# Patient Record
Sex: FEMALE | Race: WHITE | NOT HISPANIC OR LATINO | Employment: FULL TIME | URBAN - METROPOLITAN AREA
[De-identification: names, ages, dates, MRNs, and addresses within clinical notes are randomized per-mention and may not be internally consistent; named-entity substitution may affect disease eponyms.]

---

## 2019-09-03 ENCOUNTER — OFFICE VISIT (OUTPATIENT)
Dept: RETAIL CLINIC | Facility: CLINIC | Age: 49
End: 2019-09-03

## 2019-09-03 DIAGNOSIS — G51.0 BELL'S PALSY: Primary | ICD-10-CM

## 2019-09-03 PROCEDURE — 99214 OFFICE O/P EST MOD 30 MIN: CPT | Performed by: NURSE PRACTITIONER

## 2019-09-03 RX ORDER — ALBUTEROL SULFATE 1.25 MG/3ML
1 SOLUTION RESPIRATORY (INHALATION) EVERY 6 HOURS PRN
COMMUNITY
End: 2019-11-27

## 2019-09-03 RX ORDER — INSULIN DEGLUDEC 100 U/ML
58 INJECTION, SOLUTION SUBCUTANEOUS DAILY
COMMUNITY
End: 2020-04-08

## 2019-09-03 RX ORDER — PREDNISONE 20 MG/1
20 TABLET ORAL 2 TIMES DAILY
Qty: 10 TABLET | Refills: 0 | Status: SHIPPED | OUTPATIENT
Start: 2019-09-03 | End: 2019-09-08

## 2019-09-03 NOTE — PROGRESS NOTES
"Stacy Pack is a 49 y.o. female.     History of Present Illness   Patient presents with new, sudden onset of right facial droop that started on Saturday (3 days ago).  She denies any other neuro defecits, no trouble swallowing or speaking, no arm or leg wekeness, no headache, no dizziness, no vision changes.  She does have history of Bell's Palsy in 2002 and 2014 with same associated symptoms.  The facial droop has remained the same since Saturday, no improvement or worsening.  She called her PCP and was told to go to the ER and they \"refused to see her\" according to the patient.  The following portions of the patient's history were reviewed and updated as appropriate: allergies, current medications, past family history, past medical history, past social history, past surgical history and problem list.    Review of Systems   HENT: Positive for drooling (right side). Negative for sinus pressure, sneezing, sore throat, swollen glands, trouble swallowing and voice change.    Eyes:        Right eye dryness   Gastrointestinal: Negative for nausea and vomiting.   Musculoskeletal: Negative.    Skin: Negative.    Neurological: Positive for facial asymmetry and numbness (right face). Negative for speech difficulty and weakness. Headache: right side facial droop.       Objective   Physical Exam   Constitutional: She is oriented to person, place, and time. She is cooperative. No distress.   HENT:   Head: Normocephalic.   Right Ear: Hearing, tympanic membrane, external ear and ear canal normal.   Left Ear: Hearing, tympanic membrane, external ear and ear canal normal.   Nose: Nose normal.   Mouth/Throat: Oropharynx is clear and moist.   Eyes: Conjunctivae, EOM and lids are normal. Pupils are equal, round, and reactive to light.   Neck: Trachea normal and full passive range of motion without pain.   Cardiovascular: Normal rate, regular rhythm and normal pulses.   Pulmonary/Chest: Effort normal and breath sounds " normal.   Neurological: She is alert and oriented to person, place, and time.   Right side facial droop with associated numbness. Unable to completely close right eye, mouth droop on right, patient reports some drooling. No vocal wetness, no aphasia or dysphagia noted.  No other weakness in arm or leg.  Unable to raise right eyebrow or squint right eye, no movement noted on right side of face.  No other focal neuro defecits apparent.   Skin: Skin is warm. Capillary refill takes less than 2 seconds.   Psychiatric: She has a normal mood and affect. Her speech is normal and behavior is normal.   Vitals reviewed.        Assessment/Plan   Kelly was seen today for facial droop.    Diagnoses and all orders for this visit:    Bell's palsy    Other orders  -     predniSONE (DELTASONE) 20 MG tablet; Take 1 tablet by mouth 2 (Two) Times a Day for 5 days.      Educated patient that Amory Palsy does have some same signs and symptoms as stroke and there is possibility of this being stroke, although likely Bell's Palsy due to no other neuro concerns and history.  Instructed her that she would need to go to ER immediately with any weakness in arms, legs, sudden headache, trouble swallowing or speaking.  She verbalized understanding and agrees with treatment plan to treat with steroids and follow up with PCP or ER if any changes.  We also had long discussion about her diabetes and the steroids increasing her blood sugar, she stated she spoke with her endocrinologist today and was told that if steroids were started they could adjust her insulin doses.  I instructed her to notify them of the steroid use and get their orders on any changes.  Again I reiterated the importance of follow up with PCP and Endocrinology and if any changes she would need to go directly to the ED.

## 2019-09-03 NOTE — PATIENT INSTRUCTIONS
Bell Palsy, Adult    Bell palsy is a short-term inability to move muscles in part of the face. The inability to move (paralysis) results from inflammation or compression of the facial nerve, which travels along the skull and under the ear to the side of the face (7th cranial nerve). This nerve is responsible for facial movements that include blinking, closing the eyes, smiling, and frowning.  What are the causes?  The exact cause of this condition is not known. It may be caused by an infection from a virus, such as the chickenpox (herpes zoster), Radha-Barr, or mumps virus.  What increases the risk?  You are more likely to develop this condition if:  · You are pregnant.  · You have diabetes.  · You have had a recent infection in your nose, throat, or airways (upper respiratory infection).  · You have a weakened body defense system (immune system).  · You have had a facial injury, such as a fracture.  · You have a family history of Bell palsy.  What are the signs or symptoms?  Symptoms of this condition include:  · Weakness on one side of the face.  · Drooping eyelid and corner of the mouth.  · Excessive tearing in one eye.  · Difficulty closing the eyelid.  · Dry eye.  · Drooling.  · Dry mouth.  · Changes in taste.  · Change in facial appearance.  · Pain behind one ear.  · Ringing in one or both ears.  · Sensitivity to sound in one ear.  · Facial twitching.  · Headache.  · Impaired speech.  · Dizziness.  · Difficulty eating or drinking.  Most of the time, only one side of the face is affected. Rarely, Bell palsy affects the whole face.  How is this diagnosed?  This condition is diagnosed based on:  · Your symptoms.  · Your medical history.  · A physical exam.  You may also have to see health care providers who specialize in disorders of the nerves (neurologist) or diseases and conditions of the eye (ophthalmologist). You may have tests, such as:  · A test to check for nerve damage (electromyogram).  · Imaging  studies, such as CT or MRI scans.  · Blood tests.  How is this treated?  This condition affects every person differently. Sometimes symptoms go away without treatment within a couple weeks. If treatment is needed, it varies from person to person. The goal of treatment is to reduce inflammation and protect the eye from damage. Treatment for Bell palsy may include:  · Medicines, such as:  ? Steroids to reduce swelling and inflammation.  ? Antiviral drugs.  ? Pain relievers, including aspirin, acetaminophen, or ibuprofen.  · Eye drops or ointment to keep your eye moist.  · Eye protection, if you cannot close your eye.  · Exercises or massage to regain muscle strength and function (physical therapy).  Follow these instructions at home:    · Take over-the-counter and prescription medicines only as told by your health care provider.  · If your eye is affected:  ? Keep your eye moist with eye drops or ointment as told by your health care provider.  ? Follow instructions for eye care and protection as told by your health care provider.  · Do any physical therapy exercises as told by your health care provider.  · Keep all follow-up visits as told by your health care provider. This is important.  Contact a health care provider if:  · You have a fever.  · Your symptoms do not get better within 2-3 weeks, or your symptoms get worse.  · Your eye is red, irritated, or painful.  · You have new symptoms.  Get help right away if:  · You have weakness or numbness in a part of your body other than your face.  · You have trouble swallowing.  · You develop neck pain or stiffness.  · You develop dizziness or shortness of breath.  Summary  · Bell palsy is a short-term inability to move muscles in part of the face. The inability to move (paralysis) results from inflammation or compression of the facial nerve.  · This condition affects every person differently. Sometimes symptoms go away without treatment within a couple weeks.  · If  treatment is needed, it varies from person to person. The goal of treatment is to reduce inflammation and protect the eye from damage.  · Contact your health care provider if your symptoms do not get better within 2-3 weeks, or your symptoms get worse.  This information is not intended to replace advice given to you by your health care provider. Make sure you discuss any questions you have with your health care provider.  Document Released: 12/18/2006 Document Revised: 11/16/2018 Document Reviewed: 02/20/2018  Elsevier Interactive Patient Education © 2019 [employer]

## 2019-09-04 VITALS
OXYGEN SATURATION: 97 % | TEMPERATURE: 98.7 F | RESPIRATION RATE: 18 BRPM | DIASTOLIC BLOOD PRESSURE: 52 MMHG | HEART RATE: 100 BPM | SYSTOLIC BLOOD PRESSURE: 90 MMHG

## 2019-09-11 ENCOUNTER — TELEPHONE (OUTPATIENT)
Dept: FAMILY MEDICINE CLINIC | Facility: CLINIC | Age: 49
End: 2019-09-11

## 2019-09-11 NOTE — TELEPHONE ENCOUNTER
Patient has appt on 9-17-19. She is out of [employer] Plus test strips. Can these be sent to pharmacy until she is seen? She tests 2-3 times a day

## 2019-09-17 ENCOUNTER — OFFICE VISIT (OUTPATIENT)
Dept: FAMILY MEDICINE CLINIC | Facility: CLINIC | Age: 49
End: 2019-09-17

## 2019-09-17 VITALS
HEIGHT: 66 IN | TEMPERATURE: 98.7 F | SYSTOLIC BLOOD PRESSURE: 126 MMHG | DIASTOLIC BLOOD PRESSURE: 84 MMHG | BODY MASS INDEX: 40.98 KG/M2 | OXYGEN SATURATION: 96 % | WEIGHT: 255 LBS | HEART RATE: 120 BPM

## 2019-09-17 DIAGNOSIS — R80.9 ALBUMINURIA: ICD-10-CM

## 2019-09-17 DIAGNOSIS — H92.01 RIGHT EAR PAIN: Primary | ICD-10-CM

## 2019-09-17 DIAGNOSIS — E78.5 HYPERLIPIDEMIA, UNSPECIFIED HYPERLIPIDEMIA TYPE: ICD-10-CM

## 2019-09-17 DIAGNOSIS — H93.12 TINNITUS OF LEFT EAR: ICD-10-CM

## 2019-09-17 DIAGNOSIS — J30.9 ALLERGIC RHINITIS, UNSPECIFIED SEASONALITY, UNSPECIFIED TRIGGER: ICD-10-CM

## 2019-09-17 PROCEDURE — 99213 OFFICE O/P EST LOW 20 MIN: CPT | Performed by: NURSE PRACTITIONER

## 2019-09-17 RX ORDER — SIMVASTATIN 20 MG
20 TABLET ORAL DAILY
Refills: 1 | COMMUNITY
Start: 2019-08-30 | End: 2019-09-17 | Stop reason: SDUPTHER

## 2019-09-17 RX ORDER — LISINOPRIL 2.5 MG/1
2.5 TABLET ORAL DAILY
Qty: 90 TABLET | Refills: 0 | Status: SHIPPED | OUTPATIENT
Start: 2019-09-17 | End: 2020-03-10

## 2019-09-17 RX ORDER — FLUTICASONE PROPIONATE 50 MCG
2 SPRAY, SUSPENSION (ML) NASAL DAILY
Qty: 3 BOTTLE | Refills: 0 | Status: SHIPPED | OUTPATIENT
Start: 2019-09-17 | End: 2020-02-25

## 2019-09-17 RX ORDER — ERGOCALCIFEROL 1.25 MG/1
50000 CAPSULE ORAL WEEKLY
Refills: 0 | COMMUNITY
Start: 2019-08-12 | End: 2019-09-17 | Stop reason: SDUPTHER

## 2019-09-17 RX ORDER — ALBUTEROL SULFATE 90 UG/1
2 AEROSOL, METERED RESPIRATORY (INHALATION) AS NEEDED
Refills: 1 | COMMUNITY
Start: 2019-09-14 | End: 2020-05-15 | Stop reason: SDUPTHER

## 2019-09-17 RX ORDER — SIMVASTATIN 20 MG
20 TABLET ORAL DAILY
Qty: 90 TABLET | Refills: 0 | Status: SHIPPED | OUTPATIENT
Start: 2019-09-17 | End: 2020-11-02

## 2019-09-17 RX ORDER — FEXOFENADINE HYDROCHLORIDE 180 MG/1
180 TABLET, FILM COATED ORAL DAILY
Qty: 90 TABLET | Refills: 0 | Status: SHIPPED | OUTPATIENT
Start: 2019-09-17 | End: 2020-02-28

## 2019-09-17 RX ORDER — ERGOCALCIFEROL 1.25 MG/1
50000 CAPSULE ORAL WEEKLY
Qty: 12 CAPSULE | Refills: 0 | Status: SHIPPED | OUTPATIENT
Start: 2019-09-17 | End: 2021-06-11

## 2019-09-17 RX ORDER — FLUTICASONE PROPIONATE 50 MCG
SPRAY, SUSPENSION (ML) NASAL DAILY
COMMUNITY
End: 2019-09-17 | Stop reason: SDUPTHER

## 2019-09-17 RX ORDER — HYDROCHLOROTHIAZIDE 12.5 MG/1
12.5 TABLET ORAL DAILY
Qty: 90 TABLET | Refills: 0 | Status: SHIPPED | OUTPATIENT
Start: 2019-09-17 | End: 2020-05-15 | Stop reason: SDUPTHER

## 2019-09-17 RX ORDER — FEXOFENADINE HYDROCHLORIDE 180 MG/1
180 TABLET, FILM COATED ORAL DAILY
Refills: 6 | COMMUNITY
Start: 2019-08-30 | End: 2019-09-17 | Stop reason: SDUPTHER

## 2019-09-17 RX ORDER — LISINOPRIL 2.5 MG/1
2.5 TABLET ORAL DAILY
Refills: 1 | COMMUNITY
Start: 2019-09-03 | End: 2019-09-17 | Stop reason: SDUPTHER

## 2019-09-17 RX ORDER — HYDROCHLOROTHIAZIDE 12.5 MG/1
12.5 TABLET ORAL DAILY
COMMUNITY
End: 2019-09-17 | Stop reason: SDUPTHER

## 2019-09-17 NOTE — PROGRESS NOTES
Stacy Pack is a 49 y.o. female.     Chief Complaint   Patient presents with   • Ear Fullness     both ears       Earache    There is pain in both ears. The current episode started more than 1 year ago. There has been no fever. Associated symptoms include hearing loss and rhinorrhea. Pertinent negatives include no ear discharge. She has tried nothing for the symptoms. There is no history of a tympanostomy tube.     Hyperlipidemia: Symptoms: none. Medications:The patient is adherent with their medication regimen. Medication(s): statin. The patient is due for nothing at this time.       The following portions of the patient's history were reviewed and updated as appropriate: allergies, current medications, past family history, past medical history, past social history, past surgical history and problem list.    Past Medical History:   Diagnosis Date   • Albuminuria    • Allergic rhinitis    • Bell's palsy    • Blood glucose elevated    • Diabetes mellitus with albuminuria (CMS/HCC)    • Elevated blood pressure reading without diagnosis of hypertension    • Hyperlipidemia    • Migraine    • Right otitis media    • Type II diabetes mellitus (CMS/[facility])    • Vitamin D deficiency        Past Surgical History:   Procedure Laterality Date   • CATARACT EXTRACTION     • CHOLECYSTECTOMY     • HYSTERECTOMY      due to cancer       Family History   Problem Relation Age of Onset   • Breast cancer Mother    • Diabetes Father    • Hyperlipidemia Father    • Hypertension Father    • Asthma Maternal Grandfather        Social History     Socioeconomic History   • Marital status: Unknown     Spouse name: Not on file   • Number of children: Not on file   • Years of education: Not on file   • Highest education level: Not on file   Tobacco Use   • Smoking status: Never Smoker   • Smokeless tobacco: Never Used   Substance and Sexual Activity   • Alcohol use: Yes     Frequency: Monthly or less     Comment: rarely   • Drug use:  "No   • Sexual activity: No       Review of Systems   HENT: Positive for ear pain, hearing loss and rhinorrhea. Negative for ear discharge.        Objective   Vitals:    09/17/19 0800   BP: 126/84   BP Location: Left arm   Patient Position: Sitting   Pulse: 120   Temp: 98.7 °F (37.1 °C)   SpO2: 96%   Weight: 116 kg (255 lb)   Height: 167.6 cm (66\")      Body mass index is 41.16 kg/m².  Physical Exam   Constitutional: She appears well-developed and well-nourished.   HENT:   Head: Normocephalic and atraumatic.   Right Ear: Tympanic membrane and ear canal normal.   Left Ear: Tympanic membrane and ear canal normal.   Nose: Nose normal.   Mouth/Throat: Oropharynx is clear and moist.   Cardiovascular: Normal rate, regular rhythm and normal heart sounds.   Pulmonary/Chest: Effort normal and breath sounds normal.   Musculoskeletal: She exhibits no edema.   Skin: Skin is warm and dry.   Psychiatric: She has a normal mood and affect.   Nursing note and vitals reviewed.        Assessment/Plan   Kelly was seen today for ear fullness.    Diagnoses and all orders for this visit:    Right ear pain  -     Ambulatory Referral to ENT (Otolaryngology)    Tinnitus of left ear  -     Ambulatory Referral to ENT (Otolaryngology)    Hyperlipidemia, unspecified hyperlipidemia type  -     simvastatin (ZOCOR) 20 MG tablet; Take 1 tablet by mouth Daily.    Allergic rhinitis, unspecified seasonality, unspecified trigger  -     EQ ALLERGY RELIEF 180 MG tablet; Take 1 tablet by mouth Daily.  -     fluticasone (FLONASE) 50 MCG/ACT nasal spray; 2 sprays into the nostril(s) as directed by provider Daily.    Albuminuria  -     lisinopril (PRINIVIL,ZESTRIL) 2.5 MG tablet; Take 1 tablet by mouth Daily.    Other orders  -     vitamin D (ERGOCALCIFEROL) 21497 units capsule capsule; Take 1 capsule by mouth 1 (One) Time Per Week.  -     hydrochlorothiazide (HYDRODIURIL) 12.5 MG tablet; Take 1 tablet by mouth Daily.               "

## 2019-10-24 ENCOUNTER — OFFICE VISIT (OUTPATIENT)
Dept: RETAIL CLINIC | Facility: CLINIC | Age: 49
End: 2019-10-24

## 2019-10-24 VITALS
SYSTOLIC BLOOD PRESSURE: 136 MMHG | DIASTOLIC BLOOD PRESSURE: 86 MMHG | TEMPERATURE: 100 F | OXYGEN SATURATION: 95 % | HEART RATE: 103 BPM

## 2019-10-24 DIAGNOSIS — J06.9 ACUTE URI: ICD-10-CM

## 2019-10-24 DIAGNOSIS — J02.9 SORE THROAT: Primary | ICD-10-CM

## 2019-10-24 LAB
EXPIRATION DATE: NORMAL
INTERNAL CONTROL: NORMAL
Lab: NORMAL
S PYO AG THROAT QL: NEGATIVE

## 2019-10-24 PROCEDURE — 99213 OFFICE O/P EST LOW 20 MIN: CPT | Performed by: NURSE PRACTITIONER

## 2019-10-24 PROCEDURE — 87880 STREP A ASSAY W/OPTIC: CPT | Performed by: NURSE PRACTITIONER

## 2019-10-24 RX ORDER — BROMPHENIRAMINE MALEATE, PSEUDOEPHEDRINE HYDROCHLORIDE, AND DEXTROMETHORPHAN HYDROBROMIDE 2; 30; 10 MG/5ML; MG/5ML; MG/5ML
SYRUP ORAL
Qty: 240 ML | Refills: 0 | Status: SHIPPED | OUTPATIENT
Start: 2019-10-24 | End: 2019-11-27

## 2019-10-24 RX ORDER — PREDNISONE 20 MG/1
20 TABLET ORAL 2 TIMES DAILY
Qty: 10 TABLET | Refills: 0 | Status: SHIPPED | OUTPATIENT
Start: 2019-10-24 | End: 2019-11-27

## 2019-10-24 NOTE — PROGRESS NOTES
Subjective:     Kelly Pack is a 49 y.o.     Sore Throat    Episode onset: started 3 days ago. Maximum temperature: unknown. Associated symptoms include coughing, ear pain, headaches and neck pain. Pertinent negatives include no congestion. She has had no exposure to strep or mono. She has tried NSAIDs (chloraseptic spray and lozenges) for the symptoms. The treatment provided mild relief.         The following portions of the patient's history were reviewed and updated as appropriate: allergies, current medications, past family history, past medical history, past social history, past surgical history and problem list.      Review of Systems   Constitutional: Positive for fever.   HENT: Positive for ear pain and sore throat. Negative for congestion.    Respiratory: Positive for cough.    Musculoskeletal: Positive for neck pain.   Neurological: Positive for headaches.         Objective:      Physical Exam   HENT:   Head: Normocephalic and atraumatic.   Right Ear: Ear canal normal. Right ear middle ear effusion: mild.   Left Ear: Tympanic membrane and ear canal normal.   Nose: Nose normal.   Mouth/Throat: No oropharyngeal exudate. Posterior oropharyngeal erythema: mild.   Cardiovascular: Tachycardia present.   Pulmonary/Chest: Effort normal and breath sounds normal.   Lymphadenopathy:     She has cervical adenopathy (right).   Vitals reviewed.          Kelly was seen today for sore throat and generalized body aches.    Diagnoses and all orders for this visit:    Sore throat  -     POC Rapid Strep A    Acute URI    Other orders  -     brompheniramine-pseudoephedrine-DM (BROMFED DM) 30-2-10 MG/5ML syrup; 5 to 10 cc every 4 hours as needed for cough, congestion, allergies  -     predniSONE (DELTASONE) 20 MG tablet; Take 1 tablet by mouth 2 (Two) Times a Day.

## 2019-10-24 NOTE — PATIENT INSTRUCTIONS

## 2019-11-25 ENCOUNTER — TELEPHONE (OUTPATIENT)
Dept: FAMILY MEDICINE CLINIC | Facility: CLINIC | Age: 49
End: 2019-11-25

## 2019-11-25 NOTE — TELEPHONE ENCOUNTER
Will be having eye surgery 12/10 & the eye surgeon wants her to have EKG, CBS & BMP and they would like the results at least 7 days prior so patient would like to come in this week

## 2019-11-27 ENCOUNTER — OFFICE VISIT (OUTPATIENT)
Dept: FAMILY MEDICINE CLINIC | Facility: CLINIC | Age: 49
End: 2019-11-27

## 2019-11-27 VITALS
TEMPERATURE: 98.4 F | BODY MASS INDEX: 41.95 KG/M2 | SYSTOLIC BLOOD PRESSURE: 110 MMHG | WEIGHT: 261 LBS | HEIGHT: 66 IN | HEART RATE: 107 BPM | DIASTOLIC BLOOD PRESSURE: 74 MMHG | OXYGEN SATURATION: 98 %

## 2019-11-27 DIAGNOSIS — Z01.818 PREOP EXAMINATION: Primary | ICD-10-CM

## 2019-11-27 PROCEDURE — 93000 ELECTROCARDIOGRAM COMPLETE: CPT | Performed by: NURSE PRACTITIONER

## 2019-11-27 PROCEDURE — 99213 OFFICE O/P EST LOW 20 MIN: CPT | Performed by: NURSE PRACTITIONER

## 2019-11-27 RX ORDER — BENZONATATE 200 MG/1
200 CAPSULE ORAL 3 TIMES DAILY PRN
Qty: 30 CAPSULE | Refills: 0 | Status: SHIPPED | OUTPATIENT
Start: 2019-11-27 | End: 2019-12-07

## 2019-11-27 NOTE — PROGRESS NOTES
Stacy Pack is a 49 y.o. female.     Chief Complaint   Patient presents with   • Pre-op Exam       History of Present Illness     Preop visit: The patient is being seen for a preoperative visit.  The procedure is removal of retinal hemorrhage from left eye scheduled for 12/10/2019 with Dr. Howard Lazarus.  Surgical risk assessment: She had prior anesthesia and no prior adverse reaction to general anesthesia.  Pertinent past medical history: Asthma, obesity and diabetes.  Exercise capacity: Able to walk 4 blocks without symptoms and able to walk 2 flights of stairs without symptoms.  Lifestyle factors: Denies alcohol use, denies tobacco use and denies illegal drug use.  Symptoms: No symptoms.  STOP questionnaire score is 1.  Other ALEISHA risk factors include high BMI but age less than 50, female gender and normal neck circumference.  Predicted risk of ALEISHA is mild.  Pertinent family history: No pertinent family history.  Living situation: Home is secure and supportive and no postop concerns with her living situation.      The following portions of the patient's history were reviewed and updated as appropriate: allergies, current medications, past family history, past medical history, past social history, past surgical history and problem list.    Past Medical History:   Diagnosis Date   • Albuminuria    • Allergic rhinitis    • Bell's palsy    • Blood glucose elevated    • Diabetes mellitus with albuminuria (CMS/HCC)    • Elevated blood pressure reading without diagnosis of hypertension    • Hyperlipidemia    • Migraine    • Right otitis media    • Type II diabetes mellitus (CMS/HCC)    • Vitamin D deficiency        Past Surgical History:   Procedure Laterality Date   • CATARACT EXTRACTION     • CHOLECYSTECTOMY     • HYSTERECTOMY      due to cancer       Family History   Problem Relation Age of Onset   • Breast cancer Mother    • Diabetes Father    • Hyperlipidemia Father    • Hypertension Father    •  "Asthma Maternal Grandfather        Social History     Socioeconomic History   • Marital status: Unknown     Spouse name: Not on file   • Number of children: Not on file   • Years of education: Not on file   • Highest education level: Not on file   Tobacco Use   • Smoking status: Never Smoker   • Smokeless tobacco: Never Used   Substance and Sexual Activity   • Alcohol use: Yes     Frequency: Monthly or less     Comment: rarely   • Drug use: No   • Sexual activity: No       Review of Systems   Constitutional: Negative for fever.   HENT: Negative for ear pain, rhinorrhea and sore throat.    Respiratory: Negative for shortness of breath.    Cardiovascular: Negative for chest pain.   Gastrointestinal: Negative for abdominal pain, diarrhea, nausea and vomiting.   Genitourinary: Negative for dysuria and hematuria.   Skin: Negative for rash.   Neurological: Negative for dizziness and headache.       Objective   Vitals:    11/27/19 0818   BP: 110/74   BP Location: Left arm   Patient Position: Sitting   Pulse: 107   Temp: 98.4 °F (36.9 °C)   SpO2: 98%   Weight: 118 kg (261 lb)   Height: 167.6 cm (65.98\")      Body mass index is 42.15 kg/m².    Physical Exam   Constitutional: She is oriented to person, place, and time. She appears well-developed and well-nourished.   HENT:   Head: Normocephalic and atraumatic.   Right Ear: Tympanic membrane and ear canal normal.   Left Ear: Tympanic membrane and ear canal normal.   Nose: Nose normal.   Mouth/Throat: Oropharynx is clear and moist.   Eyes: Conjunctivae are normal. Pupils are equal, round, and reactive to light.   Neck: Neck supple.   Cardiovascular: Normal rate, regular rhythm and normal heart sounds.   Pulmonary/Chest: Effort normal and breath sounds normal.   Abdominal: Soft. Bowel sounds are normal.   Musculoskeletal: Normal range of motion.   Neurological: She is alert and oriented to person, place, and time.   Skin: Skin is warm and dry.   Psychiatric: She has a normal " mood and affect.   Nursing note and vitals reviewed.      ECG 12 Lead  Date/Time: 11/27/2019 12:47 PM  Performed by: Mattie Freeman APRN  Authorized by: Mattie Freeman APRN   Comparison: not compared with previous ECG   Previous ECG: no previous ECG available  Rhythm: sinus tachycardia  Conduction: conduction normal  ST Segments: ST segments normal    Clinical impression: normal ECG            Assessment/Plan   Kelly was seen today for pre-op exam.    Diagnoses and all orders for this visit:    Preop examination  -     Basic Metabolic Panel  -     CBC & Differential    Patient is cleared for surgery pending lab results.

## 2019-11-28 LAB
BASOPHILS # BLD AUTO: 0.07 10*3/MM3 (ref 0–0.2)
BASOPHILS NFR BLD AUTO: 0.8 % (ref 0–1.5)
BUN SERPL-MCNC: 26 MG/DL (ref 6–20)
BUN/CREAT SERPL: 23.4 (ref 7–25)
CALCIUM SERPL-MCNC: 9.7 MG/DL (ref 8.6–10.5)
CHLORIDE SERPL-SCNC: 95 MMOL/L (ref 98–107)
CO2 SERPL-SCNC: 25.5 MMOL/L (ref 22–29)
CREAT SERPL-MCNC: 1.11 MG/DL (ref 0.57–1)
EOSINOPHIL # BLD AUTO: 0.32 10*3/MM3 (ref 0–0.4)
EOSINOPHIL NFR BLD AUTO: 3.5 % (ref 0.3–6.2)
ERYTHROCYTE [DISTWIDTH] IN BLOOD BY AUTOMATED COUNT: 12.7 % (ref 12.3–15.4)
GLUCOSE SERPL-MCNC: 400 MG/DL (ref 65–99)
HCT VFR BLD AUTO: 35.6 % (ref 34–46.6)
HGB BLD-MCNC: 12.2 G/DL (ref 12–15.9)
IMM GRANULOCYTES # BLD AUTO: 0.09 10*3/MM3 (ref 0–0.05)
IMM GRANULOCYTES NFR BLD AUTO: 1 % (ref 0–0.5)
LYMPHOCYTES # BLD AUTO: 2.97 10*3/MM3 (ref 0.7–3.1)
LYMPHOCYTES NFR BLD AUTO: 32.5 % (ref 19.6–45.3)
MCH RBC QN AUTO: 34.6 PG (ref 26.6–33)
MCHC RBC AUTO-ENTMCNC: 34.3 G/DL (ref 31.5–35.7)
MCV RBC AUTO: 100.8 FL (ref 79–97)
MONOCYTES # BLD AUTO: 0.65 10*3/MM3 (ref 0.1–0.9)
MONOCYTES NFR BLD AUTO: 7.1 % (ref 5–12)
NEUTROPHILS # BLD AUTO: 5.04 10*3/MM3 (ref 1.7–7)
NEUTROPHILS NFR BLD AUTO: 55.1 % (ref 42.7–76)
NRBC BLD AUTO-RTO: 0 /100 WBC (ref 0–0.2)
PLATELET # BLD AUTO: 399 10*3/MM3 (ref 140–450)
POTASSIUM SERPL-SCNC: 4.4 MMOL/L (ref 3.5–5.2)
RBC # BLD AUTO: 3.53 10*6/MM3 (ref 3.77–5.28)
SODIUM SERPL-SCNC: 136 MMOL/L (ref 136–145)
WBC # BLD AUTO: 9.14 10*3/MM3 (ref 3.4–10.8)

## 2019-12-03 ENCOUNTER — TELEPHONE (OUTPATIENT)
Dept: FAMILY MEDICINE CLINIC | Facility: CLINIC | Age: 49
End: 2019-12-03

## 2019-12-03 NOTE — TELEPHONE ENCOUNTER
"A1c obtained from Dr. Mendoza on 10/29/2019 states \"too high.\"  You will need to follow-up with Dr. Mendoza to get surgical clearance.    Patient aware of results and recommendations.  "

## 2020-01-27 ENCOUNTER — ANESTHESIA EVENT (OUTPATIENT)
Dept: PERIOP | Facility: HOSPITAL | Age: 50
End: 2020-01-27

## 2020-01-28 ENCOUNTER — ANESTHESIA (OUTPATIENT)
Dept: PERIOP | Facility: HOSPITAL | Age: 50
End: 2020-01-28

## 2020-01-28 ENCOUNTER — HOSPITAL ENCOUNTER (OUTPATIENT)
Facility: HOSPITAL | Age: 50
Setting detail: HOSPITAL OUTPATIENT SURGERY
Discharge: HOME | End: 2020-01-28
Attending: OPHTHALMOLOGY | Admitting: OPHTHALMOLOGY

## 2020-01-28 VITALS
SYSTOLIC BLOOD PRESSURE: 118 MMHG | OXYGEN SATURATION: 94 % | RESPIRATION RATE: 14 BRPM | WEIGHT: 263.89 LBS | DIASTOLIC BLOOD PRESSURE: 88 MMHG | BODY MASS INDEX: 39.09 KG/M2 | HEIGHT: 69 IN | TEMPERATURE: 96.9 F | HEART RATE: 91 BPM

## 2020-01-28 LAB — GLUCOSE BLDC GLUCOMTR-MCNC: 106 MG/DL (ref 70–105)

## 2020-01-28 PROCEDURE — 82962 GLUCOSE BLOOD TEST: CPT

## 2020-01-28 PROCEDURE — 25010000003 HYALURONIDASE OVINE 200 UNIT/ML SOLUTION: Performed by: OPHTHALMOLOGY

## 2020-01-28 PROCEDURE — 25010000002 DEXAMETHASONE PER 1 MG: Performed by: OPHTHALMOLOGY

## 2020-01-28 PROCEDURE — 25010000003 CEFAZOLIN PER 500 MG: Performed by: OPHTHALMOLOGY

## 2020-01-28 PROCEDURE — 25010000002 PROPOFOL 10 MG/ML EMULSION: Performed by: ANESTHESIOLOGIST ASSISTANT

## 2020-01-28 RX ORDER — SODIUM CHLORIDE, SODIUM LACTATE, POTASSIUM CHLORIDE, CALCIUM CHLORIDE 600; 310; 30; 20 MG/100ML; MG/100ML; MG/100ML; MG/100ML
20 INJECTION, SOLUTION INTRAVENOUS CONTINUOUS
Status: DISCONTINUED | OUTPATIENT
Start: 2020-01-28 | End: 2020-01-28 | Stop reason: HOSPADM

## 2020-01-28 RX ORDER — SODIUM CHLORIDE 0.9 % (FLUSH) 0.9 %
10 SYRINGE (ML) INJECTION EVERY 12 HOURS SCHEDULED
Status: DISCONTINUED | OUTPATIENT
Start: 2020-01-28 | End: 2020-01-28 | Stop reason: HOSPADM

## 2020-01-28 RX ORDER — DEXAMETHASONE SODIUM PHOSPHATE 4 MG/ML
INJECTION, SOLUTION INTRA-ARTICULAR; INTRALESIONAL; INTRAMUSCULAR; INTRAVENOUS; SOFT TISSUE AS NEEDED
Status: DISCONTINUED | OUTPATIENT
Start: 2020-01-28 | End: 2020-01-28 | Stop reason: HOSPADM

## 2020-01-28 RX ORDER — PROPOFOL 10 MG/ML
VIAL (ML) INTRAVENOUS AS NEEDED
Status: DISCONTINUED | OUTPATIENT
Start: 2020-01-28 | End: 2020-01-28 | Stop reason: SURG

## 2020-01-28 RX ORDER — BUPIVACAINE HYDROCHLORIDE 7.5 MG/ML
INJECTION, SOLUTION EPIDURAL; RETROBULBAR AS NEEDED
Status: DISCONTINUED | OUTPATIENT
Start: 2020-01-28 | End: 2020-01-28 | Stop reason: HOSPADM

## 2020-01-28 RX ORDER — CIPROFLOXACIN HYDROCHLORIDE 3.5 MG/ML
1 SOLUTION/ DROPS TOPICAL
Status: COMPLETED | OUTPATIENT
Start: 2020-01-28 | End: 2020-01-28

## 2020-01-28 RX ORDER — LIDOCAINE HYDROCHLORIDE 10 MG/ML
INJECTION, SOLUTION EPIDURAL; INFILTRATION; INTRACAUDAL; PERINEURAL AS NEEDED
Status: DISCONTINUED | OUTPATIENT
Start: 2020-01-28 | End: 2020-01-28 | Stop reason: SURG

## 2020-01-28 RX ORDER — SODIUM CHLORIDE, SODIUM LACTATE, POTASSIUM CHLORIDE, CALCIUM CHLORIDE 600; 310; 30; 20 MG/100ML; MG/100ML; MG/100ML; MG/100ML
9 INJECTION, SOLUTION INTRAVENOUS CONTINUOUS PRN
Status: DISCONTINUED | OUTPATIENT
Start: 2020-01-28 | End: 2020-01-28 | Stop reason: HOSPADM

## 2020-01-28 RX ORDER — SODIUM CHLORIDE 0.9 % (FLUSH) 0.9 %
10 SYRINGE (ML) INJECTION AS NEEDED
Status: DISCONTINUED | OUTPATIENT
Start: 2020-01-28 | End: 2020-01-28 | Stop reason: HOSPADM

## 2020-01-28 RX ORDER — BALANCED SALT SOLUTION 6.4; .75; .48; .3; 3.9; 1.7 MG/ML; MG/ML; MG/ML; MG/ML; MG/ML; MG/ML
SOLUTION OPHTHALMIC AS NEEDED
Status: DISCONTINUED | OUTPATIENT
Start: 2020-01-28 | End: 2020-01-28 | Stop reason: HOSPADM

## 2020-01-28 RX ORDER — SODIUM CHLORIDE 9 MG/ML
9 INJECTION, SOLUTION INTRAVENOUS CONTINUOUS PRN
Status: DISCONTINUED | OUTPATIENT
Start: 2020-01-28 | End: 2020-01-28 | Stop reason: HOSPADM

## 2020-01-28 RX ORDER — PHENYLEPHRINE HCL 2.5 %
1 DROPS OPHTHALMIC (EYE)
Status: COMPLETED | OUTPATIENT
Start: 2020-01-28 | End: 2020-01-28

## 2020-01-28 RX ORDER — CYCLOPENTOLATE HYDROCHLORIDE 10 MG/ML
1 SOLUTION/ DROPS OPHTHALMIC
Status: COMPLETED | OUTPATIENT
Start: 2020-01-28 | End: 2020-01-28

## 2020-01-28 RX ORDER — NEOMYCIN SULFATE, POLYMYXIN B SULFATE AND BACITRACIN ZINC 3.5; 10000; 4 MG/G; [USP'U]/G; [USP'U]/G
OINTMENT OPHTHALMIC AS NEEDED
Status: DISCONTINUED | OUTPATIENT
Start: 2020-01-28 | End: 2020-01-28 | Stop reason: HOSPADM

## 2020-01-28 RX ORDER — ATROPINE SULFATE 10 MG/ML
SOLUTION/ DROPS OPHTHALMIC AS NEEDED
Status: DISCONTINUED | OUTPATIENT
Start: 2020-01-28 | End: 2020-01-28 | Stop reason: HOSPADM

## 2020-01-28 RX ADMIN — SODIUM CHLORIDE 9 ML/HR: 900 INJECTION, SOLUTION INTRAVENOUS at 11:25

## 2020-01-28 RX ADMIN — CYCLOPENTOLATE HYDROCHLORIDE 1 DROP: 10 SOLUTION/ DROPS OPHTHALMIC at 11:07

## 2020-01-28 RX ADMIN — CIPROFLOXACIN 1 DROP: 3 SOLUTION OPHTHALMIC at 10:52

## 2020-01-28 RX ADMIN — PHENYLEPHRINE HYDROCHLORIDE 1 DROP: 25 SOLUTION/ DROPS OPHTHALMIC at 11:07

## 2020-01-28 RX ADMIN — CYCLOPENTOLATE HYDROCHLORIDE 1 DROP: 10 SOLUTION/ DROPS OPHTHALMIC at 10:59

## 2020-01-28 RX ADMIN — CIPROFLOXACIN 1 DROP: 3 SOLUTION OPHTHALMIC at 11:07

## 2020-01-28 RX ADMIN — PHENYLEPHRINE HYDROCHLORIDE 1 DROP: 25 SOLUTION/ DROPS OPHTHALMIC at 10:52

## 2020-01-28 RX ADMIN — PHENYLEPHRINE HYDROCHLORIDE 1 DROP: 25 SOLUTION/ DROPS OPHTHALMIC at 10:59

## 2020-01-28 RX ADMIN — PROPOFOL 100 MG: 10 INJECTION, EMULSION INTRAVENOUS at 12:55

## 2020-01-28 RX ADMIN — CIPROFLOXACIN 1 DROP: 3 SOLUTION OPHTHALMIC at 10:59

## 2020-01-28 RX ADMIN — LIDOCAINE HYDROCHLORIDE 40 MG: 10 INJECTION, SOLUTION EPIDURAL; INFILTRATION; INTRACAUDAL; PERINEURAL at 12:55

## 2020-01-28 RX ADMIN — CYCLOPENTOLATE HYDROCHLORIDE 1 DROP: 10 SOLUTION/ DROPS OPHTHALMIC at 10:52

## 2020-01-28 NOTE — H&P
"    Patient Care Team:  Mattie Freeman APRN as PCP - General (Family Medicine)    Chief complaint decreased vision    HPI h/o PDR with nonclearing vitreous hemorrhage OS    Review of Systems  Review of Systems   Constitution: Negative.   HENT: Negative.    Eyes: Negative.    Cardiovascular: Negative.    Respiratory: Negative.    Endocrine: Negative.    Skin: Negative.    Musculoskeletal: Negative.    Gastrointestinal: Negative.    Genitourinary: Negative.    Neurological: Negative.    Psychiatric/Behavioral: Negative.    Allergic/Immunologic: Negative.    All other systems reviewed and are negative.      Physical Exam  Physical Exam    No Known Allergies    History  Past Medical History:   Diagnosis Date   • Albuminuria    • Allergic rhinitis    • Asthma     allergy triggered   • Bell's palsy    • Blood glucose elevated    • Diabetes mellitus with albuminuria (CMS/HCC)    • Elevated blood pressure reading without diagnosis of hypertension    • Hyperlipidemia    • Migraine    • Right otitis media    • Type II diabetes mellitus (CMS/[facility])    • Vitamin D deficiency        Past Surgical History:   Procedure Laterality Date   • CATARACT EXTRACTION     • CHOLECYSTECTOMY     • HYSTERECTOMY      due to cancer       Social History     Tobacco Use   • Smoking status: Never Smoker   • Smokeless tobacco: Never Used   Substance Use Topics   • Alcohol use: Yes     Frequency: Monthly or less     Comment: rarely   • Drug use: No       Family History   Problem Relation Age of Onset   • Breast cancer Mother    • Diabetes Father    • Hyperlipidemia Father    • Hypertension Father    • Asthma Maternal Grandfather        Vital Signs  /73 (BP Location: Right arm, Patient Position: Lying)   Pulse 96   Temp 98.7 °F (37.1 °C) (Temporal)   Resp 15   Ht 175.3 cm (69\")   Wt 120 kg (263 lb 14.3 oz)   SpO2 96%   BMI 38.97 kg/m²   Temp:  [98.7 °F (37.1 °C)] 98.7 °F (37.1 °C)  Heart Rate:  [96] 96  Resp:  [15] 15  BP: (108)/(73) " 108/73      Assessment:  (Non cleareing diabetic vitreous hemorrhage).     Plan:   (Vitrectomy and panretinal endolaser OS).       Howard S. Lazarus, MD  01/28/20  11:52 AM

## 2020-01-28 NOTE — DISCHARGE INSTRUCTIONS
Leave patch on   Call Dr. Lazarus if severe pain at [phone]  Keep scheduled follow up tomorrow

## 2020-01-28 NOTE — ANESTHESIA PREPROCEDURE EVALUATION
Anesthesia Evaluation     Patient summary reviewed and Nursing notes reviewed   NPO Solid Status: > 6 hours  NPO Liquid Status: > 6 hours           Airway   Mallampati: II  TM distance: >3 FB  Neck ROM: full  Possible difficult intubation  Dental    (+) poor dentition    Pulmonary - normal exam    breath sounds clear to auscultation  (+) asthma,sleep apnea,   Cardiovascular - normal exam    ECG reviewed  Rhythm: regular  Rate: normal    (+) hypertension, hyperlipidemia,       Neuro/Psych  (+) headaches, numbness,     GI/Hepatic/Renal/Endo    (+) obesity, morbid obesity,  diabetes mellitus,     Musculoskeletal (-) negative ROS    Abdominal  - normal exam   Substance History - negative use     OB/GYN          Other        ROS/Med Hx Other: Last used inhaler 2 days ago                Anesthesia Plan    ASA 3     MAC     intravenous induction     Anesthetic plan, all risks, benefits, and alternatives have been provided, discussed and informed consent has been obtained with: patient.

## 2020-01-28 NOTE — ANESTHESIA POSTPROCEDURE EVALUATION
Patient: Kelly Pack    Procedure Summary     Date:  01/28/20 Room / Location:  [facility] OR 05 / [facility] MA[facility]    Anesthesia Start:  1250 Anesthesia Stop:  1318    Procedure:  25G VITRECTOMY-ENDO LASER (Left Eye) Diagnosis:       Proliferative diabetic retinopathy of left eye (CMS/HCC)      Vitreous hemorrhage of left eye (CMS/HCC)      (Proliferative diabetic retinopathy of left eye (CMS/HCC) [E11.3592] Vitreous hemorrhage of left eye (CMS/HCC) [H43.12])    Surgeon:  Lazarus, Howard S., MD Provider:  Junior Cueva MD    Anesthesia Type:  MAC ASA Status:  3          Anesthesia Type: MAC    Vitals  No vitals data found for the desired time range.          Post Anesthesia Care and Evaluation    Patient location during evaluation: PHASE II  Patient participation: complete - patient participated  Level of consciousness: awake and alert  Pain score: 0  Pain management: adequate  Airway patency: patent  Anesthetic complications: No anesthetic complications  PONV Status: none  Cardiovascular status: acceptable  Respiratory status: acceptable  Hydration status: acceptable

## 2020-01-28 NOTE — OP NOTE
VITRECTOMY PARS PLANA 25GA  Procedure Report    Patient Name:  Kelly Pack  YOB: 1970    Date of Surgery:  1/28/2020     Indications: Recurrent nonclearing diabetic vitreous hemorrhage    Pre-op Diagnosis:   Proliferative diabetic retinopathy of left eye (CMS/[facility]) [E11.3592] Vitreous hemorrhage of left eye (CMS/HCC) [H43.12]       Post-Op Diagnosis Codes:     * Proliferative diabetic retinopathy of left eye (CMS/HCC) [E11.3592]     * Vitreous hemorrhage of left eye (CMS/[facility]) [H43.12]    Procedure/CPT® Codes:      Procedure(s):  25G VITRECTOMY-ENDO LASER    Staff:       Anesthesia: Monitored Anesthesia Care    Implants:    Nothing was implanted during the procedure    Complications: None    Description of Procedure: After obtaining informed consent, the patient was taken to the operating room where a peribulbar block was administered to the operative eye.  The patient was then prepped and draped in the customary manner.  Using a 25 gauge entry system, an infusion cannula was inserted inferotemporally 3.5 mm posterior to the limbus.  Additional cannulas were inserted supratemporally superonasally in a similar manner.  A DORC dual chandelier was inserted superiorly.  The eye was entered with a light pipe vitreous cutter.  Residual vitreous trimmed to the periphery for 360° Peripheral vitreous was removed employing scleral depression for visualization.  Laser was then applied in a peripheral scatter pattern for 360 degrees.   The periphery was  examined with scleral depression and no peripheral breaks or tears were noted.. The cannulas were removed and the eye was left slightly soft to palpation.  Subconjunctival injections of Decadron and cefazolin were administered into the inferior fornix. A drop of atropine and triple antibiotic ointment were placed on the eye and it was covered with a patch and Carr shield.  The patient was taken to the postoperative recovery area in stable condition.  Leobardo HAYS  Lazarus, MD     Date: 1/28/2020  Time: 1:30 PM

## 2020-02-12 ENCOUNTER — OFFICE VISIT (OUTPATIENT)
Dept: FAMILY MEDICINE CLINIC | Facility: CLINIC | Age: 50
End: 2020-02-12

## 2020-02-12 VITALS
HEART RATE: 100 BPM | OXYGEN SATURATION: 98 % | SYSTOLIC BLOOD PRESSURE: 110 MMHG | HEIGHT: 69 IN | TEMPERATURE: 97.4 F | DIASTOLIC BLOOD PRESSURE: 78 MMHG | BODY MASS INDEX: 38.48 KG/M2 | WEIGHT: 259.8 LBS

## 2020-02-12 DIAGNOSIS — F41.9 ANXIETY: ICD-10-CM

## 2020-02-12 DIAGNOSIS — F32.1 CURRENT MODERATE EPISODE OF MAJOR DEPRESSIVE DISORDER WITHOUT PRIOR EPISODE (HCC): Primary | ICD-10-CM

## 2020-02-12 PROCEDURE — 99213 OFFICE O/P EST LOW 20 MIN: CPT | Performed by: NURSE PRACTITIONER

## 2020-02-12 RX ORDER — PREDNISOLONE ACETATE 10 MG/ML
SUSPENSION/ DROPS OPHTHALMIC
COMMUNITY
Start: 2020-01-28 | End: 2021-09-14

## 2020-02-12 RX ORDER — ACETAMINOPHEN AND CODEINE PHOSPHATE 300; 30 MG/1; MG/1
TABLET ORAL SEE ADMIN INSTRUCTIONS
COMMUNITY
Start: 2020-02-07 | End: 2020-04-08

## 2020-02-12 NOTE — PROGRESS NOTES
Stacy Pack is a 49 y.o. female.     Chief Complaint   Patient presents with   • Anxiety       History of Present Illness   Anxiety (initial): The patient reports doing poorly. Symptoms: feelings of losing control and insomnia. Significant family history: depression. Medication(s): none. Patient is not exercising. Patient does not use tobacco.     PHQ-9 Depression Screening  Little interest or pleasure in doing things? 3   Feeling down, depressed, or hopeless? 3   Trouble falling or staying asleep, or sleeping too much? 3   Feeling tired or having little energy? 3   Poor appetite or overeating? 1   Feeling bad about yourself - or that you are a failure or have let yourself or your family down? 0   Trouble concentrating on things, such as reading the newspaper or watching television? 0   Moving or speaking so slowly that other people could have noticed? Or the opposite - being so fidgety or restless that you have been moving around a lot more than usual? 0   Thoughts that you would be better off dead, or of hurting yourself in some way? 0   PHQ-9 Total Score 13   If you checked off any problems, how difficult have these problems made it for you to do your work, take care of things at home, or get along with other people? Not difficult at all         The following portions of the patient's history were reviewed and updated as appropriate: allergies, current medications, past family history, past medical history, past social history, past surgical history and problem list.    Past Medical History:   Diagnosis Date   • Albuminuria    • Allergic rhinitis    • Asthma     allergy triggered   • Bell's palsy    • Blood glucose elevated    • Diabetes mellitus with albuminuria (CMS/[facility])    • Elevated blood pressure reading without diagnosis of hypertension    • Hyperlipidemia    • Migraine    • Right otitis media    • Type II diabetes mellitus (CMS/[facility])    • Vitamin D deficiency        Past Surgical History:  "  Procedure Laterality Date   • CATARACT EXTRACTION     • CHOLECYSTECTOMY     • HYSTERECTOMY      due to cancer   • VITRECTOMY PARS PLANA Left 1/28/2020    Procedure: 25G VITRECTOMY-ENDO LASER;  Surgeon: Lazarus, Howard S., MD;  Location: [facility] MAIN OR;  Service: Ophthalmology       Family History   Problem Relation Age of Onset   • Breast cancer Mother    • Diabetes Father    • Hyperlipidemia Father    • Hypertension Father    • Asthma Maternal Grandfather        Social History     Socioeconomic History   • Marital status: Unknown     Spouse name: Not on file   • Number of children: Not on file   • Years of education: Not on file   • Highest education level: Not on file   Tobacco Use   • Smoking status: Never Smoker   • Smokeless tobacco: Never Used   Substance and Sexual Activity   • Alcohol use: Yes     Frequency: Monthly or less     Comment: rarely   • Drug use: No   • Sexual activity: Never       Review of Systems   Psychiatric/Behavioral: Positive for depressed mood. Negative for suicidal ideas. The patient is nervous/anxious.        Objective   Vitals:    02/12/20 1328   BP: 110/78   BP Location: Left arm   Patient Position: Sitting   Cuff Size: Large Adult   Pulse: 100   Temp: 97.4 °F (36.3 °C)   TempSrc: Temporal   SpO2: 98%   Weight: 118 kg (259 lb 12.8 oz)   Height: 175.3 cm (69\")      Body mass index is 38.37 kg/m².  Physical Exam   Constitutional: She is oriented to person, place, and time. She appears well-developed and well-nourished.   Cardiovascular: Normal rate, regular rhythm and normal heart sounds.   Pulmonary/Chest: Effort normal and breath sounds normal.   Neurological: She is alert and oriented to person, place, and time.   Skin: Skin is warm and dry.   Psychiatric: She has a normal mood and affect. She expresses no homicidal and no suicidal ideation.   Nursing note and vitals reviewed.        Assessment/Plan   Kelly was seen today for anxiety.    Diagnoses and all orders for this " visit:    Current moderate episode of major depressive disorder without prior episode (CMS/[facility])  Comments:  - Follow-up in 6 weeks or sooner if symptoms worsen.   - ER if any SI/HI.   Orders:  -     sertraline (ZOLOFT) 50 MG tablet; TAKE 1/2 TABLET DAILY X 1 WEEK, THEN TAKE 1 TABLET DAILY    Anxiety  -     sertraline (ZOLOFT) 50 MG tablet; TAKE 1/2 TABLET DAILY X 1 WEEK, THEN TAKE 1 TABLET DAILY

## 2020-02-23 DIAGNOSIS — J30.9 ALLERGIC RHINITIS, UNSPECIFIED SEASONALITY, UNSPECIFIED TRIGGER: ICD-10-CM

## 2020-02-25 RX ORDER — FLUTICASONE PROPIONATE 50 MCG
SPRAY, SUSPENSION (ML) NASAL
Qty: 48 G | Refills: 1 | Status: SHIPPED | OUTPATIENT
Start: 2020-02-25 | End: 2021-09-14 | Stop reason: SDUPTHER

## 2020-02-28 DIAGNOSIS — J30.9 ALLERGIC RHINITIS, UNSPECIFIED SEASONALITY, UNSPECIFIED TRIGGER: ICD-10-CM

## 2020-02-28 RX ORDER — FEXOFENADINE HYDROCHLORIDE 180 MG/1
TABLET, FILM COATED ORAL
Qty: 90 TABLET | Refills: 0 | Status: SHIPPED | OUTPATIENT
Start: 2020-02-28 | End: 2022-09-16

## 2020-03-09 DIAGNOSIS — R80.9 ALBUMINURIA: ICD-10-CM

## 2020-03-10 RX ORDER — LISINOPRIL 2.5 MG/1
TABLET ORAL
Qty: 90 TABLET | Refills: 0 | Status: SHIPPED | OUTPATIENT
Start: 2020-03-10 | End: 2020-05-07

## 2020-04-08 ENCOUNTER — TELEPHONE (OUTPATIENT)
Dept: FAMILY MEDICINE CLINIC | Facility: CLINIC | Age: 50
End: 2020-04-08

## 2020-04-08 ENCOUNTER — OFFICE VISIT (OUTPATIENT)
Dept: FAMILY MEDICINE CLINIC | Facility: CLINIC | Age: 50
End: 2020-04-08

## 2020-04-08 DIAGNOSIS — F32.0 CURRENT MILD EPISODE OF MAJOR DEPRESSIVE DISORDER, UNSPECIFIED WHETHER RECURRENT (HCC): Primary | ICD-10-CM

## 2020-04-08 DIAGNOSIS — F41.9 ANXIETY: ICD-10-CM

## 2020-04-08 PROCEDURE — 99442 PR PHYS/QHP TELEPHONE EVALUATION 11-20 MIN: CPT | Performed by: NURSE PRACTITIONER

## 2020-04-08 RX ORDER — SERTRALINE HYDROCHLORIDE 100 MG/1
100 TABLET, FILM COATED ORAL DAILY
Qty: 90 TABLET | Refills: 0 | Status: SHIPPED | OUTPATIENT
Start: 2020-04-08 | End: 2020-07-28 | Stop reason: SDUPTHER

## 2020-04-08 NOTE — TELEPHONE ENCOUNTER
Patient is scheduled for today at 3:30. Front staff already arrived her but patient asked that you wait until 3:30 so she can get off a call from work.

## 2020-04-08 NOTE — PROGRESS NOTES
Stacy Pack is a 49 y.o. female.     Chief Complaint   Patient presents with   • Anxiety       History of Present Illness   Anxiety: The patient reports doing poorly. Symptoms: panic and insomnia. The patient is adherent to their medication regimen. Medication(s): sertraline. Patient is exercising. Patient does not use tobacco.       The following portions of the patient's history were reviewed and updated as appropriate: allergies, current medications, past family history, past medical history, past social history, past surgical history and problem list.    Past Medical History:   Diagnosis Date   • Albuminuria    • Allergic rhinitis    • Asthma     allergy triggered   • Bell's palsy    • Blood glucose elevated    • Diabetes mellitus with albuminuria (CMS/HCC)    • Elevated blood pressure reading without diagnosis of hypertension    • Hyperlipidemia    • Migraine    • Right otitis media    • Type II diabetes mellitus (CMS/HCC)    • Vitamin D deficiency        Past Surgical History:   Procedure Laterality Date   • CATARACT EXTRACTION     • CHOLECYSTECTOMY     • HYSTERECTOMY      due to cancer   • VITRECTOMY PARS PLANA Left 1/28/2020    Procedure: 25G VITRECTOMY-ENDO LASER;  Surgeon: Lazarus, Howard S., MD;  Location: [facility];  Service: Ophthalmology       Family History   Problem Relation Age of Onset   • Breast cancer Mother    • Diabetes Father    • Hyperlipidemia Father    • Hypertension Father    • Asthma Maternal Grandfather        Social History     Socioeconomic History   • Marital status: Unknown     Spouse name: Not on file   • Number of children: Not on file   • Years of education: Not on file   • Highest education level: Not on file   Tobacco Use   • Smoking status: Never Smoker   • Smokeless tobacco: Never Used   Substance and Sexual Activity   • Alcohol use: Yes     Frequency: Monthly or less     Comment: rarely   • Drug use: No   • Sexual activity: Never       Review of Systems    Psychiatric/Behavioral: Positive for sleep disturbance. Negative for suicidal ideas and depressed mood. The patient is nervous/anxious.        Objective   There were no vitals filed for this visit.   There is no height or weight on file to calculate BMI.        Assessment/Plan   Kelly was seen today for anxiety.    Diagnoses and all orders for this visit:    Current mild episode of major depressive disorder, unspecified whether recurrent (CMS/HCC)  -     sertraline (ZOLOFT) 100 MG tablet; Take 1 tablet by mouth Daily.    Anxiety  -     sertraline (ZOLOFT) 100 MG tablet; Take 1 tablet by mouth Daily.    ER if any SI/HI.   15 minutes spent with patient today discussing worsening anxiety, plan to increase medication, and when to follow-up.

## 2020-04-13 DIAGNOSIS — R80.9 ALBUMINURIA: ICD-10-CM

## 2020-04-14 RX ORDER — LISINOPRIL 2.5 MG/1
TABLET ORAL
Qty: 90 TABLET | Refills: 0 | OUTPATIENT
Start: 2020-04-14

## 2020-05-06 DIAGNOSIS — R80.9 ALBUMINURIA: ICD-10-CM

## 2020-05-07 RX ORDER — LISINOPRIL 2.5 MG/1
TABLET ORAL
Qty: 90 TABLET | Refills: 0 | Status: SHIPPED | OUTPATIENT
Start: 2020-05-07 | End: 2020-11-24

## 2020-05-15 ENCOUNTER — TELEPHONE (OUTPATIENT)
Dept: FAMILY MEDICINE CLINIC | Facility: CLINIC | Age: 50
End: 2020-05-15

## 2020-05-15 RX ORDER — ALBUTEROL SULFATE 90 UG/1
2 AEROSOL, METERED RESPIRATORY (INHALATION) EVERY 6 HOURS PRN
Qty: 1 INHALER | Refills: 1 | Status: SHIPPED | OUTPATIENT
Start: 2020-05-15 | End: 2022-09-06 | Stop reason: SDUPTHER

## 2020-05-15 RX ORDER — HYDROCHLOROTHIAZIDE 12.5 MG/1
12.5 TABLET ORAL DAILY
Qty: 90 TABLET | Refills: 0 | Status: SHIPPED | OUTPATIENT
Start: 2020-05-15 | End: 2020-08-06

## 2020-05-15 NOTE — TELEPHONE ENCOUNTER
Pt called in for refill on medication     albuterol sulfate  (90 Base) MCG/ACT inhaler AS NEEDED    hydrochlorothiazide (HYDRODIURIL) 12.5 MG tablet ONCE DAILY       [place] [address] [address] [facility] - [phone]  - [phone] (Phone)  [phone] (Fax)       LOV 2-

## 2020-07-28 ENCOUNTER — TELEMEDICINE (OUTPATIENT)
Dept: FAMILY MEDICINE CLINIC | Facility: CLINIC | Age: 50
End: 2020-07-28

## 2020-07-28 DIAGNOSIS — F32.0 CURRENT MILD EPISODE OF MAJOR DEPRESSIVE DISORDER, UNSPECIFIED WHETHER RECURRENT (HCC): ICD-10-CM

## 2020-07-28 DIAGNOSIS — F41.9 ANXIETY: ICD-10-CM

## 2020-07-28 PROCEDURE — 99213 OFFICE O/P EST LOW 20 MIN: CPT | Performed by: NURSE PRACTITIONER

## 2020-07-28 RX ORDER — SERTRALINE HYDROCHLORIDE 100 MG/1
100 TABLET, FILM COATED ORAL DAILY
Qty: 90 TABLET | Refills: 1 | Status: SHIPPED | OUTPATIENT
Start: 2020-07-28 | End: 2021-09-14

## 2020-07-28 RX ORDER — HYDROXYZINE PAMOATE 25 MG/1
25 CAPSULE ORAL NIGHTLY PRN
Qty: 90 CAPSULE | Refills: 1 | Status: SHIPPED | OUTPATIENT
Start: 2020-07-28 | End: 2021-09-14 | Stop reason: SINTOL

## 2020-07-28 NOTE — PROGRESS NOTES
Stacy Pack is a 50 y.o. female.     Chief Complaint   Patient presents with   • Anxiety     You have chosen to receive care through a telehealth visit.  Do you consent to use a video/audio connection for your medical care today? Yes  History of Present Illness     Anxiety: The patient reports doing fairly well. Symptoms: insomnia. The patient is adherent to their medication regimen. Medication(s): sertraline . Patient is not exercising. Patient does not use tobacco.     The following portions of the patient's history were reviewed and updated as appropriate: allergies, current medications, past family history, past medical history, past social history, past surgical history and problem list.    Past Medical History:   Diagnosis Date   • Albuminuria    • Allergic rhinitis    • Asthma     allergy triggered   • Bell's palsy    • Blood glucose elevated    • Diabetes mellitus with albuminuria (CMS/[facility])    • Elevated blood pressure reading without diagnosis of hypertension    • Hyperlipidemia    • Migraine    • Right otitis media    • Type II diabetes mellitus (CMS/[facility])    • Vitamin D deficiency        Past Surgical History:   Procedure Laterality Date   • CATARACT EXTRACTION     • CHOLECYSTECTOMY     • HYSTERECTOMY      due to cancer   • VITRECTOMY PARS PLANA Left 1/28/2020    Procedure: 25G VITRECTOMY-ENDO LASER;  Surgeon: Lazarus, Howard S., MD;  Location: [facility] MAIN OR;  Service: Ophthalmology       Family History   Problem Relation Age of Onset   • Breast cancer Mother    • Diabetes Father    • Hyperlipidemia Father    • Hypertension Father    • Asthma Maternal Grandfather        Social History     Socioeconomic History   • Marital status: Unknown     Spouse name: Not on file   • Number of children: Not on file   • Years of education: Not on file   • Highest education level: Not on file   Tobacco Use   • Smoking status: Never Smoker   • Smokeless tobacco: Never Used   Substance and Sexual Activity    • Alcohol use: Yes     Frequency: Monthly or less     Comment: rarely   • Drug use: No   • Sexual activity: Never       Review of Systems   Psychiatric/Behavioral: Positive for sleep disturbance. Negative for suicidal ideas and depressed mood. The patient is not nervous/anxious.        Objective   There were no vitals filed for this visit.   There is no height or weight on file to calculate BMI.  Physical Exam   Constitutional: She is oriented to person, place, and time. She appears well-developed and well-nourished.   Pulmonary/Chest: Effort normal.   Neurological: She is alert and oriented to person, place, and time.   Psychiatric: She has a normal mood and affect.         Assessment/Plan   Kelly was seen today for anxiety.    Diagnoses and all orders for this visit:    Anxiety  -     sertraline (ZOLOFT) 100 MG tablet; Take 1 tablet by mouth Daily.  -     hydrOXYzine pamoate (VISTARIL) 25 MG capsule; Take 1 capsule by mouth At Night As Needed for Anxiety (insomnia).    Current mild episode of major depressive disorder, unspecified whether recurrent (CMS/[facility])  Comments:  - ER if any SI/HI.   Orders:  -     sertraline (ZOLOFT) 100 MG tablet; Take 1 tablet by mouth Daily.    Approximately 10 minutes spent with patient via video.

## 2020-08-06 RX ORDER — HYDROCHLOROTHIAZIDE 12.5 MG/1
TABLET ORAL
Qty: 90 TABLET | Refills: 0 | Status: SHIPPED | OUTPATIENT
Start: 2020-08-06 | End: 2020-11-24

## 2020-10-23 ENCOUNTER — TELEPHONE (OUTPATIENT)
Dept: ENDOCRINOLOGY | Age: 50
End: 2020-10-23

## 2020-10-23 NOTE — TELEPHONE ENCOUNTER
OK TO [facility] LEVEL 3 W/ DR VELARDE    [facility] FOR 12/03/2020 @ 12:45

## 2020-10-31 DIAGNOSIS — E78.5 HYPERLIPIDEMIA, UNSPECIFIED HYPERLIPIDEMIA TYPE: ICD-10-CM

## 2020-11-02 RX ORDER — SIMVASTATIN 20 MG
TABLET ORAL
Qty: 30 TABLET | Refills: 1 | Status: SHIPPED | OUTPATIENT
Start: 2020-11-02 | End: 2020-12-03 | Stop reason: SDUPTHER

## 2020-11-24 DIAGNOSIS — R80.9 ALBUMINURIA: ICD-10-CM

## 2020-11-24 RX ORDER — HYDROCHLOROTHIAZIDE 12.5 MG/1
TABLET ORAL
Qty: 90 TABLET | Refills: 0 | Status: SHIPPED | OUTPATIENT
Start: 2020-11-24 | End: 2021-04-08 | Stop reason: SDUPTHER

## 2020-11-24 RX ORDER — LISINOPRIL 2.5 MG/1
TABLET ORAL
Qty: 90 TABLET | Refills: 0 | Status: SHIPPED | OUTPATIENT
Start: 2020-11-24 | End: 2020-12-03 | Stop reason: SDUPTHER

## 2020-12-03 ENCOUNTER — OFFICE VISIT (OUTPATIENT)
Dept: ENDOCRINOLOGY | Age: 50
End: 2020-12-03

## 2020-12-03 VITALS
OXYGEN SATURATION: 98 % | WEIGHT: 268.2 LBS | SYSTOLIC BLOOD PRESSURE: 128 MMHG | HEIGHT: 69 IN | HEART RATE: 104 BPM | DIASTOLIC BLOOD PRESSURE: 70 MMHG | BODY MASS INDEX: 39.72 KG/M2

## 2020-12-03 DIAGNOSIS — Z79.4 LONG-TERM INSULIN USE (HCC): Primary | ICD-10-CM

## 2020-12-03 DIAGNOSIS — R80.9 ALBUMINURIA: ICD-10-CM

## 2020-12-03 DIAGNOSIS — IMO0002 DM (DIABETES MELLITUS), TYPE 2, UNCONTROLLED W/NEUROLOGIC COMPLICATION: ICD-10-CM

## 2020-12-03 DIAGNOSIS — E78.5 HYPERLIPIDEMIA, UNSPECIFIED HYPERLIPIDEMIA TYPE: ICD-10-CM

## 2020-12-03 DIAGNOSIS — E66.9 OBESITY (BMI 35.0-39.9 WITHOUT COMORBIDITY): ICD-10-CM

## 2020-12-03 PROCEDURE — 99204 OFFICE O/P NEW MOD 45 MIN: CPT | Performed by: INTERNAL MEDICINE

## 2020-12-03 RX ORDER — LIRAGLUTIDE 6 MG/ML
1.8 INJECTION SUBCUTANEOUS DAILY
Qty: 9 PEN | Refills: 3 | Status: SHIPPED | OUTPATIENT
Start: 2020-12-03 | End: 2021-06-11

## 2020-12-03 RX ORDER — LISINOPRIL 2.5 MG/1
2.5 TABLET ORAL DAILY
Qty: 90 TABLET | Refills: 3 | Status: SHIPPED | OUTPATIENT
Start: 2020-12-03 | End: 2022-03-23 | Stop reason: SDUPTHER

## 2020-12-03 RX ORDER — INSULIN GLARGINE 300 U/ML
100 INJECTION, SOLUTION SUBCUTANEOUS NIGHTLY
Qty: 30 ML | Refills: 3 | Status: SHIPPED | OUTPATIENT
Start: 2020-12-03 | End: 2021-11-03 | Stop reason: SDUPTHER

## 2020-12-03 RX ORDER — SIMVASTATIN 20 MG
20 TABLET ORAL DAILY
Qty: 90 TABLET | Refills: 3 | Status: SHIPPED | OUTPATIENT
Start: 2020-12-03 | End: 2021-11-03 | Stop reason: SDUPTHER

## 2020-12-03 RX ORDER — BLOOD SUGAR DIAGNOSTIC
STRIP MISCELLANEOUS
Qty: 200 EACH | Refills: 0 | Status: SHIPPED | OUTPATIENT
Start: 2020-12-03

## 2020-12-03 NOTE — PROGRESS NOTES
Chief Complaint   Patient presents with   • Diabetes       Kelly Pack 50 y.o. presents with Type 2 dm as a new patient. Consulted by      Type 2 dm - Diagnosed about 2 years ago.   Today in clinic pt reports being on toujeo 100 units at bed time, humalog 14 units with each meal, victoza 1.2 mg subq daily. Couldn't tolerate the higher dose of victoza due to diarrhea.   FBG - 100 - 300   Pre meals - 200 - 300  Checks BG - 1 - 2 times   Sensor - not on one  Dm retinopathy - yes ,Last eye exam - 8 surgeris in the last 1 year.   Dm nephropathy - no  Dm neuropathy - yes,Dm neuropathy meds - not on any meds  CAD -  no  CVA - no  Episodes of hypoglycemia -   Pt is physically active. weight has been stable.   Pt tries to follow DM diet for most part.   On Ace inb.    Hyperlipidemia  On simvastatin 20 mg oral daily.    Reviewed primary care physician's/consulting physician documentation and lab results       I have reviewed the patient's allergies, medicines, past medical hx, family hx and social hx in detail.    Past Medical History:   Diagnosis Date   • Albuminuria    • Allergic rhinitis    • Asthma     allergy triggered   • Bell's palsy    • Blood glucose elevated    • Diabetes mellitus with albuminuria (CMS/HCC)    • Elevated blood pressure reading without diagnosis of hypertension    • Hyperlipidemia    • Migraine    • Right otitis media    • Type II diabetes mellitus (CMS/HCC)    • Vitamin D deficiency        Family History   Problem Relation Age of Onset   • Breast cancer Mother    • Diabetes Father    • Hyperlipidemia Father    • Hypertension Father    • Asthma Maternal Grandfather        Social History     Socioeconomic History   • Marital status: Unknown     Spouse name: Not on file   • Number of children: Not on file   • Years of education: Not on file   • Highest education level: Not on file   Tobacco Use   • Smoking status: Never Smoker   • Smokeless tobacco: Never Used   Substance and Sexual Activity   •  Alcohol use: Yes     Frequency: Monthly or less     Comment: rarely   • Drug use: No   • Sexual activity: Never       No Known Allergies      Current Outpatient Medications:   •  albuterol sulfate  (90 Base) MCG/ACT inhaler, Inhale 2 puffs Every 6 (Six) Hours As Needed for Wheezing or Shortness of Air., Disp: 1 inhaler, Rfl: 1  •  EQ ALLERGY RELIEF 180 MG tablet, TAKE 1 TABLET BY MOUTH ONCE DAILY, Disp: 90 tablet, Rfl: 0  •  fluticasone (FLONASE) 50 MCG/ACT nasal spray, USE 2 SPRAY(S) IN EACH NOSTRIL ONCE DAILY AS DIRECTED, Disp: 48 g, Rfl: 1  •  glucose blood (Accu-Chek Pascale Plus) test strip, TEST BLOOD SUGAR TWICE DAILY, Disp: 200 each, Rfl: 0  •  hydroCHLOROthiazide (HYDRODIURIL) 12.5 MG tablet, Take 1 tablet by mouth once daily, Disp: 90 tablet, Rfl: 0  •  hydrOXYzine pamoate (VISTARIL) 25 MG capsule, Take 1 capsule by mouth At Night As Needed for Anxiety (insomnia)., Disp: 90 capsule, Rfl: 1  •  insulin lispro (humaLOG) 100 UNIT/ML injection, Inject 14 Units under the skin into the appropriate area as directed 3 (Three) Times a Day Before Meals., Disp: 37.8 mL, Rfl: 3  •  Liraglutide (Victoza) 18 MG/3ML solution pen-injector injection, Inject 1.8 mg under the skin into the appropriate area as directed Daily., Disp: 9 pen, Rfl: 3  •  lisinopril (PRINIVIL,ZESTRIL) 2.5 MG tablet, Take 1 tablet by mouth Daily., Disp: 90 tablet, Rfl: 3  •  prednisoLONE acetate (PRED FORTE) 1 % ophthalmic suspension, , Disp: , Rfl:   •  sertraline (ZOLOFT) 100 MG tablet, Take 1 tablet by mouth Daily., Disp: 90 tablet, Rfl: 1  •  simvastatin (ZOCOR) 20 MG tablet, Take 1 tablet by mouth Daily., Disp: 90 tablet, Rfl: 3  •  vitamin D (ERGOCALCIFEROL) 84304 units capsule capsule, Take 1 capsule by mouth 1 (One) Time Per Week., Disp: 12 capsule, Rfl: 0  •  Insulin Glargine, 1 Unit Dial, (Toujeo SoloStar) 300 UNIT/ML solution pen-injector injection, Inject 100 Units under the skin into the appropriate area as directed Every Night.,  "Disp: 30 mL, Rfl: 3    Review of Systems   Constitutional: Negative for appetite change, fatigue and fever.   Eyes: Negative for visual disturbance.   Respiratory: Negative for shortness of breath.    Cardiovascular: Negative for palpitations and leg swelling.   Gastrointestinal: Negative for abdominal pain and vomiting.   Endocrine: Positive for polydipsia. Negative for polyuria.   Musculoskeletal: Negative for joint swelling and neck pain.   Skin: Negative for rash.   Neurological: Negative for weakness and numbness.   Psychiatric/Behavioral: Negative for behavioral problems.     I have reviewed and confirmed the accuracy of the ROS as documented by the MA/LPN/RN Hilda Morton MD    Objective:     /70   Pulse 104   Ht 175.3 cm (69\")   Wt 122 kg (268 lb 3.2 oz)   SpO2 98%   BMI 39.61 kg/m²     Physical Exam  Vitals signs reviewed.   Constitutional:       Appearance: She is not diaphoretic.      Comments: Obese     HENT:      Head: Normocephalic and atraumatic.   Eyes:      General: No scleral icterus.     Conjunctiva/sclera: Conjunctivae normal.   Neck:      Musculoskeletal: Normal range of motion and neck supple.      Thyroid: No thyromegaly.      Comments: Acanthosis nigricans  Cardiovascular:      Rate and Rhythm: Normal rate.      Heart sounds: Normal heart sounds.   Pulmonary:      Effort: Pulmonary effort is normal.      Breath sounds: Normal breath sounds. No stridor. No wheezing.   Abdominal:      General: Bowel sounds are normal. There is no distension.      Palpations: Abdomen is soft.      Tenderness: There is no abdominal tenderness.      Comments: Central obesity   Musculoskeletal:         General: No tenderness.   Skin:     General: Skin is warm and dry.   Neurological:      Mental Status: She is alert and oriented to person, place, and time.            Results Review:    I reviewed the patient's new clinical results.    Admission on 01/28/2020, Discharged on 01/28/2020   Component Date " Value Ref Range Status   • Glucose 01/28/2020 106* 70 - 105 mg/dL Final    Serial Number: [id number]:  [id number]       Diagnoses and all orders for this visit:    1. Long-term insulin use (CMS/[facility]) (Primary)  -     Hemoglobin A1c  -     Basic Metabolic Panel  -     Lipid Panel  -     Microalbumin / Creatinine Urine Ratio - Urine, Clean Catch  -     TSH  -     T4, Free  -     Vitamin B12 & Folate  -     Vitamin D 25 Hydroxy    2. DM (diabetes mellitus), type 2, uncontrolled w/neurologic complication (CMS/[facility])  -     Hemoglobin A1c  -     Basic Metabolic Panel  -     Lipid Panel  -     Microalbumin / Creatinine Urine Ratio - Urine, Clean Catch  -     TSH  -     T4, Free  -     Vitamin B12 & Folate  -     Vitamin D 25 Hydroxy    3. Obesity (BMI 35.0-39.9 without comorbidity)  -     Hemoglobin A1c  -     Basic Metabolic Panel  -     Lipid Panel  -     Microalbumin / Creatinine Urine Ratio - Urine, Clean Catch  -     TSH  -     T4, Free  -     Vitamin B12 & Folate  -     Vitamin D 25 Hydroxy    4. Albuminuria  -     lisinopril (PRINIVIL,ZESTRIL) 2.5 MG tablet; Take 1 tablet by mouth Daily.  Dispense: 90 tablet; Refill: 3    5. Hyperlipidemia, unspecified hyperlipidemia type  -     simvastatin (ZOCOR) 20 MG tablet; Take 1 tablet by mouth Daily.  Dispense: 90 tablet; Refill: 3    Other orders  -     glucose blood (Accu-Chek Pascale Plus) test strip; TEST BLOOD SUGAR TWICE DAILY  Dispense: 200 each; Refill: 0  -     Insulin Glargine, 1 Unit Dial, (Toujeo SoloStar) 300 UNIT/ML solution pen-injector injection; Inject 100 Units under the skin into the appropriate area as directed Every Night.  Dispense: 30 mL; Refill: 3  -     insulin lispro (humaLOG) 100 UNIT/ML injection; Inject 14 Units under the skin into the appropriate area as directed 3 (Three) Times a Day Before Meals.  Dispense: 37.8 mL; Refill: 3  -     Liraglutide (Victoza) 18 MG/3ML solution pen-injector injection; Inject 1.8 mg under the skin into the  "appropriate area as directed Daily.  Dispense: 9 pen; Refill: 3      Type 2 diabetes mellitus-uncontrolled  HbA1c would be high based on patient's blood glucose trends.  Adjust insulin regimen based on the blood work-up today.  Discussed with the patient about increasing the dosage of Victoza to 1.8 mg subcutaneous daily.    Placed continuous glucose monitoring on the patient to further assess the blood glucose trends.    Hyperlipidemia  Continue simvastatin.    Obesity  Gave handout in AVS about calorie counting and exercise regimen to help with the weight loss.    Thank you for asking me to see your patient, Kelly Pack in consultation.        Hilda Morton MD  12/03/20    EMR Dragon / transcription disclaimer:     \"Dictated utilizing Dragon dictation\".   "

## 2020-12-04 ENCOUNTER — TELEPHONE (OUTPATIENT)
Dept: ENDOCRINOLOGY | Age: 50
End: 2020-12-04

## 2020-12-04 LAB
25(OH)D3+25(OH)D2 SERPL-MCNC: 45.1 NG/ML (ref 30–100)
ALBUMIN/CREAT UR: 944 MG/G CREAT (ref 0–29)
BUN SERPL-MCNC: 37 MG/DL (ref 6–20)
BUN/CREAT SERPL: 28.5 (ref 7–25)
CALCIUM SERPL-MCNC: 10.1 MG/DL (ref 8.6–10.5)
CHLORIDE SERPL-SCNC: 103 MMOL/L (ref 98–107)
CHOLEST SERPL-MCNC: 223 MG/DL (ref 0–200)
CO2 SERPL-SCNC: 27.6 MMOL/L (ref 22–29)
CREAT SERPL-MCNC: 1.3 MG/DL (ref 0.57–1)
CREAT UR-MCNC: 168.9 MG/DL
FOLATE SERPL-MCNC: 4.26 NG/ML (ref 4.78–24.2)
GLUCOSE SERPL-MCNC: 203 MG/DL (ref 65–99)
HBA1C MFR BLD: 9.9 % (ref 4.8–5.6)
HDLC SERPL-MCNC: 39 MG/DL (ref 40–60)
INTERPRETATION: NORMAL
LDLC SERPL CALC-MCNC: 114 MG/DL (ref 0–100)
Lab: NORMAL
MICROALBUMIN UR-MCNC: 1594.2 UG/ML
POTASSIUM SERPL-SCNC: 6.2 MMOL/L (ref 3.5–5.2)
SODIUM SERPL-SCNC: 140 MMOL/L (ref 136–145)
T4 FREE SERPL-MCNC: 1.03 NG/DL (ref 0.93–1.7)
TRIGL SERPL-MCNC: 404 MG/DL (ref 0–150)
TSH SERPL DL<=0.005 MIU/L-ACNC: 2.29 UIU/ML (ref 0.27–4.2)
VIT B12 SERPL-MCNC: 486 PG/ML (ref 211–946)
VLDLC SERPL CALC-MCNC: 70 MG/DL (ref 5–40)

## 2020-12-04 NOTE — TELEPHONE ENCOUNTER
Was contacted at 4:11 AM by answering service for urgent critical lab value for this patient date of birth 5/10/1971 patient name Kelly Pack.  Critical lab value was for potassium of 6.2 that was drawn at 2:10 PM yesterday.  Lab denies hemolysis of sample.  Attempted to contact patient at 730 this morning went straight to voicemail.  Left message for patient to contact the office regarding critical lab value.  Sent a message to medical assistant Elizabeth Solares and sent a secure chat message to  regarding critical lab result of potassium of 6.2 patient was noted to be on lisinopril.  Patient's visit note from yesterday reviewed.

## 2021-04-08 ENCOUNTER — OFFICE VISIT (OUTPATIENT)
Dept: FAMILY MEDICINE CLINIC | Facility: CLINIC | Age: 51
End: 2021-04-08

## 2021-04-08 VITALS
SYSTOLIC BLOOD PRESSURE: 188 MMHG | OXYGEN SATURATION: 98 % | TEMPERATURE: 97.3 F | HEIGHT: 69 IN | WEIGHT: 290.2 LBS | DIASTOLIC BLOOD PRESSURE: 94 MMHG | HEART RATE: 96 BPM | BODY MASS INDEX: 42.98 KG/M2

## 2021-04-08 DIAGNOSIS — E11.65 UNCONTROLLED TYPE 2 DIABETES MELLITUS WITH HYPERGLYCEMIA (HCC): Chronic | ICD-10-CM

## 2021-04-08 DIAGNOSIS — I10 ESSENTIAL HYPERTENSION: ICD-10-CM

## 2021-04-08 DIAGNOSIS — J45.909 ASTHMA, UNSPECIFIED ASTHMA SEVERITY, UNSPECIFIED WHETHER COMPLICATED, UNSPECIFIED WHETHER PERSISTENT: Primary | ICD-10-CM

## 2021-04-08 PROCEDURE — 96372 THER/PROPH/DIAG INJ SC/IM: CPT | Performed by: FAMILY MEDICINE

## 2021-04-08 PROCEDURE — 99214 OFFICE O/P EST MOD 30 MIN: CPT | Performed by: FAMILY MEDICINE

## 2021-04-08 RX ORDER — DOXYCYCLINE HYCLATE 100 MG
100 TABLET ORAL 2 TIMES DAILY
Qty: 20 TABLET | Refills: 0 | Status: SHIPPED | OUTPATIENT
Start: 2021-04-08 | End: 2021-04-18

## 2021-04-08 RX ORDER — ALBUTEROL SULFATE 90 UG/1
2 AEROSOL, METERED RESPIRATORY (INHALATION) EVERY 6 HOURS PRN
Qty: 18 G | Refills: 6 | Status: SHIPPED | OUTPATIENT
Start: 2021-04-08 | End: 2021-06-11 | Stop reason: SDUPTHER

## 2021-04-08 RX ORDER — HYDROCHLOROTHIAZIDE 25 MG/1
25 TABLET ORAL DAILY
Qty: 90 TABLET | Refills: 3 | Status: SHIPPED | OUTPATIENT
Start: 2021-04-08 | End: 2021-09-14 | Stop reason: SDUPTHER

## 2021-04-08 RX ORDER — METHYLPREDNISOLONE SODIUM SUCCINATE 40 MG/ML
40 INJECTION, POWDER, LYOPHILIZED, FOR SOLUTION INTRAMUSCULAR; INTRAVENOUS ONCE
Status: COMPLETED | OUTPATIENT
Start: 2021-04-08 | End: 2021-04-08

## 2021-04-08 RX ORDER — BENZONATATE 100 MG/1
100 CAPSULE ORAL 3 TIMES DAILY PRN
Qty: 30 CAPSULE | Refills: 1 | Status: SHIPPED | OUTPATIENT
Start: 2021-04-08 | End: 2022-11-16 | Stop reason: HOSPADM

## 2021-04-08 RX ORDER — DILTIAZEM HYDROCHLORIDE 60 MG/1
2 TABLET, FILM COATED ORAL
Qty: 10.2 G | Refills: 1 | Status: SHIPPED | OUTPATIENT
Start: 2021-04-08 | End: 2021-11-09

## 2021-04-08 RX ADMIN — METHYLPREDNISOLONE SODIUM SUCCINATE 40 MG: 40 INJECTION, POWDER, LYOPHILIZED, FOR SOLUTION INTRAMUSCULAR; INTRAVENOUS at 14:34

## 2021-04-08 NOTE — PROGRESS NOTES
"Chief Complaint  Asthma (patient request refills on ventolin )    Stacy Pack presents to [facility]  History of Present Illness  Seasonal Asthma since childhood x 4 days with a death rattle, and wheezing, cough, dry, no fever, non smoker, not pregnant , , seen by an endocrinologist, she is no longer taking lisinopril, she is on HCTZ 12.5 mg only, she has been using albuterol every 6 hours, 4 times a day, no chest pain, positive bilateral maxillary tenderness  Objective   Vital Signs:   BP (!) 188/94 (BP Location: Left arm, Patient Position: Sitting)   Pulse 96   Temp 97.3 °F (36.3 °C) (Temporal)   Ht 175.3 cm (69.02\")   Wt 132 kg (290 lb 3.2 oz)   SpO2 98%   BMI 42.84 kg/m²     Physical Exam  Vitals and nursing note reviewed.   Constitutional:       Appearance: She is well-developed.   HENT:      Head: Normocephalic and atraumatic.      Right Ear: Tympanic membrane, ear canal and external ear normal.      Left Ear: Tympanic membrane, ear canal and external ear normal.      Nose: Nose normal.   Eyes:      General: No scleral icterus.        Right eye: No discharge.         Left eye: No discharge.      Conjunctiva/sclera: Conjunctivae normal.      Pupils: Pupils are equal, round, and reactive to light.   Neck:      Thyroid: No thyromegaly.      Vascular: No JVD.      Trachea: No tracheal deviation.   Cardiovascular:      Rate and Rhythm: Normal rate and regular rhythm.      Heart sounds: Normal heart sounds. No murmur heard.   No friction rub. No gallop.    Pulmonary:      Effort: Pulmonary effort is normal. No respiratory distress.      Breath sounds: Normal breath sounds. No wheezing or rales.   Chest:      Chest wall: No tenderness.   Abdominal:      General: Bowel sounds are normal. There is no distension.      Palpations: Abdomen is soft. There is no mass.      Tenderness: There is no abdominal tenderness. There is no guarding or rebound.      " Hernia: No hernia is present.   Musculoskeletal:         General: No tenderness. Normal range of motion.      Cervical back: Normal range of motion and neck supple.   Lymphadenopathy:      Cervical: No cervical adenopathy.   Skin:     General: Skin is warm and dry.   Neurological:      General: No focal deficit present.      Mental Status: She is alert.      Cranial Nerves: No cranial nerve deficit.      Sensory: No sensory deficit.      Motor: No abnormal muscle tone.      Coordination: Coordination normal.      Deep Tendon Reflexes: Reflexes normal.   Psychiatric:         Behavior: Behavior normal.         Thought Content: Thought content normal.         Judgment: Judgment normal.        Result Review :                 Assessment and Plan    Diagnoses and all orders for this visit:    1. Asthma, unspecified asthma severity, unspecified whether complicated, unspecified whether persistent (Primary)  -     doxycycline (VIBRAMYICN) 100 MG tablet; Take 1 tablet by mouth 2 (Two) Times a Day for 10 days.  Dispense: 20 tablet; Refill: 0  -     benzonatate (Tessalon Perles) 100 MG capsule; Take 1 capsule by mouth 3 (Three) Times a Day As Needed for Cough.  Dispense: 30 capsule; Refill: 1  -     Symbicort 80-4.5 MCG/ACT inhaler; Inhale 2 puffs 2 (Two) Times a Day. To use it after exacerbation is over x prevention and rinse your mouth  Dispense: 10.2 g; Refill: 1  -     methylPREDNISolone sodium succinate (SOLU-Medrol) injection 40 mg  -     albuterol sulfate  (90 Base) MCG/ACT inhaler; Inhale 2 puffs Every 6 (Six) Hours As Needed for Wheezing for up to 180 days.  Dispense: 18 g; Refill: 6    2. Essential hypertension  -     hydroCHLOROthiazide (HYDRODIURIL) 25 MG tablet; Take 1 tablet by mouth Daily.  Dispense: 90 tablet; Refill: 3        Follow Up   No follow-ups on file.  Patient was given instructions and counseling regarding her condition or for health maintenance advice. Please see specific information pulled  into the AVS if appropriate.   Monitor blood pressure  Discussed with patient side effects of taking steroids, including  but not limited to elevated blood sugar, patient to monitor her blood sugar, so we add Symbicort for prophylaxis to be used once these exacerbation is over not to be used now,

## 2021-04-09 ENCOUNTER — TELEPHONE (OUTPATIENT)
Dept: FAMILY MEDICINE CLINIC | Facility: CLINIC | Age: 51
End: 2021-04-09

## 2021-04-09 DIAGNOSIS — J45.909 ASTHMA, UNSPECIFIED ASTHMA SEVERITY, UNSPECIFIED WHETHER COMPLICATED, UNSPECIFIED WHETHER PERSISTENT: Primary | ICD-10-CM

## 2021-04-09 RX ORDER — METHYLPREDNISOLONE 4 MG/1
TABLET ORAL
Qty: 21 EACH | Refills: 0 | Status: SHIPPED | OUTPATIENT
Start: 2021-04-09 | End: 2021-09-14

## 2021-04-09 NOTE — TELEPHONE ENCOUNTER
PATIENT WAS TOLD TO CALL TODAY IF SHE DID NOT GET RELIEF FROM THE STEROID SHOT AND REQUEST A STEROID PACK INSTEAD.    Caller: Kelly Pack    Relationship: Self    Best call back number: [phone]     PATIENT IS REQUESTING THIS GETS SENT IN TODAY IF POSSIBLE.    If a prescription is needed, what is your preferred pharmacy and phone number: [facility] [id number] [place], [place] - [address] [phone] [facility] [phone]

## 2021-05-21 DIAGNOSIS — IMO0002 DM (DIABETES MELLITUS), TYPE 2, UNCONTROLLED W/NEUROLOGIC COMPLICATION: ICD-10-CM

## 2021-05-21 DIAGNOSIS — Z79.4 LONG-TERM INSULIN USE (HCC): Primary | ICD-10-CM

## 2021-05-22 LAB
25(OH)D3+25(OH)D2 SERPL-MCNC: 17.3 NG/ML (ref 30–100)
ALBUMIN/CREAT UR: 1063 MG/G CREAT (ref 0–29)
CHOLEST SERPL-MCNC: 344 MG/DL (ref 100–199)
CREAT SERPL-MCNC: 1.22 MG/DL (ref 0.57–1)
CREAT UR-MCNC: 300.6 MG/DL
FOLATE SERPL-MCNC: 5.4 NG/ML
HBA1C MFR BLD: 9.2 % (ref 4.8–5.6)
HDLC SERPL-MCNC: 33 MG/DL
IMP & REVIEW OF LAB RESULTS: NORMAL
LDLC SERPL CALC-MCNC: 194 MG/DL (ref 0–99)
MICROALBUMIN UR-MCNC: 3196.1 UG/ML
REPORT: NORMAL
T4 FREE SERPL-MCNC: 1.03 NG/DL (ref 0.82–1.77)
TRIGL SERPL-MCNC: 552 MG/DL (ref 0–149)
TSH SERPL DL<=0.005 MIU/L-ACNC: 2.9 UIU/ML (ref 0.45–4.5)
VIT B12 SERPL-MCNC: 437 PG/ML (ref 232–1245)
VLDLC SERPL CALC-MCNC: 117 MG/DL (ref 5–40)

## 2021-06-02 ENCOUNTER — TREATMENT (OUTPATIENT)
Dept: ENDOCRINOLOGY | Age: 51
End: 2021-06-02

## 2021-06-02 NOTE — PROGRESS NOTES
A continuous glucose monitor (CGM) was placed 12/03/20. The device was activated, and the patient was educated on proper use of the device. The patient will return in 14 Days for removal of the device and interpretation of the results.

## 2021-06-06 NOTE — PROGRESS NOTES
Unfortunately at this time it is too late for us to make any changes based on the CGM information.  However since patient has an upcoming appointment in the next 1 week, based on this information could make some changes at that time.

## 2021-06-11 ENCOUNTER — OFFICE VISIT (OUTPATIENT)
Dept: ENDOCRINOLOGY | Age: 51
End: 2021-06-11

## 2021-06-11 VITALS
WEIGHT: 277.6 LBS | SYSTOLIC BLOOD PRESSURE: 140 MMHG | HEIGHT: 69 IN | OXYGEN SATURATION: 97 % | DIASTOLIC BLOOD PRESSURE: 72 MMHG | HEART RATE: 72 BPM | BODY MASS INDEX: 41.12 KG/M2

## 2021-06-11 DIAGNOSIS — IMO0002 SEVERE UNCONTROLLED DIABETES MELLITUS: ICD-10-CM

## 2021-06-11 DIAGNOSIS — Z79.4 LONG-TERM INSULIN USE (HCC): Primary | ICD-10-CM

## 2021-06-11 DIAGNOSIS — E78.2 HYPERLIPEMIA, MIXED: ICD-10-CM

## 2021-06-11 DIAGNOSIS — Z79.4 TYPE 2 DIABETES MELLITUS WITH HYPERGLYCEMIA, WITH LONG-TERM CURRENT USE OF INSULIN (HCC): ICD-10-CM

## 2021-06-11 DIAGNOSIS — E11.65 TYPE 2 DIABETES MELLITUS WITH HYPERGLYCEMIA, WITH LONG-TERM CURRENT USE OF INSULIN (HCC): ICD-10-CM

## 2021-06-11 PROCEDURE — 99214 OFFICE O/P EST MOD 30 MIN: CPT | Performed by: INTERNAL MEDICINE

## 2021-06-11 RX ORDER — DULAGLUTIDE 1.5 MG/.5ML
1.5 INJECTION, SOLUTION SUBCUTANEOUS WEEKLY
Qty: 6 ML | Refills: 3 | Status: SHIPPED | OUTPATIENT
Start: 2021-06-11 | End: 2021-09-14

## 2021-06-11 RX ORDER — ERGOCALCIFEROL 1.25 MG/1
50000 CAPSULE ORAL 2 TIMES WEEKLY
Qty: 30 CAPSULE | Refills: 11 | Status: SHIPPED | OUTPATIENT
Start: 2021-06-14 | End: 2022-10-18 | Stop reason: SDUPTHER

## 2021-06-11 NOTE — PROGRESS NOTES
"Chief Complaint  Chief Complaint   Patient presents with   • Diabetes   FOLLOW UP/ TYPE 2 DM      Subjective          History of Present Illness    Kelly Pack 51 y.o. presents with Type 2 dm as a new patient. Consulted by      Type 2 dm - Diagnosed about 2 years ago.   Today in clinic pt reports being on toujeo 100 units at bed time, Humalog 14 units with each meal, also victoza 1.8 mg subcutaneous once a week  Checks BG -has not been checking her sugars  Sensor -none  Dm retinopathy -no history,Last eye exam -up-to-date with eye examination  Dm nephropathy -no  Dm neuropathy -occasional,Dm neuropathy meds -not on meds  CAD -x  CVA -x  Episodes of hypoglycemia -none in the last few weeks  Pt is physically active. weight has been stable.     Hyperlipidemia  On simvastatin, has not been taking the medication as she was having grapefruit occasionally    Reviewed primary care physician's/consulting physician documentation and lab results         I have reviewed the patient's allergies, medicines, past medical hx, family hx and social hx in detail.    Objective   Vital Signs:   /72   Pulse 72   Ht 175.3 cm (69\")   Wt 126 kg (277 lb 9.6 oz)   SpO2 97%   BMI 40.99 kg/m²   Physical Exam   General appearance - no distress  Eyes- anicteric sclera  Ear nose and throat-external ears and nose normal.    Respiratory-normal chest on inspection.  No respiratory distress noted.  Skin-no rashes.  Neuro-alert and oriented x3          Result Review :   The following data was reviewed by: Hilda Morton MD on 06/11/2021:  Orders Only on 05/21/2021   Component Date Value Ref Range Status   • Hemoglobin A1C 05/21/2021 9.2* 4.8 - 5.6 % Final    Comment:          Prediabetes: 5.7 - 6.4           Diabetes: >6.4           Glycemic control for adults with diabetes: <7.0     • Creatinine 05/21/2021 1.22* 0.57 - 1.00 mg/dL Final   • eGFR Non African Am 05/21/2021 52* >59 mL/min/1.73 Final   • eGFR African Am 05/21/2021 60  >59 " mL/min/1.73 Final    Comment: **[facility] currently reports eGFR in compliance with the current**    recommendations of the National Kidney Foundation. [facility] will    update reporting as new guidelines are published from the NKF-ASN    Task force.     • Total Cholesterol 05/21/2021 344* 100 - 199 mg/dL Final   • Triglycerides 05/21/2021 552* 0 - 149 mg/dL Final   • HDL Cholesterol 05/21/2021 33* >39 mg/dL Final   • VLDL Cholesterol Michael 05/21/2021 117* 5 - 40 mg/dL Final   • LDL Chol Calc ([facility]) 05/21/2021 194* 0 - 99 mg/dL Final   • Creatinine, Urine 05/21/2021 300.6  Not Estab. mg/dL Final   • Microalbumin, Urine 05/21/2021 3,196.1  Not Estab. ug/mL Final    Comment: Results confirmed on  dilution.     • Microalbumin/Creatinine Ratio 05/21/2021 1,063* 0 - 29 mg/g creat Final    Comment:                        Normal:                0 -  29                         Moderately increased: 30 - 300                         Severely increased:       >300     • TSH 05/21/2021 2.900  0.450 - 4.500 uIU/mL Final   • Vitamin B-12 05/21/2021 437  232 - 1,245 pg/mL Final   • Folate 05/21/2021 5.4  >3.0 ng/mL Final    Comment: A serum folate concentration of less than 3.1 ng/mL is  considered to represent clinical deficiency.     • 25 Hydroxy, Vitamin D 05/21/2021 17.3* 30.0 - 100.0 ng/mL Final    Comment: Vitamin D deficiency has been defined by the [place] of  Medicine and an Endocrine Society practice guideline as a  level of serum 25-OH vitamin D less than 20 ng/mL (1,2).  The Endocrine Society went on to further define vitamin D  insufficiency as a level between 21 and 29 ng/mL (2).  1. IOM ([place] of Medicine). 2010. Dietary reference     intakes for calcium and D. [place]: The     National Academies Press.  2. [place] RIGGINS, Sophie ISIDRO, Lance DUMONT, et al.     Evaluation, treatment, and prevention of vitamin D     deficiency: an Endocrine Society clinical practice     guideline. JCEM. 2011 Jul;  96(7):1911-30.     • Free T4 05/21/2021 1.03  0.82 - 1.77 ng/dL Final   • Interpretation 05/21/2021 Note   Final    Supplemental report is available.   • Interpretation 05/21/2021 Note   Final    Comment: -------------------------------  CHRONIC KIDNEY DISEASE:  EGFR, BLOOD PRESSURE, AND PROTEINURIA ASSESSMENT  Estimated GFR has risen, was 44 and now is 52  mL/min/1.73mE2. Current eGFR corresponds to CKD stage 3a.  Multiply eGFR by 1.159 if patient is .  Albumin:Creatinine ratio is elevated and has risen, was  943.9 and now is 1063.2 mg/g creat. Glycemic control (HB  A1c: 9.2 %) is not within goal and additional action is  indicated.  EGFR, BLOOD PRESSURE, AND PROTEINURIA TREATMENT SUGGESTIONS  -  Based upon current eGFR and presence of severe proteinuria,  patient is at very high risk for adverse outcomes such as  CKD progression, cardiovascular disease, and mortality.  Guidelines suggest a target blood pressure of 130/80 mmHg or  less in patients with albuminuria or proteinuria to reduce  cardiovascular risk and CKD progression. Weight loss and  optimal glycemic control (in diabetes) are helpful in  reducing proteinuria.  EGFR, BLOOD PRESSURE, AND PROTEINURIA FOLLOW-UP                             -  Spot Urine Panel (Protein preferred) and fasting Renal Panel  within 6 months; Hemoglobin A1C within 3 months;  BONE and MINERAL ASSESSMENT  Most recent order does not include a Renal Panel.  -  LIPIDS ASSESSMENT  LDL-C is very high and has risen, was 114 and now is 194  mg/dL. Triglyceride is very high and has risen, was 404 and  now is 552 mg/dL. Non-HDL Cholesterol is very high and has  risen, was 184 and now is 311 mg/dL. HDL-C is low and has  decreased, was 39 and now is 33 mg/dL. Markedly elevated  LDL-C raises the possibility of familial  hypercholesterolemia. A family history of high cholesterol  and heart disease in first degree relatives should be  obtained.  LIPIDS TREATMENT  SUGGESTIONS  -  Therapeutic lifestyle changes are always valuable to  maintain optimal blood lipid status (diet, exercise, weight  management). Begin statin. If statin already in use,  consider increasing dose to achieve at least a 50% LDL  reduction from baseline. High intensity statin is preferred                           .  If statin cannot be tolerated or increased, alternatives  include use of fibrate, niacin, fish oil, or ezetimibe. Very  high triglycerides may cause pancreatitis. Recommend very  low fat diet (less than 15% of calories from fat). Treat  underlying causes such as obesity and suboptimal diabetes  control.  LIPIDS FOLLOW-UP  -  fasting Lipid Panel within 3 months;  ANEMIA ASSESSMENT  Most recent order does not include a CBC Panel or iron  studies.  ANEMIA FOLLOW-UP  -  CBC is recommended by KDOQI guidelines, at least yearly;  -------------------------------  DISCLAIMER  These assessments and treatment suggestions are provided as  a convenience in support of the physician-patient  relationship and are not intended to replace the physician's  clinical judgment. They are derived from national guidelines  in addition to other evidence and expert opinion. The  clinician should consider this information within the  context of clinical opinion and the individual patient.  SEE GUIDANCE FOR CHR                           ONIC KIDNEY DISEASE PROGRAM: Kidney  Disease Improving Global Outcomes (KDIGO) clinical practice  guidelines are at http://kdigo.org/home/guidelines/.  National Kidney Foundation Kidney Disease Outcomes Quality  Initiative (KDOQI (TM)), with its limitations and  disclaimers, are at www.kidney.org/professionals/KDOQI. This  program is intended for patients who have been diagnosed  with stages 3, 4, or pre-dialysis 5 CKD. It is not intended  for children, pregnant patients, or transplant patients.       Data reviewed: PCP information       Results Review:    I reviewed the patient's new  clinical results.     Assessment and Plan    Problem List Items Addressed This Visit     None      Visit Diagnoses     Long-term insulin use (CMS/[facility])    -  Primary    Relevant Orders    Basic Metabolic Panel    Hemoglobin A1c    Lipid Panel    Microalbumin / Creatinine Urine Ratio - Urine, Clean Catch    TSH    T4, Free    Type 2 diabetes mellitus with hyperglycemia, with long-term current use of insulin (CMS/[facility])        Relevant Medications    Dulaglutide (Trulicity) 1.5 MG/0.5ML solution pen-injector    Other Relevant Orders    Basic Metabolic Panel    Hemoglobin A1c    Lipid Panel    Microalbumin / Creatinine Urine Ratio - Urine, Clean Catch    TSH    T4, Free    Severe uncontrolled diabetes mellitus (CMS/[facility])        Relevant Medications    Dulaglutide (Trulicity) 1.5 MG/0.5ML solution pen-injector    Other Relevant Orders    Basic Metabolic Panel    Hemoglobin A1c    Lipid Panel    Microalbumin / Creatinine Urine Ratio - Urine, Clean Catch    TSH    T4, Free    Hyperlipemia, mixed        Relevant Orders    Basic Metabolic Panel    Hemoglobin A1c    Lipid Panel    Microalbumin / Creatinine Urine Ratio - Urine, Clean Catch    TSH    T4, Free        Type 2 diabetes mellitus-uncontrolled with hyperglycemia   Continue Toujeo and Humalog at the above dosages  Change Victoza to Trulicity    Patient had better control of her blood sugars when she was wearing the CGM-Freestyle felipe where her average blood sugars were 164 mg/dL, expected A1c was 7.2%.  This clearly showed that patient had more accountability when she was wearing the CGM.    We will send in a prescription for freestyle felipe for the patient.    Hyperlipidemia  Resume the simvastatin  Emphasized the patient to hold the medication on the days that she is having the grapefruit but otherwise try to be compliant on the medication given her severely elevated cholesterol panel.    Reviewed the information of the CGM after downloading it and made the current  "insulin changes, CGM scan report attached on 6/2/2021  Interpreted the blood work-up/imaging results performed by the primary care/consulting physician -    Refills sent to pharmacy    Follow Up     Patient was given instructions and counseling regarding her condition or for health maintenance advice. Please see specific information pulled into the AVS if appropriate.       Thank you for asking me to see your patient, Kelly Pack in consultation.         Hilda Morton MD  06/11/21      EMR Dragon / transcription disclaimer:     \"Dictated utilizing Dragon dictation\".         "

## 2021-06-14 ENCOUNTER — TELEPHONE (OUTPATIENT)
Dept: ENDOCRINOLOGY | Age: 51
End: 2021-06-14

## 2021-09-14 ENCOUNTER — OFFICE VISIT (OUTPATIENT)
Dept: FAMILY MEDICINE CLINIC | Facility: CLINIC | Age: 51
End: 2021-09-14

## 2021-09-14 VITALS
SYSTOLIC BLOOD PRESSURE: 176 MMHG | OXYGEN SATURATION: 98 % | HEART RATE: 96 BPM | BODY MASS INDEX: 42.32 KG/M2 | DIASTOLIC BLOOD PRESSURE: 100 MMHG | TEMPERATURE: 97.8 F | WEIGHT: 286.6 LBS

## 2021-09-14 DIAGNOSIS — J30.9 ALLERGIC RHINITIS, UNSPECIFIED SEASONALITY, UNSPECIFIED TRIGGER: ICD-10-CM

## 2021-09-14 DIAGNOSIS — I10 ESSENTIAL HYPERTENSION: ICD-10-CM

## 2021-09-14 DIAGNOSIS — S91.139A: Primary | ICD-10-CM

## 2021-09-14 PROCEDURE — 99213 OFFICE O/P EST LOW 20 MIN: CPT | Performed by: NURSE PRACTITIONER

## 2021-09-14 RX ORDER — CEPHALEXIN 500 MG/1
500 CAPSULE ORAL 4 TIMES DAILY
Qty: 40 CAPSULE | Refills: 0 | Status: SHIPPED | OUTPATIENT
Start: 2021-09-14 | End: 2021-09-24

## 2021-09-14 RX ORDER — FLUTICASONE PROPIONATE 50 MCG
2 SPRAY, SUSPENSION (ML) NASAL DAILY
Qty: 48 G | Refills: 1 | Status: SHIPPED | OUTPATIENT
Start: 2021-09-14

## 2021-09-14 RX ORDER — LIRAGLUTIDE 6 MG/ML
INJECTION SUBCUTANEOUS
COMMUNITY
Start: 2021-08-11 | End: 2021-11-03

## 2021-09-14 RX ORDER — HYDROCHLOROTHIAZIDE 25 MG/1
25 TABLET ORAL DAILY
Qty: 90 TABLET | Refills: 1 | Status: SHIPPED | OUTPATIENT
Start: 2021-09-14 | End: 2021-11-09 | Stop reason: SDUPTHER

## 2021-09-15 NOTE — PROGRESS NOTES
Chief Complaint  Pain (pain right big toe, red, heat) and Med Refill (multiple medications)    Subjective          Kelly Pack presents to [facility]  History of Present Illness  Puncture wound of toe of right foot: Patient complains of pain and swelling surrounding puncture wound to right foot.  Patient sustained injury less than 1 week ago by stepping on a nail.  Tdap is up-to-date.  Pertinent medical history includes diabetes.    Hypertension: Patient here for follow-up of essential hypertension. Blood pressure is not well controlled at home. Home monitoring: The patient is checking blood pressure at home. Symptoms: lower extremity edema. Medications: The patient is adherent with their medication regimen. Medication(s): HCTZ as needed. The patient is due for nothing at this time.    Objective   Vital Signs:   /100 (BP Location: Right arm, Patient Position: Sitting, Cuff Size: Large Adult) Comment: 163/98 - repeat blood pressure  Pulse 96   Temp 97.8 °F (36.6 °C) (Temporal)   Wt 130 kg (286 lb 9.6 oz)   SpO2 98%   BMI 42.32 kg/m²     Physical Exam  Vitals and nursing note reviewed.   Cardiovascular:      Rate and Rhythm: Normal rate and regular rhythm.      Heart sounds: Normal heart sounds.   Pulmonary:      Effort: Pulmonary effort is normal.      Breath sounds: Normal breath sounds.   Skin:     Comments: Erythema and edema surrounding puncture wound at great toe of right foot.   Neurological:      Mental Status: She is oriented to person, place, and time.   Psychiatric:         Mood and Affect: Mood normal.        Result Review :            Assessment and Plan    Diagnoses and all orders for this visit:    1. Puncture wound of toe of right foot, initial encounter (Primary)  -     XR Foot 3+ View Right; Future  -     cephalexin (Keflex) 500 MG capsule; Take 1 capsule by mouth 4 (Four) Times a Day for 10 days.  Dispense: 40 capsule; Refill: 0    2. Allergic rhinitis,  unspecified seasonality, unspecified trigger  -     fluticasone (FLONASE) 50 MCG/ACT nasal spray; 2 sprays by Each Nare route Daily.  Dispense: 48 g; Refill: 1    3. Essential hypertension  -     hydroCHLOROthiazide (HYDRODIURIL) 25 MG tablet; Take 1 tablet by mouth Daily.  Dispense: 90 tablet; Refill: 1      I spent 20 minutes caring for Kelly on this date of service. This time includes time spent by me in the following activities:performing a medically appropriate examination and/or evaluation , counseling and educating the patient/family/caregiver, ordering medications, tests, or procedures and documenting information in the medical record  Follow Up   Return for Annual physical.  Patient was given instructions and counseling regarding her condition or for health maintenance advice. Please see specific information pulled into the AVS if appropriate.

## 2021-09-16 ENCOUNTER — HOSPITAL ENCOUNTER (OUTPATIENT)
Dept: GENERAL RADIOLOGY | Facility: HOSPITAL | Age: 51
Discharge: HOME | End: 2021-09-16
Admitting: NURSE PRACTITIONER

## 2021-09-16 DIAGNOSIS — S91.139A: ICD-10-CM

## 2021-09-16 PROCEDURE — 73630 X-RAY EXAM OF FOOT: CPT

## 2021-09-17 ENCOUNTER — TELEPHONE (OUTPATIENT)
Dept: FAMILY MEDICINE CLINIC | Facility: CLINIC | Age: 51
End: 2021-09-17

## 2021-09-17 NOTE — TELEPHONE ENCOUNTER
Right foot x-ray shows soft tissue swelling of the great toe but no evidence of underlying osteomyelitis.  Continue current treatment regimen and follow-up as needed.    Patient aware of results and recommendations.

## 2021-11-01 DIAGNOSIS — J45.909 ASTHMA, UNSPECIFIED ASTHMA SEVERITY, UNSPECIFIED WHETHER COMPLICATED, UNSPECIFIED WHETHER PERSISTENT: ICD-10-CM

## 2021-11-02 RX ORDER — METHYLPREDNISOLONE 4 MG/1
TABLET ORAL
Qty: 21 TABLET | Refills: 0 | OUTPATIENT
Start: 2021-11-02

## 2021-11-02 NOTE — TELEPHONE ENCOUNTER
Rx Refill Note  Requested Prescriptions     Pending Prescriptions Disp Refills   • methylPREDNISolone (MEDROL) 4 MG dose pack [Pharmacy Med Name: methylPREDNISolone 4 MG Oral Tablet Therapy Pack] 21 tablet 0     Sig: TAKE AS DIRECTED ON PACKAGE      Last office visit with prescribing clinician: 9/14/2021      Next office visit with prescribing clinician: Due for annual physical      Wants refill because she has asthma and is having problem.  Told her she needs to be seen - she was at lunch and will call back      3}  Romina Marrero MA  11/02/21, 12:11 EDT

## 2021-11-03 ENCOUNTER — OFFICE VISIT (OUTPATIENT)
Dept: ENDOCRINOLOGY | Age: 51
End: 2021-11-03

## 2021-11-03 VITALS
HEIGHT: 69 IN | OXYGEN SATURATION: 99 % | BODY MASS INDEX: 43.4 KG/M2 | DIASTOLIC BLOOD PRESSURE: 90 MMHG | SYSTOLIC BLOOD PRESSURE: 142 MMHG | WEIGHT: 293 LBS | HEART RATE: 96 BPM

## 2021-11-03 DIAGNOSIS — Z79.4 TYPE 2 DIABETES MELLITUS WITH HYPERGLYCEMIA, WITH LONG-TERM CURRENT USE OF INSULIN (HCC): Primary | ICD-10-CM

## 2021-11-03 DIAGNOSIS — E11.65 TYPE 2 DIABETES MELLITUS WITH HYPERGLYCEMIA, WITH LONG-TERM CURRENT USE OF INSULIN (HCC): Primary | ICD-10-CM

## 2021-11-03 DIAGNOSIS — E78.2 HYPERLIPEMIA, MIXED: ICD-10-CM

## 2021-11-03 PROCEDURE — 95251 CONT GLUC MNTR ANALYSIS I&R: CPT | Performed by: NURSE PRACTITIONER

## 2021-11-03 PROCEDURE — 99214 OFFICE O/P EST MOD 30 MIN: CPT | Performed by: NURSE PRACTITIONER

## 2021-11-03 RX ORDER — SIMVASTATIN 20 MG
20 TABLET ORAL DAILY
Qty: 90 TABLET | Refills: 1 | Status: SHIPPED | OUTPATIENT
Start: 2021-11-03 | End: 2022-05-05 | Stop reason: SDUPTHER

## 2021-11-03 RX ORDER — INSULIN GLARGINE 300 U/ML
100 INJECTION, SOLUTION SUBCUTANEOUS NIGHTLY
Qty: 30 ML | Refills: 1 | Status: SHIPPED | OUTPATIENT
Start: 2021-11-03 | End: 2022-03-15

## 2021-11-03 NOTE — PROGRESS NOTES
"Chief Complaint  Diabetes Mellitus    Subjective          Kelly Pack presents to [facility] ENDOCRINOLOGY  History of Present Illness     I have reviewed PMH, allergies and medications UTD at this visit     a1c improved to goal range    Type 2 dm   Diagnosed about 2 years ago.   Today in clinic pt reports being on toujeo 120u daily, Humalog 8-18 units with each meal, trulicity 1.5mg weekly     Sensor -felipe  Very high 0%  High 4%  Target range 95%  Low 1%  BS steady through the whole day  Mildly decreased around 9p-12a    Dm retinopathy -no history,Last eye exam -up-to-date with eye examination  Dm nephropathy -yes, low gfr + proteinuria   Dm neuropathy -yes, sharp shooting, worse with edema ,Dm neuropathy meds -not on meds but is wanting some   CAD -no  CVA -no  Episodes of hypoglycemia -no  Pt is physically active. weight is trending up  Lab Results   Component Value Date    HGBA1C 6.7 (H) 10/20/2021     Review of Systems   Respiratory: Positive for shortness of breath (asthma).    Cardiovascular: Positive for leg swelling. Negative for chest pain and palpitations.          Hyperlipidemia  On simvastatin 20mg daily- off related to pain    Father tolerates atorvastatin  Lab Results   Component Value Date    CHLPL 314 (H) 10/20/2021    TRIG 359 (H) 10/20/2021    HDL 38 (L) 10/20/2021     (H) 10/20/2021         Objective   Vital Signs:   /90   Pulse 96   Ht 175.3 cm (69\")   Wt 133 kg (294 lb)   SpO2 99%   BMI 43.42 kg/m²     Physical Exam  Vitals reviewed.   Constitutional:       General: She is not in acute distress.  HENT:      Head: Normocephalic and atraumatic.   Cardiovascular:      Rate and Rhythm: Normal rate and regular rhythm.   Pulmonary:      Effort: Pulmonary effort is normal. No respiratory distress.   Musculoskeletal:         General: No signs of injury. Normal range of motion.      Cervical back: Normal range of motion and neck supple.      Right lower leg: Edema " present.      Left lower leg: Edema present.      Comments: 3+ pitting   Feet:      Comments: Pedal edema  Skin:     General: Skin is warm and dry.   Neurological:      Mental Status: She is alert and oriented to person, place, and time. Mental status is at baseline.   Psychiatric:         Mood and Affect: Mood normal.         Behavior: Behavior normal.         Thought Content: Thought content normal.         Judgment: Judgment normal.          Result Review :   The following data was reviewed by: YOSSI Norton on 11/03/2021:  Common labs    Common Labsle 12/3/20 12/3/20 12/3/20 12/3/20 5/21/21 5/21/21 5/21/21 5/21/21 10/20/21 10/20/21 10/20/21 10/20/21    1410 1410 1410 1410 1226 1226 1226 1226 0839 [id number]   Glucose  203 (A)       132 (A)      BUN  37 (A)       34 (A)      Creatinine  1.30 (A)    1.22 (A)   1.37 (A)      eGFR Non  Am  44 (A)    52 (A)   45 (A)      eGFR  Am  53 (A)    60   52 (A)      Sodium  140       148 (A)      Potassium  6.2 (A)       4.9      Chloride  103       106      Calcium  10.1       9.2      Total Cholesterol   223 (A)    344 (A)    314 (A)    Triglycerides   404 (A)    552 (A)    359 (A)    HDL Cholesterol   39 (A)    33 (A)    38 (A)    LDL Cholesterol    114 (A)    194 (A)    202 (A)    Hemoglobin A1C 9.90 (A)    9.2 (A)     6.7 (A)     Microalbumin, Urine    1,594.2    3,196.1    2,268.7   (A) Abnormal value       Comments are available for some flowsheets but are not being displayed.                     Assessment and Plan    Diagnoses and all orders for this visit:    1. Type 2 diabetes mellitus with hyperglycemia, with long-term current use of insulin (HCC) (Primary)  -     Cancel: Ambulatory Referral to Nephrology  -     Ambulatory Referral to Nephrology  -     Ambulatory Referral to Podiatry  -     Hemoglobin A1c; Future  -     Comprehensive Metabolic Panel; Future  -     Lipid Panel; Future    2. Hyperlipemia, mixed  -     simvastatin (ZOCOR) 20  MG tablet; Take 1 tablet by mouth Daily.  Dispense: 90 tablet; Refill: 1  -     Hemoglobin A1c; Future  -     Comprehensive Metabolic Panel; Future  -     Lipid Panel; Future    Other orders  -     Dulaglutide 1.5 MG/0.5ML solution pen-injector; Inject 1.5 mg under the skin into the appropriate area as directed 1 (One) Time Per Week.  Dispense: 12 pen; Refill: 1  -     Continuous Blood Gluc Sensor (FreeStyle Zane 2 Sensor) misc; 1 Device Every 14 (Fourteen) Days.  Dispense: 2 each; Refill: 4  -     Insulin Glargine, 1 Unit Dial, (Toujeo SoloStar) 300 UNIT/ML solution pen-injector injection; Inject 100 Units under the skin into the appropriate area as directed Every Night.  Dispense: 30 mL; Refill: 1  -     insulin lispro (humaLOG) 100 UNIT/ML injection; Inject 14 Units under the skin into the appropriate area as directed 3 (Three) Times a Day Before Meals.  Dispense: 37.8 mL; Refill: 1        Follow Up   No follow-ups on file.      will see lula in march   Nephrology eval urgent r/t edema, proteinuria and low gfr. Had seen PCP who decreased HCTZ last visit   Recommend ER for leg edema and foot pain but she refused r/t cost of her insurance. Discussed worsening symptoms and why I suggest ER and what should be treated asap ie: r/o dvt, blood flow complications or other potentially harmful scenarios that would cause leg pain and swelling. She verbalizes understanding   Resume statin and assess myalgia  Discuss medication for neuropathy with dr cotton   Going to see PCP for asthma management   Improve low cholesterol diet  Higher risk of complications discussed with current edema and ldl > 200    Patient was given instructions and counseling regarding her condition or for health maintenance advice. Please see specific information pulled into the AVS if appropriate.       YOSSI Norton

## 2021-11-03 NOTE — PATIENT INSTRUCTIONS
Edema    Edema is an abnormal buildup of fluids in the body tissues and under the skin. Swelling of the legs, feet, and ankles is a common symptom that becomes more likely as you get older. Swelling is also common in looser tissues, like around the eyes. When the affected area is squeezed, the fluid may move out of that spot and leave a dent for a few moments. This dent is called pitting edema.  There are many possible causes of edema. Eating too much salt (sodium) and being on your feet or sitting for a long time can cause edema in your legs, feet, and ankles. Hot weather may make edema worse. Common causes of edema include:  · Heart failure.  · Liver or kidney disease.  · Weak leg blood vessels.  · Cancer.  · An injury.  · Pregnancy.  · Medicines.  · Being obese.  · Low protein levels in the blood.  Edema is usually painless. Your skin may look swollen or shiny.  Follow these instructions at home:  · Keep the affected body part raised (elevated) above the level of your heart when you are sitting or lying down.  · Do not sit still or stand for long periods of time.  · Do not wear tight clothing. Do not wear garters on your upper legs.  · Exercise your legs to get your circulation going. This helps to move the fluid back into your blood vessels, and it may help the swelling go down.  · Wear elastic bandages or support stockings to reduce swelling as told by your health care provider.  · Eat a low-salt (low-sodium) diet to reduce fluid as told by your health care provider.  · Depending on the cause of your swelling, you may need to limit how much fluid you drink (fluid restriction).  · Take over-the-counter and prescription medicines only as told by your health care provider.  Contact a health care provider if:  · Your edema does not get better with treatment.  · You have heart, liver, or kidney disease and have symptoms of edema.  · You have sudden and unexplained weight gain.  Get help right away if:  · You develop  shortness of breath or chest pain.  · You cannot breathe when you lie down.  · You develop pain, redness, or warmth in the swollen areas.  · You have heart, liver, or kidney disease and suddenly get edema.  · You have a fever and your symptoms suddenly get worse.  Summary  · Edema is an abnormal buildup of fluids in the body tissues and under the skin.  · Eating too much salt (sodium) and being on your feet or sitting for a long time can cause edema in your legs, feet, and ankles.  · Keep the affected body part raised (elevated) above the level of your heart when you are sitting or lying down.  This information is not intended to replace advice given to you by your health care provider. Make sure you discuss any questions you have with your health care provider.  Document Revised: 05/06/2020 Document Reviewed: 01/20/2018  [employer] Patient Education © 2021 [employer]        Familial Hypercholesterolemia  Familial hypercholesterolemia (FH) is a genetic disorder that causes a very high level of LDL (low-density lipoprotein) cholesterol. Cholesterol is a waxy, fat-like substance that your body needs to build cells. Your body makes all the cholesterol it needs in the liver and removes extra (excess) cholesterol from the blood as needed. Excess cholesterol comes from food that you eat. In people who have FH, the body is not able to remove LDL cholesterol from the blood as it should.  A high level of LDL cholesterol puts you at higher risk for narrowing and hardening of your arteries (atherosclerosis) at an early age. This raises your risk for heart disease and stroke.  What are the causes?  FH is passed from parent to child (inherited). FH is caused by an inherited gene defect (genetic mutation) that makes it hard for the liver to remove LDL cholesterol from the blood. The gene may be inherited from one parent or both parents.  What increases the risk?  You may be at higher risk for FH if:  · You have a family history  of the condition. If both parents carry the genetic mutation, their children are at higher risk for a more severe form of FH, with symptoms that start at an earlier age.  What are the signs or symptoms?  You may have a high level of LDL cholesterol before you develop symptoms. Symptoms of FH may include:  · Cholesterol nodules (xanthomas) on the cords of tissue that connect muscles to bones (tendons). Xanthomas often form on the long tendon at the back of the ankle (Achilles tendon) or on the tendons on the back of the hands.  · Cholesterol deposits (xanthelasmas) under the skin of the eyelids.  · A gray or blue ring around the white part of the eye (corneal arcus).  Complications of FH can occur due to atherosclerosis. Atherosclerosis may cause damage to an area of the body that is not getting enough blood. Complications of FH may include:  · Chest pain (angina) and shortness of breath due to narrowed or blocked arteries in the heart (coronary artery disease).  · Pain and cramping in the back of the lower legs (calves) when walking (claudication).  · Interruption in blood flow to the brain (stroke). This may cause:  ? Loss of balance.  ? Vision loss.  ? Sudden weakness or numbness on one side of the body.  How is this diagnosed?  This condition may be diagnosed based on:  · Your symptoms.  · Your medical history, including any family history of FH or early coronary heart disease.  · A physical exam.  · A blood test to check for the genetic mutation that causes FH. Your family members may also be tested.  How is this treated?  There is no cure for FH, but treatment can lower LDL cholesterol levels and lower your risk for heart attack or stroke. Treatment should be started as soon as you are diagnosed. Treatment may include:  · A type of medicine that lowers your cholesterol (statin). If statins do not help, your health care provider may try other kinds of cholesterol-lowering medicines. The exact combination of  "medicines depends on the severity of your symptoms.  · A procedure to filter LDL from your blood (apheresis). You may need this treatment if you have a severe form of FH.  · Making lifestyle changes that are healthy for your heart, such as lowering the amount of fat and cholesterol in your diet.  Follow these instructions at home:  Lifestyle    · Lose weight, if directed by your health care provider.  · Follow instructions from your health care provider about eating a healthy diet. Your health care provider may recommend:  ? Working with a diet and nutrition specialist (dietitian), who can help you make a healthy eating plan and help you maintain a healthy weight.  ? Eating less fat and cholesterol. Avoid fatty meats, fried foods, and whole-fat dairy.  ? Eating more vegetables, fruits, and whole grains.  ? Limiting your intake of alcohol.  · Be physically active. Ask your health care provider what type of exercise is best for you.  · Do not use any products that contain nicotine or tobacco, such as cigarettes and e-cigarettes. If you need help quitting, ask your health care provider.  · Work with your health care provider to manage any other conditions you have, such as high blood pressure (hypertension) or diabetes. These conditions affect your heart.    General instructions  · Take over-the-counter and prescription medicines only as told by your health care provider.  · Keep all follow-up visits as told by your health care provider. This is important.  Contact a health care provider if:  · You have pain or cramps in your calf when you walk.  Get help right away if:    · You have sudden, unexplained discomfort in your chest, arms, back, neck, jaw, or upper body.  · You have trouble breathing.  · You have a sudden, severe headache with no known cause.  · You have any symptoms of a stroke. \"BE FAST\" is an easy way to remember the main warning signs of a stroke:  ? B - Balance. Signs are dizziness, sudden trouble " walking, or loss of balance.  ? E - Eyes. Signs are trouble seeing or a sudden change in vision.  ? F - Face. Signs are sudden weakness or numbness of the face, or the face or eyelid drooping on one side.  ? A - Arms. Signs are weakness or numbness in an arm. This happens suddenly and usually on one side of the body.  ? S - Speech. Signs are sudden trouble speaking, slurred speech, or trouble understanding what people say.  ? T - Time. Time to call emergency services. Write down what time symptoms started.  These symptoms may represent a serious problem that is an emergency. Do not wait to see if the symptoms will go away. Get medical help right away. Call your local emergency services (911 in the [place] Do not drive yourself to the hospital.  Summary  · Familial hypercholesterolemia (FH) is a genetic disorder that causes a very high level of LDL (low-density lipoprotein) cholesterol.  · FH increases your risk for coronary heart disease and stroke at an early age.  · Treatment for FH should be started as soon as you are diagnosed with the condition. Treatment is aimed at lowering your risk for complications.  · Follow instructions from your health care provider about eating a healthy diet. Your health care provider may recommend eating less fat and cholesterol.  This information is not intended to replace advice given to you by your health care provider. Make sure you discuss any questions you have with your health care provider.  Document Revised: 04/13/2021 Document Reviewed: 04/13/2021  [employer] Patient Education © 2021 [employer]

## 2021-11-04 RX ORDER — GABAPENTIN 100 MG/1
100 CAPSULE ORAL 3 TIMES DAILY
Qty: 90 CAPSULE | Refills: 1 | Status: SHIPPED | OUTPATIENT
Start: 2021-11-04 | End: 2022-05-13 | Stop reason: SDUPTHER

## 2021-11-09 ENCOUNTER — OFFICE VISIT (OUTPATIENT)
Dept: FAMILY MEDICINE CLINIC | Facility: CLINIC | Age: 51
End: 2021-11-09

## 2021-11-09 VITALS
HEART RATE: 90 BPM | SYSTOLIC BLOOD PRESSURE: 198 MMHG | BODY MASS INDEX: 43.4 KG/M2 | DIASTOLIC BLOOD PRESSURE: 100 MMHG | WEIGHT: 293 LBS | HEIGHT: 69 IN | OXYGEN SATURATION: 98 % | TEMPERATURE: 97.5 F

## 2021-11-09 DIAGNOSIS — J45.21 MILD INTERMITTENT ASTHMA WITH ACUTE EXACERBATION: ICD-10-CM

## 2021-11-09 DIAGNOSIS — I10 ESSENTIAL HYPERTENSION: ICD-10-CM

## 2021-11-09 DIAGNOSIS — Z12.31 ENCOUNTER FOR SCREENING MAMMOGRAM FOR MALIGNANT NEOPLASM OF BREAST: ICD-10-CM

## 2021-11-09 DIAGNOSIS — Z00.00 WELL FEMALE EXAM WITHOUT GYNECOLOGICAL EXAM: Primary | ICD-10-CM

## 2021-11-09 PROCEDURE — 99396 PREV VISIT EST AGE 40-64: CPT | Performed by: NURSE PRACTITIONER

## 2021-11-09 PROCEDURE — 90686 IIV4 VACC NO PRSV 0.5 ML IM: CPT | Performed by: NURSE PRACTITIONER

## 2021-11-09 PROCEDURE — 90471 IMMUNIZATION ADMIN: CPT | Performed by: NURSE PRACTITIONER

## 2021-11-09 RX ORDER — METHYLPREDNISOLONE 4 MG/1
TABLET ORAL
Qty: 21 EACH | Refills: 0 | Status: SHIPPED | OUTPATIENT
Start: 2021-11-09 | End: 2022-06-03

## 2021-11-09 RX ORDER — FLUTICASONE PROPIONATE 44 MCG
1 AEROSOL WITH ADAPTER (GRAM) INHALATION
Qty: 1 EACH | Refills: 1 | Status: SHIPPED | OUTPATIENT
Start: 2021-11-09 | End: 2022-06-03

## 2021-11-09 RX ORDER — IRBESARTAN 150 MG/1
150 TABLET ORAL DAILY
Qty: 90 TABLET | Refills: 0 | Status: SHIPPED | OUTPATIENT
Start: 2021-11-09 | End: 2022-06-03

## 2021-11-09 RX ORDER — HYDROCHLOROTHIAZIDE 12.5 MG/1
12.5 TABLET ORAL DAILY
Qty: 90 TABLET | Refills: 0 | Status: SHIPPED | OUTPATIENT
Start: 2021-11-09 | End: 2022-06-03

## 2021-11-09 NOTE — PROGRESS NOTES
Stacy Pack is a 51 y.o. female.     Chief Complaint   Patient presents with   • Annual Exam     rattle in chest while sleeping    • Flu Vaccine       History of Present Illness   The patient is being seen for a health maintenance evaluation.  The last health maintenance visit is unknown.  Social history: Household members include none.  She is unmarried.  Work status: Full-time.  The patient has never smoked cigarettes.  She reports rare alcohol use.  General health: The patient's health since the last visit is described as good.  She does not have regular dental visits.  The patient brushes 2 times a day, flosses occasionally reports her last dental visit is unknown.  She denies vision problems.  Vision care includes glasses and an eye exam within the past year.  She denies hearing loss.  Immunization status: Influenza vaccination needed.  Lifestyle: She does not exercise regularly.  Reproductive health: She is not sexually active.  Screening: Mammogram is due.  Patient reports history of full hysterectomy due to endometrial cancer.    The following portions of the patient's history were reviewed and updated as appropriate: allergies, current medications, past family history, past medical history, past social history, past surgical history and problem list.    Past Medical History:   Diagnosis Date   • Albuminuria    • Allergic rhinitis    • Asthma     allergy triggered   • Bell's palsy    • Blood glucose elevated    • Diabetes mellitus with albuminuria (HCC)    • Elevated blood pressure reading without diagnosis of hypertension    • Hyperlipidemia    • Migraine    • Right otitis media    • Type II diabetes mellitus (HCC)    • Vitamin D deficiency        Past Surgical History:   Procedure Laterality Date   • CATARACT EXTRACTION     • CHOLECYSTECTOMY     • EYE SURGERY      NOV 2020   • HYSTERECTOMY      due to cancer   • VITRECTOMY PARS PLANA Left 1/28/2020    Procedure: 25G VITRECTOMY-ENDO LASER;   "Surgeon: Lazarus, Howard S., MD;  Location: [facility] MAIN OR;  Service: Ophthalmology       Family History   Problem Relation Age of Onset   • Breast cancer Mother    • Diabetes Father    • Hyperlipidemia Father    • Hypertension Father    • Asthma Maternal Grandfather        Social History     Socioeconomic History   • Marital status: Unknown   Tobacco Use   • Smoking status: Never Smoker   • Smokeless tobacco: Never Used   Substance and Sexual Activity   • Alcohol use: Yes     Comment: rarely   • Drug use: No   • Sexual activity: Never       Review of Systems   Constitutional: Negative for fever.   HENT: Negative for ear pain, rhinorrhea and sore throat.    Eyes: Negative for visual disturbance.   Respiratory: Positive for shortness of breath and wheezing. Negative for cough.    Cardiovascular: Negative for chest pain.   Gastrointestinal: Negative for abdominal pain, diarrhea, nausea and vomiting.   Genitourinary: Negative.    Musculoskeletal: Negative.    Skin: Negative for rash.   Neurological: Negative for dizziness and headache.   Psychiatric/Behavioral: Negative for depressed mood.       Objective   Vitals:    11/09/21 1422 11/09/21 1518   BP: (!) 200/109 (!) 198/100   BP Location: Right arm Left arm   Patient Position: Sitting Sitting   Pulse: 90    Temp: 97.5 °F (36.4 °C)    SpO2: 98%    Weight: (!) 136 kg (300 lb 6.4 oz)    Height: 175.3 cm (69.02\")       Body mass index is 44.34 kg/m².  Physical Exam  Vitals and nursing note reviewed.   Constitutional:       Appearance: Normal appearance.   HENT:      Head: Normocephalic and atraumatic.      Right Ear: Tympanic membrane and ear canal normal.      Left Ear: Tympanic membrane and ear canal normal.   Eyes:      Conjunctiva/sclera: Conjunctivae normal.      Pupils: Pupils are equal, round, and reactive to light.   Cardiovascular:      Rate and Rhythm: Normal rate and regular rhythm.      Heart sounds: Normal heart sounds.   Pulmonary:      Effort: Pulmonary " effort is normal.      Breath sounds: Normal breath sounds.   Abdominal:      General: Bowel sounds are normal.      Palpations: Abdomen is soft.      Tenderness: There is no abdominal tenderness.   Genitourinary:     Comments: Declined   Musculoskeletal:         General: Normal range of motion.      Cervical back: Neck supple.   Skin:     General: Skin is warm and dry.   Neurological:      Mental Status: She is alert and oriented to person, place, and time.   Psychiatric:         Mood and Affect: Mood normal.           Assessment/Plan   Diagnoses and all orders for this visit:    1. Well female exam without gynecological exam (Primary)  -     FluLaval/Fluarix/Fluzone >6 Months (3059-7346)    2. Mild intermittent asthma with acute exacerbation  -     methylPREDNISolone (MEDROL) 4 MG dose pack; Take as directed on package instructions.  Dispense: 21 each; Refill: 0  -     fluticasone (Flovent HFA) 44 MCG/ACT inhaler; Inhale 1 puff 2 (Two) Times a Day.  Dispense: 1 each; Refill: 1    3. Essential hypertension  Comments:  - Lisinopril was discontinued due to hyperkalemia and patient has not been taking HCTZ.   - Will restart HCTZ at 12.5 mg daily with irbesartan.  Orders:  -     irbesartan (Avapro) 150 MG tablet; Take 1 tablet by mouth Daily.  Dispense: 90 tablet; Refill: 0  -     hydroCHLOROthiazide (HYDRODIURIL) 12.5 MG tablet; Take 1 tablet by mouth Daily.  Dispense: 90 tablet; Refill: 0  -     Basic metabolic panel; Future    4. Encounter for screening mammogram for malignant neoplasm of breast  -     Mammo Screening Digital Tomosynthesis Bilateral With CAD; Future    Impression: Currently, she has an inadequate exercise regimen.  Patient declines cervical cancer screening.  Mammogram ordered today.  No screening lab work is due at this time.  Influenza vaccination given today.  She was advised to be evaluated by dentist.  Advice and education were given regarding aerobic exercise.

## 2021-12-05 ENCOUNTER — HOSPITAL ENCOUNTER (OUTPATIENT)
Dept: MAMMOGRAPHY | Facility: HOSPITAL | Age: 51
Discharge: HOME | End: 2021-12-05
Admitting: NURSE PRACTITIONER

## 2021-12-05 DIAGNOSIS — Z12.31 ENCOUNTER FOR SCREENING MAMMOGRAM FOR MALIGNANT NEOPLASM OF BREAST: ICD-10-CM

## 2021-12-05 PROCEDURE — 77067 SCR MAMMO BI INCL CAD: CPT

## 2021-12-05 PROCEDURE — 77063 BREAST TOMOSYNTHESIS BI: CPT

## 2021-12-08 ENCOUNTER — TRANSCRIBE ORDERS (OUTPATIENT)
Dept: ADMINISTRATIVE | Facility: HOSPITAL | Age: 51
End: 2021-12-08

## 2021-12-08 DIAGNOSIS — N18.31 STAGE 3A CHRONIC KIDNEY DISEASE (HCC): Primary | ICD-10-CM

## 2022-01-04 ENCOUNTER — APPOINTMENT (OUTPATIENT)
Dept: ULTRASOUND IMAGING | Facility: HOSPITAL | Age: 52
End: 2022-01-04

## 2022-01-27 ENCOUNTER — HOSPITAL ENCOUNTER (OUTPATIENT)
Dept: ULTRASOUND IMAGING | Facility: HOSPITAL | Age: 52
Discharge: HOME | End: 2022-01-27
Admitting: HOSPITALIST

## 2022-01-27 DIAGNOSIS — N18.31 STAGE 3A CHRONIC KIDNEY DISEASE: ICD-10-CM

## 2022-01-27 PROCEDURE — 76775 US EXAM ABDO BACK WALL LIM: CPT

## 2022-02-14 ENCOUNTER — TELEPHONE (OUTPATIENT)
Dept: ENDOCRINOLOGY | Age: 52
End: 2022-02-14

## 2022-02-23 ENCOUNTER — TRANSCRIBE ORDERS (OUTPATIENT)
Dept: ADMINISTRATIVE | Facility: HOSPITAL | Age: 52
End: 2022-02-23

## 2022-02-23 DIAGNOSIS — I12.9 HYPERTENSIVE NEPHROPATHY: Primary | ICD-10-CM

## 2022-03-15 RX ORDER — INSULIN GLARGINE 300 U/ML
INJECTION, SOLUTION SUBCUTANEOUS
Qty: 6 ML | Refills: 0 | Status: SHIPPED | OUTPATIENT
Start: 2022-03-15 | End: 2022-05-05 | Stop reason: SDUPTHER

## 2022-03-22 DIAGNOSIS — R80.9 ALBUMINURIA: ICD-10-CM

## 2022-03-23 NOTE — TELEPHONE ENCOUNTER
The original prescription was discontinued on 4/8/2021 by Abel Grover MD. Renewing this prescription may not be appropriate.     Blood pressure on record    Patient is on an ARB

## 2022-03-24 RX ORDER — LISINOPRIL 2.5 MG/1
TABLET ORAL
Qty: 90 TABLET | Refills: 0 | Status: SHIPPED | OUTPATIENT
Start: 2022-03-24 | End: 2022-06-03

## 2022-04-05 ENCOUNTER — APPOINTMENT (OUTPATIENT)
Dept: CARDIOLOGY | Facility: HOSPITAL | Age: 52
End: 2022-04-05

## 2022-04-26 ENCOUNTER — APPOINTMENT (OUTPATIENT)
Dept: CARDIOLOGY | Facility: HOSPITAL | Age: 52
End: 2022-04-26

## 2022-04-28 RX ORDER — FLASH GLUCOSE SENSOR
KIT MISCELLANEOUS
Qty: 2 EACH | Refills: 0 | Status: SHIPPED | OUTPATIENT
Start: 2022-04-28 | End: 2022-05-05

## 2022-04-28 NOTE — TELEPHONE ENCOUNTER
Rx Refill Note  Requested Prescriptions     Pending Prescriptions Disp Refills    Continuous Blood Gluc Sensor (FreeStyle Zane 2 Sensor) misc [Pharmacy Med Name: FREESTYLE ZANE 2 SENSOR KIT] 2 each 0     Sig: USE 1 DEVICE EVERY TWO WEEKS (14 DAYS)    Continuous Blood Gluc Sensor (FreeStyle Zane 14 Day Sensor) misc [Pharmacy Med Name: FREESTYLE ZANE SENSOR 14D KIT] 2 each 0     Sig: APPLY 1  TOPICALLY ONCE EVERY 14 DAYS      Last office visit with prescribing clinician: 11/3/2021      Next office visit with prescribing clinician: 5/5/2022            Della Brannon  04/28/22, 11:27 EDT

## 2022-04-29 ENCOUNTER — DOCUMENTATION (OUTPATIENT)
Dept: ENDOCRINOLOGY | Age: 52
End: 2022-04-29

## 2022-05-05 ENCOUNTER — OFFICE VISIT (OUTPATIENT)
Dept: ENDOCRINOLOGY | Age: 52
End: 2022-05-05

## 2022-05-05 VITALS
BODY MASS INDEX: 43.4 KG/M2 | WEIGHT: 293 LBS | HEART RATE: 95 BPM | DIASTOLIC BLOOD PRESSURE: 75 MMHG | SYSTOLIC BLOOD PRESSURE: 135 MMHG | OXYGEN SATURATION: 98 % | HEIGHT: 69 IN

## 2022-05-05 DIAGNOSIS — E11.65 TYPE 2 DIABETES MELLITUS WITH HYPERGLYCEMIA, WITH LONG-TERM CURRENT USE OF INSULIN: Primary | ICD-10-CM

## 2022-05-05 DIAGNOSIS — E78.2 HYPERLIPEMIA, MIXED: ICD-10-CM

## 2022-05-05 DIAGNOSIS — Z79.4 TYPE 2 DIABETES MELLITUS WITH HYPERGLYCEMIA, WITH LONG-TERM CURRENT USE OF INSULIN: Primary | ICD-10-CM

## 2022-05-05 PROCEDURE — 99214 OFFICE O/P EST MOD 30 MIN: CPT | Performed by: NURSE PRACTITIONER

## 2022-05-05 RX ORDER — SIMVASTATIN 20 MG
20 TABLET ORAL DAILY
Qty: 90 TABLET | Refills: 1 | Status: SHIPPED | OUTPATIENT
Start: 2022-05-05

## 2022-05-05 RX ORDER — INSULIN GLARGINE 300 U/ML
100 INJECTION, SOLUTION SUBCUTANEOUS WEEKLY
Qty: 6 ML | Refills: 0 | Status: SHIPPED | OUTPATIENT
Start: 2022-05-05 | End: 2022-05-20 | Stop reason: SDUPTHER

## 2022-05-05 NOTE — PROGRESS NOTES
"Chief Complaint  Diabetes (Type 2)    Subjective          Kelly Pack presents to [facility] ENDOCRINOLOGY  History of Present Illness     Currently with left eye palsy, saw eye doctor today, going to be having MRI to r/o stroke  She did see the nephrology    Currently on trulicty 1.5mg weekly   Currently taking toujeo 80-100u daily  Taking lispro around 14u around meal times   Does not want us to download her sensor as she does not want to pay for it to be reviewed  12a-6a 153  6a-12p 139  12p- 6p 164  6p- 12a 158  She has been taking her simvastatin   Nephrology decided not to put patient on ace or arb      Objective   Vital Signs:  /75   Pulse 95   Ht 175.3 cm (69.02\")   Wt 135 kg (297 lb 12.8 oz)   SpO2 98%   BMI 43.96 kg/m²           Physical Exam  Vitals reviewed.   Constitutional:       General: She is not in acute distress.  HENT:      Head: Normocephalic and atraumatic.   Cardiovascular:      Rate and Rhythm: Normal rate and regular rhythm.   Pulmonary:      Effort: Pulmonary effort is normal. No respiratory distress.   Musculoskeletal:         General: No signs of injury. Normal range of motion.      Cervical back: Normal range of motion and neck supple.   Skin:     General: Skin is warm and dry.   Neurological:      Mental Status: She is alert and oriented to person, place, and time. Mental status is at baseline.   Psychiatric:         Mood and Affect: Mood normal.         Behavior: Behavior normal.         Thought Content: Thought content normal.         Judgment: Judgment normal.        Result Review :   The following data was reviewed by: YOSSI Norton on 05/05/2022:  Common labs    Common Labsle 5/21/21 5/21/21 5/21/21 5/21/21 10/20/21 10/20/21 10/20/21 10/20/21    1226 1226 1226 1226 0839 0839 0839 0839   Glucose     132 (A)      BUN     34 (A)      Creatinine  1.22 (A)   1.37 (A)      eGFR Non  Am  52 (A)   45 (A)      eGFR  Am  60   52 (A)    "   Sodium     148 (A)      Potassium     4.9      Chloride     106      Calcium     9.2      Total Cholesterol   344 (A)    314 (A)    Triglycerides   552 (A)    359 (A)    HDL Cholesterol   33 (A)    38 (A)    LDL Cholesterol    194 (A)    202 (A)    Hemoglobin A1C 9.2 (A)     6.7 (A)     Microalbumin, Urine    3,196.1    2,268.7   (A) Abnormal value       Comments are available for some flowsheets but are not being displayed.                     Assessment and Plan    Diagnoses and all orders for this visit:    1. Type 2 diabetes mellitus with hyperglycemia, with long-term current use of insulin (HCC) (Primary)  -     Hemoglobin A1c  -     Lipid Panel    2. Hyperlipemia, mixed  -     Hemoglobin A1c  -     Lipid Panel  -     simvastatin (ZOCOR) 20 MG tablet; Take 1 tablet by mouth Daily.  Dispense: 90 tablet; Refill: 1    Other orders  -     Continuous Blood Gluc Sensor (FreeStyle Zane 2 Sensor) misc; 1 each by Other route Every 14 (Fourteen) Days.  Dispense: 2 each; Refill: 5  -     Dulaglutide 1.5 MG/0.5ML solution pen-injector; Inject 1.5 mg under the skin into the appropriate area as directed 1 (One) Time Per Week.  Dispense: 12 pen; Refill: 1  -     insulin lispro (humaLOG) 100 UNIT/ML injection; Inject 14 Units under the skin into the appropriate area as directed 3 (Three) Times a Day Before Meals.  Dispense: 40 mL; Refill: 1  -     Insulin Glargine, 1 Unit Dial, (Toujeo SoloStar) 300 UNIT/ML solution pen-injector injection; Inject 100 Units under the skin into the appropriate area as directed 1 (One) Time Per Week.  Dispense: 6 mL; Refill: 0             Follow Up   No follow-ups on file.     Repeat A1c and ensure below target range near 6.5%  Continue diabetic regimen as above  No ACE or ARB per nephrology  Repeat LDL to ensure resolving, increase simvastatin dose if renally appropriate to maintain LDL goals less than 100      Patient was given instructions and counseling regarding her condition or for  health maintenance advice. Please see specific information pulled into the AVS if appropriate.     YOSSI Norton

## 2022-05-13 DIAGNOSIS — Z79.4 TYPE 2 DIABETES MELLITUS WITH HYPERGLYCEMIA, WITH LONG-TERM CURRENT USE OF INSULIN: Primary | ICD-10-CM

## 2022-05-13 DIAGNOSIS — E11.65 TYPE 2 DIABETES MELLITUS WITH HYPERGLYCEMIA, WITH LONG-TERM CURRENT USE OF INSULIN: Primary | ICD-10-CM

## 2022-05-13 NOTE — TELEPHONE ENCOUNTER
Last ov appt 5/5/22  Next ov appt 11/7/22  forgot to ask for a refill on the Gabapentin. I don’t use it often but it does help on nights when my leg/ foot pain is keeping me from sleeping.

## 2022-05-16 RX ORDER — GABAPENTIN 100 MG/1
100 CAPSULE ORAL 3 TIMES DAILY
Qty: 90 CAPSULE | Refills: 1 | Status: SHIPPED | OUTPATIENT
Start: 2022-05-16 | End: 2023-05-16

## 2022-05-20 ENCOUNTER — TELEPHONE (OUTPATIENT)
Dept: ENDOCRINOLOGY | Age: 52
End: 2022-05-20

## 2022-05-20 RX ORDER — INSULIN GLARGINE 300 U/ML
INJECTION, SOLUTION SUBCUTANEOUS
Qty: 9 ML | Refills: 2 | Status: SHIPPED | OUTPATIENT
Start: 2022-05-20 | End: 2022-11-03

## 2022-05-20 NOTE — TELEPHONE ENCOUNTER
[facility] called needing clarification or a new RX sent in for toujeo the instructions are wrong please call [phone] or resend RX with new directions please

## 2022-06-01 ENCOUNTER — PRIOR AUTHORIZATION (OUTPATIENT)
Dept: ENDOCRINOLOGY | Age: 52
End: 2022-06-01

## 2022-06-03 ENCOUNTER — OFFICE VISIT (OUTPATIENT)
Dept: FAMILY MEDICINE CLINIC | Facility: CLINIC | Age: 52
End: 2022-06-03

## 2022-06-03 VITALS
SYSTOLIC BLOOD PRESSURE: 183 MMHG | HEART RATE: 87 BPM | OXYGEN SATURATION: 98 % | BODY MASS INDEX: 44.84 KG/M2 | WEIGHT: 293 LBS | TEMPERATURE: 98.7 F | DIASTOLIC BLOOD PRESSURE: 122 MMHG

## 2022-06-03 DIAGNOSIS — M54.2 NECK PAIN: ICD-10-CM

## 2022-06-03 DIAGNOSIS — G44.209 ACUTE NON INTRACTABLE TENSION-TYPE HEADACHE: ICD-10-CM

## 2022-06-03 DIAGNOSIS — I10 ESSENTIAL HYPERTENSION: Primary | ICD-10-CM

## 2022-06-03 PROCEDURE — 99213 OFFICE O/P EST LOW 20 MIN: CPT | Performed by: NURSE PRACTITIONER

## 2022-06-03 RX ORDER — ONDANSETRON 4 MG/1
4 TABLET, FILM COATED ORAL EVERY 8 HOURS PRN
Qty: 30 TABLET | Refills: 1 | Status: SHIPPED | OUTPATIENT
Start: 2022-06-03

## 2022-06-03 RX ORDER — ACETAMINOPHEN AND CODEINE PHOSPHATE 300; 60 MG/1; MG/1
1 TABLET ORAL
COMMUNITY
End: 2022-09-16

## 2022-06-03 RX ORDER — CYCLOBENZAPRINE HCL 10 MG
10 TABLET ORAL NIGHTLY PRN
Qty: 30 TABLET | Refills: 1 | Status: SHIPPED | OUTPATIENT
Start: 2022-06-03 | End: 2022-08-08

## 2022-06-03 RX ORDER — HYDRALAZINE HYDROCHLORIDE 25 MG/1
25 TABLET, FILM COATED ORAL 3 TIMES DAILY
Qty: 90 TABLET | Refills: 2 | Status: SHIPPED | OUTPATIENT
Start: 2022-06-03 | End: 2022-09-06 | Stop reason: SDUPTHER

## 2022-06-03 NOTE — PROGRESS NOTES
Chief Complaint  Migraine    Subjective        Kelly Pack presents to [facility]  History of Present Illness    Hypertension: Patient here for follow-up of essential hypertension. Blood pressure is not well controlled at home.She is not exercising and is not adherent to low salt diet. Home monitoring: The patient is not checking blood pressure at home. Symptoms: headache. Medications: The patient is not adherent with their medication regimen. HCTZ, irbestartan and lisinopril were discontinued by nephrology and endocrinology. The patient is due for Lipid panel and Serum creatinine.    Objective   Vital Signs:  BP (!) 183/122 (BP Location: Left arm, Patient Position: Sitting, Cuff Size: Large Adult)   Pulse 87   Temp 98.7 °F (37.1 °C) (Temporal)   Wt (!) 138 kg (303 lb 12.8 oz)   SpO2 98%   BMI 44.84 kg/m²     BMI has not been calculated during today's encounter.       Physical Exam  Vitals and nursing note reviewed.   Constitutional:       Appearance: Normal appearance.   Cardiovascular:      Rate and Rhythm: Normal rate and regular rhythm.      Heart sounds: Normal heart sounds.   Pulmonary:      Effort: Pulmonary effort is normal.      Breath sounds: Normal breath sounds.   Neurological:      Mental Status: She is alert and oriented to person, place, and time.   Psychiatric:         Mood and Affect: Mood normal.        Result Review :           Assessment and Plan   Diagnoses and all orders for this visit:    1. Essential hypertension (Primary)  -     hydrALAZINE (APRESOLINE) 25 MG tablet; Take 1 tablet by mouth 3 (Three) Times a Day.  Dispense: 90 tablet; Refill: 2    2. Neck pain  -     cyclobenzaprine (FLEXERIL) 10 MG tablet; Take 1 tablet by mouth At Night As Needed for Muscle Spasms.  Dispense: 30 tablet; Refill: 1    3. Acute non intractable tension-type headache  -     cyclobenzaprine (FLEXERIL) 10 MG tablet; Take 1 tablet by mouth At Night As Needed for Muscle Spasms.   Dispense: 30 tablet; Refill: 1  -     ondansetron (Zofran) 4 MG tablet; Take 1 tablet by mouth Every 8 (Eight) Hours As Needed for Nausea or Vomiting.  Dispense: 30 tablet; Refill: 1           I spent 20 minutes caring for Kelly on this date of service. This time includes time spent by me in the following activities:performing a medically appropriate examination and/or evaluation , counseling and educating the patient/family/caregiver, ordering medications, tests, or procedures and documenting information in the medical record  Follow Up   Return in about 4 weeks (around 7/1/2022) for Recheck.  Patient was given instructions and counseling regarding her condition or for health maintenance advice. Please see specific information pulled into the AVS if appropriate.

## 2022-08-07 DIAGNOSIS — M54.2 NECK PAIN: ICD-10-CM

## 2022-08-07 DIAGNOSIS — G44.209 ACUTE NON INTRACTABLE TENSION-TYPE HEADACHE: ICD-10-CM

## 2022-08-08 RX ORDER — CYCLOBENZAPRINE HCL 10 MG
TABLET ORAL
Qty: 30 TABLET | Refills: 0 | Status: SHIPPED | OUTPATIENT
Start: 2022-08-08 | End: 2022-09-06 | Stop reason: SDUPTHER

## 2022-09-06 DIAGNOSIS — G44.209 ACUTE NON INTRACTABLE TENSION-TYPE HEADACHE: ICD-10-CM

## 2022-09-06 DIAGNOSIS — M54.2 NECK PAIN: ICD-10-CM

## 2022-09-06 DIAGNOSIS — I10 ESSENTIAL HYPERTENSION: ICD-10-CM

## 2022-09-06 RX ORDER — HYDRALAZINE HYDROCHLORIDE 25 MG/1
25 TABLET, FILM COATED ORAL 3 TIMES DAILY
Qty: 270 TABLET | Refills: 0 | Status: SHIPPED | OUTPATIENT
Start: 2022-09-06 | End: 2022-09-16 | Stop reason: SDUPTHER

## 2022-09-06 RX ORDER — CYCLOBENZAPRINE HCL 10 MG
10 TABLET ORAL NIGHTLY PRN
Qty: 30 TABLET | Refills: 0 | Status: SHIPPED | OUTPATIENT
Start: 2022-09-06 | End: 2022-10-18 | Stop reason: SDUPTHER

## 2022-09-06 RX ORDER — ALBUTEROL SULFATE 90 UG/1
2 AEROSOL, METERED RESPIRATORY (INHALATION) EVERY 6 HOURS PRN
Qty: 18 G | Refills: 0 | Status: SHIPPED | OUTPATIENT
Start: 2022-09-06

## 2022-09-06 NOTE — TELEPHONE ENCOUNTER
LOV - 06/03/22    LF - ALBUTEROL 05/15/20    LF - HYDRALAZINE - 06/03/22    LF - CYCLOBENZAPRINE - 08/08/22    NEXT APPOINTMENT - 09/16/22

## 2022-09-16 ENCOUNTER — OFFICE VISIT (OUTPATIENT)
Dept: FAMILY MEDICINE CLINIC | Facility: CLINIC | Age: 52
End: 2022-09-16

## 2022-09-16 VITALS
OXYGEN SATURATION: 98 % | WEIGHT: 293 LBS | TEMPERATURE: 98.8 F | DIASTOLIC BLOOD PRESSURE: 119 MMHG | BODY MASS INDEX: 48.44 KG/M2 | HEART RATE: 92 BPM | SYSTOLIC BLOOD PRESSURE: 169 MMHG

## 2022-09-16 DIAGNOSIS — E83.42 HYPOMAGNESEMIA: ICD-10-CM

## 2022-09-16 DIAGNOSIS — I10 ESSENTIAL HYPERTENSION: Primary | ICD-10-CM

## 2022-09-16 PROCEDURE — 99213 OFFICE O/P EST LOW 20 MIN: CPT | Performed by: NURSE PRACTITIONER

## 2022-09-16 RX ORDER — HYDRALAZINE HYDROCHLORIDE 50 MG/1
50 TABLET, FILM COATED ORAL 3 TIMES DAILY
Qty: 270 TABLET | Refills: 1 | Status: SHIPPED | OUTPATIENT
Start: 2022-09-16 | End: 2022-11-16 | Stop reason: HOSPADM

## 2022-09-17 LAB
BUN SERPL-MCNC: 45 MG/DL (ref 6–24)
BUN/CREAT SERPL: 15 (ref 9–23)
CALCIUM SERPL-MCNC: 8.5 MG/DL (ref 8.7–10.2)
CHLORIDE SERPL-SCNC: 108 MMOL/L (ref 96–106)
CO2 SERPL-SCNC: 23 MMOL/L (ref 20–29)
CREAT SERPL-MCNC: 3.03 MG/DL (ref 0.57–1)
EGFRCR SERPLBLD CKD-EPI 2021: 18 ML/MIN/1.73
GLUCOSE SERPL-MCNC: 167 MG/DL (ref 65–99)
MAGNESIUM SERPL-MCNC: 1.9 MG/DL (ref 1.6–2.3)
POTASSIUM SERPL-SCNC: 4.1 MMOL/L (ref 3.5–5.2)
SODIUM SERPL-SCNC: 144 MMOL/L (ref 134–144)

## 2022-09-21 NOTE — PROGRESS NOTES
"Chief Complaint  Hypertension    Subjective        Kelly Pack presents to [facility]  History of Present Illness  Hypertension: Patient was seen at [facility] on 8/15/2022 through 8/17/2022.  Patient was treated for peripheral edema.  Cause of peripheral edema has still not been determined.  There were no medication changes upon discharge.  Patient was instructed to follow-up with nephrology regarding proteinuria.  Objective   Vital Signs:  BP (!) 169/119 (BP Location: Left arm, Patient Position: Sitting, Cuff Size: Large Adult)   Pulse 92   Temp 98.8 °F (37.1 °C) (Temporal)   Wt (!) 149 kg (328 lb 3.2 oz)   SpO2 98%   BMI 48.44 kg/m²   Estimated body mass index is 48.44 kg/m² as calculated from the following:    Height as of 5/5/22: 175.3 cm (69.02\").    Weight as of this encounter: 149 kg (328 lb 3.2 oz).        Physical Exam  Vitals and nursing note reviewed.   Cardiovascular:      Rate and Rhythm: Normal rate and regular rhythm.      Heart sounds: Normal heart sounds.   Pulmonary:      Effort: Pulmonary effort is normal.      Breath sounds: Normal breath sounds.   Musculoskeletal:      Right lower leg: No edema.      Left lower leg: No edema.   Neurological:      Mental Status: She is alert and oriented to person, place, and time.   Psychiatric:         Mood and Affect: Mood normal.        Result Review :  The following data was reviewed by: YOSSI Hamlin on 09/16/2022:    Data reviewed: Recent hospitalization notes On 8/15/2022 through 8/17/2022.          Assessment and Plan   Diagnoses and all orders for this visit:    1. Essential hypertension (Primary)  Comments:  - Will increase hydralazine to 50 mg three times daily.   Orders:  -     hydrALAZINE (APRESOLINE) 50 MG tablet; Take 1 tablet by mouth 3 (Three) Times a Day.  Dispense: 270 tablet; Refill: 1  -     Basic metabolic panel    2. Hypomagnesemia  -     Magnesium           I spent 20 minutes caring " nate Mendoza on this date of service. This time includes time spent by me in the following activities:reviewing tests, performing a medically appropriate examination and/or evaluation , counseling and educating the patient/family/caregiver, ordering medications, tests, or procedures and documenting information in the medical record  Follow Up   Advised patient to schedule appointment with nephrology as soon as possible.   Patient was given instructions and counseling regarding her condition or for health maintenance advice. Please see specific information pulled into the AVS if appropriate.

## 2022-10-18 DIAGNOSIS — G44.209 ACUTE NON INTRACTABLE TENSION-TYPE HEADACHE: ICD-10-CM

## 2022-10-18 DIAGNOSIS — M54.2 NECK PAIN: ICD-10-CM

## 2022-10-18 RX ORDER — ERGOCALCIFEROL 1.25 MG/1
50000 CAPSULE ORAL 2 TIMES WEEKLY
Qty: 30 CAPSULE | Refills: 11 | Status: SHIPPED | OUTPATIENT
Start: 2022-10-20

## 2022-10-19 DIAGNOSIS — M54.2 NECK PAIN: ICD-10-CM

## 2022-10-19 DIAGNOSIS — G44.209 ACUTE NON INTRACTABLE TENSION-TYPE HEADACHE: ICD-10-CM

## 2022-10-20 RX ORDER — CYCLOBENZAPRINE HCL 10 MG
TABLET ORAL
Qty: 30 TABLET | Refills: 0 | OUTPATIENT
Start: 2022-10-20

## 2022-10-20 RX ORDER — CYCLOBENZAPRINE HCL 10 MG
10 TABLET ORAL NIGHTLY PRN
Qty: 30 TABLET | Refills: 0 | Status: SHIPPED | OUTPATIENT
Start: 2022-10-20 | End: 2022-11-22

## 2022-11-01 DIAGNOSIS — I10 ESSENTIAL HYPERTENSION: ICD-10-CM

## 2022-11-02 RX ORDER — HYDROCHLOROTHIAZIDE 25 MG/1
TABLET ORAL
Qty: 90 TABLET | Refills: 0 | OUTPATIENT
Start: 2022-11-02

## 2022-11-03 RX ORDER — INSULIN GLARGINE 300 U/ML
INJECTION, SOLUTION SUBCUTANEOUS
Qty: 6 ML | Refills: 0 | Status: ON HOLD | OUTPATIENT
Start: 2022-11-03 | End: 2022-11-11 | Stop reason: SDUPTHER

## 2022-11-08 ENCOUNTER — HOSPITAL ENCOUNTER (INPATIENT)
Facility: HOSPITAL | Age: 52
LOS: 8 days | Discharge: HOME | End: 2022-11-16
Attending: EMERGENCY MEDICINE | Admitting: INTERNAL MEDICINE

## 2022-11-08 ENCOUNTER — APPOINTMENT (OUTPATIENT)
Dept: GENERAL RADIOLOGY | Facility: HOSPITAL | Age: 52
End: 2022-11-08

## 2022-11-08 DIAGNOSIS — R77.8 ELEVATED TROPONIN: ICD-10-CM

## 2022-11-08 DIAGNOSIS — I10 HYPERTENSION, UNSPECIFIED TYPE: ICD-10-CM

## 2022-11-08 DIAGNOSIS — E87.70 HYPERVOLEMIA, UNSPECIFIED HYPERVOLEMIA TYPE: ICD-10-CM

## 2022-11-08 DIAGNOSIS — N17.9 AKI (ACUTE KIDNEY INJURY): Primary | ICD-10-CM

## 2022-11-08 DIAGNOSIS — R29.818 SUSPECTED SLEEP APNEA: ICD-10-CM

## 2022-11-08 LAB
ALBUMIN SERPL-MCNC: 2.7 G/DL (ref 3.5–5.2)
ALBUMIN/GLOB SERPL: 0.8 G/DL
ALP SERPL-CCNC: 138 U/L (ref 39–117)
ALT SERPL W P-5'-P-CCNC: 17 U/L (ref 1–33)
ANION GAP SERPL CALCULATED.3IONS-SCNC: 12.4 MMOL/L (ref 5–15)
AST SERPL-CCNC: 18 U/L (ref 1–32)
BACTERIA UR QL AUTO: ABNORMAL /HPF
BASOPHILS # BLD AUTO: 0.08 10*3/MM3 (ref 0–0.2)
BASOPHILS NFR BLD AUTO: 0.8 % (ref 0–1.5)
BILIRUB SERPL-MCNC: <0.2 MG/DL (ref 0–1.2)
BILIRUB UR QL STRIP: NEGATIVE
BUN SERPL-MCNC: 45 MG/DL (ref 6–20)
BUN/CREAT SERPL: 11.5 (ref 7–25)
CALCIUM SPEC-SCNC: 8.4 MG/DL (ref 8.6–10.5)
CHLORIDE SERPL-SCNC: 107 MMOL/L (ref 98–107)
CLARITY UR: CLEAR
CO2 SERPL-SCNC: 22.6 MMOL/L (ref 22–29)
COLOR UR: YELLOW
CREAT SERPL-MCNC: 3.93 MG/DL (ref 0.57–1)
DEPRECATED RDW RBC AUTO: 42.6 FL (ref 37–54)
EGFRCR SERPLBLD CKD-EPI 2021: 13.3 ML/MIN/1.73
EOSINOPHIL # BLD AUTO: 0.38 10*3/MM3 (ref 0–0.4)
EOSINOPHIL NFR BLD AUTO: 3.9 % (ref 0.3–6.2)
ERYTHROCYTE [DISTWIDTH] IN BLOOD BY AUTOMATED COUNT: 12.8 % (ref 12.3–15.4)
GLOBULIN UR ELPH-MCNC: 3.5 GM/DL
GLUCOSE SERPL-MCNC: 135 MG/DL (ref 65–99)
GLUCOSE UR STRIP-MCNC: ABNORMAL MG/DL
HCT VFR BLD AUTO: 30.5 % (ref 34–46.6)
HGB BLD-MCNC: 9.9 G/DL (ref 12–15.9)
HGB UR QL STRIP.AUTO: ABNORMAL
HOLD SPECIMEN: NORMAL
HOLD SPECIMEN: NORMAL
HYALINE CASTS UR QL AUTO: ABNORMAL /LPF
IMM GRANULOCYTES # BLD AUTO: 0.08 10*3/MM3 (ref 0–0.05)
IMM GRANULOCYTES NFR BLD AUTO: 0.8 % (ref 0–0.5)
KETONES UR QL STRIP: NEGATIVE
LEUKOCYTE ESTERASE UR QL STRIP.AUTO: NEGATIVE
LYMPHOCYTES # BLD AUTO: 1.86 10*3/MM3 (ref 0.7–3.1)
LYMPHOCYTES NFR BLD AUTO: 19 % (ref 19.6–45.3)
MCH RBC QN AUTO: 29.6 PG (ref 26.6–33)
MCHC RBC AUTO-ENTMCNC: 32.5 G/DL (ref 31.5–35.7)
MCV RBC AUTO: 91.3 FL (ref 79–97)
MONOCYTES # BLD AUTO: 1.02 10*3/MM3 (ref 0.1–0.9)
MONOCYTES NFR BLD AUTO: 10.4 % (ref 5–12)
NEUTROPHILS NFR BLD AUTO: 6.38 10*3/MM3 (ref 1.7–7)
NEUTROPHILS NFR BLD AUTO: 65.1 % (ref 42.7–76)
NITRITE UR QL STRIP: NEGATIVE
NRBC BLD AUTO-RTO: 0 /100 WBC (ref 0–0.2)
NT-PROBNP SERPL-MCNC: 8826 PG/ML (ref 0–900)
PH UR STRIP.AUTO: 6 [PH] (ref 5–8)
PLATELET # BLD AUTO: 428 10*3/MM3 (ref 140–450)
PMV BLD AUTO: 9.3 FL (ref 6–12)
POTASSIUM SERPL-SCNC: 3.6 MMOL/L (ref 3.5–5.2)
PROT SERPL-MCNC: 6.2 G/DL (ref 6–8.5)
PROT UR QL STRIP: ABNORMAL
RBC # BLD AUTO: 3.34 10*6/MM3 (ref 3.77–5.28)
RBC # UR STRIP: ABNORMAL /HPF
REF LAB TEST METHOD: ABNORMAL
SODIUM SERPL-SCNC: 142 MMOL/L (ref 136–145)
SP GR UR STRIP: 1.02 (ref 1–1.03)
SQUAMOUS #/AREA URNS HPF: ABNORMAL /HPF
TROPONIN T SERPL-MCNC: 0.05 NG/ML (ref 0–0.03)
TROPONIN T SERPL-MCNC: 0.06 NG/ML (ref 0–0.03)
UROBILINOGEN UR QL STRIP: ABNORMAL
WBC # UR STRIP: ABNORMAL /HPF
WBC NRBC COR # BLD: 9.8 10*3/MM3 (ref 3.4–10.8)
WHOLE BLOOD HOLD COAG: NORMAL
WHOLE BLOOD HOLD SPECIMEN: NORMAL

## 2022-11-08 PROCEDURE — 82570 ASSAY OF URINE CREATININE: CPT | Performed by: INTERNAL MEDICINE

## 2022-11-08 PROCEDURE — 84156 ASSAY OF PROTEIN URINE: CPT | Performed by: INTERNAL MEDICINE

## 2022-11-08 PROCEDURE — 71046 X-RAY EXAM CHEST 2 VIEWS: CPT

## 2022-11-08 PROCEDURE — 93010 ELECTROCARDIOGRAM REPORT: CPT | Performed by: INTERNAL MEDICINE

## 2022-11-08 PROCEDURE — 36415 COLL VENOUS BLD VENIPUNCTURE: CPT

## 2022-11-08 PROCEDURE — 84540 ASSAY OF URINE/UREA-N: CPT | Performed by: INTERNAL MEDICINE

## 2022-11-08 PROCEDURE — 80053 COMPREHEN METABOLIC PANEL: CPT

## 2022-11-08 PROCEDURE — 84484 ASSAY OF TROPONIN QUANT: CPT

## 2022-11-08 PROCEDURE — 83880 ASSAY OF NATRIURETIC PEPTIDE: CPT

## 2022-11-08 PROCEDURE — 85025 COMPLETE CBC W/AUTO DIFF WBC: CPT

## 2022-11-08 PROCEDURE — 93005 ELECTROCARDIOGRAM TRACING: CPT

## 2022-11-08 PROCEDURE — 84484 ASSAY OF TROPONIN QUANT: CPT | Performed by: NURSE PRACTITIONER

## 2022-11-08 PROCEDURE — 25010000002 FUROSEMIDE PER 20 MG: Performed by: PHYSICIAN ASSISTANT

## 2022-11-08 PROCEDURE — 93005 ELECTROCARDIOGRAM TRACING: CPT | Performed by: EMERGENCY MEDICINE

## 2022-11-08 PROCEDURE — 99284 EMERGENCY DEPT VISIT MOD MDM: CPT

## 2022-11-08 PROCEDURE — 84300 ASSAY OF URINE SODIUM: CPT | Performed by: INTERNAL MEDICINE

## 2022-11-08 PROCEDURE — 81001 URINALYSIS AUTO W/SCOPE: CPT | Performed by: EMERGENCY MEDICINE

## 2022-11-08 PROCEDURE — 25010000002 HEPARIN (PORCINE) PER 1000 UNITS: Performed by: NURSE PRACTITIONER

## 2022-11-08 RX ORDER — DEXTROSE MONOHYDRATE 25 G/50ML
25 INJECTION, SOLUTION INTRAVENOUS
Status: DISCONTINUED | OUTPATIENT
Start: 2022-11-08 | End: 2022-11-16 | Stop reason: HOSPADM

## 2022-11-08 RX ORDER — INSULIN LISPRO 100 [IU]/ML
0-7 INJECTION, SOLUTION INTRAVENOUS; SUBCUTANEOUS
Status: DISCONTINUED | OUTPATIENT
Start: 2022-11-09 | End: 2022-11-09

## 2022-11-08 RX ORDER — SODIUM CHLORIDE 0.9 % (FLUSH) 0.9 %
10 SYRINGE (ML) INJECTION AS NEEDED
Status: DISCONTINUED | OUTPATIENT
Start: 2022-11-08 | End: 2022-11-16 | Stop reason: HOSPADM

## 2022-11-08 RX ORDER — HEPARIN SODIUM 5000 [USP'U]/ML
5000 INJECTION, SOLUTION INTRAVENOUS; SUBCUTANEOUS EVERY 12 HOURS SCHEDULED
Status: DISPENSED | OUTPATIENT
Start: 2022-11-08 | End: 2022-11-13

## 2022-11-08 RX ORDER — BUMETANIDE 2 MG/1
2 TABLET ORAL 2 TIMES DAILY
COMMUNITY
End: 2022-11-16 | Stop reason: HOSPADM

## 2022-11-08 RX ORDER — NITROGLYCERIN 0.4 MG/1
0.4 TABLET SUBLINGUAL
Status: DISCONTINUED | OUTPATIENT
Start: 2022-11-08 | End: 2022-11-16 | Stop reason: HOSPADM

## 2022-11-08 RX ORDER — SODIUM CHLORIDE 0.9 % (FLUSH) 0.9 %
10 SYRINGE (ML) INJECTION EVERY 12 HOURS SCHEDULED
Status: DISCONTINUED | OUTPATIENT
Start: 2022-11-08 | End: 2022-11-16 | Stop reason: HOSPADM

## 2022-11-08 RX ORDER — FUROSEMIDE 10 MG/ML
80 INJECTION INTRAMUSCULAR; INTRAVENOUS ONCE
Status: COMPLETED | OUTPATIENT
Start: 2022-11-08 | End: 2022-11-08

## 2022-11-08 RX ORDER — ACETAMINOPHEN 650 MG/1
650 SUPPOSITORY RECTAL EVERY 4 HOURS PRN
Status: DISCONTINUED | OUTPATIENT
Start: 2022-11-08 | End: 2022-11-16 | Stop reason: HOSPADM

## 2022-11-08 RX ORDER — ONDANSETRON 2 MG/ML
4 INJECTION INTRAMUSCULAR; INTRAVENOUS EVERY 6 HOURS PRN
Status: DISCONTINUED | OUTPATIENT
Start: 2022-11-08 | End: 2022-11-16 | Stop reason: HOSPADM

## 2022-11-08 RX ORDER — NICOTINE POLACRILEX 4 MG
15 LOZENGE BUCCAL
Status: DISCONTINUED | OUTPATIENT
Start: 2022-11-08 | End: 2022-11-16 | Stop reason: HOSPADM

## 2022-11-08 RX ORDER — ACETAMINOPHEN 325 MG/1
650 TABLET ORAL EVERY 4 HOURS PRN
Status: DISCONTINUED | OUTPATIENT
Start: 2022-11-08 | End: 2022-11-16 | Stop reason: HOSPADM

## 2022-11-08 RX ORDER — ACETAMINOPHEN 160 MG/5ML
650 SOLUTION ORAL EVERY 4 HOURS PRN
Status: DISCONTINUED | OUTPATIENT
Start: 2022-11-08 | End: 2022-11-16 | Stop reason: HOSPADM

## 2022-11-08 RX ADMIN — FUROSEMIDE 80 MG: 10 INJECTION, SOLUTION INTRAMUSCULAR; INTRAVENOUS at 21:41

## 2022-11-08 RX ADMIN — HEPARIN SODIUM 5000 UNITS: 5000 INJECTION INTRAVENOUS; SUBCUTANEOUS at 21:46

## 2022-11-08 RX ADMIN — Medication 10 ML: at 21:59

## 2022-11-08 NOTE — ED NOTES
"Pt via PV from home with c/o \"swelling to whole body x8 weeks\", high blood pressure (220/160 at home), and \"sore to R heel\".  Sent here by nephrologist    Post-op eye surgery on 11/4    All triage performed with this RN wearing appropriate PPE.  Pt placed in mask upon arrival to ED.    "

## 2022-11-09 ENCOUNTER — APPOINTMENT (OUTPATIENT)
Dept: CARDIOLOGY | Facility: HOSPITAL | Age: 52
End: 2022-11-09

## 2022-11-09 ENCOUNTER — APPOINTMENT (OUTPATIENT)
Dept: ULTRASOUND IMAGING | Facility: HOSPITAL | Age: 52
End: 2022-11-09

## 2022-11-09 PROBLEM — N18.31 ACUTE RENAL FAILURE SUPERIMPOSED ON STAGE 3A CHRONIC KIDNEY DISEASE (HCC): Status: ACTIVE | Noted: 2022-11-08

## 2022-11-09 PROBLEM — R60.1 ANASARCA: Status: ACTIVE | Noted: 2022-11-09

## 2022-11-09 LAB
ALBUMIN SERPL-MCNC: 2.5 G/DL (ref 3.5–5.2)
ALBUMIN/GLOB SERPL: 0.8 G/DL
ALP SERPL-CCNC: 127 U/L (ref 39–117)
ALT SERPL W P-5'-P-CCNC: 11 U/L (ref 1–33)
ANION GAP SERPL CALCULATED.3IONS-SCNC: 10.2 MMOL/L (ref 5–15)
AORTIC DIMENSIONLESS INDEX: 0.6 (DI)
AST SERPL-CCNC: 14 U/L (ref 1–32)
BASOPHILS # BLD AUTO: 0.05 10*3/MM3 (ref 0–0.2)
BASOPHILS NFR BLD AUTO: 0.6 % (ref 0–1.5)
BH CV ECHO MEAS - AO MAX PG: 12.5 MMHG
BH CV ECHO MEAS - AO MEAN PG: 6.7 MMHG
BH CV ECHO MEAS - AO V2 MAX: 176.5 CM/SEC
BH CV ECHO MEAS - AO V2 VTI: 37.2 CM
BH CV ECHO MEAS - AVA(I,D): 2.34 CM2
BH CV ECHO MEAS - EDV(CUBED): 125.8 ML
BH CV ECHO MEAS - EDV(MOD-SP2): 92 ML
BH CV ECHO MEAS - EDV(MOD-SP4): 135 ML
BH CV ECHO MEAS - EF(MOD-BP): 60.3 %
BH CV ECHO MEAS - EF(MOD-SP2): 56.5 %
BH CV ECHO MEAS - EF(MOD-SP4): 63 %
BH CV ECHO MEAS - ESV(CUBED): 44.8 ML
BH CV ECHO MEAS - ESV(MOD-SP2): 40 ML
BH CV ECHO MEAS - ESV(MOD-SP4): 50 ML
BH CV ECHO MEAS - FS: 29.1 %
BH CV ECHO MEAS - IVS/LVPW: 1.05 CM
BH CV ECHO MEAS - IVSD: 1.23 CM
BH CV ECHO MEAS - LV DIASTOLIC VOL/BSA (35-75): 51 CM2
BH CV ECHO MEAS - LV MASS(C)D: 234.1 GRAMS
BH CV ECHO MEAS - LV MAX PG: 5.6 MMHG
BH CV ECHO MEAS - LV MEAN PG: 2.45 MMHG
BH CV ECHO MEAS - LV SYSTOLIC VOL/BSA (12-30): 18.9 CM2
BH CV ECHO MEAS - LV V1 MAX: 118.7 CM/SEC
BH CV ECHO MEAS - LV V1 VTI: 20.7 CM
BH CV ECHO MEAS - LVIDD: 5 CM
BH CV ECHO MEAS - LVIDS: 3.6 CM
BH CV ECHO MEAS - LVOT AREA: 4.2 CM2
BH CV ECHO MEAS - LVOT DIAM: 2.31 CM
BH CV ECHO MEAS - LVPWD: 1.17 CM
BH CV ECHO MEAS - MV A DUR: 0.11 SEC
BH CV ECHO MEAS - MV A MAX VEL: 111.8 CM/SEC
BH CV ECHO MEAS - MV DEC SLOPE: 689 CM/SEC2
BH CV ECHO MEAS - MV DEC TIME: 0.13 MSEC
BH CV ECHO MEAS - MV E MAX VEL: 101.5 CM/SEC
BH CV ECHO MEAS - MV E/A: 0.91
BH CV ECHO MEAS - MV MAX PG: 6.4 MMHG
BH CV ECHO MEAS - MV MEAN PG: 3 MMHG
BH CV ECHO MEAS - MV P1/2T: 54.3 MSEC
BH CV ECHO MEAS - MV V2 VTI: 27.9 CM
BH CV ECHO MEAS - MVA(P1/2T): 4.1 CM2
BH CV ECHO MEAS - MVA(VTI): 3.1 CM2
BH CV ECHO MEAS - PA ACC TIME: 0.16 SEC
BH CV ECHO MEAS - PA PR(ACCEL): 8.9 MMHG
BH CV ECHO MEAS - PA V2 MAX: 99 CM/SEC
BH CV ECHO MEAS - QP/QS: 1.3
BH CV ECHO MEAS - RAP SYSTOLE: 15 MMHG
BH CV ECHO MEAS - RV MAX PG: 2.9 MMHG
BH CV ECHO MEAS - RV V1 MAX: 84.9 CM/SEC
BH CV ECHO MEAS - RV V1 VTI: 20.1 CM
BH CV ECHO MEAS - RVOT DIAM: 2.7 CM
BH CV ECHO MEAS - RVSP: 48 MMHG
BH CV ECHO MEAS - SI(MOD-SP2): 19.6 ML/M2
BH CV ECHO MEAS - SI(MOD-SP4): 32.1 ML/M2
BH CV ECHO MEAS - SV(LVOT): 86.9 ML
BH CV ECHO MEAS - SV(MOD-SP2): 52 ML
BH CV ECHO MEAS - SV(MOD-SP4): 85 ML
BH CV ECHO MEAS - SV(RVOT): 112.9 ML
BH CV ECHO MEAS - TR MAX PG: 33 MMHG
BH CV ECHO MEAS - TR MAX VEL: 287.4 CM/SEC
BH CV LOWER VASCULAR LEFT COMMON FEMORAL AUGMENT: NORMAL
BH CV LOWER VASCULAR LEFT COMMON FEMORAL COMPETENT: NORMAL
BH CV LOWER VASCULAR LEFT COMMON FEMORAL COMPRESS: NORMAL
BH CV LOWER VASCULAR LEFT COMMON FEMORAL PHASIC: NORMAL
BH CV LOWER VASCULAR LEFT COMMON FEMORAL SPONT: NORMAL
BH CV LOWER VASCULAR LEFT DISTAL FEMORAL COMPRESS: NORMAL
BH CV LOWER VASCULAR LEFT GASTRONEMIUS COMPRESS: NORMAL
BH CV LOWER VASCULAR LEFT GREATER SAPH AK COMPRESS: NORMAL
BH CV LOWER VASCULAR LEFT GREATER SAPH BK COMPRESS: NORMAL
BH CV LOWER VASCULAR LEFT LESSER SAPH COMPRESS: NORMAL
BH CV LOWER VASCULAR LEFT MID FEMORAL AUGMENT: NORMAL
BH CV LOWER VASCULAR LEFT MID FEMORAL COMPETENT: NORMAL
BH CV LOWER VASCULAR LEFT MID FEMORAL COMPRESS: NORMAL
BH CV LOWER VASCULAR LEFT MID FEMORAL PHASIC: NORMAL
BH CV LOWER VASCULAR LEFT MID FEMORAL SPONT: NORMAL
BH CV LOWER VASCULAR LEFT POPLITEAL AUGMENT: NORMAL
BH CV LOWER VASCULAR LEFT POPLITEAL COMPETENT: NORMAL
BH CV LOWER VASCULAR LEFT POPLITEAL COMPRESS: NORMAL
BH CV LOWER VASCULAR LEFT POPLITEAL PHASIC: NORMAL
BH CV LOWER VASCULAR LEFT POPLITEAL SPONT: NORMAL
BH CV LOWER VASCULAR LEFT POSTERIOR TIBIAL COMPRESS: NORMAL
BH CV LOWER VASCULAR LEFT PROXIMAL FEMORAL COMPRESS: NORMAL
BH CV LOWER VASCULAR LEFT SAPHENOFEMORAL JUNCTION COMPRESS: NORMAL
BH CV LOWER VASCULAR RIGHT COMMON FEMORAL AUGMENT: NORMAL
BH CV LOWER VASCULAR RIGHT COMMON FEMORAL COMPETENT: NORMAL
BH CV LOWER VASCULAR RIGHT COMMON FEMORAL COMPRESS: NORMAL
BH CV LOWER VASCULAR RIGHT COMMON FEMORAL PHASIC: NORMAL
BH CV LOWER VASCULAR RIGHT COMMON FEMORAL SPONT: NORMAL
BH CV LOWER VASCULAR RIGHT GASTRONEMIUS COMPRESS: NORMAL
BH CV LOWER VASCULAR RIGHT GREATER SAPH AK COMPRESS: NORMAL
BH CV LOWER VASCULAR RIGHT GREATER SAPH BK COMPRESS: NORMAL
BH CV LOWER VASCULAR RIGHT LESSER SAPH COMPRESS: NORMAL
BH CV LOWER VASCULAR RIGHT MID FEMORAL AUGMENT: NORMAL
BH CV LOWER VASCULAR RIGHT MID FEMORAL COMPETENT: NORMAL
BH CV LOWER VASCULAR RIGHT MID FEMORAL COMPRESS: NORMAL
BH CV LOWER VASCULAR RIGHT MID FEMORAL PHASIC: NORMAL
BH CV LOWER VASCULAR RIGHT MID FEMORAL SPONT: NORMAL
BH CV LOWER VASCULAR RIGHT POPLITEAL AUGMENT: NORMAL
BH CV LOWER VASCULAR RIGHT POPLITEAL COMPETENT: NORMAL
BH CV LOWER VASCULAR RIGHT POPLITEAL COMPRESS: NORMAL
BH CV LOWER VASCULAR RIGHT POPLITEAL PHASIC: NORMAL
BH CV LOWER VASCULAR RIGHT POPLITEAL SPONT: NORMAL
BH CV LOWER VASCULAR RIGHT POSTERIOR TIBIAL COMPRESS: NORMAL
BH CV LOWER VASCULAR RIGHT PROFUNDA FEMORAL COMPRESS: NORMAL
BH CV LOWER VASCULAR RIGHT PROXIMAL FEMORAL COMPRESS: NORMAL
BH CV LOWER VASCULAR RIGHT SAPHENOFEMORAL JUNCTION COMPRESS: NORMAL
BH CV XLRA - RV BASE: 4.6 CM
BH CV XLRA - RV LENGTH: 8.3 CM
BH CV XLRA - RV MID: 3.4 CM
BH CV XLRA - TDI S': 14.3 CM/SEC
BILIRUB SERPL-MCNC: <0.2 MG/DL (ref 0–1.2)
BUN SERPL-MCNC: 44 MG/DL (ref 6–20)
BUN/CREAT SERPL: 10.5 (ref 7–25)
CALCIUM SPEC-SCNC: 8 MG/DL (ref 8.6–10.5)
CHLORIDE SERPL-SCNC: 107 MMOL/L (ref 98–107)
CO2 SERPL-SCNC: 24.8 MMOL/L (ref 22–29)
CORTIS SERPL-MCNC: 3.3 MCG/DL
CREAT SERPL-MCNC: 4.19 MG/DL (ref 0.57–1)
CREAT UR-MCNC: 61.9 MG/DL
DEPRECATED RDW RBC AUTO: 42.8 FL (ref 37–54)
EGFRCR SERPLBLD CKD-EPI 2021: 12.3 ML/MIN/1.73
EOSINOPHIL # BLD AUTO: 0.28 10*3/MM3 (ref 0–0.4)
EOSINOPHIL NFR BLD AUTO: 3.6 % (ref 0.3–6.2)
EOSINOPHIL SPEC QL MICRO: 1 % EOS/100 CELLS (ref 0–0)
ERYTHROCYTE [DISTWIDTH] IN BLOOD BY AUTOMATED COUNT: 13 % (ref 12.3–15.4)
GLOBULIN UR ELPH-MCNC: 3.1 GM/DL
GLUCOSE BLDC GLUCOMTR-MCNC: 84 MG/DL (ref 70–130)
GLUCOSE BLDC GLUCOMTR-MCNC: 87 MG/DL (ref 70–130)
GLUCOSE BLDC GLUCOMTR-MCNC: 91 MG/DL (ref 70–130)
GLUCOSE SERPL-MCNC: 77 MG/DL (ref 65–99)
HCT VFR BLD AUTO: 28 % (ref 34–46.6)
HGB BLD-MCNC: 9 G/DL (ref 12–15.9)
IMM GRANULOCYTES # BLD AUTO: 0.07 10*3/MM3 (ref 0–0.05)
IMM GRANULOCYTES NFR BLD AUTO: 0.9 % (ref 0–0.5)
INR PPP: 1.01 (ref 0.9–1.1)
LEFT ATRIUM VOLUME INDEX: 31.9 ML/M2
LYMPHOCYTES # BLD AUTO: 1.06 10*3/MM3 (ref 0.7–3.1)
LYMPHOCYTES NFR BLD AUTO: 13.6 % (ref 19.6–45.3)
MAGNESIUM SERPL-MCNC: 2.1 MG/DL (ref 1.6–2.6)
MAXIMAL PREDICTED HEART RATE: 170 BPM
MAXIMAL PREDICTED HEART RATE: 170 BPM
MCH RBC QN AUTO: 29.7 PG (ref 26.6–33)
MCHC RBC AUTO-ENTMCNC: 32.1 G/DL (ref 31.5–35.7)
MCV RBC AUTO: 92.4 FL (ref 79–97)
MONOCYTES # BLD AUTO: 0.62 10*3/MM3 (ref 0.1–0.9)
MONOCYTES NFR BLD AUTO: 8 % (ref 5–12)
NEUTROPHILS NFR BLD AUTO: 5.69 10*3/MM3 (ref 1.7–7)
NEUTROPHILS NFR BLD AUTO: 73.3 % (ref 42.7–76)
NRBC BLD AUTO-RTO: 0 /100 WBC (ref 0–0.2)
PHOSPHATE SERPL-MCNC: 5.4 MG/DL (ref 2.5–4.5)
PLATELET # BLD AUTO: 375 10*3/MM3 (ref 140–450)
PMV BLD AUTO: 9.2 FL (ref 6–12)
POTASSIUM SERPL-SCNC: 3.7 MMOL/L (ref 3.5–5.2)
PROT ?TM UR-MCNC: 1172 MG/DL
PROT SERPL-MCNC: 5.6 G/DL (ref 6–8.5)
PROTHROMBIN TIME: 13.4 SECONDS (ref 11.7–14.2)
QT INTERVAL: 389 MS
RBC # BLD AUTO: 3.03 10*6/MM3 (ref 3.77–5.28)
SINUS: 2.9 CM
SODIUM SERPL-SCNC: 142 MMOL/L (ref 136–145)
SODIUM UR-SCNC: 46 MMOL/L
STRESS TARGET HR: 145 BPM
STRESS TARGET HR: 145 BPM
TROPONIN T SERPL-MCNC: 0.05 NG/ML (ref 0–0.03)
TROPONIN T SERPL-MCNC: 0.05 NG/ML (ref 0–0.03)
UUN 24H UR-MCNC: 301 MG/DL
WBC NRBC COR # BLD: 7.77 10*3/MM3 (ref 3.4–10.8)

## 2022-11-09 PROCEDURE — 82533 TOTAL CORTISOL: CPT | Performed by: INTERNAL MEDICINE

## 2022-11-09 PROCEDURE — 85025 COMPLETE CBC W/AUTO DIFF WBC: CPT | Performed by: NURSE PRACTITIONER

## 2022-11-09 PROCEDURE — 93306 TTE W/DOPPLER COMPLETE: CPT

## 2022-11-09 PROCEDURE — 80053 COMPREHEN METABOLIC PANEL: CPT | Performed by: INTERNAL MEDICINE

## 2022-11-09 PROCEDURE — 84484 ASSAY OF TROPONIN QUANT: CPT | Performed by: INTERNAL MEDICINE

## 2022-11-09 PROCEDURE — 84100 ASSAY OF PHOSPHORUS: CPT | Performed by: INTERNAL MEDICINE

## 2022-11-09 PROCEDURE — 82962 GLUCOSE BLOOD TEST: CPT

## 2022-11-09 PROCEDURE — 85610 PROTHROMBIN TIME: CPT | Performed by: NURSE PRACTITIONER

## 2022-11-09 PROCEDURE — 25010000002 HEPARIN (PORCINE) PER 1000 UNITS: Performed by: NURSE PRACTITIONER

## 2022-11-09 PROCEDURE — 93306 TTE W/DOPPLER COMPLETE: CPT | Performed by: INTERNAL MEDICINE

## 2022-11-09 PROCEDURE — 76775 US EXAM ABDO BACK WALL LIM: CPT

## 2022-11-09 PROCEDURE — 93970 EXTREMITY STUDY: CPT

## 2022-11-09 PROCEDURE — 83735 ASSAY OF MAGNESIUM: CPT | Performed by: INTERNAL MEDICINE

## 2022-11-09 PROCEDURE — 87205 SMEAR GRAM STAIN: CPT | Performed by: INTERNAL MEDICINE

## 2022-11-09 RX ORDER — TIMOLOL MALEATE 5 MG/ML
1 SOLUTION/ DROPS OPHTHALMIC EVERY 12 HOURS SCHEDULED
Status: DISCONTINUED | OUTPATIENT
Start: 2022-11-09 | End: 2022-11-16 | Stop reason: HOSPADM

## 2022-11-09 RX ORDER — TIMOLOL MALEATE 5 MG/ML
1 SOLUTION/ DROPS OPHTHALMIC 2 TIMES DAILY
Status: DISCONTINUED | OUTPATIENT
Start: 2022-11-09 | End: 2022-11-09

## 2022-11-09 RX ORDER — HYDRALAZINE HYDROCHLORIDE 50 MG/1
100 TABLET, FILM COATED ORAL 3 TIMES DAILY
Status: DISCONTINUED | OUTPATIENT
Start: 2022-11-09 | End: 2022-11-16 | Stop reason: HOSPADM

## 2022-11-09 RX ORDER — NEOMYCIN SULFATE, POLYMYXIN B SULFATE, AND DEXAMETHASONE 3.5; 10000; 1 MG/G; [USP'U]/G; MG/G
OINTMENT OPHTHALMIC 2 TIMES DAILY
Status: DISCONTINUED | OUTPATIENT
Start: 2022-11-09 | End: 2022-11-16 | Stop reason: HOSPADM

## 2022-11-09 RX ORDER — ALBUTEROL SULFATE 2.5 MG/3ML
2.5 SOLUTION RESPIRATORY (INHALATION) EVERY 6 HOURS PRN
Status: DISCONTINUED | OUTPATIENT
Start: 2022-11-09 | End: 2022-11-16 | Stop reason: HOSPADM

## 2022-11-09 RX ORDER — CYCLOBENZAPRINE HCL 10 MG
10 TABLET ORAL NIGHTLY PRN
Status: DISCONTINUED | OUTPATIENT
Start: 2022-11-09 | End: 2022-11-16 | Stop reason: HOSPADM

## 2022-11-09 RX ORDER — GABAPENTIN 100 MG/1
100 CAPSULE ORAL 3 TIMES DAILY
Status: DISCONTINUED | OUTPATIENT
Start: 2022-11-09 | End: 2022-11-09

## 2022-11-09 RX ORDER — LABETALOL HYDROCHLORIDE 5 MG/ML
10 INJECTION, SOLUTION INTRAVENOUS EVERY 6 HOURS PRN
Status: DISCONTINUED | OUTPATIENT
Start: 2022-11-09 | End: 2022-11-16 | Stop reason: HOSPADM

## 2022-11-09 RX ORDER — TIMOLOL MALEATE 6.8 MG/ML
1 SOLUTION/ DROPS OPHTHALMIC 2 TIMES DAILY
COMMUNITY
End: 2023-03-03

## 2022-11-09 RX ORDER — GABAPENTIN 300 MG/1
300 CAPSULE ORAL NIGHTLY PRN
Status: DISCONTINUED | OUTPATIENT
Start: 2022-11-09 | End: 2022-11-16 | Stop reason: HOSPADM

## 2022-11-09 RX ORDER — FLUTICASONE PROPIONATE 50 MCG
2 SPRAY, SUSPENSION (ML) NASAL DAILY
Status: DISCONTINUED | OUTPATIENT
Start: 2022-11-09 | End: 2022-11-16 | Stop reason: HOSPADM

## 2022-11-09 RX ORDER — NEOMYCIN SULFATE, POLYMYXIN B SULFATE, AND DEXAMETHASONE 3.5; 10000; 1 MG/G; [USP'U]/G; MG/G
OINTMENT OPHTHALMIC 2 TIMES DAILY
Status: DISCONTINUED | OUTPATIENT
Start: 2022-11-09 | End: 2022-11-09

## 2022-11-09 RX ORDER — INSULIN LISPRO 100 [IU]/ML
0-14 INJECTION, SOLUTION INTRAVENOUS; SUBCUTANEOUS
Status: DISCONTINUED | OUTPATIENT
Start: 2022-11-09 | End: 2022-11-16 | Stop reason: HOSPADM

## 2022-11-09 RX ORDER — DEXAMETHASONE 2 MG/1
1 TABLET ORAL ONCE
Status: COMPLETED | OUTPATIENT
Start: 2022-11-09 | End: 2022-11-09

## 2022-11-09 RX ORDER — AMMONIUM LACTATE 120 MG/G
1 CREAM TOPICAL EVERY 12 HOURS PRN
Status: DISCONTINUED | OUTPATIENT
Start: 2022-11-09 | End: 2022-11-16 | Stop reason: HOSPADM

## 2022-11-09 RX ORDER — BUMETANIDE 0.25 MG/ML
2 INJECTION INTRAMUSCULAR; INTRAVENOUS EVERY 12 HOURS
Status: DISCONTINUED | OUTPATIENT
Start: 2022-11-09 | End: 2022-11-10

## 2022-11-09 RX ORDER — ATORVASTATIN CALCIUM 20 MG/1
10 TABLET, FILM COATED ORAL DAILY
Status: DISCONTINUED | OUTPATIENT
Start: 2022-11-09 | End: 2022-11-16 | Stop reason: HOSPADM

## 2022-11-09 RX ORDER — CARVEDILOL 12.5 MG/1
12.5 TABLET ORAL 2 TIMES DAILY WITH MEALS
Status: DISCONTINUED | OUTPATIENT
Start: 2022-11-09 | End: 2022-11-15

## 2022-11-09 RX ADMIN — CARVEDILOL 12.5 MG: 12.5 TABLET, FILM COATED ORAL at 14:35

## 2022-11-09 RX ADMIN — BUMETANIDE 2 MG: 0.25 INJECTION, SOLUTION INTRAMUSCULAR; INTRAVENOUS at 22:13

## 2022-11-09 RX ADMIN — HEPARIN SODIUM 5000 UNITS: 5000 INJECTION INTRAVENOUS; SUBCUTANEOUS at 09:25

## 2022-11-09 RX ADMIN — DEXAMETHASONE 1 MG: 2 TABLET ORAL at 02:45

## 2022-11-09 RX ADMIN — INSULIN GLARGINE-YFGN 15 UNITS: 100 INJECTION, SOLUTION SUBCUTANEOUS at 21:08

## 2022-11-09 RX ADMIN — ATORVASTATIN CALCIUM 10 MG: 20 TABLET, FILM COATED ORAL at 09:26

## 2022-11-09 RX ADMIN — TIMOLOL MALEATE 1 DROP: 5 SOLUTION/ DROPS OPHTHALMIC at 09:28

## 2022-11-09 RX ADMIN — CARVEDILOL 12.5 MG: 12.5 TABLET, FILM COATED ORAL at 21:05

## 2022-11-09 RX ADMIN — NEOMYCIN SULFATE, POLYMYXIN B SULFATE, AND DEXAMETHASONE: 3.5; 10000; 1 OINTMENT OPHTHALMIC at 09:28

## 2022-11-09 RX ADMIN — HYDRALAZINE HYDROCHLORIDE 100 MG: 50 TABLET ORAL at 09:26

## 2022-11-09 RX ADMIN — BUMETANIDE 2 MG: 0.25 INJECTION, SOLUTION INTRAMUSCULAR; INTRAVENOUS at 14:35

## 2022-11-09 RX ADMIN — HEPARIN SODIUM 5000 UNITS: 5000 INJECTION INTRAVENOUS; SUBCUTANEOUS at 21:05

## 2022-11-09 RX ADMIN — NEOMYCIN SULFATE, POLYMYXIN B SULFATE, AND DEXAMETHASONE 1 APPLICATION: 3.5; 10000; 1 OINTMENT OPHTHALMIC at 21:06

## 2022-11-09 RX ADMIN — Medication 1 APPLICATION: at 21:07

## 2022-11-09 RX ADMIN — INSULIN GLARGINE-YFGN 25 UNITS: 100 INJECTION, SOLUTION SUBCUTANEOUS at 09:25

## 2022-11-09 RX ADMIN — INSULIN GLARGINE-YFGN 25 UNITS: 100 INJECTION, SOLUTION SUBCUTANEOUS at 02:40

## 2022-11-09 RX ADMIN — HYDRALAZINE HYDROCHLORIDE 100 MG: 50 TABLET ORAL at 21:05

## 2022-11-09 RX ADMIN — Medication 10 ML: at 21:06

## 2022-11-09 RX ADMIN — TIMOLOL MALEATE 1 DROP: 5 SOLUTION/ DROPS OPHTHALMIC at 21:06

## 2022-11-09 RX ADMIN — CYCLOBENZAPRINE 10 MG: 10 TABLET, FILM COATED ORAL at 02:45

## 2022-11-09 RX ADMIN — FLUTICASONE PROPIONATE 2 SPRAY: 50 SPRAY, METERED NASAL at 09:28

## 2022-11-09 RX ADMIN — HYDRALAZINE HYDROCHLORIDE 100 MG: 50 TABLET ORAL at 17:23

## 2022-11-09 NOTE — H&P
Name: Kelly Pack ADMIT: 2022   : 1970  PCP: Mattie Freeman APRN    MRN: [id number] LOS: 1 days   AGE/SEX: 52 y.o. female  ROOM: E6/     Chief Complaint   Patient presents with   • Edema   • Hypertension       Subjective   HPI  Ms. Pack is a 52 y.o. female with a history of diabetes hypertension and CKD who presents to [facility] with worsening blood pressure and creatinine and fluid overload.  She reports she has had worsening swelling of her legs for about 8 weeks and has been worsening.  She does have tenderness diffusely in her legs due to the swelling.  She has noticed skin changes.  She has developed some bullae and has a heel blister on her right heel which has burst.  She is concerned about this since she has diabetes.  She reports she does have dyspnea on exertion and also has orthopnea with shortness of breath laying supine.  She reports she has some wheezing at times but has not had any worsening of asthma symptoms recently.  She has not had any fevers or chills.  No chest pain or palpitations.  No productive cough.  Does not report any dysuria.  No headache.    Past Medical History:   Diagnosis Date   • Albuminuria    • Allergic     Seasonal, grass, cats and some dogs   • Allergic rhinitis    • Asthma     allergy triggered   • Bell's palsy    • Blood glucose elevated    • Cataract     Had surgery on both eyes   • Cholelithiasis     Removed. Have bile reflux/ vomiting   • Diabetes mellitus with albuminuria (HCC)    • Drug therapy    • Elevated blood pressure reading without diagnosis of hypertension    • Endometrial cancer (HCC)    • HL (hearing loss)     In L ear   • Hyperlipidemia    • Hypertension    • Low back pain    • Migraine    • Obesity    • Renal insufficiency    • Right otitis media    • Type II diabetes mellitus (HCC)    • Visual impairment     Diabetic retinopathy. Oil in L eye due to retina detachment s.   • Vitamin D deficiency      Past Surgical  History:   Procedure Laterality Date   • CATARACT EXTRACTION     • CHOLECYSTECTOMY     • EYE SURGERY      NOV 2020   • HYSTERECTOMY      due to cancer   • OOPHORECTOMY     • VITRECTOMY PARS PLANA Left 1/28/2020    Procedure: 25G VITRECTOMY-ENDO LASER;  Surgeon: Lazarus, Howard S., MD;  Location: [facility] MAIN OR;  Service: Ophthalmology     Family History   Problem Relation Age of Onset   • Breast cancer Mother    • Cancer Mother    • Diabetes Father    • Hyperlipidemia Father    • Hypertension Father    • Hearing loss Father    • Asthma Maternal Grandfather      Social History     Tobacco Use   • Smoking status: Never   • Smokeless tobacco: Never   Substance Use Topics   • Alcohol use: Not Currently     Comment: rarely   • Drug use: No     Medications Prior to Admission   Medication Sig Dispense Refill Last Dose   • albuterol sulfate  (90 Base) MCG/ACT inhaler Inhale 2 puffs Every 6 (Six) Hours As Needed for Wheezing or Shortness of Air. 18 g 0    • benzonatate (Tessalon Perles) 100 MG capsule Take 1 capsule by mouth 3 (Three) Times a Day As Needed for Cough. 30 capsule 1    • bumetanide (BUMEX) 2 MG tablet Take 1 tablet by mouth 2 (Two) Times a Day.      • cyclobenzaprine (FLEXERIL) 10 MG tablet Take 1 tablet by mouth At Night As Needed for Muscle Spasms. (Patient taking differently: Take 1 tablet by mouth Every Night. Headaches per pt) 30 tablet 0 Patient Taking Differently   • fluticasone (FLONASE) 50 MCG/ACT nasal spray 2 sprays by Each Nare route Daily. (Patient taking differently: 2 sprays by Each Nare route As Needed.) 48 g 1 Patient Taking Differently   • gabapentin (Neurontin) 100 MG capsule Take 1 capsule by mouth 3 (Three) Times a Day. 90 capsule 1    • hydrALAZINE (APRESOLINE) 50 MG tablet Take 1 tablet by mouth 3 (Three) Times a Day. (Patient taking differently: Take 2 tablets by mouth 3 (Three) Times a Day.) 270 tablet 1 Patient Taking Differently   • simvastatin (ZOCOR) 20 MG tablet Take 1  tablet by mouth Daily. 90 tablet 1    • vitamin D (ERGOCALCIFEROL) 1.25 MG (66823 UT) capsule capsule Take 1 capsule by mouth 2 (Two) Times a Week. 30 capsule 11    • Continuous Blood Gluc Sensor (FreeStyle Zane 2 Sensor) misc 1 each by Other route Every 14 (Fourteen) Days. 2 each 5    • Dulaglutide 1.5 MG/0.5ML solution pen-injector Inject 1.5 mg under the skin into the appropriate area as directed 1 (One) Time Per Week. 12 pen 1    • glucose blood (Accu-Chek Pascale Plus) test strip TEST BLOOD SUGAR TWICE DAILY 200 each 0    • Insulin Glargine, 1 Unit Dial, (Toujeo SoloStar) 300 UNIT/ML solution pen-injector injection INJECT 100 UNITS UNDER THE SKIN INTO THE APPROPRIATE AREA AS DIRECTED ONE TIME PER WEEK 6 mL 0    • insulin lispro (humaLOG) 100 UNIT/ML injection Inject 14 Units under the skin into the appropriate area as directed 3 (Three) Times a Day Before Meals. 40 mL 1    • Insulin Pen Needle 32G X 4 MM misc 1 each 5 (Five) Times a Day. 500 each 2    • ondansetron (Zofran) 4 MG tablet Take 1 tablet by mouth Every 8 (Eight) Hours As Needed for Nausea or Vomiting. 30 tablet 1      Allergies:  No Known Allergies    Review of Systems   Constitutional: Negative for chills and fever.   HENT: Negative for sore throat and trouble swallowing.    Eyes: Negative for pain and visual disturbance.   Respiratory: Positive for shortness of breath and wheezing. Negative for cough.    Cardiovascular: Positive for leg swelling. Negative for chest pain and palpitations.   Gastrointestinal: Negative for constipation, diarrhea, nausea and vomiting.   Endocrine: Negative for cold intolerance and heat intolerance.   Genitourinary: Negative for dysuria and flank pain.   Musculoskeletal: Negative for neck pain and neck stiffness.   Skin: Positive for color change. Negative for pallor.   Allergic/Immunologic: Negative for environmental allergies and food allergies.   Neurological: Negative for seizures and syncope.   Hematological:  Negative for adenopathy. Does not bruise/bleed easily.   Psychiatric/Behavioral: Negative for agitation and confusion.        Objective    Vital Signs  Temp:  [97.6 °F (36.4 °C)-98.2 °F (36.8 °C)] 97.6 °F (36.4 °C)  Heart Rate:  [] 93  Resp:  [18-20] 18  BP: (150-175)/(85-95) 175/95  SpO2:  [97 %-98 %] 98 %  on   ;   Device (Oxygen Therapy): room air  Body mass index is 53.06 kg/m².    Physical Exam  Vitals and nursing note reviewed.   Constitutional:       General: She is not in acute distress.     Appearance: She is not diaphoretic.   HENT:      Head: Normocephalic and atraumatic.   Eyes:      General:         Right eye: No discharge.      Comments: Left eye dressed   Cardiovascular:      Rate and Rhythm: Normal rate and regular rhythm.      Heart sounds: Normal heart sounds.   Pulmonary:      Effort: Pulmonary effort is normal.      Breath sounds: Decreased breath sounds present. No wheezing or rhonchi.   Abdominal:      Palpations: Abdomen is soft.      Tenderness: There is no abdominal tenderness. There is no guarding or rebound.   Musculoskeletal:         General: No tenderness.      Cervical back: Neck supple. No tenderness.      Right lower leg: Edema present.      Left lower leg: Edema present.      Comments: 3+   Skin:     Findings: Lesion (right heel burst bullae, minimal surrounding erythema, no discharge) present.      Comments: Diffuse horizontal stria type coloration of BLE   Neurological:      Mental Status: She is alert.      Cranial Nerves: No cranial nerve deficit.   Psychiatric:         Mood and Affect: Mood normal.         Behavior: Behavior normal.         Results Review:  I reviewed the patient's new clinical results.  I reviewed imaging, agree with interpretation.  I reviewed EKG/telemetry, regular rate, low voltage.  I reviewed prior records.    Lab Results (last 24 hours)     Procedure Component Value Units Date/Time    BNP [[id number]]  (Abnormal) Collected: 11/08/22 1938    Specimen:  Blood Updated: 11/08/22 2016     proBNP 8,826.0 pg/mL     Narrative:      Among patients with dyspnea, NT-proBNP is highly sensitive for the detection of acute congestive heart failure. In addition NT-proBNP of <300 pg/ml effectively rules out acute congestive heart failure with 99% negative predictive value.    Results may be falsely decreased if patient taking Biotin.      Troponin [[id number]]  (Abnormal) Collected: 11/08/22 1938    Specimen: Blood Updated: 11/08/22 2033     Troponin T 0.058 ng/mL     Narrative:      Troponin T Reference Range:  <= 0.03 ng/mL-   Negative for AMI  >0.03 ng/mL-     Abnormal for myocardial necrosis.  Clinicians would have to utilize clinical acumen, EKG, Troponin and serial changes to determine if it is an Acute Myocardial Infarction or myocardial injury due to an underlying chronic condition.       Results may be falsely decreased if patient taking Biotin.      CBC & Differential [[id number]]  (Abnormal) Collected: 11/08/22 1938    Specimen: Blood Updated: 11/08/22 1958    Narrative:      The following orders were created for panel order CBC & Differential.  Procedure                               Abnormality         Status                     ---------                               -----------         ------                     CBC Auto Differential[[id number]]        Abnormal            Final result                 Please view results for these tests on the individual orders.    Comprehensive Metabolic Panel [[id number]]  (Abnormal) Collected: 11/08/22 1938    Specimen: Blood Updated: 11/08/22 2025     Glucose 135 mg/dL      BUN 45 mg/dL      Creatinine 3.93 mg/dL      Sodium 142 mmol/L      Potassium 3.6 mmol/L      Chloride 107 mmol/L      CO2 22.6 mmol/L      Calcium 8.4 mg/dL      Total Protein 6.2 g/dL      Albumin 2.70 g/dL      ALT (SGPT) 17 U/L      AST (SGOT) 18 U/L      Alkaline Phosphatase 138 U/L      Total Bilirubin <0.2 mg/dL      Globulin 3.5 gm/dL      A/G Ratio 0.8  g/dL      BUN/Creatinine Ratio 11.5     Anion Gap 12.4 mmol/L      eGFR 13.3 mL/min/1.73      Comment: <15 Indicative of kidney failure       Narrative:      GFR Normal >60  Chronic Kidney Disease <60  Kidney Failure <15      CBC Auto Differential [[id number]]  (Abnormal) Collected: 11/08/22 1938    Specimen: Blood Updated: 11/08/22 1958     WBC 9.80 10*3/mm3      RBC 3.34 10*6/mm3      Hemoglobin 9.9 g/dL      Hematocrit 30.5 %      MCV 91.3 fL      MCH 29.6 pg      MCHC 32.5 g/dL      RDW 12.8 %      RDW-SD 42.6 fl      MPV 9.3 fL      Platelets 428 10*3/mm3      Neutrophil % 65.1 %      Lymphocyte % 19.0 %      Monocyte % 10.4 %      Eosinophil % 3.9 %      Basophil % 0.8 %      Immature Grans % 0.8 %      Neutrophils, Absolute 6.38 10*3/mm3      Lymphocytes, Absolute 1.86 10*3/mm3      Monocytes, Absolute 1.02 10*3/mm3      Eosinophils, Absolute 0.38 10*3/mm3      Basophils, Absolute 0.08 10*3/mm3      Immature Grans, Absolute 0.08 10*3/mm3      nRBC 0.0 /100 WBC     Urinalysis With Microscopic If Indicated (No Culture) - Urine, Clean Catch [[id number]]  (Abnormal) Collected: 11/08/22 1944    Specimen: Urine, Clean Catch Updated: 11/08/22 2159     Color, UA Yellow     Appearance, UA Clear     pH, UA 6.0     Specific Gravity, UA 1.018     Glucose,  mg/dL (1+)     Ketones, UA Negative     Bilirubin, UA Negative     Blood, UA Trace     Protein, UA >=300 mg/dL (3+)     Leuk Esterase, UA Negative     Nitrite, UA Negative     Urobilinogen, UA 0.2 E.U./dL    Urinalysis, Microscopic Only - Urine, Clean Catch [[id number]]  (Abnormal) Collected: 11/08/22 1944    Specimen: Urine, Clean Catch Updated: 11/08/22 2242     RBC, UA 0-2 /HPF      WBC, UA 3-5 /HPF      Bacteria, UA 2+ /HPF      Squamous Epithelial Cells, UA 3-6 /HPF      Hyaline Casts, UA 3-6 /LPF      Methodology Manual Light Microscopy    Troponin [[id number]]  (Abnormal) Collected: 11/08/22 2213    Specimen: Blood Updated: 11/08/22 2307     Troponin T 0.053  ng/mL     Narrative:      Troponin T Reference Range:  <= 0.03 ng/mL-   Negative for AMI  >0.03 ng/mL-     Abnormal for myocardial necrosis.  Clinicians would have to utilize clinical acumen, EKG, Troponin and serial changes to determine if it is an Acute Myocardial Infarction or myocardial injury due to an underlying chronic condition.       Results may be falsely decreased if patient taking Biotin.            XR Chest 2 View   Final Result   1. Borderline cardiomegaly.   2. Mild vascular congestion.   3. Blunting of the right costophrenic sulcus consistent with pleural   scarring or minimal pleural effusion.       This report was finalized on 11/8/2022 7:55 PM by Dr. Jae Cheema M.D.          US Renal Bilateral    (Results Pending)     Assessment & Plan      Active Hospital Problems    Diagnosis  POA   • **BAN (acute kidney injury) (HCC) [N17.9]  Yes   • Anasarca [R60.1]  Yes   • Essential hypertension [I10]  Yes   • Uncontrolled type 2 diabetes mellitus with hyperglycemia ([facility]) [E11.65]  Yes      Resolved Hospital Problems   No resolved problems to display.     · Anasarca/BAN on CKD: Worsening renal failure and there was report of proteinuria on outside hospital discharge summary.  She had reportedly responded to Lasix for that admission.  She has had worse anasarca including some orthopnea now.  BNP is elevated and creatinine is worse.  Plan to check renal ultrasound, urinary lytes and protein, echo. Given the skin changes hypertension and diabetes, going to check dexamethasone suppression test.  Consult nephrology.  · Hypertension: Plan resumption of home medicines holding nephrotoxic agents.  Additional as needed medicine if needed.  · Diabetes: Resume home insulin long-acting at about 1/2 dose. SSI.  Monitor requirements.  · EKG is being read as A. fib however it looks like heart rate is regular and may just be low voltage.  Going to repeat EKG to confirm.  Troponin with mild elevation but no chest  pain reported in the setting of BAN.  We will repeat to trend.  · Prophylaxis: Heparin subcutaneous    Patient seen prior to midnight on 11/8.  Note delayed by other patient care.      I discussed the patients findings and my recommendations with patient, nursing staff and consulting provider.      Ryland Hernandez MD  [facility]  11/09/22  00:59 EST    Dictated portions using Dragon dictation software.  During the entire encounter, I was wearing recommended PPE including face mask and eye protection. Hand sanitization was performed prior to entering room and upon exit.

## 2022-11-09 NOTE — PAYOR COMM NOTE
"Kelly Pack (52 y.o. Female)     ATTN: INITIAL CLINICALS TO REVIEW FOR INPATIENT AUTHORIZATION: [id number]    PLEASE REPLY TO UR DEPT: [phone],  [phone]    [facility]: NPI [id number]     LEONEL LEIGH MD: LINWOOD [id number]      Date of Birth   1970    Social Security Number       Address   [address] [facility] [address]    Home Phone   [phone]    MRN   [id number]       Quaker   Unknown    Marital Status   Unknown                            Admission Date   22    Admission Type   Emergency    Admitting Provider   Ryland Hernandez MD    Attending Provider   Leonel Leigh MD    Department, Room/Bed   [address], E655/1       Discharge Date       Discharge Disposition       Discharge Destination                               Attending Provider: Leonel Leigh MD    Allergies: No Known Allergies    Isolation: None   Infection: None   Code Status: CPR    Ht: 175 cm (68.9\")   Wt: 163 kg (359 lb 5.6 oz)    Admission Cmt: None   Principal Problem: BAN (acute kidney injury) (HCC) [N17.9]                 Active Insurance as of 2022     Primary Coverage     Payor Plan Insurance Group Employer/Plan Group    [employer] BLUE [employer]OSS [facility] [employer] [facility] PPO [id number]     Payor Plan Address Payor Plan Phone Number Payor Plan Fax Number Effective Dates    [address] [phone]  2019 - None Entered    [place] [address]       Subscriber Name Subscriber Birth Date Member ID       KELLY PACK 1970 [id number]                 Emergency Contacts      (Rel.) Home Phone Work Phone Mobile Phone    sanket pack (Mother) -- -- [phone]               History & Physical      Ryland Hernandez MD at 22 0059              Name: Kelly Pack ADMIT: 2022   : 1970  PCP: Mattie Freeman APRN    MRN: [id number] LOS: 1 days   AGE/SEX: 52 y.o. female  " ROOM: E6/1     Chief Complaint   Patient presents with   • Edema   • Hypertension       Subjective    HPI  Ms. Pack is a 52 y.o. female with a history of diabetes hypertension and CKD who presents to [facility] with worsening blood pressure and creatinine and fluid overload.  She reports she has had worsening swelling of her legs for about 8 weeks and has been worsening.  She does have tenderness diffusely in her legs due to the swelling.  She has noticed skin changes.  She has developed some bullae and has a heel blister on her right heel which has burst.  She is concerned about this since she has diabetes.  She reports she does have dyspnea on exertion and also has orthopnea with shortness of breath laying supine.  She reports she has some wheezing at times but has not had any worsening of asthma symptoms recently.  She has not had any fevers or chills.  No chest pain or palpitations.  No productive cough.  Does not report any dysuria.  No headache.    Past Medical History:   Diagnosis Date   • Albuminuria    • Allergic     Seasonal, grass, cats and some dogs   • Allergic rhinitis    • Asthma     allergy triggered   • Bell's palsy    • Blood glucose elevated    • Cataract     Had surgery on both eyes   • Cholelithiasis     Removed. Have bile reflux/ vomiting   • Diabetes mellitus with albuminuria (HCC)    • Drug therapy    • Elevated blood pressure reading without diagnosis of hypertension    • Endometrial cancer (HCC)    • HL (hearing loss)     In L ear   • Hyperlipidemia    • Hypertension    • Low back pain    • Migraine    • Obesity    • Renal insufficiency    • Right otitis media    • Type II diabetes mellitus (HCC)    • Visual impairment     Diabetic retinopathy. Oil in L eye due to retina detachment s.   • Vitamin D deficiency      Past Surgical History:   Procedure Laterality Date   • CATARACT EXTRACTION     • CHOLECYSTECTOMY     • EYE SURGERY      NOV 2020   • HYSTERECTOMY      due to  cancer   • OOPHORECTOMY     • VITRECTOMY PARS PLANA Left 1/28/2020    Procedure: 25G VITRECTOMY-ENDO LASER;  Surgeon: Lazarus, Howard S., MD;  Location: [facility] MAIN OR;  Service: Ophthalmology     Family History   Problem Relation Age of Onset   • Breast cancer Mother    • Cancer Mother    • Diabetes Father    • Hyperlipidemia Father    • Hypertension Father    • Hearing loss Father    • Asthma Maternal Grandfather      Social History     Tobacco Use   • Smoking status: Never   • Smokeless tobacco: Never   Substance Use Topics   • Alcohol use: Not Currently     Comment: rarely   • Drug use: No     Medications Prior to Admission   Medication Sig Dispense Refill Last Dose   • albuterol sulfate  (90 Base) MCG/ACT inhaler Inhale 2 puffs Every 6 (Six) Hours As Needed for Wheezing or Shortness of Air. 18 g 0    • benzonatate (Tessalon Perles) 100 MG capsule Take 1 capsule by mouth 3 (Three) Times a Day As Needed for Cough. 30 capsule 1    • bumetanide (BUMEX) 2 MG tablet Take 1 tablet by mouth 2 (Two) Times a Day.      • cyclobenzaprine (FLEXERIL) 10 MG tablet Take 1 tablet by mouth At Night As Needed for Muscle Spasms. (Patient taking differently: Take 1 tablet by mouth Every Night. Headaches per pt) 30 tablet 0 Patient Taking Differently   • fluticasone (FLONASE) 50 MCG/ACT nasal spray 2 sprays by Each Nare route Daily. (Patient taking differently: 2 sprays by Each Nare route As Needed.) 48 g 1 Patient Taking Differently   • gabapentin (Neurontin) 100 MG capsule Take 1 capsule by mouth 3 (Three) Times a Day. 90 capsule 1    • hydrALAZINE (APRESOLINE) 50 MG tablet Take 1 tablet by mouth 3 (Three) Times a Day. (Patient taking differently: Take 2 tablets by mouth 3 (Three) Times a Day.) 270 tablet 1 Patient Taking Differently   • simvastatin (ZOCOR) 20 MG tablet Take 1 tablet by mouth Daily. 90 tablet 1    • vitamin D (ERGOCALCIFEROL) 1.25 MG (55033 UT) capsule capsule Take 1 capsule by mouth 2 (Two) Times a Week.  30 capsule 11    • Continuous Blood Gluc Sensor (FreeStyle Zane 2 Sensor) misc 1 each by Other route Every 14 (Fourteen) Days. 2 each 5    • Dulaglutide 1.5 MG/0.5ML solution pen-injector Inject 1.5 mg under the skin into the appropriate area as directed 1 (One) Time Per Week. 12 pen 1    • glucose blood (Accu-Chek Pascale Plus) test strip TEST BLOOD SUGAR TWICE DAILY 200 each 0    • Insulin Glargine, 1 Unit Dial, (Toujeo SoloStar) 300 UNIT/ML solution pen-injector injection INJECT 100 UNITS UNDER THE SKIN INTO THE APPROPRIATE AREA AS DIRECTED ONE TIME PER WEEK 6 mL 0    • insulin lispro (humaLOG) 100 UNIT/ML injection Inject 14 Units under the skin into the appropriate area as directed 3 (Three) Times a Day Before Meals. 40 mL 1    • Insulin Pen Needle 32G X 4 MM misc 1 each 5 (Five) Times a Day. 500 each 2    • ondansetron (Zofran) 4 MG tablet Take 1 tablet by mouth Every 8 (Eight) Hours As Needed for Nausea or Vomiting. 30 tablet 1      Allergies:  No Known Allergies    Review of Systems   Constitutional: Negative for chills and fever.   HENT: Negative for sore throat and trouble swallowing.    Eyes: Negative for pain and visual disturbance.   Respiratory: Positive for shortness of breath and wheezing. Negative for cough.    Cardiovascular: Positive for leg swelling. Negative for chest pain and palpitations.   Gastrointestinal: Negative for constipation, diarrhea, nausea and vomiting.   Endocrine: Negative for cold intolerance and heat intolerance.   Genitourinary: Negative for dysuria and flank pain.   Musculoskeletal: Negative for neck pain and neck stiffness.   Skin: Positive for color change. Negative for pallor.   Allergic/Immunologic: Negative for environmental allergies and food allergies.   Neurological: Negative for seizures and syncope.   Hematological: Negative for adenopathy. Does not bruise/bleed easily.   Psychiatric/Behavioral: Negative for agitation and confusion.        Objective     Vital  Signs  Temp:  [97.6 °F (36.4 °C)-98.2 °F (36.8 °C)] 97.6 °F (36.4 °C)  Heart Rate:  [] 93  Resp:  [18-20] 18  BP: (150-175)/(85-95) 175/95  SpO2:  [97 %-98 %] 98 %  on   ;   Device (Oxygen Therapy): room air  Body mass index is 53.06 kg/m².    Physical Exam  Vitals and nursing note reviewed.   Constitutional:       General: She is not in acute distress.     Appearance: She is not diaphoretic.   HENT:      Head: Normocephalic and atraumatic.   Eyes:      General:         Right eye: No discharge.      Comments: Left eye dressed   Cardiovascular:      Rate and Rhythm: Normal rate and regular rhythm.      Heart sounds: Normal heart sounds.   Pulmonary:      Effort: Pulmonary effort is normal.      Breath sounds: Decreased breath sounds present. No wheezing or rhonchi.   Abdominal:      Palpations: Abdomen is soft.      Tenderness: There is no abdominal tenderness. There is no guarding or rebound.   Musculoskeletal:         General: No tenderness.      Cervical back: Neck supple. No tenderness.      Right lower leg: Edema present.      Left lower leg: Edema present.      Comments: 3+   Skin:     Findings: Lesion (right heel burst bullae, minimal surrounding erythema, no discharge) present.      Comments: Diffuse horizontal stria type coloration of BLE   Neurological:      Mental Status: She is alert.      Cranial Nerves: No cranial nerve deficit.   Psychiatric:         Mood and Affect: Mood normal.         Behavior: Behavior normal.         Results Review:  I reviewed the patient's new clinical results.  I reviewed imaging, agree with interpretation.  I reviewed EKG/telemetry, regular rate, low voltage.  I reviewed prior records.    Lab Results (last 24 hours)     Procedure Component Value Units Date/Time    BNP [[id number]]  (Abnormal) Collected: 11/08/22 1938    Specimen: Blood Updated: 11/08/22 2016     proBNP 8,826.0 pg/mL     Narrative:      Among patients with dyspnea, NT-proBNP is highly sensitive for the  detection of acute congestive heart failure. In addition NT-proBNP of <300 pg/ml effectively rules out acute congestive heart failure with 99% negative predictive value.    Results may be falsely decreased if patient taking Biotin.      Troponin [[id number]]  (Abnormal) Collected: 11/08/22 1938    Specimen: Blood Updated: 11/08/22 2033     Troponin T 0.058 ng/mL     Narrative:      Troponin T Reference Range:  <= 0.03 ng/mL-   Negative for AMI  >0.03 ng/mL-     Abnormal for myocardial necrosis.  Clinicians would have to utilize clinical acumen, EKG, Troponin and serial changes to determine if it is an Acute Myocardial Infarction or myocardial injury due to an underlying chronic condition.       Results may be falsely decreased if patient taking Biotin.      CBC & Differential [[id number]]  (Abnormal) Collected: 11/08/22 1938    Specimen: Blood Updated: 11/08/22 1958    Narrative:      The following orders were created for panel order CBC & Differential.  Procedure                               Abnormality         Status                     ---------                               -----------         ------                     CBC Auto Differential[[id number]]        Abnormal            Final result                 Please view results for these tests on the individual orders.    Comprehensive Metabolic Panel [[id number]]  (Abnormal) Collected: 11/08/22 1938    Specimen: Blood Updated: 11/08/22 2025     Glucose 135 mg/dL      BUN 45 mg/dL      Creatinine 3.93 mg/dL      Sodium 142 mmol/L      Potassium 3.6 mmol/L      Chloride 107 mmol/L      CO2 22.6 mmol/L      Calcium 8.4 mg/dL      Total Protein 6.2 g/dL      Albumin 2.70 g/dL      ALT (SGPT) 17 U/L      AST (SGOT) 18 U/L      Alkaline Phosphatase 138 U/L      Total Bilirubin <0.2 mg/dL      Globulin 3.5 gm/dL      A/G Ratio 0.8 g/dL      BUN/Creatinine Ratio 11.5     Anion Gap 12.4 mmol/L      eGFR 13.3 mL/min/1.73      Comment: <15 Indicative of kidney failure        Narrative:      GFR Normal >60  Chronic Kidney Disease <60  Kidney Failure <15      CBC Auto Differential [[id number]]  (Abnormal) Collected: 11/08/22 1938    Specimen: Blood Updated: 11/08/22 1958     WBC 9.80 10*3/mm3      RBC 3.34 10*6/mm3      Hemoglobin 9.9 g/dL      Hematocrit 30.5 %      MCV 91.3 fL      MCH 29.6 pg      MCHC 32.5 g/dL      RDW 12.8 %      RDW-SD 42.6 fl      MPV 9.3 fL      Platelets 428 10*3/mm3      Neutrophil % 65.1 %      Lymphocyte % 19.0 %      Monocyte % 10.4 %      Eosinophil % 3.9 %      Basophil % 0.8 %      Immature Grans % 0.8 %      Neutrophils, Absolute 6.38 10*3/mm3      Lymphocytes, Absolute 1.86 10*3/mm3      Monocytes, Absolute 1.02 10*3/mm3      Eosinophils, Absolute 0.38 10*3/mm3      Basophils, Absolute 0.08 10*3/mm3      Immature Grans, Absolute 0.08 10*3/mm3      nRBC 0.0 /100 WBC     Urinalysis With Microscopic If Indicated (No Culture) - Urine, Clean Catch [[id number]]  (Abnormal) Collected: 11/08/22 1944    Specimen: Urine, Clean Catch Updated: 11/08/22 2159     Color, UA Yellow     Appearance, UA Clear     pH, UA 6.0     Specific Gravity, UA 1.018     Glucose,  mg/dL (1+)     Ketones, UA Negative     Bilirubin, UA Negative     Blood, UA Trace     Protein, UA >=300 mg/dL (3+)     Leuk Esterase, UA Negative     Nitrite, UA Negative     Urobilinogen, UA 0.2 E.U./dL    Urinalysis, Microscopic Only - Urine, Clean Catch [[id number]]  (Abnormal) Collected: 11/08/22 1944    Specimen: Urine, Clean Catch Updated: 11/08/22 2242     RBC, UA 0-2 /HPF      WBC, UA 3-5 /HPF      Bacteria, UA 2+ /HPF      Squamous Epithelial Cells, UA 3-6 /HPF      Hyaline Casts, UA 3-6 /LPF      Methodology Manual Light Microscopy    Troponin [[id number]]  (Abnormal) Collected: 11/08/22 2213    Specimen: Blood Updated: 11/08/22 2307     Troponin T 0.053 ng/mL     Narrative:      Troponin T Reference Range:  <= 0.03 ng/mL-   Negative for AMI  >0.03 ng/mL-     Abnormal for myocardial necrosis.   Clinicians would have to utilize clinical acumen, EKG, Troponin and serial changes to determine if it is an Acute Myocardial Infarction or myocardial injury due to an underlying chronic condition.       Results may be falsely decreased if patient taking Biotin.            XR Chest 2 View   Final Result   1. Borderline cardiomegaly.   2. Mild vascular congestion.   3. Blunting of the right costophrenic sulcus consistent with pleural   scarring or minimal pleural effusion.       This report was finalized on 11/8/2022 7:55 PM by Dr. Jae Cheema M.D.          US Renal Bilateral    (Results Pending)     Assessment & Plan     Active Hospital Problems    Diagnosis  POA   • **BAN (acute kidney injury) (HCC) [N17.9]  Yes   • Anasarca [R60.1]  Yes   • Essential hypertension [I10]  Yes   • Uncontrolled type 2 diabetes mellitus with hyperglycemia (HCC) [E11.65]  Yes      Resolved Hospital Problems   No resolved problems to display.     · Anasarca/BAN on CKD: Worsening renal failure and there was report of proteinuria on outside hospital discharge summary.  She had reportedly responded to Lasix for that admission.  She has had worse anasarca including some orthopnea now.  BNP is elevated and creatinine is worse.  Plan to check renal ultrasound, urinary lytes and protein, echo. Given the skin changes hypertension and diabetes, going to check dexamethasone suppression test.  Consult nephrology.  · Hypertension: Plan resumption of home medicines holding nephrotoxic agents.  Additional as needed medicine if needed.  · Diabetes: Resume home insulin long-acting at about 1/2 dose. SSI.  Monitor requirements.  · EKG is being read as A. fib however it looks like heart rate is regular and may just be low voltage.  Going to repeat EKG to confirm.  Troponin with mild elevation but no chest pain reported in the setting of BAN.  We will repeat to trend.  · Prophylaxis: Heparin subcutaneous    Patient seen prior to midnight on 11/8.   "Note delayed by other patient care.      I discussed the patients findings and my recommendations with patient, nursing staff and consulting provider.      Ryland Hernandez MD  [facility]  11/09/22  00:59 EST    Dictated portions using Dragon dictation software.  During the entire encounter, I was wearing recommended PPE including face mask and eye protection. Hand sanitization was performed prior to entering room and upon exit.    Electronically signed by Ryland Hernandez MD at 11/09/22 0247          Emergency Department Notes      Tara Clifford RN at 11/08/22 1853        Pt via PV from home with c/o \"swelling to whole body x8 weeks\", high blood pressure (220/160 at home), and \"sore to R heel\".  Sent here by nephrologist    Post-op eye surgery on 11/4    All triage performed with this RN wearing appropriate PPE.  Pt placed in mask upon arrival to ED.      Electronically signed by Tara Clifford RN at 11/08/22 3966     Dusty Rollins PA at 11/08/22 2044           EMERGENCY DEPARTMENT ENCOUNTER    Room Number:  01/01  Date of encounter:  11/8/2022  PCP: Mattie Freeman APRN  Historian: Patient  Full history not obtainable due to: None    HPI:  Chief Complaint: SOA    Context: Kelly Pakc is a 52 y.o. female with a PMH significant for type 2 diabetes, renal insufficiency, endometrial cancer, hyperlipidemia, hypertension who presents to the ED c/o elevated blood pressure readings, body wide edema for the past several weeks.  States that she has been experiencing exertional dyspnea as well.  She went to her nephrology office today and was encouraged to come here for admission because her blood pressure was starting to spike.  She reports a 220/180 blood pressure at the nephrologist office today.  She denies fever, chills, cough, chest pain at any point since symptoms began.      MEDICAL RECORD REVIEW:    Upon review of the medical record it appears the patient was evaluated " earlier today in the office with her nephrologist for BAN, hypertension, diabetes      PAST MEDICAL HISTORY    Active Ambulatory Problems     Diagnosis Date Noted   • Hyperlipidemia 09/17/2019   • Allergic rhinitis 09/17/2019   • Albuminuria 09/17/2019   • Asthma 04/08/2021   • Essential hypertension 04/08/2021   • Uncontrolled type 2 diabetes mellitus with hyperglycemia (HCC) 04/08/2021     Resolved Ambulatory Problems     Diagnosis Date Noted   • No Resolved Ambulatory Problems     Past Medical History:   Diagnosis Date   • Allergic    • Bell's palsy    • Blood glucose elevated    • Cataract    • Cholelithiasis    • Diabetes mellitus with albuminuria (HCC)    • Drug therapy    • Elevated blood pressure reading without diagnosis of hypertension    • Endometrial cancer (HCC)    • HL (hearing loss)    • Hypertension    • Low back pain    • Migraine    • Obesity    • Renal insufficiency    • Right otitis media    • Type II diabetes mellitus (HCC)    • Visual impairment    • Vitamin D deficiency          PAST SURGICAL HISTORY  Past Surgical History:   Procedure Laterality Date   • CATARACT EXTRACTION     • CHOLECYSTECTOMY     • EYE SURGERY      NOV 2020   • HYSTERECTOMY      due to cancer   • OOPHORECTOMY     • VITRECTOMY PARS PLANA Left 1/28/2020    Procedure: 25G VITRECTOMY-ENDO LASER;  Surgeon: Lazarus, Howard S., MD;  Location: [facility] OR;  Service: Ophthalmology         FAMILY HISTORY  Family History   Problem Relation Age of Onset   • Breast cancer Mother    • Cancer Mother    • Diabetes Father    • Hyperlipidemia Father    • Hypertension Father    • Hearing loss Father    • Asthma Maternal Grandfather          SOCIAL HISTORY  Social History     Socioeconomic History   • Marital status: Unknown   Tobacco Use   • Smoking status: Never   • Smokeless tobacco: Never   Substance and Sexual Activity   • Alcohol use: Not Currently     Comment: rarely   • Drug use: No   • Sexual activity: Not Currently     Partners:  Male     Birth control/protection: Condom         ALLERGIES  Patient has no known allergies.        REVIEW OF SYSTEMS    All systems reviewed and marked as negative except as listed in HPI     PHYSICAL EXAM    I have reviewed the triage vital signs and nursing notes.    ED Triage Vitals [11/08/22 1856]   Temp Heart Rate Resp BP SpO2   98.2 °F (36.8 °C) 104 20 161/85 98 %      Temp src Heart Rate Source Patient Position BP Location FiO2 (%)   Tympanic -- -- -- --       Physical Exam  Constitutional:       General: She is not in acute distress.     Appearance: She is well-developed.   HENT:      Head: Normocephalic and atraumatic.   Eyes:      General: No scleral icterus.     Conjunctiva/sclera: Conjunctivae normal.   Neck:      Trachea: No tracheal deviation.   Cardiovascular:      Rate and Rhythm: Normal rate and regular rhythm.      Comments: Diffuse edema extending from the lower legs into the thighs bilaterally.  No warmth or erythema.  Pulmonary:      Effort: Pulmonary effort is normal.      Breath sounds: Normal breath sounds.      Comments: Mildly increased work of breathing at rest.  Abdominal:      Palpations: Abdomen is soft.      Tenderness: There is no abdominal tenderness.   Musculoskeletal:         General: No deformity.      Cervical back: Normal range of motion.   Lymphadenopathy:      Cervical: No cervical adenopathy.   Skin:     General: Skin is warm and dry.   Neurological:      Mental Status: She is alert and oriented to person, place, and time.   Psychiatric:         Behavior: Behavior normal.         Vital signs and nursing notes reviewed.            LAB RESULTS  Recent Results (from the past 24 hour(s))   ECG 12 Lead Other; edema    Collection Time: 11/08/22  7:29 PM   Result Value Ref Range    QT Interval 389 ms   BNP    Collection Time: 11/08/22  7:38 PM    Specimen: Blood   Result Value Ref Range    proBNP 8,826.0 (H) 0.0 - 900.0 pg/mL   Troponin    Collection Time: 11/08/22  7:38 PM     Specimen: Blood   Result Value Ref Range    Troponin T 0.058 (C) 0.000 - 0.030 ng/mL   Comprehensive Metabolic Panel    Collection Time: 11/08/22  7:38 PM    Specimen: Blood   Result Value Ref Range    Glucose 135 (H) 65 - 99 mg/dL    BUN 45 (H) 6 - 20 mg/dL    Creatinine 3.93 (H) 0.57 - 1.00 mg/dL    Sodium 142 136 - 145 mmol/L    Potassium 3.6 3.5 - 5.2 mmol/L    Chloride 107 98 - 107 mmol/L    CO2 22.6 22.0 - 29.0 mmol/L    Calcium 8.4 (L) 8.6 - 10.5 mg/dL    Total Protein 6.2 6.0 - 8.5 g/dL    Albumin 2.70 (L) 3.50 - 5.20 g/dL    ALT (SGPT) 17 1 - 33 U/L    AST (SGOT) 18 1 - 32 U/L    Alkaline Phosphatase 138 (H) 39 - 117 U/L    Total Bilirubin <0.2 0.0 - 1.2 mg/dL    Globulin 3.5 gm/dL    A/G Ratio 0.8 g/dL    BUN/Creatinine Ratio 11.5 7.0 - 25.0    Anion Gap 12.4 5.0 - 15.0 mmol/L    eGFR 13.3 (L) >60.0 mL/min/1.73   Green Top (Gel)    Collection Time: 11/08/22  7:38 PM   Result Value Ref Range    Extra Tube Hold for add-ons.    Lavender Top    Collection Time: 11/08/22  7:38 PM   Result Value Ref Range    Extra Tube hold for add-on    Gold Top - SST    Collection Time: 11/08/22  7:38 PM   Result Value Ref Range    Extra Tube Hold for add-ons.    Light Blue Top    Collection Time: 11/08/22  7:38 PM   Result Value Ref Range    Extra Tube Hold for add-ons.    CBC Auto Differential    Collection Time: 11/08/22  7:38 PM    Specimen: Blood   Result Value Ref Range    WBC 9.80 3.40 - 10.80 10*3/mm3    RBC 3.34 (L) 3.77 - 5.28 10*6/mm3    Hemoglobin 9.9 (L) 12.0 - 15.9 g/dL    Hematocrit 30.5 (L) 34.0 - 46.6 %    MCV 91.3 79.0 - 97.0 fL    MCH 29.6 26.6 - 33.0 pg    MCHC 32.5 31.5 - 35.7 g/dL    RDW 12.8 12.3 - 15.4 %    RDW-SD 42.6 37.0 - 54.0 fl    MPV 9.3 6.0 - 12.0 fL    Platelets 428 140 - 450 10*3/mm3    Neutrophil % 65.1 42.7 - 76.0 %    Lymphocyte % 19.0 (L) 19.6 - 45.3 %    Monocyte % 10.4 5.0 - 12.0 %    Eosinophil % 3.9 0.3 - 6.2 %    Basophil % 0.8 0.0 - 1.5 %    Immature Grans % 0.8 (H) 0.0 - 0.5 %     Neutrophils, Absolute 6.38 1.70 - 7.00 10*3/mm3    Lymphocytes, Absolute 1.86 0.70 - 3.10 10*3/mm3    Monocytes, Absolute 1.02 (H) 0.10 - 0.90 10*3/mm3    Eosinophils, Absolute 0.38 0.00 - 0.40 10*3/mm3    Basophils, Absolute 0.08 0.00 - 0.20 10*3/mm3    Immature Grans, Absolute 0.08 (H) 0.00 - 0.05 10*3/mm3    nRBC 0.0 0.0 - 0.2 /100 WBC       Ordered the above labs and independently reviewed the results.        RADIOLOGY  XR Chest 2 View    Result Date: 11/8/2022  XR CHEST 2 VW-  11/08/2022  HISTORY: Possible edema.  Heart size is at the upper limits of normal. There is blunting of the right costophrenic sulcus on the PA film which may be related to pleural scarring or minimal pleural effusion. This finding is now well seen on the lateral view. There may be some minimal vascular congestion but no definite pulmonary edema or focal infiltrates are seen. No pneumothorax is seen.      1. Borderline cardiomegaly. 2. Mild vascular congestion. 3. Blunting of the right costophrenic sulcus consistent with pleural scarring or minimal pleural effusion.  This report was finalized on 11/8/2022 7:55 PM by Dr. Jae Cheema M.D.        I ordered the above noted radiological studies. Independently reviewed by me and discussed with radiologist.  See dictation above for official radiology interpretation.      PROCEDURES    Procedures        MEDICATIONS GIVEN IN ER    Medications   furosemide (LASIX) injection 80 mg (has no administration in time range)         PROGRESS, DATA ANALYSIS, CONSULTS, AND MEDICAL DECISION MAKING    All labs have been independently reviewed by me.  All radiology studies have been reviewed by me.   EKG's independently reviewed by me.  Discussion below represents my analysis of pertinent findings related to patient's condition, differential diagnosis, treatment plan and final disposition.    DIFFERENTIAL DIAGNOSIS INCLUDE BUT NOT LIMITED TO:     Differential diagnosis includes but is not limited  to:  -COVID  -CHF  -acute coronary syndrome  -pulmonary embolism  -pneumothorax  -pneumonia  -asthma/COPD  -deconditioning  -anemia  -anxiety     ED Course as of 11/08/22 2107 Tue Nov 08, 2022 2040 Creatinine(!): 3.93 [DC]   2040 Troponin T(!!): 0.058 [DC]   2040 proBNP(!): 8,826.0 [DC]   2041 I viewed CXR on PACS system.  My interpretation is no pneumothorax. [DC]   2106 I spoke with ANDREA Pretty with [facility] at this time regarding the patient.  I discussed work-up, results, concerns.  I discussed the consulting provider's desire for tele admit to MD David     [DC]      ED Course User Index  [DC] Dusty Rollins PA       AS OF 21:07 EST VITALS:    BP - 161/85  HR - 104  TEMP - 98.2 °F (36.8 °C) (Tympanic)  02 SATS - 98%    2107 I rechecked the patient.  I discussed the patient's labs, radiology findings (including all incidental findings), diagnosis, and plan for admission.  All questions answered.      DIAGNOSIS  Final diagnoses:   BAN (acute kidney injury) (HCC)   Hypervolemia, unspecified hypervolemia type   Hypertension, unspecified type   Elevated troponin         DISPOSITION  Admit    Pt masked in first look. I wore a surgical mask throughout my encounters with the pt. I performed hand hygiene on entry into the pt room and upon exit.     Dictated utilizing Dragon dictation     Note Disclaimer: At [facility], we believe that sharing information builds trust and better relationships. You are receiving this note because you recently visited [facility]. It is possible you will see health information before a provider has talked with you about it. This kind of information can be easy to misunderstand. To help you fully understand what it means for your health, we urge you to discuss this note with your provider.      Dusty Rollins PA  11/08/22 2108      Electronically signed by Dusty Rollins PA at 11/08/22 2108     Theodora Spain RN at 11/08/22 2135              Electronically signed by Grabiel  Theodora, RN at 11/08/22 2204     Theodora Spain RN at 11/08/22 2204          Nursing report ED to floor  Kelly Pack  52 y.o.  female    HPI :   Chief Complaint   Patient presents with   • Edema   • Hypertension       Admitting doctor:   Ryland Hernandez MD    Admitting diagnosis:   The primary encounter diagnosis was BAN (acute kidney injury) (HCC). Diagnoses of Hypervolemia, unspecified hypervolemia type, Hypertension, unspecified type, and Elevated troponin were also pertinent to this visit.    Code status:   Current Code Status       Date Active Code Status Order ID Comments User Context       11/8/2022 2110 CPR (Attempt to Resuscitate) [id number]  Maria De Jesus Pierre, APRN ED        Question Answer    Code Status (Patient has no pulse and is not breathing) CPR (Attempt to Resuscitate)    Medical Interventions (Patient has pulse or is breathing) Full Support                    Allergies:   Patient has no known allergies.    Isolation:   No active isolations    Intake and Output  No intake or output data in the 24 hours ending 11/08/22 2204    Weight:   There were no vitals filed for this visit.    Most recent vitals:   Vitals:    11/08/22 1856 11/08/22 2142   BP: 161/85 150/92   Pulse: 104 97   Resp: 20    Temp: 98.2 °F (36.8 °C)    TempSrc: Tympanic    SpO2: 98% 97%       Active LDAs/IV Access:   Lines, Drains & Airways       Active LDAs       Name Placement date Placement time Site Days    Peripheral IV 11/08/22 2104 Right Antecubital 11/08/22 2104  Antecubital  less than 1                    Labs (abnormal labs have a star):   Labs Reviewed   BNP (IN-HOUSE) - Abnormal; Notable for the following components:       Result Value    proBNP 8,826.0 (*)     All other components within normal limits    Narrative:     Among patients with dyspnea, NT-proBNP is highly sensitive for the detection of acute congestive heart failure. In addition NT-proBNP of <300 pg/ml effectively rules out acute congestive heart failure  with 99% negative predictive value.    Results may be falsely decreased if patient taking Biotin.     TROPONIN (IN-HOUSE) - Abnormal; Notable for the following components:    Troponin T 0.058 (*)     All other components within normal limits    Narrative:     Troponin T Reference Range:  <= 0.03 ng/mL-   Negative for AMI  >0.03 ng/mL-     Abnormal for myocardial necrosis.  Clinicians would have to utilize clinical acumen, EKG, Troponin and serial changes to determine if it is an Acute Myocardial Infarction or myocardial injury due to an underlying chronic condition.       Results may be falsely decreased if patient taking Biotin.     COMPREHENSIVE METABOLIC PANEL - Abnormal; Notable for the following components:    Glucose 135 (*)     BUN 45 (*)     Creatinine 3.93 (*)     Calcium 8.4 (*)     Albumin 2.70 (*)     Alkaline Phosphatase 138 (*)     eGFR 13.3 (*)     All other components within normal limits    Narrative:     GFR Normal >60  Chronic Kidney Disease <60  Kidney Failure <15     URINALYSIS W/ MICROSCOPIC IF INDICATED (NO CULTURE) - Abnormal; Notable for the following components:    Glucose,  mg/dL (1+) (*)     Blood, UA Trace (*)     Protein, UA >=300 mg/dL (3+) (*)     All other components within normal limits   CBC WITH AUTO DIFFERENTIAL - Abnormal; Notable for the following components:    RBC 3.34 (*)     Hemoglobin 9.9 (*)     Hematocrit 30.5 (*)     Lymphocyte % 19.0 (*)     Immature Grans % 0.8 (*)     Monocytes, Absolute 1.02 (*)     Immature Grans, Absolute 0.08 (*)     All other components within normal limits   RAINBOW DRAW    Narrative:     The following orders were created for panel order [place] Draw.  Procedure                               Abnormality         Status                     ---------                               -----------         ------                     Green Top (Gel)[[id number]]                                  Final result               Lavender Top[[id number]]                                      Final result               Gold Top - [facility][[id number]]                                   Final result               Light Blue Top[[id number]]                                   Final result                 Please view results for these tests on the individual orders.   TROPONIN (IN-HOUSE)   URINALYSIS, MICROSCOPIC ONLY   POCT GLUCOSE FINGERSTICK   POCT GLUCOSE FINGERSTICK   POCT GLUCOSE FINGERSTICK   CBC AND DIFFERENTIAL    Narrative:     The following orders were created for panel order CBC & Differential.  Procedure                               Abnormality         Status                     ---------                               -----------         ------                     CBC Auto Differential[[id number]]        Abnormal            Final result                 Please view results for these tests on the individual orders.   GREEN TOP   LAVENDER TOP   GOLD [facility] - SST   LIGHT BLUE TOP       EKG:   ECG 12 Lead Other; edema   Preliminary Result   HEART RATE= 100  bpm   RR Interval= 600  ms   PA Interval=   ms   P Horizontal Axis=   deg   P Front Axis=   deg   QRSD Interval= 83  ms   QT Interval= 389  ms   QRS Axis= 46  deg   T Wave Axis= 176  deg   - ABNORMAL ECG -   Atrial fibrillation   Nonspecific T abnormalities, lateral leads   Borderline prolonged QT interval   Electronically Signed By:    Date and Time of Study: 2022-11-08 19:29:07          Meds given in ED:   Medications   sodium chloride 0.9 % flush 10 mL (10 mL Intravenous Given 11/8/22 2159)   sodium chloride 0.9 % flush 10 mL (has no administration in time range)   dextrose (GLUTOSE) oral gel 15 g (has no administration in time range)   dextrose (D50W) (25 g/50 mL) IV injection 25 g (has no administration in time range)   glucagon (human recombinant) (GLUCAGEN DIAGNOSTIC) injection 1 mg (has no administration in time range)   nitroglycerin (NITROSTAT) SL tablet 0.4 mg (has no administration in time range)   heparin (porcine)  5000 UNIT/ML injection 5,000 Units (5,000 Units Subcutaneous Given 11/8/22 2146)   acetaminophen (TYLENOL) tablet 650 mg (has no administration in time range)     Or   acetaminophen (TYLENOL) 160 MG/5ML solution 650 mg (has no administration in time range)     Or   acetaminophen (TYLENOL) suppository 650 mg (has no administration in time range)   insulin lispro (ADMELOG) injection 0-7 Units (has no administration in time range)   ondansetron (ZOFRAN) injection 4 mg (has no administration in time range)   furosemide (LASIX) injection 80 mg (80 mg Intravenous Given 11/8/22 2141)       Imaging results:  XR Chest 2 View    Result Date: 11/8/2022  1. Borderline cardiomegaly. 2. Mild vascular congestion. 3. Blunting of the right costophrenic sulcus consistent with pleural scarring or minimal pleural effusion.  This report was finalized on 11/8/2022 7:55 PM by Dr. Jae Cheema M.D.       Ambulatory status:   - Ax1    Social issues:   Social History     Socioeconomic History   • Marital status: Unknown   Tobacco Use   • Smoking status: Never   • Smokeless tobacco: Never   Substance and Sexual Activity   • Alcohol use: Not Currently     Comment: rarely   • Drug use: No   • Sexual activity: Not Currently     Partners: Male     Birth control/protection: Condom       NIH Stroke Scale:         Theodora Spain RN  11/08/22 22:04 EST     Call Theodora #[phone] with any questions    Electronically signed by Theodora Spain RN at 11/08/22 2201       Vital Signs (last 2 days)     Date/Time Temp Temp src Pulse Resp BP Patient Position SpO2    11/09/22 1314 -- -- 98 -- 152/80 -- --    11/09/22 0744 98.3 (36.8) Oral 88 18 153/79 Lying 92    11/08/22 2250 97.6 (36.4) Oral 93 18 175/95 Sitting 98    11/08/22 2142 -- -- 97 -- 150/92 -- 97    11/08/22 1856 98.2 (36.8) Tympanic 104 20 161/85 -- 98        Oxygen Therapy (last 2 days)     Date/Time SpO2 Device (Oxygen Therapy) Flow (L/min) Oxygen Concentration (%) ETCO2 (mmHg)    11/09/22 0931 --  room air -- -- --    11/09/22 0744 92 room air -- -- --    11/08/22 2330 -- room air -- -- --    11/08/22 2250 98 room air -- -- --    11/08/22 2142 97 -- -- -- --    11/08/22 1856 98 room air -- -- --        Intake & Output (last 2 days)       11/07 0701  11/08 0700 11/08 0701  11/09 0700 11/09 0701  11/10 0700    P.O.  240     Total Intake(mL/kg)  240 (1.5)     Urine (mL/kg/hr)  1000     Total Output  1000     Net  -760                Lines, Drains & Airways     Active LDAs     Name Placement date Placement time Site Days    Peripheral IV 11/08/22 2104 Right Antecubital 11/08/22 2104  Antecubital  less than 1                Current Facility-Administered Medications   Medication Dose Route Frequency Provider Last Rate Last Admin   • acetaminophen (TYLENOL) tablet 650 mg  650 mg Oral Q4H PRN Maria De Jesus Pierre APRN        Or   • acetaminophen (TYLENOL) 160 MG/5ML solution 650 mg  650 mg Oral Q4H PRN Maria De Jesus Pierre APRN        Or   • acetaminophen (TYLENOL) suppository 650 mg  650 mg Rectal Q4H PRN Maria De Jesus Pierre APRN       • albuterol (PROVENTIL) nebulizer solution 0.083% 2.5 mg/3mL  2.5 mg Nebulization Q6H PRN Ryland Hernandez MD       • atorvastatin (LIPITOR) tablet 10 mg  10 mg Oral Daily Ryland Hernandez MD   10 mg at 11/09/22 0926   • bumetanide (BUMEX) injection 2 mg  2 mg Intravenous Q12H Daphne Kay MD       • carvedilol (COREG) tablet 12.5 mg  12.5 mg Oral BID With Meals Daphne Kay MD       • cyclobenzaprine (FLEXERIL) tablet 10 mg  10 mg Oral Nightly PRN Ryland Hernandez MD   10 mg at 11/09/22 0245   • dextrose (D50W) (25 g/50 mL) IV injection 25 g  25 g Intravenous Q15 Min PRN Maria De Jesus Pierre APRN       • dextrose (GLUTOSE) oral gel 15 g  15 g Oral Q15 Min PRN Maria De Jesus Pierre APRN       • fluticasone (FLONASE) 50 MCG/ACT nasal spray 2 spray  2 spray Each Nare Daily Ryland Hernandez MD   2 spray at 11/09/22 0928   • gabapentin (NEURONTIN) capsule 100 mg   100 mg Oral TID Ryland Hernandez MD       • glucagon (human recombinant) (GLUCAGEN DIAGNOSTIC) injection 1 mg  1 mg Intramuscular Q15 Min PRN Maria De Jesus Pierre APRN       • heparin (porcine) 5000 UNIT/ML injection 5,000 Units  5,000 Units Subcutaneous Q12H Maria De Jesus Pierre APRN   5,000 Units at 11/09/22 0925   • hydrALAZINE (APRESOLINE) tablet 100 mg  100 mg Oral TID Ryland Hernandez MD   100 mg at 11/09/22 0926   • insulin glargine (LANTUS, SEMGLEE) injection 25 Units  25 Units Subcutaneous Q12H Ryland Hernandez MD   25 Units at 11/09/22 0925   • insulin lispro (ADMELOG) injection 0-14 Units  0-14 Units Subcutaneous TID AC Ryland Hernandez MD       • labetalol (NORMODYNE,TRANDATE) injection 10 mg  10 mg Intravenous Q6H PRN Ryland Hernandez MD       • neomycin-polymyxin-dexamethamethasone (POLYDEX) ophthalmic ointment   Left Eye BID Ryland Hernandez MD   Given at 11/09/22 0928   • nitroglycerin (NITROSTAT) SL tablet 0.4 mg  0.4 mg Sublingual Q5 Min PRN Maria De Jesus Pierre APRN       • ondansetron (ZOFRAN) injection 4 mg  4 mg Intravenous Q6H PRN Maria De Jesus Pierre APRN       • sodium chloride 0.9 % flush 10 mL  10 mL Intravenous Q12H Maria De Jesus Pierre APRN   10 mL at 11/08/22 2159   • sodium chloride 0.9 % flush 10 mL  10 mL Intravenous PRN Maria De Jesus Pierre APRN       • timolol (TIMOPTIC) 0.5 % ophthalmic solution 1 drop  1 drop Left Eye Q12H Ryland Hernandez MD   1 drop at 11/09/22 0928       Lab Results (last 48 hours)     Procedure Component Value Units Date/Time    Eosinophil Smear - Urine, Urine, Clean Catch [[id number]]  (Abnormal) Collected: 11/09/22 0548    Specimen: Urine, Clean Catch Updated: 11/09/22 0721     Eosinophil Smear 1 % EOS/100 Cells     Troponin [[id number]]  (Abnormal) Collected: 11/09/22 0552    Specimen: Blood Updated: 11/09/22 0711     Troponin T 0.051 ng/mL     Narrative:      Troponin T Reference Range:  <= 0.03 ng/mL-   Negative for AMI  >0.03 ng/mL-      Abnormal for myocardial necrosis.  Clinicians would have to utilize clinical acumen, EKG, Troponin and serial changes to determine if it is an Acute Myocardial Infarction or myocardial injury due to an underlying chronic condition.       Results may be falsely decreased if patient taking Biotin.      Cortisol [[id number]] Collected: 11/09/22 0552    Specimen: Blood Updated: 11/09/22 0646     Cortisol 3.30 mcg/dL     Narrative:      Cortisol Reference Ranges:    Cortisol 6AM - 10AM Range: 6.02-18.40 mcg/dl  Cortisol 4PM - 8PM Range: 2.68-10.50 mcg/dl      Results may be falsely increased if patient taking Biotin.      Comprehensive Metabolic Panel [[id number]]  (Abnormal) Collected: 11/09/22 0552    Specimen: Blood Updated: 11/09/22 0641     Glucose 77 mg/dL      BUN 44 mg/dL      Creatinine 4.19 mg/dL      Sodium 142 mmol/L      Potassium 3.7 mmol/L      Chloride 107 mmol/L      CO2 24.8 mmol/L      Calcium 8.0 mg/dL      Total Protein 5.6 g/dL      Albumin 2.50 g/dL      ALT (SGPT) 11 U/L      AST (SGOT) 14 U/L      Alkaline Phosphatase 127 U/L      Total Bilirubin <0.2 mg/dL      Globulin 3.1 gm/dL      A/G Ratio 0.8 g/dL      BUN/Creatinine Ratio 10.5     Anion Gap 10.2 mmol/L      eGFR 12.3 mL/min/1.73      Comment: <15 Indicative of kidney failure       Narrative:      GFR Normal >60  Chronic Kidney Disease <60  Kidney Failure <15      Phosphorus [[id number]]  (Abnormal) Collected: 11/09/22 0552    Specimen: Blood Updated: 11/09/22 0641     Phosphorus 5.4 mg/dL     Magnesium [[id number]]  (Normal) Collected: 11/09/22 0552    Specimen: Blood Updated: 11/09/22 0641     Magnesium 2.1 mg/dL     Protime-INR [[id number]]  (Normal) Collected: 11/09/22 0552    Specimen: Blood Updated: 11/09/22 0627     Protime 13.4 Seconds      INR 1.01    CBC Auto Differential [[id number]]  (Abnormal) Collected: 11/09/22 0552    Specimen: Blood Updated: 11/09/22 0619     WBC 7.77 10*3/mm3      RBC 3.03 10*6/mm3      Hemoglobin 9.0 g/dL       Hematocrit 28.0 %      MCV 92.4 fL      MCH 29.7 pg      MCHC 32.1 g/dL      RDW 13.0 %      RDW-SD 42.8 fl      MPV 9.2 fL      Platelets 375 10*3/mm3      Neutrophil % 73.3 %      Lymphocyte % 13.6 %      Monocyte % 8.0 %      Eosinophil % 3.6 %      Basophil % 0.6 %      Immature Grans % 0.9 %      Neutrophils, Absolute 5.69 10*3/mm3      Lymphocytes, Absolute 1.06 10*3/mm3      Monocytes, Absolute 0.62 10*3/mm3      Eosinophils, Absolute 0.28 10*3/mm3      Basophils, Absolute 0.05 10*3/mm3      Immature Grans, Absolute 0.07 10*3/mm3      nRBC 0.0 /100 WBC     POC Glucose Once [[id number]]  (Normal) Collected: 11/09/22 0602    Specimen: Blood Updated: 11/09/22 0603     Glucose 87 mg/dL      Comment: Meter: [id number] : [id number] Glen CRUZ       Protein, Urine, Random - Urine, Clean Catch [[id number]] Collected: 11/08/22 1944    Specimen: Urine, Clean Catch Updated: 11/09/22 0527     Total Protein, Urine 1,172.0 mg/dL     Narrative:      Reference intervals for random urine have not been established.  Clinical usage is dependent upon physician's interpretation in combination with other laboratory tests.       Sodium, Urine, Random - Urine, Clean Catch [[id number]] Collected: 11/08/22 1944    Specimen: Urine, Clean Catch Updated: 11/09/22 0254     Sodium, Urine 46 mmol/L     Narrative:      Reference intervals for random urine have not been established.  Clinical usage is dependent upon physician's interpretation in combination with other laboratory tests.       Creatinine, Urine, Random - Urine, Clean Catch [[id number]] Collected: 11/08/22 1944    Specimen: Urine, Clean Catch Updated: 11/09/22 0241     Creatinine, Urine 61.9 mg/dL     Narrative:      Reference intervals for random urine have not been established.  Clinical usage is dependent upon physician's interpretation in combination with other laboratory tests.       Urea Nitrogen, Urine - Urine, Clean Catch [[id number]] Collected:  11/08/22 1944    Specimen: Urine, Clean Catch Updated: 11/09/22 0241     Urea Nitrogen, Urine 301 mg/dL     Narrative:      Reference intervals for random urine have not been established.  Clinical usage is dependent upon physician's interpretation in combination with other laboratory tests.       Troponin [[id number]]  (Abnormal) Collected: 11/08/22 2213    Specimen: Blood Updated: 11/08/22 2307     Troponin T 0.053 ng/mL     Narrative:      Troponin T Reference Range:  <= 0.03 ng/mL-   Negative for AMI  >0.03 ng/mL-     Abnormal for myocardial necrosis.  Clinicians would have to utilize clinical acumen, EKG, Troponin and serial changes to determine if it is an Acute Myocardial Infarction or myocardial injury due to an underlying chronic condition.       Results may be falsely decreased if patient taking Biotin.      Urinalysis, Microscopic Only - Urine, Clean Catch [[id number]]  (Abnormal) Collected: 11/08/22 1944    Specimen: Urine, Clean Catch Updated: 11/08/22 2242     RBC, UA 0-2 /HPF      WBC, UA 3-5 /HPF      Bacteria, UA 2+ /HPF      Squamous Epithelial Cells, UA 3-6 /HPF      Hyaline Casts, UA 3-6 /LPF      Methodology Manual Light Microscopy    Urinalysis With Microscopic If Indicated (No Culture) - Urine, Clean Catch [[id number]]  (Abnormal) Collected: 11/08/22 1944    Specimen: Urine, Clean Catch Updated: 11/08/22 2159     Color, UA Yellow     Appearance, UA Clear     pH, UA 6.0     Specific Gravity, UA 1.018     Glucose,  mg/dL (1+)     Ketones, UA Negative     Bilirubin, UA Negative     Blood, UA Trace     Protein, UA >=300 mg/dL (3+)     Leuk Esterase, UA Negative     Nitrite, UA Negative     Urobilinogen, UA 0.2 E.U./dL    [place] Draw [[id number]] Collected: 11/08/22 1938    Specimen: Blood Updated: 11/08/22 2045    Narrative:      The following orders were created for panel order [place] Draw.  Procedure                               Abnormality         Status                     ---------                                -----------         ------                     Green Top (Gel)[[id number]]                                  Final result               Lavender Top[[id number]]                                     Final result               Gold Top - SST[[id number]]                                   Final result               Light Blue Top[[id number]]                                   Final result                 Please view results for these tests on the individual orders.    Green Top (Gel) [[id number]] Collected: 11/08/22 1938    Specimen: Blood Updated: 11/08/22 2045     Extra Tube Hold for add-ons.     Comment: Auto resulted.       Lavender Top [[id number]] Collected: 11/08/22 1938    Specimen: Blood Updated: 11/08/22 2045     Extra Tube hold for add-on     Comment: Auto resulted       Gold Top - SST [[id number]] Collected: 11/08/22 1938    Specimen: Blood Updated: 11/08/22 2045     Extra Tube Hold for add-ons.     Comment: Auto resulted.       Light Blue Top [[id number]] Collected: 11/08/22 1938    Specimen: Blood Updated: 11/08/22 2045     Extra Tube Hold for add-ons.     Comment: Auto resulted       Troponin [[id number]]  (Abnormal) Collected: 11/08/22 1938    Specimen: Blood Updated: 11/08/22 2033     Troponin T 0.058 ng/mL     Narrative:      Troponin T Reference Range:  <= 0.03 ng/mL-   Negative for AMI  >0.03 ng/mL-     Abnormal for myocardial necrosis.  Clinicians would have to utilize clinical acumen, EKG, Troponin and serial changes to determine if it is an Acute Myocardial Infarction or myocardial injury due to an underlying chronic condition.       Results may be falsely decreased if patient taking Biotin.      Comprehensive Metabolic Panel [[id number]]  (Abnormal) Collected: 11/08/22 1938    Specimen: Blood Updated: 11/08/22 2025     Glucose 135 mg/dL      BUN 45 mg/dL      Creatinine 3.93 mg/dL      Sodium 142 mmol/L      Potassium 3.6 mmol/L      Chloride 107 mmol/L      CO2 22.6 mmol/L       Calcium 8.4 mg/dL      Total Protein 6.2 g/dL      Albumin 2.70 g/dL      ALT (SGPT) 17 U/L      AST (SGOT) 18 U/L      Alkaline Phosphatase 138 U/L      Total Bilirubin <0.2 mg/dL      Globulin 3.5 gm/dL      A/G Ratio 0.8 g/dL      BUN/Creatinine Ratio 11.5     Anion Gap 12.4 mmol/L      eGFR 13.3 mL/min/1.73      Comment: <15 Indicative of kidney failure       Narrative:      GFR Normal >60  Chronic Kidney Disease <60  Kidney Failure <15      BNP [[id number]]  (Abnormal) Collected: 11/08/22 1938    Specimen: Blood Updated: 11/08/22 2016     proBNP 8,826.0 pg/mL     Narrative:      Among patients with dyspnea, NT-proBNP is highly sensitive for the detection of acute congestive heart failure. In addition NT-proBNP of <300 pg/ml effectively rules out acute congestive heart failure with 99% negative predictive value.    Results may be falsely decreased if patient taking Biotin.      CBC & Differential [[id number]]  (Abnormal) Collected: 11/08/22 1938    Specimen: Blood Updated: 11/08/22 1958    Narrative:      The following orders were created for panel order CBC & Differential.  Procedure                               Abnormality         Status                     ---------                               -----------         ------                     CBC Auto Differential[[id number]]        Abnormal            Final result                 Please view results for these tests on the individual orders.    CBC Auto Differential [[id number]]  (Abnormal) Collected: 11/08/22 1938    Specimen: Blood Updated: 11/08/22 1958     WBC 9.80 10*3/mm3      RBC 3.34 10*6/mm3      Hemoglobin 9.9 g/dL      Hematocrit 30.5 %      MCV 91.3 fL      MCH 29.6 pg      MCHC 32.5 g/dL      RDW 12.8 %      RDW-SD 42.6 fl      MPV 9.3 fL      Platelets 428 10*3/mm3      Neutrophil % 65.1 %      Lymphocyte % 19.0 %      Monocyte % 10.4 %      Eosinophil % 3.9 %      Basophil % 0.8 %      Immature Grans % 0.8 %      Neutrophils, Absolute 6.38 10*3/mm3       Lymphocytes, Absolute 1.86 10*3/mm3      Monocytes, Absolute 1.02 10*3/mm3      Eosinophils, Absolute 0.38 10*3/mm3      Basophils, Absolute 0.08 10*3/mm3      Immature Grans, Absolute 0.08 10*3/mm3      nRBC 0.0 /100 WBC           Imaging Results (Last 48 Hours)     Procedure Component Value Units Date/Time    US Renal Bilateral [[id number]] Collected: 11/09/22 1405     Updated: 11/09/22 1405    Narrative:      BILATERAL RENAL SONOGRAM     HISTORY: Decreasing renal function; fluid overload.     TECHNIQUE: Bilateral renal sonogram was performed in standard fashion  and is correlated with renal sonogram from 01/27/2022.     FINDINGS: Both kidneys appear morphologically normal. There is no  hydronephrosis or renal parenchymal lesion. The right kidney measures  13.1 x 5.8 x 6.1 cm and left kidney measures 12.3 x 6.0 x 5.5 cm.  Urinary bladder is decompressed.       Impression:      Negative.       XR Chest 2 View [[id number]] Collected: 11/08/22 1951     Updated: 11/08/22 1958    Narrative:      XR CHEST 2 VW-  11/08/2022     HISTORY: Possible edema.     Heart size is at the upper limits of normal. There is blunting of the  right costophrenic sulcus on the PA film which may be related to pleural  scarring or minimal pleural effusion. This finding is now well seen on  the lateral view. There may be some minimal vascular congestion but no  definite pulmonary edema or focal infiltrates are seen. No pneumothorax  is seen.       Impression:      1. Borderline cardiomegaly.  2. Mild vascular congestion.  3. Blunting of the right costophrenic sulcus consistent with pleural  scarring or minimal pleural effusion.     This report was finalized on 11/8/2022 7:55 PM by Dr. Jae Cheema M.D.           ECG/EMG Results (last 48 hours)     Procedure Component Value Units Date/Time    ECG 12 Lead Other; edema [[id number]] Collected: 11/08/22 1929     Updated: 11/09/22 0752     QT Interval 389 ms     Narrative:      HEART RATE=  100  bpm  RR Interval= 600  ms  SD Interval=   ms  P Horizontal Axis=   deg  P Front Axis=   deg  QRSD Interval= 83  ms  QT Interval= 389  ms  QRS Axis= 46  deg  T Wave Axis= 176  deg  - ABNORMAL ECG -  Sinus rhythm  Nonspecific T abnormalities, lateral leads  Borderline prolonged QT interval  No Prior Tracing for Comparison  Electronically Signed By: Cameron Stephenson ([facility]) 09-Nov-2022 07:52:15  Date and Time of Study: 2022-11-08 19:29:07    Adult Transthoracic Echo Complete W/ Cont if Necessary Per Protocol [[id number]] Resulted: 11/09/22 1316     Updated: 11/09/22 1317     Target HR (85%) 145 bpm      Max. Pred. HR (100%) 170 bpm      EF(MOD-bp) 60.3 %      LVIDd 5.0 cm      LVIDs 3.6 cm      IVSd 1.23 cm      LVPWd 1.17 cm      FS 29.1 %      IVS/LVPW 1.05 cm      ESV(cubed) 44.8 ml      LV Sys Vol (BSA corrected) 18.9 cm2      EDV(cubed) 125.8 ml      LV Peck Vol (BSA corrected) 51.0 cm2      LVOT area 4.2 cm2      LV mass(C)d 234.1 grams      LVOT diam 2.31 cm      EDV(MOD-sp2) 92.0 ml      EDV(MOD-sp4) 135.0 ml      ESV(MOD-sp2) 40.0 ml      ESV(MOD-sp4) 50.0 ml      SV(MOD-sp2) 52.0 ml      SV(MOD-sp4) 85.0 ml      SI(MOD-sp2) 19.6 ml/m2      SI(MOD-sp4) 32.1 ml/m2      EF(MOD-sp2) 56.5 %      EF(MOD-sp4) 63.0 %      MV E max carina 101.5 cm/sec      MV A max carina 111.8 cm/sec      MV dec time 0.13 msec      MV E/A 0.91     MV A dur 0.11 sec      LA ESV Index (BP) 31.9 ml/m2      SV(LVOT) 86.9 ml      SV(RVOT) 112.9 ml      Qp/Qs 1.30     RV Base 4.6 cm      RV Mid 3.4 cm      RV Length 8.3 cm      RV S' 14.3 cm/sec      LV V1 max 118.7 cm/sec      LV V1 max PG 5.6 mmHg      LV V1 mean PG 2.45 mmHg      LV V1 VTI 20.7 cm      Ao pk carina 176.5 cm/sec      Ao max PG 12.5 mmHg      Ao mean PG 6.7 mmHg      Ao V2 VTI 37.2 cm      WILLIAM(I,D) 2.34 cm2      MV max PG 6.4 mmHg      MV mean PG 3.0 mmHg      MV V2 VTI 27.9 cm      MV P1/2t 54.3 msec      MVA(P1/2t) 4.1 cm2      MVA(VTI) 3.1 cm2      MV dec slope 689.0 cm/sec2       TR max carina 287.4 cm/sec      TR max PG 33.0 mmHg      RVOT diam 2.7 cm      RV V1 max PG 2.9 mmHg      RV V1 max 84.9 cm/sec      RV V1 VTI 20.1 cm      PA V2 max 99.0 cm/sec      PA acc time 0.16 sec      PA pr(Accel) 8.9 mmHg      Sinus 2.9 cm      Dimensionless Index 0.60 (DI)      RVSP(TR) 48 mmHg      RAP systole 15 mmHg           Orders (last 48 hrs)      Start     Ordered    11/10/22 0600  Renal Function Panel  Morning Draw         11/09/22 0922    11/10/22 0500  ECG 12 Lead Rhythm Change  Tomorrow AM         11/09/22 0115    11/09/22 1800  Wound Care  2 Times Daily       11/09/22 0947    11/09/22 1300  Skin Care  3 Times Daily         11/09/22 0933 11/09/22 1015  carvedilol (COREG) tablet 12.5 mg  2 Times Daily With Meals         11/09/22 0920 11/09/22 1015  bumetanide (BUMEX) injection 2 mg  Every 12 Hours         11/09/22 0920 11/09/22 0934  Elevate Heels Off of Bed  Until Discontinued         11/09/22 0933 11/09/22 0934  Turn Patient  Now Then Every 2 Hours         11/09/22 0933 11/09/22 0924  Duplex Venous Lower Extremity - Bilateral CAR  Once         11/09/22 0923 11/09/22 0922  Microalbumin, Urine, 24 Hour - Urine, Clean Catch  Once         11/09/22 0922 11/09/22 0922  Protein, Urine, 24 Hour - Urine, Clean Catch  Once         11/09/22 0922 11/09/22 0900  gabapentin (NEURONTIN) capsule 100 mg  3 Times Daily         11/09/22 0129 11/09/22 0900  hydrALAZINE (APRESOLINE) tablet 100 mg  3 Times Daily         11/09/22 0129 11/09/22 0900  atorvastatin (LIPITOR) tablet 10 mg  Daily         11/09/22 0129 11/09/22 0900  fluticasone (FLONASE) 50 MCG/ACT nasal spray 2 spray  Daily         11/09/22 0131    11/09/22 0900  timolol (TIMOPTIC) 0.5 % ophthalmic solution 1 drop  2 Times Daily,   Status:  Discontinued        Note to Pharmacy: Patient supply    11/09/22 0145    11/09/22 0900  neomycin-polymyxin-dexamethamethasone (POLYDEX) ophthalmic ointment  2 Times Daily,   Status:   Discontinued         11/09/22 0152    11/09/22 0900  neomycin-polymyxin-dexamethamethasone (POLYDEX) ophthalmic ointment  2 Times Daily         11/09/22 0246    11/09/22 0900  timolol (TIMOPTIC) 0.5 % ophthalmic solution 1 drop  Every 12 Hours Scheduled         11/09/22 0246    11/09/22 0800  Oral Care  2 Times Daily       11/08/22 2110 11/09/22 0730  insulin lispro (ADMELOG) injection 0-7 Units  3 Times Daily Before Meals,   Status:  Discontinued         11/08/22 2110 11/09/22 0730  insulin lispro (ADMELOG) injection 0-14 Units  3 Times Daily Before Meals         11/09/22 0047    11/09/22 0700  POC Glucose TID AC  3 Times Daily Before Meals       11/08/22 2110 11/09/22 0604  POC Glucose Once  PROCEDURE ONCE         11/09/22 0602    11/09/22 0600  Basic Metabolic Panel  Morning Draw,   Status:  Canceled         11/08/22 2110 11/09/22 0600  CBC Auto Differential  Morning Draw         11/08/22 2110 11/09/22 0600  Protime-INR  Morning Draw         11/08/22 2110 11/09/22 0600  Renal Function Panel  Morning Draw,   Status:  Canceled         11/09/22 0048    11/09/22 0600  Magnesium  Morning Draw         11/09/22 0048    11/09/22 0600  Troponin  Every 6 Hours       11/09/22 0049    11/09/22 0600  Cortisol  Morning Draw         11/09/22 0058    11/09/22 0600  Comprehensive Metabolic Panel  Morning Draw         11/09/22 0118    11/09/22 0600  Phosphorus  Morning Draw         11/09/22 0118    11/09/22 0230  insulin glargine (LANTUS, SEMGLEE) injection 25 Units  Every 12 Hours Scheduled         11/09/22 0130    11/09/22 0200  dexamethasone (DECADRON) tablet 1 mg  Once         11/09/22 0104    11/09/22 0131  labetalol (NORMODYNE,TRANDATE) injection 10 mg  Every 6 Hours PRN         11/09/22 0131    11/09/22 0126  cyclobenzaprine (FLEXERIL) tablet 10 mg  Nightly PRN         11/09/22 0129    11/09/22 0126  albuterol (PROVENTIL) nebulizer solution 0.083% 2.5 mg/3mL  Every 6 Hours PRN         11/09/22 0129     11/09/22 0053  Wound Ostomy Eval & Treat  Once         11/09/22 0053    11/09/22 0050  Adult Transthoracic Echo Complete W/ Cont if Necessary Per Protocol  Once         11/09/22 0049    11/09/22 0047  Creatinine, Urine, Random - Urine, Clean Catch  Once         11/09/22 0047    11/09/22 0047  Protein, Urine, Random - Urine, Clean Catch  Once         11/09/22 0047    11/09/22 0047  Urea Nitrogen, Urine - Urine, Clean Catch  Once         11/09/22 0047    11/09/22 0047  Eosinophil Smear - Urine, Urine, Clean Catch  Once         11/09/22 0047    11/09/22 0046  Sodium, Urine, Random - Urine, Clean Catch  Once         11/09/22 0047    11/09/22 0000  Vital Signs  Every 4 Hours       11/08/22 2110 11/08/22 2142  Urinalysis, Microscopic Only - Urine, Clean Catch  Once         11/08/22 2141 11/08/22 2112  sodium chloride 0.9 % flush 10 mL  Every 12 Hours Scheduled         11/08/22 2110 11/08/22 2112  heparin (porcine) 5000 UNIT/ML injection 5,000 Units  Every 12 Hours Scheduled         11/08/22 2110 11/08/22 2111  US Renal Bilateral  1 Time Imaging         11/08/22 2110 11/08/22 2110  Diet Regular; Consistent Carbohydrate  Diet Effective Now         11/08/22 2110 11/08/22 2110  Inpatient Nephrology Consult  Once        Specialty:  Nephrology  Provider:  Daphne Kay MD    11/08/22 2110 11/08/22 2110  Troponin  Once        Comments: Perform Draw 6 Hours After First Troponin in ED      11/08/22 2110 11/08/22 2109  ondansetron (ZOFRAN) injection 4 mg  Every 6 Hours PRN         11/08/22 2110 11/08/22 2109  acetaminophen (TYLENOL) tablet 650 mg  Every 4 Hours PRN        See Hyperspace for full Linked Orders Report.    11/08/22 2110 11/08/22 2109  acetaminophen (TYLENOL) 160 MG/5ML solution 650 mg  Every 4 Hours PRN        See Hyperspace for full Linked Orders Report.    11/08/22 2110 11/08/22 2109  acetaminophen (TYLENOL) suppository 650 mg  Every 4 Hours PRN        See Hyperspace for full  Linked Orders Report.    11/08/22 2110 11/08/22 2109  Place Sequential Compression Device  Once         11/08/22 2110 11/08/22 2109  Maintain Sequential Compression Device  Continuous         11/08/22 2110 11/08/22 2109  Telemetry - Maintain IV Access  Continuous         11/08/22 2110 11/08/22 2109  Continuous Cardiac Monitoring  Continuous        Comments: Follow Standing Orders As Outlined in Process Instructions (Open Order Report to View Full Instructions)    11/08/22 2110 11/08/22 2109  May Be Off Telemetry for Tests  Continuous         11/08/22 2110 11/08/22 2109  Notify Provider if ACLS Protocol Activated  Until Discontinued         11/08/22 2110 11/08/22 2109  Pulse Oximetry, Continuous  Continuous         11/08/22 2110 11/08/22 2108  Telemetry - Pulse Oximetry  Continuous PRN,   Status:  Canceled      Comments: If Patient Develops Unresponsiveness, Acute Dyspnea, Cyanosis or Suspected Hypoxemia Start Continuous Pulse Ox Monitoring, Apply Oxygen & Notify Provider    11/08/22 2110 11/08/22 2108  nitroglycerin (NITROSTAT) SL tablet 0.4 mg  Every 5 Minutes PRN         11/08/22 2110 11/08/22 2108  Inpatient Admission  Once         11/08/22 2107 11/08/22 2108  Cardiac Monitoring  Continuous,   Status:  Canceled        Comments: Follow Standing Orders As Outlined in Process Instructions (Open Order Report to View Full Instructions)    11/08/22 2107 11/08/22 2108  Intake & Output  Every Shift       11/08/22 2110 11/08/22 2108  Weigh Patient  Once         11/08/22 2110 11/08/22 2108  Oxygen Therapy- Nasal Cannula; Titrate for SPO2: 90% - 95%  Continuous         11/08/22 2110 11/08/22 2108  Insert Peripheral IV  Once         11/08/22 2110 11/08/22 2108  Saline Lock & Maintain IV Access  Continuous,   Status:  Canceled         11/08/22 2110 11/08/22 2108  Do NOT Hold Basal or Correction Scale Insulin When Patient is NPO, Hold Scheduled Mealtime (Bolus) Insulin if NPO   Continuous         11/08/22 2110 11/08/22 2108  Code Status and Medical Interventions:  Continuous         11/08/22 2110 11/08/22 2107  sodium chloride 0.9 % flush 10 mL  As Needed         11/08/22 2110 11/08/22 2107  dextrose (GLUTOSE) oral gel 15 g  Every 15 Minutes PRN         11/08/22 2110 11/08/22 2107  dextrose (D50W) (25 g/50 mL) IV injection 25 g  Every 15 Minutes PRN         11/08/22 2110 11/08/22 2107  glucagon (human recombinant) (GLUCAGEN DIAGNOSTIC) injection 1 mg  Every 15 Minutes PRN         11/08/22 2110 11/08/22 2058  furosemide (LASIX) injection 80 mg  Once         11/08/22 2056 11/08/22 2056  LHA (on-call MD unless specified) Details  Once        Specialty:  Hospitalist  Provider:  Ryland Hernandez MD    11/08/22 2056 11/08/22 1902  ECG 12 Lead Other; edema  STAT         11/08/22 1901    11/08/22 1901  BNP  Once         11/08/22 1901    11/08/22 1901  Troponin  Once         11/08/22 1901    11/08/22 1901  CBC & Differential  Once         11/08/22 1901 11/08/22 1901  Comprehensive Metabolic Panel  Once         11/08/22 1901 11/08/22 1901  Urinalysis With Microscopic If Indicated (No Culture) - Urine, Clean Catch  Once         11/08/22 1901 11/08/22 1901  XR Chest 2 View  1 Time Imaging         11/08/22 1901 11/08/22 1901  CBC Auto Differential  PROCEDURE ONCE         11/08/22 1901    11/08/22 1900  [place] Draw  Once         11/08/22 1901 11/08/22 1900  Green Top (Gel)  PROCEDURE ONCE         11/08/22 1901    11/08/22 1900  Lavender Top  PROCEDURE ONCE         11/08/22 1901 11/08/22 1900  Gold Top - SST  PROCEDURE ONCE         11/08/22 1901 11/08/22 1900  Light Blue Top  PROCEDURE ONCE         11/08/22 1901    Unscheduled  Follow Hypoglycemia Standing Orders For Blood Glucose <70 & Notify Provider of Treatment  As Needed      Comments: Follow Hypoglycemia Orders As Outlined in Process Instructions (Open Order Report to View Full Instructions)  Notify  Provider Any Time Hypoglycemia Treatment is Administered    22    Unscheduled  Oxygen Therapy- Nasal Cannula; Titrate for SPO2: 90% - 95%  Continuous PRN      Comments: If Patient Develops Unresponsiveness, Acute Dyspnea, Cyanosis or Suspected Hypoxemia Start Continuous Pulse Ox Monitoring, Apply Oxygen & Notify Provider    22    Unscheduled  ECG 12 Lead  As Needed      Comments: Nurse to Release if Patient Expericences Acute Chest Pain or Dysrhythmias    22    Unscheduled  Potassium  As Needed      Comments: For Ventricular Arrhythmias      22    Unscheduled  Magnesium  As Needed      Comments: For Ventricular Arrhythmias      22    Unscheduled  Troponin  As Needed      Comments: For Chest Pain      22    Unscheduled  Blood Gas, Arterial -  As Needed      Comments: Draw for Acute Dyspnea, Cyanosis, Suspected Hypoxemia or UnresponsivenessNotify Provider of Results      22    Unscheduled  ACLS Protocol For Life Threatening Dysrhythmias (Unless Code Status Indicates Otherwise)  As Needed       22    Unscheduled  Up With Assistance  As Needed       22    --  bumetanide (BUMEX) 2 MG tablet  2 Times Daily         224    --  Timolol Maleate, Once-Daily, 0.5 % solution  2 Times Daily         22 014    --  Neomycin-Polymyxin-Dexameth 0.1 % ointment  2 Times Daily         22                   Consult Notes (last 48 hours)      Daphne Kay MD at 22 0922            [facility] Consult Note      Patient Name: Kelly Pack  : 1970  MRN: [id number]  Primary Care Physician:  Mattie Freeman, APRN  Referring Physician: Ryland Hernandez MD  Date of admission: 2022    Subjective     Reason for Consult:  BAN     HPI:   Kelly Pack is a 52 y.o. female known to have history of longstanding diabetes mellitus type 2 that was uncontrolled for many years,  history of hypertension, history of hyperlipidemia, history of noncompliance whom I have been following for chronic kidney disease stage III with progressive decline over the past year.  I saw the patient yesterday in the clinic and at that time patient was complaining of bilateral lower extremity edema,  mild shortness of breath and not feeling well.  Patient was started on Bumex 1 mg twice daily 1 week ago and increased to 2 mg twice daily the day of her admission with no great improvement of her lower extremity edema.  Her blood pressure in the clinic was 210 systolic.  She has been struggling poor blood pressure control for the past couple of months.  Creatinine around 1.3 in 2021 up to 1.5-1.7 earlier this year and then is up to 3.45 on 10/17/2022.  Patient does have nephrotic range proteinuria with protein to creatinine ratio of 12.5 g/g checked on 6/2/2022.  Serology work-up was negative including YADIRA, ANCA, complement level, SPEP immunofixation without GENET 2R.  She was advised to go to the hospital last night because of hypertensive emergency.  In ER she was noted to have blood pressure 161/85.  Creatinine 3.93, sodium 142, potassium 3.6, bicarb 22.6 chloride 107 albumin 2.7.    Patient was seen this morning.  Laying in bed comfortably.  Complaining of bilateral extremity edema and shortness of breath.  Also noted presence of pain at the left inner thigh denies any nausea or vomiting.  No fever no chills.  No NSAID use.        Review of Systems:   14 point review of systems is otherwise negative except for mentioned above on HPI    Personal History     Past Medical History:   Diagnosis Date   • Albuminuria    • Allergic     Seasonal, grass, cats and some dogs   • Allergic rhinitis    • Asthma     allergy triggered   • Bell's palsy    • Blood glucose elevated    • Cataract     Had surgery on both eyes   • Cholelithiasis     Removed. Have bile reflux/ vomiting   • Diabetes mellitus with albuminuria (HCC)    •  Drug therapy    • Elevated blood pressure reading without diagnosis of hypertension    • Endometrial cancer (HCC)    • HL (hearing loss)     In L ear   • Hyperlipidemia    • Hypertension    • Low back pain    • Migraine    • Obesity    • Renal insufficiency    • Right otitis media    • Type II diabetes mellitus (HCC)    • Visual impairment     Diabetic retinopathy. Oil in L eye due to retina detachment s.   • Vitamin D deficiency        Past Surgical History:   Procedure Laterality Date   • CATARACT EXTRACTION     • CHOLECYSTECTOMY     • EYE SURGERY      NOV 2020   • HYSTERECTOMY      due to cancer   • OOPHORECTOMY     • VITRECTOMY PARS PLANA Left 1/28/2020    Procedure: 25G VITRECTOMY-ENDO LASER;  Surgeon: Lazarus, Howard S., MD;  Location: [facility] MAIN OR;  Service: Ophthalmology       Family History: family history includes Asthma in her maternal grandfather; Breast cancer in her mother; Cancer in her mother; Diabetes in her father; Hearing loss in her father; Hyperlipidemia in her father; Hypertension in her father.    Social History:  reports that she has never smoked. She has never used smokeless tobacco. She reports that she does not currently use alcohol. She reports that she does not use drugs.    Home Medications:  Prior to Admission medications    Medication Sig Start Date End Date Taking? Authorizing Provider   albuterol sulfate  (90 Base) MCG/ACT inhaler Inhale 2 puffs Every 6 (Six) Hours As Needed for Wheezing or Shortness of Air. 9/6/22  Yes Mattie Freeman APRN   benzonatate (Tessalon Perles) 100 MG capsule Take 1 capsule by mouth 3 (Three) Times a Day As Needed for Cough. 4/8/21  Yes Abel Grover MD   bumetanide (BUMEX) 2 MG tablet Take 1 tablet by mouth 2 (Two) Times a Day.   Yes Provider, MD Mia   cyclobenzaprine (FLEXERIL) 10 MG tablet Take 1 tablet by mouth At Night As Needed for Muscle Spasms.  Patient taking differently: Take 1 tablet by mouth Every Night. Headaches  per pt 10/20/22  Yes Mattie Freeman APRN   fluticasone (FLONASE) 50 MCG/ACT nasal spray 2 sprays by Each Nare route Daily.  Patient taking differently: 2 sprays by Each Nare route As Needed. 9/14/21  Yes Mattie Freeman APRN   gabapentin (Neurontin) 100 MG capsule Take 1 capsule by mouth 3 (Three) Times a Day. 5/16/22 5/16/23 Yes Hilda Morton MD   hydrALAZINE (APRESOLINE) 50 MG tablet Take 1 tablet by mouth 3 (Three) Times a Day.  Patient taking differently: Take 2 tablets by mouth 3 (Three) Times a Day. 9/16/22  Yes Mattie Freeman APRN   Neomycin-Polymyxin-Dexameth 0.1 % ointment Apply 1 application to eye(s) as directed by provider 2 (Two) Times a Day. Neomycin-polymyxin b sulfate and dexamethasone opth ointment   Yes ProviderMia MD   simvastatin (ZOCOR) 20 MG tablet Take 1 tablet by mouth Daily. 5/5/22  Yes Eli Machado APRN   Timolol Maleate, Once-Daily, 0.5 % solution Apply 1 drop to eye(s) as directed by provider 2 (Two) Times a Day. L eye   Yes ProviderMia MD   vitamin D (ERGOCALCIFEROL) 1.25 MG (95681 UT) capsule capsule Take 1 capsule by mouth 2 (Two) Times a Week. 10/20/22  Yes Hilda Morton MD   Continuous Blood Gluc Sensor (FreeStyle Zane 2 Sensor) misc 1 each by Other route Every 14 (Fourteen) Days. 5/5/22   Eli Machado APRN   Dulaglutide 1.5 MG/0.5ML solution pen-injector Inject 1.5 mg under the skin into the appropriate area as directed 1 (One) Time Per Week. 5/5/22   Eli Machado APRN   glucose blood (Accu-Chek Pascale Plus) test strip TEST BLOOD SUGAR TWICE DAILY 12/3/20   Hilda Morton MD   Insulin Glargine, 1 Unit Dial, (Toujeo SoloStar) 300 UNIT/ML solution pen-injector injection INJECT 100 UNITS UNDER THE SKIN INTO THE APPROPRIATE AREA AS DIRECTED ONE TIME PER WEEK 11/3/22   Hilda Morton MD   insulin lispro (humaLOG) 100 UNIT/ML injection Inject 14 Units under the skin into the appropriate area as directed 3 (Three) Times a Day Before Meals.  5/5/22   Eli Machado APRN   Insulin Pen Needle 32G X 4 MM misc 1 each 5 (Five) Times a Day. 11/3/21   Eli Machado APRN   ondansetron (Zofran) 4 MG tablet Take 1 tablet by mouth Every 8 (Eight) Hours As Needed for Nausea or Vomiting. 6/3/22   Mattie Freeman APRN       Allergies:  No Known Allergies    Objective     Vitals:   Temp:  [97.6 °F (36.4 °C)-98.3 °F (36.8 °C)] 98.3 °F (36.8 °C)  Heart Rate:  [] 88  Resp:  [18-20] 18  BP: (150-175)/(79-95) 153/79    Intake/Output Summary (Last 24 hours) at 11/9/2022 0922  Last data filed at 11/9/2022 0600  Gross per 24 hour   Intake 240 ml   Output 1000 ml   Net -760 ml       Physical Exam:   Constitutional: Comfortable, ill-looking, facial swelling  HEENT: Sclera anicteric, no conjunctival injection, left eye covered  Neck: Supple, no thyromegaly, no lymphadenopathy, trachea at midline, no JVD  Respiratory: Clear to auscultation bilaterally, nonlabored respiration  Cardiovascular: RRR, no murmurs, no rubs or gallops, no carotid bruit  Gastrointestinal: Positive bowel sounds, abdomen is soft, nontender and nondistended  : No palpable bladder.  Abdominal wall edema  Musculoskeletal: 2+Bilateral lower extremity edema up to the thighs edema, no clubbing or cyanosis  Psychiatric: Appropriate affect, cooperative  Neurologic: Oriented x3, moving all extremities, normal speech and mental status  Skin: Warm and dry       Scheduled Meds:     atorvastatin, 10 mg, Oral, Daily  bumetanide, 2 mg, Intravenous, Q12H  carvedilol, 12.5 mg, Oral, BID With Meals  fluticasone, 2 spray, Each Nare, Daily  gabapentin, 100 mg, Oral, TID  heparin (porcine), 5,000 Units, Subcutaneous, Q12H  hydrALAZINE, 100 mg, Oral, TID  insulin glargine, 25 Units, Subcutaneous, Q12H  insulin lispro, 0-14 Units, Subcutaneous, TID AC  neomycin-polymyxin-dexamethamethasone, , Left Eye, BID  sodium chloride, 10 mL, Intravenous, Q12H  timolol, 1 drop, Left Eye, Q12H      IV Meds:        Results  Reviewed:   I have personally reviewed the results from the time of this admission to 11/9/2022 09:22 EST     Lab Results   Component Value Date    GLUCOSE 77 11/09/2022    CALCIUM 8.0 (L) 11/09/2022     11/09/2022    K 3.7 11/09/2022    CO2 24.8 11/09/2022     11/09/2022    BUN 44 (H) 11/09/2022    CREATININE 4.19 (H) 11/09/2022    EGFRIFAFRI 52 (L) 10/20/2021    EGFRIFNONA 45 (L) 10/20/2021    BCR 10.5 11/09/2022    ANIONGAP 10.2 11/09/2022      Lab Results   Component Value Date    MG 2.1 11/09/2022    PHOS 5.4 (H) 11/09/2022    ALBUMIN 2.50 (L) 11/09/2022           Assessment / Plan     ASSESSMENT:  1. Acute kidney injury on top of chronic kidney disease stage III with progressive decline in kidney function in the past year.  Etiology likely secondary to uncontrolled diabetes and hypertension.  Serological work-up has been negative including YADIRA, ANCA, complement level, SPEP and immunofixation in addition to GENET 2R.  Urinalysis no RBC not in favor of any active nephritic pathology.  Patient is nephrotic with  urine protein more  than 12 g/g  2. Hypertensive emergency: Blood pressure is better controlled now.  Suspect noncompliance with medication therapy.  3. Anasarca secondary to nephrotic syndrome/hypoalbuminemia  4. Anemia of CKD  5. Type 2 diabetes mellitus with CKD.  Previously uncontrolled last A1c of 8.8  6. History of hypothyroidism  7. Bilateral lower extremity edema secondary to hypoalbuminemia/possible sleep apnea        PLAN:  · Her blood pressure seems to be better controlled after resuming hydralazine.  Patient noted in the clinic that she has been missing some doses   · We will switch Bumex oral to IV 2 mg twice daily  · We will check 24-hour urine collection for protein  · Continue hydralazine 100 mg 3 times daily  · We will add carvedilol 12.5 mg twice daily  · Holding on ACE and ARB given worsening kidney function  · Check renal ultrasound.  · No indication for dialysis today with  patient seems to be heading toward dialysis.  · Venous Doppler bilateral lower extremities to rule out DVT.  · Plan to proceed with a kidney biopsy once her blood pressure is well controlled.      Thank you for involving us in the care of Kelly Pack.  Please feel free to call with any questions.    Daphne Kay MD  11/09/22  09:22 [facility]    [facility]  [phone]      Dictated using Dragon dictation software    Electronically signed by Daphne Kay MD at 11/09/22 0924         Dez Frederick, RN   Registered Nurse  Wound Care  Nursing Note     Addendum  Date of Service:  11/09/22 0902  Creation Time:  11/09/22 0935     Wound/Ostomy: Consult received regarding skin issue in the rt Heel. Patient alert, oriented, able to repositioning, hx of uncontrolled Diabetes Mellitus, upon assessment is observed full-thickens skin loss in the rt Heel, kelli-wound is very dry, excoriate, yellow but not exudate, Patient stated that she  had some problem with her  shoe some time ago, paint with betadine is ordered.  In addition we can see bilateral lower leg/feet with swollen appearance, dry, red and shiny skin, no open area, Ammonium lactate 12% is ordered too    Please re-consult for any additional need.        Revision History

## 2022-11-09 NOTE — ED PROVIDER NOTES
EMERGENCY DEPARTMENT ENCOUNTER    Room Number:  01/01  Date of encounter:  11/8/2022  PCP: Mattie Freeman APRN  Historian: Patient  Full history not obtainable due to: None    HPI:  Chief Complaint: SOA    Context: Kelly Pack is a 52 y.o. female with a PMH significant for type 2 diabetes, renal insufficiency, endometrial cancer, hyperlipidemia, hypertension who presents to the ED c/o elevated blood pressure readings, body wide edema for the past several weeks.  States that she has been experiencing exertional dyspnea as well.  She went to her nephrology office today and was encouraged to come here for admission because her blood pressure was starting to spike.  She reports a 220/180 blood pressure at the nephrologist office today.  She denies fever, chills, cough, chest pain at any point since symptoms began.      MEDICAL RECORD REVIEW:    Upon review of the medical record it appears the patient was evaluated earlier today in the office with her nephrologist for BAN, hypertension, diabetes      PAST MEDICAL HISTORY    Active Ambulatory Problems     Diagnosis Date Noted   • Hyperlipidemia 09/17/2019   • Allergic rhinitis 09/17/2019   • Albuminuria 09/17/2019   • Asthma 04/08/2021   • Essential hypertension 04/08/2021   • Uncontrolled type 2 diabetes mellitus with hyperglycemia (HCC) 04/08/2021     Resolved Ambulatory Problems     Diagnosis Date Noted   • No Resolved Ambulatory Problems     Past Medical History:   Diagnosis Date   • Allergic    • Bell's palsy    • Blood glucose elevated    • Cataract    • Cholelithiasis    • Diabetes mellitus with albuminuria (HCC)    • Drug therapy    • Elevated blood pressure reading without diagnosis of hypertension    • Endometrial cancer (HCC)    • HL (hearing loss)    • Hypertension    • Low back pain    • Migraine    • Obesity    • Renal insufficiency    • Right otitis media    • Type II diabetes mellitus (HCC)    • Visual impairment    • Vitamin D deficiency           PAST SURGICAL HISTORY  Past Surgical History:   Procedure Laterality Date   • CATARACT EXTRACTION     • CHOLECYSTECTOMY     • EYE SURGERY      NOV 2020   • HYSTERECTOMY      due to cancer   • OOPHORECTOMY     • VITRECTOMY PARS PLANA Left 1/28/2020    Procedure: 25G VITRECTOMY-ENDO LASER;  Surgeon: Lazarus, Howard S., MD;  Location: [facility] MAIN OR;  Service: Ophthalmology         FAMILY HISTORY  Family History   Problem Relation Age of Onset   • Breast cancer Mother    • Cancer Mother    • Diabetes Father    • Hyperlipidemia Father    • Hypertension Father    • Hearing loss Father    • Asthma Maternal Grandfather          SOCIAL HISTORY  Social History     Socioeconomic History   • Marital status: Unknown   Tobacco Use   • Smoking status: Never   • Smokeless tobacco: Never   Substance and Sexual Activity   • Alcohol use: Not Currently     Comment: rarely   • Drug use: No   • Sexual activity: Not Currently     Partners: Male     Birth control/protection: Condom         ALLERGIES  Patient has no known allergies.        REVIEW OF SYSTEMS    All systems reviewed and marked as negative except as listed in HPI     PHYSICAL EXAM    I have reviewed the triage vital signs and nursing notes.    ED Triage Vitals [11/08/22 1856]   Temp Heart Rate Resp BP SpO2   98.2 °F (36.8 °C) 104 20 161/85 98 %      Temp src Heart Rate Source Patient Position BP Location FiO2 (%)   Tympanic -- -- -- --       Physical Exam  Constitutional:       General: She is not in acute distress.     Appearance: She is well-developed.   HENT:      Head: Normocephalic and atraumatic.   Eyes:      General: No scleral icterus.     Conjunctiva/sclera: Conjunctivae normal.   Neck:      Trachea: No tracheal deviation.   Cardiovascular:      Rate and Rhythm: Normal rate and regular rhythm.      Comments: Diffuse edema extending from the lower legs into the thighs bilaterally.  No warmth or erythema.  Pulmonary:      Effort: Pulmonary effort is normal.       Breath sounds: Normal breath sounds.      Comments: Mildly increased work of breathing at rest.  Abdominal:      Palpations: Abdomen is soft.      Tenderness: There is no abdominal tenderness.   Musculoskeletal:         General: No deformity.      Cervical back: Normal range of motion.   Lymphadenopathy:      Cervical: No cervical adenopathy.   Skin:     General: Skin is warm and dry.   Neurological:      Mental Status: She is alert and oriented to person, place, and time.   Psychiatric:         Behavior: Behavior normal.         Vital signs and nursing notes reviewed.            LAB RESULTS  Recent Results (from the past 24 hour(s))   ECG 12 Lead Other; edema    Collection Time: 11/08/22  7:29 PM   Result Value Ref Range    QT Interval 389 ms   BNP    Collection Time: 11/08/22  7:38 PM    Specimen: Blood   Result Value Ref Range    proBNP 8,826.0 (H) 0.0 - 900.0 pg/mL   Troponin    Collection Time: 11/08/22  7:38 PM    Specimen: Blood   Result Value Ref Range    Troponin T 0.058 (C) 0.000 - 0.030 ng/mL   Comprehensive Metabolic Panel    Collection Time: 11/08/22  7:38 PM    Specimen: Blood   Result Value Ref Range    Glucose 135 (H) 65 - 99 mg/dL    BUN 45 (H) 6 - 20 mg/dL    Creatinine 3.93 (H) 0.57 - 1.00 mg/dL    Sodium 142 136 - 145 mmol/L    Potassium 3.6 3.5 - 5.2 mmol/L    Chloride 107 98 - 107 mmol/L    CO2 22.6 22.0 - 29.0 mmol/L    Calcium 8.4 (L) 8.6 - 10.5 mg/dL    Total Protein 6.2 6.0 - 8.5 g/dL    Albumin 2.70 (L) 3.50 - 5.20 g/dL    ALT (SGPT) 17 1 - 33 U/L    AST (SGOT) 18 1 - 32 U/L    Alkaline Phosphatase 138 (H) 39 - 117 U/L    Total Bilirubin <0.2 0.0 - 1.2 mg/dL    Globulin 3.5 gm/dL    A/G Ratio 0.8 g/dL    BUN/Creatinine Ratio 11.5 7.0 - 25.0    Anion Gap 12.4 5.0 - 15.0 mmol/L    eGFR 13.3 (L) >60.0 mL/min/1.73   Green Top (Gel)    Collection Time: 11/08/22  7:38 PM   Result Value Ref Range    Extra Tube Hold for add-ons.    Lavender Top    Collection Time: 11/08/22  7:38 PM   Result Value  Ref Range    Extra Tube hold for add-on    Gold Top - SST    Collection Time: 11/08/22  7:38 PM   Result Value Ref Range    Extra Tube Hold for add-ons.    Light Blue Top    Collection Time: 11/08/22  7:38 PM   Result Value Ref Range    Extra Tube Hold for add-ons.    CBC Auto Differential    Collection Time: 11/08/22  7:38 PM    Specimen: Blood   Result Value Ref Range    WBC 9.80 3.40 - 10.80 10*3/mm3    RBC 3.34 (L) 3.77 - 5.28 10*6/mm3    Hemoglobin 9.9 (L) 12.0 - 15.9 g/dL    Hematocrit 30.5 (L) 34.0 - 46.6 %    MCV 91.3 79.0 - 97.0 fL    MCH 29.6 26.6 - 33.0 pg    MCHC 32.5 31.5 - 35.7 g/dL    RDW 12.8 12.3 - 15.4 %    RDW-SD 42.6 37.0 - 54.0 fl    MPV 9.3 6.0 - 12.0 fL    Platelets 428 140 - 450 10*3/mm3    Neutrophil % 65.1 42.7 - 76.0 %    Lymphocyte % 19.0 (L) 19.6 - 45.3 %    Monocyte % 10.4 5.0 - 12.0 %    Eosinophil % 3.9 0.3 - 6.2 %    Basophil % 0.8 0.0 - 1.5 %    Immature Grans % 0.8 (H) 0.0 - 0.5 %    Neutrophils, Absolute 6.38 1.70 - 7.00 10*3/mm3    Lymphocytes, Absolute 1.86 0.70 - 3.10 10*3/mm3    Monocytes, Absolute 1.02 (H) 0.10 - 0.90 10*3/mm3    Eosinophils, Absolute 0.38 0.00 - 0.40 10*3/mm3    Basophils, Absolute 0.08 0.00 - 0.20 10*3/mm3    Immature Grans, Absolute 0.08 (H) 0.00 - 0.05 10*3/mm3    nRBC 0.0 0.0 - 0.2 /100 WBC       Ordered the above labs and independently reviewed the results.        RADIOLOGY  XR Chest 2 View    Result Date: 11/8/2022  XR CHEST 2 VW-  11/08/2022  HISTORY: Possible edema.  Heart size is at the upper limits of normal. There is blunting of the right costophrenic sulcus on the PA film which may be related to pleural scarring or minimal pleural effusion. This finding is now well seen on the lateral view. There may be some minimal vascular congestion but no definite pulmonary edema or focal infiltrates are seen. No pneumothorax is seen.      1. Borderline cardiomegaly. 2. Mild vascular congestion. 3. Blunting of the right costophrenic sulcus consistent with  pleural scarring or minimal pleural effusion.  This report was finalized on 11/8/2022 7:55 PM by Dr. Jae Cheema M.D.        I ordered the above noted radiological studies. Independently reviewed by me and discussed with radiologist.  See dictation above for official radiology interpretation.      PROCEDURES    Procedures        MEDICATIONS GIVEN IN ER    Medications   furosemide (LASIX) injection 80 mg (has no administration in time range)         PROGRESS, DATA ANALYSIS, CONSULTS, AND MEDICAL DECISION MAKING    All labs have been independently reviewed by me.  All radiology studies have been reviewed by me.   EKG's independently reviewed by me.  Discussion below represents my analysis of pertinent findings related to patient's condition, differential diagnosis, treatment plan and final disposition.    DIFFERENTIAL DIAGNOSIS INCLUDE BUT NOT LIMITED TO:     Differential diagnosis includes but is not limited to:  -COVID  -CHF  -acute coronary syndrome  -pulmonary embolism  -pneumothorax  -pneumonia  -asthma/COPD  -deconditioning  -anemia  -anxiety     ED Course as of 11/08/22 2107 Tue Nov 08, 2022 2040 Creatinine(!): 3.93 [DC]   2040 Troponin T(!!): 0.058 [DC]   2040 proBNP(!): 8,826.0 [DC]   2041 I viewed CXR on PACS system.  My interpretation is no pneumothorax. [DC]   2106 I spoke with ANDREA Pretty with [facility] at this time regarding the patient.  I discussed work-up, results, concerns.  I discussed the consulting provider's desire for tele admit to MD David     [DC]      ED Course User Index  [DC] Dusty Rollins PA       AS OF 21:07 EST VITALS:    BP - 161/85  HR - 104  TEMP - 98.2 °F (36.8 °C) (Tympanic)  02 SATS - 98%    2107 I rechecked the patient.  I discussed the patient's labs, radiology findings (including all incidental findings), diagnosis, and plan for admission.  All questions answered.      DIAGNOSIS  Final diagnoses:   BAN (acute kidney injury) (HCC)   Hypervolemia, unspecified  hypervolemia type   Hypertension, unspecified type   Elevated troponin         DISPOSITION  Admit    Pt masked in first look. I wore a surgical mask throughout my encounters with the pt. I performed hand hygiene on entry into the pt room and upon exit.     Dictated utilizing Dragon dictation     Note Disclaimer: At [facility], we believe that sharing information builds trust and better relationships. You are receiving this note because you recently visited [facility]. It is possible you will see health information before a provider has talked with you about it. This kind of information can be easy to misunderstand. To help you fully understand what it means for your health, we urge you to discuss this note with your provider.      Dusty Rollins PA  11/08/22 210

## 2022-11-09 NOTE — ED NOTES
Nursing report ED to floor  Kelly Pack  52 y.o.  female    HPI :   Chief Complaint   Patient presents with    Edema    Hypertension       Admitting doctor:   Ryland Hernandez MD    Admitting diagnosis:   The primary encounter diagnosis was BAN (acute kidney injury) (HCC). Diagnoses of Hypervolemia, unspecified hypervolemia type, Hypertension, unspecified type, and Elevated troponin were also pertinent to this visit.    Code status:   Current Code Status       Date Active Code Status Order ID Comments User Context       11/8/2022 2110 CPR (Attempt to Resuscitate) [id number]  Maria De Jesus Pierre, APRN ED        Question Answer    Code Status (Patient has no pulse and is not breathing) CPR (Attempt to Resuscitate)    Medical Interventions (Patient has pulse or is breathing) Full Support                    Allergies:   Patient has no known allergies.    Isolation:   No active isolations    Intake and Output  No intake or output data in the 24 hours ending 11/08/22 2204    Weight:   There were no vitals filed for this visit.    Most recent vitals:   Vitals:    11/08/22 1856 11/08/22 2142   BP: 161/85 150/92   Pulse: 104 97   Resp: 20    Temp: 98.2 °F (36.8 °C)    TempSrc: Tympanic    SpO2: 98% 97%       Active LDAs/IV Access:   Lines, Drains & Airways       Active LDAs       Name Placement date Placement time Site Days    Peripheral IV 11/08/22 2104 Right Antecubital 11/08/22 2104  Antecubital  less than 1                    Labs (abnormal labs have a star):   Labs Reviewed   BNP (IN-HOUSE) - Abnormal; Notable for the following components:       Result Value    proBNP 8,826.0 (*)     All other components within normal limits    Narrative:     Among patients with dyspnea, NT-proBNP is highly sensitive for the detection of acute congestive heart failure. In addition NT-proBNP of <300 pg/ml effectively rules out acute congestive heart failure with 99% negative predictive value.    Results may be falsely decreased if  patient taking Biotin.     TROPONIN (IN-HOUSE) - Abnormal; Notable for the following components:    Troponin T 0.058 (*)     All other components within normal limits    Narrative:     Troponin T Reference Range:  <= 0.03 ng/mL-   Negative for AMI  >0.03 ng/mL-     Abnormal for myocardial necrosis.  Clinicians would have to utilize clinical acumen, EKG, Troponin and serial changes to determine if it is an Acute Myocardial Infarction or myocardial injury due to an underlying chronic condition.       Results may be falsely decreased if patient taking Biotin.     COMPREHENSIVE METABOLIC PANEL - Abnormal; Notable for the following components:    Glucose 135 (*)     BUN 45 (*)     Creatinine 3.93 (*)     Calcium 8.4 (*)     Albumin 2.70 (*)     Alkaline Phosphatase 138 (*)     eGFR 13.3 (*)     All other components within normal limits    Narrative:     GFR Normal >60  Chronic Kidney Disease <60  Kidney Failure <15     URINALYSIS W/ MICROSCOPIC IF INDICATED (NO CULTURE) - Abnormal; Notable for the following components:    Glucose,  mg/dL (1+) (*)     Blood, UA Trace (*)     Protein, UA >=300 mg/dL (3+) (*)     All other components within normal limits   CBC WITH AUTO DIFFERENTIAL - Abnormal; Notable for the following components:    RBC 3.34 (*)     Hemoglobin 9.9 (*)     Hematocrit 30.5 (*)     Lymphocyte % 19.0 (*)     Immature Grans % 0.8 (*)     Monocytes, Absolute 1.02 (*)     Immature Grans, Absolute 0.08 (*)     All other components within normal limits   RAINBOW DRAW    Narrative:     The following orders were created for panel order [place] Draw.  Procedure                               Abnormality         Status                     ---------                               -----------         ------                     Green Top (Gel)[[id number]]                                  Final result               Lavender Top[[id number]]                                     Final result               Gold Top -  SST[[id number]]                                   Final result               Light Blue Top[[id number]]                                   Final result                 Please view results for these tests on the individual orders.   TROPONIN (IN-HOUSE)   URINALYSIS, MICROSCOPIC ONLY   POCT GLUCOSE FINGERSTICK   POCT GLUCOSE FINGERSTICK   POCT GLUCOSE FINGERSTICK   CBC AND DIFFERENTIAL    Narrative:     The following orders were created for panel order CBC & Differential.  Procedure                               Abnormality         Status                     ---------                               -----------         ------                     CBC Auto Differential[[id number]]        Abnormal            Final result                 Please view results for these tests on the individual orders.   GREEN TOP   LAVENDER TOP   GOLD TOP - SST   LIGHT BLUE TOP       EKG:   ECG 12 Lead Other; edema   Preliminary Result   HEART RATE= 100  bpm   RR Interval= 600  ms   VA Interval=   ms   P Horizontal Axis=   deg   P Front Axis=   deg   QRSD Interval= 83  ms   QT Interval= 389  ms   QRS Axis= 46  deg   T Wave Axis= 176  deg   - ABNORMAL ECG -   Atrial fibrillation   Nonspecific T abnormalities, lateral leads   Borderline prolonged QT interval   Electronically Signed By:    Date and Time of Study: 2022-11-08 19:29:07          Meds given in ED:   Medications   sodium chloride 0.9 % flush 10 mL (10 mL Intravenous Given 11/8/22 2159)   sodium chloride 0.9 % flush 10 mL (has no administration in time range)   dextrose (GLUTOSE) oral gel 15 g (has no administration in time range)   dextrose (D50W) (25 g/50 mL) IV injection 25 g (has no administration in time range)   glucagon (human recombinant) (GLUCAGEN DIAGNOSTIC) injection 1 mg (has no administration in time range)   nitroglycerin (NITROSTAT) SL tablet 0.4 mg (has no administration in time range)   heparin (porcine) 5000 UNIT/ML injection 5,000 Units (5,000 Units Subcutaneous Given  11/8/22 2146)   acetaminophen (TYLENOL) tablet 650 mg (has no administration in time range)     Or   acetaminophen (TYLENOL) 160 MG/5ML solution 650 mg (has no administration in time range)     Or   acetaminophen (TYLENOL) suppository 650 mg (has no administration in time range)   insulin lispro (ADMELOG) injection 0-7 Units (has no administration in time range)   ondansetron (ZOFRAN) injection 4 mg (has no administration in time range)   furosemide (LASIX) injection 80 mg (80 mg Intravenous Given 11/8/22 2141)       Imaging results:  XR Chest 2 View    Result Date: 11/8/2022  1. Borderline cardiomegaly. 2. Mild vascular congestion. 3. Blunting of the right costophrenic sulcus consistent with pleural scarring or minimal pleural effusion.  This report was finalized on 11/8/2022 7:55 PM by Dr. Jae Cheema M.D.       Ambulatory status:   - Ax1    Social issues:   Social History     Socioeconomic History    Marital status: Unknown   Tobacco Use    Smoking status: Never    Smokeless tobacco: Never   Substance and Sexual Activity    Alcohol use: Not Currently     Comment: rarely    Drug use: No    Sexual activity: Not Currently     Partners: Male     Birth control/protection: Condom       NIH Stroke Scale:         Theodora Spain RN  11/08/22 22:04 EST     Call Theodora #[phone] with any questions

## 2022-11-09 NOTE — PLAN OF CARE
Goal Outcome Evaluation:      Patient was admitted last night with BAN.Alert and oriented.RA.Medicated per MAR.Troponin trending down.Repeat EKG this am. Wound consulted for R heel wound.[place] renal US and ECHO to be completed this am. Urine sample obtained. Nephro consult called.Pt ambulating to BR.NSR on tele.

## 2022-11-09 NOTE — CONSULTS
[facility] Consult Note      Patient Name: Kelly Pack  : 1970  MRN: [id number]  Primary Care Physician:  Mattie Freeman APRN  Referring Physician: Ryland Hernandez MD  Date of admission: 2022    Subjective     Reason for Consult:  BAN     HPI:   Kelly Pack is a 52 y.o. female known to have history of longstanding diabetes mellitus type 2 that was uncontrolled for many years, history of hypertension, history of hyperlipidemia, history of noncompliance whom I have been following for chronic kidney disease stage III with progressive decline over the past year.  I saw the patient yesterday in the clinic and at that time patient was complaining of bilateral lower extremity edema,  mild shortness of breath and not feeling well.  Patient was started on Bumex 1 mg twice daily 1 week ago and increased to 2 mg twice daily the day of her admission with no great improvement of her lower extremity edema.  Her blood pressure in the clinic was 210 systolic.  She has been struggling poor blood pressure control for the past couple of months.  Creatinine around 1.3 in  up to 1.5-1.7 earlier this year and then is up to 3.45 on 10/17/2022.  Patient does have nephrotic range proteinuria with protein to creatinine ratio of 12.5 g/g checked on 2022.  Serology work-up was negative including YADIRA, ANCA, complement level, SPEP immunofixation without GENET 2R.  She was advised to go to the hospital last night because of hypertensive emergency.  In ER she was noted to have blood pressure 161/85.  Creatinine 3.93, sodium 142, potassium 3.6, bicarb 22.6 chloride 107 albumin 2.7.    Patient was seen this morning.  Laying in bed comfortably.  Complaining of bilateral extremity edema and shortness of breath.  Also noted presence of pain at the left inner thigh denies any nausea or vomiting.  No fever no chills.  No NSAID use.        Review of Systems:   14 point review of systems is  otherwise negative except for mentioned above on HPI    Personal History     Past Medical History:   Diagnosis Date   • Albuminuria    • Allergic     Seasonal, grass, cats and some dogs   • Allergic rhinitis    • Asthma     allergy triggered   • Bell's palsy    • Blood glucose elevated    • Cataract     Had surgery on both eyes   • Cholelithiasis     Removed. Have bile reflux/ vomiting   • Diabetes mellitus with albuminuria (HCC)    • Drug therapy    • Elevated blood pressure reading without diagnosis of hypertension    • Endometrial cancer (HCC)    • HL (hearing loss)     In L ear   • Hyperlipidemia    • Hypertension    • Low back pain    • Migraine    • Obesity    • Renal insufficiency    • Right otitis media    • Type II diabetes mellitus (HCC)    • Visual impairment     Diabetic retinopathy. Oil in L eye due to retina detachment s.   • Vitamin D deficiency        Past Surgical History:   Procedure Laterality Date   • CATARACT EXTRACTION     • CHOLECYSTECTOMY     • EYE SURGERY      NOV 2020   • HYSTERECTOMY      due to cancer   • OOPHORECTOMY     • VITRECTOMY PARS PLANA Left 1/28/2020    Procedure: 25G VITRECTOMY-ENDO LASER;  Surgeon: Lazarus, Howard S., MD;  Location: [facility] MAIN OR;  Service: Ophthalmology       Family History: family history includes Asthma in her maternal grandfather; Breast cancer in her mother; Cancer in her mother; Diabetes in her father; Hearing loss in her father; Hyperlipidemia in her father; Hypertension in her father.    Social History:  reports that she has never smoked. She has never used smokeless tobacco. She reports that she does not currently use alcohol. She reports that she does not use drugs.    Home Medications:  Prior to Admission medications    Medication Sig Start Date End Date Taking? Authorizing Provider   albuterol sulfate  (90 Base) MCG/ACT inhaler Inhale 2 puffs Every 6 (Six) Hours As Needed for Wheezing or Shortness of Air. 9/6/22  Yes Mattie Freeman, APRN    benzonatate (Tessalon Perles) 100 MG capsule Take 1 capsule by mouth 3 (Three) Times a Day As Needed for Cough. 4/8/21  Yes Abel Grover MD   bumetanide (BUMEX) 2 MG tablet Take 1 tablet by mouth 2 (Two) Times a Day.   Yes ProviderMia MD   cyclobenzaprine (FLEXERIL) 10 MG tablet Take 1 tablet by mouth At Night As Needed for Muscle Spasms.  Patient taking differently: Take 1 tablet by mouth Every Night. Headaches per pt 10/20/22  Yes Mattie Freeman APRN   fluticasone (FLONASE) 50 MCG/ACT nasal spray 2 sprays by Each Nare route Daily.  Patient taking differently: 2 sprays by Each Nare route As Needed. 9/14/21  Yes Mattie Freeman APRN   gabapentin (Neurontin) 100 MG capsule Take 1 capsule by mouth 3 (Three) Times a Day. 5/16/22 5/16/23 Yes Hilda Morton MD   hydrALAZINE (APRESOLINE) 50 MG tablet Take 1 tablet by mouth 3 (Three) Times a Day.  Patient taking differently: Take 2 tablets by mouth 3 (Three) Times a Day. 9/16/22  Yes Mattie Freeman APRN   Neomycin-Polymyxin-Dexameth 0.1 % ointment Apply 1 application to eye(s) as directed by provider 2 (Two) Times a Day. Neomycin-polymyxin b sulfate and dexamethasone opth ointment   Yes Mia Hong MD   simvastatin (ZOCOR) 20 MG tablet Take 1 tablet by mouth Daily. 5/5/22  Yes Eli Machado APRN   Timolol Maleate, Once-Daily, 0.5 % solution Apply 1 drop to eye(s) as directed by provider 2 (Two) Times a Day. L eye   Yes Mia Hong MD   vitamin D (ERGOCALCIFEROL) 1.25 MG (64504 UT) capsule capsule Take 1 capsule by mouth 2 (Two) Times a Week. 10/20/22  Yes Hilda Morton MD   Continuous Blood Gluc Sensor (FreeStyle Zane 2 Sensor) misc 1 each by Other route Every 14 (Fourteen) Days. 5/5/22   Eli Machado APRN   Dulaglutide 1.5 MG/0.5ML solution pen-injector Inject 1.5 mg under the skin into the appropriate area as directed 1 (One) Time Per Week. 5/5/22   Eli Machado APRN   glucose blood (Accu-Chek Pascale Plus)  test strip TEST BLOOD SUGAR TWICE DAILY 12/3/20   Hilda Morton MD   Insulin Glargine, 1 Unit Dial, (Toujeo SoloStar) 300 UNIT/ML solution pen-injector injection INJECT 100 UNITS UNDER THE SKIN INTO THE APPROPRIATE AREA AS DIRECTED ONE TIME PER WEEK 11/3/22   Hilda Morton MD   insulin lispro (humaLOG) 100 UNIT/ML injection Inject 14 Units under the skin into the appropriate area as directed 3 (Three) Times a Day Before Meals. 5/5/22   Eli Machado APRN   Insulin Pen Needle 32G X 4 MM misc 1 each 5 (Five) Times a Day. 11/3/21   Eli Machado APRN   ondansetron (Zofran) 4 MG tablet Take 1 tablet by mouth Every 8 (Eight) Hours As Needed for Nausea or Vomiting. 6/3/22   Mattie Freeman APRN       Allergies:  No Known Allergies    Objective     Vitals:   Temp:  [97.6 °F (36.4 °C)-98.3 °F (36.8 °C)] 98.3 °F (36.8 °C)  Heart Rate:  [] 88  Resp:  [18-20] 18  BP: (150-175)/(79-95) 153/79    Intake/Output Summary (Last 24 hours) at 11/9/2022 0853  Last data filed at 11/9/2022 0600  Gross per 24 hour   Intake 240 ml   Output 1000 ml   Net -760 ml       Physical Exam:   Constitutional: Comfortable, ill-looking, facial swelling  HEENT: Sclera anicteric, no conjunctival injection, left eye covered  Neck: Supple, no thyromegaly, no lymphadenopathy, trachea at midline, no JVD  Respiratory: Clear to auscultation bilaterally, nonlabored respiration  Cardiovascular: RRR, no murmurs, no rubs or gallops, no carotid bruit  Gastrointestinal: Positive bowel sounds, abdomen is soft, nontender and nondistended  : No palpable bladder.  Abdominal wall edema  Musculoskeletal: 2+Bilateral lower extremity edema up to the thighs edema, no clubbing or cyanosis  Psychiatric: Appropriate affect, cooperative  Neurologic: Oriented x3, moving all extremities, normal speech and mental status  Skin: Warm and dry       Scheduled Meds:     atorvastatin, 10 mg, Oral, Daily  fluticasone, 2 spray, Each Nare, Daily  gabapentin, 100 mg,  Oral, TID  heparin (porcine), 5,000 Units, Subcutaneous, Q12H  hydrALAZINE, 100 mg, Oral, TID  insulin glargine, 25 Units, Subcutaneous, Q12H  insulin lispro, 0-14 Units, Subcutaneous, TID AC  neomycin-polymyxin-dexamethamethasone, , Left Eye, BID  sodium chloride, 10 mL, Intravenous, Q12H  timolol, 1 drop, Left Eye, Q12H      IV Meds:        Results Reviewed:   I have personally reviewed the results from the time of this admission to 11/9/2022 08:53 EST     Lab Results   Component Value Date    GLUCOSE 77 11/09/2022    CALCIUM 8.0 (L) 11/09/2022     11/09/2022    K 3.7 11/09/2022    CO2 24.8 11/09/2022     11/09/2022    BUN 44 (H) 11/09/2022    CREATININE 4.19 (H) 11/09/2022    EGFRIFAFRI 52 (L) 10/20/2021    EGFRIFNONA 45 (L) 10/20/2021    BCR 10.5 11/09/2022    ANIONGAP 10.2 11/09/2022      Lab Results   Component Value Date    MG 2.1 11/09/2022    PHOS 5.4 (H) 11/09/2022    ALBUMIN 2.50 (L) 11/09/2022           Assessment / Plan     ASSESSMENT:  1. Acute kidney injury on top of chronic kidney disease stage III with progressive decline in kidney function in the past year.  Etiology likely secondary to uncontrolled diabetes and hypertension.  Serological work-up has been negative including YADIRA, ANCA, complement level, SPEP and immunofixation in addition to GENET 2R.  Urinalysis no RBC not in favor of any active nephritic pathology.  Patient is nephrotic with  urine protein more  than 12 g/g  2. Hypertensive emergency: Blood pressure is better controlled now.  Suspect noncompliance with medication therapy.  3. Anasarca secondary to nephrotic syndrome/hypoalbuminemia  4. Anemia of CKD  5. Type 2 diabetes mellitus with CKD.  Previously uncontrolled last A1c of 8.8  6. History of hypothyroidism  7. Bilateral lower extremity edema secondary to hypoalbuminemia/possible sleep apnea        PLAN:  · Her blood pressure seems to be better controlled after resuming hydralazine.  Patient noted in the clinic that she  has been missing some doses   · We will switch Bumex oral to IV 2 mg twice daily  · We will check 24-hour urine collection for protein  · Continue hydralazine 100 mg 3 times daily  · We will add carvedilol 12.5 mg twice daily  · Holding on ACE and ARB given worsening kidney function  · Check renal ultrasound.  · No indication for dialysis today with patient seems to be heading toward dialysis.  · Plan to proceed with a kidney biopsy once her blood pressure is well controlled.      Thank you for involving us in the care of Kelly Pack.  Please feel free to call with any questions.    Daphne Kay MD  11/09/22  08:53 [facility]    [facility]  [phone]      Much of this encounter note is an electronic transcription/translation of spoken language to printed text. The electronic translation of spoken language may permit erroneous, or at times, nonsensical words or phrases to be inadvertently transcribed; Although I have reviewed the note for such errors, some may still exist.

## 2022-11-09 NOTE — CASE MANAGEMENT/SOCIAL WORK
Discharge Planning Assessment  [facility]     Patient Name: Kelly Pack  MRN: [id number]  Today's Date: 11/9/2022    Admit Date: 11/8/2022    Plan: Plan home.  CLARENCE Tucker RN   Discharge Needs Assessment     Row Name 11/09/22 0945       Living Environment    People in Home alone    Current Living Arrangements apartment    Primary Care Provided by self    Provides Primary Care For no one    Family Caregiver if Needed friend(s)    Quality of Family Relationships unable to assess    Able to Return to Prior Arrangements yes    Living Arrangement Comments Pt lives alone in a first floor apartment.       Resource/Environmental Concerns    Resource/Environmental Concerns none    Transportation Concerns none       Transition Planning    Patient/Family Anticipates Transition to home    Patient/Family Anticipated Services at Transition none    Transportation Anticipated car, drives self;family or friend will provide       Discharge Needs Assessment    Readmission Within the Last 30 Days no previous admission in last 30 days    Equipment Currently Used at Home bp cuff;glucometer;rollator    Concerns to be Addressed no discharge needs identified;denies needs/concerns at this time    Anticipated Changes Related to Illness none    Equipment Needed After Discharge bp cuff;glucometer;rollator               Discharge Plan     Row Name 11/09/22 0967       Plan    Plan Plan home.  CLARENCE Tucker RN    Patient/Family in Agreement with Plan yes    Plan Comments FACE SHEET VERIFIED.  Spoke with pt at bedside.  Pt's PCP is YOSSI Hollis.  Pt's next of kin is her mother ( Regla Pack [phone]).  Pt lives alone in a first floor apartment.  Pt is independent with ADLs.  Pt has a B/P cuff, glucometer. rollator and scales for home use.  Pt gets her prescriptions at [facility] in [place].  Pt denies any issues affording medications. Pt is not current with .  Pt has not been in SNF.  Pt denies any discharge needs.  Pt  drove self to hospital and will drive self home.  Pt's neighbor will assist pt if needed.  Plan home.  CLARENCE Tucker RN              Continued Care and Services - Admitted Since 11/8/2022    Coordination has not been started for this encounter.     Selected Continued Care - Episodes Includes continued care and service providers with selected services from the active episodes listed below    [facility] Episode start date: 4/29/2022   There are no active outsourced providers for this episode.               Expected Discharge Date and Time     Expected Discharge Date Expected Discharge Time    Nov 14, 2022          Demographic Summary     Row Name 11/09/22 0944       General Information    Admission Type inpatient    Arrived From emergency department    Referral Source admission list    Reason for Consult discharge planning    Preferred Language English               Functional Status     Row Name 11/09/22 0944       Functional Status    Usual Activity Tolerance good    Current Activity Tolerance moderate       Functional Status, IADL    Medications independent    Meal Preparation independent    Housekeeping independent    Laundry independent    Shopping independent       Mental Status    General Appearance WDL WDL               Psychosocial    No documentation.                Abuse/Neglect    No documentation.                Legal    No documentation.                Substance Abuse    No documentation.                Patient Forms    No documentation.                   Cecy Tucker, RN

## 2022-11-09 NOTE — PLAN OF CARE
Problem: Fall Injury Risk  Goal: Absence of Fall and Fall-Related Injury  11/9/2022 1750 by Radha Tran RN  Outcome: Ongoing, Progressing  11/9/2022 1750 by Radha Tran RN  Outcome: Ongoing, Progressing  Intervention: Promote Injury-Free Environment  Recent Flowsheet Documentation  Taken 11/9/2022 1644 by Radha Tran RN  Safety Promotion/Fall Prevention:   fall prevention program maintained   safety round/check completed  Taken 11/9/2022 1510 by Radha Tran RN  Safety Promotion/Fall Prevention:   fall prevention program maintained   safety round/check completed  Taken 11/9/2022 1234 by Radha Tran RN  Safety Promotion/Fall Prevention: patient off unit  Taken 11/9/2022 1030 by Radha Tran RN  Safety Promotion/Fall Prevention: patient off unit  Taken 11/9/2022 0931 by Radha Tran RN  Safety Promotion/Fall Prevention:   fall prevention program maintained   safety round/check completed  Taken 11/9/2022 0800 by Radha Tran RN  Safety Promotion/Fall Prevention:   fall prevention program maintained   safety round/check completed     Problem: Fluid and Electrolyte Imbalance (Acute Kidney Injury/Impairment)  Goal: Fluid and Electrolyte Balance  11/9/2022 1750 by Radha Tran RN  Outcome: Ongoing, Progressing  11/9/2022 1750 by Radha Tran RN  Outcome: Ongoing, Progressing     Problem: Oral Intake Inadequate (Acute Kidney Injury/Impairment)  Goal: Optimal Nutrition Intake  11/9/2022 1750 by Radha Tran RN  Outcome: Ongoing, Progressing  11/9/2022 1750 by Radha Tran RN  Outcome: Ongoing, Progressing     Problem: Renal Function Impairment (Acute Kidney Injury/Impairment)  Goal: Effective Renal Function  11/9/2022 1750 by Radha Tran RN  Outcome: Ongoing, Progressing  11/9/2022 1750 by Radha Tran RN  Outcome: Ongoing, Progressing     Problem: Adult Inpatient Plan of Care  Goal: Plan of Care Review  Outcome: Ongoing,  Progressing  Flowsheets (Taken 11/9/2022 1750)  Progress: no change  Plan of Care Reviewed With: patient  Outcome Evaluation: BLE edema. WOCN rec's noted. Renal US, echo, and BLE complete. 24 hr urine in progress. NSR on monitor.  Goal: Patient-Specific Goal (Individualized)  Outcome: Ongoing, Progressing  Goal: Absence of Hospital-Acquired Illness or Injury  Outcome: Ongoing, Progressing  Intervention: Identify and Manage Fall Risk  Recent Flowsheet Documentation  Taken 11/9/2022 1644 by Radha Tran RN  Safety Promotion/Fall Prevention:   fall prevention program maintained   safety round/check completed  Taken 11/9/2022 1510 by Radha Tran RN  Safety Promotion/Fall Prevention:   fall prevention program maintained   safety round/check completed  Taken 11/9/2022 1234 by Radha Tran RN  Safety Promotion/Fall Prevention: patient off unit  Taken 11/9/2022 1030 by Radha Tran RN  Safety Promotion/Fall Prevention: patient off unit  Taken 11/9/2022 0931 by Radha Tran RN  Safety Promotion/Fall Prevention:   fall prevention program maintained   safety round/check completed  Taken 11/9/2022 0800 by Radha Tran RN  Safety Promotion/Fall Prevention:   fall prevention program maintained   safety round/check completed  Goal: Optimal Comfort and Wellbeing  Outcome: Ongoing, Progressing  Goal: Readiness for Transition of Care  Outcome: Ongoing, Progressing   Goal Outcome Evaluation:  Plan of Care Reviewed With: patient        Progress: no change  Outcome Evaluation: BLE edema. WOCN rec's noted. Renal US, echo, and BLE complete. 24 hr urine in progress. NSR on monitor.

## 2022-11-09 NOTE — CONSULTS
[facility] Consult Note      Patient Name: Kelly Pack  : 1970  MRN: [id number]  Primary Care Physician:  Mattie Freeman APRN  Referring Physician: Ryland Hernandez MD  Date of admission: 2022    Subjective     Reason for Consult:  BAN     HPI:   Kelly Pack is a 52 y.o. female known to have history of longstanding diabetes mellitus type 2 that was uncontrolled for many years, history of hypertension, history of hyperlipidemia, history of noncompliance whom I have been following for chronic kidney disease stage III with progressive decline over the past year.  I saw the patient yesterday in the clinic and at that time patient was complaining of bilateral lower extremity edema,  mild shortness of breath and not feeling well.  Patient was started on Bumex 1 mg twice daily 1 week ago and increased to 2 mg twice daily the day of her admission with no great improvement of her lower extremity edema.  Her blood pressure in the clinic was 210 systolic.  She has been struggling poor blood pressure control for the past couple of months.  Creatinine around 1.3 in  up to 1.5-1.7 earlier this year and then is up to 3.45 on 10/17/2022.  Patient does have nephrotic range proteinuria with protein to creatinine ratio of 12.5 g/g checked on 2022.  Serology work-up was negative including YADIRA, ANCA, complement level, SPEP immunofixation without GENET 2R.  She was advised to go to the hospital last night because of hypertensive emergency.  In ER she was noted to have blood pressure 161/85.  Creatinine 3.93, sodium 142, potassium 3.6, bicarb 22.6 chloride 107 albumin 2.7.    Patient was seen this morning.  Laying in bed comfortably.  Complaining of bilateral extremity edema and shortness of breath.  Also noted presence of pain at the left inner thigh denies any nausea or vomiting.  No fever no chills.  No NSAID use.        Review of Systems:   14 point review of systems is  otherwise negative except for mentioned above on HPI    Personal History     Past Medical History:   Diagnosis Date   • Albuminuria    • Allergic     Seasonal, grass, cats and some dogs   • Allergic rhinitis    • Asthma     allergy triggered   • Bell's palsy    • Blood glucose elevated    • Cataract     Had surgery on both eyes   • Cholelithiasis     Removed. Have bile reflux/ vomiting   • Diabetes mellitus with albuminuria (HCC)    • Drug therapy    • Elevated blood pressure reading without diagnosis of hypertension    • Endometrial cancer (HCC)    • HL (hearing loss)     In L ear   • Hyperlipidemia    • Hypertension    • Low back pain    • Migraine    • Obesity    • Renal insufficiency    • Right otitis media    • Type II diabetes mellitus (HCC)    • Visual impairment     Diabetic retinopathy. Oil in L eye due to retina detachment s.   • Vitamin D deficiency        Past Surgical History:   Procedure Laterality Date   • CATARACT EXTRACTION     • CHOLECYSTECTOMY     • EYE SURGERY      NOV 2020   • HYSTERECTOMY      due to cancer   • OOPHORECTOMY     • VITRECTOMY PARS PLANA Left 1/28/2020    Procedure: 25G VITRECTOMY-ENDO LASER;  Surgeon: Lazarus, Howard S., MD;  Location: [facility] MAIN OR;  Service: Ophthalmology       Family History: family history includes Asthma in her maternal grandfather; Breast cancer in her mother; Cancer in her mother; Diabetes in her father; Hearing loss in her father; Hyperlipidemia in her father; Hypertension in her father.    Social History:  reports that she has never smoked. She has never used smokeless tobacco. She reports that she does not currently use alcohol. She reports that she does not use drugs.    Home Medications:  Prior to Admission medications    Medication Sig Start Date End Date Taking? Authorizing Provider   albuterol sulfate  (90 Base) MCG/ACT inhaler Inhale 2 puffs Every 6 (Six) Hours As Needed for Wheezing or Shortness of Air. 9/6/22  Yes Mattie Freeman, APRN    benzonatate (Tessalon Perles) 100 MG capsule Take 1 capsule by mouth 3 (Three) Times a Day As Needed for Cough. 4/8/21  Yes Abel Grover MD   bumetanide (BUMEX) 2 MG tablet Take 1 tablet by mouth 2 (Two) Times a Day.   Yes ProviderMia MD   cyclobenzaprine (FLEXERIL) 10 MG tablet Take 1 tablet by mouth At Night As Needed for Muscle Spasms.  Patient taking differently: Take 1 tablet by mouth Every Night. Headaches per pt 10/20/22  Yes Mattie Freeman APRN   fluticasone (FLONASE) 50 MCG/ACT nasal spray 2 sprays by Each Nare route Daily.  Patient taking differently: 2 sprays by Each Nare route As Needed. 9/14/21  Yes aMttie Freeman APRN   gabapentin (Neurontin) 100 MG capsule Take 1 capsule by mouth 3 (Three) Times a Day. 5/16/22 5/16/23 Yes Hilda Morton MD   hydrALAZINE (APRESOLINE) 50 MG tablet Take 1 tablet by mouth 3 (Three) Times a Day.  Patient taking differently: Take 2 tablets by mouth 3 (Three) Times a Day. 9/16/22  Yes Mattie Freeman APRN   Neomycin-Polymyxin-Dexameth 0.1 % ointment Apply 1 application to eye(s) as directed by provider 2 (Two) Times a Day. Neomycin-polymyxin b sulfate and dexamethasone opth ointment   Yes Mia Hong MD   simvastatin (ZOCOR) 20 MG tablet Take 1 tablet by mouth Daily. 5/5/22  Yes Eli Machado APRN   Timolol Maleate, Once-Daily, 0.5 % solution Apply 1 drop to eye(s) as directed by provider 2 (Two) Times a Day. L eye   Yes Mia Hong MD   vitamin D (ERGOCALCIFEROL) 1.25 MG (06125 UT) capsule capsule Take 1 capsule by mouth 2 (Two) Times a Week. 10/20/22  Yes Hilda Morton MD   Continuous Blood Gluc Sensor (FreeStyle Zane 2 Sensor) misc 1 each by Other route Every 14 (Fourteen) Days. 5/5/22   Eli Machado APRN   Dulaglutide 1.5 MG/0.5ML solution pen-injector Inject 1.5 mg under the skin into the appropriate area as directed 1 (One) Time Per Week. 5/5/22   Eli Machado APRN   glucose blood (Accu-Chek Pascale Plus)  test strip TEST BLOOD SUGAR TWICE DAILY 12/3/20   Hilda Morton MD   Insulin Glargine, 1 Unit Dial, (Toujeo SoloStar) 300 UNIT/ML solution pen-injector injection INJECT 100 UNITS UNDER THE SKIN INTO THE APPROPRIATE AREA AS DIRECTED ONE TIME PER WEEK 11/3/22   Hilda Morton MD   insulin lispro (humaLOG) 100 UNIT/ML injection Inject 14 Units under the skin into the appropriate area as directed 3 (Three) Times a Day Before Meals. 5/5/22   Eli Machado APRN   Insulin Pen Needle 32G X 4 MM misc 1 each 5 (Five) Times a Day. 11/3/21   Eli Machado APRN   ondansetron (Zofran) 4 MG tablet Take 1 tablet by mouth Every 8 (Eight) Hours As Needed for Nausea or Vomiting. 6/3/22   Mattie Freeman APRN       Allergies:  No Known Allergies    Objective     Vitals:   Temp:  [97.6 °F (36.4 °C)-98.3 °F (36.8 °C)] 98.3 °F (36.8 °C)  Heart Rate:  [] 88  Resp:  [18-20] 18  BP: (150-175)/(79-95) 153/79    Intake/Output Summary (Last 24 hours) at 11/9/2022 0922  Last data filed at 11/9/2022 0600  Gross per 24 hour   Intake 240 ml   Output 1000 ml   Net -760 ml       Physical Exam:   Constitutional: Comfortable, ill-looking, facial swelling  HEENT: Sclera anicteric, no conjunctival injection, left eye covered  Neck: Supple, no thyromegaly, no lymphadenopathy, trachea at midline, no JVD  Respiratory: Clear to auscultation bilaterally, nonlabored respiration  Cardiovascular: RRR, no murmurs, no rubs or gallops, no carotid bruit  Gastrointestinal: Positive bowel sounds, abdomen is soft, nontender and nondistended  : No palpable bladder.  Abdominal wall edema  Musculoskeletal: 2+Bilateral lower extremity edema up to the thighs edema, no clubbing or cyanosis  Psychiatric: Appropriate affect, cooperative  Neurologic: Oriented x3, moving all extremities, normal speech and mental status  Skin: Warm and dry       Scheduled Meds:     atorvastatin, 10 mg, Oral, Daily  bumetanide, 2 mg, Intravenous, Q12H  carvedilol, 12.5 mg,  Oral, BID With Meals  fluticasone, 2 spray, Each Nare, Daily  gabapentin, 100 mg, Oral, TID  heparin (porcine), 5,000 Units, Subcutaneous, Q12H  hydrALAZINE, 100 mg, Oral, TID  insulin glargine, 25 Units, Subcutaneous, Q12H  insulin lispro, 0-14 Units, Subcutaneous, TID AC  neomycin-polymyxin-dexamethamethasone, , Left Eye, BID  sodium chloride, 10 mL, Intravenous, Q12H  timolol, 1 drop, Left Eye, Q12H      IV Meds:        Results Reviewed:   I have personally reviewed the results from the time of this admission to 11/9/2022 09:22 EST     Lab Results   Component Value Date    GLUCOSE 77 11/09/2022    CALCIUM 8.0 (L) 11/09/2022     11/09/2022    K 3.7 11/09/2022    CO2 24.8 11/09/2022     11/09/2022    BUN 44 (H) 11/09/2022    CREATININE 4.19 (H) 11/09/2022    EGFRIFAFRI 52 (L) 10/20/2021    EGFRIFNONA 45 (L) 10/20/2021    BCR 10.5 11/09/2022    ANIONGAP 10.2 11/09/2022      Lab Results   Component Value Date    MG 2.1 11/09/2022    PHOS 5.4 (H) 11/09/2022    ALBUMIN 2.50 (L) 11/09/2022           Assessment / Plan     ASSESSMENT:  1. Acute kidney injury on top of chronic kidney disease stage III with progressive decline in kidney function in the past year.  Etiology likely secondary to uncontrolled diabetes and hypertension.  Serological work-up has been negative including YADIRA, ANCA, complement level, SPEP and immunofixation in addition to GENET 2R.  Urinalysis no RBC not in favor of any active nephritic pathology.  Patient is nephrotic with  urine protein more  than 12 g/g  2. Hypertensive emergency: Blood pressure is better controlled now.  Suspect noncompliance with medication therapy.  3. Anasarca secondary to nephrotic syndrome/hypoalbuminemia  4. Anemia of CKD  5. Type 2 diabetes mellitus with CKD.  Previously uncontrolled last A1c of 8.8  6. History of hypothyroidism  7. Bilateral lower extremity edema secondary to hypoalbuminemia/possible sleep apnea        PLAN:  · Her blood pressure seems to be  better controlled after resuming hydralazine.  Patient noted in the clinic that she has been missing some doses   · We will switch Bumex oral to IV 2 mg twice daily  · We will check 24-hour urine collection for protein  · Continue hydralazine 100 mg 3 times daily  · We will add carvedilol 12.5 mg twice daily  · Holding on ACE and ARB given worsening kidney function  · Check renal ultrasound.  · No indication for dialysis today with patient seems to be heading toward dialysis.  · Venous Doppler bilateral lower extremities to rule out DVT.  · Plan to proceed with a kidney biopsy once her blood pressure is well controlled.      Thank you for involving us in the care of Kelly Pack.  Please feel free to call with any questions.    Daphne Kay MD  11/09/22  09:22 [facility]    [facility]  [phone]      Dictated using Dragon dictation software

## 2022-11-09 NOTE — CASE MANAGEMENT/SOCIAL WORK
Continued Stay Note  [facility]     Patient Name: Kelly Pack  MRN: [id number]  Today's Date: 11/9/2022    Admit Date: 11/8/2022    Plan: Plan home.  CLARENCE Tucker RN   Discharge Plan     Row Name 11/09/22 0947       Plan    Plan Plan home.  CLARENCE Tucker RN    Patient/Family in Agreement with Plan yes    Plan Comments FACE SHEET VERIFIED.  Spoke with pt at bedside.  Pt's PCP is YOSSI Hollis.  Pt's next of kin is her mother ( Regla Pack [phone]).  Pt lives alone in a first floor apartment.  Pt is independent with ADLs.  Pt has a B/P cuff, glucometer. rollator and scales for home use.  Pt gets her prescriptions at [facility] in [place].  Pt denies any issues affording medications. Pt is not current with HH.  Pt has not been in SNF.  Pt denies any discharge needs.  Pt drove self to hospital and will drive self home.  Pt's neighbor will assist pt if needed.  Plan home.  CLARENCE Tucker RN               Discharge Codes    No documentation.               Expected Discharge Date and Time     Expected Discharge Date Expected Discharge Time    Nov 14, 2022             Cecy Tucker RN

## 2022-11-09 NOTE — PROGRESS NOTES
Name: Kelly Pack ADMIT: 2022   : 1970  PCP: Mattie Freeman APRN    MRN: [id number] LOS: 1 days   AGE/SEX: 52 y.o. female  ROOM: [facility]     Subjective   Subjective   Sleeping when I entered. Side lying with loud snoring noted. She currently denies any chest pain. Dyspnea is worse with exertion. She feels like she is urinating well. Denies any cough, fever, chills, nausea or vomiting. Has a patch on her left eye from recent eye surgery (has a thin retina that likes to detach easy per her report).     Objective   Objective   Vital Signs  Temp:  [97.6 °F (36.4 °C)-98.3 °F (36.8 °C)] 98.3 °F (36.8 °C)  Heart Rate:  [] 98  Resp:  [18-20] 18  BP: (150-175)/(79-95) 152/80  SpO2:  [92 %-98 %] 92 %  on   ;   Device (Oxygen Therapy): room air  Body mass index is 53.22 kg/m².     Physical Exam  Vitals and nursing note reviewed.   Constitutional:       Appearance: She is obese. She is ill-appearing.   HENT:      Head: Normocephalic.      Comments: Left eye covered  Cardiovascular:      Rate and Rhythm: Normal rate and regular rhythm.      Pulses: Normal pulses.   Pulmonary:      Effort: Pulmonary effort is normal. No respiratory distress.      Comments: Diminished. On RA  Abdominal:      General: Bowel sounds are normal. There is no distension.      Palpations: Abdomen is soft.      Tenderness: There is no abdominal tenderness.   Musculoskeletal:         General: Swelling (2+ BLE (some of this appears more chronic related to lymphedema as well)) present. Normal range of motion.      Cervical back: Normal range of motion and neck supple.   Skin:     General: Skin is warm and dry.      Findings: No bruising.   Neurological:      Mental Status: She is alert and oriented to person, place, and time.      Sensory: No sensory deficit.      Coordination: Coordination normal.   Psychiatric:         Mood and Affect: Mood normal.         Behavior: Behavior normal.       Results Review:       I reviewed the  patient's new clinical results.  Results from last 7 days   Lab Units 11/09/22  0552 11/08/22 1938   WBC 10*3/mm3 7.77 9.80   HEMOGLOBIN g/dL 9.0* 9.9*   PLATELETS 10*3/mm3 375 428     Results from last 7 days   Lab Units 11/09/22  0552 11/08/22 1938   SODIUM mmol/L 142 142   POTASSIUM mmol/L 3.7 3.6   CHLORIDE mmol/L 107 107   CO2 mmol/L 24.8 22.6   BUN mg/dL 44* 45*   CREATININE mg/dL 4.19* 3.93*   GLUCOSE mg/dL 77 135*   Estimated Creatinine Clearance: 26.6 mL/min (A) (by C-G formula based on SCr of 4.19 mg/dL (H)).  Results from last 7 days   Lab Units 11/09/22  0552 11/08/22 1938   ALBUMIN g/dL 2.50* 2.70*   BILIRUBIN mg/dL <0.2 <0.2   ALK PHOS U/L 127* 138*   AST (SGOT) U/L 14 18   ALT (SGPT) U/L 11 17     Results from last 7 days   Lab Units 11/09/22  0552 11/08/22 1938   CALCIUM mg/dL 8.0* 8.4*   ALBUMIN g/dL 2.50* 2.70*   MAGNESIUM mg/dL 2.1  --    PHOSPHORUS mg/dL 5.4*  --        Glucose   Date/Time Value Ref Range Status   11/09/2022 0602 87 70 - 130 mg/dL Final     Comment:     Meter: [id number] : [id number] Glen CRUZ       atorvastatin, 10 mg, Oral, Daily  bumetanide, 2 mg, Intravenous, Q12H  carvedilol, 12.5 mg, Oral, BID With Meals  fluticasone, 2 spray, Each Nare, Daily  heparin (porcine), 5,000 Units, Subcutaneous, Q12H  hydrALAZINE, 100 mg, Oral, TID  insulin glargine, 25 Units, Subcutaneous, Q12H  insulin lispro, 0-14 Units, Subcutaneous, TID AC  neomycin-polymyxin-dexamethamethasone, , Left Eye, BID  sodium chloride, 10 mL, Intravenous, Q12H  timolol, 1 drop, Left Eye, Q12H       Diet Regular; Consistent Carbohydrate       Assessment/Plan     Active Hospital Problems    Diagnosis  POA   • **Acute renal failure superimposed on stage 3a chronic kidney disease (HCC) [N17.9, N18.31]  Yes   • Anasarca [R60.1]  Yes   • Hypertensive emergency [I16.1]  Yes   • Suspected sleep apnea [R29.818]  Yes   • Anemia [D64.9]  Yes   • Bilateral lower extremity edema [R60.0]  Yes   •  Essential hypertension [I10]  Yes   • Uncontrolled type 2 diabetes mellitus with hyperglycemia (HCC) [E11.65]  Yes   • Nephrotic range proteinuria [R80.9]  Yes      Resolved Hospital Problems   No resolved problems to display.     Ms. Pack is a 52 year old female who presented to the hospital at the direction of her nephrologist for increased body edema, dyspnea, worsening kidney function and elevated BP. She was noted to have markedly elevated BP in the nephrology office 11/8/22 and was instructed to go to the ED for further care. She has been reluctant to pursue kidney biopsy in the past.     · BAN on CKD3a: Progressive worsening. Nephrology consulted and no immediate need for HD, but heading that way per their notes. Now on IV diuretics with 24 hour urine collection in place. Renal US was normal. Plans for kidney biopsy once BP improves.   · Anasarca/BLE edema: Suspect some of her leg swelling is chronic d/t lymphedema. Venous doppler negative. Diuretics as above.   · HTN emergency: Improved BP. On hydralazine and Coreg.   · DM2: A1c 8.8%. On Lantus 25 units BID and SSI. Glucose this morning was 77. I am going to decrease her insulin to 15 units BID of Lantus for now to avoid hypoglycemia.   · Anemia: Prior levels in 12 range in the past. Most recently 9 range. Suspect d/t chronic disease from worsening kidney function. Will check morning anemia studies.   · Suspected sleep apnea: Snoring when I entered. Echo noted with some increased pressures on the right. Will need outpatient sleep study.   · Elevated troponin: Present on arrival and down-trending. No cardiac complaints and suspect this is d/t her kidney function as well. Echo is without any significant wall motion abnormalities/valvular dysfunction. Will monitor closely for any new symptoms. Consider cardiology consultation pending clinical course. EKG on admission did have a slightly prolonged QTc- repeat in AM. Monitor on telemetry.    Discussed with  patient and nurse.    VTE Prophylaxis - Heparin SC  Code Status - Full code  Disposition - Anticipate discharge TBD.      YOSSI Padilla  [facility]  11/09/22  16:30 EST

## 2022-11-09 NOTE — NURSING NOTE
Wound/Ostomy: Consult received regarding skin issue in the rt Heel. Patient alert, oriented, able to repositioning, hx of uncontrolled Diabetes Mellitus, upon assessment is observed full-thickens skin loss in the rt Heel, kelli-wound is very dry, excoriate, yellow but not exudate, Patient stated that she  had some problem with her  shoe some time ago, paint with betadine is ordered.  In addition we can see bilateral lower leg/feet with swollen appearance, dry, red and shiny skin, no open area, Ammonium lactate 12% is ordered too    Please re-consult for any additional need.

## 2022-11-10 ENCOUNTER — TELEPHONE (OUTPATIENT)
Dept: ENDOCRINOLOGY | Age: 52
End: 2022-11-10

## 2022-11-10 LAB
ALBUMIN SERPL-MCNC: 2.3 G/DL (ref 3.5–5.2)
ANION GAP SERPL CALCULATED.3IONS-SCNC: 9.9 MMOL/L (ref 5–15)
BUN SERPL-MCNC: 48 MG/DL (ref 6–20)
BUN/CREAT SERPL: 11.7 (ref 7–25)
CALCIUM SPEC-SCNC: 7.9 MG/DL (ref 8.6–10.5)
CHLORIDE SERPL-SCNC: 107 MMOL/L (ref 98–107)
CO2 SERPL-SCNC: 25.1 MMOL/L (ref 22–29)
CREAT SERPL-MCNC: 4.1 MG/DL (ref 0.57–1)
DEPRECATED RDW RBC AUTO: 41.9 FL (ref 37–54)
EGFRCR SERPLBLD CKD-EPI 2021: 12.6 ML/MIN/1.73
ERYTHROCYTE [DISTWIDTH] IN BLOOD BY AUTOMATED COUNT: 12.8 % (ref 12.3–15.4)
FERRITIN SERPL-MCNC: 149 NG/ML (ref 13–150)
FOLATE SERPL-MCNC: 5.16 NG/ML (ref 4.78–24.2)
GLUCOSE BLDC GLUCOMTR-MCNC: 105 MG/DL (ref 70–130)
GLUCOSE BLDC GLUCOMTR-MCNC: 135 MG/DL (ref 70–130)
GLUCOSE BLDC GLUCOMTR-MCNC: 88 MG/DL (ref 70–130)
GLUCOSE BLDC GLUCOMTR-MCNC: 96 MG/DL (ref 70–130)
GLUCOSE SERPL-MCNC: 82 MG/DL (ref 65–99)
HCT VFR BLD AUTO: 25.4 % (ref 34–46.6)
HGB BLD-MCNC: 8.1 G/DL (ref 12–15.9)
IRON 24H UR-MRATE: 39 MCG/DL (ref 37–145)
IRON SATN MFR SERPL: 15 % (ref 20–50)
MAGNESIUM SERPL-MCNC: 2 MG/DL (ref 1.6–2.6)
MCH RBC QN AUTO: 29.7 PG (ref 26.6–33)
MCHC RBC AUTO-ENTMCNC: 31.9 G/DL (ref 31.5–35.7)
MCV RBC AUTO: 93 FL (ref 79–97)
PHOSPHATE SERPL-MCNC: 5.9 MG/DL (ref 2.5–4.5)
PLATELET # BLD AUTO: 311 10*3/MM3 (ref 140–450)
PMV BLD AUTO: 9.5 FL (ref 6–12)
POTASSIUM SERPL-SCNC: 3.5 MMOL/L (ref 3.5–5.2)
QT INTERVAL: 461 MS
RBC # BLD AUTO: 2.73 10*6/MM3 (ref 3.77–5.28)
SODIUM SERPL-SCNC: 142 MMOL/L (ref 136–145)
TIBC SERPL-MCNC: 258 MCG/DL (ref 298–536)
TRANSFERRIN SERPL-MCNC: 173 MG/DL (ref 200–360)
VIT B12 BLD-MCNC: 261 PG/ML (ref 211–946)
WBC NRBC COR # BLD: 7.72 10*3/MM3 (ref 3.4–10.8)

## 2022-11-10 PROCEDURE — 85027 COMPLETE CBC AUTOMATED: CPT | Performed by: NURSE PRACTITIONER

## 2022-11-10 PROCEDURE — 63710000001 DIPHENHYDRAMINE PER 50 MG: Performed by: NURSE PRACTITIONER

## 2022-11-10 PROCEDURE — 83735 ASSAY OF MAGNESIUM: CPT | Performed by: HOSPITALIST

## 2022-11-10 PROCEDURE — 25010000002 HEPARIN (PORCINE) PER 1000 UNITS: Performed by: NURSE PRACTITIONER

## 2022-11-10 PROCEDURE — 80069 RENAL FUNCTION PANEL: CPT | Performed by: HOSPITALIST

## 2022-11-10 PROCEDURE — 99222 1ST HOSP IP/OBS MODERATE 55: CPT | Performed by: INTERNAL MEDICINE

## 2022-11-10 PROCEDURE — 84466 ASSAY OF TRANSFERRIN: CPT | Performed by: NURSE PRACTITIONER

## 2022-11-10 PROCEDURE — 93005 ELECTROCARDIOGRAM TRACING: CPT | Performed by: HOSPITALIST

## 2022-11-10 PROCEDURE — 82962 GLUCOSE BLOOD TEST: CPT

## 2022-11-10 PROCEDURE — 82746 ASSAY OF FOLIC ACID SERUM: CPT | Performed by: NURSE PRACTITIONER

## 2022-11-10 PROCEDURE — 82607 VITAMIN B-12: CPT | Performed by: NURSE PRACTITIONER

## 2022-11-10 PROCEDURE — 82728 ASSAY OF FERRITIN: CPT | Performed by: NURSE PRACTITIONER

## 2022-11-10 PROCEDURE — 93010 ELECTROCARDIOGRAM REPORT: CPT | Performed by: INTERNAL MEDICINE

## 2022-11-10 PROCEDURE — 83540 ASSAY OF IRON: CPT | Performed by: NURSE PRACTITIONER

## 2022-11-10 RX ORDER — CHOLECALCIFEROL (VITAMIN D3) 125 MCG
1000 CAPSULE ORAL DAILY
Status: DISCONTINUED | OUTPATIENT
Start: 2022-11-10 | End: 2022-11-16 | Stop reason: HOSPADM

## 2022-11-10 RX ORDER — BUMETANIDE 0.25 MG/ML
4 INJECTION INTRAMUSCULAR; INTRAVENOUS EVERY 8 HOURS
Status: DISCONTINUED | OUTPATIENT
Start: 2022-11-10 | End: 2022-11-10

## 2022-11-10 RX ORDER — BUMETANIDE 0.25 MG/ML
4 INJECTION INTRAMUSCULAR; INTRAVENOUS EVERY 8 HOURS
Status: COMPLETED | OUTPATIENT
Start: 2022-11-10 | End: 2022-11-11

## 2022-11-10 RX ORDER — DIPHENHYDRAMINE HCL 25 MG
25 CAPSULE ORAL ONCE
Status: COMPLETED | OUTPATIENT
Start: 2022-11-10 | End: 2022-11-10

## 2022-11-10 RX ADMIN — HYDRALAZINE HYDROCHLORIDE 100 MG: 50 TABLET ORAL at 17:36

## 2022-11-10 RX ADMIN — INSULIN GLARGINE-YFGN 10 UNITS: 100 INJECTION, SOLUTION SUBCUTANEOUS at 20:45

## 2022-11-10 RX ADMIN — ATORVASTATIN CALCIUM 10 MG: 20 TABLET, FILM COATED ORAL at 08:52

## 2022-11-10 RX ADMIN — NEOMYCIN SULFATE, POLYMYXIN B SULFATE, AND DEXAMETHASONE: 3.5; 10000; 1 OINTMENT OPHTHALMIC at 08:53

## 2022-11-10 RX ADMIN — INSULIN GLARGINE-YFGN 15 UNITS: 100 INJECTION, SOLUTION SUBCUTANEOUS at 08:51

## 2022-11-10 RX ADMIN — CYCLOBENZAPRINE 10 MG: 10 TABLET, FILM COATED ORAL at 20:47

## 2022-11-10 RX ADMIN — HYDRALAZINE HYDROCHLORIDE 100 MG: 50 TABLET ORAL at 20:56

## 2022-11-10 RX ADMIN — Medication 10 ML: at 08:53

## 2022-11-10 RX ADMIN — BUMETANIDE 4 MG: 0.25 INJECTION, SOLUTION INTRAMUSCULAR; INTRAVENOUS at 08:53

## 2022-11-10 RX ADMIN — HEPARIN SODIUM 5000 UNITS: 5000 INJECTION INTRAVENOUS; SUBCUTANEOUS at 20:47

## 2022-11-10 RX ADMIN — BUMETANIDE 4 MG: 0.25 INJECTION, SOLUTION INTRAMUSCULAR; INTRAVENOUS at 17:36

## 2022-11-10 RX ADMIN — ACETAMINOPHEN 650 MG: 325 TABLET, FILM COATED ORAL at 20:47

## 2022-11-10 RX ADMIN — TIMOLOL MALEATE 1 DROP: 5 SOLUTION/ DROPS OPHTHALMIC at 08:53

## 2022-11-10 RX ADMIN — FLUTICASONE PROPIONATE 2 SPRAY: 50 SPRAY, METERED NASAL at 08:52

## 2022-11-10 RX ADMIN — TIMOLOL MALEATE 1 DROP: 5 SOLUTION/ DROPS OPHTHALMIC at 21:30

## 2022-11-10 RX ADMIN — Medication 1000 MCG: at 18:28

## 2022-11-10 RX ADMIN — CARVEDILOL 12.5 MG: 12.5 TABLET, FILM COATED ORAL at 17:36

## 2022-11-10 RX ADMIN — HEPARIN SODIUM 5000 UNITS: 5000 INJECTION INTRAVENOUS; SUBCUTANEOUS at 08:53

## 2022-11-10 RX ADMIN — NEOMYCIN SULFATE, POLYMYXIN B SULFATE, AND DEXAMETHASONE: 3.5; 10000; 1 OINTMENT OPHTHALMIC at 21:30

## 2022-11-10 RX ADMIN — DIPHENHYDRAMINE HYDROCHLORIDE 25 MG: 25 CAPSULE ORAL at 04:11

## 2022-11-10 RX ADMIN — CARVEDILOL 12.5 MG: 12.5 TABLET, FILM COATED ORAL at 08:52

## 2022-11-10 RX ADMIN — HYDRALAZINE HYDROCHLORIDE 100 MG: 50 TABLET ORAL at 08:52

## 2022-11-10 NOTE — CONSULTS
"Date of Hospital Visit: 11/10/22  Encounter Provider: Rj Lawton MD  Place of Service: [facility] CARDIOLOGY  Patient Name: Kelly Pack  :1970  Referral Provider: Ryland Hernandez MD    Chief complaint SOA     History of Present Illness     Ms Kelly Pack is a 52 year old woman with obesity, uncontrolled DM, CKD, hypertension and suspected (untreated) ALEISHA.    She presents with increasing BP, edema, and shortness of breath. She does not have chest pain, PND, or dyspnea at rest. She denies palpitations or syncope.    She had a BP of 175/95. Her Cr is ~4. Her Tn was 0.058. Her medications have been adjusted by nephrology.    We were consulted for a \"run of VT\" on telemetry this morning. The patient was sleeping.           ECHO 22      Left ventricular systolic function is normal. Left ventricular ejection fraction appears to be 56 - 60%.  •  Left ventricular diastolic function was not assessed.  •  The right ventricular cavity is mildly dilated but normal systolic function.  •  Estimated right ventricular systolic pressure from tricuspid regurgitation is mildly elevated (35-45 mmHg).  •  No significant valvular stenosis or regurgitation noted.        ECHO 22    Summary     1. Left ventricular ejection fraction is normal, with an ejection fraction   of 55-60%.     2. There is moderately increased left ventricular wall thickness.     3. There is trace mitral valve regurgitation.     4. There is trace tricuspid valve regurgitation.       Past Medical History:   Diagnosis Date   • Albuminuria    • Allergic     Seasonal, grass, cats and some dogs   • Allergic rhinitis    • Asthma     allergy triggered   • Bell's palsy    • Cataract     Had surgery on both eyes   • Cholelithiasis     Removed. Have bile reflux/ vomiting   • Drug therapy    • Endometrial cancer (HCC)    • HL (hearing loss)     In L ear   • Hyperlipidemia    • Hypertension    • Low back pain    • " Migraine    • Obesity    • Renal insufficiency    • Right otitis media    • Type II diabetes mellitus (HCC)    • Visual impairment     Diabetic retinopathy. Oil in L eye due to retina detachment s.   • Vitamin D deficiency        Past Surgical History:   Procedure Laterality Date   • CATARACT EXTRACTION     • CHOLECYSTECTOMY     • EYE SURGERY      NOV 2020   • HYSTERECTOMY      due to cancer   • OOPHORECTOMY     • VITRECTOMY PARS PLANA Left 1/28/2020    Procedure: 25G VITRECTOMY-ENDO LASER;  Surgeon: Lazarus, Howard S., MD;  Location: [facility] MAIN OR;  Service: Ophthalmology       Prior to Admission medications    Medication Sig Start Date End Date Taking? Authorizing Provider   albuterol sulfate  (90 Base) MCG/ACT inhaler Inhale 2 puffs Every 6 (Six) Hours As Needed for Wheezing or Shortness of Air. 9/6/22  Yes Mattie Freeman APRN   benzonatate (Tessalon Perles) 100 MG capsule Take 1 capsule by mouth 3 (Three) Times a Day As Needed for Cough. 4/8/21  Yes Abel Grover MD   bumetanide (BUMEX) 2 MG tablet Take 1 tablet by mouth 2 (Two) Times a Day.   Yes ProviderMia MD   cyclobenzaprine (FLEXERIL) 10 MG tablet Take 1 tablet by mouth At Night As Needed for Muscle Spasms.  Patient taking differently: Take 1 tablet by mouth Every Night. Headaches per pt 10/20/22  Yes Mattie Freeman APRN   fluticasone (FLONASE) 50 MCG/ACT nasal spray 2 sprays by Each Nare route Daily.  Patient taking differently: 2 sprays by Each Nare route As Needed. 9/14/21  Yes Mattie Freeman APRN   gabapentin (Neurontin) 100 MG capsule Take 1 capsule by mouth 3 (Three) Times a Day. 5/16/22 5/16/23 Yes Hilda Morton MD   hydrALAZINE (APRESOLINE) 50 MG tablet Take 1 tablet by mouth 3 (Three) Times a Day.  Patient taking differently: Take 2 tablets by mouth 3 (Three) Times a Day. 9/16/22  Yes Mattie Freeman APRN   Neomycin-Polymyxin-Dexameth 0.1 % ointment Apply 1 application to eye(s) as directed by provider 2  (Two) Times a Day. Neomycin-polymyxin b sulfate and dexamethasone opth ointment   Yes Provider, MD Mia   simvastatin (ZOCOR) 20 MG tablet Take 1 tablet by mouth Daily. 5/5/22  Yes Eli Machado APRN   Timolol Maleate, Once-Daily, 0.5 % solution Apply 1 drop to eye(s) as directed by provider 2 (Two) Times a Day. L eye   Yes ProviderMia MD   vitamin D (ERGOCALCIFEROL) 1.25 MG (32438 UT) capsule capsule Take 1 capsule by mouth 2 (Two) Times a Week. 10/20/22  Yes Hilda Morton MD   Continuous Blood Gluc Sensor (FreeStyle Zane 2 Sensor) misc 1 each by Other route Every 14 (Fourteen) Days. 5/5/22   Eli Machado APRN   Dulaglutide 1.5 MG/0.5ML solution pen-injector Inject 1.5 mg under the skin into the appropriate area as directed 1 (One) Time Per Week. 5/5/22   Eli Machado APRN   glucose blood (Accu-Chek Pascale Plus) test strip TEST BLOOD SUGAR TWICE DAILY 12/3/20   Hilda Morton MD   Insulin Glargine, 1 Unit Dial, (Toujeo SoloStar) 300 UNIT/ML solution pen-injector injection INJECT 100 UNITS UNDER THE SKIN INTO THE APPROPRIATE AREA AS DIRECTED ONE TIME PER WEEK 11/3/22   Hilda Morton MD   insulin lispro (humaLOG) 100 UNIT/ML injection Inject 14 Units under the skin into the appropriate area as directed 3 (Three) Times a Day Before Meals. 5/5/22   Eli Machado APRN   Insulin Pen Needle 32G X 4 MM misc 1 each 5 (Five) Times a Day. 11/3/21   Eli Machado APRN   ondansetron (Zofran) 4 MG tablet Take 1 tablet by mouth Every 8 (Eight) Hours As Needed for Nausea or Vomiting. 6/3/22   Mattie Freeman APRN       Social History     Socioeconomic History   • Marital status: Unknown   Tobacco Use   • Smoking status: Never   • Smokeless tobacco: Never   Substance and Sexual Activity   • Alcohol use: Not Currently     Comment: rarely   • Drug use: No   • Sexual activity: Not Currently     Partners: Male     Birth control/protection: Condom       Family History   Problem Relation Age of  "Onset   • Breast cancer Mother    • Cancer Mother    • Diabetes Father    • Hyperlipidemia Father    • Hypertension Father    • Hearing loss Father    • Asthma Maternal Grandfather        Review of Systems   Constitutional: Positive for fatigue.   Eyes: Positive for visual disturbance.   Respiratory: Positive for shortness of breath.    Cardiovascular: Positive for leg swelling.   Gastrointestinal: Positive for abdominal distention and abdominal pain.   Musculoskeletal: Positive for arthralgias.   Psychiatric/Behavioral: Positive for dysphoric mood.   All other systems reviewed and are negative.       Objective:     Vitals:    11/09/22 2331 11/10/22 0058 11/10/22 0659 11/10/22 1300   BP: 147/81  134/66 120/63   BP Location: Right arm  Right arm Right arm   Patient Position: Sitting  Lying Lying   Pulse: 86  71 72   Resp: 18  18 18   Temp: 98.1 °F (36.7 °C)  98.3 °F (36.8 °C) 98.5 °F (36.9 °C)   TempSrc: Oral  Oral Oral   SpO2: 98% (!) 89% 98% 99%   Weight:       Height:         Body mass index is 53.22 kg/m².  Flowsheet Rows    Flowsheet Row First Filed Value   Admission Height 175.3 cm (69\") Documented at 11/08/2022 2142   Admission Weight 166 kg (365 lb) Documented at 11/08/2022 2142          Physical Exam  Vitals reviewed.   Constitutional:       Comments: obese   HENT:      Head: Normocephalic.      Comments: Left eye is bandaged     Nose: Nose normal.      Mouth/Throat:      Pharynx: Oropharynx is clear.   Eyes:      Conjunctiva/sclera: Conjunctivae normal.   Neck:      Comments: Cannot assess JVD due to body habitus  Cardiovascular:      Rate and Rhythm: Normal rate and regular rhythm.      Heart sounds: Normal heart sounds.      Comments: Distant heart sounds  Pulmonary:      Effort: Pulmonary effort is normal.      Breath sounds: Normal breath sounds.   Abdominal:      Palpations: Abdomen is soft.      Tenderness: There is abdominal tenderness.      Comments: Cannot feel organs or aorta   Musculoskeletal:    "      General: Swelling (4+ ROXANA, 2+ anasarca) present. Normal range of motion.      Cervical back: Normal range of motion.   Skin:     General: Skin is warm and dry.      Findings: No erythema.   Neurological:      General: No focal deficit present.      Mental Status: She is alert.      Cranial Nerves: No cranial nerve deficit.   Psychiatric:         Mood and Affect: Mood normal.         Thought Content: Thought content normal.                 Lab Review:                Results from last 7 days   Lab Units 11/10/22  0419   SODIUM mmol/L 142   POTASSIUM mmol/L 3.5   CHLORIDE mmol/L 107   CO2 mmol/L 25.1   BUN mg/dL 48*   CREATININE mg/dL 4.10*   GLUCOSE mg/dL 82   CALCIUM mg/dL 7.9*     Results from last 7 days   Lab Units 11/09/22  1421 11/09/22  0552 11/08/22  2213   TROPONIN T ng/mL 0.048* 0.051* 0.053*     Results from last 7 days   Lab Units 11/10/22  0419   WBC 10*3/mm3 7.72   HEMOGLOBIN g/dL 8.1*   HEMATOCRIT % 25.4*   PLATELETS 10*3/mm3 311     Results from last 7 days   Lab Units 11/09/22  0552   INR  1.01         Results from last 7 days   Lab Units 11/10/22  0419   MAGNESIUM mg/dL 2.0                     I personally viewed and interpreted the patient's EKG/Telemetry data    Assessment/Plan:     1. Abnormal telemetry strip  2. Mildly elevated Tn  3. Anasarca due to nephrotic syndrome  4. Uncontrolled DM  5. Morbid obesity  6. Suspected sleep apnea    With calipers, I was able to march her QRS through the rhythm strip. This is artifact.     Her Tn elevation is indeterminate, especially in the setting of BAN/CKD. Her echo shows normal LVSF/wall motion. She denies angina.    Will defer further management of BP and volume to nephrology.

## 2022-11-10 NOTE — PLAN OF CARE
Goal Outcome Evaluation:  Plan of Care Reviewed With: patient      Patient resting at this time. No c/o pain or nausea. Pt had a VE run 51 beats.EKG showed SR.Cardiology consulted. Stat K/Mag neg.No distress noted. Placed on 2L NC while asleep, O2 dropping in the low 80s.Benadryl given once for itching. 24hr urine in progress.IV bumex.Will cont to monitor.

## 2022-11-10 NOTE — PROGRESS NOTES
[facility] Progress Note      Patient Name: Kelly Pack  : 1970  MRN: [id number]  Primary Care Physician:  Mattie Freeman APRN  Date of admission: 2022    Subjective     Interval History:   The patient was seen and examined today for follow-up on  BAN   Laying comfortably in bed.  Denies nausea and vomiting.  No fever or chills  Blood pressure is acceptable  Review of Systems:   As noted above    Objective     Vitals:   Temp:  [98 °F (36.7 °C)-98.3 °F (36.8 °C)] 98.3 °F (36.8 °C)  Heart Rate:  [71-98] 71  Resp:  [18] 18  BP: (134-152)/(66-81) 134/66  Flow (L/min):  [2] 2    Intake/Output Summary (Last 24 hours) at 11/10/2022 1102  Last data filed at 11/10/2022 0510  Gross per 24 hour   Intake 240 ml   Output 900 ml   Net -660 ml       Physical Exam:    General Appearance: Complaining of shortness of breath.  Skin: warm and dry  HEENT: oral mucosa normal, nonicteric sclera  Neck: supple, no JVD  Lungs: CTA  Heart: RRR, normal S1 and S2  Abdomen: soft, nontender, distended.  Abdominal wall edema  : no palpable bladder  Extremities: 2+ pitting edema up to the thighs , cyanosis or clubbing  Neuro: normal speech and mental status     Scheduled Meds:     atorvastatin, 10 mg, Oral, Daily  bumetanide, 4 mg, Intravenous, Q8H  carvedilol, 12.5 mg, Oral, BID With Meals  fluticasone, 2 spray, Each Nare, Daily  heparin (porcine), 5,000 Units, Subcutaneous, Q12H  hydrALAZINE, 100 mg, Oral, TID  insulin glargine, 15 Units, Subcutaneous, Q12H  insulin lispro, 0-14 Units, Subcutaneous, TID AC  neomycin-polymyxin-dexamethamethasone, , Left Eye, BID  sodium chloride, 10 mL, Intravenous, Q12H  timolol, 1 drop, Left Eye, Q12H      IV Meds:        Results Reviewed:   I have personally reviewed the results from the time of this admission to 11/10/2022 11:02 EST     Results from last 7 days   Lab Units 11/10/22  0419 22  0552 22  1938   SODIUM mmol/L 142 142 142   POTASSIUM  mmol/L 3.5 3.7 3.6   CHLORIDE mmol/L 107 107 107   CO2 mmol/L 25.1 24.8 22.6   BUN mg/dL 48* 44* 45*   CREATININE mg/dL 4.10* 4.19* 3.93*   CALCIUM mg/dL 7.9* 8.0* 8.4*   BILIRUBIN mg/dL  --  <0.2 <0.2   ALK PHOS U/L  --  127* 138*   ALT (SGPT) U/L  --  11 17   AST (SGOT) U/L  --  14 18   GLUCOSE mg/dL 82 77 135*       Estimated Creatinine Clearance: 27.2 mL/min (A) (by C-G formula based on SCr of 4.1 mg/dL (H)).    Results from last 7 days   Lab Units 11/10/22  0419 11/09/22  0552   MAGNESIUM mg/dL 2.0 2.1   PHOSPHORUS mg/dL 5.9* 5.4*             Results from last 7 days   Lab Units 11/10/22  0419 11/09/22  0552 11/08/22  1938   WBC 10*3/mm3 7.72 7.77 9.80   HEMOGLOBIN g/dL 8.1* 9.0* 9.9*   PLATELETS 10*3/mm3 311 375 428       Results from last 7 days   Lab Units 11/09/22  0552   INR  1.01       Assessment / Plan     ASSESSMENT:  1. Acute kidney injury on top of chronic kidney disease stage III with progressive decline in kidney function in the past year.  Etiology likely secondary to uncontrolled diabetes and hypertension.  Serological work-up has been negative including YADIRA, ANCA, complement level, SPEP and immunofixation in addition to GENET 2R.  Urinalysis no RBC not in favor of any active nephritic pathology.  Patient is nephrotic with  urine protein more  than 12 g/g  2. Hypertensive emergency: Blood pressure is better controlled now.  Suspect noncompliance with medication therapy.  3. Anasarca secondary to nephrotic syndrome/hypoalbuminemia  4. Anemia of CKD  5. Type 2 diabetes mellitus with CKD.  Previously uncontrolled last A1c of 8.8  6. History of hypothyroidism  7. Bilateral lower extremity edema secondary to hypoalbuminemia/possible sleep apnea.  Doppler lower extremity was negative for DVT        PLAN:  · Suboptimal diuresis despite Bumex 2 mg IV twice daily.  We will increase Bumex to 4 mg 3 times daily for 3 doses.  · Continue to hold ACE and ARB given worsening kidney function.  · Renal ultrasound  was noted with no hydronephrosis.  · Continue surveillance labs      Thank you for involving us in the care of Kelly Pack.  Please feel free to call with any questions.    Daphne Kay MD  11/10/22  11:02 [facility]    [facility]  [phone]    Parts of this note may be an electronic transcription/translation of spoken language to printed text using the Dragon dictation system.

## 2022-11-10 NOTE — ED PROVIDER NOTES
MD ATTESTATION NOTE    The ANDREA and I have discussed this patient's history, physical exam, and treatment plan.    I provided a substantive portion of the care of this patient. I personally performed the physical exam, in its entirety. The attached note describes my personal findings.      Kelly Pack is a 52 y.o. female who presents to the ED c/o shortness of breath.  She has noted having worsening edema in her legs and even arm over the past 2 weeks.      On exam:  GENERAL: not distressed  HENT: nares patent  EYES: no scleral icterus  CV: regular rhythm, regular rate  RESPIRATORY: normal effort  ABDOMEN: soft, abdominal wall swelling  MUSCULOSKELETAL: no deformity, 2+ edema to the legs bilaterally extending up into the thighs  NEURO: alert, moves all extremities, follows commands  SKIN: warm, dry    Labs  Recent Results (from the past 24 hour(s))   Eosinophil Smear - Urine, Urine, Clean Catch    Collection Time: 11/09/22  5:48 AM    Specimen: Urine, Clean Catch   Result Value Ref Range    Eosinophil Smear 1 (H) 0 - 0 % EOS/100 Cells   CBC Auto Differential    Collection Time: 11/09/22  5:52 AM    Specimen: Blood   Result Value Ref Range    WBC 7.77 3.40 - 10.80 10*3/mm3    RBC 3.03 (L) 3.77 - 5.28 10*6/mm3    Hemoglobin 9.0 (L) 12.0 - 15.9 g/dL    Hematocrit 28.0 (L) 34.0 - 46.6 %    MCV 92.4 79.0 - 97.0 fL    MCH 29.7 26.6 - 33.0 pg    MCHC 32.1 31.5 - 35.7 g/dL    RDW 13.0 12.3 - 15.4 %    RDW-SD 42.8 37.0 - 54.0 fl    MPV 9.2 6.0 - 12.0 fL    Platelets 375 140 - 450 10*3/mm3    Neutrophil % 73.3 42.7 - 76.0 %    Lymphocyte % 13.6 (L) 19.6 - 45.3 %    Monocyte % 8.0 5.0 - 12.0 %    Eosinophil % 3.6 0.3 - 6.2 %    Basophil % 0.6 0.0 - 1.5 %    Immature Grans % 0.9 (H) 0.0 - 0.5 %    Neutrophils, Absolute 5.69 1.70 - 7.00 10*3/mm3    Lymphocytes, Absolute 1.06 0.70 - 3.10 10*3/mm3    Monocytes, Absolute 0.62 0.10 - 0.90 10*3/mm3    Eosinophils, Absolute 0.28 0.00 - 0.40 10*3/mm3    Basophils, Absolute 0.05 0.00  - 0.20 10*3/mm3    Immature Grans, Absolute 0.07 (H) 0.00 - 0.05 10*3/mm3    nRBC 0.0 0.0 - 0.2 /100 WBC   Protime-INR    Collection Time: 11/09/22  5:52 AM    Specimen: Blood   Result Value Ref Range    Protime 13.4 11.7 - 14.2 Seconds    INR 1.01 0.90 - 1.10   Magnesium    Collection Time: 11/09/22  5:52 AM    Specimen: Blood   Result Value Ref Range    Magnesium 2.1 1.6 - 2.6 mg/dL   Troponin    Collection Time: 11/09/22  5:52 AM    Specimen: Blood   Result Value Ref Range    Troponin T 0.051 (C) 0.000 - 0.030 ng/mL   Cortisol    Collection Time: 11/09/22  5:52 AM    Specimen: Blood   Result Value Ref Range    Cortisol 3.30   mcg/dL   Comprehensive Metabolic Panel    Collection Time: 11/09/22  5:52 AM    Specimen: Blood   Result Value Ref Range    Glucose 77 65 - 99 mg/dL    BUN 44 (H) 6 - 20 mg/dL    Creatinine 4.19 (H) 0.57 - 1.00 mg/dL    Sodium 142 136 - 145 mmol/L    Potassium 3.7 3.5 - 5.2 mmol/L    Chloride 107 98 - 107 mmol/L    CO2 24.8 22.0 - 29.0 mmol/L    Calcium 8.0 (L) 8.6 - 10.5 mg/dL    Total Protein 5.6 (L) 6.0 - 8.5 g/dL    Albumin 2.50 (L) 3.50 - 5.20 g/dL    ALT (SGPT) 11 1 - 33 U/L    AST (SGOT) 14 1 - 32 U/L    Alkaline Phosphatase 127 (H) 39 - 117 U/L    Total Bilirubin <0.2 0.0 - 1.2 mg/dL    Globulin 3.1 gm/dL    A/G Ratio 0.8 g/dL    BUN/Creatinine Ratio 10.5 7.0 - 25.0    Anion Gap 10.2 5.0 - 15.0 mmol/L    eGFR 12.3 (L) >60.0 mL/min/1.73   Phosphorus    Collection Time: 11/09/22  5:52 AM    Specimen: Blood   Result Value Ref Range    Phosphorus 5.4 (H) 2.5 - 4.5 mg/dL   POC Glucose Once    Collection Time: 11/09/22  6:02 AM    Specimen: Blood   Result Value Ref Range    Glucose 87 70 - 130 mg/dL   Duplex Venous Lower Extremity - Bilateral CAR    Collection Time: 11/09/22 12:16 PM   Result Value Ref Range    Target HR (85%) 145 bpm    Max. Pred. HR (100%) 170 bpm    Right Common Femoral Spont Y     Right Common Femoral Competent Y     Right Common Femoral Phasic Y     Right Common  Femoral Compress C     Right Common Femoral Augment Y     Right Saphenofemoral Junction Compress C     Right Profunda Femoral Compress C     Right Proximal Femoral Compress C     Right Mid Femoral Spont Y     Right Mid Femoral Competent Y     Right Mid Femoral Phasic Y     Right Mid Femoral Compress C     Right Mid Femoral Augment Y     Right Popliteal Spont Y     Right Popliteal Competent Y     Right Popliteal Phasic Y     Right Popliteal Compress C     Right Popliteal Augment Y     Right Posterior Tibial Compress C     Right Gastronemius Compress C     Right Greater Saph AK Compress C     Right Greater Saph BK Compress C     Right Lesser Saph Compress C     Left Common Femoral Spont Y     Left Common Femoral Competent Y     Left Common Femoral Phasic Y     Left Common Femoral Compress C     Left Common Femoral Augment Y     Left Saphenofemoral Junction Compress C     Left Proximal Femoral Compress C     Left Mid Femoral Spont Y     Left Mid Femoral Competent Y     Left Mid Femoral Phasic Y     Left Mid Femoral Compress C     Left Mid Femoral Augment Y     Left Distal Femoral Compress C     Left Popliteal Spont Y     Left Popliteal Competent Y     Left Popliteal Phasic Y     Left Popliteal Compress C     Left Popliteal Augment Y     Left Posterior Tibial Compress C     Left Gastronemius Compress C     Left Greater Saph AK Compress C     Left Greater Saph BK Compress C     Left Lesser Saph Compress C    Adult Transthoracic Echo Complete W/ Cont if Necessary Per Protocol    Collection Time: 11/09/22  1:14 PM   Result Value Ref Range    Target HR (85%) 145 bpm    Max. Pred. HR (100%) 170 bpm    EF(MOD-bp) 60.3 %    LVIDd 5.0 cm    LVIDs 3.6 cm    IVSd 1.23 cm    LVPWd 1.17 cm    FS 29.1 %    IVS/LVPW 1.05 cm    ESV(cubed) 44.8 ml    LV Sys Vol (BSA corrected) 18.9 cm2    EDV(cubed) 125.8 ml    LV Peck Vol (BSA corrected) 51.0 cm2    LVOT area 4.2 cm2    LV mass(C)d 234.1 grams    LVOT diam 2.31 cm    EDV(MOD-sp2) 92.0  ml    EDV(MOD-sp4) 135.0 ml    ESV(MOD-sp2) 40.0 ml    ESV(MOD-sp4) 50.0 ml    SV(MOD-sp2) 52.0 ml    SV(MOD-sp4) 85.0 ml    SI(MOD-sp2) 19.6 ml/m2    SI(MOD-sp4) 32.1 ml/m2    EF(MOD-sp2) 56.5 %    EF(MOD-sp4) 63.0 %    MV E max carina 101.5 cm/sec    MV A max carina 111.8 cm/sec    MV dec time 0.13 msec    MV E/A 0.91     MV A dur 0.11 sec    LA ESV Index (BP) 31.9 ml/m2    SV(LVOT) 86.9 ml    SV(RVOT) 112.9 ml    Qp/Qs 1.30     RV Base 4.6 cm    RV Mid 3.4 cm    RV Length 8.3 cm    RV S' 14.3 cm/sec    LV V1 max 118.7 cm/sec    LV V1 max PG 5.6 mmHg    LV V1 mean PG 2.45 mmHg    LV V1 VTI 20.7 cm    Ao pk carina 176.5 cm/sec    Ao max PG 12.5 mmHg    Ao mean PG 6.7 mmHg    Ao V2 VTI 37.2 cm    WILLIAM(I,D) 2.34 cm2    MV max PG 6.4 mmHg    MV mean PG 3.0 mmHg    MV V2 VTI 27.9 cm    MV P1/2t 54.3 msec    MVA(P1/2t) 4.1 cm2    MVA(VTI) 3.1 cm2    MV dec slope 689.0 cm/sec2    TR max carina 287.4 cm/sec    TR max PG 33.0 mmHg    RVOT diam 2.7 cm    RV V1 max PG 2.9 mmHg    RV V1 max 84.9 cm/sec    RV V1 VTI 20.1 cm    PA V2 max 99.0 cm/sec    PA acc time 0.16 sec    PA pr(Accel) 8.9 mmHg    Sinus 2.9 cm    Dimensionless Index 0.60 (DI)    RVSP(TR) 48 mmHg    RAP systole 15 mmHg   Troponin    Collection Time: 11/09/22  2:21 PM    Specimen: Blood   Result Value Ref Range    Troponin T 0.048 (C) 0.000 - 0.030 ng/mL   POC Glucose Once    Collection Time: 11/09/22  4:39 PM    Specimen: Blood   Result Value Ref Range    Glucose 84 70 - 130 mg/dL   POC Glucose Once    Collection Time: 11/09/22  8:20 PM    Specimen: Blood   Result Value Ref Range    Glucose 91 70 - 130 mg/dL       Radiology  Adult Transthoracic Echo Complete W/ Cont if Necessary Per Protocol    Result Date: 11/9/2022  •  Left ventricular systolic function is normal. Left ventricular ejection fraction appears to be 56 - 60%. •  Left ventricular diastolic function was not assessed. •  The right ventricular cavity is mildly dilated but normal systolic function. •  Estimated  right ventricular systolic pressure from tricuspid regurgitation is mildly elevated (35-45 mmHg). •  No significant valvular stenosis or regurgitation noted.     Duplex Venous Lower Extremity - Bilateral CAR    Result Date: 11/9/2022  •  Normal bilateral lower extremity venous duplex scan. •  Difficult study due to body habitus and peroneal veins bilaterally not visualized.     US Renal Bilateral    Result Date: 11/9/2022  BILATERAL RENAL SONOGRAM  HISTORY: Decreasing renal function; fluid overload.  TECHNIQUE: Bilateral renal sonogram was performed in standard fashion and is correlated with renal sonogram from 01/27/2022.  FINDINGS: Both kidneys appear morphologically normal. There is no hydronephrosis or renal parenchymal lesion. The right kidney measures 13.1 x 5.8 x 6.1 cm and left kidney measures 12.3 x 6.0 x 5.5 cm. Urinary bladder is decompressed.      Negative.  This report was finalized on 11/9/2022 2:23 PM by Dr. Junior Lyons M.D.        Medical Decision Making:  ED Course as of 11/10/22 0217   e Nov 08, 2022 2040 Creatinine(!): 3.93 [DC]   2040 Troponin T(!!): 0.058 [DC]   2040 proBNP(!): 8,826.0 [DC]   2041 I viewed CXR on PACS system.  My interpretation is no pneumothorax. [DC]   2106 I spoke with ANDREA Pretty with [facility] at this time regarding the patient.  I discussed work-up, results, concerns.  I discussed the consulting provider's desire for tele admit to MD David     [DC]      ED Course User Index  [DC] Dusty Rollins PA           PPE: Both the patient and I wore a surgical mask throughout the entire patient encounter. I wore protective goggles.     Diagnosis  Final diagnoses:   BAN (acute kidney injury) (HCC)   Hypervolemia, unspecified hypervolemia type   Hypertension, unspecified type   Elevated troponin        Yung Jade II, MD  11/10/22 0218       Yung Jade II, MD  11/10/22 0218

## 2022-11-10 NOTE — PLAN OF CARE
Goal Outcome Evaluation:  Plan of Care Reviewed With: patient           Outcome Evaluation: Pt is resting in room.  Nephrology has upped her Bumex from 2 mg to 4mg.  Pt tolerating well.  Kidney biopsy will be planned.  VSR on monitor and will cont to monitor.

## 2022-11-10 NOTE — PROGRESS NOTES
Name: Kelly Pack ADMIT: 2022   : 1970  PCP: Mattie Freeman APRN    MRN: [id number] LOS: 2 days   AGE/SEX: 52 y.o. female  ROOM: [facility]     Subjective   Subjective   No new complaints.  Denies shortness of breath but does not feel like she is getting much output from the diuretic    Review of Systems     Objective   Objective   Vital Signs  Temp:  [98 °F (36.7 °C)-98.5 °F (36.9 °C)] 98.5 °F (36.9 °C)  Heart Rate:  [71-86] 72  Resp:  [18] 18  BP: (120-147)/(63-81) 120/63  SpO2:  [89 %-99 %] 99 %  on  Flow (L/min):  [2] 2;   Device (Oxygen Therapy): nasal cannula  Body mass index is 53.22 kg/m².  Physical Exam  Vitals and nursing note reviewed.   Constitutional:       Appearance: She is obese. She is ill-appearing.   HENT:      Head: Normocephalic.      Comments: Left eye covered  Cardiovascular:      Rate and Rhythm: Normal rate and regular rhythm.      Pulses: Normal pulses.   Pulmonary:      Effort: Pulmonary effort is normal. No respiratory distress.      Comments: Diminished.   Abdominal:      General: Bowel sounds are normal. There is no distension.      Palpations: Abdomen is soft.      Tenderness: There is no abdominal tenderness.   Musculoskeletal:      Cervical back: Normal range of motion and neck supple.      Right lower leg: Edema present.      Left lower leg: Edema present.      Comments: 3+ bilateral with chronic stasis thickening   Skin:     General: Skin is warm and dry.      Findings: No bruising.   Neurological:      Mental Status: She is alert and oriented to person, place, and time.      Sensory: No sensory deficit.      Coordination: Coordination normal.   Psychiatric:         Mood and Affect: Mood normal.         Behavior: Behavior normal.       Results Review     I reviewed the patient's new clinical results.  Results from last 7 days   Lab Units 11/10/22  0419 22  0552 22  1938   WBC 10*3/mm3 7.72 7.77 9.80   HEMOGLOBIN g/dL 8.1* 9.0* 9.9*   PLATELETS 10*3/mm3  311 375 428     Results from last 7 days   Lab Units 11/10/22  0419 11/09/22  0552 11/08/22  1938   SODIUM mmol/L 142 142 142   POTASSIUM mmol/L 3.5 3.7 3.6   CHLORIDE mmol/L 107 107 107   CO2 mmol/L 25.1 24.8 22.6   BUN mg/dL 48* 44* 45*   CREATININE mg/dL 4.10* 4.19* 3.93*   GLUCOSE mg/dL 82 77 135*   EGFR mL/min/1.73 12.6* 12.3* 13.3*     Results from last 7 days   Lab Units 11/10/22  0419 11/09/22  0552 11/08/22 1938   ALBUMIN g/dL 2.30* 2.50* 2.70*   BILIRUBIN mg/dL  --  <0.2 <0.2   ALK PHOS U/L  --  127* 138*   AST (SGOT) U/L  --  14 18   ALT (SGPT) U/L  --  11 17     Results from last 7 days   Lab Units 11/10/22  0419 11/09/22  0552 11/08/22 1938   CALCIUM mg/dL 7.9* 8.0* 8.4*   ALBUMIN g/dL 2.30* 2.50* 2.70*   MAGNESIUM mg/dL 2.0 2.1  --    PHOSPHORUS mg/dL 5.9* 5.4*  --        Glucose   Date/Time Value Ref Range Status   11/10/2022 1058 96 70 - 130 mg/dL Final     Comment:     Meter: [id number] : [id number] Wally CRUZ   11/10/2022 0610 88 70 - 130 mg/dL Final     Comment:     Meter: [id number] : [id number] Glen Torrez S NA   11/09/2022 2020 91 70 - 130 mg/dL Final     Comment:     Meter: [id number] : [id number] Glen Torrez S NA   11/09/2022 1639 84 70 - 130 mg/dL Final     Comment:     Meter: [id number] : [id number] Krupa CRUZ   11/09/2022 0602 87 70 - 130 mg/dL Final     Comment:     Meter: [id number] : [id number] Glen Torrez S NA       XR Chest 2 View    Result Date: 11/8/2022  1. Borderline cardiomegaly. 2. Mild vascular congestion. 3. Blunting of the right costophrenic sulcus consistent with pleural scarring or minimal pleural effusion.  This report was finalized on 11/8/2022 7:55 PM by Dr. Jae Cheema M.D.      US Renal Bilateral    Result Date: 11/9/2022  Negative.  This report was finalized on 11/9/2022 2:23 PM by Dr. Junior Lyons M.D.      Scheduled Medications  atorvastatin, 10 mg, Oral, Daily  bumetanide, 4  mg, Intravenous, Q8H  carvedilol, 12.5 mg, Oral, BID With Meals  fluticasone, 2 spray, Each Nare, Daily  heparin (porcine), 5,000 Units, Subcutaneous, Q12H  hydrALAZINE, 100 mg, Oral, TID  insulin glargine, 10 Units, Subcutaneous, Q12H  insulin lispro, 0-14 Units, Subcutaneous, TID AC  neomycin-polymyxin-dexamethamethasone, , Left Eye, BID  sodium chloride, 10 mL, Intravenous, Q12H  timolol, 1 drop, Left Eye, Q12H    Infusions   Diet  Diet Regular; Consistent Carbohydrate       Assessment/Plan     Active Hospital Problems    Diagnosis  POA   • **Acute renal failure superimposed on stage 3a chronic kidney disease (HCC) [N17.9, N18.31]  Yes   • Anasarca [R60.1]  Yes   • Suspected sleep apnea [R29.818]  Yes   • Anemia [D64.9]  Yes   • Bilateral lower extremity edema [R60.0]  Yes   • Elevated troponin [R77.8]  Yes   • Essential hypertension [I10]  Yes   • Uncontrolled type 2 diabetes mellitus with hyperglycemia (HCC) [E11.65]  Yes   • Nephrotic range proteinuria [R80.9]  Yes      Resolved Hospital Problems    Diagnosis Date Resolved POA   • Hypertensive emergency [I16.1] 11/10/2022 Yes       52 y.o. female admitted with Acute renal failure superimposed on stage 3a chronic kidney disease (HCC).    Renal function stable overnight.  Nephrotic range proteinuria with work-up in progress.  - Renal ultrasound negative for hydronephrosis  - Serologic evaluation apparently negative  - Defer to nephrology regarding potential renal biopsy mentioned previously  - Diuretic dosing per their judgment, Increased today    Anemia-worse today without obvious bleed  - Labs ordered showing iron sat 15%, TIBC low.  Total serum iron 39, mostly consistent with chronic disease  - B12 borderline low, will add oral replacement    Hypertension much better now on current regimen.    DM2- tightly controlled, probably too tight.  Will back off Lantus somewhat.    Abnormal telemetry strips-cardiology consult reviewed, felt to be artifactual, not VT  -  Mild troponin elevation due to renal dysfunction.  - Echo unremarkable      · Heparin SC for DVT prophylaxis.  · Disposition: Probably early next week, depends on renal response      Mikael Leigh MD  [facility]  11/10/22  16:01 EST

## 2022-11-11 LAB
ALBUMIN SERPL-MCNC: 2.6 G/DL (ref 3.5–5.2)
ALBUMIN UR-MCNC: 231.1 MG/DL
ANION GAP SERPL CALCULATED.3IONS-SCNC: 7.8 MMOL/L (ref 5–15)
BUN SERPL-MCNC: 46 MG/DL (ref 6–20)
BUN/CREAT SERPL: 11.6 (ref 7–25)
CALCIUM SPEC-SCNC: 7.9 MG/DL (ref 8.6–10.5)
CHLORIDE SERPL-SCNC: 111 MMOL/L (ref 98–107)
CO2 SERPL-SCNC: 25.2 MMOL/L (ref 22–29)
COLLECT DURATION TIME UR: 24 HRS
COLLECT DURATION TIME UR: 24 HRS
CREAT SERPL-MCNC: 3.98 MG/DL (ref 0.57–1)
DEPRECATED RDW RBC AUTO: 43 FL (ref 37–54)
EGFRCR SERPLBLD CKD-EPI 2021: 13.1 ML/MIN/1.73
ERYTHROCYTE [DISTWIDTH] IN BLOOD BY AUTOMATED COUNT: 13 % (ref 12.3–15.4)
GLUCOSE BLDC GLUCOMTR-MCNC: 107 MG/DL (ref 70–130)
GLUCOSE BLDC GLUCOMTR-MCNC: 114 MG/DL (ref 70–130)
GLUCOSE BLDC GLUCOMTR-MCNC: 90 MG/DL (ref 70–130)
GLUCOSE BLDC GLUCOMTR-MCNC: 91 MG/DL (ref 70–130)
GLUCOSE SERPL-MCNC: 107 MG/DL (ref 65–99)
HCT VFR BLD AUTO: 25.7 % (ref 34–46.6)
HGB BLD-MCNC: 8.2 G/DL (ref 12–15.9)
MCH RBC QN AUTO: 29.5 PG (ref 26.6–33)
MCHC RBC AUTO-ENTMCNC: 31.9 G/DL (ref 31.5–35.7)
MCV RBC AUTO: 92.4 FL (ref 79–97)
MICROALBUMIN 24H UR-MRATE: 4159.8 MG/24 HRS (ref 0–25)
PHOSPHATE SERPL-MCNC: 6.8 MG/DL (ref 2.5–4.5)
PLATELET # BLD AUTO: 297 10*3/MM3 (ref 140–450)
PMV BLD AUTO: 9.1 FL (ref 6–12)
POTASSIUM SERPL-SCNC: 4 MMOL/L (ref 3.5–5.2)
PROT 24H UR-MRATE: 7475.4 MG/24HOURS (ref 0–150)
RBC # BLD AUTO: 2.78 10*6/MM3 (ref 3.77–5.28)
SODIUM SERPL-SCNC: 144 MMOL/L (ref 136–145)
SPECIMEN VOL 24H UR: 1800 ML
SPECIMEN VOL 24H UR: 1800 ML
WBC NRBC COR # BLD: 6.06 10*3/MM3 (ref 3.4–10.8)

## 2022-11-11 PROCEDURE — 82962 GLUCOSE BLOOD TEST: CPT

## 2022-11-11 PROCEDURE — 80069 RENAL FUNCTION PANEL: CPT | Performed by: HOSPITALIST

## 2022-11-11 PROCEDURE — 25010000002 HEPARIN (PORCINE) PER 1000 UNITS: Performed by: NURSE PRACTITIONER

## 2022-11-11 PROCEDURE — 85027 COMPLETE CBC AUTOMATED: CPT | Performed by: HOSPITALIST

## 2022-11-11 PROCEDURE — 84156 ASSAY OF PROTEIN URINE: CPT | Performed by: INTERNAL MEDICINE

## 2022-11-11 PROCEDURE — 25010000002 NA FERRIC GLUC CPLX PER 12.5 MG: Performed by: HOSPITALIST

## 2022-11-11 PROCEDURE — 81050 URINALYSIS VOLUME MEASURE: CPT | Performed by: INTERNAL MEDICINE

## 2022-11-11 PROCEDURE — 82043 UR ALBUMIN QUANTITATIVE: CPT | Performed by: INTERNAL MEDICINE

## 2022-11-11 RX ORDER — BUMETANIDE 0.25 MG/ML
4 INJECTION INTRAMUSCULAR; INTRAVENOUS 3 TIMES DAILY
Status: DISCONTINUED | OUTPATIENT
Start: 2022-11-11 | End: 2022-11-14

## 2022-11-11 RX ORDER — INSULIN GLARGINE 300 U/ML
INJECTION, SOLUTION SUBCUTANEOUS
Qty: 6 ML | Refills: 0 | Status: SHIPPED | OUTPATIENT
Start: 2022-11-11 | End: 2022-11-16 | Stop reason: SDUPTHER

## 2022-11-11 RX ORDER — SEVELAMER CARBONATE 800 MG/1
800 TABLET, FILM COATED ORAL
Status: DISCONTINUED | OUTPATIENT
Start: 2022-11-11 | End: 2022-11-16

## 2022-11-11 RX ORDER — METOLAZONE 5 MG/1
5 TABLET ORAL DAILY
Status: DISCONTINUED | OUTPATIENT
Start: 2022-11-11 | End: 2022-11-16 | Stop reason: HOSPADM

## 2022-11-11 RX ADMIN — HYDRALAZINE HYDROCHLORIDE 100 MG: 50 TABLET ORAL at 21:27

## 2022-11-11 RX ADMIN — HEPARIN SODIUM 5000 UNITS: 5000 INJECTION INTRAVENOUS; SUBCUTANEOUS at 21:27

## 2022-11-11 RX ADMIN — BUMETANIDE 4 MG: 0.25 INJECTION, SOLUTION INTRAMUSCULAR; INTRAVENOUS at 13:45

## 2022-11-11 RX ADMIN — BUMETANIDE 4 MG: 0.25 INJECTION, SOLUTION INTRAMUSCULAR; INTRAVENOUS at 21:26

## 2022-11-11 RX ADMIN — SODIUM CHLORIDE 250 MG: 9 INJECTION, SOLUTION INTRAVENOUS at 13:45

## 2022-11-11 RX ADMIN — METOLAZONE 5 MG: 5 TABLET ORAL at 13:45

## 2022-11-11 RX ADMIN — Medication 10 ML: at 21:27

## 2022-11-11 RX ADMIN — FLUTICASONE PROPIONATE 2 SPRAY: 50 SPRAY, METERED NASAL at 08:48

## 2022-11-11 RX ADMIN — SEVELAMER CARBONATE 800 MG: 800 TABLET, FILM COATED ORAL at 17:10

## 2022-11-11 RX ADMIN — CARVEDILOL 12.5 MG: 12.5 TABLET, FILM COATED ORAL at 17:10

## 2022-11-11 RX ADMIN — INSULIN GLARGINE-YFGN 10 UNITS: 100 INJECTION, SOLUTION SUBCUTANEOUS at 08:48

## 2022-11-11 RX ADMIN — BUMETANIDE 4 MG: 0.25 INJECTION, SOLUTION INTRAMUSCULAR; INTRAVENOUS at 01:30

## 2022-11-11 RX ADMIN — SEVELAMER CARBONATE 800 MG: 800 TABLET, FILM COATED ORAL at 13:45

## 2022-11-11 RX ADMIN — HYDRALAZINE HYDROCHLORIDE 100 MG: 50 TABLET ORAL at 08:47

## 2022-11-11 RX ADMIN — HYDRALAZINE HYDROCHLORIDE 100 MG: 50 TABLET ORAL at 16:42

## 2022-11-11 RX ADMIN — Medication 1000 MCG: at 08:47

## 2022-11-11 RX ADMIN — HEPARIN SODIUM 5000 UNITS: 5000 INJECTION INTRAVENOUS; SUBCUTANEOUS at 08:47

## 2022-11-11 RX ADMIN — ATORVASTATIN CALCIUM 10 MG: 20 TABLET, FILM COATED ORAL at 08:47

## 2022-11-11 RX ADMIN — INSULIN GLARGINE-YFGN 10 UNITS: 100 INJECTION, SOLUTION SUBCUTANEOUS at 21:33

## 2022-11-11 RX ADMIN — NEOMYCIN SULFATE, POLYMYXIN B SULFATE, AND DEXAMETHASONE: 3.5; 10000; 1 OINTMENT OPHTHALMIC at 21:28

## 2022-11-11 RX ADMIN — CARVEDILOL 12.5 MG: 12.5 TABLET, FILM COATED ORAL at 08:47

## 2022-11-11 RX ADMIN — NEOMYCIN SULFATE, POLYMYXIN B SULFATE, AND DEXAMETHASONE: 3.5; 10000; 1 OINTMENT OPHTHALMIC at 08:48

## 2022-11-11 RX ADMIN — TIMOLOL MALEATE 1 DROP: 5 SOLUTION/ DROPS OPHTHALMIC at 21:30

## 2022-11-11 NOTE — PROGRESS NOTES
Name: Kelly Pack ADMIT: 2022   : 1970  PCP: Mattie Freeman APRN    MRN: [id number] LOS: 3 days   AGE/SEX: 52 y.o. female  ROOM: [facility]     Subjective   Subjective   Looks a little better.  Complains of feeling lethargic and tired    Review of Systems     Objective   Objective   Vital Signs  Temp:  [97.6 °F (36.4 °C)-98.4 °F (36.9 °C)] 98.4 °F (36.9 °C)  Heart Rate:  [71-75] 71  Resp:  [16-18] 16  BP: (125-154)/(71-91) 145/71  SpO2:  [95 %-98 %] 98 %  on   ;   Device (Oxygen Therapy): room air  Body mass index is 53.22 kg/m².  Physical Exam  Vitals and nursing note reviewed.   Constitutional:       Appearance: She is obese. She is ill-appearing.   HENT:      Head: Normocephalic.      Comments: Left eye closed  Cardiovascular:      Rate and Rhythm: Normal rate and regular rhythm.      Pulses: Normal pulses.   Pulmonary:      Effort: Pulmonary effort is normal. No respiratory distress.      Comments: Diminished.   Abdominal:      General: Bowel sounds are normal. There is no distension.      Palpations: Abdomen is soft.      Tenderness: There is no abdominal tenderness.   Musculoskeletal:      Cervical back: Normal range of motion and neck supple.      Right lower leg: Edema present.      Left lower leg: Edema present.      Comments: 3+ bilateral with chronic stasis thickening   Skin:     General: Skin is warm and dry.      Findings: No bruising.   Neurological:      Mental Status: She is alert and oriented to person, place, and time.      Sensory: No sensory deficit.      Coordination: Coordination normal.   Psychiatric:         Mood and Affect: Mood normal.         Behavior: Behavior normal.       Results Review     I reviewed the patient's new clinical results.  Results from last 7 days   Lab Units 22  0702 11/10/22  0419 22  0552 22  1938   WBC 10*3/mm3 6.06 7.72 7.77 9.80   HEMOGLOBIN g/dL 8.2* 8.1* 9.0* 9.9*   PLATELETS 10*3/mm3 297 311 375 428     Results from last 7 days    Lab Units 11/11/22  0702 11/10/22  0419 11/09/22  0552 11/08/22  1938   SODIUM mmol/L 144 142 142 142   POTASSIUM mmol/L 4.0 3.5 3.7 3.6   CHLORIDE mmol/L 111* 107 107 107   CO2 mmol/L 25.2 25.1 24.8 22.6   BUN mg/dL 46* 48* 44* 45*   CREATININE mg/dL 3.98* 4.10* 4.19* 3.93*   GLUCOSE mg/dL 107* 82 77 135*   EGFR mL/min/1.73 13.1* 12.6* 12.3* 13.3*     Results from last 7 days   Lab Units 11/11/22  0702 11/10/22  0419 11/09/22  0552 11/08/22  1938   ALBUMIN g/dL 2.60* 2.30* 2.50* 2.70*   BILIRUBIN mg/dL  --   --  <0.2 <0.2   ALK PHOS U/L  --   --  127* 138*   AST (SGOT) U/L  --   --  14 18   ALT (SGPT) U/L  --   --  11 17     Results from last 7 days   Lab Units 11/11/22  0702 11/10/22  0419 11/09/22  0552 11/08/22  1938   CALCIUM mg/dL 7.9* 7.9* 8.0* 8.4*   ALBUMIN g/dL 2.60* 2.30* 2.50* 2.70*   MAGNESIUM mg/dL  --  2.0 2.1  --    PHOSPHORUS mg/dL 6.8* 5.9* 5.4*  --        Glucose   Date/Time Value Ref Range Status   11/11/2022 1136 91 70 - 130 mg/dL Final     Comment:     Meter: [id number] : [id number] Otilia Sheri NA   11/11/2022 0604 114 70 - 130 mg/dL Final     Comment:     Meter: [id number] : [id number] Leighaira STINSON NA   11/10/2022 2028 135 (H) 70 - 130 mg/dL Final     Comment:     Meter: [id number] : [id number] Leighaira STINSON NA   11/10/2022 1634 105 70 - 130 mg/dL Final     Comment:     Meter: [id number] : [id number] DobbinsGloriaPatel Jennifer CRUZ   11/10/2022 1058 96 70 - 130 mg/dL Final     Comment:     Meter: [id number] : [id number] Wally CRUZ   11/10/2022 0610 88 70 - 130 mg/dL Final     Comment:     Meter: [id number] : [id number] Glen CRUZ   11/09/2022 2020 91 70 - 130 mg/dL Final     Comment:     Meter: [id number] : [id number] Glen Torrez S ANTHONY       No radiology results for the last day  Scheduled Medications  atorvastatin, 10 mg, Oral, Daily  bumetanide, 4 mg, Intravenous, TID  carvedilol, 12.5 mg, Oral, BID  With Meals  fluticasone, 2 spray, Each Nare, Daily  heparin (porcine), 5,000 Units, Subcutaneous, Q12H  hydrALAZINE, 100 mg, Oral, TID  insulin glargine, 10 Units, Subcutaneous, Q12H  insulin lispro, 0-14 Units, Subcutaneous, TID AC  metOLazone, 5 mg, Oral, Daily  neomycin-polymyxin-dexamethamethasone, , Left Eye, BID  sevelamer, 800 mg, Oral, TID With Meals  sodium chloride, 10 mL, Intravenous, Q12H  timolol, 1 drop, Left Eye, Q12H  vitamin B-12, 1,000 mcg, Oral, Daily    Infusions   Diet  Diet Regular; Consistent Carbohydrate, Low Sodium, Renal, Daily Fluid Restriction; 1500 mL Fluid Per Day; 2,000 mg Na       Assessment/Plan     Active Hospital Problems    Diagnosis  POA   • **Acute renal failure superimposed on stage 3a chronic kidney disease (HCC) [N17.9, N18.31]  Yes   • Anasarca [R60.1]  Yes   • Suspected sleep apnea [R29.818]  Yes   • Anemia [D64.9]  Yes   • Bilateral lower extremity edema [R60.0]  Yes   • Elevated troponin [R77.8]  Yes   • Essential hypertension [I10]  Yes   • Uncontrolled type 2 diabetes mellitus with hyperglycemia ([facility]) [E11.65]  Yes   • Nephrotic range proteinuria [R80.9]  Yes      Resolved Hospital Problems    Diagnosis Date Resolved POA   • Hypertensive emergency [I16.1] 11/10/2022 Yes       52 y.o. female admitted with Acute renal failure superimposed on stage 3a chronic kidney disease (HCC).    Renal function remained stable.  Nephrotic range proteinuria with work-up in progress.  - Renal ultrasound negative for hydronephrosis  - Serologic evaluation apparently negative  - Nephrology recommending renal biopsy which patient is considering and I think will ultimately agree to based on conversation today  -Continuing IV diuresis, nephrology managing  - 24-hour urine in progress.    Anemia- stable.  No obvious bleed  - Labs ordered showing iron sat 15%, TIBC low.  Total serum iron 39, mostly consistent with chronic disease  - IV Ferrlecit ordered.  Also on oral B12    Hypertension much  better now on current regimen.    DM2- tightly controlled. Continue Lantus at current dose    Abnormal telemetry strips-cardiology consult reviewed, felt to be artifactual, not VT  - Mild troponin elevation due to renal dysfunction.  - Echo unremarkable      · Heparin SC for DVT prophylaxis.  · Disposition: Hopefully home soon, depends on decision regarding renal biopsy and stability of creatinine      Mikael Leigh MD  [facility]  11/11/22  15:57 EST

## 2022-11-11 NOTE — PLAN OF CARE
Goal Outcome Evaluation:  Plan of Care Reviewed With: patient           Outcome Evaluation: Meds given as ordered.  Iron IV started today.  Renvela and metolazone fiven along with Bumex 4mg.  Diet changed to Renal, 1500ml fluid restriction, low sodium, reg CCD.  NSR on monitor and will cont to monitor.  VSS.

## 2022-11-11 NOTE — PROGRESS NOTES
[facility] Progress Note      Patient Name: Kelly Pack  : 1970  MRN: [id number]  Primary Care Physician:  Mattie Freeman APRN  Date of admission: 2022    Subjective     Interval History:   The patient was seen and examined today for follow-up on  BAN   No events overnight.  24 urine collection had to be restarted because of poor collection.  Urine output of 2700 cc last 24 hours.  Mild decrease in lower extremity edema was noted.  Weight is unchanged from yesterday  Review of Systems:   As noted above    Objective     Vitals:   Temp:  [97.6 °F (36.4 °C)-98.5 °F (36.9 °C)] 97.7 °F (36.5 °C)  Heart Rate:  [72-75] 74  Resp:  [16-18] 16  BP: (120-154)/(63-91) 154/91  Flow (L/min):  [2] 2    Intake/Output Summary (Last 24 hours) at 2022 1050  Last data filed at 2022 0620  Gross per 24 hour   Intake 720 ml   Output 2400 ml   Net -1680 ml       Physical Exam:    General Appearance: Ill looking, obese  Skin: warm and dry  HEENT: oral mucosa normal, nonicteric sclera.  Left eye covered  Neck: supple, no JVD  Lungs: CTA  Heart: RRR, normal S1 and S2  Abdomen: soft, nontender, distended.  Abdominal wall edema  : no palpable bladder  Extremities: 2+ pitting edema up to the thighs , cyanosis or clubbing  Neuro: normal speech and mental status     Scheduled Meds:     atorvastatin, 10 mg, Oral, Daily  carvedilol, 12.5 mg, Oral, BID With Meals  fluticasone, 2 spray, Each Nare, Daily  heparin (porcine), 5,000 Units, Subcutaneous, Q12H  hydrALAZINE, 100 mg, Oral, TID  insulin glargine, 10 Units, Subcutaneous, Q12H  insulin lispro, 0-14 Units, Subcutaneous, TID AC  neomycin-polymyxin-dexamethamethasone, , Left Eye, BID  sodium chloride, 10 mL, Intravenous, Q12H  timolol, 1 drop, Left Eye, Q12H  vitamin B-12, 1,000 mcg, Oral, Daily      IV Meds:        Results Reviewed:   I have personally reviewed the results from the time of this admission to 2022 10:50 EST      Results from last 7 days   Lab Units 11/11/22  0702 11/10/22  0419 11/09/22  0552 11/08/22  1938   SODIUM mmol/L 144 142 142 142   POTASSIUM mmol/L 4.0 3.5 3.7 3.6   CHLORIDE mmol/L 111* 107 107 107   CO2 mmol/L 25.2 25.1 24.8 22.6   BUN mg/dL 46* 48* 44* 45*   CREATININE mg/dL 3.98* 4.10* 4.19* 3.93*   CALCIUM mg/dL 7.9* 7.9* 8.0* 8.4*   BILIRUBIN mg/dL  --   --  <0.2 <0.2   ALK PHOS U/L  --   --  127* 138*   ALT (SGPT) U/L  --   --  11 17   AST (SGOT) U/L  --   --  14 18   GLUCOSE mg/dL 107* 82 77 135*       Estimated Creatinine Clearance: 28 mL/min (A) (by C-G formula based on SCr of 3.98 mg/dL (H)).    Results from last 7 days   Lab Units 11/11/22  0702 11/10/22  0419 11/09/22  0552   MAGNESIUM mg/dL  --  2.0 2.1   PHOSPHORUS mg/dL 6.8* 5.9* 5.4*             Results from last 7 days   Lab Units 11/11/22  0702 11/10/22  0419 11/09/22  0552 11/08/22  1938   WBC 10*3/mm3 6.06 7.72 7.77 9.80   HEMOGLOBIN g/dL 8.2* 8.1* 9.0* 9.9*   PLATELETS 10*3/mm3 297 311 375 428       Results from last 7 days   Lab Units 11/09/22  0552   INR  1.01       Assessment / Plan     ASSESSMENT:  1. Acute kidney injury on top of chronic kidney disease stage III with progressive decline in kidney function in the past year.  Etiology likely secondary to uncontrolled diabetes and hypertension.  Serological work-up has been negative including YADIRA, ANCA, complement level, SPEP and immunofixation in addition to GENET 2R.  Urinalysis  With no RBC not in favor of any active nephritic pathology.  Renal ultrasound was noted with no hydronephrosis.  2. Hypertensive emergency: Blood pressure is better controlled now.  Suspect noncompliance with medication therapy.  3. Anasarca secondary to nephrotic syndrome/hypoalbuminemia  4. Anemia of CKD/iron deficient anemia iron saturation 15 and ferritin 149  5. Type 2 diabetes mellitus with CKD.  Previously uncontrolled last A1c of 8.8  6. History of hypothyroidism  7. Bilateral lower extremity edema  secondary to hypoalbuminemia/possible sleep apnea.  Doppler lower extremity was negative for DVT        PLAN:  · Patient continues to be hypervolemic on Bumex 4 mg 3 times daily that we will continue.   · We will add metolazone 5 mg daily  · Continue to hold ACE and ARB given worsening kidney function.  · There is no indication for dialysis today but potentially she may need dialysis if she does not respond to diuresis.  · Discussed with her the need for kidney biopsy.  Patient continues to be reluctant to proceed with that given risk involved  · Renal diet  · Start Renvela for hyperphosphatemia  · IV iron for iron deficiency anemia  · Continue surveillance labs      Thank you for involving us in the care of Kelly Pack.  Please feel free to call with any questions.    Daphne Kay MD  11/11/22  10:50 [facility]    [facility]  [phone]    Parts of this note may be an electronic transcription/translation of spoken language to printed text using the Dragon dictation system.

## 2022-11-11 NOTE — CASE MANAGEMENT/SOCIAL WORK
Continued Stay Note  [facility]     Patient Name: Kelly Pack  MRN: [id number]  Today's Date: 11/11/2022    Admit Date: 11/8/2022    Plan: Plan home.  CLARENCE Tucker RN   Discharge Plan     Row Name 11/11/22 1242       Plan    Plan Plan home.  CLARENCE Tucker RN    Patient/Family in Agreement with Plan yes    Plan Comments Spoke with pt at bedside.  Pt denies any discharge needs at present.  [facility]P following for dialysis needs.  Plan home.  CLARENCE Tucker RN               Discharge Codes    No documentation.               Expected Discharge Date and Time     Expected Discharge Date Expected Discharge Time    Nov 14, 2022             Cecy Tucker RN

## 2022-11-12 LAB
ALBUMIN SERPL-MCNC: 2.4 G/DL (ref 3.5–5.2)
ANION GAP SERPL CALCULATED.3IONS-SCNC: 11.2 MMOL/L (ref 5–15)
BUN SERPL-MCNC: 50 MG/DL (ref 6–20)
BUN/CREAT SERPL: 11.7 (ref 7–25)
CALCIUM SPEC-SCNC: 8.2 MG/DL (ref 8.6–10.5)
CHLORIDE SERPL-SCNC: 106 MMOL/L (ref 98–107)
CO2 SERPL-SCNC: 25.8 MMOL/L (ref 22–29)
CREAT SERPL-MCNC: 4.29 MG/DL (ref 0.57–1)
EGFRCR SERPLBLD CKD-EPI 2021: 12 ML/MIN/1.73
GLUCOSE BLDC GLUCOMTR-MCNC: 101 MG/DL (ref 70–130)
GLUCOSE BLDC GLUCOMTR-MCNC: 119 MG/DL (ref 70–130)
GLUCOSE BLDC GLUCOMTR-MCNC: 138 MG/DL (ref 70–130)
GLUCOSE BLDC GLUCOMTR-MCNC: 140 MG/DL (ref 70–130)
GLUCOSE SERPL-MCNC: 98 MG/DL (ref 65–99)
PHOSPHATE SERPL-MCNC: 7.1 MG/DL (ref 2.5–4.5)
POTASSIUM SERPL-SCNC: 3.8 MMOL/L (ref 3.5–5.2)
SODIUM SERPL-SCNC: 143 MMOL/L (ref 136–145)

## 2022-11-12 PROCEDURE — 80069 RENAL FUNCTION PANEL: CPT | Performed by: HOSPITALIST

## 2022-11-12 PROCEDURE — 25010000002 HEPARIN (PORCINE) PER 1000 UNITS: Performed by: NURSE PRACTITIONER

## 2022-11-12 PROCEDURE — 82962 GLUCOSE BLOOD TEST: CPT

## 2022-11-12 RX ADMIN — SEVELAMER CARBONATE 800 MG: 800 TABLET, FILM COATED ORAL at 10:04

## 2022-11-12 RX ADMIN — ACETAMINOPHEN 650 MG: 325 TABLET, FILM COATED ORAL at 23:35

## 2022-11-12 RX ADMIN — GABAPENTIN 300 MG: 300 CAPSULE ORAL at 22:24

## 2022-11-12 RX ADMIN — ATORVASTATIN CALCIUM 10 MG: 20 TABLET, FILM COATED ORAL at 10:03

## 2022-11-12 RX ADMIN — METOLAZONE 5 MG: 5 TABLET ORAL at 10:04

## 2022-11-12 RX ADMIN — Medication 1000 MCG: at 10:04

## 2022-11-12 RX ADMIN — FLUTICASONE PROPIONATE 2 SPRAY: 50 SPRAY, METERED NASAL at 10:07

## 2022-11-12 RX ADMIN — SEVELAMER CARBONATE 800 MG: 800 TABLET, FILM COATED ORAL at 12:53

## 2022-11-12 RX ADMIN — Medication 10 ML: at 20:43

## 2022-11-12 RX ADMIN — Medication 10 ML: at 10:05

## 2022-11-12 RX ADMIN — NEOMYCIN SULFATE, POLYMYXIN B SULFATE, AND DEXAMETHASONE: 3.5; 10000; 1 OINTMENT OPHTHALMIC at 20:41

## 2022-11-12 RX ADMIN — CARVEDILOL 12.5 MG: 12.5 TABLET, FILM COATED ORAL at 17:54

## 2022-11-12 RX ADMIN — NEOMYCIN SULFATE, POLYMYXIN B SULFATE, AND DEXAMETHASONE: 3.5; 10000; 1 OINTMENT OPHTHALMIC at 10:07

## 2022-11-12 RX ADMIN — HYDRALAZINE HYDROCHLORIDE 100 MG: 50 TABLET ORAL at 20:42

## 2022-11-12 RX ADMIN — HYDRALAZINE HYDROCHLORIDE 100 MG: 50 TABLET ORAL at 17:54

## 2022-11-12 RX ADMIN — HEPARIN SODIUM 5000 UNITS: 5000 INJECTION INTRAVENOUS; SUBCUTANEOUS at 20:42

## 2022-11-12 RX ADMIN — SEVELAMER CARBONATE 800 MG: 800 TABLET, FILM COATED ORAL at 17:54

## 2022-11-12 RX ADMIN — CARVEDILOL 12.5 MG: 12.5 TABLET, FILM COATED ORAL at 10:04

## 2022-11-12 RX ADMIN — CYCLOBENZAPRINE 10 MG: 10 TABLET, FILM COATED ORAL at 22:24

## 2022-11-12 RX ADMIN — TIMOLOL MALEATE 1 DROP: 5 SOLUTION/ DROPS OPHTHALMIC at 20:43

## 2022-11-12 RX ADMIN — INSULIN GLARGINE-YFGN 10 UNITS: 100 INJECTION, SOLUTION SUBCUTANEOUS at 10:03

## 2022-11-12 RX ADMIN — BUMETANIDE 4 MG: 0.25 INJECTION, SOLUTION INTRAMUSCULAR; INTRAVENOUS at 17:52

## 2022-11-12 RX ADMIN — HEPARIN SODIUM 5000 UNITS: 5000 INJECTION INTRAVENOUS; SUBCUTANEOUS at 10:07

## 2022-11-12 RX ADMIN — TIMOLOL MALEATE 1 DROP: 5 SOLUTION/ DROPS OPHTHALMIC at 10:09

## 2022-11-12 RX ADMIN — HYDRALAZINE HYDROCHLORIDE 100 MG: 50 TABLET ORAL at 10:04

## 2022-11-12 RX ADMIN — BUMETANIDE 4 MG: 0.25 INJECTION, SOLUTION INTRAMUSCULAR; INTRAVENOUS at 10:04

## 2022-11-12 RX ADMIN — BUMETANIDE 4 MG: 0.25 INJECTION, SOLUTION INTRAMUSCULAR; INTRAVENOUS at 20:42

## 2022-11-12 NOTE — PROGRESS NOTES
Name: Kelly Pack ADMIT: 2022   : 1970  PCP: Mattie Freeman APRN    MRN: [id number] LOS: 4 days   AGE/SEX: 52 y.o. female  ROOM: [facility]     Subjective   Subjective   Looks better.  Reports better urine output    Weight down 4 pounds since admit    Review of Systems     Objective   Objective   Vital Signs  Temp:  [97.6 °F (36.4 °C)-98.1 °F (36.7 °C)] 97.9 °F (36.6 °C)  Heart Rate:  [76-80] 76  Resp:  [16] 16  BP: (125-165)/(74-81) 125/81  SpO2:  [95 %-99 %] 95 %  on   ;   Device (Oxygen Therapy): room air  Body mass index is 52.61 kg/m².  Physical Exam  Vitals and nursing note reviewed.   Constitutional:       Appearance: She is obese. She is ill-appearing.   HENT:      Head: Normocephalic.      Comments: Left eye closed  Cardiovascular:      Rate and Rhythm: Normal rate and regular rhythm.      Pulses: Normal pulses.   Pulmonary:      Effort: Pulmonary effort is normal. No respiratory distress.      Comments: Diminished.   Abdominal:      General: Bowel sounds are normal. There is no distension.      Palpations: Abdomen is soft.      Tenderness: There is no abdominal tenderness.   Musculoskeletal:      Cervical back: Normal range of motion and neck supple.      Right lower leg: Edema present.      Left lower leg: Edema present.      Comments: 3+ bilateral with chronic stasis thickening   Skin:     General: Skin is warm and dry.      Findings: No bruising.   Neurological:      Mental Status: She is alert and oriented to person, place, and time.      Sensory: No sensory deficit.      Coordination: Coordination normal.   Psychiatric:         Mood and Affect: Mood normal.         Behavior: Behavior normal.       Results Review     I reviewed the patient's new clinical results.  Results from last 7 days   Lab Units 22  0702 11/10/22  0419 22  0552 22  1938   WBC 10*3/mm3 6.06 7.72 7.77 9.80   HEMOGLOBIN g/dL 8.2* 8.1* 9.0* 9.9*   PLATELETS 10*3/mm3 297 311 375 428     Results from  last 7 days   Lab Units 11/12/22  0805 11/11/22  0702 11/10/22  0419 11/09/22  0552   SODIUM mmol/L 143 144 142 142   POTASSIUM mmol/L 3.8 4.0 3.5 3.7   CHLORIDE mmol/L 106 111* 107 107   CO2 mmol/L 25.8 25.2 25.1 24.8   BUN mg/dL 50* 46* 48* 44*   CREATININE mg/dL 4.29* 3.98* 4.10* 4.19*   GLUCOSE mg/dL 98 107* 82 77   EGFR mL/min/1.73 12.0* 13.1* 12.6* 12.3*     Results from last 7 days   Lab Units 11/12/22  0805 11/11/22  0702 11/10/22  0419 11/09/22  0552 11/08/22  1938   ALBUMIN g/dL 2.40* 2.60* 2.30* 2.50* 2.70*   BILIRUBIN mg/dL  --   --   --  <0.2 <0.2   ALK PHOS U/L  --   --   --  127* 138*   AST (SGOT) U/L  --   --   --  14 18   ALT (SGPT) U/L  --   --   --  11 17     Results from last 7 days   Lab Units 11/12/22  0805 11/11/22  0702 11/10/22  0419 11/09/22  0552   CALCIUM mg/dL 8.2* 7.9* 7.9* 8.0*   ALBUMIN g/dL 2.40* 2.60* 2.30* 2.50*   MAGNESIUM mg/dL  --   --  2.0 2.1   PHOSPHORUS mg/dL 7.1* 6.8* 5.9* 5.4*       Glucose   Date/Time Value Ref Range Status   11/12/2022 1056 138 (H) 70 - 130 mg/dL Final     Comment:     Meter: [id number] : [id number] King Miriam NA   11/12/2022 0642 101 70 - 130 mg/dL Final     Comment:     Meter: [id number] : [id number] Herb CRUZ   11/11/2022 2049 107 70 - 130 mg/dL Final     Comment:     Meter: [id number] : [id number] Herb CRUZ   11/11/2022 1652 90 70 - 130 mg/dL Final     Comment:     Meter: [id number] : [id number] Otilia Sheri NA   11/11/2022 1136 91 70 - 130 mg/dL Final     Comment:     Meter: [id number] : [id number] Otilia Asifjaylan NA   11/11/2022 0604 114 70 - 130 mg/dL Final     Comment:     Meter: [id number] : [id number] Glen CRUZ   11/10/2022 2028 135 (H) 70 - 130 mg/dL Final     Comment:     Meter: [id number] : [id number] Glen Torrez S NA       No radiology results for the last day  Scheduled Medications  atorvastatin, 10 mg, Oral, Daily  bumetanide, 4 mg, Intravenous, TID  carvedilol,  12.5 mg, Oral, BID With Meals  fluticasone, 2 spray, Each Nare, Daily  heparin (porcine), 5,000 Units, Subcutaneous, Q12H  hydrALAZINE, 100 mg, Oral, TID  insulin glargine, 10 Units, Subcutaneous, Q12H  insulin lispro, 0-14 Units, Subcutaneous, TID AC  metOLazone, 5 mg, Oral, Daily  neomycin-polymyxin-dexamethamethasone, , Left Eye, BID  sevelamer, 800 mg, Oral, TID With Meals  sodium chloride, 10 mL, Intravenous, Q12H  timolol, 1 drop, Left Eye, Q12H  vitamin B-12, 1,000 mcg, Oral, Daily    Infusions   Diet  Diet Regular; Consistent Carbohydrate, Low Sodium, Renal, Daily Fluid Restriction; 1500 mL Fluid Per Day; 2,000 mg Na       Assessment/Plan     Active Hospital Problems    Diagnosis  POA   • **Acute renal failure superimposed on stage 3a chronic kidney disease (HCC) [N17.9, N18.31]  Yes   • Anasarca [R60.1]  Yes   • Suspected sleep apnea [R29.818]  Yes   • Anemia [D64.9]  Yes   • Bilateral lower extremity edema [R60.0]  Yes   • Elevated troponin [R77.8]  Yes   • Essential hypertension [I10]  Yes   • Uncontrolled type 2 diabetes mellitus with hyperglycemia (HCC) [E11.65]  Yes   • Nephrotic range proteinuria [R80.9]  Yes      Resolved Hospital Problems    Diagnosis Date Resolved POA   • Hypertensive emergency [I16.1] 11/10/2022 Yes       52 y.o. female admitted with Acute renal failure superimposed on stage 3a chronic kidney disease (HCC).    BAN/CKD 3a-slightly worse today.    -24 urine with 4 g protein  - Renal ultrasound negative for hydronephrosis  - Serologic evaluation apparently negative  - Renal biopsy ordered for Monday  -Diuretics per nephrology    Anemia- stable.  No obvious bleed  - Labs ordered showing iron sat 15%, TIBC low.  Total serum iron 39, mostly consistent with chronic disease  - IV Ferrlecit given.  Also on oral B12    Hypertension-poorly controlled this morning but better after she got her meds.  Continue current regimen.    DM2- tightly controlled.  We will decrease Lantus dose again  slightly     Abnormal telemetry strips-cardiology consult reviewed, felt to be artifactual, not VT  - Mild troponin elevation due to renal dysfunction.  - Echo unremarkable      · Heparin SC for DVT prophylaxis.  · Disposition: Hopefully home soon, depends on decision regarding renal biopsy and stability of creatinine        Mikael Leigh MD  [facility]  11/12/22  14:54 EST

## 2022-11-12 NOTE — PROGRESS NOTES
[facility] Progress Note      Patient Name: Kelly Pack  : 1970  MRN: [id number]  Primary Care Physician:  Mattie Freeman APRN  Date of admission: 2022    Subjective     Interval History:   The patient was seen and examined today for follow-up on  BAN   Refusing at night to wear oxygen we will drop in her oxygen saturation.    Review of Systems:   As noted above    Objective     Vitals:   Temp:  [97.6 °F (36.4 °C)-98.4 °F (36.9 °C)] 98 °F (36.7 °C)  Heart Rate:  [71-80] 79  Resp:  [16] 16  BP: (142-165)/(71-81) 165/79    Intake/Output Summary (Last 24 hours) at 2022 1103  Last data filed at 2022 2000  Gross per 24 hour   Intake 240 ml   Output 2050 ml   Net -1810 ml       Physical Exam:    General Appearance: Ill looking, obese  Skin: warm and dry  HEENT: oral mucosa normal, nonicteric sclera.  Left eye covered  Neck: supple, no JVD  Lungs: CTA  Heart: RRR, normal S1 and S2  Abdomen: soft, nontender, distended.  Abdominal wall edema  : no palpable bladder  Extremities: 2+ pitting edema up to the thighs , cyanosis or clubbing  Neuro: normal speech and mental status     Scheduled Meds:     atorvastatin, 10 mg, Oral, Daily  bumetanide, 4 mg, Intravenous, TID  carvedilol, 12.5 mg, Oral, BID With Meals  fluticasone, 2 spray, Each Nare, Daily  heparin (porcine), 5,000 Units, Subcutaneous, Q12H  hydrALAZINE, 100 mg, Oral, TID  insulin glargine, 10 Units, Subcutaneous, Q12H  insulin lispro, 0-14 Units, Subcutaneous, TID AC  metOLazone, 5 mg, Oral, Daily  neomycin-polymyxin-dexamethamethasone, , Left Eye, BID  sevelamer, 800 mg, Oral, TID With Meals  sodium chloride, 10 mL, Intravenous, Q12H  timolol, 1 drop, Left Eye, Q12H  vitamin B-12, 1,000 mcg, Oral, Daily      IV Meds:        Results Reviewed:   I have personally reviewed the results from the time of this admission to 2022 11:03 EST     Results from last 7 days   Lab Units 22  0805 22  0702  11/10/22  0419 11/09/22  0552 11/08/22  1938   SODIUM mmol/L 143 144 142 142 142   POTASSIUM mmol/L 3.8 4.0 3.5 3.7 3.6   CHLORIDE mmol/L 106 111* 107 107 107   CO2 mmol/L 25.8 25.2 25.1 24.8 22.6   BUN mg/dL 50* 46* 48* 44* 45*   CREATININE mg/dL 4.29* 3.98* 4.10* 4.19* 3.93*   CALCIUM mg/dL 8.2* 7.9* 7.9* 8.0* 8.4*   BILIRUBIN mg/dL  --   --   --  <0.2 <0.2   ALK PHOS U/L  --   --   --  127* 138*   ALT (SGPT) U/L  --   --   --  11 17   AST (SGOT) U/L  --   --   --  14 18   GLUCOSE mg/dL 98 107* 82 77 135*       Estimated Creatinine Clearance: 25.8 mL/min (A) (by C-G formula based on SCr of 4.29 mg/dL (H)).    Results from last 7 days   Lab Units 11/12/22  0805 11/11/22  0702 11/10/22  0419 11/09/22  0552   MAGNESIUM mg/dL  --   --  2.0 2.1   PHOSPHORUS mg/dL 7.1* 6.8* 5.9* 5.4*             Results from last 7 days   Lab Units 11/11/22  0702 11/10/22  0419 11/09/22  0552 11/08/22 1938   WBC 10*3/mm3 6.06 7.72 7.77 9.80   HEMOGLOBIN g/dL 8.2* 8.1* 9.0* 9.9*   PLATELETS 10*3/mm3 297 311 375 428       Results from last 7 days   Lab Units 11/09/22  0552   INR  1.01       Assessment / Plan     ASSESSMENT:  1. Acute kidney injury on top of chronic kidney disease stage III with progressive decline in kidney function in the past year.  Etiology likely secondary to uncontrolled diabetes and hypertension.  Serological work-up has been negative including YADIRA, ANCA, complement level, SPEP and immunofixation in addition to GENET 2R.  Urinalysis  With no RBC not in favor of any active nephritic pathology.  Renal ultrasound was noted with no hydronephrosis.  2. Hypertensive emergency: Blood pressure is better controlled now.  Suspect noncompliance with medication therapy.  3. Anasarca secondary to nephrotic syndrome/hypoalbuminemia  4. Anemia of CKD/iron deficient anemia iron saturation 15 and ferritin 149  5. Type 2 diabetes mellitus with CKD.  Previously uncontrolled last A1c of 8.8  6. History of hypothyroidism  7. Bilateral  lower extremity edema secondary to hypoalbuminemia/possible sleep apnea.  Doppler lower extremity was negative for DVT        PLAN:  · Urine output has not been recorded but patient noted decrease in lower extremity edema and tightness.    · Creatinine slightly up today.  We will decrease metolazone to 5 mg daily  · Replace electrolyte as needed labs  · Patient seems to be agreeable to kidney biopsy we will order for Monday.  We will hold heparin the day of her biopsy.  · Daily weight  · Strict input and output      Thank you for involving us in the care of Kelly Pack.  Please feel free to call with any questions.    Daphne Kay MD  11/12/22  11:03 [facility]    [facility]  [phone]    Parts of this note may be an electronic transcription/translation of spoken language to printed text using the Dragon dictation system.

## 2022-11-12 NOTE — PLAN OF CARE
Goal Outcome Evaluation:     Patient is resting abed with nasal cannula in place and set at 2ltrs. Pt. is desatting while asleep to the low 80's and possibly has some undiagnosed sleep apnea. She was satting in the upper 90's while awake, is alert and oriented and up ad morgan. VSS, no c/o pain or discomfort during this shift, patient is up to bathroom ad morgan voiding without difficulty.

## 2022-11-13 LAB
ALBUMIN SERPL-MCNC: 2.9 G/DL (ref 3.5–5.2)
ANION GAP SERPL CALCULATED.3IONS-SCNC: 11.4 MMOL/L (ref 5–15)
BUN SERPL-MCNC: 51 MG/DL (ref 6–20)
BUN/CREAT SERPL: 12.3 (ref 7–25)
CALCIUM SPEC-SCNC: 8.3 MG/DL (ref 8.6–10.5)
CHLORIDE SERPL-SCNC: 101 MMOL/L (ref 98–107)
CO2 SERPL-SCNC: 26.6 MMOL/L (ref 22–29)
CREAT SERPL-MCNC: 4.15 MG/DL (ref 0.57–1)
EGFRCR SERPLBLD CKD-EPI 2021: 12.5 ML/MIN/1.73
GLUCOSE BLDC GLUCOMTR-MCNC: 119 MG/DL (ref 70–130)
GLUCOSE BLDC GLUCOMTR-MCNC: 130 MG/DL (ref 70–130)
GLUCOSE BLDC GLUCOMTR-MCNC: 163 MG/DL (ref 70–130)
GLUCOSE BLDC GLUCOMTR-MCNC: 93 MG/DL (ref 70–130)
GLUCOSE SERPL-MCNC: 149 MG/DL (ref 65–99)
PHOSPHATE SERPL-MCNC: 6.5 MG/DL (ref 2.5–4.5)
POTASSIUM SERPL-SCNC: 3.9 MMOL/L (ref 3.5–5.2)
SODIUM SERPL-SCNC: 139 MMOL/L (ref 136–145)

## 2022-11-13 PROCEDURE — 80069 RENAL FUNCTION PANEL: CPT | Performed by: HOSPITALIST

## 2022-11-13 PROCEDURE — 25010000002 HEPARIN (PORCINE) PER 1000 UNITS: Performed by: NURSE PRACTITIONER

## 2022-11-13 PROCEDURE — 82962 GLUCOSE BLOOD TEST: CPT

## 2022-11-13 PROCEDURE — 25010000002 HEPARIN (PORCINE) PER 1000 UNITS: Performed by: HOSPITALIST

## 2022-11-13 PROCEDURE — 63710000001 INSULIN LISPRO (HUMAN) PER 5 UNITS: Performed by: INTERNAL MEDICINE

## 2022-11-13 PROCEDURE — 0TB13ZX EXCISION OF LEFT KIDNEY, PERCUTANEOUS APPROACH, DIAGNOSTIC: ICD-10-PCS | Performed by: RADIOLOGY

## 2022-11-13 RX ADMIN — NEOMYCIN SULFATE, POLYMYXIN B SULFATE, AND DEXAMETHASONE: 3.5; 10000; 1 OINTMENT OPHTHALMIC at 21:50

## 2022-11-13 RX ADMIN — INSULIN GLARGINE-YFGN 10 UNITS: 100 INJECTION, SOLUTION SUBCUTANEOUS at 09:33

## 2022-11-13 RX ADMIN — METOLAZONE 5 MG: 5 TABLET ORAL at 09:32

## 2022-11-13 RX ADMIN — BUMETANIDE 4 MG: 0.25 INJECTION, SOLUTION INTRAMUSCULAR; INTRAVENOUS at 21:50

## 2022-11-13 RX ADMIN — HYDRALAZINE HYDROCHLORIDE 100 MG: 50 TABLET ORAL at 09:32

## 2022-11-13 RX ADMIN — SEVELAMER CARBONATE 800 MG: 800 TABLET, FILM COATED ORAL at 17:59

## 2022-11-13 RX ADMIN — BUMETANIDE 4 MG: 0.25 INJECTION, SOLUTION INTRAMUSCULAR; INTRAVENOUS at 17:59

## 2022-11-13 RX ADMIN — HYDRALAZINE HYDROCHLORIDE 100 MG: 50 TABLET ORAL at 21:50

## 2022-11-13 RX ADMIN — SEVELAMER CARBONATE 800 MG: 800 TABLET, FILM COATED ORAL at 09:32

## 2022-11-13 RX ADMIN — BUMETANIDE 4 MG: 0.25 INJECTION, SOLUTION INTRAMUSCULAR; INTRAVENOUS at 09:31

## 2022-11-13 RX ADMIN — Medication 1 APPLICATION: at 09:20

## 2022-11-13 RX ADMIN — NEOMYCIN SULFATE, POLYMYXIN B SULFATE, AND DEXAMETHASONE: 3.5; 10000; 1 OINTMENT OPHTHALMIC at 09:20

## 2022-11-13 RX ADMIN — ACETAMINOPHEN 650 MG: 325 TABLET, FILM COATED ORAL at 06:37

## 2022-11-13 RX ADMIN — CARVEDILOL 12.5 MG: 12.5 TABLET, FILM COATED ORAL at 17:59

## 2022-11-13 RX ADMIN — FLUTICASONE PROPIONATE 2 SPRAY: 50 SPRAY, METERED NASAL at 09:20

## 2022-11-13 RX ADMIN — Medication 1000 MCG: at 09:32

## 2022-11-13 RX ADMIN — SEVELAMER CARBONATE 800 MG: 800 TABLET, FILM COATED ORAL at 12:50

## 2022-11-13 RX ADMIN — HEPARIN SODIUM 5000 UNITS: 5000 INJECTION INTRAVENOUS; SUBCUTANEOUS at 09:32

## 2022-11-13 RX ADMIN — HEPARIN SODIUM 5000 UNITS: 5000 INJECTION INTRAVENOUS; SUBCUTANEOUS at 21:50

## 2022-11-13 RX ADMIN — INSULIN LISPRO 3 UNITS: 100 INJECTION, SOLUTION INTRAVENOUS; SUBCUTANEOUS at 12:49

## 2022-11-13 RX ADMIN — CARVEDILOL 12.5 MG: 12.5 TABLET, FILM COATED ORAL at 09:32

## 2022-11-13 RX ADMIN — ATORVASTATIN CALCIUM 10 MG: 20 TABLET, FILM COATED ORAL at 09:32

## 2022-11-13 RX ADMIN — TIMOLOL MALEATE 1 DROP: 5 SOLUTION/ DROPS OPHTHALMIC at 09:20

## 2022-11-13 RX ADMIN — TIMOLOL MALEATE 1 DROP: 5 SOLUTION/ DROPS OPHTHALMIC at 21:50

## 2022-11-13 RX ADMIN — Medication 10 ML: at 21:50

## 2022-11-13 RX ADMIN — Medication 10 ML: at 09:33

## 2022-11-13 RX ADMIN — HYDRALAZINE HYDROCHLORIDE 100 MG: 50 TABLET ORAL at 17:58

## 2022-11-13 NOTE — PROGRESS NOTES
[facility] Progress Note      Patient Name: Kelly Pack  : 1970  MRN: [id number]  Primary Care Physician:  Mattie Freeman APRN  Date of admission: 2022    Subjective     Interval History:   The patient was seen and examined today for follow-up on  BAN   No issues noted.  Urine output recorded 2800 cc last 24 hours  Weight is down to 160 kg  Review of Systems:   As noted above    Objective     Vitals:   Temp:  [97.4 °F (36.3 °C)-98.1 °F (36.7 °C)] 98.1 °F (36.7 °C)  Heart Rate:  [76-87] 78  Resp:  [16-18] 18  BP: (125-166)/(75-84) 137/75    Intake/Output Summary (Last 24 hours) at 2022 1025  Last data filed at 2022 0629  Gross per 24 hour   Intake --   Output 2800 ml   Net -2800 ml       Physical Exam:    General Appearance: Ill looking, obese  Skin: warm and dry  HEENT: oral mucosa normal, nonicteric sclera.  Left eye covered  Neck: supple, no JVD  Lungs: CTA  Heart: RRR, normal S1 and S2  Abdomen: soft, nontender, distended.  Abdominal wall edema  : no palpable bladder  Extremities: 2+ pitting edema up to the thighs , cyanosis or clubbing  Neuro: normal speech and mental status     Scheduled Meds:     atorvastatin, 10 mg, Oral, Daily  bumetanide, 4 mg, Intravenous, TID  carvedilol, 12.5 mg, Oral, BID With Meals  fluticasone, 2 spray, Each Nare, Daily  heparin (porcine), 5,000 Units, Subcutaneous, Q12H  hydrALAZINE, 100 mg, Oral, TID  insulin glargine, 10 Units, Subcutaneous, QAM  insulin lispro, 0-14 Units, Subcutaneous, TID AC  metOLazone, 5 mg, Oral, Daily  neomycin-polymyxin-dexamethamethasone, , Left Eye, BID  sevelamer, 800 mg, Oral, TID With Meals  sodium chloride, 10 mL, Intravenous, Q12H  timolol, 1 drop, Left Eye, Q12H  vitamin B-12, 1,000 mcg, Oral, Daily      IV Meds:        Results Reviewed:   I have personally reviewed the results from the time of this admission to 2022 10:25 EST     Results from last 7 days   Lab Units 22  0899  11/11/22  0702 11/10/22  0419 11/09/22  0552 11/08/22  1938   SODIUM mmol/L 143 144 142 142 142   POTASSIUM mmol/L 3.8 4.0 3.5 3.7 3.6   CHLORIDE mmol/L 106 111* 107 107 107   CO2 mmol/L 25.8 25.2 25.1 24.8 22.6   BUN mg/dL 50* 46* 48* 44* 45*   CREATININE mg/dL 4.29* 3.98* 4.10* 4.19* 3.93*   CALCIUM mg/dL 8.2* 7.9* 7.9* 8.0* 8.4*   BILIRUBIN mg/dL  --   --   --  <0.2 <0.2   ALK PHOS U/L  --   --   --  127* 138*   ALT (SGPT) U/L  --   --   --  11 17   AST (SGOT) U/L  --   --   --  14 18   GLUCOSE mg/dL 98 107* 82 77 135*       Estimated Creatinine Clearance: 25.8 mL/min (A) (by C-G formula based on SCr of 4.29 mg/dL (H)).    Results from last 7 days   Lab Units 11/12/22  0805 11/11/22  0702 11/10/22  0419 11/09/22  0552   MAGNESIUM mg/dL  --   --  2.0 2.1   PHOSPHORUS mg/dL 7.1* 6.8* 5.9* 5.4*             Results from last 7 days   Lab Units 11/11/22  0702 11/10/22  0419 11/09/22  0552 11/08/22  1938   WBC 10*3/mm3 6.06 7.72 7.77 9.80   HEMOGLOBIN g/dL 8.2* 8.1* 9.0* 9.9*   PLATELETS 10*3/mm3 297 311 375 428       Results from last 7 days   Lab Units 11/09/22  0552   INR  1.01       Assessment / Plan     ASSESSMENT:  1. Acute kidney injury on top of chronic kidney disease stage III with progressive decline in kidney function in the past year.  Etiology likely secondary to uncontrolled diabetes and hypertension.  Serological work-up has been negative including YADIRA, ANCA, complement level, SPEP and immunofixation in addition to GENET 2R.  Urinalysis  With no RBC not in favor of any active nephritic pathology.  Renal ultrasound was noted with no hydronephrosis.  2. Hypertensive emergency: Blood pressure is better controlled now.  Suspect noncompliance with medication therapy.  3. Anasarca secondary to nephrotic syndrome/hypoalbuminemia  4. Anemia of CKD/iron deficient anemia iron saturation 15 and ferritin 149  5. Type 2 diabetes mellitus with CKD.  Previously uncontrolled last A1c of 8.8  6. History of  hypothyroidism  7. Bilateral lower extremity edema secondary to hypoalbuminemia/possible sleep apnea.  Doppler lower extremity was negative for DVT        PLAN:  · Patient continues to diurese well on current dose of diuretics.  · Awaiting today's lab.  · Plan for a kidney biopsy tomorrow in a.m.  · Surveillance labs      Thank you for involving us in the care of Kelly Pack.  Please feel free to call with any questions.    Daphne Kay MD  11/13/22  10:25 [facility]    [facility]  [phone]    Parts of this note may be an electronic transcription/translation of spoken language to printed text using the Dragon dictation system.

## 2022-11-13 NOTE — PROGRESS NOTES
Name: Kelly Pack ADMIT: 2022   : 1970  PCP: Mattie Freeman APRN    MRN: [id number] LOS: 5 days   AGE/SEX: 52 y.o. female  ROOM: [facility]     Subjective   Subjective   Looks better. Weight decreasing    Review of Systems     Objective   Objective   Vital Signs  Temp:  [97.4 °F (36.3 °C)-98.1 °F (36.7 °C)] 97.5 °F (36.4 °C)  Heart Rate:  [71-87] 71  Resp:  [16-18] 18  BP: (134-166)/(75-84) 148/79  SpO2:  [96 %-97 %] 96 %  on   ;   Device (Oxygen Therapy): room air  Body mass index is 52.33 kg/m².  Physical Exam  Vitals and nursing note reviewed.   Constitutional:       Appearance: She is obese. She is ill-appearing.   HENT:      Head: Normocephalic and atraumatic.   Cardiovascular:      Rate and Rhythm: Normal rate and regular rhythm.      Pulses: Normal pulses.   Pulmonary:      Effort: Pulmonary effort is normal. No respiratory distress.      Comments: Diminished.   Abdominal:      General: Bowel sounds are normal. There is no distension.      Palpations: Abdomen is soft.      Tenderness: There is no abdominal tenderness.   Musculoskeletal:      Cervical back: Normal range of motion and neck supple.      Right lower leg: Edema present.      Left lower leg: Edema present.      Comments: 3+ bilateral with chronic stasis thickening   Skin:     General: Skin is warm and dry.      Findings: No bruising.   Neurological:      Mental Status: She is alert and oriented to person, place, and time.      Sensory: No sensory deficit.      Coordination: Coordination normal.   Psychiatric:         Mood and Affect: Mood normal.         Behavior: Behavior normal.       Results Review     I reviewed the patient's new clinical results.  Results from last 7 days   Lab Units 22  0702 11/10/22  0419 22  0552 22  1938   WBC 10*3/mm3 6.06 7.72 7.77 9.80   HEMOGLOBIN g/dL 8.2* 8.1* 9.0* 9.9*   PLATELETS 10*3/mm3 297 311 375 428     Results from last 7 days   Lab Units 22  1032 22  0805  11/11/22  0702 11/10/22  0419   SODIUM mmol/L 139 143 144 142   POTASSIUM mmol/L 3.9 3.8 4.0 3.5   CHLORIDE mmol/L 101 106 111* 107   CO2 mmol/L 26.6 25.8 25.2 25.1   BUN mg/dL 51* 50* 46* 48*   CREATININE mg/dL 4.15* 4.29* 3.98* 4.10*   GLUCOSE mg/dL 149* 98 107* 82   EGFR mL/min/1.73 12.5* 12.0* 13.1* 12.6*     Results from last 7 days   Lab Units 11/13/22  1032 11/12/22  0805 11/11/22  0702 11/10/22  0419 11/09/22  0552 11/08/22  1938   ALBUMIN g/dL 2.90* 2.40* 2.60* 2.30* 2.50* 2.70*   BILIRUBIN mg/dL  --   --   --   --  <0.2 <0.2   ALK PHOS U/L  --   --   --   --  127* 138*   AST (SGOT) U/L  --   --   --   --  14 18   ALT (SGPT) U/L  --   --   --   --  11 17     Results from last 7 days   Lab Units 11/13/22  1032 11/12/22  0805 11/11/22  0702 11/10/22  0419 11/09/22  0552   CALCIUM mg/dL 8.3* 8.2* 7.9* 7.9* 8.0*   ALBUMIN g/dL 2.90* 2.40* 2.60* 2.30* 2.50*   MAGNESIUM mg/dL  --   --   --  2.0 2.1   PHOSPHORUS mg/dL 6.5* 7.1* 6.8* 5.9* 5.4*       Glucose   Date/Time Value Ref Range Status   11/13/2022 1533 93 70 - 130 mg/dL Final     Comment:     Meter: [id number] : [id number] Roberto Rodriguez    11/13/2022 1035 163 (H) 70 - 130 mg/dL Final     Comment:     Meter: [id number] : [id number] Roberto Rodriguez    11/13/2022 0601 130 70 - 130 mg/dL Final     Comment:     Meter: [id number] : [id number] Herb CRUZ   11/12/2022 2053 140 (H) 70 - 130 mg/dL Final     Comment:     Meter: [id number] : [id number] Herb CRUZ   11/12/2022 1601 119 70 - 130 mg/dL Final     Comment:     Meter: [id number] : [id number] Abe Baltazar CNA   11/12/2022 1056 138 (H) 70 - 130 mg/dL Final     Comment:     Meter: [id number] : [id number] Janes Homeanne    11/12/2022 0642 101 70 - 130 mg/dL Final     Comment:     Meter: [id number] : [id number] Herb CRUZ       No radiology results for the last day  Scheduled Medications  atorvastatin, 10 mg, Oral, Daily  bumetanide, 4 mg, Intravenous,  TID  carvedilol, 12.5 mg, Oral, BID With Meals  fluticasone, 2 spray, Each Nare, Daily  heparin (porcine), 5,000 Units, Subcutaneous, Q12H  hydrALAZINE, 100 mg, Oral, TID  insulin glargine, 10 Units, Subcutaneous, QAM  insulin lispro, 0-14 Units, Subcutaneous, TID AC  metOLazone, 5 mg, Oral, Daily  neomycin-polymyxin-dexamethamethasone, , Left Eye, BID  sevelamer, 800 mg, Oral, TID With Meals  sodium chloride, 10 mL, Intravenous, Q12H  timolol, 1 drop, Left Eye, Q12H  vitamin B-12, 1,000 mcg, Oral, Daily    Infusions   Diet  Diet Regular; Consistent Carbohydrate, Low Sodium, Renal, Daily Fluid Restriction; 1500 mL Fluid Per Day; 2,000 mg Na       Assessment/Plan     Active Hospital Problems    Diagnosis  POA   • **Acute renal failure superimposed on stage 3a chronic kidney disease (HCC) [N17.9, N18.31]  Yes   • Anasarca [R60.1]  Yes   • Suspected sleep apnea [R29.818]  Yes   • Anemia [D64.9]  Yes   • Bilateral lower extremity edema [R60.0]  Yes   • Elevated troponin [R77.8]  Yes   • Essential hypertension [I10]  Yes   • Uncontrolled type 2 diabetes mellitus with hyperglycemia (HCC) [E11.65]  Yes   • Nephrotic range proteinuria [R80.9]  Yes      Resolved Hospital Problems    Diagnosis Date Resolved POA   • Hypertensive emergency [I16.1] 11/10/2022 Yes       52 y.o. female admitted with Acute renal failure superimposed on stage 3a chronic kidney disease (HCC).    BAN/CKD 3a-stable today.    -24 hr urine with 4 g protein  - Renal ultrasound negative for hydronephrosis  - Serologic evaluation negative  - Renal biopsy ordered for Monday  -Diuretics per nephrology    Anemia- stable.  No obvious bleed  - Labs ordered showing iron sat 15%, TIBC low.  Total serum iron 39, mostly consistent with chronic disease  - IV Ferrlecit given.  Also on oral B12    Hypertension-Better overall. Continue current regimen.    DM2- tightly controlled on lower dose Lantus. No changes today.     Abnormal telemetry strips-cardiology consult  reviewed, felt to be artifactual, not VT  - Mild troponin elevation due to renal dysfunction.  - Echo unremarkable      · Heparin SC for DVT prophylaxis.  · Disposition: Hopefully home soon, following renal biopsy      Mikael Leigh MD  [facility]  11/13/22  16:59 EST

## 2022-11-13 NOTE — PLAN OF CARE
Goal Outcome Evaluation:      Patient continues with severe, bilateral lower legs edema and pulses are difficult to palpate but present. She is beginning to c/o bilateral legs intermittent pain and was given PRN muscle relaxer, pain medication, and nerve pain reliever. Medications effective throughout the night. She continues to require HS oxygen due to some undiagnosed sleep apnea and she is an open mouth breather while asleep with some heavy snoring noted.

## 2022-11-14 ENCOUNTER — APPOINTMENT (OUTPATIENT)
Dept: CT IMAGING | Facility: HOSPITAL | Age: 52
End: 2022-11-14

## 2022-11-14 LAB
ALBUMIN SERPL-MCNC: 2.5 G/DL (ref 3.5–5.2)
ANION GAP SERPL CALCULATED.3IONS-SCNC: 11.1 MMOL/L (ref 5–15)
APTT PPP: 38.8 SECONDS (ref 22.7–35.4)
BUN SERPL-MCNC: 55 MG/DL (ref 6–20)
BUN/CREAT SERPL: 12.6 (ref 7–25)
CALCIUM SPEC-SCNC: 8.1 MG/DL (ref 8.6–10.5)
CHLORIDE SERPL-SCNC: 105 MMOL/L (ref 98–107)
CO2 SERPL-SCNC: 26.9 MMOL/L (ref 22–29)
CREAT SERPL-MCNC: 4.38 MG/DL (ref 0.57–1)
DEPRECATED RDW RBC AUTO: 45.7 FL (ref 37–54)
EGFRCR SERPLBLD CKD-EPI 2021: 11.7 ML/MIN/1.73
ERYTHROCYTE [DISTWIDTH] IN BLOOD BY AUTOMATED COUNT: 13.2 % (ref 12.3–15.4)
GLUCOSE BLDC GLUCOMTR-MCNC: 108 MG/DL (ref 70–130)
GLUCOSE BLDC GLUCOMTR-MCNC: 131 MG/DL (ref 70–130)
GLUCOSE BLDC GLUCOMTR-MCNC: 136 MG/DL (ref 70–130)
GLUCOSE BLDC GLUCOMTR-MCNC: 141 MG/DL (ref 70–130)
GLUCOSE SERPL-MCNC: 110 MG/DL (ref 65–99)
HCT VFR BLD AUTO: 28.5 % (ref 34–46.6)
HGB BLD-MCNC: 9 G/DL (ref 12–15.9)
INR PPP: 0.98 (ref 0.9–1.1)
MCH RBC QN AUTO: 29.9 PG (ref 26.6–33)
MCHC RBC AUTO-ENTMCNC: 31.6 G/DL (ref 31.5–35.7)
MCV RBC AUTO: 94.7 FL (ref 79–97)
PHOSPHATE SERPL-MCNC: 6.9 MG/DL (ref 2.5–4.5)
PLATELET # BLD AUTO: 339 10*3/MM3 (ref 140–450)
PMV BLD AUTO: 9.2 FL (ref 6–12)
POTASSIUM SERPL-SCNC: 3.7 MMOL/L (ref 3.5–5.2)
PROTHROMBIN TIME: 13.1 SECONDS (ref 11.7–14.2)
RBC # BLD AUTO: 3.01 10*6/MM3 (ref 3.77–5.28)
SODIUM SERPL-SCNC: 143 MMOL/L (ref 136–145)
WBC NRBC COR # BLD: 7.06 10*3/MM3 (ref 3.4–10.8)

## 2022-11-14 PROCEDURE — 88300 SURGICAL PATH GROSS: CPT | Performed by: HOSPITALIST

## 2022-11-14 PROCEDURE — 77012 CT SCAN FOR NEEDLE BIOPSY: CPT

## 2022-11-14 PROCEDURE — 99152 MOD SED SAME PHYS/QHP 5/>YRS: CPT

## 2022-11-14 PROCEDURE — 25010000002 HYDROMORPHONE 1 MG/ML SOLUTION: Performed by: RADIOLOGY

## 2022-11-14 PROCEDURE — 85610 PROTHROMBIN TIME: CPT | Performed by: HOSPITALIST

## 2022-11-14 PROCEDURE — 82962 GLUCOSE BLOOD TEST: CPT

## 2022-11-14 PROCEDURE — 80069 RENAL FUNCTION PANEL: CPT | Performed by: HOSPITALIST

## 2022-11-14 PROCEDURE — 0 LIDOCAINE 1 % SOLUTION: Performed by: RADIOLOGY

## 2022-11-14 PROCEDURE — 85027 COMPLETE CBC AUTOMATED: CPT | Performed by: HOSPITALIST

## 2022-11-14 PROCEDURE — 85730 THROMBOPLASTIN TIME PARTIAL: CPT | Performed by: HOSPITALIST

## 2022-11-14 PROCEDURE — 25010000002 MIDAZOLAM PER 1 MG: Performed by: RADIOLOGY

## 2022-11-14 PROCEDURE — 25010000002 FENTANYL CITRATE (PF) 50 MCG/ML SOLUTION: Performed by: RADIOLOGY

## 2022-11-14 RX ORDER — MIDAZOLAM HYDROCHLORIDE 1 MG/ML
INJECTION INTRAMUSCULAR; INTRAVENOUS AS NEEDED
Status: COMPLETED | OUTPATIENT
Start: 2022-11-14 | End: 2022-11-14

## 2022-11-14 RX ORDER — BUMETANIDE 2 MG/1
4 TABLET ORAL 3 TIMES DAILY
Status: DISCONTINUED | OUTPATIENT
Start: 2022-11-14 | End: 2022-11-15

## 2022-11-14 RX ORDER — MIDAZOLAM HYDROCHLORIDE 1 MG/ML
0.5 INJECTION INTRAMUSCULAR; INTRAVENOUS ONCE
Status: COMPLETED | OUTPATIENT
Start: 2022-11-14 | End: 2022-11-14

## 2022-11-14 RX ORDER — FENTANYL CITRATE 50 UG/ML
INJECTION, SOLUTION INTRAMUSCULAR; INTRAVENOUS AS NEEDED
Status: COMPLETED | OUTPATIENT
Start: 2022-11-14 | End: 2022-11-14

## 2022-11-14 RX ORDER — SODIUM CHLORIDE 9 MG/ML
INJECTION, SOLUTION INTRAVENOUS CONTINUOUS PRN
Status: COMPLETED | OUTPATIENT
Start: 2022-11-14 | End: 2022-11-14

## 2022-11-14 RX ORDER — LIDOCAINE HYDROCHLORIDE 10 MG/ML
20 INJECTION, SOLUTION INFILTRATION; PERINEURAL ONCE
Status: COMPLETED | OUTPATIENT
Start: 2022-11-14 | End: 2022-11-14

## 2022-11-14 RX ADMIN — METOLAZONE 5 MG: 5 TABLET ORAL at 08:30

## 2022-11-14 RX ADMIN — HYDRALAZINE HYDROCHLORIDE 100 MG: 50 TABLET ORAL at 08:30

## 2022-11-14 RX ADMIN — INSULIN GLARGINE-YFGN 10 UNITS: 100 INJECTION, SOLUTION SUBCUTANEOUS at 06:58

## 2022-11-14 RX ADMIN — FLUTICASONE PROPIONATE 2 SPRAY: 50 SPRAY, METERED NASAL at 08:30

## 2022-11-14 RX ADMIN — Medication 10 ML: at 20:55

## 2022-11-14 RX ADMIN — FENTANYL CITRATE 50 MCG: 50 INJECTION INTRAMUSCULAR; INTRAVENOUS at 14:11

## 2022-11-14 RX ADMIN — TIMOLOL MALEATE 1 DROP: 5 SOLUTION/ DROPS OPHTHALMIC at 08:31

## 2022-11-14 RX ADMIN — MIDAZOLAM 0.5 MG: 1 INJECTION INTRAMUSCULAR; INTRAVENOUS at 13:48

## 2022-11-14 RX ADMIN — Medication 10 ML: at 08:31

## 2022-11-14 RX ADMIN — BUMETANIDE 4 MG: 0.25 INJECTION, SOLUTION INTRAMUSCULAR; INTRAVENOUS at 16:09

## 2022-11-14 RX ADMIN — CARVEDILOL 12.5 MG: 12.5 TABLET, FILM COATED ORAL at 16:09

## 2022-11-14 RX ADMIN — SEVELAMER CARBONATE 800 MG: 800 TABLET, FILM COATED ORAL at 08:30

## 2022-11-14 RX ADMIN — NEOMYCIN SULFATE, POLYMYXIN B SULFATE, AND DEXAMETHASONE: 3.5; 10000; 1 OINTMENT OPHTHALMIC at 08:31

## 2022-11-14 RX ADMIN — ACETAMINOPHEN 650 MG: 325 TABLET, FILM COATED ORAL at 19:27

## 2022-11-14 RX ADMIN — SODIUM CHLORIDE 25 ML/HR: 9 INJECTION, SOLUTION INTRAVENOUS at 14:11

## 2022-11-14 RX ADMIN — HYDRALAZINE HYDROCHLORIDE 100 MG: 50 TABLET ORAL at 16:08

## 2022-11-14 RX ADMIN — LIDOCAINE HYDROCHLORIDE 20 ML: 10 INJECTION, SOLUTION INFILTRATION; PERINEURAL at 14:34

## 2022-11-14 RX ADMIN — HYDRALAZINE HYDROCHLORIDE 100 MG: 50 TABLET ORAL at 20:54

## 2022-11-14 RX ADMIN — MIDAZOLAM 1 MG: 1 INJECTION INTRAMUSCULAR; INTRAVENOUS at 14:11

## 2022-11-14 RX ADMIN — NEOMYCIN SULFATE, POLYMYXIN B SULFATE, AND DEXAMETHASONE: 3.5; 10000; 1 OINTMENT OPHTHALMIC at 21:07

## 2022-11-14 RX ADMIN — ATORVASTATIN CALCIUM 10 MG: 20 TABLET, FILM COATED ORAL at 08:30

## 2022-11-14 RX ADMIN — Medication 1000 MCG: at 08:30

## 2022-11-14 RX ADMIN — TIMOLOL MALEATE 1 DROP: 5 SOLUTION/ DROPS OPHTHALMIC at 21:07

## 2022-11-14 RX ADMIN — HYDROMORPHONE HYDROCHLORIDE 1 MG: 1 INJECTION, SOLUTION INTRAMUSCULAR; INTRAVENOUS; SUBCUTANEOUS at 14:51

## 2022-11-14 RX ADMIN — CARVEDILOL 12.5 MG: 12.5 TABLET, FILM COATED ORAL at 08:30

## 2022-11-14 RX ADMIN — SEVELAMER CARBONATE 800 MG: 800 TABLET, FILM COATED ORAL at 16:09

## 2022-11-14 RX ADMIN — BUMETANIDE 4 MG: 2 TABLET ORAL at 20:54

## 2022-11-14 RX ADMIN — GABAPENTIN 300 MG: 300 CAPSULE ORAL at 02:03

## 2022-11-14 NOTE — PLAN OF CARE
Goal Outcome Evaluation:  Plan of Care Reviewed With: patient           Outcome Evaluation: Pt is currently finishing up the CT of the L Kidney.  Pt tolerated well.  VSS.  Will cont to monitor.

## 2022-11-14 NOTE — PROGRESS NOTES
[facility] Progress Note      Patient Name: Kelly Pack  : 1970  MRN: [id number]  Primary Care Physician:  Mattie Freeman APRN  Date of admission: 2022    Subjective     Interval History:   The patient was seen and examined today for follow-up on  BAN    anxious about her kidney biopsy this morning.  Review of Systems:   As noted above    Objective     Vitals:   Temp:  [97.3 °F (36.3 °C)-97.5 °F (36.4 °C)] 97.5 °F (36.4 °C)  Heart Rate:  [71-81] 72  Resp:  [16-18] 16  BP: (121-150)/(67-79) 121/67    Intake/Output Summary (Last 24 hours) at 2022 1035  Last data filed at 2022 0832  Gross per 24 hour   Intake 1200 ml   Output 3100 ml   Net -1900 ml       Physical Exam:    General Appearance: Ill looking, obese  Skin: warm and dry  HEENT: oral mucosa normal, nonicteric sclera.  Left eye covered  Neck: supple, no JVD  Lungs: CTA  Heart: RRR, normal S1 and S2  Abdomen: soft, nontender, distended.  Abdominal wall edema  : no palpable bladder  Extremities: 2+ pitting edema up to the thighs , cyanosis or clubbing  Neuro: normal speech and mental status     Scheduled Meds:     atorvastatin, 10 mg, Oral, Daily  bumetanide, 4 mg, Intravenous, TID  carvedilol, 12.5 mg, Oral, BID With Meals  fluticasone, 2 spray, Each Nare, Daily  hydrALAZINE, 100 mg, Oral, TID  insulin glargine, 10 Units, Subcutaneous, QAM  insulin lispro, 0-14 Units, Subcutaneous, TID AC  metOLazone, 5 mg, Oral, Daily  neomycin-polymyxin-dexamethamethasone, , Left Eye, BID  sevelamer, 800 mg, Oral, TID With Meals  sodium chloride, 10 mL, Intravenous, Q12H  timolol, 1 drop, Left Eye, Q12H  vitamin B-12, 1,000 mcg, Oral, Daily      IV Meds:        Results Reviewed:   I have personally reviewed the results from the time of this admission to 2022 10:35 EST     Results from last 7 days   Lab Units 22  0630 22  1032 22  0805 11/10/22  0419 22  0552 22  1938   SODIUM  mmol/L 143 139 143   < > 142 142   POTASSIUM mmol/L 3.7 3.9 3.8   < > 3.7 3.6   CHLORIDE mmol/L 105 101 106   < > 107 107   CO2 mmol/L 26.9 26.6 25.8   < > 24.8 22.6   BUN mg/dL 55* 51* 50*   < > 44* 45*   CREATININE mg/dL 4.38* 4.15* 4.29*   < > 4.19* 3.93*   CALCIUM mg/dL 8.1* 8.3* 8.2*   < > 8.0* 8.4*   BILIRUBIN mg/dL  --   --   --   --  <0.2 <0.2   ALK PHOS U/L  --   --   --   --  127* 138*   ALT (SGPT) U/L  --   --   --   --  11 17   AST (SGOT) U/L  --   --   --   --  14 18   GLUCOSE mg/dL 110* 149* 98   < > 77 135*    < > = values in this interval not displayed.       Estimated Creatinine Clearance: 25.2 mL/min (A) (by C-G formula based on SCr of 4.38 mg/dL (H)).    Results from last 7 days   Lab Units 11/14/22  0630 11/13/22  1032 11/12/22  0805 11/11/22  0702 11/10/22  0419 11/09/22  0552   MAGNESIUM mg/dL  --   --   --   --  2.0 2.1   PHOSPHORUS mg/dL 6.9* 6.5* 7.1*   < > 5.9* 5.4*    < > = values in this interval not displayed.             Results from last 7 days   Lab Units 11/14/22  0817 11/11/22  0702 11/10/22  0419 11/09/22  0552 11/08/22  1938   WBC 10*3/mm3 7.06 6.06 7.72 7.77 9.80   HEMOGLOBIN g/dL 9.0* 8.2* 8.1* 9.0* 9.9*   PLATELETS 10*3/mm3 339 297 311 375 428       Results from last 7 days   Lab Units 11/14/22 0817 11/09/22  0552   INR  0.98 1.01       Assessment / Plan     ASSESSMENT:  1. Acute kidney injury on top of chronic kidney disease stage III with progressive decline in kidney function in the past year.  Etiology likely secondary to uncontrolled diabetes and hypertension.  Serological work-up has been negative including YADIRA, ANCA, complement level, SPEP and immunofixation in addition to GENET 2R.  Urinalysis  With no RBC not in favor of any active nephritic pathology.  Renal ultrasound was noted with no hydronephrosis.  2. Hypertensive emergency: Blood pressure is better controlled now.  Suspect noncompliance with medication therapy.  3. Anasarca secondary to nephrotic  syndrome/hypoalbuminemia  4. Anemia of CKD/iron deficient anemia iron saturation 15 and ferritin 149  5. Type 2 diabetes mellitus with CKD.  Previously uncontrolled last A1c of 8.8  6. History of hypothyroidism  7. Bilateral lower extremity edema secondary to hypoalbuminemia/possible sleep apnea.  Doppler lower extremity was negative for DVT        PLAN:  · Patient continues to diurese well on current dose of diuretics.  Weight is it is slightly coming down  · Kidney biopsy scheduled for today  · May need to increase diuretic dose.  We will keep the same diuretic dose today given kidney biopsy.  Hopefully discharge in next 24 to 48 hours  · Surveillance labs      Thank you for involving us in the care of Kelly Pack.  Please feel free to call with any questions.    Daphne Kay MD  11/14/22  10:35 EST    [facility]  [phone]    Parts of this note may be an electronic transcription/translation of spoken language to printed text using the Dragon dictation system.

## 2022-11-14 NOTE — POST-PROCEDURE NOTE
POST PROCEDURE NOTE    Procedure: CT left kidney biopsy    Pre-Procedure Diagnosis: BAN on CKD    Post-procedure Diagnosis: same    Findings: Technically successful ct guided left kidney biopsy    Complications: No immediate    Blood loss: Minimal    Specimen Removed: Two 18 gauge cores    Disposition:   Transfer back to inpatient room

## 2022-11-14 NOTE — PROGRESS NOTES
Name: Kelly Pack ADMIT: 2022   : 1970  PCP: Mattie Freeman APRN    MRN: [id number] LOS: 6 days   AGE/SEX: 52 y.o. female  ROOM: [facility]     Subjective   Subjective   Looks okay and without new complaint    No weight done today    Review of Systems     Objective   Objective   Vital Signs  Temp:  [97.3 °F (36.3 °C)-98.1 °F (36.7 °C)] 98.1 °F (36.7 °C)  Heart Rate:  [70-81] 70  Resp:  [14-18] 16  BP: (121-167)/(67-93) 123/76  SpO2:  [90 %-99 %] 90 %  on   ;   Device (Oxygen Therapy): room air  Body mass index is 52.33 kg/m².  Physical Exam  Vitals and nursing note reviewed.   Constitutional:       Appearance: She is obese. She is ill-appearing.   HENT:      Head: Normocephalic and atraumatic.   Cardiovascular:      Rate and Rhythm: Normal rate and regular rhythm.      Pulses: Normal pulses.   Pulmonary:      Effort: Pulmonary effort is normal. No respiratory distress.      Comments: Diminished.   Abdominal:      General: Bowel sounds are normal. There is no distension.      Palpations: Abdomen is soft.      Tenderness: There is no abdominal tenderness.   Musculoskeletal:      Cervical back: Normal range of motion and neck supple.      Right lower leg: Edema present.      Left lower leg: Edema present.      Comments: 3+ bilateral with chronic stasis thickening   Skin:     General: Skin is warm and dry.      Findings: No bruising.   Neurological:      Mental Status: She is alert and oriented to person, place, and time.      Sensory: No sensory deficit.      Coordination: Coordination normal.   Psychiatric:         Mood and Affect: Mood normal.         Behavior: Behavior normal.       Results Review     I reviewed the patient's new clinical results.  Results from last 7 days   Lab Units 22  0817 22  0702 11/10/22  0419 22  0552   WBC 10*3/mm3 7.06 6.06 7.72 7.77   HEMOGLOBIN g/dL 9.0* 8.2* 8.1* 9.0*   PLATELETS 10*3/mm3 339 297 311 375     Results from last 7 days   Lab Units  11/14/22 0630 11/13/22 1032 11/12/22  0805 11/11/22  0702   SODIUM mmol/L 143 139 143 144   POTASSIUM mmol/L 3.7 3.9 3.8 4.0   CHLORIDE mmol/L 105 101 106 111*   CO2 mmol/L 26.9 26.6 25.8 25.2   BUN mg/dL 55* 51* 50* 46*   CREATININE mg/dL 4.38* 4.15* 4.29* 3.98*   GLUCOSE mg/dL 110* 149* 98 107*   EGFR mL/min/1.73 11.7* 12.5* 12.0* 13.1*     Results from last 7 days   Lab Units 11/14/22  0630 11/13/22  1032 11/12/22  0805 11/11/22  0702 11/10/22  0419 11/09/22  0552 11/08/22  1938   ALBUMIN g/dL 2.50* 2.90* 2.40* 2.60*   < > 2.50* 2.70*   BILIRUBIN mg/dL  --   --   --   --   --  <0.2 <0.2   ALK PHOS U/L  --   --   --   --   --  127* 138*   AST (SGOT) U/L  --   --   --   --   --  14 18   ALT (SGPT) U/L  --   --   --   --   --  11 17    < > = values in this interval not displayed.     Results from last 7 days   Lab Units 11/14/22  0630 11/13/22  1032 11/12/22  0805 11/11/22  0702 11/10/22  0419 11/09/22  0552   CALCIUM mg/dL 8.1* 8.3* 8.2* 7.9* 7.9* 8.0*   ALBUMIN g/dL 2.50* 2.90* 2.40* 2.60* 2.30* 2.50*   MAGNESIUM mg/dL  --   --   --   --  2.0 2.1   PHOSPHORUS mg/dL 6.9* 6.5* 7.1* 6.8* 5.9* 5.4*       Glucose   Date/Time Value Ref Range Status   11/14/2022 1222 136 (H) 70 - 130 mg/dL Final     Comment:     Meter: [id number] : [id number] Krupa SIMON NA   11/14/2022 0627 108 70 - 130 mg/dL Final     Comment:     Meter: [id number] : [id number] Glen SITNSON NA   11/13/2022 2121 119 70 - 130 mg/dL Final     Comment:     Meter: [id number] : [id number] Glen Torrez S    11/13/2022 1533 93 70 - 130 mg/dL Final     Comment:     Meter: [id number] : [id number] [facility]   11/13/2022 1035 163 (H) 70 - 130 mg/dL Final     Comment:     Meter: [id number] : [id number] [facility]   11/13/2022 0601 130 70 - 130 mg/dL Final     Comment:     Meter: [id number] : [id number] Herb Walker    11/12/2022 2053 140 (H) 70 - 130 mg/dL Final     Comment:     Meter:  [id number] : [id number] Herb Walker NA       No radiology results for the last day  Scheduled Medications  atorvastatin, 10 mg, Oral, Daily  bumetanide, 4 mg, Intravenous, TID  carvedilol, 12.5 mg, Oral, BID With Meals  fluticasone, 2 spray, Each Nare, Daily  hydrALAZINE, 100 mg, Oral, TID  insulin glargine, 10 Units, Subcutaneous, QAM  insulin lispro, 0-14 Units, Subcutaneous, TID AC  metOLazone, 5 mg, Oral, Daily  neomycin-polymyxin-dexamethamethasone, , Left Eye, BID  sevelamer, 800 mg, Oral, TID With Meals  sodium chloride, 10 mL, Intravenous, Q12H  timolol, 1 drop, Left Eye, Q12H  vitamin B-12, 1,000 mcg, Oral, Daily    Infusions   Diet  Diet Regular; Consistent Carbohydrate, Low Sodium, Renal, Daily Fluid Restriction; 1500 mL Fluid Per Day; 2,000 mg Na       Assessment/Plan     Active Hospital Problems    Diagnosis  POA   • **Acute renal failure superimposed on stage 3a chronic kidney disease (HCC) [N17.9, N18.31]  Yes   • Anasarca [R60.1]  Yes   • Suspected sleep apnea [R29.818]  Yes   • Anemia [D64.9]  Yes   • Bilateral lower extremity edema [R60.0]  Yes   • Elevated troponin [R77.8]  Yes   • Essential hypertension [I10]  Yes   • Uncontrolled type 2 diabetes mellitus with hyperglycemia ([facility]) [E11.65]  Yes   • Nephrotic range proteinuria [R80.9]  Yes      Resolved Hospital Problems    Diagnosis Date Resolved POA   • Hypertensive emergency [I16.1] 11/10/2022 Yes       52 y.o. female admitted with Acute renal failure superimposed on stage 3a chronic kidney disease (HCC).    BAN/CKD 3a- remains relatively stable today.    -24 hr urine with 4 g protein  - Renal ultrasound negative for hydronephrosis  - Serologic evaluation negative  - Renal biopsy pending today  -Diuretics per nephrology    Anemia- stable.  No obvious bleed  - Labs ordered showing iron sat 15%, TIBC low.  Total serum iron 39, mostly consistent with chronic disease  - IV Ferrlecit given.  Also on oral B12    Hypertension-Better overall.  Continue current regimen.    DM2- tightly controlled on lower dose Lantus. No changes today.     Abnormal telemetry strips-cardiology consult reviewed, felt to be artifactual, not VT  - Mild troponin elevation due to renal dysfunction.  - Echo unremarkable      · Heparin SC for DVT prophylaxis on hold today for renal biopsy.  Resume tomorrow most likely.  · Disposition: Hopefully home Tuesday/Wednesday      Mikael Leigh MD  [facility]  11/14/22  16:38 EST

## 2022-11-14 NOTE — CASE MANAGEMENT/SOCIAL WORK
Continued Stay Note  [facility]     Patient Name: Kelly Pack  MRN: [id number]  Today's Date: 11/14/2022    Admit Date: 11/8/2022    Plan: Plan home.   CLARENCE Tucker RN   Discharge Plan     Row Name 11/14/22 1312       Plan    Plan Plan home.   CLARENCE Tucker RN    Plan Comments Spoke with pt at bedside.  Pt denies any discharge needs.  Pt to have kidney biopsy today.  Plan home.  CLARENCE Tucker RN               Discharge Codes    No documentation.               Expected Discharge Date and Time     Expected Discharge Date Expected Discharge Time    Nov 14, 2022             Cecy Tucker RN

## 2022-11-14 NOTE — NURSING NOTE
Patient arrived in Radiology Triage [place] 12 for CT Guided Kidney Biopsy.    Protective goggles and mask in place with all patient interactions today.

## 2022-11-14 NOTE — PLAN OF CARE
Goal Outcome Evaluation:  Plan of Care Reviewed With: patient      Pt A&Ox4. Pt swelling in extremities is slowly going down. Pt urinated about 1700ml. Oral intake is 600ml for dayshift. Wound care, bs monitoring, daily weights and fluid restriction continued. SR on the monitor. Pt for going for CT guided renal biopsy tmrw. Nightshift to give night dose of heparin, morning dose of heparin subq is currently on hold per Dr. Astudillo. VSS. NAD noted. Will continue to monitor.  Progress: improving

## 2022-11-15 ENCOUNTER — APPOINTMENT (OUTPATIENT)
Dept: CARDIOLOGY | Facility: HOSPITAL | Age: 52
End: 2022-11-15

## 2022-11-15 LAB
ALBUMIN SERPL-MCNC: 2.8 G/DL (ref 3.5–5.2)
ANION GAP SERPL CALCULATED.3IONS-SCNC: 10.7 MMOL/L (ref 5–15)
BASOPHILS # BLD AUTO: 0.04 10*3/MM3 (ref 0–0.2)
BASOPHILS NFR BLD AUTO: 0.5 % (ref 0–1.5)
BH CV UPPER VENOUS LEFT BASILIC FOREARM COMPRESS: NORMAL
BH CV UPPER VENOUS LEFT BASILIC UPPER COMPRESS: NORMAL
BH CV UPPER VENOUS LEFT BRACHIAL COMPRESS: NORMAL
BH CV UPPER VENOUS LEFT CEPHALIC FOREARM COLOR: 1
BH CV UPPER VENOUS LEFT CEPHALIC FOREARM COMPRESS: NORMAL
BH CV UPPER VENOUS LEFT CEPHALIC FOREARM THROMBUS: NORMAL
BH CV UPPER VENOUS LEFT CEPHALIC UPPER COLOR: 1
BH CV UPPER VENOUS LEFT CEPHALIC UPPER COMPRESS: NORMAL
BH CV UPPER VENOUS LEFT CEPHALIC UPPER THROMBUS: NORMAL
BH CV UPPER VENOUS LEFT INTERNAL JUGULAR AUGMENT: NORMAL
BH CV UPPER VENOUS LEFT INTERNAL JUGULAR COMPRESS: NORMAL
BH CV UPPER VENOUS LEFT INTERNAL JUGULAR PHASIC: NORMAL
BH CV UPPER VENOUS LEFT INTERNAL JUGULAR SPONT: NORMAL
BH CV UPPER VENOUS LEFT RADIAL COMPRESS: NORMAL
BH CV UPPER VENOUS LEFT SUBCLAVIAN AUGMENT: NORMAL
BH CV UPPER VENOUS LEFT SUBCLAVIAN COMPRESS: NORMAL
BH CV UPPER VENOUS LEFT SUBCLAVIAN PHASIC: NORMAL
BH CV UPPER VENOUS LEFT SUBCLAVIAN SPONT: NORMAL
BH CV UPPER VENOUS LEFT ULNAR COMPRESS: NORMAL
BH CV UPPER VENOUS RIGHT BASILIC FOREARM COMPRESS: NORMAL
BH CV UPPER VENOUS RIGHT BASILIC UPPER COMPRESS: NORMAL
BH CV UPPER VENOUS RIGHT BRACHIAL COMPRESS: NORMAL
BH CV UPPER VENOUS RIGHT CEPHALIC FOREARM COMPRESS: NORMAL
BH CV UPPER VENOUS RIGHT CEPHALIC UPPER COMPRESS: NORMAL
BH CV UPPER VENOUS RIGHT INTERNAL JUGULAR AUGMENT: NORMAL
BH CV UPPER VENOUS RIGHT INTERNAL JUGULAR COMPRESS: NORMAL
BH CV UPPER VENOUS RIGHT INTERNAL JUGULAR PHASIC: NORMAL
BH CV UPPER VENOUS RIGHT INTERNAL JUGULAR SPONT: NORMAL
BH CV UPPER VENOUS RIGHT RADIAL COMPRESS: NORMAL
BH CV UPPER VENOUS RIGHT SUBCLAVIAN AUGMENT: NORMAL
BH CV UPPER VENOUS RIGHT SUBCLAVIAN COMPRESS: NORMAL
BH CV UPPER VENOUS RIGHT SUBCLAVIAN PHASIC: NORMAL
BH CV UPPER VENOUS RIGHT SUBCLAVIAN SPONT: NORMAL
BH CV UPPER VENOUS RIGHT ULNAR COMPRESS: NORMAL
BH CV VAS MEAS BASILIC ANTECUBITAL FOSSA LEFT: 0.32 CM
BH CV VAS MEAS BASILIC ANTECUBITAL FOSSA RIGHT: 0.31 CM
BH CV VAS MEAS BASILIC FOREARM LEFT - DIST: 0.3 CM
BH CV VAS MEAS BASILIC FOREARM LEFT - MID: 0.31 CM
BH CV VAS MEAS BASILIC FOREARM LEFT - PROX: 0.32 CM
BH CV VAS MEAS BASILIC FOREARM RIGHT - DIST: 0.19 CM
BH CV VAS MEAS BASILIC FOREARM RIGHT - MID: 0.3 CM
BH CV VAS MEAS BASILIC FOREARM RIGHT - PROX: 0.3 CM
BH CV VAS MEAS BASILIC UPPER ARM LEFT - DIST: 0.37 CM
BH CV VAS MEAS BASILIC UPPER ARM LEFT - MID: 0.37 CM
BH CV VAS MEAS BASILIC UPPER ARM LEFT - PROX: 0.6 CM
BH CV VAS MEAS BASILIC UPPER ARM RIGHT - DIST: 0.47 CM
BH CV VAS MEAS BASILIC UPPER ARM RIGHT - MID: 0.52 CM
BH CV VAS MEAS BASILIC UPPER ARM RIGHT - PROX: 0.57 CM
BH CV VAS MEAS CEPHALIC ANTECUBITAL FOSSA RIGHT: 0.66 CM
BH CV VAS MEAS CEPHALIC FOREARM LEFT - DIST: 0.22 CM
BH CV VAS MEAS CEPHALIC FOREARM LEFT - MID: 0.27 CM
BH CV VAS MEAS CEPHALIC FOREARM LEFT - PROX: 0.28 CM
BH CV VAS MEAS CEPHALIC FOREARM RIGHT - DIST: 0.37 CM
BH CV VAS MEAS CEPHALIC FOREARM RIGHT - MID: 0.4 CM
BH CV VAS MEAS CEPHALIC FOREARM RIGHT - PROX: 0.35 CM
BH CV VAS MEAS CEPHALIC UPPER ARM LEFT - PROX: 0.36 CM
BH CV VAS MEAS CEPHALIC UPPER ARM RIGHT - DIST: 0.39 CM
BH CV VAS MEAS CEPHALIC UPPER ARM RIGHT - MID: 0.45 CM
BH CV VAS MEAS CEPHALIC UPPER ARM RIGHT - PROX: 0.38 CM
BH CV VAS MEAS RADIAL UPPER ARM LEFT - DIST: 0.15 CM
BH CV VAS MEAS RADIAL UPPER ARM LEFT - MID: 0.18 CM
BH CV VAS MEAS RADIAL UPPER ARM LEFT - PROX: 0.15 CM
BH CV VAS MEAS RADIAL UPPER ARM RIGHT - DIST: 0.16 CM
BH CV VAS MEAS RADIAL UPPER ARM RIGHT - MID: 0.22 CM
BH CV VAS MEAS RADIAL UPPER ARM RIGHT - PROX: 0.18 CM
BUN SERPL-MCNC: 60 MG/DL (ref 6–20)
BUN/CREAT SERPL: 13.3 (ref 7–25)
CALCIUM SPEC-SCNC: 8.2 MG/DL (ref 8.6–10.5)
CHLORIDE SERPL-SCNC: 102 MMOL/L (ref 98–107)
CO2 SERPL-SCNC: 26.3 MMOL/L (ref 22–29)
CREAT SERPL-MCNC: 4.5 MG/DL (ref 0.57–1)
DEPRECATED RDW RBC AUTO: 43 FL (ref 37–54)
EGFRCR SERPLBLD CKD-EPI 2021: 11.3 ML/MIN/1.73
EOSINOPHIL # BLD AUTO: 0.27 10*3/MM3 (ref 0–0.4)
EOSINOPHIL NFR BLD AUTO: 3.2 % (ref 0.3–6.2)
ERYTHROCYTE [DISTWIDTH] IN BLOOD BY AUTOMATED COUNT: 12.7 % (ref 12.3–15.4)
GLUCOSE BLDC GLUCOMTR-MCNC: 106 MG/DL (ref 70–130)
GLUCOSE BLDC GLUCOMTR-MCNC: 137 MG/DL (ref 70–130)
GLUCOSE BLDC GLUCOMTR-MCNC: 140 MG/DL (ref 70–130)
GLUCOSE BLDC GLUCOMTR-MCNC: 141 MG/DL (ref 70–130)
GLUCOSE SERPL-MCNC: 132 MG/DL (ref 65–99)
HCT VFR BLD AUTO: 25.8 % (ref 34–46.6)
HGB BLD-MCNC: 8.2 G/DL (ref 12–15.9)
IMM GRANULOCYTES # BLD AUTO: 0.02 10*3/MM3 (ref 0–0.05)
IMM GRANULOCYTES NFR BLD AUTO: 0.2 % (ref 0–0.5)
LYMPHOCYTES # BLD AUTO: 1.29 10*3/MM3 (ref 0.7–3.1)
LYMPHOCYTES NFR BLD AUTO: 15.3 % (ref 19.6–45.3)
MAXIMAL PREDICTED HEART RATE: 170 BPM
MCH RBC QN AUTO: 29.6 PG (ref 26.6–33)
MCHC RBC AUTO-ENTMCNC: 31.8 G/DL (ref 31.5–35.7)
MCV RBC AUTO: 93.1 FL (ref 79–97)
MONOCYTES # BLD AUTO: 0.69 10*3/MM3 (ref 0.1–0.9)
MONOCYTES NFR BLD AUTO: 8.2 % (ref 5–12)
NEUTROPHILS NFR BLD AUTO: 6.13 10*3/MM3 (ref 1.7–7)
NEUTROPHILS NFR BLD AUTO: 72.6 % (ref 42.7–76)
NRBC BLD AUTO-RTO: 0 /100 WBC (ref 0–0.2)
PHOSPHATE SERPL-MCNC: 7.1 MG/DL (ref 2.5–4.5)
PLATELET # BLD AUTO: 299 10*3/MM3 (ref 140–450)
PMV BLD AUTO: 9.6 FL (ref 6–12)
POTASSIUM SERPL-SCNC: 4 MMOL/L (ref 3.5–5.2)
RBC # BLD AUTO: 2.77 10*6/MM3 (ref 3.77–5.28)
SODIUM SERPL-SCNC: 139 MMOL/L (ref 136–145)
STRESS TARGET HR: 145 BPM
UPPER ARTERIAL LEFT ARM BRACHIAL LENGTH: 0.54 CM
UPPER ARTERIAL RIGHT ARM BRACHIAL LENGTH: 0.44 CM
WBC NRBC COR # BLD: 8.44 10*3/MM3 (ref 3.4–10.8)

## 2022-11-15 PROCEDURE — 85025 COMPLETE CBC W/AUTO DIFF WBC: CPT | Performed by: HOSPITALIST

## 2022-11-15 PROCEDURE — 82962 GLUCOSE BLOOD TEST: CPT

## 2022-11-15 PROCEDURE — 93985 DUP-SCAN HEMO COMPL BI STD: CPT

## 2022-11-15 PROCEDURE — 80069 RENAL FUNCTION PANEL: CPT | Performed by: HOSPITALIST

## 2022-11-15 RX ORDER — BUMETANIDE 2 MG/1
2 TABLET ORAL 3 TIMES DAILY
Status: DISCONTINUED | OUTPATIENT
Start: 2022-11-15 | End: 2022-11-16 | Stop reason: HOSPADM

## 2022-11-15 RX ORDER — BUMETANIDE 2 MG/1
4 TABLET ORAL 3 TIMES DAILY
Qty: 180 TABLET | Refills: 0 | Status: SHIPPED | OUTPATIENT
Start: 2022-11-15 | End: 2022-11-16 | Stop reason: HOSPADM

## 2022-11-15 RX ORDER — CARVEDILOL 25 MG/1
25 TABLET ORAL 2 TIMES DAILY WITH MEALS
Status: DISCONTINUED | OUTPATIENT
Start: 2022-11-15 | End: 2022-11-16 | Stop reason: HOSPADM

## 2022-11-15 RX ORDER — SEVELAMER CARBONATE 800 MG/1
800 TABLET, FILM COATED ORAL
Qty: 90 TABLET | Refills: 0 | Status: SHIPPED | OUTPATIENT
Start: 2022-11-15 | End: 2022-11-16 | Stop reason: SDUPTHER

## 2022-11-15 RX ORDER — CARVEDILOL 25 MG/1
25 TABLET ORAL 2 TIMES DAILY WITH MEALS
Qty: 60 TABLET | Refills: 1 | Status: SHIPPED | OUTPATIENT
Start: 2022-11-15

## 2022-11-15 RX ORDER — METOLAZONE 5 MG/1
5 TABLET ORAL DAILY
Qty: 30 TABLET | Refills: 1 | Status: SHIPPED | OUTPATIENT
Start: 2022-11-15

## 2022-11-15 RX ADMIN — HYDRALAZINE HYDROCHLORIDE 100 MG: 50 TABLET ORAL at 15:48

## 2022-11-15 RX ADMIN — BUMETANIDE 2 MG: 2 TABLET ORAL at 20:28

## 2022-11-15 RX ADMIN — BUMETANIDE 2 MG: 2 TABLET ORAL at 15:48

## 2022-11-15 RX ADMIN — TIMOLOL MALEATE 1 DROP: 5 SOLUTION/ DROPS OPHTHALMIC at 20:31

## 2022-11-15 RX ADMIN — NEOMYCIN SULFATE, POLYMYXIN B SULFATE, AND DEXAMETHASONE: 3.5; 10000; 1 OINTMENT OPHTHALMIC at 08:46

## 2022-11-15 RX ADMIN — METOLAZONE 5 MG: 5 TABLET ORAL at 15:48

## 2022-11-15 RX ADMIN — INSULIN GLARGINE-YFGN 10 UNITS: 100 INJECTION, SOLUTION SUBCUTANEOUS at 06:48

## 2022-11-15 RX ADMIN — FLUTICASONE PROPIONATE 2 SPRAY: 50 SPRAY, METERED NASAL at 08:45

## 2022-11-15 RX ADMIN — SEVELAMER CARBONATE 800 MG: 800 TABLET, FILM COATED ORAL at 08:45

## 2022-11-15 RX ADMIN — BUMETANIDE 4 MG: 2 TABLET ORAL at 08:44

## 2022-11-15 RX ADMIN — ACETAMINOPHEN 650 MG: 325 TABLET, FILM COATED ORAL at 08:50

## 2022-11-15 RX ADMIN — CARVEDILOL 25 MG: 12.5 TABLET, FILM COATED ORAL at 08:44

## 2022-11-15 RX ADMIN — Medication 1000 MCG: at 08:44

## 2022-11-15 RX ADMIN — CARVEDILOL 25 MG: 12.5 TABLET, FILM COATED ORAL at 18:07

## 2022-11-15 RX ADMIN — SEVELAMER CARBONATE 800 MG: 800 TABLET, FILM COATED ORAL at 18:07

## 2022-11-15 RX ADMIN — SEVELAMER CARBONATE 800 MG: 800 TABLET, FILM COATED ORAL at 13:07

## 2022-11-15 RX ADMIN — ATORVASTATIN CALCIUM 10 MG: 20 TABLET, FILM COATED ORAL at 08:44

## 2022-11-15 RX ADMIN — HYDRALAZINE HYDROCHLORIDE 100 MG: 50 TABLET ORAL at 08:44

## 2022-11-15 RX ADMIN — ACETAMINOPHEN 650 MG: 325 TABLET, FILM COATED ORAL at 20:28

## 2022-11-15 RX ADMIN — NEOMYCIN SULFATE, POLYMYXIN B SULFATE, AND DEXAMETHASONE 1 APPLICATION: 3.5; 10000; 1 OINTMENT OPHTHALMIC at 20:31

## 2022-11-15 RX ADMIN — Medication 10 ML: at 08:45

## 2022-11-15 RX ADMIN — Medication 10 ML: at 22:17

## 2022-11-15 RX ADMIN — TIMOLOL MALEATE 1 DROP: 5 SOLUTION/ DROPS OPHTHALMIC at 08:46

## 2022-11-15 RX ADMIN — HYDRALAZINE HYDROCHLORIDE 100 MG: 50 TABLET ORAL at 20:35

## 2022-11-15 NOTE — PROGRESS NOTES
Name: Kelly Pack ADMIT: 2022   : 1970  PCP: Mattie Freeman APRN    MRN: [id number] LOS: 7 days   AGE/SEX: 52 y.o. female  ROOM: [facility]     Subjective   Subjective   Seen this morning prior to vein mapping.  Doing well at that time but apparently complaining of dizziness this afternoon and not feeling well    Review of Systems     Objective   Objective   Vital Signs  Temp:  [97.3 °F (36.3 °C)-98.1 °F (36.7 °C)] 98 °F (36.7 °C)  Heart Rate:  [71-94] 75  Resp:  [16-18] 18  BP: (131-157)/(69-95) 133/71  SpO2:  [94 %-96 %] 94 %  on   ;   Device (Oxygen Therapy): room air  Body mass index is 51.65 kg/m².  Physical Exam  Vitals and nursing note reviewed.   Constitutional:       Appearance: She is obese. She is ill-appearing.   HENT:      Head: Normocephalic and atraumatic.   Cardiovascular:      Rate and Rhythm: Normal rate and regular rhythm.      Pulses: Normal pulses.   Pulmonary:      Effort: Pulmonary effort is normal. No respiratory distress.      Comments: Diminished.   Abdominal:      General: Bowel sounds are normal. There is no distension.      Palpations: Abdomen is soft.      Tenderness: There is no abdominal tenderness.   Musculoskeletal:      Cervical back: Normal range of motion and neck supple.      Right lower leg: Edema present.      Left lower leg: Edema present.      Comments: 3+ bilateral with chronic stasis thickening   Skin:     General: Skin is warm and dry.      Findings: No bruising.   Neurological:      Mental Status: She is alert and oriented to person, place, and time.      Sensory: No sensory deficit.      Coordination: Coordination normal.   Psychiatric:         Mood and Affect: Mood normal.         Behavior: Behavior normal.       Results Review     I reviewed the patient's new clinical results.  Results from last 7 days   Lab Units 11/15/22  1306 22  0817 22  0702 11/10/22  0419   WBC 10*3/mm3 8.44 7.06 6.06 7.72   HEMOGLOBIN g/dL 8.2* 9.0* 8.2* 8.1*    PLATELETS 10*3/mm3 299 339 297 311     Results from last 7 days   Lab Units 11/15/22  1306 11/14/22 0630 11/13/22 1032 11/12/22  0805   SODIUM mmol/L 139 143 139 143   POTASSIUM mmol/L 4.0 3.7 3.9 3.8   CHLORIDE mmol/L 102 105 101 106   CO2 mmol/L 26.3 26.9 26.6 25.8   BUN mg/dL 60* 55* 51* 50*   CREATININE mg/dL 4.50* 4.38* 4.15* 4.29*   GLUCOSE mg/dL 132* 110* 149* 98   EGFR mL/min/1.73 11.3* 11.7* 12.5* 12.0*     Results from last 7 days   Lab Units 11/15/22  1306 11/14/22 0630 11/13/22 1032 11/12/22  0805 11/10/22  0419 11/09/22  0552 11/08/22  1938   ALBUMIN g/dL 2.80* 2.50* 2.90* 2.40*   < > 2.50* 2.70*   BILIRUBIN mg/dL  --   --   --   --   --  <0.2 <0.2   ALK PHOS U/L  --   --   --   --   --  127* 138*   AST (SGOT) U/L  --   --   --   --   --  14 18   ALT (SGPT) U/L  --   --   --   --   --  11 17    < > = values in this interval not displayed.     Results from last 7 days   Lab Units 11/15/22  1306 11/14/22 0630 11/13/22 1032 11/12/22  0805 11/11/22  0702 11/10/22  0419 11/09/22  0552   CALCIUM mg/dL 8.2* 8.1* 8.3* 8.2*   < > 7.9* 8.0*   ALBUMIN g/dL 2.80* 2.50* 2.90* 2.40*   < > 2.30* 2.50*   MAGNESIUM mg/dL  --   --   --   --   --  2.0 2.1   PHOSPHORUS mg/dL 7.1* 6.9* 6.5* 7.1*   < > 5.9* 5.4*    < > = values in this interval not displayed.       Glucose   Date/Time Value Ref Range Status   11/15/2022 1318 137 (H) 70 - 130 mg/dL Final     Comment:     Meter: [id number] : [id number] Otilia Jymie ANTHONY   11/15/2022 0623 106 70 - 130 mg/dL Final     Comment:     Meter: [id number] : [id number] Alicante Ariane Setha S NA   11/14/2022 2108 131 (H) 70 - 130 mg/dL Final     Comment:     Meter: [id number] : [id number] Alicante Ariane Setha S NA   11/14/2022 1712 141 (H) 70 - 130 mg/dL Final     Comment:     Meter: [id number] : [id number] Krupa Lawrence KKari CRUZ   11/14/2022 1222 136 (H) 70 - 130 mg/dL Final     Comment:     Meter: [id number] : [id number] Krupa Lawrence KKari CRUZ   11/14/2022  0627 108 70 - 130 mg/dL Final     Comment:     Meter: [id number] : [id number] Glen CRUZ   11/13/2022 2121 119 70 - 130 mg/dL Final     Comment:     Meter: [id number] : [id number] Glen CRUZ       CT Needle Biopsy Kidney Renal    Result Date: 11/15/2022  Technically successful CT guided left kidney biopsy.  Moderate sedation was provided under my direct supervision using 1 mg IV Versed and 50 mcg IV Fentanyl. The patient was independently monitored by a trained Department of Radiology RN using automated blood pressure, EKG, and pulse oximetry. My total intra-service time was 13 minutes.       Radiation dose reduction techniques were utilized, including automated exposure control and exposure modulation based on body size.  This report was finalized on 11/15/2022 7:22 AM by Dr. Tyler Gray M.D.      Scheduled Medications  atorvastatin, 10 mg, Oral, Daily  bumetanide, 2 mg, Oral, TID  carvedilol, 25 mg, Oral, BID With Meals  fluticasone, 2 spray, Each Nare, Daily  hydrALAZINE, 100 mg, Oral, TID  insulin glargine, 10 Units, Subcutaneous, QAM  insulin lispro, 0-14 Units, Subcutaneous, TID AC  metOLazone, 5 mg, Oral, Daily  neomycin-polymyxin-dexamethamethasone, , Left Eye, BID  sevelamer, 800 mg, Oral, TID With Meals  sodium chloride, 10 mL, Intravenous, Q12H  timolol, 1 drop, Left Eye, Q12H  vitamin B-12, 1,000 mcg, Oral, Daily    Infusions   Diet  Diet Regular Texture (IDDSI 7); Regular Consistency; Regular/House Diet, Diabetic Diets, Fluid Restriction (240 mL/tray) Diets, Renal Diets; Consistent Carbohydrate; Low Sodium (2-3g); 1500 mL/day       Assessment/Plan     Active Hospital Problems    Diagnosis  POA   • **Acute renal failure superimposed on stage 3a chronic kidney disease (HCC) [N17.9, N18.31]  Yes   • Anasarca [R60.1]  Yes   • Suspected sleep apnea [R29.818]  Yes   • Anemia [D64.9]  Yes   • Bilateral lower extremity edema [R60.0]  Yes   • Elevated troponin  [R77.8]  Yes   • Essential hypertension [I10]  Yes   • Uncontrolled type 2 diabetes mellitus with hyperglycemia (HCC) [E11.65]  Yes   • Nephrotic range proteinuria [R80.9]  Yes      Resolved Hospital Problems    Diagnosis Date Resolved POA   • Hypertensive emergency [I16.1] 11/10/2022 Yes       52 y.o. female admitted with Acute renal failure superimposed on stage 3a chronic kidney disease (HCC).    BAN/CKD 3a- creatinine drifting upward  -24 hr urine with 4 g protein  - Renal ultrasound negative for hydronephrosis  - Serologic evaluation negative  - Renal biopsy results pending   -Diuretics per nephrology.  Discussed with Dr. Reagan Hanson, switching to p.o. and decreasing dose today.    Anemia- down slightly today but overall stable for the last several days when trends reviewed.  No obvious bleed  - Labs ordered showing iron sat 15%, TIBC low.  Total serum iron 39, mostly consistent with chronic disease  - IV Ferrlecit given.  Also on oral B12    Hypertension- improved control but slightly orthostatic.  We will see how she does with decreased diuretic dose    DM2- fairly controlled on lower dose Lantus. No changes today.     Abnormal telemetry strips-cardiology consult reviewed, felt to be artifactual, not VT  - Mild troponin elevation due to renal dysfunction.  - Echo unremarkable    LUE superficial thrombophlebitis noted on vein mapping.  No treatment indicated at this time      · Heparin SC for DVT prophylaxis  · Disposition: Hopefully home Wednesday      Mikael Leigh MD  [facility]  11/15/22  15:20 EST

## 2022-11-15 NOTE — PLAN OF CARE
Goal Outcome Evaluation:  Plan of Care Reviewed With: patient           Outcome Evaluation: Vein mapping done today.  Her kidney function did not improve so the bumex and decreased to 2mg TID.  Possible DC tomorrow pending kidney fucntion.  VSS.  Will Cont to monitor.

## 2022-11-15 NOTE — PROGRESS NOTES
[facility] Progress Note      Patient Name: Kelly Pack  : 1970  MRN: [id number]  Primary Care Physician:  Mattie Freeman APRN  Date of admission: 2022    Subjective     Interval History:   Patient underwent a kidney biopsy yesterday.  Her postoperative course was complicated by pain at the site of the biopsy.  Denies any nausea or vomiting.  No fever or chills.  No weight today  Review of Systems:   As noted above    Objective     Vitals:   Temp:  [97.3 °F (36.3 °C)-98.1 °F (36.7 °C)] 98 °F (36.7 °C)  Heart Rate:  [70-80] 80  Resp:  [14-18] 16  BP: (123-167)/(70-95) 157/95    Intake/Output Summary (Last 24 hours) at 11/15/2022 0818  Last data filed at 11/15/2022 0600  Gross per 24 hour   Intake 1020 ml   Output 1850 ml   Net -830 ml       Physical Exam:    General Appearance: Ill looking, obese  Skin: warm and dry  HEENT: oral mucosa normal, nonicteric sclera.  Left eye covered  Neck: supple, no JVD  Lungs: CTA  Heart: RRR, normal S1 and S2  Abdomen: soft, nontender, distended.  Abdominal wall edema  : no palpable bladder  Extremities: 2+ pitting edema up to the thighs , cyanosis or clubbing  Neuro: normal speech and mental status     Scheduled Meds:     atorvastatin, 10 mg, Oral, Daily  bumetanide, 4 mg, Oral, TID  carvedilol, 12.5 mg, Oral, BID With Meals  fluticasone, 2 spray, Each Nare, Daily  hydrALAZINE, 100 mg, Oral, TID  insulin glargine, 10 Units, Subcutaneous, QAM  insulin lispro, 0-14 Units, Subcutaneous, TID AC  metOLazone, 5 mg, Oral, Daily  neomycin-polymyxin-dexamethamethasone, , Left Eye, BID  sevelamer, 800 mg, Oral, TID With Meals  sodium chloride, 10 mL, Intravenous, Q12H  timolol, 1 drop, Left Eye, Q12H  vitamin B-12, 1,000 mcg, Oral, Daily      IV Meds:        Results Reviewed:   I have personally reviewed the results from the time of this admission to 11/15/2022 08:18 EST     Results from last 7 days   Lab Units 22  0630 22  1032  11/12/22  0805 11/10/22  0419 11/09/22  0552 11/08/22  1938   SODIUM mmol/L 143 139 143   < > 142 142   POTASSIUM mmol/L 3.7 3.9 3.8   < > 3.7 3.6   CHLORIDE mmol/L 105 101 106   < > 107 107   CO2 mmol/L 26.9 26.6 25.8   < > 24.8 22.6   BUN mg/dL 55* 51* 50*   < > 44* 45*   CREATININE mg/dL 4.38* 4.15* 4.29*   < > 4.19* 3.93*   CALCIUM mg/dL 8.1* 8.3* 8.2*   < > 8.0* 8.4*   BILIRUBIN mg/dL  --   --   --   --  <0.2 <0.2   ALK PHOS U/L  --   --   --   --  127* 138*   ALT (SGPT) U/L  --   --   --   --  11 17   AST (SGOT) U/L  --   --   --   --  14 18   GLUCOSE mg/dL 110* 149* 98   < > 77 135*    < > = values in this interval not displayed.       Estimated Creatinine Clearance: 25.2 mL/min (A) (by C-G formula based on SCr of 4.38 mg/dL (H)).    Results from last 7 days   Lab Units 11/14/22  0630 11/13/22  1032 11/12/22  0805 11/11/22  0702 11/10/22  0419 11/09/22  0552   MAGNESIUM mg/dL  --   --   --   --  2.0 2.1   PHOSPHORUS mg/dL 6.9* 6.5* 7.1*   < > 5.9* 5.4*    < > = values in this interval not displayed.             Results from last 7 days   Lab Units 11/14/22  0817 11/11/22  0702 11/10/22  0419 11/09/22  0552 11/08/22  1938   WBC 10*3/mm3 7.06 6.06 7.72 7.77 9.80   HEMOGLOBIN g/dL 9.0* 8.2* 8.1* 9.0* 9.9*   PLATELETS 10*3/mm3 339 297 311 375 428       Results from last 7 days   Lab Units 11/14/22 0817 11/09/22  0552   INR  0.98 1.01       Assessment / Plan     ASSESSMENT:  1. Acute kidney injury on top of chronic kidney disease stage III with progressive decline in kidney function in the past year.  Etiology likely secondary to uncontrolled diabetes and hypertension.  Serological work-up has been negative including YADIRA, ANCA, complement level, SPEP and immunofixation in addition to GENET 2R.  Urinalysis  With no RBC not in favor of any active nephritic pathology.  Renal ultrasound was noted with no hydronephrosis.  2. Hypertensive emergency: Blood pressure is better controlled now.  Suspect noncompliance with  medication therapy.  3. Anasarca secondary to nephrotic syndrome/hypoalbuminemia  4. Anemia of CKD/iron deficient anemia iron saturation 15 and ferritin 149  5. Type 2 diabetes mellitus with CKD.  Previously uncontrolled last A1c of 8.8  6. History of hypothyroidism  7. Bilateral lower extremity edema secondary to hypoalbuminemia/possible sleep apnea.  Doppler lower extremity was negative for DVT        PLAN:  · Patient had a kidney biopsy performed yesterday.  · Continue Bumex 4 mg 3 times daily and metolazone 5 mg daily awaiting repeat renal panel today.  · Recheck weight today.  · May discharge home if creatinine remains stable  · No indication for dialysis today but patient very close to needing dialysis.  We will do vein mapping of upper extremities.  · Increase carvedilol to 25 mg twice daily given hypertension.      Thank you for involving us in the care of Kelly Pack.  Please feel free to call with any questions.    Daphne Kay MD  11/15/22  08:18 [facility]    [facility]  [phone]    Parts of this note may be an electronic transcription/translation of spoken language to printed text using the Dragon dictation system.

## 2022-11-15 NOTE — PLAN OF CARE
Goal Outcome Evaluation:  Plan of Care Reviewed With: patient      Pt is alert and oriented x 4. Pt has received all appropriate medications and pt's vital signs have been  stable throughout the shift. Pain was treated, see Mar. Will continue to monitor.

## 2022-11-15 NOTE — PROGRESS NOTES
Today's preliminary report on bilateral upper extremity venous Doppler. Right arm is negative; left arm is positive for acute superficial venous thrombus. Report called to floor VICTORIA LINDA

## 2022-11-16 ENCOUNTER — READMISSION MANAGEMENT (OUTPATIENT)
Dept: CALL CENTER | Facility: HOSPITAL | Age: 52
End: 2022-11-16

## 2022-11-16 VITALS
HEIGHT: 69 IN | DIASTOLIC BLOOD PRESSURE: 66 MMHG | RESPIRATION RATE: 18 BRPM | HEART RATE: 72 BPM | BODY MASS INDEX: 43.4 KG/M2 | WEIGHT: 293 LBS | SYSTOLIC BLOOD PRESSURE: 128 MMHG | TEMPERATURE: 97.5 F | OXYGEN SATURATION: 99 %

## 2022-11-16 LAB
ALBUMIN SERPL-MCNC: 2.5 G/DL (ref 3.5–5.2)
ANION GAP SERPL CALCULATED.3IONS-SCNC: 8.7 MMOL/L (ref 5–15)
BASOPHILS # BLD AUTO: 0.04 10*3/MM3 (ref 0–0.2)
BASOPHILS NFR BLD AUTO: 0.5 % (ref 0–1.5)
BUN SERPL-MCNC: 66 MG/DL (ref 6–20)
BUN/CREAT SERPL: 14.4 (ref 7–25)
CALCIUM SPEC-SCNC: 7.7 MG/DL (ref 8.6–10.5)
CHLORIDE SERPL-SCNC: 102 MMOL/L (ref 98–107)
CO2 SERPL-SCNC: 25.3 MMOL/L (ref 22–29)
CREAT SERPL-MCNC: 4.58 MG/DL (ref 0.57–1)
DEPRECATED RDW RBC AUTO: 40 FL (ref 37–54)
EGFRCR SERPLBLD CKD-EPI 2021: 11.1 ML/MIN/1.73
EOSINOPHIL # BLD AUTO: 0.23 10*3/MM3 (ref 0–0.4)
EOSINOPHIL NFR BLD AUTO: 2.9 % (ref 0.3–6.2)
ERYTHROCYTE [DISTWIDTH] IN BLOOD BY AUTOMATED COUNT: 12.2 % (ref 12.3–15.4)
GLUCOSE BLDC GLUCOMTR-MCNC: 125 MG/DL (ref 70–130)
GLUCOSE BLDC GLUCOMTR-MCNC: 138 MG/DL (ref 70–130)
GLUCOSE SERPL-MCNC: 106 MG/DL (ref 65–99)
HCT VFR BLD AUTO: 21.9 % (ref 34–46.6)
HCT VFR BLD AUTO: 24.2 % (ref 34–46.6)
HGB BLD-MCNC: 7.3 G/DL (ref 12–15.9)
HGB BLD-MCNC: 7.7 G/DL (ref 12–15.9)
IMM GRANULOCYTES # BLD AUTO: 0.04 10*3/MM3 (ref 0–0.05)
IMM GRANULOCYTES NFR BLD AUTO: 0.5 % (ref 0–0.5)
LAB AP CASE REPORT: NORMAL
LAB AP CLINICAL INFORMATION: NORMAL
LYMPHOCYTES # BLD AUTO: 1.52 10*3/MM3 (ref 0.7–3.1)
LYMPHOCYTES NFR BLD AUTO: 18.9 % (ref 19.6–45.3)
MCH RBC QN AUTO: 30.4 PG (ref 26.6–33)
MCHC RBC AUTO-ENTMCNC: 33.3 G/DL (ref 31.5–35.7)
MCV RBC AUTO: 91.3 FL (ref 79–97)
MONOCYTES # BLD AUTO: 0.89 10*3/MM3 (ref 0.1–0.9)
MONOCYTES NFR BLD AUTO: 11.1 % (ref 5–12)
NEUTROPHILS NFR BLD AUTO: 5.31 10*3/MM3 (ref 1.7–7)
NEUTROPHILS NFR BLD AUTO: 66.1 % (ref 42.7–76)
NRBC BLD AUTO-RTO: 0 /100 WBC (ref 0–0.2)
PATH REPORT.FINAL DX SPEC: NORMAL
PATH REPORT.GROSS SPEC: NORMAL
PHOSPHATE SERPL-MCNC: 6.9 MG/DL (ref 2.5–4.5)
PLATELET # BLD AUTO: 268 10*3/MM3 (ref 140–450)
PMV BLD AUTO: 9.9 FL (ref 6–12)
POTASSIUM SERPL-SCNC: 3.9 MMOL/L (ref 3.5–5.2)
RBC # BLD AUTO: 2.4 10*6/MM3 (ref 3.77–5.28)
SODIUM SERPL-SCNC: 136 MMOL/L (ref 136–145)
WBC NRBC COR # BLD: 8.03 10*3/MM3 (ref 3.4–10.8)

## 2022-11-16 PROCEDURE — 25010000002 INFLUENZA VAC SPLIT QUAD 0.5 ML SUSPENSION PREFILLED SYRINGE: Performed by: HOSPITALIST

## 2022-11-16 PROCEDURE — G0008 ADMIN INFLUENZA VIRUS VAC: HCPCS | Performed by: HOSPITALIST

## 2022-11-16 PROCEDURE — 80069 RENAL FUNCTION PANEL: CPT | Performed by: HOSPITALIST

## 2022-11-16 PROCEDURE — 90686 IIV4 VACC NO PRSV 0.5 ML IM: CPT | Performed by: HOSPITALIST

## 2022-11-16 PROCEDURE — 82962 GLUCOSE BLOOD TEST: CPT

## 2022-11-16 PROCEDURE — 85018 HEMOGLOBIN: CPT | Performed by: HOSPITALIST

## 2022-11-16 PROCEDURE — 85014 HEMATOCRIT: CPT | Performed by: HOSPITALIST

## 2022-11-16 PROCEDURE — 85025 COMPLETE CBC W/AUTO DIFF WBC: CPT | Performed by: HOSPITALIST

## 2022-11-16 RX ORDER — BUMETANIDE 2 MG/1
2 TABLET ORAL 3 TIMES DAILY
Qty: 90 TABLET | Refills: 1 | Status: SHIPPED | OUTPATIENT
Start: 2022-11-16 | End: 2023-03-21

## 2022-11-16 RX ORDER — HYDRALAZINE HYDROCHLORIDE 100 MG/1
100 TABLET, FILM COATED ORAL 3 TIMES DAILY
Qty: 90 TABLET | Refills: 0 | Status: SHIPPED | OUTPATIENT
Start: 2022-11-16

## 2022-11-16 RX ORDER — SEVELAMER CARBONATE 800 MG/1
1600 TABLET, FILM COATED ORAL
Status: DISCONTINUED | OUTPATIENT
Start: 2022-11-16 | End: 2022-11-16 | Stop reason: HOSPADM

## 2022-11-16 RX ORDER — INSULIN GLARGINE 300 U/ML
INJECTION, SOLUTION SUBCUTANEOUS
Qty: 6 ML | Refills: 0
Start: 2022-11-16

## 2022-11-16 RX ORDER — SEVELAMER CARBONATE 800 MG/1
1600 TABLET, FILM COATED ORAL
Qty: 180 TABLET | Refills: 0 | Status: SHIPPED | OUTPATIENT
Start: 2022-11-16 | End: 2023-03-21

## 2022-11-16 RX ADMIN — Medication 10 ML: at 08:28

## 2022-11-16 RX ADMIN — CARVEDILOL 25 MG: 12.5 TABLET, FILM COATED ORAL at 08:26

## 2022-11-16 RX ADMIN — SEVELAMER CARBONATE 800 MG: 800 TABLET, FILM COATED ORAL at 08:26

## 2022-11-16 RX ADMIN — INSULIN GLARGINE-YFGN 10 UNITS: 100 INJECTION, SOLUTION SUBCUTANEOUS at 08:25

## 2022-11-16 RX ADMIN — METOLAZONE 5 MG: 5 TABLET ORAL at 08:26

## 2022-11-16 RX ADMIN — BUMETANIDE 2 MG: 2 TABLET ORAL at 08:26

## 2022-11-16 RX ADMIN — FLUTICASONE PROPIONATE 2 SPRAY: 50 SPRAY, METERED NASAL at 08:28

## 2022-11-16 RX ADMIN — TIMOLOL MALEATE 1 DROP: 5 SOLUTION/ DROPS OPHTHALMIC at 08:28

## 2022-11-16 RX ADMIN — HYDRALAZINE HYDROCHLORIDE 100 MG: 50 TABLET ORAL at 08:26

## 2022-11-16 RX ADMIN — INFLUENZA VIRUS VACCINE 0.5 ML: 15; 15; 15; 15 SUSPENSION INTRAMUSCULAR at 08:26

## 2022-11-16 RX ADMIN — SEVELAMER CARBONATE 1600 MG: 800 TABLET, FILM COATED ORAL at 12:22

## 2022-11-16 RX ADMIN — NEOMYCIN SULFATE, POLYMYXIN B SULFATE, AND DEXAMETHASONE: 3.5; 10000; 1 OINTMENT OPHTHALMIC at 08:28

## 2022-11-16 RX ADMIN — Medication 1000 MCG: at 08:26

## 2022-11-16 RX ADMIN — ATORVASTATIN CALCIUM 10 MG: 20 TABLET, FILM COATED ORAL at 08:26

## 2022-11-16 NOTE — PROGRESS NOTES
[facility] Progress Note      Patient Name: Kelly Pack  : 1970  MRN: [id number]  Primary Care Physician:  Mattie Freeman APRN  Date of admission: 2022    Subjective     Interval History:   Patient denies any nausea or vomiting.  No fever or chills.  No metallic taste.  Not short of breath.  On oxygen at night possible due to sleep apnea.  Kidney biopsy performed unfortunately the sample was not adequate and only were able to run immunofluorescence with no immune deposition favoring diabetic nephropathy.  Patient was really upset about the news this morning  Review of Systems:   As noted above    Objective     Vitals:   Temp:  [97.5 °F (36.4 °C)-98.5 °F (36.9 °C)] 97.5 °F (36.4 °C)  Heart Rate:  [71-94] 72  Resp:  [18] 18  BP: (108-155)/(49-87) 128/66  Flow (L/min):  [2] 2    Intake/Output Summary (Last 24 hours) at 2022 0933  Last data filed at 2022 0845  Gross per 24 hour   Intake 970 ml   Output 1900 ml   Net -930 ml       Physical Exam:    General Appearance: Ill looking, obese  Skin: warm and dry  HEENT: oral mucosa normal, nonicteric sclera.  Left eye covered  Neck: supple, no JVD  Lungs: CTA  Heart: RRR, normal S1 and S2  Abdomen: soft, nontender, distended.  Abdominal wall edema  : no palpable bladder  Extremities: 2+ pitting edema up to the thighs , cyanosis or clubbing  Neuro: normal speech and mental status     Scheduled Meds:     atorvastatin, 10 mg, Oral, Daily  bumetanide, 2 mg, Oral, TID  carvedilol, 25 mg, Oral, BID With Meals  fluticasone, 2 spray, Each Nare, Daily  hydrALAZINE, 100 mg, Oral, TID  insulin glargine, 10 Units, Subcutaneous, QAM  insulin lispro, 0-14 Units, Subcutaneous, TID AC  metOLazone, 5 mg, Oral, Daily  neomycin-polymyxin-dexamethamethasone, , Left Eye, BID  sevelamer, 800 mg, Oral, TID With Meals  sodium chloride, 10 mL, Intravenous, Q12H  timolol, 1 drop, Left Eye, Q12H  vitamin B-12, 1,000 mcg, Oral, Daily      IV  Meds:        Results Reviewed:   I have personally reviewed the results from the time of this admission to 11/16/2022 09:33 EST     Results from last 7 days   Lab Units 11/16/22  0638 11/15/22  1306 11/14/22  0630   SODIUM mmol/L 136 139 143   POTASSIUM mmol/L 3.9 4.0 3.7   CHLORIDE mmol/L 102 102 105   CO2 mmol/L 25.3 26.3 26.9   BUN mg/dL 66* 60* 55*   CREATININE mg/dL 4.58* 4.50* 4.38*   CALCIUM mg/dL 7.7* 8.2* 8.1*   GLUCOSE mg/dL 106* 132* 110*       Estimated Creatinine Clearance: 24.1 mL/min (A) (by C-G formula based on SCr of 4.58 mg/dL (H)).    Results from last 7 days   Lab Units 11/16/22  0638 11/15/22  1306 11/14/22  0630 11/11/22  0702 11/10/22  0419   MAGNESIUM mg/dL  --   --   --   --  2.0   PHOSPHORUS mg/dL 6.9* 7.1* 6.9*   < > 5.9*    < > = values in this interval not displayed.             Results from last 7 days   Lab Units 11/16/22 0638 11/15/22  1306 11/14/22  0817 11/11/22  0702 11/10/22  0419   WBC 10*3/mm3 8.03 8.44 7.06 6.06 7.72   HEMOGLOBIN g/dL 7.3* 8.2* 9.0* 8.2* 8.1*   PLATELETS 10*3/mm3 268 299 339 297 311       Results from last 7 days   Lab Units 11/14/22  0817   INR  0.98       Assessment / Plan     ASSESSMENT:  1. Acute kidney injury on top of chronic kidney disease stage III with progressive decline in kidney function in the past year.  Etiology likely secondary to uncontrolled diabetes and hypertension.  Serological work-up has been negative including YADIRA, ANCA, complement level, SPEP and immunofixation in addition to GENET 2R.  Urinalysis  With no RBC not in favor of any active nephritic pathology.  Renal ultrasound was noted with no hydronephrosis.  2. Hypertensive emergency: Blood pressure is better controlled now.  Suspect noncompliance with medication therapy.  3. Anasarca secondary to nephrotic syndrome/hypoalbuminemia  4. Anemia of CKD/iron deficient anemia iron saturation 15 and ferritin 149  5. Type 2 diabetes mellitus with CKD.  Previously uncontrolled last A1c of  8.8  6. History of hypothyroidism  7. Bilateral lower extremity edema secondary to hypoalbuminemia/possible sleep apnea.  Doppler lower extremity was negative for DVT        PLAN:  · Creatinine remained stable.  Has moderate response to diuresis that was cut yesterday due to worsening kidney function.  · Discussed with the patient need for dialysis in order to improve her volume status.  Patient declined dialysis at the time being and she wants to be discharged home.  · Given stability of kidney function, I think it is acceptable to discharge home.  She needs very close follow-up.  Plan to see her in clinic on Thursday with repeat CKD labs.      Thank you for involving us in the care of Kelly Pack.  Please feel free to call with any questions.    Daphne Kay MD  11/16/22  09:33 [facility]    [facility]  [phone]    Parts of this note may be an electronic transcription/translation of spoken language to printed text using the Dragon dictation system.

## 2022-11-16 NOTE — PLAN OF CARE
Goal Outcome Evaluation:  Plan of Care Reviewed With: patient           Outcome Evaluation: Pt to be DC'd home.  VSS, will cont to monitor.

## 2022-11-16 NOTE — PLAN OF CARE
Goal Outcome Evaluation:      Pt. Alert, oriented x4, c/o pain, asking to have tylenol at last night, tylenol 650mg given as ordered, no further c/o pain, v/s stable, 02 sats 96% on room air, no s/s acute distress noted

## 2022-11-16 NOTE — PROGRESS NOTES
[facility] Progress Note      Patient Name: Kelly Pack  : 1970  MRN: [id number]  Primary Care Physician:  Mattie Freeman APRN  Date of admission: 2022    Subjective     Interval History:   Patient denies any nausea or vomiting.  No fever or chills.  No metallic taste.  Not short of breath.  On oxygen at night possible due to sleep apnea.  Kidney biopsy performed unfortunately the sample was not adequate and only were able to run immunofluorescence with no immune deposition favoring diabetic nephropathy.  Patient was really upset about the news this morning  Review of Systems:   As noted above    Objective     Vitals:   Temp:  [97.5 °F (36.4 °C)-98.5 °F (36.9 °C)] 97.5 °F (36.4 °C)  Heart Rate:  [71-94] 72  Resp:  [18] 18  BP: (108-155)/(49-87) 128/66  Flow (L/min):  [2] 2    Intake/Output Summary (Last 24 hours) at 2022 0959  Last data filed at 2022 0845  Gross per 24 hour   Intake 970 ml   Output 1900 ml   Net -930 ml       Physical Exam:    General Appearance: Ill looking, obese  Skin: warm and dry  HEENT: oral mucosa normal, nonicteric sclera.  Left eye covered  Neck: supple, no JVD  Lungs: CTA  Heart: RRR, normal S1 and S2  Abdomen: soft, nontender, distended.  Abdominal wall edema  : no palpable bladder  Extremities: 2+ pitting edema up to the thighs , cyanosis or clubbing  Neuro: normal speech and mental status     Scheduled Meds:     atorvastatin, 10 mg, Oral, Daily  bumetanide, 2 mg, Oral, TID  carvedilol, 25 mg, Oral, BID With Meals  fluticasone, 2 spray, Each Nare, Daily  hydrALAZINE, 100 mg, Oral, TID  insulin glargine, 10 Units, Subcutaneous, QAM  insulin lispro, 0-14 Units, Subcutaneous, TID AC  metOLazone, 5 mg, Oral, Daily  neomycin-polymyxin-dexamethamethasone, , Left Eye, BID  sevelamer, 1,600 mg, Oral, TID With Meals  sodium chloride, 10 mL, Intravenous, Q12H  timolol, 1 drop, Left Eye, Q12H  vitamin B-12, 1,000 mcg, Oral, Daily      IV  Meds:        Results Reviewed:   I have personally reviewed the results from the time of this admission to 11/16/2022 09:59 EST     Results from last 7 days   Lab Units 11/16/22 0638 11/15/22  1306 11/14/22  0630   SODIUM mmol/L 136 139 143   POTASSIUM mmol/L 3.9 4.0 3.7   CHLORIDE mmol/L 102 102 105   CO2 mmol/L 25.3 26.3 26.9   BUN mg/dL 66* 60* 55*   CREATININE mg/dL 4.58* 4.50* 4.38*   CALCIUM mg/dL 7.7* 8.2* 8.1*   GLUCOSE mg/dL 106* 132* 110*       Estimated Creatinine Clearance: 24.1 mL/min (A) (by C-G formula based on SCr of 4.58 mg/dL (H)).    Results from last 7 days   Lab Units 11/16/22  0638 11/15/22  1306 11/14/22  0630 11/11/22  0702 11/10/22  0419   MAGNESIUM mg/dL  --   --   --   --  2.0   PHOSPHORUS mg/dL 6.9* 7.1* 6.9*   < > 5.9*    < > = values in this interval not displayed.             Results from last 7 days   Lab Units 11/16/22  0638 11/15/22  1306 11/14/22  0817 11/11/22  0702 11/10/22  0419   WBC 10*3/mm3 8.03 8.44 7.06 6.06 7.72   HEMOGLOBIN g/dL 7.3* 8.2* 9.0* 8.2* 8.1*   PLATELETS 10*3/mm3 268 299 339 297 311       Results from last 7 days   Lab Units 11/14/22  0817   INR  0.98       Assessment / Plan     ASSESSMENT:  1. Acute kidney injury on top of chronic kidney disease stage III with progressive decline in kidney function in the past year.  Etiology likely secondary to uncontrolled diabetes and hypertension.  Serological work-up has been negative including YADIRA, ANCA, complement level, SPEP and immunofixation in addition to GENET 2R.  Urinalysis  With no RBC not in favor of any active nephritic pathology.  Renal ultrasound was noted with no hydronephrosis.  2. Hypertensive emergency: Blood pressure is better controlled now.  Suspect noncompliance with medication therapy.  3. Anasarca secondary to nephrotic syndrome/hypoalbuminemia  4. Anemia of CKD/iron deficient anemia iron saturation 15 and ferritin 149. HB is down   5. Type 2 diabetes mellitus with CKD.  Previously uncontrolled last  A1c of 8.8  6. History of hypothyroidism  7. Bilateral lower extremity edema secondary to hypoalbuminemia/possible sleep apnea.  Doppler lower extremity was negative for DVT        PLAN:  · Creatinine remained stable.  Has moderate response to diuresis that was cut yesterday due to worsening kidney function.  · Discussed with the patient need for dialysis in order to improve her volume status.  Patient declined dialysis at the time being and she wants to be discharged home.  · Given stability of kidney function, I think it is acceptable to discharge home if Hb is stable .  She needs very close follow-up.  Plan to see her in clinic on Thursday with repeat CKD labs.  · Repeat H&H       Thank you for involving us in the care of Kelly Pack.  Please feel free to call with any questions.    Daphne Kay MD  11/16/22  09:59 [facility] [[email or web address]]    [facility]  [phone]    Parts of this note may be an electronic transcription/translation of spoken language to printed text using the Dragon dictation system.

## 2022-11-16 NOTE — OUTREACH NOTE
Prep Survey    Flowsheet Row Responses   [facility] [place] patient discharged from? [place]   Is LACE score < 7 ? No   Emergency Room discharge w/ pulse ox? No   Eligibility [facility]   Date of Admission 11/08/22   Date of Discharge 11/16/22   Discharge Disposition Home or Self Care   Discharge diagnosis Acute renal failure superimposed on stage 3a chronic kidney disease    Does the patient have one of the following disease processes/diagnoses(primary or secondary)? Other   Does the patient have Home health ordered? No   Is there a DME ordered? No   Prep survey completed? Yes          ERIC MONTES - Registered Nurse

## 2022-11-17 ENCOUNTER — TRANSITIONAL CARE MANAGEMENT TELEPHONE ENCOUNTER (OUTPATIENT)
Dept: CALL CENTER | Facility: HOSPITAL | Age: 52
End: 2022-11-17

## 2022-11-17 NOTE — OUTREACH NOTE
Call Center TCM Note    Flowsheet Row Responses   [facility] patient discharged from? [place]   Does the patient have one of the following disease processes/diagnoses(primary or secondary)? Other   TCM attempt successful? No   Unsuccessful attempts Attempt 1          Rama Delarosa RN    11/17/2022, 10:42 EST

## 2022-11-17 NOTE — DISCHARGE SUMMARY
Patient Name: Kelly Pack  : 1970  MRN: [id number]    Date of Admission: 2022  Date of Discharge:  2022  Primary Care Physician: Mattie Freeman APRN      Chief Complaint:   Edema and Hypertension      Discharge Diagnoses     Active Hospital Problems    Diagnosis  POA   • **Acute renal failure superimposed on stage 3a chronic kidney disease (HCC) [N17.9, N18.31]  Yes   • Anasarca [R60.1]  Yes   • Suspected sleep apnea [R29.818]  Yes   • Anemia [D64.9]  Yes   • Bilateral lower extremity edema [R60.0]  Yes   • Elevated troponin [R77.8]  Yes   • Essential hypertension [I10]  Yes   • Uncontrolled type 2 diabetes mellitus with hyperglycemia (HCC) [E11.65]  Yes   • Nephrotic range proteinuria [R80.9]  Yes      Resolved Hospital Problems    Diagnosis Date Resolved POA   • Hypertensive emergency [I16.1] 11/10/2022 Yes        Hospital Course     Ms. Pack is a 52 y.o. female with a history of CKD 3, DM2, HTN who presented to [facility] initially complaining of worsening edema and HTN.  Please see the admitting history and physical for further details.  She had developed weeping of the lower extremities.  Patient had worsening renal function over the period of several months per discussion with nephrology.  Hypertension was poorly controlled.  She was started on more aggressive antihypertensive regimen.  Work-up was initiated including 24-hour urine protein which showed 4 g proteinuria.  She was recommended to have a renal biopsy and eventually agreed to this but unfortunately the sample was suboptimal and essentially nondiagnostic.  Vein mapping was initiated showing a small superficial thrombus.  She was aggressively diuresed with IV Bumex throughout the hospitalization and eventually transitioned over to oral.  Nephrology believes she will need dialysis in short order but patient is not ready for that yet and will be managed closely in the outpatient setting for now.  She did have  some anemia and some iron deficiency was given Ferrlecit and started on oral B12 as well.  I instructed her not to take her full doses of insulin upon discharge because she has not been requiring nearly that much here in the hospital.  Changes are discussed below    Day of Discharge     Subjective:  Somewhat angry about the biopsy results but ready to go home    Physical Exam:     Body mass index is 52.12 kg/m².  Physical Exam  Vitals and nursing note reviewed.   Constitutional:       Appearance: She is obese. She is ill-appearing.   HENT:      Head: Normocephalic and atraumatic.   Cardiovascular:      Rate and Rhythm: Normal rate and regular rhythm.      Pulses: Normal pulses.   Pulmonary:      Effort: Pulmonary effort is normal. No respiratory distress.      Comments: Diminished.   Abdominal:      General: Bowel sounds are normal. There is no distension.      Palpations: Abdomen is soft.      Tenderness: There is no abdominal tenderness.   Musculoskeletal:      Cervical back: Normal range of motion and neck supple.      Right lower leg: Edema present.      Left lower leg: Edema present.      Comments: 3+ bilateral with chronic stasis thickening   Skin:     General: Skin is warm and dry.      Findings: No bruising.   Neurological:      Mental Status: She is alert and oriented to person, place, and time.      Sensory: No sensory deficit.      Coordination: Coordination normal.   Psychiatric:         Mood and Affect: Mood normal.         Behavior: Behavior normal.         Consultants     Consult Orders (all) (From admission, onward)     Start     Ordered    11/10/22 0415  Inpatient Cardiology Consult  Once        Specialty:  Cardiology  Provider:  Tom Love MD    11/10/22 0418    11/08/22 2110  Inpatient Nephrology Consult  Once        Specialty:  Nephrology  Provider:  Daphne Kay MD    11/08/22 2110 11/08/22 2056  [facility] (on-call MD unless specified) Details  Once,   Status:  Canceled         Specialty:  Hospitalist  Provider:  Ryland Hernandez MD    11/08/22 2056              Procedures       Imaging Results (All)     Procedure Component Value Units Date/Time    CT Needle Biopsy Kidney Renal [[id number]] Collected: 11/15/22 0722     Updated: 11/15/22 0725    Narrative:      PROCEDURE: CT guided left kidney biopsy     HISTORY: florian proteinuria; N17.9-Acute kidney failure, unspecified;  E87.70-Fluid overload, unspecified; I10-Essential (primary)  hypertension; R77.8-Other specified abnormalities of plasma proteins     TECHNIQUE:  Radiation dose reduction techniques were utilized, including automated  exposure control and exposure modulation based on body size.     The procedural risks, benefits, and alternatives were discussed with the  patient. Informed consent was obtained.        The patient was placed in the prone position on the CT procedure table.  Noncontrast CT scan was performed to localize the left kidney. The  overlying skin was prepped and draped in the usual sterile fashion with  2% chlorhexidine. 1% lidocaine was administered for local anesthesia.     Next, a 17 gauge coaxial needle was advanced into the lower pole of the  kidney under CT guidance. Two 18 gauge core biopsies were then obtained  and sent to pathology. The needle was removed and sterile dressing  applied. No immediate complications.       Impression:      Technically successful CT guided left kidney biopsy.     Moderate sedation was provided under my direct supervision using 1 mg IV  Versed and 50 mcg IV Fentanyl. The patient was independently monitored  by a trained Department of Radiology RN using automated blood pressure,  EKG, and pulse oximetry. My total intra-service time was 13 minutes.                     Radiation dose reduction techniques were utilized, including automated  exposure control and exposure modulation based on body size.     This report was finalized on 11/15/2022 7:22 AM by Dr. Tyler MCNULTY  TOBI Gray.        Renal Bilateral [[id number]] Collected: 11/09/22 1405     Updated: 11/09/22 1426    Narrative:      BILATERAL RENAL SONOGRAM     HISTORY: Decreasing renal function; fluid overload.     TECHNIQUE: Bilateral renal sonogram was performed in standard fashion  and is correlated with renal sonogram from 01/27/2022.     FINDINGS: Both kidneys appear morphologically normal. There is no  hydronephrosis or renal parenchymal lesion. The right kidney measures  13.1 x 5.8 x 6.1 cm and left kidney measures 12.3 x 6.0 x 5.5 cm.  Urinary bladder is decompressed.       Impression:      Negative.     This report was finalized on 11/9/2022 2:23 PM by Dr. Junior Lyons M.D.       XR Chest 2 View [[id number]] Collected: 11/08/22 1951     Updated: 11/08/22 1958    Narrative:      XR CHEST 2 VW-  11/08/2022     HISTORY: Possible edema.     Heart size is at the upper limits of normal. There is blunting of the  right costophrenic sulcus on the PA film which may be related to pleural  scarring or minimal pleural effusion. This finding is now well seen on  the lateral view. There may be some minimal vascular congestion but no  definite pulmonary edema or focal infiltrates are seen. No pneumothorax  is seen.       Impression:      1. Borderline cardiomegaly.  2. Mild vascular congestion.  3. Blunting of the right costophrenic sulcus consistent with pleural  scarring or minimal pleural effusion.     This report was finalized on 11/8/2022 7:55 PM by Dr. Jae Cheema M.D.           Results for orders placed during the hospital encounter of 11/08/22    Duplex Initial Vein Mapping Hemodialysis Access Bilateral CAR    Interpretation Summary  •  The right cephalic vein is patent and of adequate size in the upper arm.  •  The right basilic vein is patent and of adequate size in the upper arm.  •  The left basilic vein is patent and of adequate size in the upper arm.  •  Acute left upper extremity superficial  thrombophlebitis noted in the cephalic (upper arm) and cephalic (forearm).    Results for orders placed during the hospital encounter of 11/08/22    Adult Transthoracic Echo Complete W/ Cont if Necessary Per Protocol    Interpretation Summary  •  Left ventricular systolic function is normal. Left ventricular ejection fraction appears to be 56 - 60%.  •  Left ventricular diastolic function was not assessed.  •  The right ventricular cavity is mildly dilated but normal systolic function.  •  Estimated right ventricular systolic pressure from tricuspid regurgitation is mildly elevated (35-45 mmHg).  •  No significant valvular stenosis or regurgitation noted.    Pertinent Labs     Results from last 7 days   Lab Units 11/16/22  1105 11/16/22  0638 11/15/22  1306 11/14/22  0817 11/11/22  0702   WBC 10*3/mm3  --  8.03 8.44 7.06 6.06   HEMOGLOBIN g/dL 7.7* 7.3* 8.2* 9.0* 8.2*   PLATELETS 10*3/mm3  --  268 299 339 297     Results from last 7 days   Lab Units 11/16/22  0638 11/15/22  1306 11/14/22  0630 11/13/22  1032   SODIUM mmol/L 136 139 143 139   POTASSIUM mmol/L 3.9 4.0 3.7 3.9   CHLORIDE mmol/L 102 102 105 101   CO2 mmol/L 25.3 26.3 26.9 26.6   BUN mg/dL 66* 60* 55* 51*   CREATININE mg/dL 4.58* 4.50* 4.38* 4.15*   GLUCOSE mg/dL 106* 132* 110* 149*   EGFR mL/min/1.73 11.1* 11.3* 11.7* 12.5*     Results from last 7 days   Lab Units 11/16/22  0638 11/15/22  1306 11/14/22  0630 11/13/22  1032   ALBUMIN g/dL 2.50* 2.80* 2.50* 2.90*     Results from last 7 days   Lab Units 11/16/22  0638 11/15/22  1306 11/14/22  0630 11/13/22  1032   CALCIUM mg/dL 7.7* 8.2* 8.1* 8.3*   ALBUMIN g/dL 2.50* 2.80* 2.50* 2.90*   PHOSPHORUS mg/dL 6.9* 7.1* 6.9* 6.5*               Invalid input(s): LDLCALC          Test Results Pending at Discharge       Discharge Details        Discharge Medications      New Medications      Instructions Start Date   carvedilol 25 MG tablet  Commonly known as: COREG   25 mg, Oral, 2 Times Daily With Meals       cyanocobalamin 1000 MCG tablet  Commonly known as: VITAMIN B-12   1,000 mcg, Oral, Daily      metOLazone 5 MG tablet  Commonly known as: ZAROXOLYN   5 mg, Oral, Daily      sevelamer 800 MG tablet  Commonly known as: RENVELA   1,600 mg, Oral, 3 Times Daily With Meals         Changes to Medications      Instructions Start Date   bumetanide 2 MG tablet  Commonly known as: BUMEX  What changed: when to take this   2 mg, Oral, 3 Times Daily      hydrALAZINE 100 MG tablet  Commonly known as: APRESOLINE  What changed:   · medication strength  · how much to take   100 mg, Oral, 3 Times Daily      Toujeo SoloStar 300 UNIT/ML solution pen-injector injection  Generic drug: Insulin Glargine (1 Unit Dial)  What changed: additional instructions   INJECT 15 UNITS UNDER THE SKIN INTO THE APPROPRIATE AREA AS DIRECTED daily         Continue These Medications      Instructions Start Date   Accu-Chek Pascale Plus test strip  Generic drug: glucose blood   TEST BLOOD SUGAR TWICE DAILY      albuterol sulfate  (90 Base) MCG/ACT inhaler  Commonly known as: PROVENTIL HFA;VENTOLIN HFA;PROAIR HFA   2 puffs, Inhalation, Every 6 Hours PRN      FreeStyle Zane 2 Sensor misc   1 each, Other, Every 14 Days      gabapentin 100 MG capsule  Commonly known as: Neurontin   100 mg, Oral, 3 Times Daily      insulin lispro 100 UNIT/ML injection  Commonly known as: humaLOG   14 Units, Subcutaneous, 3 Times Daily Before Meals      Insulin Pen Needle 32G X 4 MM misc   1 each, Does not apply, 5 Times Daily      Neomycin-Polymyxin-Dexameth 0.1 % ointment   1 application, Ophthalmic, 2 Times Daily, Neomycin-polymyxin b sulfate and dexamethasone opth ointment      ondansetron 4 MG tablet  Commonly known as: Zofran   4 mg, Oral, Every 8 Hours PRN      simvastatin 20 MG tablet  Commonly known as: ZOCOR   20 mg, Oral, Daily      Timolol Maleate (Once-Daily) 0.5 % solution   1 drop, Ophthalmic, 2 Times Daily, L eye      vitamin D 1.25 MG (71209 UT) capsule  capsule  Commonly known as: ERGOCALCIFEROL   50,000 Units, Oral, 2 Times Weekly         Stop These Medications    benzonatate 100 MG capsule  Commonly known as: Tessalon Perles     Dulaglutide 1.5 MG/0.5ML solution pen-injector        ASK your doctor about these medications      Instructions Start Date   cyclobenzaprine 10 MG tablet  Commonly known as: FLEXERIL   10 mg, Oral, Nightly PRN      fluticasone 50 MCG/ACT nasal spray  Commonly known as: FLONASE   2 sprays, Each Nare, Daily             Allergies   Allergen Reactions   • Tomato Swelling       Discharge Disposition:  Home or Self Care      Discharge Diet:  No active diet order      Discharge Activity:       CODE STATUS:    Code Status and Medical Interventions:   Ordered at: 11/08/22 2110     Code Status (Patient has no pulse and is not breathing):    CPR (Attempt to Resuscitate)     Medical Interventions (Patient has pulse or is breathing):    Full Support       Future Appointments   Date Time Provider Department Center   11/18/2022  8:30 AM Alex Santana MD [facility] None     Additional Instructions for the Follow-ups that You Need to Schedule     Ambulatory Referral to Sleep Medicine   As directed      Follow-up needed: Yes         Discharge Follow-up with Specialty: nephrology per their recs   As directed      Specialty: nephrology per their recs            Follow-up Information     Mattie Freeman APRN Follow up in 1 week(s).    Specialty: Family Medicine  Contact information:  [address] [address] [address] [phone]                         Additional Instructions for the Follow-ups that You Need to Schedule     Ambulatory Referral to Sleep Medicine   As directed      Follow-up needed: Yes         Discharge Follow-up with Specialty: nephrology per their recs   As directed      Specialty: nephrology per their recs           Time Spent on Discharge:  Greater than 30 minutes      Mikael Leigh MD  [facility]  11/17/22  17:28 EST

## 2022-11-17 NOTE — OUTREACH NOTE
Call Center TCM Note    Flowsheet Row Responses   [facility] patient discharged from? [place]   Does the patient have one of the following disease processes/diagnoses(primary or secondary)? Other   TCM attempt successful? Yes   Call start time 1456   Call end time 1500   Discharge diagnosis Acute renal failure superimposed on stage 3a chronic kidney disease    Prescription comments waiting on pharmacy to finish filling her medications   Comments she will call to make her own f/u appts   Does the patient have an appointment with their PCP within 7 days of discharge? No   Has home health visited the patient within 72 hours of discharge? N/A   Psychosocial issues? No   Did the patient receive a copy of their discharge instructions? Yes   What is the patient's perception of their health status since discharge? Improving   Is the patient/caregiver able to teach back the hierarchy of who to call/visit for symptoms/problems? PCP, Specialist, Home health nurse, Urgent Care, ED, 911 Yes   TCM call completed? Yes   Wrap up additional comments Doing well, all questions addressed, she will call to make her f/u appts.   Call end time 1500   Would this patient benefit from a Referral to Amb Social Work? No   Is the patient interested in additional calls from an ambulatory ?  NOTE:  applies to high risk patients requiring additional follow-up. No          Rama Delarosa RN    11/17/2022, 15:00 EST

## 2022-11-18 ENCOUNTER — OFFICE VISIT (OUTPATIENT)
Dept: SLEEP MEDICINE | Facility: HOSPITAL | Age: 52
End: 2022-11-18

## 2022-11-18 VITALS
DIASTOLIC BLOOD PRESSURE: 101 MMHG | HEART RATE: 80 BPM | BODY MASS INDEX: 43.4 KG/M2 | WEIGHT: 293 LBS | OXYGEN SATURATION: 95 % | HEIGHT: 69 IN | SYSTOLIC BLOOD PRESSURE: 186 MMHG

## 2022-11-18 DIAGNOSIS — G47.10 HYPERSOMNOLENCE: ICD-10-CM

## 2022-11-18 DIAGNOSIS — E66.9 SUPER OBESE: ICD-10-CM

## 2022-11-18 DIAGNOSIS — R06.83 LOUD SNORING: ICD-10-CM

## 2022-11-18 DIAGNOSIS — G47.34 NOCTURNAL HYPOXIA: ICD-10-CM

## 2022-11-18 DIAGNOSIS — G47.33 OBSTRUCTIVE SLEEP APNEA: Primary | ICD-10-CM

## 2022-11-18 PROCEDURE — G0463 HOSPITAL OUTPT CLINIC VISIT: HCPCS

## 2022-11-18 NOTE — PAYOR COMM NOTE
"Kelly Pack (52 y.o. Female)     ATTN: DISCHARGE SUMMARY FOR REVIEW: [id number]     DEPT: [phone],  [phone]    [facility]:  NPI [id number]      Date of Birth   1970    Social Security Number       Address   [address] [facility] [place] [address]    Home Phone   [phone]    MRN   [id number]       Restorationist   [employer]    Marital Status   Single                            Admission Date   22    Admission Type   Emergency    Admitting Provider   Ryland Hernandez MD    Attending Provider       Department, Room/Bed   [address], E655/1       Discharge Date   2022    Discharge Disposition   Home or Self Care    Discharge Destination                               Attending Provider: (none)   Allergies: Tomato    Isolation: None   Infection: None   Code Status: Prior    Ht: 175 cm (68.9\")   Wt: 160 kg (351 lb 14.4 oz)    Admission Cmt: None   Principal Problem: Acute renal failure superimposed on stage 3a chronic kidney disease (HCC) [N17.9,N18.31]                 Active Insurance as of 2022     Primary Coverage     Payor Plan Insurance Group Employer/Plan Group    [employer] BLUE [employer]OSS [employer] CROSS BLUE SHIELD PPO [id number]     Payor Plan Address Payor Plan Phone Number Payor Plan Fax Number Effective Dates    [address] [phone]  2019 - None Entered    [place] [address]       Subscriber Name Subscriber Birth Date Member ID       KELLY PACK 1970 [id number]                 Emergency Contacts      (Rel.) Home Phone Work Phone Mobile Phone    sanket pack (Mother) -- -- [phone]               Discharge Summary      Mikael Leigh MD at 22 1335              Patient Name: Kelly Pack  : 1970  MRN: [id number]    Date of Admission: 2022  Date of Discharge:  2022  Primary Care Physician: Mattie Freeman, YOSSI      Chief Complaint:   Edema and " Hypertension      Discharge Diagnoses     Active Hospital Problems    Diagnosis  POA   • **Acute renal failure superimposed on stage 3a chronic kidney disease (HCC) [N17.9, N18.31]  Yes   • Anasarca [R60.1]  Yes   • Suspected sleep apnea [R29.818]  Yes   • Anemia [D64.9]  Yes   • Bilateral lower extremity edema [R60.0]  Yes   • Elevated troponin [R77.8]  Yes   • Essential hypertension [I10]  Yes   • Uncontrolled type 2 diabetes mellitus with hyperglycemia (HCC) [E11.65]  Yes   • Nephrotic range proteinuria [R80.9]  Yes      Resolved Hospital Problems    Diagnosis Date Resolved POA   • Hypertensive emergency [I16.1] 11/10/2022 Yes        Hospital Course     Ms. Pack is a 52 y.o. female with a history of CKD 3, DM2, HTN who presented to [facility] initially complaining of worsening edema and HTN.  Please see the admitting history and physical for further details.  She had developed weeping of the lower extremities.  Patient had worsening renal function over the period of several months per discussion with nephrology.  Hypertension was poorly controlled.  She was started on more aggressive antihypertensive regimen.  Work-up was initiated including 24-hour urine protein which showed 4 g proteinuria.  She was recommended to have a renal biopsy and eventually agreed to this but unfortunately the sample was suboptimal and essentially nondiagnostic.  Vein mapping was initiated showing a small superficial thrombus.  She was aggressively diuresed with IV Bumex throughout the hospitalization and eventually transitioned over to oral.  Nephrology believes she will need dialysis in short order but patient is not ready for that yet and will be managed closely in the outpatient setting for now.  She did have some anemia and some iron deficiency was given Ferrlecit and started on oral B12 as well.  I instructed her not to take her full doses of insulin upon discharge because she has not been requiring nearly that  much here in the hospital.  Changes are discussed below    Day of Discharge     Subjective:  Somewhat angry about the biopsy results but ready to go home    Physical Exam:     Body mass index is 52.12 kg/m².  Physical Exam  Vitals and nursing note reviewed.   Constitutional:       Appearance: She is obese. She is ill-appearing.   HENT:      Head: Normocephalic and atraumatic.   Cardiovascular:      Rate and Rhythm: Normal rate and regular rhythm.      Pulses: Normal pulses.   Pulmonary:      Effort: Pulmonary effort is normal. No respiratory distress.      Comments: Diminished.   Abdominal:      General: Bowel sounds are normal. There is no distension.      Palpations: Abdomen is soft.      Tenderness: There is no abdominal tenderness.   Musculoskeletal:      Cervical back: Normal range of motion and neck supple.      Right lower leg: Edema present.      Left lower leg: Edema present.      Comments: 3+ bilateral with chronic stasis thickening   Skin:     General: Skin is warm and dry.      Findings: No bruising.   Neurological:      Mental Status: She is alert and oriented to person, place, and time.      Sensory: No sensory deficit.      Coordination: Coordination normal.   Psychiatric:         Mood and Affect: Mood normal.         Behavior: Behavior normal.         Consultants     Consult Orders (all) (From admission, onward)     Start     Ordered    11/10/22 0415  Inpatient Cardiology Consult  Once        Specialty:  Cardiology  Provider:  Tom Love MD    11/10/22 0418    11/08/22 2110  Inpatient Nephrology Consult  Once        Specialty:  Nephrology  Provider:  Daphne Kay MD    11/08/22 2110 11/08/22 2056  [facility] (on-call MD unless specified) Details  Once,   Status:  Canceled        Specialty:  Hospitalist  Provider:  Ryland Hernandez MD    11/08/22 2056              Procedures       Imaging Results (All)     Procedure Component Value Units Date/Time    CT Needle Biopsy Kidney Renal  [[id number]] Collected: 11/15/22 0722     Updated: 11/15/22 0725    Narrative:      PROCEDURE: CT guided left kidney biopsy     HISTORY: florian proteinuria; N17.9-Acute kidney failure, unspecified;  E87.70-Fluid overload, unspecified; I10-Essential (primary)  hypertension; R77.8-Other specified abnormalities of plasma proteins     TECHNIQUE:  Radiation dose reduction techniques were utilized, including automated  exposure control and exposure modulation based on body size.     The procedural risks, benefits, and alternatives were discussed with the  patient. Informed consent was obtained.        The patient was placed in the prone position on the CT procedure table.  Noncontrast CT scan was performed to localize the left kidney. The  overlying skin was prepped and draped in the usual sterile fashion with  2% chlorhexidine. 1% lidocaine was administered for local anesthesia.     Next, a 17 gauge coaxial needle was advanced into the lower pole of the  kidney under CT guidance. Two 18 gauge core biopsies were then obtained  and sent to pathology. The needle was removed and sterile dressing  applied. No immediate complications.       Impression:      Technically successful CT guided left kidney biopsy.     Moderate sedation was provided under my direct supervision using 1 mg IV  Versed and 50 mcg IV Fentanyl. The patient was independently monitored  by a trained Department of Radiology RN using automated blood pressure,  EKG, and pulse oximetry. My total intra-service time was 13 minutes.                     Radiation dose reduction techniques were utilized, including automated  exposure control and exposure modulation based on body size.     This report was finalized on 11/15/2022 7:22 AM by Dr. Tyler Gray M.D.        Renal Bilateral [[id number]] Collected: 11/09/22 1405     Updated: 11/09/22 1426    Narrative:      BILATERAL RENAL SONOGRAM     HISTORY: Decreasing renal function; fluid overload.     TECHNIQUE:  Bilateral renal sonogram was performed in standard fashion  and is correlated with renal sonogram from 01/27/2022.     FINDINGS: Both kidneys appear morphologically normal. There is no  hydronephrosis or renal parenchymal lesion. The right kidney measures  13.1 x 5.8 x 6.1 cm and left kidney measures 12.3 x 6.0 x 5.5 cm.  Urinary bladder is decompressed.       Impression:      Negative.     This report was finalized on 11/9/2022 2:23 PM by Dr. Junior Lyons M.D.       XR Chest 2 View [[id number]] Collected: 11/08/22 1951     Updated: 11/08/22 1958    Narrative:      XR CHEST 2 VW-  11/08/2022     HISTORY: Possible edema.     Heart size is at the upper limits of normal. There is blunting of the  right costophrenic sulcus on the PA film which may be related to pleural  scarring or minimal pleural effusion. This finding is now well seen on  the lateral view. There may be some minimal vascular congestion but no  definite pulmonary edema or focal infiltrates are seen. No pneumothorax  is seen.       Impression:      1. Borderline cardiomegaly.  2. Mild vascular congestion.  3. Blunting of the right costophrenic sulcus consistent with pleural  scarring or minimal pleural effusion.     This report was finalized on 11/8/2022 7:55 PM by Dr. Jae Cheema M.D.           Results for orders placed during the hospital encounter of 11/08/22    Duplex Initial Vein Mapping Hemodialysis Access Bilateral CAR    Interpretation Summary  •  The right cephalic vein is patent and of adequate size in the upper arm.  •  The right basilic vein is patent and of adequate size in the upper arm.  •  The left basilic vein is patent and of adequate size in the upper arm.  •  Acute left upper extremity superficial thrombophlebitis noted in the cephalic (upper arm) and cephalic (forearm).    Results for orders placed during the hospital encounter of 11/08/22    Adult Transthoracic Echo Complete W/ Cont if Necessary Per  Protocol    Interpretation Summary  •  Left ventricular systolic function is normal. Left ventricular ejection fraction appears to be 56 - 60%.  •  Left ventricular diastolic function was not assessed.  •  The right ventricular cavity is mildly dilated but normal systolic function.  •  Estimated right ventricular systolic pressure from tricuspid regurgitation is mildly elevated (35-45 mmHg).  •  No significant valvular stenosis or regurgitation noted.    Pertinent Labs     Results from last 7 days   Lab Units 11/16/22  1105 11/16/22  0638 11/15/22  1306 11/14/22  0817 11/11/22  0702   WBC 10*3/mm3  --  8.03 8.44 7.06 6.06   HEMOGLOBIN g/dL 7.7* 7.3* 8.2* 9.0* 8.2*   PLATELETS 10*3/mm3  --  268 299 339 297     Results from last 7 days   Lab Units 11/16/22  0638 11/15/22  1306 11/14/22  0630 11/13/22  1032   SODIUM mmol/L 136 139 143 139   POTASSIUM mmol/L 3.9 4.0 3.7 3.9   CHLORIDE mmol/L 102 102 105 101   CO2 mmol/L 25.3 26.3 26.9 26.6   BUN mg/dL 66* 60* 55* 51*   CREATININE mg/dL 4.58* 4.50* 4.38* 4.15*   GLUCOSE mg/dL 106* 132* 110* 149*   EGFR mL/min/1.73 11.1* 11.3* 11.7* 12.5*     Results from last 7 days   Lab Units 11/16/22  0638 11/15/22  1306 11/14/22  0630 11/13/22  1032   ALBUMIN g/dL 2.50* 2.80* 2.50* 2.90*     Results from last 7 days   Lab Units 11/16/22  0638 11/15/22  1306 11/14/22  0630 11/13/22  1032   CALCIUM mg/dL 7.7* 8.2* 8.1* 8.3*   ALBUMIN g/dL 2.50* 2.80* 2.50* 2.90*   PHOSPHORUS mg/dL 6.9* 7.1* 6.9* 6.5*               Invalid input(s): LDLCALC          Test Results Pending at Discharge       Discharge Details        Discharge Medications      New Medications      Instructions Start Date   carvedilol 25 MG tablet  Commonly known as: COREG   25 mg, Oral, 2 Times Daily With Meals      cyanocobalamin 1000 MCG tablet  Commonly known as: VITAMIN B-12   1,000 mcg, Oral, Daily      metOLazone 5 MG tablet  Commonly known as: ZAROXOLYN   5 mg, Oral, Daily      sevelamer 800 MG tablet  Commonly known  as: RENVELA   1,600 mg, Oral, 3 Times Daily With Meals         Changes to Medications      Instructions Start Date   bumetanide 2 MG tablet  Commonly known as: BUMEX  What changed: when to take this   2 mg, Oral, 3 Times Daily      hydrALAZINE 100 MG tablet  Commonly known as: APRESOLINE  What changed:   · medication strength  · how much to take   100 mg, Oral, 3 Times Daily      Toujeo SoloStar 300 UNIT/ML solution pen-injector injection  Generic drug: Insulin Glargine (1 Unit Dial)  What changed: additional instructions   INJECT 15 UNITS UNDER THE SKIN INTO THE APPROPRIATE AREA AS DIRECTED daily         Continue These Medications      Instructions Start Date   Accu-Chek Pascale Plus test strip  Generic drug: glucose blood   TEST BLOOD SUGAR TWICE DAILY      albuterol sulfate  (90 Base) MCG/ACT inhaler  Commonly known as: PROVENTIL HFA;VENTOLIN HFA;PROAIR HFA   2 puffs, Inhalation, Every 6 Hours PRN      FreeStyle Zane 2 Sensor misc   1 each, Other, Every 14 Days      gabapentin 100 MG capsule  Commonly known as: Neurontin   100 mg, Oral, 3 Times Daily      insulin lispro 100 UNIT/ML injection  Commonly known as: humaLOG   14 Units, Subcutaneous, 3 Times Daily Before Meals      Insulin Pen Needle 32G X 4 MM misc   1 each, Does not apply, 5 Times Daily      Neomycin-Polymyxin-Dexameth 0.1 % ointment   1 application, Ophthalmic, 2 Times Daily, Neomycin-polymyxin b sulfate and dexamethasone opth ointment      ondansetron 4 MG tablet  Commonly known as: Zofran   4 mg, Oral, Every 8 Hours PRN      simvastatin 20 MG tablet  Commonly known as: ZOCOR   20 mg, Oral, Daily      Timolol Maleate (Once-Daily) 0.5 % solution   1 drop, Ophthalmic, 2 Times Daily, L eye      vitamin D 1.25 MG (09150 UT) capsule capsule  Commonly known as: ERGOCALCIFEROL   50,000 Units, Oral, 2 Times Weekly         Stop These Medications    benzonatate 100 MG capsule  Commonly known as: Tessalon Perles     Dulaglutide 1.5 MG/0.5ML solution  pen-injector        ASK your doctor about these medications      Instructions Start Date   cyclobenzaprine 10 MG tablet  Commonly known as: FLEXERIL   10 mg, Oral, Nightly PRN      fluticasone 50 MCG/ACT nasal spray  Commonly known as: FLONASE   2 sprays, Each Nare, Daily             Allergies   Allergen Reactions   • Tomato Swelling       Discharge Disposition:  Home or Self Care      Discharge Diet:  No active diet order      Discharge Activity:       CODE STATUS:    Code Status and Medical Interventions:   Ordered at: 11/08/22 2110     Code Status (Patient has no pulse and is not breathing):    CPR (Attempt to Resuscitate)     Medical Interventions (Patient has pulse or is breathing):    Full Support       Future Appointments   Date Time Provider Department Center   11/18/2022  8:30 AM Alex Santana MD [facility]     Additional Instructions for the Follow-ups that You Need to Schedule     Ambulatory Referral to Sleep Medicine   As directed      Follow-up needed: Yes         Discharge Follow-up with Specialty: nephrology per their recs   As directed      Specialty: nephrology per their recs            Follow-up Information     Mattie Freeman APRN Follow up in 1 week(s).    Specialty: Family Medicine  Contact information:  [address] [place] [address]  [phone]                         Additional Instructions for the Follow-ups that You Need to Schedule     Ambulatory Referral to Sleep Medicine   As directed      Follow-up needed: Yes         Discharge Follow-up with Specialty: nephrology per their recs   As directed      Specialty: nephrology per their recs           Time Spent on Discharge:  Greater than 30 minutes      Mikael Leigh MD  [facility]  11/17/22  17:28 EST              Electronically signed by Mikael Leigh MD at 11/17/22 4865

## 2022-11-18 NOTE — PROGRESS NOTES
[facility]  [address]  [address]  [facility] [address] [phone]    Referring Physician: Dr. Leigh  PCP: Mattie Freeman APRN    Reason for consult:  Sleep disorders of sleep apnea    Kelly Pack is a 52 y.o.female was seen in the [facility] today. She was recently in hospital and advised sleep apnea evaluation. She sleeps from 11pm to 8am. Multiple arousals, restless sleep, pain during sleep. Wakes up tired and unrefreshed. Wt gain 50 lbs. Has loud snoring and witnessed apneas. Has morning headaches. Gets drowsy with driving sometimes.  [place] Sleepiness Score: 12. Caffeine intake 0-1 per day. Alcohol intake 0 per week.    Kelly Pack  has a past medical history of Albuminuria, Allergic, Allergic rhinitis, Asthma, Bell's palsy, Cataract, Cholelithiasis, Drug therapy, Endometrial cancer (HCC), HL (hearing loss), Hyperlipidemia, Hypertension, Low back pain, Migraine, Obesity, Renal insufficiency, Right otitis media, Type II diabetes mellitus (HCC), Visual impairment, and Vitamin D deficiency.     Current Medications:    Current Outpatient Medications:   •  albuterol sulfate  (90 Base) MCG/ACT inhaler, Inhale 2 puffs Every 6 (Six) Hours As Needed for Wheezing or Shortness of Air., Disp: 18 g, Rfl: 0  •  bumetanide (BUMEX) 2 MG tablet, Take 1 tablet by mouth 3 (Three) Times a Day., Disp: 90 tablet, Rfl: 1  •  carvedilol (COREG) 25 MG tablet, Take 1 tablet by mouth 2 (Two) Times a Day With Meals., Disp: 60 tablet, Rfl: 1  •  Continuous Blood Gluc Sensor (FreeStyle Zane 2 Sensor) misc, 1 each by Other route Every 14 (Fourteen) Days., Disp: 2 each, Rfl: 5  •  cyclobenzaprine (FLEXERIL) 10 MG tablet, Take 1 tablet by mouth At Night As Needed for Muscle Spasms. (Patient taking differently: Take 1 tablet by mouth Every Night. Headaches per pt), Disp: 30 tablet, Rfl: 0  •  fluticasone (FLONASE) 50 MCG/ACT nasal spray, 2 sprays by Each Nare route Daily.  (Patient taking differently: 2 sprays by Each Nare route As Needed.), Disp: 48 g, Rfl: 1  •  gabapentin (Neurontin) 100 MG capsule, Take 1 capsule by mouth 3 (Three) Times a Day., Disp: 90 capsule, Rfl: 1  •  glucose blood (Accu-Chek Pascale Plus) test strip, TEST BLOOD SUGAR TWICE DAILY, Disp: 200 each, Rfl: 0  •  hydrALAZINE (APRESOLINE) 100 MG tablet, Take 1 tablet by mouth 3 (Three) Times a Day., Disp: 90 tablet, Rfl: 0  •  Insulin Glargine, 1 Unit Dial, (Toujeo SoloStar) 300 UNIT/ML solution pen-injector injection, INJECT 15 UNITS UNDER THE SKIN INTO THE APPROPRIATE AREA AS DIRECTED daily, Disp: 6 mL, Rfl: 0  •  insulin lispro (humaLOG) 100 UNIT/ML injection, Inject 14 Units under the skin into the appropriate area as directed 3 (Three) Times a Day Before Meals., Disp: 40 mL, Rfl: 1  •  Insulin Pen Needle 32G X 4 MM misc, 1 each 5 (Five) Times a Day., Disp: 500 each, Rfl: 2  •  metOLazone (ZAROXOLYN) 5 MG tablet, Take 1 tablet by mouth Daily., Disp: 30 tablet, Rfl: 1  •  Neomycin-Polymyxin-Dexameth 0.1 % ointment, Apply 1 application to eye(s) as directed by provider 2 (Two) Times a Day. Neomycin-polymyxin b sulfate and dexamethasone opth ointment, Disp: , Rfl:   •  ondansetron (Zofran) 4 MG tablet, Take 1 tablet by mouth Every 8 (Eight) Hours As Needed for Nausea or Vomiting., Disp: 30 tablet, Rfl: 1  •  sevelamer (RENVELA) 800 MG tablet, Take 2 tablets by mouth 3 (Three) Times a Day With Meals., Disp: 180 tablet, Rfl: 0  •  simvastatin (ZOCOR) 20 MG tablet, Take 1 tablet by mouth Daily., Disp: 90 tablet, Rfl: 1  •  Timolol Maleate, Once-Daily, 0.5 % solution, Apply 1 drop to eye(s) as directed by provider 2 (Two) Times a Day. L eye, Disp: , Rfl:   •  vitamin B-12 (VITAMIN B-12) 1000 MCG tablet, Take 1 tablet by mouth Daily., Disp: 30 tablet, Rfl: 0  •  vitamin D (ERGOCALCIFEROL) 1.25 MG (67548 UT) capsule capsule, Take 1 capsule by mouth 2 (Two) Times a Week., Disp: 30 capsule, Rfl: 11   also listed in Sleep  "Questionnaire.    FH: family history includes Asthma in her maternal grandfather; Breast cancer in her mother; Cancer in her mother; Diabetes in her father; Hearing loss in her father; Hyperlipidemia in her father; Hypertension in her father.  SH:  reports that she has never smoked. She has never used smokeless tobacco. She reports that she does not currently use alcohol. She reports that she does not use drugs.    Pertinent Positive Review of Systems of nasal congestion, pnd, arthritis, swelling ankles, cough, soa, dizziness, anxiety  Rest of Review of Systems was negative as recorded in Sleep Questionnaire.        Vital Signs: BP (!) 186/101   Pulse 80   Ht 175 cm (68.9\")   Wt (!) 158 kg (348 lb)   SpO2 95%   BMI 51.54 kg/m²     Body mass index is 51.54 kg/m².       Tongue: Large       Dentition: good       Pharynx: Posterior pharyngeal pillars are unable to see   Mallampatti: IV (only hard palate visible)        General: Alert. Cooperative. Well developed. No acute distress.             Head:  Normocephalic. Symmetrical. Atraumatic.              Eyes: Sclera clear. No icterus. PERRLA. Normal EOM.             Ears: No deformities. Normal hearing.             Nose: No septal deviation. No drainage.          Throat: No oral lesions. No thrush. Moist mucous membranes.    Chest Wall:  Normal shape. Symmetric expansion with respiration. No tenderness.             Neck:  Trachea midline.           Lungs:  Clear to auscultation bilaterally. No wheezes. No rhonchi. No rales. Respirations regular, even and unlabored.            Heart:  Regular rhythm and normal rate. Normal S1 and S2. No murmur.     Abdomen:  Soft, non-tender and non-distended. Normal bowel sounds. No masses.  Extremities:  Moves all extremities well. No edema.           Pulses: Pulses palpable and equal bilaterally.               Skin: Dry. Intact. No bleeding. No rash.           Neuro: Moves all 4 extremities and cranial nerves grossly " intact.  Psychiatric: Normal mood and affect.    Impression:  1. Obstructive sleep apnea    2. Super obese    3. Hypersomnolence    4. Loud snoring    5. Nocturnal hypoxia          Orders Placed This Encounter   Procedures   • Home Sleep Study     Standing Status:   Future     Standing Expiration Date:   11/18/2023     Scheduling Instructions:      Ask patient to sleep on back as much as possible on night of study     Order Specific Question:   May take own meds     Answer:   Yes     Order Specific Question:   Details     Answer:   O2 Implementation per Protocol            Plan:  Kelly has loud snoring witnessed apneas and recently noted to have nocturnal hypoxia.  She clearly meets all parameters suggesting sleep disordered breathing.  She will be scheduled for a HST to evaluate further.  I instructed her to try and sleep supine for a portion of the night.  She will very likely require treatment with CPAP device.    This patient has typical features of obstructive sleep apnea with hypersomnolence snoring and apneas along with elevated BMI and classic oropharyngeal structure.  Likelihood of obstructive sleep apnea is high and a polysomnogram study will therefore be requested.      Possible diagnosis and pathophysiology of obstructive sleep apnea was discussed with the patient.  Health risks of untreated obstructive sleep apnea including cardiovascular risks were discussed.  Patient was cautioned about activities requiring full concentration especially driving at night or for longer distances, and until hypersomnolence is corrected.    Results of sleep testing will be reviewed to see if patient is a candidate for CPAP machine.  Alternatives to therapy include oral mandibular advancing device (OMAD) were discussed. However OMAD is usually only beneficial in mild obstructive sleep apnea.  The benefit of weight loss in reducing severity of obstructive sleep apnea was discussed.  Patient would benefit from adhering to  a strict diet to achieve ideal BMI.     Patient will follow up in this clinic after testing.    Thank you for allowing me to participate in your patient's care.    Electronically signed by Alex Santana MD, 11/18/22, 9:14 AM EST.    Part of this note may be an electronic transcription/translation of spoken language to printed text using the Dragon Dictation System.

## 2022-11-21 DIAGNOSIS — M54.2 NECK PAIN: ICD-10-CM

## 2022-11-21 DIAGNOSIS — G44.209 ACUTE NON INTRACTABLE TENSION-TYPE HEADACHE: ICD-10-CM

## 2022-11-22 RX ORDER — CYCLOBENZAPRINE HCL 10 MG
TABLET ORAL
Qty: 30 TABLET | Refills: 0 | Status: SHIPPED | OUTPATIENT
Start: 2022-11-22 | End: 2022-12-27

## 2022-11-29 NOTE — PROGRESS NOTES
Enter Query Response Below      Query Response:     Anemia related to CKD and chronic iron deficiency         If applicable, please update the problem list.         Patient: Kelly Pack        : 1970  Account: [id number]           Admit Date:         How to Respond to this query:       a. Click New Note     b. Answer query within the yellow box.                c. Update the Problem List, if applicable.      If you have any questions about this query contact me at: [email or web address]    Dr. Leigh,    Patient is a 52 year old female admitted on 22  with worsening blood pressure and creatinine and fluid overload. Patient with documented anemia in CKD and iron deficiency anemia. Treated with monitoring hemoglobin and hematocrit.    22 Hemoglobin 9.9, hematocrit 30.5  22 Hemoglobin 9.0, hematocrit 28.0  11/10/22 Hemoglobin 8.1, hematocrit 25.4  22 Hemoglobin 8.2, hematocrit 25.7  22 Hemoglobin 9.0, hematocrit 28.5  11/15/22 Hemoglobin 8.2, hematocrit 25.8  22 Hemoglobin 7.3, hematocrit 21.9  22 Hemoglobin 7.7, hematocrit 24.2    Can this be further clarified as:    Acute blood loss anemia  Other, please specify___________  Unable to determine        By submitting this query, we are merely seeking further clarification of documentation to accurately reflect all conditions that you are monitoring, evaluating, treating or that extend the hospitalization or utilize additional resources of care. Please utilize your independent clinical judgment when addressing the question(s) above.     This query and your response, once completed, will be entered into the legal medical record.    Sincerely,  Fern Putnam RN  Clinical Documentation Integrity Program

## 2022-11-29 NOTE — PROGRESS NOTES
Enter Query Response Below      Query Response:     Fluid overload was related to renal failure, not cardiac         If applicable, please update the problem list.           Patient: Kelly Pack        : 1970  Account: [id number]           Admit Date:         How to Respond to this query:       a. Click New Note     b. Answer query within the yellow box.                c. Update the Problem List, if applicable.      If you have any questions about this query contact me at: [email or web address]    Dr. Leigh,    Patient is a 52 year old female admitted on 22  with worsening blood pressure and creatinine and fluid overload.  Patient takes bumex as home medication per medication list prior to admission. Echo shows EF 56-60%. ProBNP 8,826. Treated with IV diuretics and metolazone.     Can this be further clarified as:    Acute on chronic diastolic CHF  Fluid overload only  Other, please specify_________  Unable to determine      By submitting this query, we are merely seeking further clarification of documentation to accurately reflect all conditions that you are monitoring, evaluating, treating or that extend the hospitalization or utilize additional resources of care. Please utilize your independent clinical judgment when addressing the question(s) above.     This query and your response, once completed, will be entered into the legal medical record.    Sincerely,  Fern Putnam RN  Clinical Documentation Integrity Program

## 2022-11-30 ENCOUNTER — READMISSION MANAGEMENT (OUTPATIENT)
Dept: CALL CENTER | Facility: HOSPITAL | Age: 52
End: 2022-11-30

## 2022-11-30 NOTE — OUTREACH NOTE
Medical Week 2 Survey    Flowsheet Row Responses   [facility] patient discharged from? [place]   Does the patient have one of the following disease processes/diagnoses(primary or secondary)? Other   Week 2 attempt successful? No   Unsuccessful attempts Attempt 1          NAVIN POWELL - Registered Nurse

## 2022-12-08 ENCOUNTER — READMISSION MANAGEMENT (OUTPATIENT)
Dept: CALL CENTER | Facility: HOSPITAL | Age: 52
End: 2022-12-08

## 2022-12-08 NOTE — OUTREACH NOTE
Medical Week 3 Survey    Flowsheet Row Responses   [facility] patient discharged from? [place]   Does the patient have one of the following disease processes/diagnoses(primary or secondary)? Other   Week 3 attempt successful? No   Unsuccessful attempts Attempt 2   Discharge diagnosis Acute renal failure superimposed on stage 3a chronic kidney disease           ROBIN MORENO - Registered Nurse

## 2022-12-24 DIAGNOSIS — M54.2 NECK PAIN: ICD-10-CM

## 2022-12-24 DIAGNOSIS — G44.209 ACUTE NON INTRACTABLE TENSION-TYPE HEADACHE: ICD-10-CM

## 2022-12-27 RX ORDER — CYCLOBENZAPRINE HCL 10 MG
TABLET ORAL
Qty: 30 TABLET | Refills: 0 | Status: SHIPPED | OUTPATIENT
Start: 2022-12-27 | End: 2023-03-03 | Stop reason: SDUPTHER

## 2022-12-27 NOTE — TELEPHONE ENCOUNTER
Rx Refill Note  Requested Prescriptions     Pending Prescriptions Disp Refills   • cyclobenzaprine (FLEXERIL) 10 MG tablet [Pharmacy Med Name: Cyclobenzaprine HCl 10 MG Oral Tablet] 30 tablet 0     Sig: TAKE 1 TABLET BY MOUTH AT NIGHT AS NEEDED FOR MUSCLE SPASM      Last office visit with prescribing clinician: 9/16/2022   Last telemedicine visit with prescribing clinician: Visit date not found   Next office visit with prescribing clinician: Visit date not found                         Would you like a call back once the refill request has been completed: [] Yes [] No    If the office needs to give you a call back, can they leave a voicemail: [] Yes [] No    Lisa Flynn MA  12/27/22, 09:58 EST

## 2022-12-28 ENCOUNTER — HOSPITAL ENCOUNTER (OUTPATIENT)
Dept: SLEEP MEDICINE | Facility: HOSPITAL | Age: 52
Discharge: HOME | End: 2022-12-28
Admitting: INTERNAL MEDICINE
Payer: COMMERCIAL

## 2022-12-28 DIAGNOSIS — G47.33 OBSTRUCTIVE SLEEP APNEA: ICD-10-CM

## 2022-12-28 PROCEDURE — 95806 SLEEP STUDY UNATT&RESP EFFT: CPT

## 2023-01-03 DIAGNOSIS — G47.33 OBSTRUCTIVE SLEEP APNEA: Primary | ICD-10-CM

## 2023-01-03 DIAGNOSIS — G47.34 NOCTURNAL HYPOXIA: ICD-10-CM

## 2023-01-03 RX ORDER — ZOLPIDEM TARTRATE 5 MG/1
TABLET ORAL
Qty: 2 TABLET | Refills: 0 | Status: SHIPPED | OUTPATIENT
Start: 2023-01-03 | End: 2023-03-03

## 2023-01-04 ENCOUNTER — TELEPHONE (OUTPATIENT)
Dept: SLEEP MEDICINE | Facility: HOSPITAL | Age: 53
End: 2023-01-04
Payer: COMMERCIAL

## 2023-01-04 NOTE — TELEPHONE ENCOUNTER
LV for patient to call if she has questions about sleep study results , or would like to scheduled titration

## 2023-01-05 ENCOUNTER — TELEPHONE (OUTPATIENT)
Dept: SLEEP MEDICINE | Facility: HOSPITAL | Age: 53
End: 2023-01-05
Payer: COMMERCIAL

## 2023-01-05 NOTE — TELEPHONE ENCOUNTER
Spoke with pt about results. Pt is going to check cost with her insurance company on doing an in lab study and will cb on how she wishes to proceed.

## 2023-01-12 DIAGNOSIS — G47.33 OBSTRUCTIVE SLEEP APNEA: Primary | ICD-10-CM

## 2023-01-12 DIAGNOSIS — G47.34 NOCTURNAL HYPOXIA: ICD-10-CM

## 2023-01-13 ENCOUNTER — TELEPHONE (OUTPATIENT)
Dept: SLEEP MEDICINE | Facility: HOSPITAL | Age: 53
End: 2023-01-13
Payer: COMMERCIAL

## 2023-02-10 DIAGNOSIS — G44.209 ACUTE NON INTRACTABLE TENSION-TYPE HEADACHE: ICD-10-CM

## 2023-02-10 DIAGNOSIS — M54.2 NECK PAIN: ICD-10-CM

## 2023-02-14 RX ORDER — CYCLOBENZAPRINE HCL 10 MG
10 TABLET ORAL NIGHTLY PRN
Qty: 30 TABLET | Refills: 0 | OUTPATIENT
Start: 2023-02-14

## 2023-03-03 ENCOUNTER — OFFICE VISIT (OUTPATIENT)
Dept: FAMILY MEDICINE CLINIC | Facility: CLINIC | Age: 53
End: 2023-03-03
Payer: COMMERCIAL

## 2023-03-03 VITALS
BODY MASS INDEX: 43.4 KG/M2 | TEMPERATURE: 97.6 F | OXYGEN SATURATION: 98 % | RESPIRATION RATE: 16 BRPM | HEIGHT: 69 IN | WEIGHT: 293 LBS | HEART RATE: 80 BPM | DIASTOLIC BLOOD PRESSURE: 98 MMHG | SYSTOLIC BLOOD PRESSURE: 168 MMHG

## 2023-03-03 DIAGNOSIS — E11.65 TYPE 2 DIABETES MELLITUS WITH HYPERGLYCEMIA, WITH LONG-TERM CURRENT USE OF INSULIN: ICD-10-CM

## 2023-03-03 DIAGNOSIS — G44.209 ACUTE NON INTRACTABLE TENSION-TYPE HEADACHE: ICD-10-CM

## 2023-03-03 DIAGNOSIS — G62.89 OTHER POLYNEUROPATHY: ICD-10-CM

## 2023-03-03 DIAGNOSIS — N18.4 STAGE 4 CHRONIC KIDNEY DISEASE: ICD-10-CM

## 2023-03-03 DIAGNOSIS — I10 ESSENTIAL HYPERTENSION: Primary | ICD-10-CM

## 2023-03-03 DIAGNOSIS — M54.2 NECK PAIN: ICD-10-CM

## 2023-03-03 DIAGNOSIS — R60.0 BILATERAL LOWER EXTREMITY EDEMA: Chronic | ICD-10-CM

## 2023-03-03 DIAGNOSIS — Z79.4 TYPE 2 DIABETES MELLITUS WITH HYPERGLYCEMIA, WITH LONG-TERM CURRENT USE OF INSULIN: ICD-10-CM

## 2023-03-03 PROCEDURE — 99214 OFFICE O/P EST MOD 30 MIN: CPT | Performed by: NURSE PRACTITIONER

## 2023-03-03 RX ORDER — CLONIDINE HYDROCHLORIDE 0.1 MG/1
0.1 TABLET ORAL
COMMUNITY
Start: 2023-01-03 | End: 2024-01-03

## 2023-03-03 RX ORDER — CYCLOBENZAPRINE HCL 10 MG
10 TABLET ORAL NIGHTLY PRN
Qty: 30 TABLET | Refills: 3 | Status: SHIPPED | OUTPATIENT
Start: 2023-03-03

## 2023-03-03 RX ORDER — GABAPENTIN 100 MG/1
100 CAPSULE ORAL 3 TIMES DAILY
Qty: 90 CAPSULE | Refills: 1 | Status: CANCELLED | OUTPATIENT
Start: 2023-03-03 | End: 2024-03-02

## 2023-03-03 RX ORDER — DULAGLUTIDE 1.5 MG/.5ML
INJECTION, SOLUTION SUBCUTANEOUS
COMMUNITY
Start: 2022-11-28

## 2023-03-03 NOTE — PROGRESS NOTES
Chief Complaint  Establish Care    Stacy Mendoza presents to [facility]  As a 52 year old female to establish care     History of hypertension and CKD stage 4-  She sees Nephrology -  Last appointment was yesterday 03/02/2023.  They are working on a plan and adjusting her blood pressure medications.  She is scheduled to have a fistula placed next month and begin home dialysis    She has some bilateral peripheral edema-  Uses a walker to help with gait  Has some peripheral neuropathy-  Takes Gabapentin 100 mg TID as needed-  Works well-  Request refill.  States only takes as needed.  Reviewed Philip and every 3 month follow up to continue    Has headaches and neck pain-  Takes flexeril at night.  Helps with spasms.  Reviewed and ok with nephrology- pain worse at night-  Aching/tightening and cramping    T2DM.  Since the last visit, she has overall felt well.  she has been compliant with current meds.  she denies medication side effects.  Glucose control with medication is needs improvement   The last HgbA1C result was   Lab Results   Component Value Date    HGBA1C 8.8 (H) 08/16/2022   .  The last dilated eye exam was 2022  Has been seeing endocrinology-  Provider left and she needs to be reestablished. She states her current office is referring her.  She needs refills on freestyle felipe sensore.  Uses and monitor B/S closely  Takes medication as directed and watches carbs and sugar intake    No other acute C/o today    The following portions of the patient's history were reviewed and updated as appropriate: allergies, current medications, past family history, past medical history, past social history, past surgical history, and problem list      Review of Systems   Constitutional: Negative for chills, fatigue and fever.   HENT: Negative for congestion.    Eyes: Negative for visual disturbance.   Respiratory: Negative for cough, shortness of breath and wheezing.   "  Cardiovascular: Positive for leg swelling. Negative for chest pain and palpitations.   Gastrointestinal: Negative for abdominal pain, diarrhea, nausea and vomiting.   Skin: Negative for rash.   Neurological: Positive for numbness. Negative for dizziness and light-headedness.        Objective   Vital Signs:   Vitals:    03/03/23 0908   BP: (!) 194/104   Pulse: 80   Resp: 16   Temp: 97.6 °F (36.4 °C)   TempSrc: Skin   SpO2: 98%   Weight: (!) 147 kg (323 lb)   Height: 175 cm (68.9\")        Class 3 Severe Obesity (BMI >=40). Obesity-related health conditions include the following: hypertension, diabetes mellitus and dyslipidemias. Obesity is unchanged. BMI is is above average; BMI management plan is completed. We discussed portion control and increasing exercise.        Physical Exam  Vitals reviewed.   Constitutional:       General: She is not in acute distress.  Eyes:      Conjunctiva/sclera: Conjunctivae normal.   Neck:      Thyroid: No thyroid mass, thyromegaly or thyroid tenderness.      Vascular: No carotid bruit.      Trachea: Trachea and phonation normal.   Cardiovascular:      Rate and Rhythm: Normal rate and regular rhythm.      Heart sounds: Normal heart sounds.   Pulmonary:      Effort: Pulmonary effort is normal. No respiratory distress.      Breath sounds: Normal breath sounds. No stridor. No wheezing, rhonchi or rales.   Chest:      Chest wall: No tenderness.   Abdominal:      General: There is no distension.      Palpations: There is no mass.      Tenderness: There is no abdominal tenderness. There is no right CVA tenderness, left CVA tenderness, guarding or rebound.      Hernia: No hernia is present.   Musculoskeletal:      Right lower leg: 3+ Edema present.      Left lower leg: 3+ Edema present.   Lymphadenopathy:      Cervical: No cervical adenopathy.   Neurological:      Mental Status: She is alert.   Psychiatric:         Attention and Perception: Attention normal.         Mood and Affect: Mood " normal.          Result Review :     The following data was reviewed by: YOSSI Fernandez on 03/03/2023:      Hemoglobin & Hematocrit, Blood (11/16/2022 11:05)-    POC Glucose Once (11/16/2022 11:01) 138  CBC & Differential (11/16/2022 06:38) H &H 7.7/24.2  Renal Function Panel (11/16/2022 06:38) gfr 11  Labs monitored by nephrology   HA1c 8.8  Assessment and Plan    Diagnoses and all orders for this visit:    1. Essential hypertension (Primary)  Comments:  Keep follow up with nephrology-  adjusting medication  montior B/p at home and bring log to next visit    2. Neck pain  Comments:  refill flexeril  no driving or alcohol while taking medication  Orders:  -     cyclobenzaprine (FLEXERIL) 10 MG tablet; Take 1 tablet by mouth At Night As Needed for Muscle Spasms.  Dispense: 30 tablet; Refill: 3    3. Acute non intractable tension-type headache  Comments:  no changes  keep headache diary with any changes  Orders:  -     cyclobenzaprine (FLEXERIL) 10 MG tablet; Take 1 tablet by mouth At Night As Needed for Muscle Spasms.  Dispense: 30 tablet; Refill: 3    4. Type 2 diabetes mellitus with hyperglycemia, with long-term current use of insulin (HCC)  Comments:  keep follow up with endocrinology  continue montioring bs with freestyle  Orders:  -     Continuous Blood Gluc Sensor (FreeStyle Zane 2 Sensor) misc; 1 each by Other route Every 14 (Fourteen) Days.  Dispense: 2 each; Refill: 5    5. Bilateral lower extremity edema  Comments:  keep follow up with nephrology  plans to start dialysis soon  report any changes    6. Stage 4 chronic kidney disease (HCC)  Comments:  keep follow up with nephrology  plans to start dialysis soon  report any changes    7. Other polyneuropathy  Comments:  takes Gabapentin-  reviewed follow up every 3 months  only fill with this provider      After reviewing KETTY  Gabapentin just filled by Dr Dusty Orellana 02/28/2023 400 mg with 3 refills to RX alternatives    Follow Up   Return in  about 3 months (around 6/3/2023) for Next scheduled follow up.  Patient was given instructions and counseling regarding her condition or for health maintenance advice. Please see specific information pulled into the AVS if appropriate.

## 2023-03-30 ENCOUNTER — TELEPHONE (OUTPATIENT)
Dept: URGENT CARE | Facility: CLINIC | Age: 53
End: 2023-03-30
Payer: COMMERCIAL

## 2023-03-30 DIAGNOSIS — L03.116 CELLULITIS OF LEFT THIGH: Primary | ICD-10-CM

## 2023-03-30 RX ORDER — SULFAMETHOXAZOLE AND TRIMETHOPRIM 800; 160 MG/1; MG/1
1 TABLET ORAL 2 TIMES DAILY
Qty: 14 TABLET | Refills: 0 | Status: SHIPPED | OUTPATIENT
Start: 2023-03-30

## 2023-03-30 NOTE — TELEPHONE ENCOUNTER
Patient was seen 3/21/23 for cellulitis of L thigh and given 7 days of Keflex. She reports it is improving, 30% better/ reduction in redness, big reduction in pain than previous, still warm to touch instead of hot.

## 2023-04-12 ENCOUNTER — OFFICE VISIT (OUTPATIENT)
Dept: SLEEP MEDICINE | Facility: HOSPITAL | Age: 53
End: 2023-04-12
Payer: COMMERCIAL

## 2023-04-12 VITALS
OXYGEN SATURATION: 96 % | HEART RATE: 84 BPM | HEIGHT: 69 IN | WEIGHT: 293 LBS | DIASTOLIC BLOOD PRESSURE: 91 MMHG | SYSTOLIC BLOOD PRESSURE: 186 MMHG | BODY MASS INDEX: 43.4 KG/M2

## 2023-04-12 DIAGNOSIS — E66.9 SUPER OBESE: ICD-10-CM

## 2023-04-12 DIAGNOSIS — N18.6 ESRD (END STAGE RENAL DISEASE): ICD-10-CM

## 2023-04-12 DIAGNOSIS — G47.34 NOCTURNAL HYPOXIA: ICD-10-CM

## 2023-04-12 DIAGNOSIS — G47.33 OBSTRUCTIVE SLEEP APNEA: Primary | ICD-10-CM

## 2023-04-12 PROCEDURE — G0463 HOSPITAL OUTPT CLINIC VISIT: HCPCS

## 2023-04-12 NOTE — PROGRESS NOTES
"[facility]  Telephone: [phone] / Fax: [phone] [place]  Telephone: [phone] / Fax: [phone] Chikis Mora    PCP: Kim Benjamin APRN    Reason for visit: ALEISHA f/u    Kelly Pack is a 52 y.o.female  was last seen at [facility] sleep lab in November 2022 and had HST in Dec 2022 showing very severe ALEISHA with AHI 58/hr, desaturation <89% for 179 minutes. She was set up with auto CPAP and is here today to review response to therapy. She feels that CPAP \"changed everything\".  Frequency of nocturnal interruptions decreased. Snoring resolved. She wakes up feeling more rested. Sore throat she had before due to dry mouth is no longer present.  Her sleep schedule is MN-8am. ESS is 7. She has ESRD due to DM2 and is pending HD initiation. Her BP is elevated at the time of her office visit today as she has not yet taken afternoon BP meds.    SH- no tobacco, no alcohol, 0-1 caffeine per day.     ROS- +nasal congestion, +post nasal drip, +wheezing, +cough, rest is negative.    DME -[facility]    Current Medications:    Current Outpatient Medications:   •  albuterol sulfate  (90 Base) MCG/ACT inhaler, Inhale 2 puffs Every 6 (Six) Hours As Needed for Wheezing or Shortness of Air., Disp: 18 g, Rfl: 0  •  bumetanide (BUMEX) 2 MG tablet, Take 1 tablet by mouth., Disp: , Rfl:   •  carvedilol (COREG) 25 MG tablet, Take 1 tablet by mouth 2 (Two) Times a Day With Meals., Disp: 60 tablet, Rfl: 1  •  cephalexin (KEFLEX) 500 MG capsule, Take 1 capsule by mouth 2 (Two) Times a Day for 7 days., Disp: 14 capsule, Rfl: 0  •  cloNIDine (CATAPRES) 0.1 MG tablet, Take 1 tablet by mouth., Disp: , Rfl:   •  Continuous Blood Gluc Sensor (FreeStyle Zane 2 Sensor) misc, 1 each by Other route Every 14 (Fourteen) Days., Disp: 2 each, Rfl: 5  •  cyclobenzaprine (FLEXERIL) 10 MG tablet, Take 1 tablet by mouth At Night As Needed for Muscle Spasms., Disp: 30 tablet, Rfl: 3  •  ferrous sulfate 325 (65 FE) MG " tablet, TAKE 1 TABLET BY MOUTH IN THE MORNING, 1 AT NOON, AND 1 IN THE EVENING. TAKE WITH MEALS. (DO NOT CRUSH, CHEW, OR SPLIT), Disp: , Rfl:   •  fluticasone (FLONASE) 50 MCG/ACT nasal spray, 2 sprays by Each Nare route Daily., Disp: 48 g, Rfl: 1  •  gabapentin (Neurontin) 100 MG capsule, Take 1 capsule by mouth 3 (Three) Times a Day., Disp: 90 capsule, Rfl: 1  •  glucose blood (Accu-Chek Pascale Plus) test strip, TEST BLOOD SUGAR TWICE DAILY, Disp: 200 each, Rfl: 0  •  hydrALAZINE (APRESOLINE) 100 MG tablet, Take 1 tablet by mouth 3 (Three) Times a Day., Disp: 90 tablet, Rfl: 0  •  Insulin Glargine, 1 Unit Dial, (Toujeo SoloStar) 300 UNIT/ML solution pen-injector injection, INJECT 15 UNITS UNDER THE SKIN INTO THE APPROPRIATE AREA AS DIRECTED daily, Disp: 6 mL, Rfl: 0  •  insulin lispro (humaLOG) 100 UNIT/ML injection, Inject 14 Units under the skin into the appropriate area as directed 3 (Three) Times a Day Before Meals., Disp: 40 mL, Rfl: 1  •  Insulin Pen Needle 32G X 4 MM misc, 1 each 5 (Five) Times a Day., Disp: 500 each, Rfl: 2  •  metOLazone (ZAROXOLYN) 5 MG tablet, Take 1 tablet by mouth Daily., Disp: 30 tablet, Rfl: 1  •  ondansetron (Zofran) 4 MG tablet, Take 1 tablet by mouth Every 8 (Eight) Hours As Needed for Nausea or Vomiting., Disp: 30 tablet, Rfl: 1  •  sevelamer (RENVELA) 800 MG tablet, Take 3 tablets by mouth., Disp: , Rfl:   •  simvastatin (ZOCOR) 20 MG tablet, Take 1 tablet by mouth Daily., Disp: 90 tablet, Rfl: 1  •  sulfamethoxazole-trimethoprim (Bactrim DS) 800-160 MG per tablet, Take 1 tablet by mouth 2 (Two) Times a Day., Disp: 14 tablet, Rfl: 0  •  Trulicity 1.5 MG/0.5ML solution pen-injector, INJECT 1.5 MG UNDER THE SKIN INTO THE APPROPRIATE AREA AS DIRECTED ONCE A WEEK, Disp: , Rfl:   •  vitamin B-12 (VITAMIN B-12) 1000 MCG tablet, Take 1 tablet by mouth Daily., Disp: 30 tablet, Rfl: 0  •  vitamin D (ERGOCALCIFEROL) 1.25 MG (41294 UT) capsule capsule, Take 1 capsule by mouth 2 (Two) Times  "a Week., Disp: 30 capsule, Rfl: 11   also entered in Sleep Questionnaire    Patient  has a past medical history of Albuminuria, Allergic, Allergic rhinitis, Asthma, Bell's palsy, Cataract, Cholelithiasis, CKD (chronic kidney disease), Drug therapy, Endometrial cancer, HL (hearing loss), Hyperlipidemia, Hypertension, Low back pain, Migraine, Obesity, Renal insufficiency, Right otitis media, Type II diabetes mellitus, Visual impairment, and Vitamin D deficiency.    I have reviewed the Past Medical History, Past Surgical History, Social History and Family History.    Vital Signs BP (!) 186/91 Comment: Pt  has not taken afternoon meds yet for bp  Pulse 84   Ht 175 cm (68.9\")   Wt (!) 141 kg (310 lb 12.8 oz)   SpO2 96%   BMI 46.03 kg/m²  Body mass index is 46.03 kg/m².    General Alert and oriented. No acute distress noted   Pharynx/Throat Class IV  Mallampati airway, large tongue, no evidence of redundant lateral pharyngeal tissue. No oral lesions. No thrush. Moist mucous membranes.   Head Normocephalic. Symmetrical. Atraumatic.    Nose No septal deviation. No drainage   Chest Wall Normal shape. Symmetric expansion with respiration. No tenderness.   Neck Trachea midline, no thyromegaly or adenopathy    Lungs Clear to auscultation bilaterally. No wheezes. No rhonchi. No rales. Respirations regular, even and unlabored.   Heart Regular rhythm and normal rate. Normal S1 and S2. No murmur   Abdomen Soft, non-tender and non-distended. Normal bowel sounds. No masses.   Extremities Moves all extremities well. No edema   Psychiatric Normal mood and affect.     Testing:  Download -Jan 22-present, average nightly use of 7 hours and 18 minutes on auto CPAP 5-20cm H2O, avg pr is 12.3 cm H2O, AHI 0.7.    Study-Diagnostic findings: The patient tolerated the home sleep testing with monitoring time of 682 minutes. The data obtained make this a technically adequate study. The apnea hypopneas index(AHI) was 58.8 per sleep hour.  The " AHI during supine position was - per sleep hour. Mean heart rate of 88.4 BPM.  Snoring was noted 48.4% of sleep time. Lowest oxygen saturation during the study was 33%. Saturation below 89% was noted for 179.5 mins.     Impression:  1. Obstructive sleep apnea    2. Nocturnal hypoxia    3. Super obese    4. ESRD (end stage renal disease)          Plan:  Kelly is doing well on the CPAP. She had significant hypoxia during the night of HST and requires an ovn oximetry on CPAP RA to confirm that CPAP is able to correct all of her desaturations. She was unable to have in lab titration done due to cost.    She uses the CPAP device and benefits from its use in terms of reduction of hypersomnia and snoring.  AHI appears to be within adequate range. I reviewed download report and original sleep study report with the patient.     Weight loss will be strongly beneficial to reduce the severity of sleep-disordered breathing.  Caution during activities that require prolonged concentration is strongly advised if sleepiness returns.       Follow up with Dr. Santana in one year    Thank you for allowing me to participate in your patient's care.      YOSSI Hogan  [facility]  Phone: [phone]      Part of this note may be an electronic transcription/translation of spoken language to printed text using the Dragon Dictation System.

## 2023-05-23 ENCOUNTER — DOCUMENTATION (OUTPATIENT)
Dept: SLEEP MEDICINE | Facility: HOSPITAL | Age: 53
End: 2023-05-23
Payer: COMMERCIAL

## 2023-05-23 NOTE — PROGRESS NOTES
Overnight oximetry on CPAP RA done 4/25/23 shows significant improvement in nocturnal hypoxia in comparison to original HST. <88% cumulative time 5 min and 18 seconds. Pt will be notified. If she agrees, pressures on CPAP will be increased to 8-20cm H2O.

## 2023-05-30 ENCOUNTER — TELEMEDICINE (OUTPATIENT)
Dept: FAMILY MEDICINE CLINIC | Facility: CLINIC | Age: 53
End: 2023-05-30

## 2023-05-30 ENCOUNTER — TELEPHONE (OUTPATIENT)
Dept: FAMILY MEDICINE CLINIC | Facility: CLINIC | Age: 53
End: 2023-05-30

## 2023-05-30 VITALS — WEIGHT: 141 LBS | HEIGHT: 69 IN | BODY MASS INDEX: 20.88 KG/M2

## 2023-05-30 DIAGNOSIS — R19.7 ACUTE DIARRHEA: Primary | ICD-10-CM

## 2023-05-30 PROCEDURE — 99214 OFFICE O/P EST MOD 30 MIN: CPT | Performed by: INTERNAL MEDICINE

## 2023-05-30 RX ORDER — HYDRALAZINE HYDROCHLORIDE 25 MG/1
TABLET, FILM COATED ORAL
COMMUNITY
Start: 2023-05-04 | End: 2023-05-30

## 2023-05-30 RX ORDER — LOPERAMIDE HYDROCHLORIDE 2 MG/1
2 TABLET ORAL 3 TIMES DAILY PRN
Qty: 6 TABLET | Refills: 0 | Status: SHIPPED | OUTPATIENT
Start: 2023-05-30

## 2023-05-30 NOTE — TELEPHONE ENCOUNTER
Caller: Kelly Pack    Relationship: Self    Best call back number: [phone] (Home)    What medication are you requesting: SOMETHING FOR DIARRHEA    What are your current symptoms: DIARRHEA    How long have you been experiencing symptoms:  5 DAYS NOW    Have you had these symptoms before:    [] Yes  [] No    Have you been treated for these symptoms before:   [] Yes  [] No    If a prescription is needed, what is your preferred pharmacy and phone number: [place] [id number] [facility] [phone] [facility] [phone]          Additional notes: PLEASE CONTACT PATIENT TO ADVISE.          THANKS

## 2023-05-30 NOTE — PROGRESS NOTES
"Mode of Visit: Video  Location of patient: home  Location of provider: [facility] clinic  You have chosen to receive care through a telehealth visit.  Does the patient consent to use a video/audio connection for your medical care today? Yes  The visit included audio and video interaction. No technical issues occurred during this visit     Subjective       Chief Complaint   Patient presents with   • Diarrhea     Pt states she has had symptoms for past 5 days.         HPI:        Kelly is a 53 y.o. female who presents to  [facility] today for diarrhea. She normally sees Kim, has several medical problems including HTN, diabetes on insulin followed by endo, CKD4 nearing need for HD.    Last Thursday, had [employer] for lunch. Couldn't eat all of it. Had severe constipation and pain. Could feel the pain moving across her body. Finally had BM, subsequently has had diarrhea for last 5 days. Can only keep crackers, toast, Pedialyte, and sprite down. No vomiting. +nausea intermittently, has bile reflux so that's a choric issue. Has had 2-3 bouts non-bloody diarrhea per day. No belly pain. No itching or funny taste in mouth.  Started probiotic. Has been 3 different rounds of antibiotics for cellulitis, April 10 last dose.   Not taking short acting insulin due to this illness, has had two lows since her illness. Not taking Toujeo due to illness, previously on  units, last time she took 40-45 units. On multiple anti-HTN medications, BP usually high in mornings and pt states it's been running higher than normal as of late.     Her nephrologist is Dr. Reagan Del Rosario, states she has upcoming appt w him. We went thru her med list. She is not on metolazone but has a call into nephrologist to restart as she is now having much more swelling.         PE:   Objective   Vitals:    05/30/23 1313   Weight: 64 kg (141 lb)   Height: 175 cm (68.9\")        Body mass index is 20.88 " kg/m².    Physical Exam   Constitutional: She appears well-developed and well-nourished. No distress.   HENT:   Head: Normocephalic and atraumatic.   Eyes: Right eye exhibits no discharge. Left eye exhibits no discharge. No scleral icterus.   Pulmonary/Chest: Effort normal.  No respiratory distress.  Neurological: She is alert.   Skin: She is not diaphoretic.   Psychiatric: She has a normal mood and affect.             The following data was reviewed by: Lexy Herr MD on 05/30/2023:  Office Visit with Kim Benjamin APRN (03/03/2023)  RENAL FUNCTION PANEL (03/10/2023 12:49)          A/P:     Assessment & Plan   Diagnoses and all orders for this visit:    1. Acute diarrhea (Primary)  -     loperamide (IMODIUM A-D) 2 MG tablet; Take 1 tablet by mouth 3 (Three) Times a Day As Needed for Diarrhea.  Dispense: 6 tablet; Refill: 0    Patient states her diarrhea is to the point where she is afraid to leave the house.  I told her that I am very concerned about her, particularly given presence advanced chronic kidney disease on the edge of needing dialysis.  My concern is that the diarrhea could be a symptom of uremia or volume depletion from the diarrhea could lead to sooner need for dialysis.  More so, difficult to evaluate her volume status at telehealth visit.  On one hand, she has had poor p.o. intake/diarrhea which makes me wonder if she is volume depleted, but on the other hand she states that her legs are far more edematous--so could be overloaded.  That coupled with higher blood pressures, lower blood sugars make me worried that she is indeed developing uremia.  Moreover, she has been on antibiotics thrice in the last few months, C. difficile in differential diagnosis.  I told the patient that I am happy to call her in a very short course of loperamide so she can get out of the house to her appointments, but I do not recommend symptomatic treatment ONLY for this. Needs further eval in person, either here,  nephrology office, and/or ED.  Patient states she has her nephrologist's cell phone number and will give him a call. l recommended that she get labs as soon as possible, and if any lightheadedness, dizziness, inability to tolerate p.o., nausea, vomiting, etc. she needs to report to the emergency department.  She is also welcome to come here for labs/visit but it may make more sense for her to see her nephrologist first.      I spent 30 minutes caring for Kelly on this date of service. This time includes time spent by me in the following activities:preparing for the visit, reviewing tests, obtaining and/or reviewing a separately obtained history, performing a medically appropriate examination and/or evaluation , counseling and educating the patient/family/caregiver, ordering medications, tests, or procedures and documenting information in the medical record   Follow up:   Return if symptoms worsen or fail to improve.  Patient was given instructions and counseling regarding her condition or for health maintenance advice. Please see specific information pulled into the AVS if appropriate.

## 2023-06-01 ENCOUNTER — TELEPHONE (OUTPATIENT)
Dept: FAMILY MEDICINE CLINIC | Facility: CLINIC | Age: 53
End: 2023-06-01

## 2023-06-01 NOTE — TELEPHONE ENCOUNTER
Caller: Kelly Pack    Relationship: Self    Best call back number: [phone]     What is the medical concern/diagnosis: MAMMOGRAM    What specialty or service is being requested: RADIOLOGY    What is the provider, practice or medical service name: ANY    What is the office location: [facility] [place]

## 2023-06-02 DIAGNOSIS — Z12.31 ENCOUNTER FOR SCREENING MAMMOGRAM FOR MALIGNANT NEOPLASM OF BREAST: Primary | ICD-10-CM

## 2023-06-02 NOTE — TELEPHONE ENCOUNTER
S/w patient and let her know that we can not order a diagnostic MMG because we have no history of problems.  Pt stated she is having problems.  Without history a screening has to be done first.

## 2023-06-02 NOTE — TELEPHONE ENCOUNTER
Caller: Ramone Kelly    Relationship: Self    Best call back number: [phone]    What is the best time to reach you: ANY    Who are you requesting to speak with (clinical staff, provider,  specific staff member): CLINICAL STAFF    What was the call regarding: PATIENT CALLED STATED THE MAMMOGRAM ORDER WAS SCREENING AND SHE NEEDS THE MAMMOGRAM DIAGNOSTIC ORDERED.  CAN YOSSI GRUBBS PLEASE CORRECT AND SEND ORDER FOR SCHEDULING TODAY.      Is it okay if the provider responds through MyChart:

## 2023-06-08 ENCOUNTER — HOSPITAL ENCOUNTER (OUTPATIENT)
Dept: MAMMOGRAPHY | Facility: HOSPITAL | Age: 53
Discharge: HOME | End: 2023-06-08
Payer: COMMERCIAL

## 2023-06-08 DIAGNOSIS — Z12.31 ENCOUNTER FOR SCREENING MAMMOGRAM FOR MALIGNANT NEOPLASM OF BREAST: ICD-10-CM

## 2023-06-22 PROBLEM — N18.9 ACUTE RENAL FAILURE SUPERIMPOSED ON CHRONIC KIDNEY DISEASE, UNSPECIFIED CKD STAGE, UNSPECIFIED ACUTE RENAL FAILURE TYPE: Status: ACTIVE | Noted: 2023-06-22

## 2023-06-22 PROBLEM — N17.9 ACUTE RENAL FAILURE SUPERIMPOSED ON CHRONIC KIDNEY DISEASE, UNSPECIFIED CKD STAGE, UNSPECIFIED ACUTE RENAL FAILURE TYPE: Status: ACTIVE | Noted: 2023-06-22

## 2023-06-23 PROBLEM — E11.22 TYPE 2 DIABETES MELLITUS WITH CHRONIC KIDNEY DISEASE: Status: ACTIVE | Noted: 2023-06-23

## 2023-08-08 ENCOUNTER — TELEPHONE (OUTPATIENT)
Dept: FAMILY MEDICINE CLINIC | Facility: CLINIC | Age: 53
End: 2023-08-08

## 2023-08-08 NOTE — TELEPHONE ENCOUNTER
Caller: Kelly Pack    Relationship: Self    Best call back number: [phone]     What was the call regarding: PATIENT IS ASKING WHAT HER BLOOD TYPE IS. PLEASE ADVISE.

## 2023-08-09 NOTE — TELEPHONE ENCOUNTER
Patient stated she was in the hospital from Nov 8th - Nov 17th of 2022 and was given two blood fusion during the time of her being admitted .  She has spoke to medical records who was unable to locate the labs or results of the requested test.

## 2023-08-10 NOTE — TELEPHONE ENCOUNTER
Caller: Kelly Pack    Relationship: Self    Best call back number: [phone]     What was the call regarding: HUB RELAYED MESSAGE TO PATIENT.

## 2023-08-10 NOTE — TELEPHONE ENCOUNTER
No answer     I looked thru her records and did not see her blood type.     Okay for HUB to relay

## 2023-08-15 ENCOUNTER — OFFICE VISIT (OUTPATIENT)
Dept: SURGERY | Facility: CLINIC | Age: 53
End: 2023-08-15
Payer: COMMERCIAL

## 2023-08-15 VITALS
BODY MASS INDEX: 38.18 KG/M2 | OXYGEN SATURATION: 100 % | SYSTOLIC BLOOD PRESSURE: 132 MMHG | DIASTOLIC BLOOD PRESSURE: 68 MMHG | WEIGHT: 257.8 LBS | HEIGHT: 69 IN | HEART RATE: 77 BPM

## 2023-08-15 DIAGNOSIS — N18.6 ESRD ON HEMODIALYSIS: ICD-10-CM

## 2023-08-15 DIAGNOSIS — Z99.2 ESRD ON HEMODIALYSIS: ICD-10-CM

## 2023-08-15 DIAGNOSIS — Z12.11 SCREENING FOR COLON CANCER: Primary | ICD-10-CM

## 2023-08-15 PROCEDURE — 99204 OFFICE O/P NEW MOD 45 MIN: CPT | Performed by: SURGERY

## 2023-08-15 RX ORDER — ERGOCALCIFEROL 1.25 MG/1
50000 CAPSULE ORAL 2 TIMES WEEKLY
COMMUNITY

## 2023-08-15 RX ORDER — ONDANSETRON 2 MG/ML
4 INJECTION INTRAMUSCULAR; INTRAVENOUS EVERY 6 HOURS PRN
OUTPATIENT
Start: 2023-08-15

## 2023-08-16 NOTE — PROGRESS NOTES
Chief Complaint   Patient presents with    PD Cath Consult       Subjective      Kelly Pack is a 53 y.o. female who is referred by Dr. Roman to be evaluated for peritoneal dialysis catheter placement. Patient has ESRD and  is on dialysis. She gets dialysis on Monday, Wednesday, and Friday since August. Patient does have a AV access but the fistula is noT completely matured.  She is having dialysis through an IJ catheter patient has not had a recent intraabdominal infection.   She gets very weak after hemodialysis and wants to switch to peritoneal dialysis  The patient reports having regular bowel movements.  Patient did not have a Colonoscopy.   Patient has history of ovarian cancer underwent hysterectomy in bilateral salpingo-oophorectomy in 2000.  She had laparoscopic cholecystectomy after that.    Home Evaluation: No    Past Medical History:   Diagnosis Date    Albuminuria     Allergic     Seasonal, grass, cats and some dogs    Allergic rhinitis     Asthma     allergy triggered    Bell's palsy     Cataract     Had surgery on both eyes    Cholelithiasis     Removed. Have bile reflux/ vomiting    CKD (chronic kidney disease)     Drug therapy     Endometrial cancer     HL (hearing loss)     In L ear    Hyperlipidemia     Hypertension     Low back pain     Migraine     Obesity     Renal insufficiency     Right otitis media     Type II diabetes mellitus     Visual impairment     Diabetic retinopathy. Oil in L eye due to retina detachment s.    Vitamin D deficiency        Past Surgical History:   Procedure Laterality Date    ARTERIOVENOUS FISTULA  05/2023    CATARACT EXTRACTION      CHOLECYSTECTOMY      EYE SURGERY      NOV 2020    HYSTERECTOMY      due to cancer    INSERTION HEMODIALYSIS CATHETER Right 06/23/2023    Procedure: HEMODIALYSIS CATHETER INSERTION;  Surgeon: Mikael Mackay MD;  Location: [facility];  Service: Vascular;  Laterality: Right;    OOPHORECTOMY      VITRECTOMY PARS PLANA Left 01/28/2020     Procedure: 25G VITRECTOMY-ENDO LASER;  Surgeon: Lazarus, Howard S., MD;  Location: [facility] MAIN OR;  Service: Ophthalmology         Current Outpatient Medications:     albuterol sulfate  (90 Base) MCG/ACT inhaler, Inhale 2 puffs Every 6 (Six) Hours As Needed for Wheezing or Shortness of Air., Disp: 18 g, Rfl: 0    carvedilol (COREG) 25 MG tablet, Take 1 tablet by mouth 2 (Two) Times a Day With Meals., Disp: 60 tablet, Rfl: 1    cyclobenzaprine (FLEXERIL) 10 MG tablet, Take 1 tablet by mouth At Night As Needed for Muscle Spasms., Disp: 30 tablet, Rfl: 3    fluticasone (FLONASE) 50 MCG/ACT nasal spray, 2 sprays by Each Nare route Daily., Disp: 48 g, Rfl: 1    Probiotic Product (PROBIOTIC-10 PO), Take  by mouth., Disp: , Rfl:     sevelamer (RENVELA) 800 MG tablet, Take 3 tablets by mouth., Disp: , Rfl:     simvastatin (ZOCOR) 20 MG tablet, Take 1 tablet by mouth Daily., Disp: 90 tablet, Rfl: 1    vitamin B-12 (VITAMIN B-12) 1000 MCG tablet, Take 1 tablet by mouth Daily., Disp: 30 tablet, Rfl: 0    vitamin D (ERGOCALCIFEROL) 1.25 MG (16126 UT) capsule capsule, Take 1 capsule by mouth 2 (Two) Times a Week., Disp: , Rfl:     Continuous Blood Gluc Sensor (FreeStyle Zane 2 Sensor) misc, 1 each by Other route Every 14 (Fourteen) Days. (Patient not taking: Reported on 8/15/2023), Disp: 2 each, Rfl: 5    Insulin Pen Needle 32G X 4 MM misc, 1 each 5 (Five) Times a Day. (Patient not taking: Reported on 8/15/2023), Disp: 500 each, Rfl: 2    torsemide (DEMADEX) 100 MG tablet, Take 1 tablet by mouth Daily for 30 days., Disp: 30 tablet, Rfl: 0    Allergies   Allergen Reactions    Tomato Swelling     THROAT    Cat Hair Extract Itching     WATERY EYES    Mixed Grasses Itching     WATERY EYES       Family History   Problem Relation Age of Onset    Breast cancer Mother     Cancer Mother         Breast cancer, back in the 90s    Diabetes Father     Hyperlipidemia Father     Hypertension Father     Hearing loss Father     Mental  "illness Father         Alzheimer's    Asthma Maternal Grandfather        Social History     Socioeconomic History    Marital status: Single   Tobacco Use    Smoking status: Never    Smokeless tobacco: Never   Vaping Use    Vaping Use: Never used   Substance and Sexual Activity    Alcohol use: Not Currently     Comment: 3-4 drinks PER YEAR!    Drug use: No    Sexual activity: Not Currently     Partners: Male     Birth control/protection: Condom     REVIEW OF SYSTEMS    Review of Systems   Constitutional:  Negative for activity change and appetite change.   HENT:  Negative for congestion and hearing loss.    Eyes:  Negative for discharge and itching.   Respiratory:  Negative for apnea and choking.    Cardiovascular:  Negative for chest pain and leg swelling.   Gastrointestinal:  Negative for abdominal distention and abdominal pain.   Endocrine: Negative for cold intolerance and heat intolerance.   Genitourinary:  Negative for difficulty urinating and hematuria.   Musculoskeletal:  Negative for arthralgias and back pain.   Skin:  Negative for color change and pallor.   Allergic/Immunologic: Negative for environmental allergies.   Neurological:  Negative for dizziness and headaches.   Hematological:  Negative for adenopathy. Does not bruise/bleed easily.   Psychiatric/Behavioral:  Negative for agitation. The patient is not nervous/anxious.      Physical Examination  /68 (BP Location: Left arm, Patient Position: Sitting, Cuff Size: Large Adult)   Pulse 77   Ht 175.3 cm (69\")   Wt 117 kg (257 lb 12.8 oz)   SpO2 100%   BMI 38.07 kg/mý   Body mass index is 38.07 kg/mý.  Physical Exam  Constitutional:       Appearance: Normal appearance.   HENT:      Head: Normocephalic and atraumatic.      Nose: Nose normal.      Mouth/Throat:      Pharynx: Oropharynx is clear.   Eyes:      General: No scleral icterus.     Conjunctiva/sclera: Conjunctivae normal.   Cardiovascular:      Pulses: Normal pulses.   Pulmonary:      " Effort: Pulmonary effort is normal.      Breath sounds: Normal breath sounds.   Abdominal:      General: Bowel sounds are normal. There is no distension.      Palpations: Abdomen is soft. There is no mass.      Tenderness: There is no abdominal tenderness.      Hernia: A hernia is present.          Comments: There is a well-healed infraumbilical incision.  There is a small and is reducible umbilical hernia   Musculoskeletal:      Cervical back: Normal range of motion and neck supple.   Skin:     General: Skin is warm.      Capillary Refill: Capillary refill takes less than 2 seconds.   Neurological:      General: No focal deficit present.      Mental Status: She is alert. Mental status is at baseline.   Psychiatric:         Mood and Affect: Mood normal.         Behavior: Behavior normal.       Labs 6/26/2023l creatinine 5.7, BUN 50, sodium 132, CO2 19.2, phosphorus 5.3,  Hemoglobin 9, white blood cell count 7.9, platelets 274    Assessment:   Kelly Pack is a 53 y.o. female with ESRD that is on hemo-dialysis and wants to switch to peritoneal dialysis.  Patient never had colonoscopy.  Discussed with the patient about further step.  I think she should undergo colonoscopy prior to undergoing peritoneal dialysis catheter placement.  Risk and benefits of procedure including bleeding, infection, perforation discussed in detail with the patient  She has a small umbilical hernia will need to be fixed at the time of surgery.  I offered her open umbilical hernia repair with possible mesh placement.  Risk and benefits of procedure including bleeding, infection, wound complications, mesh infection, chronic pain discussed in detail with the patient      The peritoneal dialysis catheter procedure was explained in detail to the patient including risks and benefits.  The benefits including the possibility of having dialysis at home without the side effects of the hemodialysis.  The risks including but not limited to catheter  dislodgment, obstruction, malfunction, bleeding, infection and possible injury to surrounding organs during peritoneal access. She is interested in proceeding with laparoscopic placement of peritoneal dialysis catheter. The patient understands that the catheter will not be used for dialysis for a period of approximately 2 weeks after its placement and that during this time they should not shower until the exit site is completely healed. The patient underwent at least one training session with the peritoneal dialysis nurse and their house was evaluated and is ready for the peritoneal dialysis. Patient verbalized understanding and agreed with the plan. All questions were answered at this time.      Plan:     -Colonoscopy  - Laparoscopic peritoneal dialysis catheter placement, possible open and umbilical hernia repair with possible mesh placement  - Preparation for surgery orders have been placed  - Surgery scheduling.     Orders Placed This Encounter   Procedures    Obtain Informed Consent     Standing Status:   Future     Order Specific Question:   Informed Consent Given For     Answer:   colonoscopy    Obtain Informed Consent     Standing Status:   Future     Order Specific Question:   Informed Consent Given For     Answer:   Laparoscopic peritoneal dialysis catheter placement and umbilical hernia repair, possible mesh placement    Provide NPO Instructions to Patient     Standing Status:   Future    Chlorhexidine Skin Prep     Chlorhexidine Skin Prep and Instructions For All Patients Having A Procedure Requiring an Outward Incision if Not Allergic. If Allergic, Give Antibacterial Skin Wipes and Instructions. Do Not Use For Facial Cases or on Any Mucus Membranes.     Standing Status:   Future    ECG 12 Lead     Standing Status:   Future     Standing Expiration Date:   8/15/2024     Order Specific Question:   Reason for Exam:     Answer:   preop     Order Specific Question:   Release to patient     Answer:   Routine  Release [[id number]]         Homer Leiva MD  General, Minimally Invasive and Endoscopic Surgery  [facility]    [address], [address]  [place], [place], [address]  P: [phone]  F: [phone]

## 2023-08-18 ENCOUNTER — PATIENT ROUNDING (BHMG ONLY) (OUTPATIENT)
Dept: SURGERY | Facility: CLINIC | Age: 53
End: 2023-08-18
Payer: COMMERCIAL

## 2023-08-18 NOTE — PROGRESS NOTES
August 18, 2023    Hello, may I speak with Kelly Pack?    My name is Kelly      I am  with [facility]  [address]  [place] [address] [phone].    Before we get started may I verify your date of birth? 1970    I am calling to officially welcome you to our practice and ask about your recent visit. Is this a good time to talk? yes    Tell me about your visit with us. What things went well?  She was happy with her visit       We're always looking for ways to make our patients' experiences even better. Do you have recommendations on ways we may improve?  no    Overall were you satisfied with your first visit to our practice? yes       I appreciate you taking the time to speak with me today. Is there anything else I can do for you? no      Thank you, and have a great day.

## 2023-09-22 ENCOUNTER — TELEPHONE (OUTPATIENT)
Dept: FAMILY MEDICINE CLINIC | Facility: CLINIC | Age: 53
End: 2023-09-22

## 2023-09-22 NOTE — TELEPHONE ENCOUNTER
Caller: Kelly Pack    Relationship: Self    Best call back number: [phone]     What orders are you requesting (i.e. lab or imaging): MAMMOGRAM, COLONOSCOPY    Where will you receive your lab/imaging services: WOULD LIKE TO HAVE MAMMOGRAM DONE AT [facility] IN [place], DOES NOT HAVE PREFERENCE ON COLONOSCOPY.    Additional notes: PLEASE CALL PATIENT WHEN ORDERS ARE PLACED.

## 2023-10-17 ENCOUNTER — OFFICE VISIT (OUTPATIENT)
Dept: FAMILY MEDICINE CLINIC | Facility: CLINIC | Age: 53
End: 2023-10-17
Payer: COMMERCIAL

## 2023-10-17 VITALS
SYSTOLIC BLOOD PRESSURE: 159 MMHG | DIASTOLIC BLOOD PRESSURE: 80 MMHG | WEIGHT: 257 LBS | BODY MASS INDEX: 38.06 KG/M2 | HEART RATE: 65 BPM | HEIGHT: 69 IN | RESPIRATION RATE: 16 BRPM | OXYGEN SATURATION: 97 % | TEMPERATURE: 97.6 F

## 2023-10-17 DIAGNOSIS — Z12.11 COLON CANCER SCREENING: ICD-10-CM

## 2023-10-17 DIAGNOSIS — Z12.31 ENCOUNTER FOR SCREENING MAMMOGRAM FOR MALIGNANT NEOPLASM OF BREAST: ICD-10-CM

## 2023-10-17 DIAGNOSIS — N18.4 STAGE 4 CHRONIC KIDNEY DISEASE: ICD-10-CM

## 2023-10-17 DIAGNOSIS — Z79.899 CONTROLLED SUBSTANCE AGREEMENT SIGNED: ICD-10-CM

## 2023-10-17 DIAGNOSIS — Z23 ENCOUNTER FOR IMMUNIZATION: ICD-10-CM

## 2023-10-17 DIAGNOSIS — E11.65 TYPE 2 DIABETES MELLITUS WITH HYPERGLYCEMIA, WITH LONG-TERM CURRENT USE OF INSULIN: ICD-10-CM

## 2023-10-17 DIAGNOSIS — Z79.4 TYPE 2 DIABETES MELLITUS WITH HYPERGLYCEMIA, WITH LONG-TERM CURRENT USE OF INSULIN: ICD-10-CM

## 2023-10-17 DIAGNOSIS — G62.89 OTHER POLYNEUROPATHY: ICD-10-CM

## 2023-10-17 DIAGNOSIS — I10 ESSENTIAL HYPERTENSION: Chronic | ICD-10-CM

## 2023-10-17 DIAGNOSIS — Z00.00 ANNUAL PHYSICAL EXAM: Primary | ICD-10-CM

## 2023-10-17 RX ORDER — GABAPENTIN 100 MG/1
100 CAPSULE ORAL 3 TIMES DAILY
Qty: 90 CAPSULE | Refills: 0 | Status: SHIPPED | OUTPATIENT
Start: 2023-10-17 | End: 2023-11-16

## 2023-10-17 NOTE — PROGRESS NOTES
Chief Complaint  Annual Exam    Stacy Mendoza presents to [facility] as a 53-year-old female best annual adult preventative physical exam. Overall she feels well. Problem list, medication list, and PMFSH have been reviewed. All recent labs(if applicable) and prescription refills(if needed) have been addressed during today's office visit.  The following were discussed during today's preventative wellness exam: Nutrition, Physical activity, Healthy weight, Immunizations, and Screenings        He  has been feeling well.     Health Habits:  Dental Exam. up to date  Eye Exam. up to date  Exercise: 3 times/week.  Current exercise activities include: Physical therapy    Diet/exercise: BMI 37.93 tries to be consistent with a healthy diet.  Tries to remain active.     Social history: Never smoker social alcohol use no drug use     Sexual history: No concern for STD testing;      Sleep: History of sleep apnea uses CPAP.       Tdap: Up-to-date  Zoster: Ordered today  Colonoscopy:    Ordered today  Mammogram: Ordered today    PHQ-2 Depression Screening  Little interest or pleasure in doing things?  0   Feeling down, depressed, or hopeless?  0   PHQ-2 Total Score  0       Type 2 diabetes- Since the last visit, she has overall felt well.  she has been compliant with current meds.  she denies medication side effects.  Glucose control with medication is well controlled   The last HgbA1C result was 5.2-  with endocrinology 06/2023   The last dilated eye exam was .  She has lost 90 pounds  She is no longer taking any insulin or other diabetic medication  She does continue to watch her carb and sugar intake  Endocrinologist says she no longer needs to follow-up as long as her hemoglobin A1c stays below 6.5.  She does need this office to control her gabapentin that was being prescribed for neuropathy by endocrinology.  She does take 100 mg 3 times a day she tries to limit her use and only  "takes as needed.  She did sign a controlled substance agreement  She is agreeable to a urine tox screen  She does know she will follow-up every 3 months  No increase or changes in numbness and tingling  No open ulcerations    She does have a history of hypertension has been controlled on current medication  Denies any increase or changes in headaches no blurred vision no dizziness no flushing no chest pain or pressure    She does have history of hyperlipidemia.  She does take simvastatin.  She denies any muscle weakness or myalgias        She has no other acute complaints at today    The following portions of the patient's history were reviewed and updated as appropriate: allergies, current medications, past family history, past medical history, past social history, past surgical history, and problem list      Review of Systems   Constitutional:  Negative for chills, fatigue and fever.   HENT:  Positive for congestion.    Eyes:  Negative for visual disturbance.   Respiratory:  Negative for cough, shortness of breath and wheezing.    Cardiovascular:  Negative for chest pain, palpitations and leg swelling.   Gastrointestinal:  Negative for abdominal pain, diarrhea, nausea and vomiting.   Endocrine: Negative for polydipsia, polyphagia and polyuria.   Genitourinary:  Negative for dysuria.   Musculoskeletal:  Negative for arthralgias.   Skin:  Negative for rash.   Neurological:  Negative for dizziness and light-headedness.   Psychiatric/Behavioral:  Negative for self-injury, sleep disturbance and suicidal ideas.         Objective   Vital Signs:   Vitals:    10/17/23 0909   BP: 159/80   BP Location: Left arm   Patient Position: Sitting   Cuff Size: Adult   Pulse: 65   Resp: 16   Temp: 97.6 °F (36.4 °C)   TempSrc: Oral   SpO2: 97%   Weight: 117 kg (257 lb)   Height: 175.3 cm (69.02\")                  Physical Exam  Constitutional:       General: She is not in acute distress.     Appearance: Normal appearance. She is normal " weight. She is not ill-appearing.   HENT:      Head: Normocephalic.      Right Ear: Tympanic membrane, ear canal and external ear normal. There is no impacted cerumen.      Left Ear: Tympanic membrane, ear canal and external ear normal. There is no impacted cerumen.      Nose: Nose normal. No congestion.      Mouth/Throat:      Mouth: Mucous membranes are moist.      Pharynx: Oropharynx is clear. No oropharyngeal exudate or posterior oropharyngeal erythema.   Eyes:      Extraocular Movements: Extraocular movements intact.      Conjunctiva/sclera: Conjunctivae normal.      Pupils: Pupils are equal, round, and reactive to light.   Cardiovascular:      Rate and Rhythm: Normal rate and regular rhythm.      Pulses: Normal pulses.      Heart sounds: Normal heart sounds. No murmur heard.  Pulmonary:      Effort: Pulmonary effort is normal.      Breath sounds: Normal breath sounds.   Abdominal:      General: Abdomen is flat. There is no distension.      Palpations: Abdomen is soft. There is no mass.      Tenderness: There is no abdominal tenderness. There is no right CVA tenderness, left CVA tenderness, guarding or rebound.      Hernia: No hernia is present.   Musculoskeletal:         General: Normal range of motion.      Cervical back: Normal range of motion and neck supple.   Skin:     General: Skin is warm and dry.      Capillary Refill: Capillary refill takes less than 2 seconds.      Findings: No rash.      Comments: Hemodialysis catheter in place   Neurological:      Mental Status: She is alert and oriented to person, place, and time. Mental status is at baseline.   Psychiatric:         Mood and Affect: Mood normal.         Behavior: Behavior normal.         Thought Content: Thought content normal.         Judgment: Judgment normal.          Result Review :     The following data was reviewed by: YOSSI Fernandez on 10/17/2023:     Last hemoglobin A1c 6/2023 5.2  Ferritin (06/25/2023 07:20) 427.0  CBC &  Differential (06/25/2023 07:20) H&H 9.0/29.2     Assessment and Plan    Diagnoses and all orders for this visit:    1. Annual physical exam (Primary)  Comments:  Health maintenance and immunizations reviewed and updated  Orders:  -     ToxAssure Flex 15, Ur -  -     Comprehensive Metabolic Panel  -     CBC & Differential  -     Lipid Panel  -     TSH  -     T4, Free  -     Hemoglobin A1c  -     Vitamin D,25-Hydroxy    2. Type 2 diabetes mellitus with hyperglycemia, with long-term current use of insulin  Comments:  Last hemoglobin A1c 5.2  No longer needs any insulin  Watch carb and sugar intake  Recheck hemoglobin A1c with next labs  Orders:  -     ToxAssure Flex 15, Ur -  -     Comprehensive Metabolic Panel  -     CBC & Differential  -     Lipid Panel  -     TSH  -     T4, Free  -     Hemoglobin A1c  -     Vitamin D,25-Hydroxy    3. Essential hypertension  Comments:  continue current management  On dialysis  Monitor blood pressure at home bring log to next visit  Orders:  -     ToxAssure Flex 15, Ur -  -     Comprehensive Metabolic Panel  -     CBC & Differential  -     Lipid Panel  -     TSH  -     T4, Free  -     Hemoglobin A1c  -     Vitamin D,25-Hydroxy    4. Stage 4 chronic kidney disease  Comments:  Hemodialysis 3 times per week with [facility]  Orders:  -     ToxAssure Flex 15, Ur -  -     Comprehensive Metabolic Panel  -     CBC & Differential  -     Lipid Panel  -     TSH  -     T4, Free  -     Hemoglobin A1c  -     Vitamin D,25-Hydroxy    5. Other polyneuropathy  Comments:  Gabapentin-no longer goes endocrinology new controlled substance agreement signed  We will follow-up every 3 mon  Lowest possible dose controlling symptoms well  Orders:  -     gabapentin (NEURONTIN) 100 MG capsule; Take 1 capsule by mouth 3 (Three) Times a Day for 30 days.  Dispense: 90 capsule; Refill: 0  -     ToxAssure Flex 15, Ur -  -     Comprehensive Metabolic Panel  -     CBC & Differential  -     Lipid Panel  -     TSH  -     T4,  Free  -     Hemoglobin A1c  -     Vitamin D,25-Hydroxy    6. Controlled substance agreement signed  Comments:  Signed in office today we will follow-up every 3 months urine talk screen with next labs  Orders:  -     ToxAssure Flex 15, Ur -    7. Colon cancer screening  Comments:  Screening only no complaints of  Orders:  -     Ambulatory Referral For Screening Colonoscopy    8. Encounter for screening mammogram for malignant neoplasm of breast  Comments:  Screening only no complaints of  Orders:  -     Mammo screening digital tomosynthesis bilateral w CAD; Future    9. Encounter for immunization  Comments:  Shingrix vaccine in office today  Orders:  -     Shingrix Vaccine        Follow Up   Return in about 3 months (around 1/17/2024) for Next scheduled follow up/medication refill.  Patient was given instructions and counseling regarding her condition or for health maintenance advice. Please see specific information pulled into the AVS if appropriate.

## 2023-10-18 ENCOUNTER — TELEPHONE (OUTPATIENT)
Dept: FAMILY MEDICINE CLINIC | Facility: CLINIC | Age: 53
End: 2023-10-18
Payer: COMMERCIAL

## 2023-10-18 NOTE — TELEPHONE ENCOUNTER
Caller: Kelly Pack    Relationship: Self    Best call back number: [phone]     What is the best time to reach you: ANY TIME    Who are you requesting to speak with (clinical staff, provider,  specific staff member): CLINICAL STAFF    What was the call regarding: PATIENT STATES THAT SHE WOULD LIKE TO KNOW IF THE SHINGLES VACCINE IS A TWO SHOT SERIES.    Is it okay if the provider responds through VideoSurfhart:     PLEASE ADVISE.

## 2023-10-18 NOTE — TELEPHONE ENCOUNTER
Pt advised that Shingrix is a 2 part series. Pt received the first and will need to wait at least 2-6 months to have second. Pt verbalized understanding.

## 2023-11-20 ENCOUNTER — TELEPHONE (OUTPATIENT)
Dept: FAMILY MEDICINE CLINIC | Facility: CLINIC | Age: 53
End: 2023-11-20
Payer: COMMERCIAL

## 2023-11-20 RX ORDER — ALBUTEROL SULFATE 90 UG/1
2 AEROSOL, METERED RESPIRATORY (INHALATION) EVERY 6 HOURS PRN
Qty: 18 G | Refills: 0 | Status: SHIPPED | OUTPATIENT
Start: 2023-11-20

## 2023-12-05 ENCOUNTER — HOSPITAL ENCOUNTER (OUTPATIENT)
Dept: MAMMOGRAPHY | Facility: HOSPITAL | Age: 53
Discharge: HOME | End: 2023-12-05
Admitting: NURSE PRACTITIONER
Payer: COMMERCIAL

## 2023-12-05 DIAGNOSIS — Z12.31 ENCOUNTER FOR SCREENING MAMMOGRAM FOR MALIGNANT NEOPLASM OF BREAST: ICD-10-CM

## 2023-12-05 PROCEDURE — 77067 SCR MAMMO BI INCL CAD: CPT

## 2023-12-05 PROCEDURE — 77063 BREAST TOMOSYNTHESIS BI: CPT

## 2024-01-26 ENCOUNTER — TELEPHONE (OUTPATIENT)
Dept: FAMILY MEDICINE CLINIC | Facility: CLINIC | Age: 54
End: 2024-01-26
Payer: COMMERCIAL

## 2024-01-26 DIAGNOSIS — Z12.11 COLON CANCER SCREENING: Primary | ICD-10-CM

## 2024-01-26 NOTE — TELEPHONE ENCOUNTER
Caller: Kelly Pack    Relationship: Self    Best call back number: [phone] - LEAVE A VOICEMAIL IF NEEDED    What orders are you requesting (i.e. lab or imaging): COLOGUARD TESTING FOR TRANSPLANT LIST     In what timeframe would the patient need to come in: ASAP    Where will you receive your lab/imaging services:     [id number]  [address]  [facility] [address]

## 2024-02-02 RX ORDER — ALBUTEROL SULFATE 90 UG/1
2 AEROSOL, METERED RESPIRATORY (INHALATION) EVERY 6 HOURS PRN
Qty: 18 G | Refills: 0 | Status: SHIPPED | OUTPATIENT
Start: 2024-02-02

## 2024-02-02 NOTE — TELEPHONE ENCOUNTER
DELETE AFTER REVIEWING: Send the encounter HIGH priority, If patient has less than a 3 day supply. If the patient will run out of medication over the weekend add that information to the additional details line. Send this encounter to the clinical pool.    Caller: Kelly Pack    Relationship: Self    Best call back number:     [phone]        Requested Prescriptions:   Requested Prescriptions     Pending Prescriptions Disp Refills    albuterol sulfate  (90 Base) MCG/ACT inhaler 18 g 0     Sig: Inhale 2 puffs Every 6 (Six) Hours As Needed for Wheezing or Shortness of Air.        Pharmacy where request should be sent: [address] [phone] [facility] [phone]      Last office visit with prescribing clinician: 10/17/2023   Last telemedicine visit with prescribing clinician: Visit date not found   Next office visit with prescribing clinician: Visit date not found     Additional details provided by patient: PATIENT IS CALLING TO STATE SHE HAS LOST HER INHALER, AND SHE HAS ANXIETY ABOUT NOT HAVING IT.  SHE IS WANTING TO KNOW IF [facility] WOULD PRESCRIBE AN EMERGENCY SUPPLY.    Does the patient have less than a 3 day supply:  [x] Yes  [] No    Would you like a call back once the refill request has been completed: [] Yes [] No    If the office needs to give you a call back, can they leave a voicemail: [] Yes [] No    Edin Mckenzie Rep   02/02/24 11:13 EST     PLEASE ADVISE.

## 2024-03-04 NOTE — TELEPHONE ENCOUNTER
Caller: Ramone Kelly    Relationship: Self    Best call back number:     Requested Prescriptions:   Requested Prescriptions     Pending Prescriptions Disp Refills    albuterol sulfate  (90 Base) MCG/ACT inhaler 18 g 0     Sig: Inhale 2 puffs Every 6 (Six) Hours As Needed for Wheezing or Shortness of Air.        [facility]  [address]       Last office visit with prescribing clinician: 10/17/2023   Last telemedicine visit with prescribing clinician: Visit date not found   Next office visit with prescribing clinician: Visit date not found     Additional details provided by patient: PATIENT SAYS THAT HER PHARMACY WILL BE SENDING A PRIOR AUTHORIZATION TO DR WORTHINGTON ABOUT THIS MEDICATION.  SHE SAYS SHE ONLY HAS A FEW PUFFS REMAINING AND WANTS TO BE SHE SAYS SHE CAN BE CALLED IF SHE NEEDS TO COME TO THE OFFICE TO BE SEEN.     Does the patient have less than a 3 day supply:  [x] Yes  [] No    Would you like a call back once the refill request has been completed: [] Yes [x] No    If the office needs to give you a call back, can they leave a voicemail: [] Yes [] No    Edin Kline Rep   03/04/24 15:22 EST

## 2024-03-06 RX ORDER — ALBUTEROL SULFATE 90 UG/1
2 AEROSOL, METERED RESPIRATORY (INHALATION) EVERY 6 HOURS PRN
Qty: 18 G | Refills: 0 | Status: SHIPPED | OUTPATIENT
Start: 2024-03-06

## 2024-03-07 ENCOUNTER — PRIOR AUTHORIZATION (OUTPATIENT)
Dept: FAMILY MEDICINE CLINIC | Facility: CLINIC | Age: 54
End: 2024-03-07
Payer: COMMERCIAL

## 2024-03-07 NOTE — TELEPHONE ENCOUNTER
PA Case ID #: [id number]  Rx #: [id number]  Need Help? Call us at [phone]  Outcome  Approved today  Approved. This drug is covered on the Preferred Drug List. It does not require prior approval. Only specific National Drug Codes (NDCs) are covered. The NDC your pharmacy is using is not covered. The pharmacy can use NDC(s): 46834856923, [id number], or [id number]. You can get 2 Inhalers (17 grams) per 30 days. Please call the pharmacy to process the claim.  Authorization Expiration Date: 12/31/2024

## 2024-03-13 DIAGNOSIS — G44.209 ACUTE NON INTRACTABLE TENSION-TYPE HEADACHE: ICD-10-CM

## 2024-03-13 DIAGNOSIS — M54.2 NECK PAIN: ICD-10-CM

## 2024-03-14 RX ORDER — CYCLOBENZAPRINE HCL 10 MG
10 TABLET ORAL NIGHTLY PRN
Qty: 30 TABLET | Refills: 0 | Status: SHIPPED | OUTPATIENT
Start: 2024-03-14

## 2024-03-14 NOTE — TELEPHONE ENCOUNTER
Last ov 10/2023 annual exam    Return in about 3 months (around 1/17/2024) for Next scheduled follow up/medication refill.

## 2024-03-18 ENCOUNTER — TELEPHONE (OUTPATIENT)
Dept: FAMILY MEDICINE CLINIC | Facility: CLINIC | Age: 54
End: 2024-03-18
Payer: MEDICARE

## 2024-03-18 NOTE — TELEPHONE ENCOUNTER
Caller: Kelly Pack    Relationship: Self    Best call back number: [phone]     Who are you requesting to speak with (clinical staff, provider,  specific staff member): CLINICAL STAFF     What was the call regarding: WANTING TO KNOW IF SHE NEEDS TO GET A SECOND PNEUMONIA VACCINE PLEASE CALL AND ADVISE

## 2024-04-25 DIAGNOSIS — M54.2 NECK PAIN: ICD-10-CM

## 2024-04-25 DIAGNOSIS — G62.89 OTHER POLYNEUROPATHY: ICD-10-CM

## 2024-04-25 DIAGNOSIS — G44.209 ACUTE NON INTRACTABLE TENSION-TYPE HEADACHE: ICD-10-CM

## 2024-04-26 RX ORDER — CYCLOBENZAPRINE HCL 10 MG
10 TABLET ORAL NIGHTLY PRN
Qty: 30 TABLET | Refills: 0 | OUTPATIENT
Start: 2024-04-26

## 2024-04-26 RX ORDER — GABAPENTIN 100 MG/1
100 CAPSULE ORAL 3 TIMES DAILY
Qty: 90 CAPSULE | Refills: 0 | OUTPATIENT
Start: 2024-04-26

## 2024-05-07 DIAGNOSIS — G44.209 ACUTE NON INTRACTABLE TENSION-TYPE HEADACHE: ICD-10-CM

## 2024-05-07 DIAGNOSIS — M54.2 NECK PAIN: ICD-10-CM

## 2024-05-09 RX ORDER — CYCLOBENZAPRINE HCL 10 MG
10 TABLET ORAL NIGHTLY PRN
Qty: 15 TABLET | Refills: 0 | OUTPATIENT
Start: 2024-05-09

## 2024-05-28 ENCOUNTER — TELEPHONE (OUTPATIENT)
Dept: FAMILY MEDICINE CLINIC | Facility: CLINIC | Age: 54
End: 2024-05-28
Payer: MEDICARE

## 2024-05-28 NOTE — TELEPHONE ENCOUNTER
Caller: Kelly Pack    Relationship: Self    Best call back number:     [phone] (Home)       What is the best time to reach you: ANY     Who are you requesting to speak with (clinical staff, provider,  specific staff member): ANY     Do you know the name of the person who called: N/A     What was the call regarding: PATIENT CONTACTED INSURANCE TO VERIFY NETWORK PARTICIPATION AND WAS TOLD NICOLE NEEDS TO UPDATE HER CREDENTIALS.     Is it okay if the provider responds through MyChart:

## 2024-06-04 ENCOUNTER — OFFICE VISIT (OUTPATIENT)
Dept: FAMILY MEDICINE CLINIC | Facility: CLINIC | Age: 54
End: 2024-06-04
Payer: MEDICARE

## 2024-06-04 VITALS
BODY MASS INDEX: 40.58 KG/M2 | OXYGEN SATURATION: 95 % | HEIGHT: 69 IN | TEMPERATURE: 98.3 F | HEART RATE: 97 BPM | DIASTOLIC BLOOD PRESSURE: 82 MMHG | WEIGHT: 274 LBS | SYSTOLIC BLOOD PRESSURE: 166 MMHG

## 2024-06-04 DIAGNOSIS — G62.89 OTHER POLYNEUROPATHY: ICD-10-CM

## 2024-06-04 DIAGNOSIS — F32.A ANXIETY AND DEPRESSION: ICD-10-CM

## 2024-06-04 DIAGNOSIS — G44.209 ACUTE NON INTRACTABLE TENSION-TYPE HEADACHE: ICD-10-CM

## 2024-06-04 DIAGNOSIS — M54.2 NECK PAIN: ICD-10-CM

## 2024-06-04 DIAGNOSIS — F41.9 ANXIETY AND DEPRESSION: ICD-10-CM

## 2024-06-04 DIAGNOSIS — Z79.899 HIGH RISK MEDICATION USE: ICD-10-CM

## 2024-06-04 DIAGNOSIS — I10 PRIMARY HYPERTENSION: ICD-10-CM

## 2024-06-04 DIAGNOSIS — N18.6 KIDNEY DISEASE, CHRONIC, END STAGE ON DIALYSIS: ICD-10-CM

## 2024-06-04 DIAGNOSIS — Z99.2 KIDNEY DISEASE, CHRONIC, END STAGE ON DIALYSIS: ICD-10-CM

## 2024-06-04 DIAGNOSIS — E11.65 TYPE 2 DIABETES MELLITUS WITH HYPERGLYCEMIA, WITHOUT LONG-TERM CURRENT USE OF INSULIN: Primary | ICD-10-CM

## 2024-06-04 PROCEDURE — 3079F DIAST BP 80-89 MM HG: CPT | Performed by: NURSE PRACTITIONER

## 2024-06-04 PROCEDURE — 3077F SYST BP >= 140 MM HG: CPT | Performed by: NURSE PRACTITIONER

## 2024-06-04 PROCEDURE — 99214 OFFICE O/P EST MOD 30 MIN: CPT | Performed by: NURSE PRACTITIONER

## 2024-06-04 RX ORDER — GABAPENTIN 100 MG/1
100 CAPSULE ORAL 3 TIMES DAILY
Qty: 90 CAPSULE | Refills: 0 | Status: SHIPPED | OUTPATIENT
Start: 2024-06-04 | End: 2024-07-04

## 2024-06-04 RX ORDER — CYCLOBENZAPRINE HCL 10 MG
10 TABLET ORAL NIGHTLY PRN
Qty: 30 TABLET | Refills: 0 | Status: SHIPPED | OUTPATIENT
Start: 2024-06-04

## 2024-06-04 RX ORDER — MAGNESIUM OXIDE TAB 400 MG (241.3 MG ELEMENTAL MG) 400 (241.3 MG) MG
400 TAB ORAL DAILY
COMMUNITY
Start: 2024-05-20

## 2024-06-04 RX ORDER — LANTHANUM CARBONATE 1000 MG/1
1000 TABLET, CHEWABLE ORAL 2 TIMES DAILY WITH MEALS
COMMUNITY
Start: 2024-05-14

## 2024-06-04 RX ORDER — MIDODRINE HYDROCHLORIDE 2.5 MG/1
2.5 TABLET ORAL
COMMUNITY
Start: 2024-04-22

## 2024-06-04 RX ORDER — BUPROPION HYDROCHLORIDE 150 MG/1
150 TABLET ORAL DAILY
Qty: 90 TABLET | Refills: 1 | Status: SHIPPED | OUTPATIENT
Start: 2024-06-04

## 2024-06-04 NOTE — PROGRESS NOTES
Chief Complaint  Med Refill    Subjective        HPI   Kelly presents to [facility] as a 54-year-old female for follow-up on chronic conditions, to refill medications, review/order labs.  Overall doing okay    Type II diabetic    Since the last visit, she has overall felt well.  she has been compliant with current meds.  she denies medication side effects.  Glucose control with medication is needs improvement   The last HgbA1C result was   Lab Results   Component Value Date    HGBA1C 8.8 (H) 08/16/2022   .  The last dilated eye exam was 2023  She is receiving dialysis 3 times per week.  She is taking her medication as directed.  Her blood sugar has fluctuated.  She does use a continuous glucose monitor.  And tries to watch her carb and sugar intake    She does have chronic back and neck pain, neuropathy  She does use gabapentin very rarely.  She does use Flexeril in between.  She takes her medication as directed  She did sign a new controlled substance agreement  She was not able to void today related to her dialysis.  She does void on her own on a regular schedule.  She will schedule her next appointment to be able to leave a urine sample for a urine tox    She has been having some increased anxiety/feeling down slightly with her health concerns.  She is also trying to work hard to lose weight.  She is agreeable to trying Wellbutrin      She does have follow-up appointment scheduled with her specialist.    The following portions of the patient's history were reviewed and updated as appropriate: allergies, current medications, past family history, past medical history, past social history, past surgical history, and problem list    Review of Systems   Constitutional:  Negative for chills, fatigue and fever.   Eyes:  Negative for visual disturbance.   Respiratory:  Negative for cough, shortness of breath, wheezing and stridor.    Cardiovascular:  Negative for chest pain, palpitations  "and leg swelling.   Gastrointestinal:  Negative for abdominal pain, diarrhea, nausea and vomiting.   Endocrine: Negative for polydipsia, polyphagia and polyuria.   Neurological:  Negative for dizziness.   Psychiatric/Behavioral:  Negative for self-injury, sleep disturbance and suicidal ideas. The patient is not nervous/anxious.         Objective   Vital Signs:   Vitals:    06/04/24 0851   BP: 166/82   Pulse: 97   Temp: 98.3 °F (36.8 °C)   SpO2: 95%   Weight: 124 kg (274 lb)   Height: 175.3 cm (69.02\")            6/23/2023     9:47 AM   PHQ-2/PHQ-9 Depression Screening   Little Interest or Pleasure in Doing Things 0-->not at all   Feeling Down, Depressed or Hopeless 0-->not at all   PHQ-9: Brief Depression Severity Measure Score 0               Physical Exam  Vitals reviewed.   Constitutional:       General: She is not in acute distress.  Eyes:      Conjunctiva/sclera: Conjunctivae normal.   Neck:      Thyroid: No thyromegaly.      Vascular: No carotid bruit.   Cardiovascular:      Rate and Rhythm: Normal rate and regular rhythm.      Heart sounds: Normal heart sounds.   Pulmonary:      Effort: Pulmonary effort is normal. No respiratory distress.      Breath sounds: Normal breath sounds. No stridor. No wheezing, rhonchi or rales.   Chest:      Chest wall: No tenderness.   Neurological:      Mental Status: She is alert and oriented to person, place, and time.   Psychiatric:         Attention and Perception: Attention normal.         Mood and Affect: Mood normal.          Result Review :     The following data was reviewed by: YOSSI Fernandez on 06/04/2024:           Assessment and Plan    Assessment & Plan  Type 2 diabetes mellitus with hyperglycemia, without long-term current use of insulin  Repeat hemoglobin A1c with labs  Continue to monitor blood sugars closely    Neck pain  Continue Flexeril as needed  Acute non intractable tension-type headache    Keep headache diary report any changes        Other " polyneuropathy  Continue gabapentin as needed  High risk medication use  Signed controlled substance agreement  Will get U tox on next visit unable to void today  Kidney disease, chronic, end stage on dialysis  Dialysis 3 times per week  Keep follow-up with nephrology  Primary hypertension  Monitor BP closely bring log to next visit  Unable to take  Anxiety and depression  Trial Wellbutrin  Denies any SI or HI  Report any medication side effects immediately    New Medications Ordered This Visit   Medications    cyclobenzaprine (FLEXERIL) 10 MG tablet     Sig: Take 1 tablet by mouth At Night As Needed for Muscle Spasms.     Dispense:  30 tablet     Refill:  0    buPROPion XL (Wellbutrin XL) 150 MG 24 hr tablet     Sig: Take 1 tablet by mouth Daily.     Dispense:  90 tablet     Refill:  1    gabapentin (NEURONTIN) 100 MG capsule     Sig: Take 1 capsule by mouth 3 (Three) Times a Day for 30 days.     Dispense:  90 capsule     Refill:  0          Follow Up   Return in about 6 weeks (around 7/16/2024) for Follow up for B/p Check, follow up anxiety.  Patient was given instructions and counseling regarding her condition or for health maintenance advice. Please see specific information pulled into the AVS if appropriate.

## 2024-06-05 LAB
25(OH)D3+25(OH)D2 SERPL-MCNC: 18.1 NG/ML (ref 30–100)
ALBUMIN SERPL-MCNC: 3.9 G/DL (ref 3.5–5.2)
ALBUMIN/GLOB SERPL: 1.5 G/DL
ALP SERPL-CCNC: 103 U/L (ref 39–117)
ALT SERPL-CCNC: 6 U/L (ref 1–33)
AST SERPL-CCNC: 7 U/L (ref 1–32)
BASOPHILS # BLD AUTO: 0.09 10*3/MM3 (ref 0–0.2)
BASOPHILS NFR BLD AUTO: 1 % (ref 0–1.5)
BILIRUB SERPL-MCNC: <0.2 MG/DL (ref 0–1.2)
BUN SERPL-MCNC: 45 MG/DL (ref 6–20)
BUN/CREAT SERPL: 7.7 (ref 7–25)
CALCIUM SERPL-MCNC: 7.6 MG/DL (ref 8.6–10.5)
CHLORIDE SERPL-SCNC: 97 MMOL/L (ref 98–107)
CHOLEST SERPL-MCNC: 303 MG/DL (ref 0–200)
CO2 SERPL-SCNC: 21.2 MMOL/L (ref 22–29)
CREAT SERPL-MCNC: 5.88 MG/DL (ref 0.57–1)
EGFRCR SERPLBLD CKD-EPI 2021: 8.1 ML/MIN/1.73
EOSINOPHIL # BLD AUTO: 0.36 10*3/MM3 (ref 0–0.4)
EOSINOPHIL NFR BLD AUTO: 3.9 % (ref 0.3–6.2)
ERYTHROCYTE [DISTWIDTH] IN BLOOD BY AUTOMATED COUNT: 12.9 % (ref 12.3–15.4)
GLOBULIN SER CALC-MCNC: 2.6 GM/DL
GLUCOSE SERPL-MCNC: 313 MG/DL (ref 65–99)
HBA1C MFR BLD: 11.1 % (ref 4.8–5.6)
HCT VFR BLD AUTO: 31.8 % (ref 34–46.6)
HDLC SERPL-MCNC: 33 MG/DL (ref 40–60)
HGB BLD-MCNC: 10.7 G/DL (ref 12–15.9)
IMM GRANULOCYTES # BLD AUTO: 0.07 10*3/MM3 (ref 0–0.05)
IMM GRANULOCYTES NFR BLD AUTO: 0.8 % (ref 0–0.5)
LDLC SERPL CALC-MCNC: 205 MG/DL (ref 0–100)
LYMPHOCYTES # BLD AUTO: 1.39 10*3/MM3 (ref 0.7–3.1)
LYMPHOCYTES NFR BLD AUTO: 15.2 % (ref 19.6–45.3)
MCH RBC QN AUTO: 33 PG (ref 26.6–33)
MCHC RBC AUTO-ENTMCNC: 33.6 G/DL (ref 31.5–35.7)
MCV RBC AUTO: 98.1 FL (ref 79–97)
MONOCYTES # BLD AUTO: 0.7 10*3/MM3 (ref 0.1–0.9)
MONOCYTES NFR BLD AUTO: 7.7 % (ref 5–12)
NEUTROPHILS # BLD AUTO: 6.51 10*3/MM3 (ref 1.7–7)
NEUTROPHILS NFR BLD AUTO: 71.4 % (ref 42.7–76)
NRBC BLD AUTO-RTO: 0 /100 WBC (ref 0–0.2)
PLATELET # BLD AUTO: 369 10*3/MM3 (ref 140–450)
POTASSIUM SERPL-SCNC: 4.5 MMOL/L (ref 3.5–5.2)
PROT SERPL-MCNC: 6.5 G/DL (ref 6–8.5)
RBC # BLD AUTO: 3.24 10*6/MM3 (ref 3.77–5.28)
SODIUM SERPL-SCNC: 138 MMOL/L (ref 136–145)
TRIGL SERPL-MCNC: 320 MG/DL (ref 0–150)
TSH SERPL DL<=0.005 MIU/L-ACNC: 3.49 UIU/ML (ref 0.27–4.2)
VLDLC SERPL CALC-MCNC: 65 MG/DL (ref 5–40)
WBC # BLD AUTO: 9.12 10*3/MM3 (ref 3.4–10.8)

## 2024-08-30 ENCOUNTER — TRANSCRIBE ORDERS (OUTPATIENT)
Dept: ADMINISTRATIVE | Facility: HOSPITAL | Age: 54
End: 2024-08-30
Payer: COMMERCIAL

## 2024-08-30 ENCOUNTER — HOSPITAL ENCOUNTER (OUTPATIENT)
Facility: HOSPITAL | Age: 54
Setting detail: OBSERVATION
Discharge: HOME | End: 2024-09-01
Attending: STUDENT IN AN ORGANIZED HEALTH CARE EDUCATION/TRAINING PROGRAM | Admitting: HOSPITALIST
Payer: COMMERCIAL

## 2024-08-30 ENCOUNTER — APPOINTMENT (OUTPATIENT)
Dept: GENERAL RADIOLOGY | Facility: HOSPITAL | Age: 54
End: 2024-08-30
Payer: COMMERCIAL

## 2024-08-30 DIAGNOSIS — R50.9 FEVER DETERMINED BY EXAMINATION: ICD-10-CM

## 2024-08-30 DIAGNOSIS — D50.8 OTHER IRON DEFICIENCY ANEMIA: Primary | ICD-10-CM

## 2024-08-30 DIAGNOSIS — D64.9 SYMPTOMATIC ANEMIA: Primary | ICD-10-CM

## 2024-08-30 PROBLEM — N18.6 ESRD (END STAGE RENAL DISEASE): Status: ACTIVE | Noted: 2024-08-30

## 2024-08-30 LAB
ABO GROUP BLD: NORMAL
ALBUMIN SERPL-MCNC: 3.8 G/DL (ref 3.5–5.2)
ALBUMIN/GLOB SERPL: 1.2 G/DL
ALP SERPL-CCNC: 110 U/L (ref 39–117)
ALT SERPL W P-5'-P-CCNC: 8 U/L (ref 1–33)
ANION GAP SERPL CALCULATED.3IONS-SCNC: 13 MMOL/L (ref 5–15)
APTT PPP: 30.5 SECONDS (ref 22.7–35.4)
AST SERPL-CCNC: 9 U/L (ref 1–32)
B PARAPERT DNA SPEC QL NAA+PROBE: NOT DETECTED
B PERT DNA SPEC QL NAA+PROBE: NOT DETECTED
BASOPHILS # BLD AUTO: 0.07 10*3/MM3 (ref 0–0.2)
BASOPHILS NFR BLD AUTO: 0.6 % (ref 0–1.5)
BILIRUB SERPL-MCNC: 0.2 MG/DL (ref 0–1.2)
BLD GP AB SCN SERPL QL: NEGATIVE
BUN SERPL-MCNC: 23 MG/DL (ref 6–20)
BUN/CREAT SERPL: 5.3 (ref 7–25)
C PNEUM DNA NPH QL NAA+NON-PROBE: NOT DETECTED
CALCIUM SPEC-SCNC: 8.7 MG/DL (ref 8.6–10.5)
CHLORIDE SERPL-SCNC: 97 MMOL/L (ref 98–107)
CO2 SERPL-SCNC: 25 MMOL/L (ref 22–29)
CREAT SERPL-MCNC: 4.3 MG/DL (ref 0.57–1)
D-LACTATE SERPL-SCNC: 3.1 MMOL/L (ref 0.5–2)
DEPRECATED RDW RBC AUTO: 43.3 FL (ref 37–54)
EGFRCR SERPLBLD CKD-EPI 2021: 11.8 ML/MIN/1.73
EOSINOPHIL # BLD AUTO: 0.48 10*3/MM3 (ref 0–0.4)
EOSINOPHIL NFR BLD AUTO: 4.4 % (ref 0.3–6.2)
ERYTHROCYTE [DISTWIDTH] IN BLOOD BY AUTOMATED COUNT: 13.9 % (ref 12.3–15.4)
FERRITIN SERPL-MCNC: 62.8 NG/ML (ref 13–150)
FLUAV SUBTYP SPEC NAA+PROBE: NOT DETECTED
FLUBV RNA ISLT QL NAA+PROBE: NOT DETECTED
GLOBULIN UR ELPH-MCNC: 3.1 GM/DL
GLUCOSE BLDC GLUCOMTR-MCNC: 204 MG/DL (ref 70–130)
GLUCOSE SERPL-MCNC: 206 MG/DL (ref 65–99)
HADV DNA SPEC NAA+PROBE: NOT DETECTED
HCOV 229E RNA SPEC QL NAA+PROBE: NOT DETECTED
HCOV HKU1 RNA SPEC QL NAA+PROBE: NOT DETECTED
HCOV NL63 RNA SPEC QL NAA+PROBE: NOT DETECTED
HCOV OC43 RNA SPEC QL NAA+PROBE: NOT DETECTED
HCT VFR BLD AUTO: 24.2 % (ref 34–46.6)
HGB BLD-MCNC: 7.4 G/DL (ref 12–15.9)
HMPV RNA NPH QL NAA+NON-PROBE: NOT DETECTED
HPIV1 RNA ISLT QL NAA+PROBE: NOT DETECTED
HPIV2 RNA SPEC QL NAA+PROBE: NOT DETECTED
HPIV3 RNA NPH QL NAA+PROBE: NOT DETECTED
HPIV4 P GENE NPH QL NAA+PROBE: NOT DETECTED
IMM GRANULOCYTES # BLD AUTO: 0.11 10*3/MM3 (ref 0–0.05)
IMM GRANULOCYTES NFR BLD AUTO: 1 % (ref 0–0.5)
INR PPP: 1.03 (ref 0.9–1.1)
IRON 24H UR-MRATE: 30 MCG/DL (ref 37–145)
IRON SATN MFR SERPL: 7 % (ref 20–50)
LYMPHOCYTES # BLD AUTO: 0.9 10*3/MM3 (ref 0.7–3.1)
LYMPHOCYTES NFR BLD AUTO: 8.3 % (ref 19.6–45.3)
M PNEUMO IGG SER IA-ACNC: NOT DETECTED
MAGNESIUM SERPL-MCNC: 2.3 MG/DL (ref 1.6–2.6)
MCH RBC QN AUTO: 27.1 PG (ref 26.6–33)
MCHC RBC AUTO-ENTMCNC: 30.6 G/DL (ref 31.5–35.7)
MCV RBC AUTO: 88.6 FL (ref 79–97)
MONOCYTES # BLD AUTO: 0.87 10*3/MM3 (ref 0.1–0.9)
MONOCYTES NFR BLD AUTO: 8 % (ref 5–12)
NEUTROPHILS NFR BLD AUTO: 77.7 % (ref 42.7–76)
NEUTROPHILS NFR BLD AUTO: 8.45 10*3/MM3 (ref 1.7–7)
NRBC BLD AUTO-RTO: 0 /100 WBC (ref 0–0.2)
PLATELET # BLD AUTO: 405 10*3/MM3 (ref 140–450)
PMV BLD AUTO: 8.6 FL (ref 6–12)
POTASSIUM SERPL-SCNC: 4.1 MMOL/L (ref 3.5–5.2)
PROCALCITONIN SERPL-MCNC: 1.37 NG/ML (ref 0–0.25)
PROT SERPL-MCNC: 6.9 G/DL (ref 6–8.5)
PROTHROMBIN TIME: 13.7 SECONDS (ref 11.7–14.2)
RBC # BLD AUTO: 2.73 10*6/MM3 (ref 3.77–5.28)
RH BLD: POSITIVE
RHINOVIRUS RNA SPEC NAA+PROBE: NOT DETECTED
RSV RNA NPH QL NAA+NON-PROBE: NOT DETECTED
SARS-COV-2 RNA NPH QL NAA+NON-PROBE: NOT DETECTED
SODIUM SERPL-SCNC: 135 MMOL/L (ref 136–145)
T&S EXPIRATION DATE: NORMAL
TIBC SERPL-MCNC: 446 MCG/DL (ref 298–536)
TRANSFERRIN SERPL-MCNC: 299 MG/DL (ref 200–360)
WBC NRBC COR # BLD AUTO: 10.88 10*3/MM3 (ref 3.4–10.8)

## 2024-08-30 PROCEDURE — 36415 COLL VENOUS BLD VENIPUNCTURE: CPT | Performed by: STUDENT IN AN ORGANIZED HEALTH CARE EDUCATION/TRAINING PROGRAM

## 2024-08-30 PROCEDURE — 83605 ASSAY OF LACTIC ACID: CPT | Performed by: STUDENT IN AN ORGANIZED HEALTH CARE EDUCATION/TRAINING PROGRAM

## 2024-08-30 PROCEDURE — 71045 X-RAY EXAM CHEST 1 VIEW: CPT

## 2024-08-30 PROCEDURE — 25010000002 CEFTRIAXONE PER 250 MG: Performed by: INTERNAL MEDICINE

## 2024-08-30 PROCEDURE — 99285 EMERGENCY DEPT VISIT HI MDM: CPT

## 2024-08-30 PROCEDURE — 86901 BLOOD TYPING SEROLOGIC RH(D): CPT | Performed by: STUDENT IN AN ORGANIZED HEALTH CARE EDUCATION/TRAINING PROGRAM

## 2024-08-30 PROCEDURE — 83540 ASSAY OF IRON: CPT | Performed by: STUDENT IN AN ORGANIZED HEALTH CARE EDUCATION/TRAINING PROGRAM

## 2024-08-30 PROCEDURE — 85730 THROMBOPLASTIN TIME PARTIAL: CPT | Performed by: STUDENT IN AN ORGANIZED HEALTH CARE EDUCATION/TRAINING PROGRAM

## 2024-08-30 PROCEDURE — 0202U NFCT DS 22 TRGT SARS-COV-2: CPT | Performed by: STUDENT IN AN ORGANIZED HEALTH CARE EDUCATION/TRAINING PROGRAM

## 2024-08-30 PROCEDURE — 36430 TRANSFUSION BLD/BLD COMPNT: CPT

## 2024-08-30 PROCEDURE — 84145 PROCALCITONIN (PCT): CPT | Performed by: STUDENT IN AN ORGANIZED HEALTH CARE EDUCATION/TRAINING PROGRAM

## 2024-08-30 PROCEDURE — 87040 BLOOD CULTURE FOR BACTERIA: CPT | Performed by: STUDENT IN AN ORGANIZED HEALTH CARE EDUCATION/TRAINING PROGRAM

## 2024-08-30 PROCEDURE — G0378 HOSPITAL OBSERVATION PER HR: HCPCS

## 2024-08-30 PROCEDURE — 82728 ASSAY OF FERRITIN: CPT | Performed by: STUDENT IN AN ORGANIZED HEALTH CARE EDUCATION/TRAINING PROGRAM

## 2024-08-30 PROCEDURE — 84466 ASSAY OF TRANSFERRIN: CPT | Performed by: STUDENT IN AN ORGANIZED HEALTH CARE EDUCATION/TRAINING PROGRAM

## 2024-08-30 PROCEDURE — 86900 BLOOD TYPING SEROLOGIC ABO: CPT

## 2024-08-30 PROCEDURE — 25010000002 HEPARIN (PORCINE) PER 1000 UNITS: Performed by: INTERNAL MEDICINE

## 2024-08-30 PROCEDURE — 82948 REAGENT STRIP/BLOOD GLUCOSE: CPT

## 2024-08-30 PROCEDURE — 85610 PROTHROMBIN TIME: CPT | Performed by: STUDENT IN AN ORGANIZED HEALTH CARE EDUCATION/TRAINING PROGRAM

## 2024-08-30 PROCEDURE — 86901 BLOOD TYPING SEROLOGIC RH(D): CPT

## 2024-08-30 PROCEDURE — 86850 RBC ANTIBODY SCREEN: CPT | Performed by: STUDENT IN AN ORGANIZED HEALTH CARE EDUCATION/TRAINING PROGRAM

## 2024-08-30 PROCEDURE — 63710000001 INSULIN LISPRO (HUMAN) PER 5 UNITS: Performed by: INTERNAL MEDICINE

## 2024-08-30 PROCEDURE — P9016 RBC LEUKOCYTES REDUCED: HCPCS

## 2024-08-30 PROCEDURE — 80053 COMPREHEN METABOLIC PANEL: CPT | Performed by: STUDENT IN AN ORGANIZED HEALTH CARE EDUCATION/TRAINING PROGRAM

## 2024-08-30 PROCEDURE — 85025 COMPLETE CBC W/AUTO DIFF WBC: CPT | Performed by: STUDENT IN AN ORGANIZED HEALTH CARE EDUCATION/TRAINING PROGRAM

## 2024-08-30 PROCEDURE — 86920 COMPATIBILITY TEST SPIN: CPT

## 2024-08-30 PROCEDURE — 96372 THER/PROPH/DIAG INJ SC/IM: CPT

## 2024-08-30 PROCEDURE — 86900 BLOOD TYPING SEROLOGIC ABO: CPT | Performed by: STUDENT IN AN ORGANIZED HEALTH CARE EDUCATION/TRAINING PROGRAM

## 2024-08-30 PROCEDURE — 83735 ASSAY OF MAGNESIUM: CPT

## 2024-08-30 RX ORDER — AMOXICILLIN 250 MG
2 CAPSULE ORAL 2 TIMES DAILY PRN
Status: DISCONTINUED | OUTPATIENT
Start: 2024-08-30 | End: 2024-09-01 | Stop reason: HOSPADM

## 2024-08-30 RX ORDER — FLUTICASONE PROPIONATE 50 MCG
2 SPRAY, SUSPENSION (ML) NASAL DAILY
Status: DISCONTINUED | OUTPATIENT
Start: 2024-08-31 | End: 2024-09-01 | Stop reason: HOSPADM

## 2024-08-30 RX ORDER — ONDANSETRON 4 MG/1
4 TABLET, ORALLY DISINTEGRATING ORAL EVERY 6 HOURS PRN
Status: DISCONTINUED | OUTPATIENT
Start: 2024-08-30 | End: 2024-09-01 | Stop reason: HOSPADM

## 2024-08-30 RX ORDER — IBUPROFEN 600 MG/1
1 TABLET ORAL
Status: DISCONTINUED | OUTPATIENT
Start: 2024-08-30 | End: 2024-09-01 | Stop reason: HOSPADM

## 2024-08-30 RX ORDER — SODIUM CHLORIDE 0.9 % (FLUSH) 0.9 %
10 SYRINGE (ML) INJECTION EVERY 12 HOURS SCHEDULED
Status: DISCONTINUED | OUTPATIENT
Start: 2024-08-30 | End: 2024-09-01 | Stop reason: HOSPADM

## 2024-08-30 RX ORDER — SODIUM CHLORIDE 0.9 % (FLUSH) 0.9 %
10 SYRINGE (ML) INJECTION AS NEEDED
Status: DISCONTINUED | OUTPATIENT
Start: 2024-08-30 | End: 2024-09-01 | Stop reason: HOSPADM

## 2024-08-30 RX ORDER — CYCLOBENZAPRINE HCL 10 MG
10 TABLET ORAL 3 TIMES DAILY PRN
Status: DISCONTINUED | OUTPATIENT
Start: 2024-08-30 | End: 2024-09-01 | Stop reason: HOSPADM

## 2024-08-30 RX ORDER — POLYETHYLENE GLYCOL 3350 17 G/17G
17 POWDER, FOR SOLUTION ORAL DAILY PRN
Status: DISCONTINUED | OUTPATIENT
Start: 2024-08-30 | End: 2024-09-01 | Stop reason: HOSPADM

## 2024-08-30 RX ORDER — ACETAMINOPHEN 160 MG/5ML
650 SOLUTION ORAL EVERY 4 HOURS PRN
Status: DISCONTINUED | OUTPATIENT
Start: 2024-08-30 | End: 2024-09-01 | Stop reason: HOSPADM

## 2024-08-30 RX ORDER — ACETAMINOPHEN 650 MG/1
650 SUPPOSITORY RECTAL EVERY 4 HOURS PRN
Status: DISCONTINUED | OUTPATIENT
Start: 2024-08-30 | End: 2024-09-01 | Stop reason: HOSPADM

## 2024-08-30 RX ORDER — ACETAMINOPHEN 325 MG/1
650 TABLET ORAL EVERY 4 HOURS PRN
Status: DISCONTINUED | OUTPATIENT
Start: 2024-08-30 | End: 2024-09-01 | Stop reason: HOSPADM

## 2024-08-30 RX ORDER — CARVEDILOL 25 MG/1
25 TABLET ORAL 2 TIMES DAILY WITH MEALS
Status: DISCONTINUED | OUTPATIENT
Start: 2024-08-31 | End: 2024-09-01 | Stop reason: HOSPADM

## 2024-08-30 RX ORDER — ATORVASTATIN CALCIUM 20 MG/1
10 TABLET, FILM COATED ORAL DAILY
Status: DISCONTINUED | OUTPATIENT
Start: 2024-08-31 | End: 2024-09-01 | Stop reason: HOSPADM

## 2024-08-30 RX ORDER — HEPARIN SODIUM 5000 [USP'U]/ML
5000 INJECTION, SOLUTION INTRAVENOUS; SUBCUTANEOUS EVERY 12 HOURS SCHEDULED
Status: DISCONTINUED | OUTPATIENT
Start: 2024-08-30 | End: 2024-08-31

## 2024-08-30 RX ORDER — GABAPENTIN 300 MG/1
300 CAPSULE ORAL NIGHTLY PRN
Status: DISCONTINUED | OUTPATIENT
Start: 2024-08-30 | End: 2024-09-01 | Stop reason: HOSPADM

## 2024-08-30 RX ORDER — INSULIN LISPRO 100 [IU]/ML
2-7 INJECTION, SOLUTION INTRAVENOUS; SUBCUTANEOUS
Status: DISCONTINUED | OUTPATIENT
Start: 2024-08-30 | End: 2024-09-01 | Stop reason: HOSPADM

## 2024-08-30 RX ORDER — LANTHANUM CARBONATE 500 MG/1
1000 TABLET, CHEWABLE ORAL 2 TIMES DAILY WITH MEALS
Status: DISCONTINUED | OUTPATIENT
Start: 2024-08-31 | End: 2024-09-01 | Stop reason: HOSPADM

## 2024-08-30 RX ORDER — ALBUTEROL SULFATE 0.83 MG/ML
2.5 SOLUTION RESPIRATORY (INHALATION) EVERY 6 HOURS PRN
Status: DISCONTINUED | OUTPATIENT
Start: 2024-08-30 | End: 2024-09-01 | Stop reason: HOSPADM

## 2024-08-30 RX ORDER — NICOTINE POLACRILEX 4 MG
15 LOZENGE BUCCAL
Status: DISCONTINUED | OUTPATIENT
Start: 2024-08-30 | End: 2024-09-01 | Stop reason: HOSPADM

## 2024-08-30 RX ORDER — BISACODYL 5 MG/1
5 TABLET, DELAYED RELEASE ORAL DAILY PRN
Status: DISCONTINUED | OUTPATIENT
Start: 2024-08-30 | End: 2024-09-01 | Stop reason: HOSPADM

## 2024-08-30 RX ORDER — SODIUM CHLORIDE 9 MG/ML
40 INJECTION, SOLUTION INTRAVENOUS AS NEEDED
Status: DISCONTINUED | OUTPATIENT
Start: 2024-08-30 | End: 2024-09-01 | Stop reason: HOSPADM

## 2024-08-30 RX ORDER — ONDANSETRON 2 MG/ML
4 INJECTION INTRAMUSCULAR; INTRAVENOUS EVERY 6 HOURS PRN
Status: DISCONTINUED | OUTPATIENT
Start: 2024-08-30 | End: 2024-09-01 | Stop reason: HOSPADM

## 2024-08-30 RX ORDER — DEXTROSE MONOHYDRATE 25 G/50ML
25 INJECTION, SOLUTION INTRAVENOUS
Status: DISCONTINUED | OUTPATIENT
Start: 2024-08-30 | End: 2024-09-01 | Stop reason: HOSPADM

## 2024-08-30 RX ORDER — BISACODYL 10 MG
10 SUPPOSITORY, RECTAL RECTAL DAILY PRN
Status: DISCONTINUED | OUTPATIENT
Start: 2024-08-30 | End: 2024-09-01 | Stop reason: HOSPADM

## 2024-08-30 RX ORDER — NITROGLYCERIN 0.4 MG/1
0.4 TABLET SUBLINGUAL
Status: DISCONTINUED | OUTPATIENT
Start: 2024-08-30 | End: 2024-09-01 | Stop reason: HOSPADM

## 2024-08-30 RX ADMIN — CEFTRIAXONE 2000 MG: 2 INJECTION, POWDER, FOR SOLUTION INTRAMUSCULAR; INTRAVENOUS at 22:11

## 2024-08-30 RX ADMIN — Medication 10 ML: at 22:12

## 2024-08-30 RX ADMIN — HEPARIN SODIUM 5000 UNITS: 5000 INJECTION INTRAVENOUS; SUBCUTANEOUS at 22:10

## 2024-08-30 RX ADMIN — INSULIN LISPRO 3 UNITS: 100 INJECTION, SOLUTION INTRAVENOUS; SUBCUTANEOUS at 22:23

## 2024-08-31 LAB
ALBUMIN SERPL-MCNC: 3 G/DL (ref 3.5–5.2)
ALBUMIN/GLOB SERPL: 1.3 G/DL
ALP SERPL-CCNC: 73 U/L (ref 39–117)
ALT SERPL W P-5'-P-CCNC: 9 U/L (ref 1–33)
ANION GAP SERPL CALCULATED.3IONS-SCNC: 11.2 MMOL/L (ref 5–15)
AST SERPL-CCNC: 7 U/L (ref 1–32)
BASOPHILS # BLD AUTO: 0.07 10*3/MM3 (ref 0–0.2)
BASOPHILS NFR BLD AUTO: 0.7 % (ref 0–1.5)
BH BB BLOOD EXPIRATION DATE: NORMAL
BH BB BLOOD TYPE BARCODE: 6200
BH BB DISPENSE STATUS: NORMAL
BH BB PRODUCT CODE: NORMAL
BH BB UNIT NUMBER: NORMAL
BILIRUB SERPL-MCNC: 0.4 MG/DL (ref 0–1.2)
BUN SERPL-MCNC: 24 MG/DL (ref 6–20)
BUN/CREAT SERPL: 5.9 (ref 7–25)
CALCIUM SPEC-SCNC: 7 MG/DL (ref 8.6–10.5)
CHLORIDE SERPL-SCNC: 107 MMOL/L (ref 98–107)
CO2 SERPL-SCNC: 20.8 MMOL/L (ref 22–29)
CREAT SERPL-MCNC: 4.09 MG/DL (ref 0.57–1)
CROSSMATCH INTERPRETATION: NORMAL
D-LACTATE SERPL-SCNC: 0.8 MMOL/L (ref 0.5–2)
DEPRECATED RDW RBC AUTO: 46.5 FL (ref 37–54)
EGFRCR SERPLBLD CKD-EPI 2021: 12.5 ML/MIN/1.73
EOSINOPHIL # BLD AUTO: 0.51 10*3/MM3 (ref 0–0.4)
EOSINOPHIL NFR BLD AUTO: 5.1 % (ref 0.3–6.2)
ERYTHROCYTE [DISTWIDTH] IN BLOOD BY AUTOMATED COUNT: 14.1 % (ref 12.3–15.4)
GLOBULIN UR ELPH-MCNC: 2.3 GM/DL
GLUCOSE BLDC GLUCOMTR-MCNC: 155 MG/DL (ref 70–130)
GLUCOSE BLDC GLUCOMTR-MCNC: 162 MG/DL (ref 70–130)
GLUCOSE BLDC GLUCOMTR-MCNC: 178 MG/DL (ref 70–130)
GLUCOSE BLDC GLUCOMTR-MCNC: 229 MG/DL (ref 70–130)
GLUCOSE SERPL-MCNC: 171 MG/DL (ref 65–99)
HBA1C MFR BLD: 9.3 % (ref 4.8–5.6)
HCT VFR BLD AUTO: 24.2 % (ref 34–46.6)
HGB BLD-MCNC: 7.5 G/DL (ref 12–15.9)
IMM GRANULOCYTES # BLD AUTO: 0.07 10*3/MM3 (ref 0–0.05)
IMM GRANULOCYTES NFR BLD AUTO: 0.7 % (ref 0–0.5)
LYMPHOCYTES # BLD AUTO: 1.48 10*3/MM3 (ref 0.7–3.1)
LYMPHOCYTES NFR BLD AUTO: 14.9 % (ref 19.6–45.3)
MAGNESIUM SERPL-MCNC: 1.9 MG/DL (ref 1.6–2.6)
MCH RBC QN AUTO: 28.1 PG (ref 26.6–33)
MCHC RBC AUTO-ENTMCNC: 31 G/DL (ref 31.5–35.7)
MCV RBC AUTO: 90.6 FL (ref 79–97)
MONOCYTES # BLD AUTO: 1.02 10*3/MM3 (ref 0.1–0.9)
MONOCYTES NFR BLD AUTO: 10.3 % (ref 5–12)
NEUTROPHILS NFR BLD AUTO: 6.79 10*3/MM3 (ref 1.7–7)
NEUTROPHILS NFR BLD AUTO: 68.3 % (ref 42.7–76)
NRBC BLD AUTO-RTO: 0 /100 WBC (ref 0–0.2)
PHOSPHATE SERPL-MCNC: 3.4 MG/DL (ref 2.5–4.5)
PLATELET # BLD AUTO: 362 10*3/MM3 (ref 140–450)
PMV BLD AUTO: 8.8 FL (ref 6–12)
POTASSIUM SERPL-SCNC: 3.5 MMOL/L (ref 3.5–5.2)
PROT SERPL-MCNC: 5.3 G/DL (ref 6–8.5)
RBC # BLD AUTO: 2.67 10*6/MM3 (ref 3.77–5.28)
SODIUM SERPL-SCNC: 139 MMOL/L (ref 136–145)
UNIT  ABO: NORMAL
UNIT  RH: NORMAL
WBC NRBC COR # BLD AUTO: 9.94 10*3/MM3 (ref 3.4–10.8)

## 2024-08-31 PROCEDURE — 63710000001 INSULIN LISPRO (HUMAN) PER 5 UNITS: Performed by: INTERNAL MEDICINE

## 2024-08-31 PROCEDURE — 25010000002 VANCOMYCIN 10 G RECONSTITUTED SOLUTION: Performed by: INTERNAL MEDICINE

## 2024-08-31 PROCEDURE — 86900 BLOOD TYPING SEROLOGIC ABO: CPT

## 2024-08-31 PROCEDURE — 94640 AIRWAY INHALATION TREATMENT: CPT

## 2024-08-31 PROCEDURE — 85025 COMPLETE CBC W/AUTO DIFF WBC: CPT | Performed by: INTERNAL MEDICINE

## 2024-08-31 PROCEDURE — 96366 THER/PROPH/DIAG IV INF ADDON: CPT

## 2024-08-31 PROCEDURE — G0378 HOSPITAL OBSERVATION PER HR: HCPCS

## 2024-08-31 PROCEDURE — 25010000002 NA FERRIC GLUC CPLX PER 12.5 MG: Performed by: INTERNAL MEDICINE

## 2024-08-31 PROCEDURE — 96367 TX/PROPH/DG ADDL SEQ IV INF: CPT

## 2024-08-31 PROCEDURE — 25010000002 CEFTRIAXONE PER 250 MG: Performed by: INTERNAL MEDICINE

## 2024-08-31 PROCEDURE — 83735 ASSAY OF MAGNESIUM: CPT | Performed by: INTERNAL MEDICINE

## 2024-08-31 PROCEDURE — 96372 THER/PROPH/DIAG INJ SC/IM: CPT

## 2024-08-31 PROCEDURE — 80053 COMPREHEN METABOLIC PANEL: CPT | Performed by: INTERNAL MEDICINE

## 2024-08-31 PROCEDURE — 94761 N-INVAS EAR/PLS OXIMETRY MLT: CPT

## 2024-08-31 PROCEDURE — 25010000002 ONDANSETRON PER 1 MG: Performed by: INTERNAL MEDICINE

## 2024-08-31 PROCEDURE — 94799 UNLISTED PULMONARY SVC/PX: CPT

## 2024-08-31 PROCEDURE — 84100 ASSAY OF PHOSPHORUS: CPT | Performed by: INTERNAL MEDICINE

## 2024-08-31 PROCEDURE — 96375 TX/PRO/DX INJ NEW DRUG ADDON: CPT

## 2024-08-31 PROCEDURE — 25010000002 HEPARIN (PORCINE) PER 1000 UNITS: Performed by: INTERNAL MEDICINE

## 2024-08-31 PROCEDURE — 86901 BLOOD TYPING SEROLOGIC RH(D): CPT

## 2024-08-31 PROCEDURE — 25810000003 SODIUM CHLORIDE 0.9 % SOLUTION: Performed by: INTERNAL MEDICINE

## 2024-08-31 PROCEDURE — 83036 HEMOGLOBIN GLYCOSYLATED A1C: CPT | Performed by: INTERNAL MEDICINE

## 2024-08-31 PROCEDURE — 83605 ASSAY OF LACTIC ACID: CPT | Performed by: STUDENT IN AN ORGANIZED HEALTH CARE EDUCATION/TRAINING PROGRAM

## 2024-08-31 PROCEDURE — 96365 THER/PROPH/DIAG IV INF INIT: CPT

## 2024-08-31 PROCEDURE — 94664 DEMO&/EVAL PT USE INHALER: CPT

## 2024-08-31 PROCEDURE — 82948 REAGENT STRIP/BLOOD GLUCOSE: CPT

## 2024-08-31 RX ORDER — FERROUS SULFATE 325(65) MG
325 TABLET ORAL
Status: DISCONTINUED | OUTPATIENT
Start: 2024-08-31 | End: 2024-08-31

## 2024-08-31 RX ORDER — CETIRIZINE HYDROCHLORIDE 10 MG/1
5 TABLET ORAL ONCE
Status: COMPLETED | OUTPATIENT
Start: 2024-08-31 | End: 2024-08-31

## 2024-08-31 RX ORDER — ACETAMINOPHEN 325 MG/1
650 TABLET ORAL ONCE
Status: COMPLETED | OUTPATIENT
Start: 2024-08-31 | End: 2024-08-31

## 2024-08-31 RX ADMIN — VANCOMYCIN HYDROCHLORIDE 2500 MG: 10 INJECTION, POWDER, LYOPHILIZED, FOR SOLUTION INTRAVENOUS at 03:15

## 2024-08-31 RX ADMIN — Medication 10 ML: at 21:17

## 2024-08-31 RX ADMIN — CARVEDILOL 25 MG: 25 TABLET, FILM COATED ORAL at 18:29

## 2024-08-31 RX ADMIN — HEPARIN SODIUM 5000 UNITS: 5000 INJECTION INTRAVENOUS; SUBCUTANEOUS at 08:15

## 2024-08-31 RX ADMIN — ACETAMINOPHEN 325MG 650 MG: 325 TABLET ORAL at 03:22

## 2024-08-31 RX ADMIN — LANTHANUM CARBONATE 1000 MG: 500 TABLET, CHEWABLE ORAL at 18:29

## 2024-08-31 RX ADMIN — CARVEDILOL 25 MG: 25 TABLET, FILM COATED ORAL at 08:15

## 2024-08-31 RX ADMIN — Medication 10 ML: at 08:17

## 2024-08-31 RX ADMIN — CEFTRIAXONE 2000 MG: 2 INJECTION, POWDER, FOR SOLUTION INTRAMUSCULAR; INTRAVENOUS at 22:04

## 2024-08-31 RX ADMIN — INSULIN LISPRO 3 UNITS: 100 INJECTION, SOLUTION INTRAVENOUS; SUBCUTANEOUS at 12:24

## 2024-08-31 RX ADMIN — SODIUM CHLORIDE 62.5 MG: 9 INJECTION, SOLUTION INTRAVENOUS at 12:44

## 2024-08-31 RX ADMIN — FLUTICASONE PROPIONATE 2 SPRAY: 50 SPRAY, METERED NASAL at 08:16

## 2024-08-31 RX ADMIN — ALBUTEROL SULFATE 2.5 MG: 2.5 SOLUTION RESPIRATORY (INHALATION) at 10:32

## 2024-08-31 RX ADMIN — CETIRIZINE HYDROCHLORIDE 5 MG: 10 TABLET ORAL at 11:35

## 2024-08-31 RX ADMIN — ACETAMINOPHEN 325MG 650 MG: 325 TABLET ORAL at 11:36

## 2024-08-31 RX ADMIN — LANTHANUM CARBONATE 1000 MG: 500 TABLET, CHEWABLE ORAL at 08:15

## 2024-08-31 RX ADMIN — ALBUTEROL SULFATE 2.5 MG: 2.5 SOLUTION RESPIRATORY (INHALATION) at 04:40

## 2024-08-31 RX ADMIN — ATORVASTATIN CALCIUM 10 MG: 20 TABLET, FILM COATED ORAL at 08:15

## 2024-08-31 RX ADMIN — ONDANSETRON 4 MG: 2 INJECTION, SOLUTION INTRAMUSCULAR; INTRAVENOUS at 10:27

## 2024-08-31 RX ADMIN — INSULIN LISPRO 2 UNITS: 100 INJECTION, SOLUTION INTRAVENOUS; SUBCUTANEOUS at 21:01

## 2024-08-31 RX ADMIN — INSULIN LISPRO 2 UNITS: 100 INJECTION, SOLUTION INTRAVENOUS; SUBCUTANEOUS at 08:15

## 2024-08-31 RX ADMIN — INSULIN LISPRO 2 UNITS: 100 INJECTION, SOLUTION INTRAVENOUS; SUBCUTANEOUS at 17:04

## 2024-08-31 RX ADMIN — ALBUTEROL SULFATE 2.5 MG: 2.5 SOLUTION RESPIRATORY (INHALATION) at 23:03

## 2024-08-31 NOTE — PROGRESS NOTES
"[facility] Clinical [facility] Services: Vancomycin Pharmacokinetic Initial Consult Note    Kelly Pack is a 54 y.o. female who is on day 1 of pharmacy to dose vancomycin.    Indication: Empiric  Consulting Provider: David  Planned Duration of Therapy: 7 days  Loading Dose Ordered or Given: 2500 mg on 8/30 at 2300  MRSA PCR performed: no  Culture/Source:   8/30 BloodCx - pending x2  Target: Dose by Levels  Pertinent Vanc Dosing History: n/a  Other Antimicrobials: Ceftriaxone    Vitals/Labs  Ht: 175.3 cm (69\"); Wt: 124 kg (274 lb)  Temp Readings from Last 1 Encounters:   08/30/24 99.5 °F (37.5 °C) (Tympanic)    Estimated Creatinine Clearance: 21.6 mL/min (A) (by C-G formula based on SCr of 4.3 mg/dL (H)).  ESRD     Results from last 7 days   Lab Units 08/30/24  1844   CREATININE mg/dL 4.30*   WBC 10*3/mm3 10.88*     Assessment/Plan:    Vancomycin Dose:   no further doses needed at this time  Vanc Random has been ordered for 8/31 at 0600 with AM labs     Pharmacy will follow patient's kidney function and will adjust doses and obtain levels as necessary. Thank you for involving pharmacy in this patient's care. Please contact pharmacy with any questions or concerns.                           Sylvia Pineda, [facility]  Clinical Pharmacist    "

## 2024-08-31 NOTE — ED NOTES
Nursing report ED to floor  Kelly Pack  54 y.o.  female    HPI :  HPI (Adult)  Stated Reason for Visit: abnormal labs    Chief Complaint  Chief Complaint   Patient presents with    Abnormal Lab       Admitting doctor:   Ryland Hernandez MD    Admitting diagnosis:   The primary encounter diagnosis was Symptomatic anemia. A diagnosis of Fever determined by examination was also pertinent to this visit.    Code status:   Current Code Status       Date Active Code Status Order ID Comments User Context       Prior            Allergies:   Tomato, Cat hair extract, and Mixed grasses    Isolation:   No active isolations    Intake and Output  No intake or output data in the 24 hours ending 08/30/24 2100    Weight:   There were no vitals filed for this visit.    Most recent vitals:   Vitals:    08/30/24 1830 08/30/24 1920 08/30/24 2030 08/30/24 2050   BP: 168/72 165/81 158/71    BP Location: Left arm      Patient Position: Sitting      Pulse:  97 99    Resp:       Temp:    99.5 °F (37.5 °C)   TempSrc:    Tympanic   SpO2:  95% 96%        Active LDAs/IV Access:   Lines, Drains & Airways       Active LDAs       Name Placement date Placement time Site Days    Hemodialysis Cath Double 06/23/23  1224  Chest  434                    Labs (abnormal labs have a star):   Labs Reviewed   COMPREHENSIVE METABOLIC PANEL - Abnormal; Notable for the following components:       Result Value    Glucose 206 (*)     BUN 23 (*)     Creatinine 4.30 (*)     Sodium 135 (*)     Chloride 97 (*)     BUN/Creatinine Ratio 5.3 (*)     eGFR 11.8 (*)     All other components within normal limits    Narrative:     GFR Normal >60  Chronic Kidney Disease <60  Kidney Failure <15     CBC WITH AUTO DIFFERENTIAL - Abnormal; Notable for the following components:    WBC 10.88 (*)     RBC 2.73 (*)     Hemoglobin 7.4 (*)     Hematocrit 24.2 (*)     MCHC 30.6 (*)     Neutrophil % 77.7 (*)     Lymphocyte % 8.3 (*)     Immature Grans % 1.0 (*)     Neutrophils,  "Absolute 8.45 (*)     Eosinophils, Absolute 0.48 (*)     Immature Grans, Absolute 0.11 (*)     All other components within normal limits   LACTIC ACID, PLASMA - Abnormal; Notable for the following components:    Lactate 3.1 (*)     All other components within normal limits   PROCALCITONIN - Abnormal; Notable for the following components:    Procalcitonin 1.37 (*)     All other components within normal limits    Narrative:     As a Marker for Sepsis (Non-Neonates):    1. <0.5 ng/mL represents a low risk of severe sepsis and/or septic shock.  2. >2 ng/mL represents a high risk of severe sepsis and/or septic shock.    As a Marker for Lower Respiratory Tract Infections that require antibiotic therapy:    PCT on Admission    Antibiotic Therapy       6-12 Hrs later    >0.5                Strongly Recommended  >0.25 - <0.5        Recommended   0.1 - 0.25          Discouraged              Remeasure/reassess PCT  <0.1                Strongly Discouraged     Remeasure/reassess PCT    As 28 day mortality risk marker: \"Change in Procalcitonin Result\" (>80% or <=80%) if Day 0 (or Day 1) and Day 4 values are available. Refer to [email or web address]    Change in PCT <=80%  A decrease of PCT levels below or equal to 80% defines a positive change in PCT test result representing a higher risk for 28-day all-cause mortality of patients diagnosed with severe sepsis for septic shock.    Change in PCT >80%  A decrease of PCT levels of more than 80% defines a negative change in PCT result representing a lower risk for 28-day all-cause mortality of patients diagnosed with severe sepsis or septic shock.      PROTIME-INR - Normal   APTT - Normal   BLOOD CULTURE   BLOOD CULTURE   RESPIRATORY PANEL PCR W/ COVID-19 (SARS-COV-2), NP SWAB IN UTM/VTP, 2 HR TAT   LACTIC ACID, REFLEX   IRON PROFILE   FERRITIN   TYPE AND SCREEN   PREPARE RBC   CBC AND DIFFERENTIAL    Narrative:     The following orders were created for panel order CBC " "& Differential.  Procedure                               Abnormality         Status                     ---------                               -----------         ------                     CBC Auto Differential[[id number]]        Abnormal            Final result                 Please view results for these tests on the individual orders.       EKG:   No orders to display       Meds given in ED:   Medications - No data to display    Imaging results:  No radiology results for the last day    Ambulatory status:   - standby    Social issues:   Social History     Socioeconomic History    Marital status: Single   Tobacco Use    Smoking status: Never    Smokeless tobacco: Never   Vaping Use    Vaping status: Never Used   Substance and Sexual Activity    Alcohol use: Not Currently     Comment: 3-4 drinks PER YEAR!    Drug use: No    Sexual activity: Not Currently     Partners: Male     Birth control/protection: Condom       Peripheral Neurovascular  Peripheral Neurovascular (Adult)  Peripheral Neurovascular WDL: WDL    Neuro Cognitive  Neuro Cognitive (Adult)  Cognitive/Neuro/Behavioral WDL: WDL    Learning  Learning Assessment (Adult)  Learning Readiness and Ability: no barriers identified  Education Provided  Person Taught: patient    Respiratory  Respiratory WDL  Respiratory WDL: .WDL except (pt states she has a hx of asthma and has been using her inahler more frequently with no relief and still \"feeling winded.\")    Abdominal Pain       Pain Assessments  Pain (Adult)  (0-10) Pain Rating: Rest: 0  (0-10) Pain Rating: Activity: 0    NIH Stroke Scale       Sugar Pedersen RN  08/30/24 21:00 EDT   "

## 2024-08-31 NOTE — PLAN OF CARE
Goal Outcome Evaluation:  Plan of Care Reviewed With: patient           Outcome Evaluation: 1 unit PRBC given as ordered. Abx iv started. Anxious. No active bleeding observed. Called renal consult for ESRD/anemia. 2 Home meds sent to Pharmacy.

## 2024-08-31 NOTE — SIGNIFICANT NOTE
08/31/24 1032   Aerosol Therapy (SVN)   $ Mini Neb Subsequent yes  (PRN)   Daily Review of Necessity (SVN) completed   Respiratory Treatment Status (SVN) given;self-administered   Treatment Route (SVN) mouthpiece utilized;oxygen   Patient Position HOB elevated;Martínez's   Signs of Intolerance (SVN) none     PRN given; pt stated she is feeling anxious and wanted PRN tx

## 2024-08-31 NOTE — PLAN OF CARE
Problem: Adult Inpatient Plan of Care  Goal: Plan of Care Review  Outcome: Ongoing, Progressing  Flowsheets (Taken 8/31/2024 8615)  Progress: no change  Plan of Care Reviewed With: patient  Outcome Evaluation: Patient denies any pain. She had an episode of nausea that was relieved with PRN zofran. Patient wearing oxygen PRN for relief. IV iron given this afternoon. Plan of care ongoing.

## 2024-08-31 NOTE — CONSULTS
[facility]                                                                                             Nephrology Initial Consult Note    Patient Identification:  Name: Kelly Pack MRN: [id number]  Age: 54 y.o. : 1970  Sex: female  Date:2024    Requesting Physician: As per consult order.  Reason for Consultation: ESRD, anemia management  Information from:patient/ family/ chart      History of Present Illness: This is a 54 y.o. year old female with end-stage renal disease on dialysis  at [facility] under the care of Dr. Maria De Jesus Roman, dialysis .  She has a right arm AV fistula that recently infiltrated so she is currently using a right chest CVC for dialysis access.  She had a full dialysis yesterday but came to the ER with a worsening fatigue, hemoglobin 7.4.  Her iron saturation was 7%.  She has a severe reaction to Mircera and currently receives Epogen 8000 units with each dialysis.  She is not currently on IV iron with outpatient dialysis, possibly due to adverse reaction that she reports including severe constipation with IV iron.  She also is intolerant to oral iron.  She did complete her full treatment yesterday denies shortness of breath nausea vomiting or edema.  Initially positive for sepsis screen with high lactate and WBC and received empiric IV vancomycin.    The following medical history and medications personally reviewed by me:    Problem List:     Symptomatic anemia    Asthma    Essential hypertension    Uncontrolled type 2 diabetes mellitus with hyperglycemia    ESRD (end stage renal disease)      Past Medical History:  Past Medical History:   Diagnosis Date    Albuminuria     Allergic     Seasonal, grass, cats and some dogs    Allergic rhinitis     Asthma     allergy triggered    Bell's palsy     Cataract     Had  surgery on both eyes    Cholelithiasis     Removed. Have bile reflux/ vomiting    CKD (chronic kidney disease)     Drug therapy     Endometrial cancer     HL (hearing loss)     In L ear    Hyperlipidemia     Hypertension     Low back pain     Migraine     Obesity     Renal insufficiency     Right otitis media     Type II diabetes mellitus     Visual impairment     Diabetic retinopathy. Oil in L eye due to retina detachment s.    Vitamin D deficiency        Past Surgical History:  Past Surgical History:   Procedure Laterality Date    ARTERIOVENOUS FISTULA  05/2023    CATARACT EXTRACTION      CHOLECYSTECTOMY      EYE SURGERY      NOV 2020    HYSTERECTOMY      due to cancer    INSERTION HEMODIALYSIS CATHETER Right 06/23/2023    Procedure: HEMODIALYSIS CATHETER INSERTION;  Surgeon: Mikael Mackay MD;  Location: [facility] MAIN OR;  Service: Vascular;  Laterality: Right;    OOPHORECTOMY      VITRECTOMY PARS PLANA Left 01/28/2020    Procedure: 25G VITRECTOMY-ENDO LASER;  Surgeon: Lazarus, Howard S., MD;  Location: [facility] MAIN OR;  Service: Ophthalmology        Home Meds:   Medications Prior to Admission   Medication Sig Dispense Refill Last Dose    albuterol sulfate  (90 Base) MCG/ACT inhaler Inhale 2 puffs Every 6 (Six) Hours As Needed for Wheezing or Shortness of Air. 18 g 0 8/30/2024    carvedilol (COREG) 25 MG tablet Take 1 tablet by mouth 2 (Two) Times a Day With Meals. 60 tablet 1 8/29/2024    cyclobenzaprine (FLEXERIL) 10 MG tablet Take 1 tablet by mouth At Night As Needed for Muscle Spasms. 30 tablet 0 Past Month    fluticasone (FLONASE) 50 MCG/ACT nasal spray 2 sprays by Each Nare route Daily. 48 g 1 Past Week    lanthanum (FOSRENOL) 1000 MG chewable tablet Chew 1 tablet 2 (Two) Times a Day With Meals.   8/29/2024    simvastatin (ZOCOR) 20 MG tablet Take 1 tablet by mouth Daily. 90 tablet 1 Past Month    vitamin B-12 (VITAMIN B-12) 1000 MCG tablet Take 1 tablet by mouth Daily. 30 tablet 0 Past Month     vitamin D (ERGOCALCIFEROL) 1.25 MG (00352 UT) capsule capsule Take 1 capsule by mouth 2 (Two) Times a Week.   Past Month    Continuous Blood Gluc Sensor (FreeStyle Zane 2 Sensor) misc 1 each by Other route Every 14 (Fourteen) Days. (Patient not taking: Reported on 6/4/2024) 2 each 5     gabapentin (NEURONTIN) 100 MG capsule Take 1 capsule by mouth 3 (Three) Times a Day for 30 days. (Patient taking differently: Take 3 capsules by mouth At Night As Needed.) 90 capsule 0     Insulin Pen Needle 32G X 4 MM misc 1 each 5 (Five) Times a Day. (Patient not taking: Reported on 6/4/2024) 500 each 2 More than a month    Magnesium Glycinate 120 MG capsule Take 2 capsules by mouth 2 (Two) Times a Day.       MAGnesium-Oxide 400 (240 Mg) MG tablet 1 tablet Daily. (Patient not taking: Reported on 8/30/2024)   Not Taking    midodrine (PROAMATINE) 2.5 MG tablet Take 1 tablet by mouth 3 (Three) Times a Day Before Meals. (Patient not taking: Reported on 8/30/2024)   Not Taking    torsemide (DEMADEX) 100 MG tablet Take 1 tablet by mouth Daily for 30 days. (Patient taking differently: Take 1 tablet by mouth Every Night. Last taken 8/29/24) 30 tablet 0        Current Meds:   Current Facility-Administered Medications   Medication Dose Route Frequency Provider Last Rate Last Admin    acetaminophen (TYLENOL) tablet 650 mg  650 mg Oral Q4H PRN Ryland Hernandez MD   650 mg at 08/31/24 0322    Or    acetaminophen (TYLENOL) 160 MG/5ML oral solution 650 mg  650 mg Oral Q4H PRN Ryland Hernandez MD        Or    acetaminophen (TYLENOL) suppository 650 mg  650 mg Rectal Q4H PRN Ryland Hernandez MD        albuterol (PROVENTIL) nebulizer solution 0.083% 2.5 mg/3mL  2.5 mg Nebulization Q6H PRN Ryland Hernandez MD   2.5 mg at 08/31/24 0440    atorvastatin (LIPITOR) tablet 10 mg  10 mg Oral Daily Ryland Hernandez MD   10 mg at 08/31/24 0815    sennosides-docusate (PERICOLACE) 8.6-50 MG per tablet 2 tablet  2 tablet Oral BID PRN David  Ryland MEJIA MD        And    polyethylene glycol (MIRALAX) packet 17 g  17 g Oral Daily PRN Ryland Hernandez MD        And    bisacodyl (DULCOLAX) EC tablet 5 mg  5 mg Oral Daily PRN Ryland Hernandez MD        And    bisacodyl (DULCOLAX) suppository 10 mg  10 mg Rectal Daily PRN Ryland Hernandez MD        carvedilol (COREG) tablet 25 mg  25 mg Oral BID With Meals Ryland Hernandez MD   25 mg at 08/31/24 0815    cefTRIAXone (ROCEPHIN) 2,000 mg in sodium chloride 0.9 % 100 mL MBP  2,000 mg Intravenous Q24H Ryland Hernandez  mL/hr at 08/30/24 2211 2,000 mg at 08/30/24 2211    cyclobenzaprine (FLEXERIL) tablet 10 mg  10 mg Oral TID PRN Ryland Hernandez MD        dextrose (D50W) (25 g/50 mL) IV injection 25 g  25 g Intravenous Q15 Min PRN Ryland Hernandez MD        dextrose (GLUTOSE) oral gel 15 g  15 g Oral Q15 Min PRN Ryland Hernandez MD        fluticasone (FLONASE) 50 MCG/ACT nasal spray 2 spray  2 spray Each Nare Daily Ryland Hernandez MD   2 spray at 08/31/24 0816    gabapentin (NEURONTIN) capsule 300 mg  300 mg Oral Nightly PRN Ryland Hernandez MD        glucagon (GLUCAGEN) injection 1 mg  1 mg Intramuscular Q15 Min PRN Ryland Hernandez MD        insulin lispro (HUMALOG/ADMELOG) injection 2-7 Units  2-7 Units Subcutaneous 4x Daily AC & at Bedtime Ryland Hernandez MD   2 Units at 08/31/24 0815    lanthanum (FOSRENOL) chewable tablet 1,000 mg  1,000 mg Oral BID With Meals Ryland Hernandez MD   1,000 mg at 08/31/24 0815    nitroglycerin (NITROSTAT) SL tablet 0.4 mg  0.4 mg Sublingual Q5 Min PRN Ryland Hernandez MD        ondansetron ODT (ZOFRAN-ODT) disintegrating tablet 4 mg  4 mg Oral Q6H PRN Ryland Hernandez MD        Or    ondansetron (ZOFRAN) injection 4 mg  4 mg Intravenous Q6H PRN Ryland Hernandez MD        sodium chloride 0.9 % flush 10 mL  10 mL Intravenous Q12H Ryland Hernandez MD   10 mL at 08/31/24 0817    sodium chloride 0.9 % flush 10 mL  10 mL  Intravenous PRN Ryland Hernandez MD        sodium chloride 0.9 % infusion 40 mL  40 mL Intravenous PRN Ryland Hernandez MD           Allergies:  Allergies   Allergen Reactions    Tomato Swelling     THROAT    Cat Hair Extract Itching     WATERY EYES    Mixed Grasses Itching     WATERY EYES       Social History:   Social History     Socioeconomic History    Marital status: Single   Tobacco Use    Smoking status: Never    Smokeless tobacco: Never   Vaping Use    Vaping status: Never Used   Substance and Sexual Activity    Alcohol use: Not Currently     Comment: 3-4 drinks PER YEAR!    Drug use: No    Sexual activity: Not Currently     Partners: Male     Birth control/protection: Condom        Family History:  Family History   Problem Relation Age of Onset    Breast cancer Mother     Cancer Mother         Breast cancer, back in the 90s    Diabetes Father     Hyperlipidemia Father     Hypertension Father     Hearing loss Father     Mental illness Father         Alzheimer’s    Asthma Maternal Grandfather         Review of Systems: as per HPI, in addition:    General:      + Weakness / fatigue,                       No fevers / chills                       no weight loss  HEENT;       no dysphagia or visual changes  Neck:           normal range of motion, no swelling  Respiratory: no cough / congestion                      No shortness of air                       No wheezing  CV:              No chest pain                       No palpitations  Abdomen/GI: no nausea / vomiting                      No diarrhea / constipation                      No abdominal pain  :             no dysuria / urinary frequency                       No urgency, normal output  Endocrine:   no polyuria / polydipsia,                      No heat or cold intolerance  Skin:           Denies rashes or skin lesions   Vascular:   No edema  Musculoskeletal: Denies joint pain or deformities      Physical Exam:  Vitals:   Temp (24hrs), Av  "°F (37.2 °C), Min:98.2 °F (36.8 °C), Max:100 °F (37.8 °C)    /86 (BP Location: Left arm, Patient Position: Lying)   Pulse 89   Temp 98.2 °F (36.8 °C) (Oral)   Resp 18   Ht 175.3 cm (69\")   Wt 126 kg (278 lb)   SpO2 99%   BMI 41.05 kg/m²   Intake/Output:     Intake/Output Summary (Last 24 hours) at 8/31/2024 1022  Last data filed at 8/31/2024 0900  Gross per 24 hour   Intake 890 ml   Output --   Net 890 ml        Wt Readings from Last 1 Encounters:   08/30/24 2156 126 kg (278 lb)   08/30/24 2136 124 kg (274 lb)       Exam:    General Appearance:  Awake, alert, no acute distress  Obese, well-appearing   Head and Face:  Normocephalic, atraumatic, mucous membranes moist, oropharynx clear   Eyes:  No icterus, pupils equal round and reactive to light, extraocular movements intact    ENMT: Moist mucosa, tongue symmetric    Neck: Supple  no jugular venous distention  no thyromegaly   Pulmonary:  Respiratory effort: Normal  Auscultation of lungs: Clear bilaterally  No wheezes  No rhonchi  Good air movement, good expansion   Chest wall:  No tenderness or deformity   Cardiovascular:  Auscultation of the heart: Normal rhythm, no murmurs  Trace edema of bilateral lower extremities   Abdomen:  Abdomen: soft, nontender, normal bowel sounds all four quadrants, no masses   Liver and spleen: no hepatosplenomegaly   Musculoskeletal: Digits and nails: normal  Normal range of motion  No joint swelling or gross deformities    Skin: Skin inspection: color normal, no visible rashes or lesions  Skin palpation: texture, turgor normal, no palpable lesions   Lymphatic:  no cervical lymphadenopathy    Psychiatric: Judgement and insight: normal  Orientation to person place and time: normal  Mood and affect: normal       DATA:  Radiology and Labs:  The following labs independently reviewed by me, additional AM labs ordered  Old records independently reviewed showing ESRD on dialysis  The following radiologic studies independently " viewed by me, findings chest x-ray showed clear lungs mild cardiomegaly  Interval notes, chart personally reviewed by me.  I have reviewed and summarized old records as detailed above  Plan of care discussed with Dr. Rivero regarding anemia management  New problems include iron deficiency anemia    Dialysis patient access: Right chest CVC, right arm AV fistula    Risk/ complexity of medical care/ medical decision making: Moderate complexity, anemia and dialysis management  Chronic illness with severe exacerbation or progression: Anemia of CKD      Labs:   Recent Results (from the past 24 hour(s))   Comprehensive Metabolic Panel    Collection Time: 08/30/24  6:44 PM    Specimen: Arm, Left; Blood   Result Value Ref Range    Glucose 206 (H) 65 - 99 mg/dL    BUN 23 (H) 6 - 20 mg/dL    Creatinine 4.30 (H) 0.57 - 1.00 mg/dL    Sodium 135 (L) 136 - 145 mmol/L    Potassium 4.1 3.5 - 5.2 mmol/L    Chloride 97 (L) 98 - 107 mmol/L    CO2 25.0 22.0 - 29.0 mmol/L    Calcium 8.7 8.6 - 10.5 mg/dL    Total Protein 6.9 6.0 - 8.5 g/dL    Albumin 3.8 3.5 - 5.2 g/dL    ALT (SGPT) 8 1 - 33 U/L    AST (SGOT) 9 1 - 32 U/L    Alkaline Phosphatase 110 39 - 117 U/L    Total Bilirubin 0.2 0.0 - 1.2 mg/dL    Globulin 3.1 gm/dL    A/G Ratio 1.2 g/dL    BUN/Creatinine Ratio 5.3 (L) 7.0 - 25.0    Anion Gap 13.0 5.0 - 15.0 mmol/L    eGFR 11.8 (L) >60.0 mL/min/1.73   Protime-INR    Collection Time: 08/30/24  6:44 PM    Specimen: Arm, Left; Blood   Result Value Ref Range    Protime 13.7 11.7 - 14.2 Seconds    INR 1.03 0.90 - 1.10   aPTT    Collection Time: 08/30/24  6:44 PM    Specimen: Arm, Left; Blood   Result Value Ref Range    PTT 30.5 22.7 - 35.4 seconds   Type & Screen    Collection Time: 08/30/24  6:44 PM    Specimen: Arm, Left; Blood   Result Value Ref Range    ABO Type A     RH type Positive     Antibody Screen Negative     T&S Expiration Date 9/2/2024 11:59:59 PM    CBC Auto Differential    Collection Time: 08/30/24  6:44 PM    Specimen:  Arm, Left; Blood   Result Value Ref Range    WBC 10.88 (H) 3.40 - 10.80 10*3/mm3    RBC 2.73 (L) 3.77 - 5.28 10*6/mm3    Hemoglobin 7.4 (L) 12.0 - 15.9 g/dL    Hematocrit 24.2 (L) 34.0 - 46.6 %    MCV 88.6 79.0 - 97.0 fL    MCH 27.1 26.6 - 33.0 pg    MCHC 30.6 (L) 31.5 - 35.7 g/dL    RDW 13.9 12.3 - 15.4 %    RDW-SD 43.3 37.0 - 54.0 fl    MPV 8.6 6.0 - 12.0 fL    Platelets 405 140 - 450 10*3/mm3    Neutrophil % 77.7 (H) 42.7 - 76.0 %    Lymphocyte % 8.3 (L) 19.6 - 45.3 %    Monocyte % 8.0 5.0 - 12.0 %    Eosinophil % 4.4 0.3 - 6.2 %    Basophil % 0.6 0.0 - 1.5 %    Immature Grans % 1.0 (H) 0.0 - 0.5 %    Neutrophils, Absolute 8.45 (H) 1.70 - 7.00 10*3/mm3    Lymphocytes, Absolute 0.90 0.70 - 3.10 10*3/mm3    Monocytes, Absolute 0.87 0.10 - 0.90 10*3/mm3    Eosinophils, Absolute 0.48 (H) 0.00 - 0.40 10*3/mm3    Basophils, Absolute 0.07 0.00 - 0.20 10*3/mm3    Immature Grans, Absolute 0.11 (H) 0.00 - 0.05 10*3/mm3    nRBC 0.0 0.0 - 0.2 /100 WBC   Procalcitonin    Collection Time: 08/30/24  6:44 PM    Specimen: Arm, Left; Blood   Result Value Ref Range    Procalcitonin 1.37 (H) 0.00 - 0.25 ng/mL   Iron Profile    Collection Time: 08/30/24  6:44 PM    Specimen: Arm, Left; Blood   Result Value Ref Range    Iron 30 (L) 37 - 145 mcg/dL    Iron Saturation (TSAT) 7 (L) 20 - 50 %    Transferrin 299 200 - 360 mg/dL    TIBC 446 298 - 536 mcg/dL   Ferritin    Collection Time: 08/30/24  6:44 PM    Specimen: Arm, Left; Blood   Result Value Ref Range    Ferritin 62.80 13.00 - 150.00 ng/mL   Magnesium    Collection Time: 08/30/24  6:44 PM    Specimen: Arm, Left; Blood   Result Value Ref Range    Magnesium 2.3 1.6 - 2.6 mg/dL   Lactic Acid, Plasma    Collection Time: 08/30/24  7:37 PM    Specimen: Arm, Left; Blood   Result Value Ref Range    Lactate 3.1 (C) 0.5 - 2.0 mmol/L   Respiratory Panel PCR w/COVID-19(SARS-CoV-2) [facility] In-House, NP Swab in UTM/VTM, 2 HR TAT - Swab, Nasopharynx    Collection Time: 08/30/24   8:29 PM    Specimen: Nasopharynx; Swab   Result Value Ref Range    ADENOVIRUS, PCR Not Detected Not Detected    Coronavirus 229E Not Detected Not Detected    Coronavirus HKU1 Not Detected Not Detected    Coronavirus NL63 Not Detected Not Detected    Coronavirus OC43 Not Detected Not Detected    COVID19 Not Detected Not Detected - Ref. Range    Human Metapneumovirus Not Detected Not Detected    Human Rhinovirus/Enterovirus Not Detected Not Detected    Influenza A PCR Not Detected Not Detected    Influenza B PCR Not Detected Not Detected    Parainfluenza Virus 1 Not Detected Not Detected    Parainfluenza Virus 2 Not Detected Not Detected    Parainfluenza Virus 3 Not Detected Not Detected    Parainfluenza Virus 4 Not Detected Not Detected    RSV, PCR Not Detected Not Detected    Bordetella pertussis pcr Not Detected Not Detected    Bordetella parapertussis PCR Not Detected Not Detected    Chlamydophila pneumoniae PCR Not Detected Not Detected    Mycoplasma pneumo by PCR Not Detected Not Detected   POC Glucose Once    Collection Time: 08/30/24 10:12 PM    Specimen: Blood   Result Value Ref Range    Glucose 204 (H) 70 - 130 mg/dL   Prepare RBC, 1 Units    Collection Time: 08/30/24 11:43 PM   Result Value Ref Range    Product Code Z5287G28     Unit Number [id number]     UNIT  ABO A     UNIT  RH POS     Crossmatch Interpretation Compatible     Dispense Status IS     Blood Expiration Date [id number]     Blood Type Barcode 6200    Comprehensive Metabolic Panel    Collection Time: 08/31/24  4:28 AM    Specimen: Blood   Result Value Ref Range    Glucose 171 (H) 65 - 99 mg/dL    BUN 24 (H) 6 - 20 mg/dL    Creatinine 4.09 (H) 0.57 - 1.00 mg/dL    Sodium 139 136 - 145 mmol/L    Potassium 3.5 3.5 - 5.2 mmol/L    Chloride 107 98 - 107 mmol/L    CO2 20.8 (L) 22.0 - 29.0 mmol/L    Calcium 7.0 (L) 8.6 - 10.5 mg/dL    Total Protein 5.3 (L) 6.0 - 8.5 g/dL    Albumin 3.0 (L) 3.5 - 5.2 g/dL    ALT (SGPT) 9 1 - 33 U/L    AST (SGOT)  7 1 - 32 U/L    Alkaline Phosphatase 73 39 - 117 U/L    Total Bilirubin 0.4 0.0 - 1.2 mg/dL    Globulin 2.3 gm/dL    A/G Ratio 1.3 g/dL    BUN/Creatinine Ratio 5.9 (L) 7.0 - 25.0    Anion Gap 11.2 5.0 - 15.0 mmol/L    eGFR 12.5 (L) >60.0 mL/min/1.73   Magnesium    Collection Time: 08/31/24  4:28 AM    Specimen: Blood   Result Value Ref Range    Magnesium 1.9 1.6 - 2.6 mg/dL   Phosphorus    Collection Time: 08/31/24  4:28 AM    Specimen: Blood   Result Value Ref Range    Phosphorus 3.4 2.5 - 4.5 mg/dL   STAT Lactic Acid, Reflex    Collection Time: 08/31/24  4:29 AM    Specimen: Blood   Result Value Ref Range    Lactate 0.8 0.5 - 2.0 mmol/L   Hemoglobin A1c    Collection Time: 08/31/24  4:29 AM    Specimen: Blood   Result Value Ref Range    Hemoglobin A1C 9.30 (H) 4.80 - 5.60 %   CBC Auto Differential    Collection Time: 08/31/24  6:52 AM    Specimen: Blood   Result Value Ref Range    WBC 9.94 3.40 - 10.80 10*3/mm3    RBC 2.67 (L) 3.77 - 5.28 10*6/mm3    Hemoglobin 7.5 (L) 12.0 - 15.9 g/dL    Hematocrit 24.2 (L) 34.0 - 46.6 %    MCV 90.6 79.0 - 97.0 fL    MCH 28.1 26.6 - 33.0 pg    MCHC 31.0 (L) 31.5 - 35.7 g/dL    RDW 14.1 12.3 - 15.4 %    RDW-SD 46.5 37.0 - 54.0 fl    MPV 8.8 6.0 - 12.0 fL    Platelets 362 140 - 450 10*3/mm3    Neutrophil % 68.3 42.7 - 76.0 %    Lymphocyte % 14.9 (L) 19.6 - 45.3 %    Monocyte % 10.3 5.0 - 12.0 %    Eosinophil % 5.1 0.3 - 6.2 %    Basophil % 0.7 0.0 - 1.5 %    Immature Grans % 0.7 (H) 0.0 - 0.5 %    Neutrophils, Absolute 6.79 1.70 - 7.00 10*3/mm3    Lymphocytes, Absolute 1.48 0.70 - 3.10 10*3/mm3    Monocytes, Absolute 1.02 (H) 0.10 - 0.90 10*3/mm3    Eosinophils, Absolute 0.51 (H) 0.00 - 0.40 10*3/mm3    Basophils, Absolute 0.07 0.00 - 0.20 10*3/mm3    Immature Grans, Absolute 0.07 (H) 0.00 - 0.05 10*3/mm3    nRBC 0.0 0.0 - 0.2 /100 WBC   POC Glucose Once    Collection Time: 08/31/24  8:04 AM    Specimen: Blood   Result Value Ref Range    Glucose 178 (H) 70 - 130 mg/dL        Radiology:  Imaging Results (Last 24 Hours)       Procedure Component Value Units Date/Time    XR Chest 1 View [[id number]] Collected: 08/30/24 1953     Updated: 08/30/24 1959    Narrative:      EXAM:XR CHEST 1 VW-     CLINICAL HISTORY:fever, cough     FINDINGS:     The lungs are clear of acute infiltrates. The cardiomediastinal  silhouette is moderately enlarged but stable in size when compared to  the prior study dated 6/23/2023. There is a right IJ approach central  venous catheter terminating at the level of the venoatrial junction. The  osseous structures are unremarkable.        This report was finalized on 8/30/2024 7:56 PM by Dr. Pedro Luis Munoz M.D  on Workstation: [id number]                    ASSESSMENT:   ESRD on dialysis Monday Wednesday Friday, completed a full treatment yesterday  Immature right arm AV fistula with CVC in place for access    Symptomatic anemia    Asthma    Essential hypertension    Uncontrolled type 2 diabetes mellitus with hyperglycemia, A1c 9.3  Obesity  Metabolic acidosis  Hypocalcemia, corrects due to low albumin      DISCUSSION/PLAN:   No need for dialysis today, volume and electrolytes are stable  She completed her full treatment yesterday  Continue Monday Wednesday Friday dialysis schedule this admission  Will go ahead and give a one-time dose of IV iron here.  Ferric gluconate is the only IV iron formulation currently available at [facility].  Will give the lowest dose and premedicate with Tylenol and Zyrtec  She is already on Epogen Monday Wednesday Friday with dialysis so no need for an extra Epogen at this time unless she is still in the hospital on Monday   discussed plan with Dr. Rivero, agree with stopping IV antibiotics      Continue to monitor electrolytes and volume closely    I appreciate the consult request.  Please send me a secure chat message with any nonurgent questions regarding patient care.  For any urgent patient care issues please call my office  number below.      Tyler Roman MD  [facility]  Office phone number: [phone]  Answering service phone number: [phone]      8/31/2024        Dictation via Dragon dictation software

## 2024-08-31 NOTE — H&P
Patient Name:  Kelly Pack  YOB: 1970  MRN:  [id number]  Admit Date:  8/30/2024  Patient Care Team:  Kim Benjamin APRN as PCP - General (Nurse Practitioner)      Subjective   History Present Illness     Chief Complaint   Patient presents with    Abnormal Lab       Ms. Pack is a 54 y.o. with a history of ESRD, iron deficiency and adverse reactions to iron infusions, asthma, hypertension, diabetes who presents to [facility] complaining of feeling poorly after dialysis.  She had a hemoglobin of 6.5 on her dialysis labs.  She reports that she has had low iron levels in the past.  She has had constipation issues with oral iron and reports burning sensation of chest and other issues when she has tried to do IV iron in the past.  She is not sure what the formulation of that was.  She was going to try to get outpatient infusion arranged but that was going to take at least several days so she was instructed to come to the ER.  She did receive dialysis today.  She reports that she had a temperature of up to 100.8 and was feeling feverish.  She has been having cough.  Her cough has been mostly nonproductive but she does report 1 time she was coughing so violently she vomited and then had some productive cough.  She is not reporting any nausea vomiting or abdominal pain currently.  No diarrhea.  No rash reported.  She is not had any dark stool or blood in the stool.    Review of Systems   Constitutional:  Positive for fatigue and fever.   HENT:  Negative for sore throat and trouble swallowing.    Eyes:  Negative for pain and visual disturbance.   Respiratory:  Positive for cough, shortness of breath and wheezing.    Cardiovascular:  Negative for chest pain, palpitations and leg swelling.   Gastrointestinal:  Negative for abdominal pain, constipation, diarrhea and nausea.   Endocrine: Negative for cold intolerance and heat intolerance.   Genitourinary:  Negative for dysuria and flank  pain.   Musculoskeletal:  Negative for neck pain and neck stiffness.   Skin:  Negative for pallor and rash.   Allergic/Immunologic: Positive for environmental allergies and food allergies.   Neurological:  Negative for seizures and syncope.   Hematological:  Negative for adenopathy. Does not bruise/bleed easily.   Psychiatric/Behavioral:  Negative for agitation and confusion.         Personal History     Past Medical History:   Diagnosis Date    Albuminuria     Allergic     Seasonal, grass, cats and some dogs    Allergic rhinitis     Asthma     allergy triggered    Bell's palsy     Cataract     Had surgery on both eyes    Cholelithiasis     Removed. Have bile reflux/ vomiting    CKD (chronic kidney disease)     Drug therapy     Endometrial cancer     HL (hearing loss)     In L ear    Hyperlipidemia     Hypertension     Low back pain     Migraine     Obesity     Renal insufficiency     Right otitis media     Type II diabetes mellitus     Visual impairment     Diabetic retinopathy. Oil in L eye due to retina detachment s.    Vitamin D deficiency      Past Surgical History:   Procedure Laterality Date    ARTERIOVENOUS FISTULA  05/2023    CATARACT EXTRACTION      CHOLECYSTECTOMY      EYE SURGERY      NOV 2020    HYSTERECTOMY      due to cancer    INSERTION HEMODIALYSIS CATHETER Right 06/23/2023    Procedure: HEMODIALYSIS CATHETER INSERTION;  Surgeon: Mikael Mackay MD;  [facility] OR;  Service: Vascular;  Laterality: Right;    OOPHORECTOMY      VITRECTOMY PARS PLANA Left 01/28/2020    Procedure: 25G VITRECTOMY-ENDO LASER;  Surgeon: Lazarus, Howard S., MD;  Location: [facility] MAIN OR;  Service: Ophthalmology     Family History   Problem Relation Age of Onset    Breast cancer Mother     Cancer Mother         Breast cancer, back in the 90s    Diabetes Father     Hyperlipidemia Father     Hypertension Father     Hearing loss Father     Mental illness Father         Alzheimer’s    Asthma Maternal Grandfather       Social History     Tobacco Use    Smoking status: Never    Smokeless tobacco: Never   Vaping Use    Vaping status: Never Used   Substance Use Topics    Alcohol use: Not Currently     Comment: 3-4 drinks PER YEAR!    Drug use: No     No current facility-administered medications on file prior to encounter.     Current Outpatient Medications on File Prior to Encounter   Medication Sig Dispense Refill    albuterol sulfate  (90 Base) MCG/ACT inhaler Inhale 2 puffs Every 6 (Six) Hours As Needed for Wheezing or Shortness of Air. 18 g 0    buPROPion XL (Wellbutrin XL) 150 MG 24 hr tablet Take 1 tablet by mouth Daily. 90 tablet 1    carvedilol (COREG) 25 MG tablet Take 1 tablet by mouth 2 (Two) Times a Day With Meals. 60 tablet 1    Continuous Blood Gluc Sensor (FreeStyle Zane 2 Sensor) misc 1 each by Other route Every 14 (Fourteen) Days. (Patient not taking: Reported on 6/4/2024) 2 each 5    cyclobenzaprine (FLEXERIL) 10 MG tablet Take 1 tablet by mouth At Night As Needed for Muscle Spasms. 30 tablet 0    fluticasone (FLONASE) 50 MCG/ACT nasal spray 2 sprays by Each Nare route Daily. 48 g 1    gabapentin (NEURONTIN) 100 MG capsule Take 1 capsule by mouth 3 (Three) Times a Day for 30 days. 90 capsule 0    Insulin Pen Needle 32G X 4 MM misc 1 each 5 (Five) Times a Day. (Patient not taking: Reported on 6/4/2024) 500 each 2    lanthanum (FOSRENOL) 1000 MG chewable tablet Chew 1 tablet 2 (Two) Times a Day With Meals.      MAGnesium-Oxide 400 (240 Mg) MG tablet 1 tablet Daily.      midodrine (PROAMATINE) 2.5 MG tablet Take 1 tablet by mouth 3 (Three) Times a Day Before Meals.      Probiotic Product (PROBIOTIC-10 PO) Take  by mouth.      sevelamer (RENVELA) 800 MG tablet Take 3 tablets by mouth. (Patient not taking: Reported on 6/4/2024)      simvastatin (ZOCOR) 20 MG tablet Take 1 tablet by mouth Daily. 90 tablet 1    torsemide (DEMADEX) 100 MG tablet Take 1 tablet by mouth Daily for 30 days. 30 tablet 0    vitamin  B-12 (VITAMIN B-12) 1000 MCG tablet Take 1 tablet by mouth Daily. 30 tablet 0    vitamin D (ERGOCALCIFEROL) 1.25 MG (89346 UT) capsule capsule Take 1 capsule by mouth 2 (Two) Times a Week.       Allergies   Allergen Reactions    Tomato Swelling     THROAT    Cat Hair Extract Itching     WATERY EYES    Mixed Grasses Itching     WATERY EYES       Objective    Objective     Vital Signs  Temp:  [99.5 °F (37.5 °C)-100 °F (37.8 °C)] 99.5 °F (37.5 °C)  Heart Rate:  [] 99  Resp:  [18] 18  BP: (158-168)/(71-81) 158/71  SpO2:  [95 %-97 %] 96 %  on   ;   Device (Oxygen Therapy): room air  There is no height or weight on file to calculate BMI.    Physical Exam  Vitals and nursing note reviewed.   Constitutional:       General: She is not in acute distress.     Appearance: She is not diaphoretic.   HENT:      Head: Atraumatic.   Eyes:      Conjunctiva/sclera: Conjunctivae normal.      Pupils: Pupils are equal, round, and reactive to light.   Cardiovascular:      Rate and Rhythm: Regular rhythm. Tachycardia present.      Pulses: Normal pulses.   Pulmonary:      Effort: Pulmonary effort is normal.      Breath sounds: No wheezing, rhonchi or rales.   Abdominal:      General: There is no distension.      Palpations: Abdomen is soft.      Tenderness: There is no abdominal tenderness. There is no guarding or rebound.   Musculoskeletal:         General: No tenderness.      Right lower leg: Edema present.      Left lower leg: Edema present.   Skin:     General: Skin is warm and dry.   Neurological:      Mental Status: She is alert.      Cranial Nerves: No cranial nerve deficit.   Psychiatric:         Mood and Affect: Mood normal.         Behavior: Behavior normal.         Results Review:  I reviewed the patient's new clinical results.  I reviewed the patient's new imaging results and agree with the interpretation.  I personally viewed and interpreted the patient's EKG/Telemetry data  I reviewed prior records.    Lab Results (last  24 hours)       Procedure Component Value Units Date/Time    CBC & Differential [[id number]]  (Abnormal) Collected: 08/30/24 1844    Specimen: Blood from Arm, Left Updated: 08/30/24 1901    Narrative:      The following orders were created for panel order CBC & Differential.  Procedure                               Abnormality         Status                     ---------                               -----------         ------                     CBC Auto Differential[[id number]]        Abnormal            Final result                 Please view results for these tests on the individual orders.    Comprehensive Metabolic Panel [[id number]]  (Abnormal) Collected: 08/30/24 1844    Specimen: Blood from Arm, Left Updated: 08/30/24 1915     Glucose 206 mg/dL      BUN 23 mg/dL      Creatinine 4.30 mg/dL      Sodium 135 mmol/L      Potassium 4.1 mmol/L      Chloride 97 mmol/L      CO2 25.0 mmol/L      Calcium 8.7 mg/dL      Total Protein 6.9 g/dL      Albumin 3.8 g/dL      ALT (SGPT) 8 U/L      AST (SGOT) 9 U/L      Alkaline Phosphatase 110 U/L      Total Bilirubin 0.2 mg/dL      Globulin 3.1 gm/dL      A/G Ratio 1.2 g/dL      BUN/Creatinine Ratio 5.3     Anion Gap 13.0 mmol/L      eGFR 11.8 mL/min/1.73      Comment: <15 Indicative of kidney failure       Narrative:      GFR Normal >60  Chronic Kidney Disease <60  Kidney Failure <15      Protime-INR [[id number]]  (Normal) Collected: 08/30/24 1844    Specimen: Blood from Arm, Left Updated: 08/30/24 1903     Protime 13.7 Seconds      INR 1.03    aPTT [[id number]]  (Normal) Collected: 08/30/24 1844    Specimen: Blood from Arm, Left Updated: 08/30/24 1904     PTT 30.5 seconds     CBC Auto Differential [[id number]]  (Abnormal) Collected: 08/30/24 1844    Specimen: Blood from Arm, Left Updated: 08/30/24 1901     WBC 10.88 10*3/mm3      RBC 2.73 10*6/mm3      Hemoglobin 7.4 g/dL      Hematocrit 24.2 %      MCV 88.6 fL      MCH 27.1 pg      MCHC 30.6 g/dL      RDW 13.9 %      RDW-SD  "43.3 fl      MPV 8.6 fL      Platelets 405 10*3/mm3      Neutrophil % 77.7 %      Lymphocyte % 8.3 %      Monocyte % 8.0 %      Eosinophil % 4.4 %      Basophil % 0.6 %      Immature Grans % 1.0 %      Neutrophils, Absolute 8.45 10*3/mm3      Lymphocytes, Absolute 0.90 10*3/mm3      Monocytes, Absolute 0.87 10*3/mm3      Eosinophils, Absolute 0.48 10*3/mm3      Basophils, Absolute 0.07 10*3/mm3      Immature Grans, Absolute 0.11 10*3/mm3      nRBC 0.0 /100 WBC     Procalcitonin [[id number]]  (Abnormal) Collected: 08/30/24 1844    Specimen: Blood from Arm, Left Updated: 08/30/24 1945     Procalcitonin 1.37 ng/mL     Narrative:      As a Marker for Sepsis (Non-Neonates):    1. <0.5 ng/mL represents a low risk of severe sepsis and/or septic shock.  2. >2 ng/mL represents a high risk of severe sepsis and/or septic shock.    As a Marker for Lower Respiratory Tract Infections that require antibiotic therapy:    PCT on Admission    Antibiotic Therapy       6-12 Hrs later    >0.5                Strongly Recommended  >0.25 - <0.5        Recommended   0.1 - 0.25          Discouraged              Remeasure/reassess PCT  <0.1                Strongly Discouraged     Remeasure/reassess PCT    As 28 day mortality risk marker: \"Change in Procalcitonin Result\" (>80% or <=80%) if Day 0 (or Day 1) and Day 4 values are available. Refer to http://www.[facility]-pct-calculator.com    Change in PCT <=80%  A decrease of PCT levels below or equal to 80% defines a positive change in PCT test result representing a higher risk for 28-day all-cause mortality of patients diagnosed with severe sepsis for septic shock.    Change in PCT >80%  A decrease of PCT levels of more than 80% defines a negative change in PCT result representing a lower risk for 28-day all-cause mortality of patients diagnosed with severe sepsis or septic shock.       Iron Profile [[id number]]  (Abnormal) Collected: 08/30/24 1844    Specimen: Blood from Arm, Left Updated: 08/30/24 " 2116     Iron 30 mcg/dL      Iron Saturation (TSAT) 7 %      Transferrin 299 mg/dL      TIBC 446 mcg/dL     Ferritin [[id number]]  (Normal) Collected: 08/30/24 1844    Specimen: Blood from Arm, Left Updated: 08/30/24 2122     Ferritin 62.80 ng/mL     Narrative:      Results may be falsely decreased if patient taking Biotin.      Blood Culture - Blood, Arm, Left [[id number]] Collected: 08/30/24 1937    Specimen: Blood from Arm, Left Updated: 08/30/24 1942    Blood Culture - Blood, Arm, Left [[id number]] Collected: 08/30/24 1937    Specimen: Blood from Arm, Left Updated: 08/30/24 1942    Lactic Acid, Plasma [[id number]]  (Abnormal) Collected: 08/30/24 1937    Specimen: Blood from Arm, Left Updated: 08/30/24 2014     Lactate 3.1 mmol/L     Respiratory Panel PCR w/COVID-19(SARS-CoV-2) [facility]ARCELO/[facility]Y In-House, NP Swab in UTM/VTM, 2 HR TAT - Swab, Nasopharynx [[id number]] Collected: 08/30/24 2029    Specimen: Swab from Nasopharynx Updated: 08/30/24 2029            Imaging Results (Last 24 Hours)       Procedure Component Value Units Date/Time    XR Chest 1 View [[id number]] Collected: 08/30/24 1953     Updated: 08/30/24 1959    Narrative:      EXAM:XR CHEST 1 VW-     CLINICAL HISTORY:fever, cough     FINDINGS:     The lungs are clear of acute infiltrates. The cardiomediastinal  silhouette is moderately enlarged but stable in size when compared to  the prior study dated 6/23/2023. There is a right IJ approach central  venous catheter terminating at the level of the venoatrial junction. The  osseous structures are unremarkable.        This report was finalized on 8/30/2024 7:56 PM by Dr. Pedro Luis Munoz M.D  on Workstation: [id number]               Results for orders placed during the hospital encounter of 11/08/22    Adult Transthoracic Echo Complete W/ Cont if Necessary Per Protocol    Interpretation Summary    Left ventricular systolic function is normal. Left ventricular ejection fraction appears to be 56 - 60%.     Left ventricular diastolic function was not assessed.    The right ventricular cavity is mildly dilated but normal systolic function.    Estimated right ventricular systolic pressure from tricuspid regurgitation is mildly elevated (35-45 mmHg).    No significant valvular stenosis or regurgitation noted.      No orders to display        Assessment/Plan     Active Hospital Problems    Diagnosis  POA    **Symptomatic anemia [D64.9]  Yes    ESRD (end stage renal disease) [N18.6]  Yes    Asthma [J45.909]  Yes    Essential hypertension [I10]  Yes    Uncontrolled type 2 diabetes mellitus with hyperglycemia [E11.65]  Yes      Resolved Hospital Problems   No resolved problems to display.       Ms. Pack is a 54 y.o.     Symptomatic anemia: Iron studies have been sent.  Her hemoglobin is 7.4 currently but she did just receive dialysis so this may be artificially concentrated depending on the amount of fluid removal.  Her iron saturation is 7% with a ferritin of 62.  1 unit packed red blood cells has been ordered since she has had what sounds like a severe reaction to iron infusion in the past and is not sure of which formulation it was.  Going to consult her nephrologist and will screen FOBT although no overt GI losses have been reported.  ESRD: See above  Fever: Would qualify for sepsis based on leukocytosis and tachycardia.  Some of the tachycardia may be related to anemia and she has not had overt fever here although does report a temperature of 100.8 prior to admission.  Having nonproductive cough with clear breath sounds on exam.  Chest x-ray reviewed.  RVP and blood cultures are pending.  Will give antibiotics while awaiting test results.  She did have an elevated procalcitonin which may be related to her renal disease.  Lactic acid level elevated and she is going to receive volume with the blood transfusion.  Monitor progression.  Asthma: No bronchospasm on exam.  Plan to resume home regimen when  confirmed.  Hypertension: Monitor.  I am not sure if her med rec is correct currently since she has both Coreg and midodrine listed.  She may be taking these medicines depending on dialysis volume and blood pressure.  Monitor progression and plan to hold diuretics due to the lactic acidosis currently.  Diabetes: Check A1c.  SSI.  PPx: Heparin subcutaneous  I discussed the patient's findings and my recommendations with patient and ED provider.      Ryland Hernandez MD  [facility]  08/30/24  21:27 EDT    Dictated portions of note using dragon dictation software.

## 2024-08-31 NOTE — ED PROVIDER NOTES
EMERGENCY DEPARTMENT ENCOUNTER    Room Number:  18/18  PCP: Kim Benjamin APRN  History obtained from: Patient      HPI:  Chief Complaint: Low iron  A complete HPI/ROS/PMH/PSH/SH/FH are unobtainable due to: N/A  Context: Kelly Pack is a 54 y.o. female who presents to the ED c/o low iron level, generalized weakness.  Patient has felt increased fatigue over the last week or so.  No melena or hematochezia.  Patient reports that she had labs an outpatient which demonstrated low iron levels and hemoglobin of 6.5.  Got dialysis today.  No nausea or vomiting.  Has had a cough for the last 6 weeks, unchanged.  No chest pain or shortness of breath.            PAST MEDICAL HISTORY  Active Ambulatory Problems     Diagnosis Date Noted    Hyperlipidemia 09/17/2019    Allergic rhinitis 09/17/2019    Nephrotic range proteinuria 09/17/2019    Asthma 04/08/2021    Essential hypertension 04/08/2021    Uncontrolled type 2 diabetes mellitus with hyperglycemia 04/08/2021    Acute renal failure superimposed on stage 3a chronic kidney disease 11/08/2022    Anasarca 11/09/2022    Suspected sleep apnea     Anemia     Bilateral lower extremity edema     Elevated troponin     Acute renal failure superimposed on chronic kidney disease 06/22/2023    Type 2 diabetes mellitus with chronic kidney disease 06/23/2023     Resolved Ambulatory Problems     Diagnosis Date Noted    Hypertensive emergency      Past Medical History:   Diagnosis Date    Albuminuria     Allergic     Bell's palsy     Cataract     Cholelithiasis     CKD (chronic kidney disease)     Drug therapy     Endometrial cancer     HL (hearing loss)     Hypertension     Low back pain     Migraine     Obesity     Renal insufficiency     Right otitis media     Type II diabetes mellitus     Visual impairment     Vitamin D deficiency          PAST SURGICAL HISTORY  Past Surgical History:   Procedure Laterality Date    ARTERIOVENOUS FISTULA  05/2023    CATARACT EXTRACTION       CHOLECYSTECTOMY      EYE SURGERY      NOV 2020    HYSTERECTOMY      due to cancer    INSERTION HEMODIALYSIS CATHETER Right 06/23/2023    Procedure: HEMODIALYSIS CATHETER INSERTION;  Surgeon: Mikael Mackay MD;  Location: [facility] MAIN OR;  Service: Vascular;  Laterality: Right;    OOPHORECTOMY      VITRECTOMY PARS PLANA Left 01/28/2020    Procedure: 25G VITRECTOMY-ENDO LASER;  Surgeon: Lazarus, Howard S., MD;  Location: [facility] MAIN OR;  Service: Ophthalmology         FAMILY HISTORY  Family History   Problem Relation Age of Onset    Breast cancer Mother     Cancer Mother         Breast cancer, back in the 90s    Diabetes Father     Hyperlipidemia Father     Hypertension Father     Hearing loss Father     Mental illness Father         Alzheimer’s    Asthma Maternal Grandfather          SOCIAL HISTORY  Social History     Socioeconomic History    Marital status: Single   Tobacco Use    Smoking status: Never    Smokeless tobacco: Never   Vaping Use    Vaping status: Never Used   Substance and Sexual Activity    Alcohol use: Not Currently     Comment: 3-4 drinks PER YEAR!    Drug use: No    Sexual activity: Not Currently     Partners: Male     Birth control/protection: Condom         ALLERGIES  Tomato, Cat hair extract, and Mixed grasses        REVIEW OF SYSTEMS    As per HPI      PHYSICAL EXAM  ED Triage Vitals   Temp Heart Rate Resp BP SpO2   08/30/24 1829 08/30/24 1829 08/30/24 1829 08/30/24 1830 08/30/24 1829   100 °F (37.8 °C) 114 18 168/72 97 %      Temp src Heart Rate Source Patient Position BP Location FiO2 (%)   08/30/24 1829 08/30/24 1829 08/30/24 1830 08/30/24 1830 --   Tympanic Monitor Sitting Left arm        Physical Exam  Constitutional:       General: She is not in acute distress.  HENT:      Head: Normocephalic and atraumatic.   Cardiovascular:      Rate and Rhythm: Regular rhythm. Tachycardia present.   Pulmonary:      Effort: Pulmonary effort is normal. No respiratory distress.      Comments: Tunneled  dialysis catheter to the right anterior chest wall without surrounding erythema or drainage  Abdominal:      General: There is no distension.      Palpations: Abdomen is soft.      Tenderness: There is no abdominal tenderness.   Musculoskeletal:         General: No swelling or deformity.      Comments: Fistula in the right upper extremity with palpable thrill, some contusion throughout the arm and forearm   Skin:     General: Skin is warm and dry.      Coloration: Skin is pale.   Neurological:      Mental Status: She is alert. Mental status is at baseline.           Vital signs and nursing notes reviewed.          LAB RESULTS  Recent Results (from the past 24 hour(s))   Comprehensive Metabolic Panel    Collection Time: 08/30/24  6:44 PM    Specimen: Arm, Left; Blood   Result Value Ref Range    Glucose 206 (H) 65 - 99 mg/dL    BUN 23 (H) 6 - 20 mg/dL    Creatinine 4.30 (H) 0.57 - 1.00 mg/dL    Sodium 135 (L) 136 - 145 mmol/L    Potassium 4.1 3.5 - 5.2 mmol/L    Chloride 97 (L) 98 - 107 mmol/L    CO2 25.0 22.0 - 29.0 mmol/L    Calcium 8.7 8.6 - 10.5 mg/dL    Total Protein 6.9 6.0 - 8.5 g/dL    Albumin 3.8 3.5 - 5.2 g/dL    ALT (SGPT) 8 1 - 33 U/L    AST (SGOT) 9 1 - 32 U/L    Alkaline Phosphatase 110 39 - 117 U/L    Total Bilirubin 0.2 0.0 - 1.2 mg/dL    Globulin 3.1 gm/dL    A/G Ratio 1.2 g/dL    BUN/Creatinine Ratio 5.3 (L) 7.0 - 25.0    Anion Gap 13.0 5.0 - 15.0 mmol/L    eGFR 11.8 (L) >60.0 mL/min/1.73   Protime-INR    Collection Time: 08/30/24  6:44 PM    Specimen: Arm, Left; Blood   Result Value Ref Range    Protime 13.7 11.7 - 14.2 Seconds    INR 1.03 0.90 - 1.10   aPTT    Collection Time: 08/30/24  6:44 PM    Specimen: Arm, Left; Blood   Result Value Ref Range    PTT 30.5 22.7 - 35.4 seconds   Type & Screen    Collection Time: 08/30/24  6:44 PM    Specimen: Arm, Left; Blood   Result Value Ref Range    ABO Type A     RH type Positive     Antibody Screen Negative     T&S Expiration Date 9/2/2024 11:59:59 PM     CBC Auto Differential    Collection Time: 08/30/24  6:44 PM    Specimen: Arm, Left; Blood   Result Value Ref Range    WBC 10.88 (H) 3.40 - 10.80 10*3/mm3    RBC 2.73 (L) 3.77 - 5.28 10*6/mm3    Hemoglobin 7.4 (L) 12.0 - 15.9 g/dL    Hematocrit 24.2 (L) 34.0 - 46.6 %    MCV 88.6 79.0 - 97.0 fL    MCH 27.1 26.6 - 33.0 pg    MCHC 30.6 (L) 31.5 - 35.7 g/dL    RDW 13.9 12.3 - 15.4 %    RDW-SD 43.3 37.0 - 54.0 fl    MPV 8.6 6.0 - 12.0 fL    Platelets 405 140 - 450 10*3/mm3    Neutrophil % 77.7 (H) 42.7 - 76.0 %    Lymphocyte % 8.3 (L) 19.6 - 45.3 %    Monocyte % 8.0 5.0 - 12.0 %    Eosinophil % 4.4 0.3 - 6.2 %    Basophil % 0.6 0.0 - 1.5 %    Immature Grans % 1.0 (H) 0.0 - 0.5 %    Neutrophils, Absolute 8.45 (H) 1.70 - 7.00 10*3/mm3    Lymphocytes, Absolute 0.90 0.70 - 3.10 10*3/mm3    Monocytes, Absolute 0.87 0.10 - 0.90 10*3/mm3    Eosinophils, Absolute 0.48 (H) 0.00 - 0.40 10*3/mm3    Basophils, Absolute 0.07 0.00 - 0.20 10*3/mm3    Immature Grans, Absolute 0.11 (H) 0.00 - 0.05 10*3/mm3    nRBC 0.0 0.0 - 0.2 /100 WBC   Procalcitonin    Collection Time: 08/30/24  6:44 PM    Specimen: Arm, Left; Blood   Result Value Ref Range    Procalcitonin 1.37 (H) 0.00 - 0.25 ng/mL   Lactic Acid, Plasma    Collection Time: 08/30/24  7:37 PM    Specimen: Arm, Left; Blood   Result Value Ref Range    Lactate 3.1 (C) 0.5 - 2.0 mmol/L       Ordered the above labs and reviewed the results.        RADIOLOGY  XR Chest 1 View    Result Date: 8/30/2024  EXAM:XR CHEST 1 VW-  CLINICAL HISTORY:fever, cough  FINDINGS:  The lungs are clear of acute infiltrates. The cardiomediastinal silhouette is moderately enlarged but stable in size when compared to the prior study dated 6/23/2023. There is a right IJ approach central venous catheter terminating at the level of the venoatrial junction. The osseous structures are unremarkable.   This report was finalized on 8/30/2024 7:56 PM by Dr. Pedro Luis Munoz M.D on Workstation: [id number]       Ordered  the above noted radiological studies. Reviewed by me in PACS.                MEDICATIONS GIVEN IN ER  Medications - No data to display            MEDICAL DECISION MAKING, PROGRESS, and CONSULTS    MDM: Patient presented emergency department with symptomatic anemia, suspect due to renal failure/iron deficiency anemia.  Otherwise well-appearing, vitals otherwise stable.  Slightly tachycardic as well as borderline temperature.  Lactic acid elevated, may be in the setting of anemia versus infectious process.  Will continue to monitor fever curve.  Procalcitonin also elevated however patient has end-stage renal disease on dialysis.  Patient has had a cough however this has been unchanged for the last 6 weeks.  Discussed case with the patient team, will order 1 unit of PRBC and blood cultures.  Admitted for further management.    All labs have been independently reviewed by me.  All radiology studies have been reviewed by me and I have also reviewed the radiology report.   EKG's independently viewed and interpreted by me.  Discussion below represents my analysis of pertinent findings related to patient's condition, differential diagnosis, treatment plan and final disposition.      Additional sources:  - Discussed/ obtained information from independent historians:      - External (non-ED) record review:     - Chronic or social conditions impacting care:     - Shared decision making:        Orders placed during this visit:  Orders Placed This Encounter   Procedures    Blood Culture - Blood,    Blood Culture - Blood,    Respiratory Panel PCR w/COVID-19(SARS-CoV-2) [facility] In-House, NP Swab in UTM/VTM, 2 HR TAT - Swab, Nasopharynx    XR Chest 1 View    Comprehensive Metabolic Panel    Protime-INR    aPTT    CBC Auto Differential    Lactic Acid, Plasma    Procalcitonin    STAT Lactic Acid, Reflex    Iron Profile    Ferritin    Verify Informed Consent    [facility] (on-call MD unless specified) Details    Type & Screen     Prepare RBC, 1 Units    Initiate Observation Status    CBC & Differential         Additional orders considered but not ordered:  Considered CT chest abdomen pelvis to further evaluate for signs of infection however will defer at this time pending clinical course.        Differential diagnosis includes but is not limited to:    Bacteremia, transient bacteremia, viral syndrome, pertussis, urinary tract infection, line infection, iron deficiency anemia, acute blood loss anemia, symptomatic anemia      Independent interpretation of labs, radiology studies, and discussions with consultants:  ED Course as of 08/30/24 2047   Fri Aug 30, 2024   1906 Temp: 100 °F (37.8 °C) [FS]   1906 Heart Rate: 114 [FS]   1911 Hemoglobin(!): 7.4 [FS]   2017 Lactate(!!): 3.1 [FS]   2017 Procalcitonin(!): 1.37 [FS]   2026 Chest x-ray interpreted myself:  No infiltrate or pneumothorax [FS]      ED Course User Index  [FS] Sascha Martinez MD           DIAGNOSIS  Final diagnoses:   Symptomatic anemia   Fever determined by examination         DISPOSITION  Admitted to telemetry        Latest Documented Vital Signs:  As of 20:47 EDT  BP- 165/81 HR- 97 Temp- 100 °F (37.8 °C) (Tympanic) O2 sat- 95%              --    Please note that portions of this were completed with a voice recognition program.       Note Disclaimer: At [facility], we believe that sharing information builds trust and better relationships. You are receiving this note because you are receiving care at [facility] or recently visited. It is possible you will see health information before a provider has talked with you about it. This kind of information can be easy to misunderstand. To help you fully understand what it means for your health, we urge you to discuss this note with your provider.             Sascha Martinez MD  08/30/24 2052

## 2024-08-31 NOTE — PROGRESS NOTES
"    DAILY PROGRESS NOTE  [facility]    Patient Identification:  Name: Kelly Pack  Age: 54 y.o.  Sex: female  :  1970  MRN: [id number]         Primary Care Physician: Kim Benjamin APRN    Subjective:  Interval History: Tried to discuss a lot of her previous allergies and what she is tolerated in the past when she has not.  No worsening of fever or chills.  No nausea or vomiting noted.    Objective: paltp    Scheduled Meds:atorvastatin, 10 mg, Oral, Daily  carvedilol, 25 mg, Oral, BID With Meals  cefTRIAXone, 2,000 mg, Intravenous, Q24H  fluticasone, 2 spray, Each Nare, Daily  heparin (porcine), 5,000 Units, Subcutaneous, Q12H  insulin lispro, 2-7 Units, Subcutaneous, 4x Daily AC & at Bedtime  lanthanum, 1,000 mg, Oral, BID With Meals  sodium chloride, 10 mL, Intravenous, Q12H  Vancomycin Pharmacy Intermittent/Pulse Dosing, , Does not apply, Daily      Continuous Infusions:Pharmacy to dose vancomycin,         Vital signs in last 24 hours:  Temp:  [98.2 °F (36.8 °C)-100 °F (37.8 °C)] 98.2 °F (36.8 °C)  Heart Rate:  [] 89  Resp:  [18] 18  BP: (134-177)/(65-88) 168/86    Intake/Output:    Intake/Output Summary (Last 24 hours) at 2024 0949  Last data filed at 2024 0500  Gross per 24 hour   Intake 650 ml   Output --   Net 650 ml       Exam:  /86 (BP Location: Left arm, Patient Position: Lying)   Pulse 89   Temp 98.2 °F (36.8 °C) (Oral)   Resp 18   Ht 175.3 cm (69\")   Wt 126 kg (278 lb)   SpO2 99%   BMI 41.05 kg/m²     Deferred exam    Data Review:  Labs in chart were reviewed.    Assessment:  Active Hospital Problems    Diagnosis  POA    **Symptomatic anemia [D64.9]  Yes    ESRD (end stage renal disease) [N18.6]  Yes    Asthma [J45.909]  Yes    Essential hypertension [I10]  Yes    Uncontrolled type 2 diabetes mellitus with hyperglycemia [E11.65]  Yes      Resolved Hospital Problems   No resolved problems to display.       Plan:    Multifactorial anemia driven by " ESRD/iron deficiency   -Iron level 30   -Patient does not like oral iron due to GI aversion   -On Procrit an outpatient but not allergic to IV iron   -Status post 1 unit packed red blood cells Hgb 7.4-7.5   -Since renally driven, discussed things with  and he is going to review different iron's available.  Given her high likelihood of reactions, it always had a little better luck with IV sucrose as opposed to what is formulary now   -Heparin for prophylaxis removed -changed to SCDs    DM2 very poorly controlled with an A1c of 9+    HTN stable    Empiric IV vancomycin discontinued.  No source of infection found.  I think all of her labs are skewed due to kidney disease in regards to lactate and procalcitonin and no source of infection has been found to this point.  Antibiotics held and monitor overnight.  Highest fever noted is 100.  Would favor atelectasis.  Will ensure blood cultures show no growth to date prior to DC        Disposition likely tomorrow      Disposition -hopeful DC tomorrow    Robert Rivero MD  8/31/2024  09:49 EDT

## 2024-09-01 ENCOUNTER — READMISSION MANAGEMENT (OUTPATIENT)
Dept: CALL CENTER | Facility: HOSPITAL | Age: 54
End: 2024-09-01
Payer: COMMERCIAL

## 2024-09-01 VITALS
HEART RATE: 60 BPM | BODY MASS INDEX: 41.18 KG/M2 | RESPIRATION RATE: 20 BRPM | DIASTOLIC BLOOD PRESSURE: 72 MMHG | HEIGHT: 69 IN | OXYGEN SATURATION: 100 % | WEIGHT: 278 LBS | TEMPERATURE: 97.3 F | SYSTOLIC BLOOD PRESSURE: 145 MMHG

## 2024-09-01 LAB
GLUCOSE BLDC GLUCOMTR-MCNC: 118 MG/DL (ref 70–130)
GLUCOSE BLDC GLUCOMTR-MCNC: 173 MG/DL (ref 70–130)
HCT VFR BLD AUTO: 25.6 % (ref 34–46.6)
HGB BLD-MCNC: 7.7 G/DL (ref 12–15.9)

## 2024-09-01 PROCEDURE — 85018 HEMOGLOBIN: CPT | Performed by: HOSPITALIST

## 2024-09-01 PROCEDURE — 85014 HEMATOCRIT: CPT | Performed by: HOSPITALIST

## 2024-09-01 PROCEDURE — 63710000001 INSULIN LISPRO (HUMAN) PER 5 UNITS: Performed by: INTERNAL MEDICINE

## 2024-09-01 PROCEDURE — 82948 REAGENT STRIP/BLOOD GLUCOSE: CPT

## 2024-09-01 PROCEDURE — G0378 HOSPITAL OBSERVATION PER HR: HCPCS

## 2024-09-01 RX ADMIN — CARVEDILOL 25 MG: 25 TABLET, FILM COATED ORAL at 08:19

## 2024-09-01 RX ADMIN — ATORVASTATIN CALCIUM 10 MG: 20 TABLET, FILM COATED ORAL at 08:19

## 2024-09-01 RX ADMIN — LANTHANUM CARBONATE 1000 MG: 500 TABLET, CHEWABLE ORAL at 08:19

## 2024-09-01 RX ADMIN — FLUTICASONE PROPIONATE 2 SPRAY: 50 SPRAY, METERED NASAL at 08:19

## 2024-09-01 RX ADMIN — Medication 10 ML: at 08:19

## 2024-09-01 RX ADMIN — INSULIN LISPRO 2 UNITS: 100 INJECTION, SOLUTION INTRAVENOUS; SUBCUTANEOUS at 12:12

## 2024-09-01 NOTE — PLAN OF CARE
Goal Outcome Evaluation:   IV site infiltrated and the IV nurse changed it. No complaints of nausea for me. Due antibiotics given as ordered. VS stable continue to monitor.

## 2024-09-01 NOTE — OUTREACH NOTE
Prep Survey      Flowsheet Row Responses   [facility] patient discharged from? [place]   Is LACE score < 7 ? No   Eligibility [facility]   Date of Admission 08/30/24   Date of Discharge 09/01/24   Discharge Disposition Home or Self Care   Discharge diagnosis **Symptomatic anemia (   Does the patient have one of the following disease processes/diagnoses(primary or secondary)? Other   Does the patient have Home health ordered? No   Is there a DME ordered? No   Prep survey completed? Yes            QUINN MONTES - Registered Nurse

## 2024-09-01 NOTE — PROGRESS NOTES
Discussed with Dr. Rivero  Hemoglobin improving with iron therapy  No indication for transfusion at current hemoglobin levels  Will discuss with her outpatient nephrologist and make sure she is getting IV iron and Epogen with her outpatient HD  Stable for discharge today  Follow-up at outpatient dialysis tomorrow

## 2024-09-01 NOTE — CASE MANAGEMENT/SOCIAL WORK
Case Management Discharge Note      Final Note: dc home         Selected Continued Care - Discharged on 9/1/2024 Admission date: 8/30/2024 - Discharge disposition: Home or Self Care      Destination    No services have been selected for the patient.                Durable Medical Equipment    No services have been selected for the patient.                Dialysis/Infusion    No services have been selected for the patient.                Home Medical Care    No services have been selected for the patient.                Therapy    No services have been selected for the patient.                Community Resources    No services have been selected for the patient.                Community & DME    No services have been selected for the patient.                    Selected Continued Care - Episodes Includes continued care and service providers with selected services from the active episodes listed below      [facility] Episode start date: 4/29/2022   There are no active outsourced providers for this episode.                      Final Discharge Disposition Code: 01 - home or self-care

## 2024-09-01 NOTE — DISCHARGE SUMMARY
Date of Discharge:  9/1/2024    PCP: Kim Benjamin APRN    Discharge Diagnosis:   Active Hospital Problems    Diagnosis  POA    **Symptomatic anemia [D64.9]  Yes    ESRD (end stage renal disease) [N18.6]  Yes    Asthma [J45.909]  Yes    Essential hypertension [I10]  Yes    Uncontrolled type 2 diabetes mellitus with hyperglycemia [E11.65]  Yes      Resolved Hospital Problems   No resolved problems to display.          Consults       Date and Time Order Name Status Description    8/30/2024  9:22 PM Inpatient Nephrology Consult Completed     8/30/2024  8:27 PM [facility] (on-call MD unless specified) Details            Hospital Course  54 y.o. female who was brought in for low iron.  She is an ESRD patient managed by Dr. [facility] and has multifactorial anemia driven by her renal disease and iron deficiency.  Iron level here was noted to be 30.  Case discussed with Dr. Tyler Roman as he assisted while the patient was in house.  He ordered IV iron and the patient tolerated administration.  We also gave 1 unit of packed red blood cells and her hemoglobin has shown an upward trend 7.4-7 0.5-7.7.  She still feels some fatigue which is anticipated but otherwise her hemoglobin is improving and hopefully will continue to follow suit.  She requested additional transfusion on day of discharge and that was declined medically given improving overall trends.  Her drive to get the additional transfusion seem to pending around and outdoor concert.  I advised the patient to reconsider as low hemoglobin levels will definitely cause her to fatigue and increase her risk of syncope and further trauma.  She declines and states that she is spent too much money and will go no matter what.  Regardless nephrology will continue with EPO in an outpatient setting and will also trend her H&H in an outpatient setting.  Vital signs are overall stable as well as afebrile.  Patient is alert and oriented x 3 and RN taken with me at bedside as  chaperone.  Diabetic control is very poor as indicated by an A1c of 9+ and should follow closely with prescribing physician in outpatient setting.  She was given empiric antibiotics but for no reason as no source was identified.  I discontinued antibiotics and nephrology is in agreement with that as well.  Hypertensions remained stable.  This patient is medically stable for discharge today.  All questions answered the best my ability.      Temp:  [97.3 °F (36.3 °C)-98.6 °F (37 °C)] 97.3 °F (36.3 °C)  Heart Rate:  [60-70] 60  Resp:  [20] 20  BP: (114-145)/(59-87) 145/72  Body mass index is 41.05 kg/m².    Physical Exam  Constitutional:       Comments: Nontoxic.  Conversational   HENT:      Head: Normocephalic.      Mouth/Throat:      Mouth: Mucous membranes are moist.      Pharynx: Oropharynx is clear.   Cardiovascular:      Rate and Rhythm: Normal rate and regular rhythm.   Pulmonary:      Effort: Pulmonary effort is normal. No respiratory distress.      Breath sounds: Normal breath sounds.   Abdominal:      General: Bowel sounds are normal. There is no distension.      Tenderness: There is no abdominal tenderness.   Neurological:      Mental Status: She is alert and oriented to person, place, and time. Mental status is at baseline.       Disposition: Home or Self Care       Discharge Medications        Changes to Medications        Instructions Start Date   gabapentin 100 MG capsule  Commonly known as: NEURONTIN  What changed:   how much to take  when to take this  reasons to take this   100 mg, Oral, 3 Times Daily      torsemide 100 MG tablet  Commonly known as: DEMADEX  What changed:   when to take this  additional instructions   100 mg, Oral, Daily             Continue These Medications        Instructions Start Date   albuterol sulfate  (90 Base) MCG/ACT inhaler  Commonly known as: PROVENTIL HFA;VENTOLIN HFA;PROAIR HFA   2 puffs, Inhalation, Every 6 Hours PRN      carvedilol 25 MG tablet  Commonly known  as: COREG   25 mg, Oral, 2 Times Daily With Meals      cyanocobalamin 1000 MCG tablet  Commonly known as: VITAMIN B-12   1,000 mcg, Oral, Daily      cyclobenzaprine 10 MG tablet  Commonly known as: FLEXERIL   10 mg, Oral, Nightly PRN      fluticasone 50 MCG/ACT nasal spray  Commonly known as: FLONASE   2 sprays, Each Nare, Daily      lanthanum 1000 MG chewable tablet  Commonly known as: FOSRENOL   1,000 mg, Oral, 2 Times Daily With Meals      Magnesium Glycinate 120 MG capsule   2 capsules, Oral, 2 Times Daily      simvastatin 20 MG tablet  Commonly known as: ZOCOR   20 mg, Oral, Daily      vitamin D 1.25 MG (37790 UT) capsule capsule  Commonly known as: ERGOCALCIFEROL   50,000 Units, Oral, 2 Times Weekly             Stop These Medications      FreeStyle Zane 2 Sensor misc     Insulin Pen Needle 32G X 4 MM misc     MAGnesium-Oxide 400 (240 Mg) MG tablet  Generic drug: Magnesium Oxide -Mg Supplement     midodrine 2.5 MG tablet  Commonly known as: PROAMATINE               Additional Instructions for the Follow-ups that You Need to Schedule       Discharge Follow-up with PCP   As directed       Currently Documented PCP:    Kim Benjamin APRN    PCP Phone Number:    [phone]     Follow Up Details: PCP as needed.  Renal through [facility]               Follow-up Information       Kim Benjamin APRN .    Specialties: Nurse Practitioner, Family Medicine  Why: PCP as needed.  Renal through [facility]  Contact information:  [address] [address]  [phone]                            Future Appointments   Date Time Provider Department Center   9/9/2024 12:00 PM ROOM 5 [facility]  [facility]     Pending Labs       Order Current Status    Blood Culture - Blood, Arm, Left Preliminary result    Blood Culture - Blood, Arm, Left Preliminary result           Robert Rivero MD  [facility]  09/01/24    Discharge time spent greater than 30  minutes.

## 2024-09-03 ENCOUNTER — TRANSITIONAL CARE MANAGEMENT TELEPHONE ENCOUNTER (OUTPATIENT)
Dept: CALL CENTER | Facility: HOSPITAL | Age: 54
End: 2024-09-03
Payer: COMMERCIAL

## 2024-09-03 NOTE — OUTREACH NOTE
Call Center TCM Note      Flowsheet Row Responses   [facility] [place] patient discharged from? [place]   Does the patient have one of the following disease processes/diagnoses(primary or secondary)? Other   TCM attempt successful? No   Unsuccessful attempts Attempt 1   Call Status Left message            Claudia Zuñiga RN    9/3/2024, 09:59 EDT

## 2024-09-03 NOTE — OUTREACH NOTE
Call Center TCM Note      Flowsheet Row Responses   [facility] patient discharged from? [place]   Does the patient have one of the following disease processes/diagnoses(primary or secondary)? Other   TCM attempt successful? No   Unsuccessful attempts Attempt 2   Call Status Left message            Claudia Zuñiga RN    9/3/2024, 17:02 EDT

## 2024-09-04 ENCOUNTER — TRANSITIONAL CARE MANAGEMENT TELEPHONE ENCOUNTER (OUTPATIENT)
Dept: CALL CENTER | Facility: HOSPITAL | Age: 54
End: 2024-09-04
Payer: COMMERCIAL

## 2024-09-04 ENCOUNTER — TELEPHONE (OUTPATIENT)
Dept: FAMILY MEDICINE CLINIC | Facility: CLINIC | Age: 54
End: 2024-09-04
Payer: COMMERCIAL

## 2024-09-04 LAB
BACTERIA SPEC AEROBE CULT: NORMAL
BACTERIA SPEC AEROBE CULT: NORMAL

## 2024-09-04 NOTE — OUTREACH NOTE
Call Center TCM Note      Flowsheet Row Responses   [facility] patient discharged from? [place]   Does the patient have one of the following disease processes/diagnoses(primary or secondary)? Other   TCM attempt successful? No   Unsuccessful attempts Attempt 3            Aurora Barragan RN    9/4/2024, 08:26 EDT

## 2024-09-09 ENCOUNTER — HOSPITAL ENCOUNTER (OUTPATIENT)
Dept: INFUSION THERAPY | Facility: HOSPITAL | Age: 54
Discharge: HOME | End: 2024-09-09
Payer: COMMERCIAL

## 2024-09-13 ENCOUNTER — HOSPITAL ENCOUNTER (OUTPATIENT)
Dept: INFUSION THERAPY | Facility: HOSPITAL | Age: 54
Discharge: HOME | End: 2024-09-13
Payer: COMMERCIAL

## 2024-09-16 RX ORDER — ALBUTEROL SULFATE 90 UG/1
AEROSOL, METERED RESPIRATORY (INHALATION)
Qty: 18 G | Refills: 0 | Status: SHIPPED | OUTPATIENT
Start: 2024-09-16

## 2024-10-07 ENCOUNTER — TELEPHONE (OUTPATIENT)
Dept: ONCOLOGY | Facility: CLINIC | Age: 54
End: 2024-10-07
Payer: COMMERCIAL

## 2024-10-07 NOTE — TELEPHONE ENCOUNTER
ATTEMPTED TO CALL THE PT [phone]. NO ANSWER AND NO WAY TO LEAVE A VM AS NO VM RESPONDED. NEED TO KNOW IF THERE IS ACTIVE INS, AND IF SO, WHO THE PAYER IS AND ID #.

## 2024-12-03 ENCOUNTER — HOSPITAL ENCOUNTER (OUTPATIENT)
Facility: HOSPITAL | Age: 54
Discharge: HOME | End: 2024-12-03
Attending: STUDENT IN AN ORGANIZED HEALTH CARE EDUCATION/TRAINING PROGRAM | Admitting: STUDENT IN AN ORGANIZED HEALTH CARE EDUCATION/TRAINING PROGRAM
Payer: COMMERCIAL

## 2024-12-03 ENCOUNTER — APPOINTMENT (OUTPATIENT)
Dept: GENERAL RADIOLOGY | Facility: HOSPITAL | Age: 54
End: 2024-12-03
Payer: COMMERCIAL

## 2024-12-03 VITALS
HEIGHT: 69 IN | BODY MASS INDEX: 41.18 KG/M2 | WEIGHT: 278 LBS | RESPIRATION RATE: 18 BRPM | TEMPERATURE: 97.9 F | OXYGEN SATURATION: 95 % | SYSTOLIC BLOOD PRESSURE: 109 MMHG | HEART RATE: 62 BPM | DIASTOLIC BLOOD PRESSURE: 57 MMHG

## 2024-12-03 DIAGNOSIS — J45.20 MILD INTERMITTENT ASTHMATIC BRONCHITIS WITHOUT COMPLICATION: ICD-10-CM

## 2024-12-03 DIAGNOSIS — J06.9 VIRAL URI: Primary | ICD-10-CM

## 2024-12-03 LAB
FLUAV SUBTYP SPEC NAA+PROBE: NOT DETECTED
FLUBV RNA ISLT QL NAA+PROBE: NOT DETECTED
SARS-COV-2 RNA RESP QL NAA+PROBE: NOT DETECTED
STREP A PCR: NOT DETECTED

## 2024-12-03 PROCEDURE — 87636 SARSCOV2 & INF A&B AMP PRB: CPT | Performed by: PHYSICIAN ASSISTANT

## 2024-12-03 PROCEDURE — G0463 HOSPITAL OUTPT CLINIC VISIT: HCPCS | Performed by: PHYSICIAN ASSISTANT

## 2024-12-03 PROCEDURE — 99213 OFFICE O/P EST LOW 20 MIN: CPT | Performed by: PHYSICIAN ASSISTANT

## 2024-12-03 PROCEDURE — 71046 X-RAY EXAM CHEST 2 VIEWS: CPT | Performed by: PHYSICIAN ASSISTANT

## 2024-12-03 PROCEDURE — EDLOS: Performed by: PHYSICIAN ASSISTANT

## 2024-12-03 PROCEDURE — 71046 X-RAY EXAM CHEST 2 VIEWS: CPT

## 2024-12-03 PROCEDURE — 87636 SARSCOV2 & INF A&B AMP PRB: CPT | Performed by: STUDENT IN AN ORGANIZED HEALTH CARE EDUCATION/TRAINING PROGRAM

## 2024-12-03 PROCEDURE — 87651 STREP A DNA AMP PROBE: CPT | Performed by: PHYSICIAN ASSISTANT

## 2024-12-03 PROCEDURE — 87651 STREP A DNA AMP PROBE: CPT | Performed by: STUDENT IN AN ORGANIZED HEALTH CARE EDUCATION/TRAINING PROGRAM

## 2024-12-03 RX ORDER — FLUTICASONE PROPIONATE 50 MCG
2 SPRAY, SUSPENSION (ML) NASAL DAILY
Qty: 18.2 G | Refills: 0 | Status: SHIPPED | OUTPATIENT
Start: 2024-12-03

## 2024-12-03 RX ORDER — ALBUTEROL SULFATE 90 UG/1
2 INHALANT RESPIRATORY (INHALATION) EVERY 6 HOURS PRN
Qty: 18 G | Refills: 0 | Status: SHIPPED | OUTPATIENT
Start: 2024-12-03

## 2024-12-03 RX ORDER — DEXTROMETHORPHAN HYDROBROMIDE AND PROMETHAZINE HYDROCHLORIDE 15; 6.25 MG/5ML; MG/5ML
5 SYRUP ORAL 4 TIMES DAILY PRN
Qty: 180 ML | Refills: 0 | Status: SHIPPED | OUTPATIENT
Start: 2024-12-03

## 2024-12-03 NOTE — FSED PROVIDER NOTE
EMERGENCY DEPARTMENT ENCOUNTER    Room Number:  12/12  Date seen:  12/3/2024  Time seen: 11:21 EST  PCP: Kim Benjamin APRN  Historian: Patient    Discussed/obtained information from independent historians: N/A    HPI:  Chief complaint: Cough congestion  A complete HPI/ROS/PMH/PSH/SH/FH are unobtainable due to:   Context:Kelly Pack is a 54 y.o. female with significant past medical history of hypertension, hyperlipidemia, asthma, type 2 diabetes dialysis patient who presents to the ED with c/o cough congestion has been ongoing for the past few days.  Patient reports she has had chills.  No fever.  No abdominal pain, nausea, vomiting, diarrhea.  No  symptoms.  No recent sick contacts.  There is no associated chest pain, palpitations or shortness of breath.  Patient states here for further evaluation.        Chronic or social conditions impacting care:    ALLERGIES  Tomato, Cat hair extract, and Mixed grasses    PAST MEDICAL HISTORY  Active Ambulatory Problems     Diagnosis Date Noted    Hyperlipidemia 09/17/2019    Allergic rhinitis 09/17/2019    Nephrotic range proteinuria 09/17/2019    Asthma 04/08/2021    Essential hypertension 04/08/2021    Uncontrolled type 2 diabetes mellitus with hyperglycemia 04/08/2021    Acute renal failure superimposed on stage 3a chronic kidney disease 11/08/2022    Anasarca 11/09/2022    Suspected sleep apnea     Anemia     Bilateral lower extremity edema     Elevated troponin     Acute renal failure superimposed on chronic kidney disease 06/22/2023    Type 2 diabetes mellitus with chronic kidney disease 06/23/2023    Symptomatic anemia 08/30/2024    ESRD (end stage renal disease) 08/30/2024     Resolved Ambulatory Problems     Diagnosis Date Noted    Hypertensive emergency      Past Medical History:   Diagnosis Date    Albuminuria     Allergic     Bell's palsy     Cataract     Cholelithiasis     CKD (chronic kidney disease)     Diabetes mellitus     Drug therapy      Endometrial cancer     HL (hearing loss)     Hypertension     Low back pain     Migraine     Obesity     Peripheral neuropathy     Renal insufficiency     Right otitis media     Type II diabetes mellitus     Visual impairment     Vitamin D deficiency        PAST SURGICAL HISTORY  Past Surgical History:   Procedure Laterality Date    ARTERIOVENOUS FISTULA  05/2023    CATARACT EXTRACTION      CHOLECYSTECTOMY      EYE SURGERY      NOV 2020    HYSTERECTOMY      due to cancer    INSERTION HEMODIALYSIS CATHETER Right 06/23/2023    Procedure: HEMODIALYSIS CATHETER INSERTION;  Surgeon: Mikael Mackay MD;  Location: [facility] MAIN OR;  Service: Vascular;  Laterality: Right;    OOPHORECTOMY      VITRECTOMY PARS PLANA Left 01/28/2020    Procedure: 25G VITRECTOMY-ENDO LASER;  Surgeon: Lazarus, Howard S., MD;  Location: [facility] MAIN OR;  Service: Ophthalmology       FAMILY HISTORY  Family History   Problem Relation Age of Onset    Breast cancer Mother     Cancer Mother         Breast cancer, back in the 90s    Alzheimer's disease Father     Dementia Father     Diabetes Father     Hyperlipidemia Father     Hypertension Father     Hearing loss Father     Mental illness Father         Alzheimer’s    Asthma Maternal Grandfather        SOCIAL HISTORY  Social History     Socioeconomic History    Marital status: Single   Tobacco Use    Smoking status: Never    Smokeless tobacco: Never   Vaping Use    Vaping status: Never Used   Substance and Sexual Activity    Alcohol use: Not Currently     Comment: 3-4 drinks PER YEAR!    Drug use: No    Sexual activity: Not Currently     Partners: Male     Birth control/protection: Condom       REVIEW OF SYSTEMS  Review of Systems    All systems reviewed and negative except for those discussed in HPI.     PHYSICAL EXAM    I have reviewed the triage vital signs and nursing notes.  Vitals:    12/03/24 0958   BP: 109/57   Pulse: 62   Resp: 18   Temp:    SpO2: 95%     Physical Exam    GENERAL: WDWN female,  not distressed  HENT: nares patent, mild mucosal edema, otherwise normal ENT exam  EYES: no scleral icterus  NECK: no ROM limitations  CV: regular rhythm, regular rate  RESPIRATORY: normal effort, lungs CTAB  ABDOMEN: soft  : deferred  MUSCULOSKELETAL: no deformity  NEURO: alert, moves all extremities, follows commands  SKIN: warm, dry    LAB RESULTS  Recent Results (from the past 24 hours)   Rapid Strep A Screen - Swab, Throat    Collection Time: 12/03/24 10:04 AM    Specimen: Throat; Swab   Result Value Ref Range    STREP A PCR Not Detected Not Detected   COVID-19 and FLU A/B PCR, 1 HR TAT - Swab, Nasopharynx    Collection Time: 12/03/24 10:04 AM    Specimen: Nasopharynx; Swab   Result Value Ref Range    COVID19 Not Detected Not Detected - Ref. Range    Influenza A PCR Not Detected Not Detected    Influenza B PCR Not Detected Not Detected       Ordered the above labs and independently interpreted results.  My findings will be discussed in the ED course or medical decision making section below    RADIOLOGY RESULTS  XR Chest 2 View    Result Date: 12/3/2024  XR CHEST 2 VW-  HISTORY: Female who is 54 years-old, short of breath  TECHNIQUE: Frontal and lateral views of the chest  COMPARISON: 8/30/2024  FINDINGS: Right-sided central venous catheter appears stable. The heart is enlarged. Pulmonary vasculature is unremarkable. No focal pulmonary consolidation, pleural effusion, or pneumothorax. No acute osseous process.      No evidence for acute pulmonary process. Cardiomegaly. Follow-up as clinical indications persist.  This report was finalized on 12/3/2024 10:43 AM by Dr. Ronaldo Rodriguez M.D on Workstation: [id number]        Ordered the above noted radiological studies.  Independently interpreted by me.  My findings will be discussed in the medical decision section below.     PROGRESS, DATA ANALYSIS, CONSULTS AND MEDICAL DECISION MAKING    Please note that this section constitutes my independent interpretation of  clinical data including lab results, radiology, EKG's.  This constitutes my independent professional opinion regarding differential diagnosis and management of this patient.  It may include any factors such as history from outside sources, review of external records, social determinants of health, management of medications, response to those treatments, and discussions with other providers.    ED Course as of 12/03/24 1126   Tue Dec 03, 2024   1103   IMPRESSION:  No evidence for acute pulmonary process. Cardiomegaly.  Follow-up as clinical indications persist.     This report was finalized on 12/3/2024 10:43 AM by Dr. Ronaldo Rodriguez M.D on Workstation: [id number]      []      ED Course User Index  [RC] Rolando Shields III, PA     Orders placed during this visit:  Orders Placed This Encounter   Procedures    Rapid Strep A Screen - Swab, Throat    COVID-19 and FLU A/B PCR, 1 HR TAT - Swab, Nasopharynx    XR Chest 2 View            Medical Decision Making    COVID, flu, strep, PNA.  COVID flu strep and chest x-ray were obtained and all negative.  Findings most consistent with a viral URI and mild secondary asthmatic bronchitis.  Patient is diabetic and on dialysis.  Will avoid steroids.  Will treat with albuterol, cough medicine, nasal decongestant, PCP follow-up.      DIAGNOSIS  Final diagnoses:   Viral URI   Mild intermittent asthmatic bronchitis without complication          Medication List        New Prescriptions      promethazine-dextromethorphan 6.25-15 MG/5ML syrup  Commonly known as: PROMETHAZINE-DM  Take 5 mL by mouth 4 (Four) Times a Day As Needed for Cough.            Changed      * albuterol sulfate  (90 Base) MCG/ACT inhaler  Commonly known as: PROVENTIL HFA;VENTOLIN HFA;PROAIR HFA  INHALE 2 PUFFS BY MOUTH EVERY 6 HOURS AS NEEDED FOR WHEEZING FOR SHORTNESS OF BREATH  What changed: Another medication with the same name was added. Make sure you understand how and when to take each.     *  albuterol sulfate  (90 Base) MCG/ACT inhaler  Commonly known as: PROVENTIL HFA;VENTOLIN HFA;PROAIR HFA  Inhale 2 puffs Every 6 (Six) Hours As Needed for Shortness of Air or Wheezing.  What changed: You were already taking a medication with the same name, and this prescription was added. Make sure you understand how and when to take each.     * fluticasone 50 MCG/ACT nasal spray  Commonly known as: FLONASE  2 sprays by Each Nare route Daily.  What changed: Another medication with the same name was added. Make sure you understand how and when to take each.     * fluticasone 50 MCG/ACT nasal spray  Commonly known as: FLONASE  Administer 2 sprays into the nostril(s) as directed by provider Daily.  What changed: You were already taking a medication with the same name, and this prescription was added. Make sure you understand how and when to take each.     gabapentin 100 MG capsule  Commonly known as: NEURONTIN  Take 1 capsule by mouth 3 (Three) Times a Day for 30 days.  What changed:   how much to take  when to take this  reasons to take this     torsemide 100 MG tablet  Commonly known as: DEMADEX  Take 1 tablet by mouth Daily for 30 days.  What changed:   when to take this  additional instructions           * This list has 4 medication(s) that are the same as other medications prescribed for you. Read the directions carefully, and ask your doctor or other care provider to review them with you.                   Where to Get Your Medications        These medications were sent to [address] - [address] [facility] [phone] [facility] [phone]   [address] [facility] [address]      Phone: [phone]   albuterol sulfate  (90 Base) MCG/ACT inhaler  fluticasone 50 MCG/ACT nasal spray  promethazine-dextromethorphan 6.25-15 MG/5ML syrup         FOLLOW-UP  Kim Benjamin, YOSSI  [address] [address]  [address]  [phone]    In 3 days  For further evaluation and treatment, As needed        Latest Documented Vital Signs:  As of 11:26 EST  BP- 109/57 HR- 62 Temp- 97.9 °F (36.6 °C) (Temporal) O2 sat- 95%    Appropriate PPE utilized throughout this patient encounter to include mask, if indicated, per current protocol. Hand hygiene was performed before donning PPE and after removal when leaving the room.    Please note that portions of this were completed with a voice recognition program.     Note Disclaimer: At [facility], we believe that sharing information builds trust and better relationships. You are receiving this note because you are receiving care at [facility] or recently visited. It is possible you will see health information before a provider has talked with you about it. This kind of information can be easy to misunderstand. To help you fully understand what it means for your health, we urge you to discuss this note with your provider.

## 2024-12-03 NOTE — Clinical Note
[facility]  [address]  [place] [address]    Kelly Pack was seen and treated in our emergency department on 12/3/2024.  She may return to work on 12/06/2024.         Thank you for choosing [facility].    Rolando Shields III, PA

## 2024-12-03 NOTE — Clinical Note
[facility]  [address]  [place] [address]    Kelly Pack was seen and treated in our emergency department on 12/3/2024.  She may return to work on 12/06/2024.         Thank you for choosing [facility].    Rolando Shields III, PA

## 2025-03-13 ENCOUNTER — TELEPHONE (OUTPATIENT)
Dept: FAMILY MEDICINE CLINIC | Facility: CLINIC | Age: 55
End: 2025-03-13
Payer: COMMERCIAL

## 2025-03-13 NOTE — TELEPHONE ENCOUNTER
Caller: Kelly Pack    Relationship: Self    Best call back number: :[phone]        What was the call regarding: PATIENT IS CALLING WANTED TO SCHEDULE APPOINTMENT TOMORROW TO COME IN AND GIVE UA DUE TO HAVING  A POSSIBLE UTI       SYMPTOMS: BLOOD IN URINE, FEELING LIKE SHE NEEDS TO GO TO THE BATHROOM     PATIENT WAS CURRENTLY AT DIALYSIS AND THE DOCTOR AT DIALYSIS TOLD HER TO SEE HER PCP PER PATIENT     PLEASE GIVE CALLBACK WITH APPOINTMENT     TRIED TO TRANSFER TO OFFICE NO ANSWER

## 2025-03-14 ENCOUNTER — OFFICE VISIT (OUTPATIENT)
Dept: FAMILY MEDICINE CLINIC | Facility: CLINIC | Age: 55
End: 2025-03-14
Payer: MEDICARE

## 2025-03-14 VITALS
OXYGEN SATURATION: 99 % | TEMPERATURE: 96.7 F | WEIGHT: 247 LBS | SYSTOLIC BLOOD PRESSURE: 150 MMHG | DIASTOLIC BLOOD PRESSURE: 90 MMHG | HEIGHT: 69 IN | HEART RATE: 79 BPM | BODY MASS INDEX: 36.58 KG/M2

## 2025-03-14 DIAGNOSIS — N30.01 ACUTE CYSTITIS WITH HEMATURIA: Primary | ICD-10-CM

## 2025-03-14 LAB
BILIRUB BLD-MCNC: ABNORMAL MG/DL
CLARITY, POC: ABNORMAL
COLOR UR: ABNORMAL
EXPIRATION DATE: ABNORMAL
GLUCOSE UR STRIP-MCNC: ABNORMAL MG/DL
KETONES UR QL: ABNORMAL
LEUKOCYTE EST, POC: ABNORMAL
Lab: ABNORMAL
NITRITE UR-MCNC: NEGATIVE MG/ML
PH UR: 6 [PH] (ref 5–8)
PROT UR STRIP-MCNC: ABNORMAL MG/DL
RBC # UR STRIP: ABNORMAL /UL
SP GR UR: 1.02 (ref 1–1.03)
UROBILINOGEN UR QL: NORMAL

## 2025-03-14 PROCEDURE — 1159F MED LIST DOCD IN RCRD: CPT | Performed by: NURSE PRACTITIONER

## 2025-03-14 PROCEDURE — 1160F RVW MEDS BY RX/DR IN RCRD: CPT | Performed by: NURSE PRACTITIONER

## 2025-03-14 PROCEDURE — 3077F SYST BP >= 140 MM HG: CPT | Performed by: NURSE PRACTITIONER

## 2025-03-14 PROCEDURE — 81003 URINALYSIS AUTO W/O SCOPE: CPT | Performed by: NURSE PRACTITIONER

## 2025-03-14 PROCEDURE — 3080F DIAST BP >= 90 MM HG: CPT | Performed by: NURSE PRACTITIONER

## 2025-03-14 PROCEDURE — 1126F AMNT PAIN NOTED NONE PRSNT: CPT | Performed by: NURSE PRACTITIONER

## 2025-03-14 PROCEDURE — 99213 OFFICE O/P EST LOW 20 MIN: CPT | Performed by: NURSE PRACTITIONER

## 2025-03-14 RX ORDER — BENZONATATE 100 MG/1
CAPSULE ORAL
COMMUNITY
Start: 2025-01-13

## 2025-03-14 RX ORDER — METHYLPREDNISOLONE 4 MG/1
TABLET ORAL
COMMUNITY
Start: 2025-02-25

## 2025-03-14 RX ORDER — DOXYCYCLINE 100 MG/1
100 CAPSULE ORAL 2 TIMES DAILY
Qty: 20 CAPSULE | Refills: 0 | Status: SHIPPED | OUTPATIENT
Start: 2025-03-14

## 2025-03-14 NOTE — PROGRESS NOTES
"Chief Complaint  Urinary Tract Infection    Subjective        Urinary Tract Infection   Associated symptoms include hematuria. Pertinent negatives include no chills, flank pain, nausea or vomiting.      ROSA ISELA presents to [facility] as a 54 year old female C/o 1 week  worsening history of  dysuria, dark urine, pelvic pain and pressure  She is on Dialysis 3 x week  States that she discussed with dialysis and they referred to PCP  She has had uti in the past  No fever or back pain  She is only able to void very small amount    No other acute C/o today    The following portions of the patient's history were reviewed and updated as appropriate: allergies, current medications, past family history, past medical history, past social history, past surgical history, and problem list       Review of Systems   Constitutional:  Negative for chills, fatigue and fever.   Eyes:  Negative for visual disturbance.   Respiratory:  Negative for cough, shortness of breath and wheezing.    Cardiovascular:  Negative for chest pain and leg swelling.   Gastrointestinal:  Negative for abdominal pain, diarrhea, nausea and vomiting.   Genitourinary:  Positive for dysuria, hematuria and pelvic pain. Negative for flank pain.   Musculoskeletal:  Negative for back pain.   Neurological:  Negative for dizziness.   Psychiatric/Behavioral:  Negative for self-injury, sleep disturbance and suicidal ideas. The patient is not nervous/anxious.         Objective   Vital Signs:   Vitals:    03/14/25 1350   BP: 150/90   Pulse: 79   Temp: 96.7 °F (35.9 °C)   SpO2: 99%   Weight: 112 kg (247 lb)   Height: 175.3 cm (69.02\")   PainSc: 0-No pain            3/14/2025     1:51 PM   PHQ-2/PHQ-9 Depression Screening   Little interest or pleasure in doing things Not at all   Feeling down, depressed, or hopeless Not at all   How difficult have these problems made it for you to do your work, take care of things at home, or get along with " other people? Not difficult at all               Physical Exam  Vitals reviewed.   Constitutional:       General: She is not in acute distress.  Eyes:      Conjunctiva/sclera: Conjunctivae normal.   Neck:      Thyroid: No thyromegaly.      Vascular: No carotid bruit.   Cardiovascular:      Rate and Rhythm: Normal rate and regular rhythm.      Heart sounds: Normal heart sounds.   Pulmonary:      Effort: Pulmonary effort is normal.      Breath sounds: Normal breath sounds.   Abdominal:      General: Abdomen is flat. Bowel sounds are normal. There is no distension.      Palpations: Abdomen is soft. There is no mass.      Tenderness: There is no abdominal tenderness. There is no right CVA tenderness, left CVA tenderness, guarding or rebound.      Hernia: No hernia is present.   Neurological:      Mental Status: She is alert and oriented to person, place, and time.   Psychiatric:         Attention and Perception: Attention normal.         Mood and Affect: Mood normal.          Result Review :     The following data was reviewed by: YOSSI Fernandez on 03/14/2025:           Assessment and Plan       Acute cystitis with hematuria  Return if symptoms worsen or fail to improve.     Patient was given instructions and counseling regarding her condition or for health maintenance advice. Please see specific information pulled into the AVS if appropriate.  Information regarding urinary tract infection for adults was added to the patient's after visit summary today.     -Will send prescription for Doxycycline to the patient's pharmacy  -Urinalysis dipstick was performed in office today.  Her urine specimen was positive for large amount of leukocytes, large amount of blood, and 300 mg/dL of protein.  -not enough urine to send for culture/ micro  -Take all medications as prescribed and until completed  -Drink plenty of fluids  -Monitor for temperature and take Tylenol as needed  -Seek immediate medical attention for high  fever, chills, back pain, nausea and vomiting, or any other worsening signs or symptoms.  -The patient verbalized understanding of all instructions given today.       Orders:    POC Urinalysis Dipstick, Automated          Follow Up   Return if symptoms worsen or fail to improve, for Follow up with PCP as Directed.  Patient was given instructions and counseling regarding her condition or for health maintenance advice. Please see specific information pulled into the AVS if appropriate.

## 2025-04-02 ENCOUNTER — APPOINTMENT (OUTPATIENT)
Dept: CT IMAGING | Facility: HOSPITAL | Age: 55
End: 2025-04-02
Payer: MEDICARE

## 2025-04-02 ENCOUNTER — HOSPITAL ENCOUNTER (INPATIENT)
Facility: HOSPITAL | Age: 55
LOS: 14 days | Discharge: NURSING, REHAB OR LONG-TERM CARE | End: 2025-04-16
Attending: EMERGENCY MEDICINE | Admitting: STUDENT IN AN ORGANIZED HEALTH CARE EDUCATION/TRAINING PROGRAM
Payer: MEDICARE

## 2025-04-02 ENCOUNTER — ANESTHESIA EVENT (OUTPATIENT)
Dept: PERIOP | Facility: HOSPITAL | Age: 55
End: 2025-04-02
Payer: MEDICARE

## 2025-04-02 ENCOUNTER — ANESTHESIA (OUTPATIENT)
Dept: PERIOP | Facility: HOSPITAL | Age: 55
End: 2025-04-02
Payer: MEDICARE

## 2025-04-02 ENCOUNTER — APPOINTMENT (OUTPATIENT)
Dept: GENERAL RADIOLOGY | Facility: HOSPITAL | Age: 55
End: 2025-04-02
Payer: MEDICARE

## 2025-04-02 DIAGNOSIS — N18.6 ESRD ON DIALYSIS: ICD-10-CM

## 2025-04-02 DIAGNOSIS — I21.4 NSTEMI (NON-ST ELEVATED MYOCARDIAL INFARCTION): Primary | ICD-10-CM

## 2025-04-02 DIAGNOSIS — Z99.2 ESRD ON DIALYSIS: ICD-10-CM

## 2025-04-02 DIAGNOSIS — N13.30 HYDRONEPHROSIS OF LEFT KIDNEY: ICD-10-CM

## 2025-04-02 DIAGNOSIS — A41.9 SEPSIS, DUE TO UNSPECIFIED ORGANISM, UNSPECIFIED WHETHER ACUTE ORGAN DYSFUNCTION PRESENT: ICD-10-CM

## 2025-04-02 DIAGNOSIS — N12 EMPHYSEMATOUS PYELONEPHRITIS OF LEFT KIDNEY: ICD-10-CM

## 2025-04-02 DIAGNOSIS — A41.9 SEPSIS WITHOUT ACUTE ORGAN DYSFUNCTION, DUE TO UNSPECIFIED ORGANISM: ICD-10-CM

## 2025-04-02 LAB
ALBUMIN SERPL-MCNC: 2.9 G/DL (ref 3.5–5.2)
ALBUMIN/GLOB SERPL: 0.7 G/DL
ALP SERPL-CCNC: 160 U/L (ref 39–117)
ALT SERPL W P-5'-P-CCNC: 6 U/L (ref 1–33)
ANION GAP SERPL CALCULATED.3IONS-SCNC: 20.5 MMOL/L (ref 5–15)
AST SERPL-CCNC: 11 U/L (ref 1–32)
ATMOSPHERIC PRESS: 743.3 MMHG
B PARAPERT DNA SPEC QL NAA+PROBE: NOT DETECTED
B PERT DNA SPEC QL NAA+PROBE: NOT DETECTED
B-OH-BUTYR SERPL-SCNC: 0.42 MMOL/L (ref 0.02–0.27)
BACTERIA BLD CULT: ABNORMAL
BACTERIA ID TEST ISLT QL CULT: ABNORMAL
BACTERIA UR QL AUTO: ABNORMAL /HPF
BASE EXCESS BLDV CALC-SCNC: -2 MMOL/L (ref -2–2)
BASOPHILS # BLD AUTO: 0.05 10*3/MM3 (ref 0–0.2)
BASOPHILS NFR BLD AUTO: 0.3 % (ref 0–1.5)
BDY SITE: ABNORMAL
BILIRUB SERPL-MCNC: 0.3 MG/DL (ref 0–1.2)
BILIRUB UR QL STRIP: NEGATIVE
BOTTLE TYPE: ABNORMAL
BUN SERPL-MCNC: 79 MG/DL (ref 6–20)
BUN/CREAT SERPL: 6.8 (ref 7–25)
C PNEUM DNA NPH QL NAA+NON-PROBE: NOT DETECTED
CALCIUM SPEC-SCNC: 8.2 MG/DL (ref 8.6–10.5)
CHLORIDE SERPL-SCNC: 92 MMOL/L (ref 98–107)
CLARITY UR: ABNORMAL
CO2 SERPL-SCNC: 19.5 MMOL/L (ref 22–29)
COLOR UR: YELLOW
CREAT SERPL-MCNC: 11.69 MG/DL (ref 0.57–1)
D-LACTATE SERPL-SCNC: 1.9 MMOL/L (ref 0.5–2)
D-LACTATE SERPL-SCNC: 2.6 MMOL/L (ref 0.5–2)
D-LACTATE SERPL-SCNC: 2.8 MMOL/L (ref 0.5–2)
D-LACTATE SERPL-SCNC: 3.4 MMOL/L (ref 0.5–2)
DEPRECATED RDW RBC AUTO: 47.1 FL (ref 37–54)
DEVICE COMMENT: ABNORMAL
EGFRCR SERPLBLD CKD-EPI 2021: 3.6 ML/MIN/1.73
EOSINOPHIL # BLD AUTO: 0.15 10*3/MM3 (ref 0–0.4)
EOSINOPHIL NFR BLD AUTO: 0.8 % (ref 0.3–6.2)
ERYTHROCYTE [DISTWIDTH] IN BLOOD BY AUTOMATED COUNT: 13.4 % (ref 12.3–15.4)
FLUAV SUBTYP SPEC NAA+PROBE: NOT DETECTED
FLUBV RNA ISLT QL NAA+PROBE: NOT DETECTED
GLOBULIN UR ELPH-MCNC: 4.3 GM/DL
GLUCOSE BLDC GLUCOMTR-MCNC: 231 MG/DL (ref 70–130)
GLUCOSE BLDC GLUCOMTR-MCNC: 241 MG/DL (ref 70–130)
GLUCOSE BLDC GLUCOMTR-MCNC: 351 MG/DL (ref 70–130)
GLUCOSE SERPL-MCNC: 244 MG/DL (ref 65–99)
GLUCOSE UR STRIP-MCNC: ABNORMAL MG/DL
HADV DNA SPEC NAA+PROBE: NOT DETECTED
HBA1C MFR BLD: 8.2 % (ref 4.8–5.6)
HBV SURFACE AG SERPL QL IA: NORMAL
HCO3 BLDV-SCNC: 23.3 MMOL/L (ref 22–28)
HCOV 229E RNA SPEC QL NAA+PROBE: NOT DETECTED
HCOV HKU1 RNA SPEC QL NAA+PROBE: NOT DETECTED
HCOV NL63 RNA SPEC QL NAA+PROBE: NOT DETECTED
HCOV OC43 RNA SPEC QL NAA+PROBE: NOT DETECTED
HCT VFR BLD AUTO: 31.4 % (ref 34–46.6)
HGB BLD-MCNC: 10.4 G/DL (ref 12–15.9)
HGB UR QL STRIP.AUTO: ABNORMAL
HMPV RNA NPH QL NAA+NON-PROBE: NOT DETECTED
HPIV1 RNA ISLT QL NAA+PROBE: NOT DETECTED
HPIV2 RNA SPEC QL NAA+PROBE: NOT DETECTED
HPIV3 RNA NPH QL NAA+PROBE: NOT DETECTED
HPIV4 P GENE NPH QL NAA+PROBE: NOT DETECTED
HYALINE CASTS UR QL AUTO: ABNORMAL /LPF
IMM GRANULOCYTES # BLD AUTO: 0.15 10*3/MM3 (ref 0–0.05)
IMM GRANULOCYTES NFR BLD AUTO: 0.8 % (ref 0–0.5)
INHALED O2 CONCENTRATION: 21 %
KETONES UR QL STRIP: ABNORMAL
LEUKOCYTE ESTERASE UR QL STRIP.AUTO: ABNORMAL
LIPASE SERPL-CCNC: 22 U/L (ref 13–60)
LYMPHOCYTES # BLD AUTO: 0.51 10*3/MM3 (ref 0.7–3.1)
LYMPHOCYTES NFR BLD AUTO: 2.7 % (ref 19.6–45.3)
M PNEUMO IGG SER IA-ACNC: NOT DETECTED
MCH RBC QN AUTO: 32.2 PG (ref 26.6–33)
MCHC RBC AUTO-ENTMCNC: 33.1 G/DL (ref 31.5–35.7)
MCV RBC AUTO: 97.2 FL (ref 79–97)
MODALITY: ABNORMAL
MONOCYTES # BLD AUTO: 1.08 10*3/MM3 (ref 0.1–0.9)
MONOCYTES NFR BLD AUTO: 5.8 % (ref 5–12)
NEUTROPHILS NFR BLD AUTO: 16.72 10*3/MM3 (ref 1.7–7)
NEUTROPHILS NFR BLD AUTO: 89.6 % (ref 42.7–76)
NITRITE UR QL STRIP: NEGATIVE
NRBC BLD AUTO-RTO: 0 /100 WBC (ref 0–0.2)
PCO2 BLDV: 41.1 MM HG (ref 41–51)
PH BLDV: 7.36 PH UNITS (ref 7.31–7.41)
PH UR STRIP.AUTO: 5.5 [PH] (ref 5–8)
PLATELET # BLD AUTO: 244 10*3/MM3 (ref 140–450)
PMV BLD AUTO: 10.3 FL (ref 6–12)
PO2 BLDV: 26.2 MM HG (ref 35–45)
POTASSIUM SERPL-SCNC: 4.7 MMOL/L (ref 3.5–5.2)
PROT SERPL-MCNC: 7.2 G/DL (ref 6–8.5)
PROT UR QL STRIP: ABNORMAL
RBC # BLD AUTO: 3.23 10*6/MM3 (ref 3.77–5.28)
RBC # UR STRIP: ABNORMAL /HPF
REF LAB TEST METHOD: ABNORMAL
RHINOVIRUS RNA SPEC NAA+PROBE: NOT DETECTED
RSV RNA NPH QL NAA+NON-PROBE: NOT DETECTED
SAO2 % BLDCOV: 46 % (ref 45–75)
SARS-COV-2 RNA NPH QL NAA+NON-PROBE: NOT DETECTED
SODIUM SERPL-SCNC: 132 MMOL/L (ref 136–145)
SP GR UR STRIP: 1.02 (ref 1–1.03)
SQUAMOUS #/AREA URNS HPF: ABNORMAL /HPF
TOTAL RATE: 18 BREATHS/MINUTE
UROBILINOGEN UR QL STRIP: ABNORMAL
WBC # UR STRIP: ABNORMAL /HPF
WBC NRBC COR # BLD AUTO: 18.66 10*3/MM3 (ref 3.4–10.8)

## 2025-04-02 PROCEDURE — 80053 COMPREHEN METABOLIC PANEL: CPT | Performed by: PHYSICIAN ASSISTANT

## 2025-04-02 PROCEDURE — 87040 BLOOD CULTURE FOR BACTERIA: CPT | Performed by: PHYSICIAN ASSISTANT

## 2025-04-02 PROCEDURE — 25010000002 CEFTRIAXONE PER 250 MG: Performed by: PHYSICIAN ASSISTANT

## 2025-04-02 PROCEDURE — 0202U NFCT DS 22 TRGT SARS-COV-2: CPT | Performed by: PHYSICIAN ASSISTANT

## 2025-04-02 PROCEDURE — 87186 SC STD MICRODIL/AGAR DIL: CPT | Performed by: PHYSICIAN ASSISTANT

## 2025-04-02 PROCEDURE — 25010000002 PROPOFOL 10 MG/ML EMULSION: Performed by: NURSE ANESTHETIST, CERTIFIED REGISTERED

## 2025-04-02 PROCEDURE — 25010000002 DEXAMETHASONE PER 1 MG: Performed by: NURSE ANESTHETIST, CERTIFIED REGISTERED

## 2025-04-02 PROCEDURE — 25010000002 LIDOCAINE 2% SOLUTION: Performed by: NURSE ANESTHETIST, CERTIFIED REGISTERED

## 2025-04-02 PROCEDURE — 87186 SC STD MICRODIL/AGAR DIL: CPT | Performed by: UROLOGY

## 2025-04-02 PROCEDURE — 25010000002 FENTANYL CITRATE (PF) 50 MCG/ML SOLUTION: Performed by: NURSE ANESTHETIST, CERTIFIED REGISTERED

## 2025-04-02 PROCEDURE — 87340 HEPATITIS B SURFACE AG IA: CPT | Performed by: INTERNAL MEDICINE

## 2025-04-02 PROCEDURE — 25010000002 ONDANSETRON PER 1 MG: Performed by: EMERGENCY MEDICINE

## 2025-04-02 PROCEDURE — C1769 GUIDE WIRE: HCPCS | Performed by: UROLOGY

## 2025-04-02 PROCEDURE — 25010000002 HYDROMORPHONE PER 4 MG: Performed by: EMERGENCY MEDICINE

## 2025-04-02 PROCEDURE — 87077 CULTURE AEROBIC IDENTIFY: CPT | Performed by: PHYSICIAN ASSISTANT

## 2025-04-02 PROCEDURE — C2617 STENT, NON-COR, TEM W/O DEL: HCPCS | Performed by: UROLOGY

## 2025-04-02 PROCEDURE — 82010 KETONE BODYS QUAN: CPT | Performed by: PHYSICIAN ASSISTANT

## 2025-04-02 PROCEDURE — 36415 COLL VENOUS BLD VENIPUNCTURE: CPT | Performed by: PHYSICIAN ASSISTANT

## 2025-04-02 PROCEDURE — 87077 CULTURE AEROBIC IDENTIFY: CPT | Performed by: UROLOGY

## 2025-04-02 PROCEDURE — 25010000002 DROPERIDOL PER 5 MG: Performed by: NURSE ANESTHETIST, CERTIFIED REGISTERED

## 2025-04-02 PROCEDURE — 0T768DZ DILATION OF RIGHT URETER WITH INTRALUMINAL DEVICE, VIA NATURAL OR ARTIFICIAL OPENING ENDOSCOPIC: ICD-10-PCS | Performed by: UROLOGY

## 2025-04-02 PROCEDURE — 82948 REAGENT STRIP/BLOOD GLUCOSE: CPT

## 2025-04-02 PROCEDURE — 87088 URINE BACTERIA CULTURE: CPT | Performed by: PHYSICIAN ASSISTANT

## 2025-04-02 PROCEDURE — 25010000002 ONDANSETRON PER 1 MG: Performed by: NURSE ANESTHETIST, CERTIFIED REGISTERED

## 2025-04-02 PROCEDURE — 25810000003 LACTATED RINGERS SOLUTION: Performed by: PHYSICIAN ASSISTANT

## 2025-04-02 PROCEDURE — 25010000002 HYDROMORPHONE PER 4 MG: Performed by: STUDENT IN AN ORGANIZED HEALTH CARE EDUCATION/TRAINING PROGRAM

## 2025-04-02 PROCEDURE — 83036 HEMOGLOBIN GLYCOSYLATED A1C: CPT | Performed by: STUDENT IN AN ORGANIZED HEALTH CARE EDUCATION/TRAINING PROGRAM

## 2025-04-02 PROCEDURE — 87086 URINE CULTURE/COLONY COUNT: CPT | Performed by: PHYSICIAN ASSISTANT

## 2025-04-02 PROCEDURE — 87086 URINE CULTURE/COLONY COUNT: CPT | Performed by: UROLOGY

## 2025-04-02 PROCEDURE — 85025 COMPLETE CBC W/AUTO DIFF WBC: CPT | Performed by: PHYSICIAN ASSISTANT

## 2025-04-02 PROCEDURE — 74176 CT ABD & PELVIS W/O CONTRAST: CPT

## 2025-04-02 PROCEDURE — 25010000002 HEPARIN (PORCINE) PER 1000 UNITS: Performed by: INTERNAL MEDICINE

## 2025-04-02 PROCEDURE — 25510000002 IOTHALAMATE 60 % SOLUTION: Performed by: UROLOGY

## 2025-04-02 PROCEDURE — 25010000002 FENTANYL CITRATE (PF) 50 MCG/ML SOLUTION: Performed by: ANESTHESIOLOGY

## 2025-04-02 PROCEDURE — BT141ZZ FLUOROSCOPY OF KIDNEYS, URETERS AND BLADDER USING LOW OSMOLAR CONTRAST: ICD-10-PCS | Performed by: UROLOGY

## 2025-04-02 PROCEDURE — 87154 CUL TYP ID BLD PTHGN 6+ TRGT: CPT | Performed by: PHYSICIAN ASSISTANT

## 2025-04-02 PROCEDURE — 99291 CRITICAL CARE FIRST HOUR: CPT

## 2025-04-02 PROCEDURE — 83605 ASSAY OF LACTIC ACID: CPT | Performed by: PHYSICIAN ASSISTANT

## 2025-04-02 PROCEDURE — 25010000002 FAMOTIDINE 10 MG/ML SOLUTION: Performed by: ANESTHESIOLOGY

## 2025-04-02 PROCEDURE — 87088 URINE BACTERIA CULTURE: CPT | Performed by: UROLOGY

## 2025-04-02 PROCEDURE — 74420 UROGRAPHY RTRGR +-KUB: CPT

## 2025-04-02 PROCEDURE — 81001 URINALYSIS AUTO W/SCOPE: CPT | Performed by: PHYSICIAN ASSISTANT

## 2025-04-02 PROCEDURE — 25810000003 SODIUM CHLORIDE 0.9 % SOLUTION: Performed by: INTERNAL MEDICINE

## 2025-04-02 PROCEDURE — 25010000002 SUGAMMADEX 200 MG/2ML SOLUTION: Performed by: NURSE ANESTHETIST, CERTIFIED REGISTERED

## 2025-04-02 PROCEDURE — 83690 ASSAY OF LIPASE: CPT | Performed by: PHYSICIAN ASSISTANT

## 2025-04-02 PROCEDURE — 82803 BLOOD GASES ANY COMBINATION: CPT

## 2025-04-02 PROCEDURE — 25010000002 PHENYLEPHRINE 10 MG/ML SOLUTION: Performed by: NURSE ANESTHETIST, CERTIFIED REGISTERED

## 2025-04-02 DEVICE — URETERAL STENT
Type: IMPLANTABLE DEVICE | Site: URETER | Status: FUNCTIONAL
Brand: CONTOUR™

## 2025-04-02 RX ORDER — GABAPENTIN 300 MG/1
300 CAPSULE ORAL NIGHTLY PRN
Status: DISCONTINUED | OUTPATIENT
Start: 2025-04-02 | End: 2025-04-16 | Stop reason: HOSPADM

## 2025-04-02 RX ORDER — HYDROCODONE BITARTRATE AND ACETAMINOPHEN 7.5; 325 MG/1; MG/1
1 TABLET ORAL EVERY 4 HOURS PRN
Status: DISCONTINUED | OUTPATIENT
Start: 2025-04-02 | End: 2025-04-02 | Stop reason: HOSPADM

## 2025-04-02 RX ORDER — ACETAMINOPHEN 650 MG/1
650 SUPPOSITORY RECTAL EVERY 4 HOURS PRN
Status: DISCONTINUED | OUTPATIENT
Start: 2025-04-02 | End: 2025-04-16 | Stop reason: HOSPADM

## 2025-04-02 RX ORDER — HEPARIN SODIUM 1000 [USP'U]/ML
2000 INJECTION, SOLUTION INTRAVENOUS; SUBCUTANEOUS AS NEEDED
Status: DISCONTINUED | OUTPATIENT
Start: 2025-04-02 | End: 2025-04-06

## 2025-04-02 RX ORDER — HYDROMORPHONE HYDROCHLORIDE 1 MG/ML
0.25 INJECTION, SOLUTION INTRAMUSCULAR; INTRAVENOUS; SUBCUTANEOUS
Status: DISCONTINUED | OUTPATIENT
Start: 2025-04-02 | End: 2025-04-02 | Stop reason: HOSPADM

## 2025-04-02 RX ORDER — HYDROMORPHONE HYDROCHLORIDE 1 MG/ML
0.5 INJECTION, SOLUTION INTRAMUSCULAR; INTRAVENOUS; SUBCUTANEOUS
Refills: 0 | Status: DISCONTINUED | OUTPATIENT
Start: 2025-04-02 | End: 2025-04-06

## 2025-04-02 RX ORDER — LABETALOL HYDROCHLORIDE 5 MG/ML
5 INJECTION, SOLUTION INTRAVENOUS
Status: DISCONTINUED | OUTPATIENT
Start: 2025-04-02 | End: 2025-04-02 | Stop reason: HOSPADM

## 2025-04-02 RX ORDER — DROPERIDOL 2.5 MG/ML
0.62 INJECTION, SOLUTION INTRAMUSCULAR; INTRAVENOUS
Status: DISCONTINUED | OUTPATIENT
Start: 2025-04-02 | End: 2025-04-02 | Stop reason: HOSPADM

## 2025-04-02 RX ORDER — PROMETHAZINE HYDROCHLORIDE 25 MG/1
25 TABLET ORAL ONCE AS NEEDED
Status: DISCONTINUED | OUTPATIENT
Start: 2025-04-02 | End: 2025-04-02 | Stop reason: HOSPADM

## 2025-04-02 RX ORDER — OXYCODONE AND ACETAMINOPHEN 5; 325 MG/1; MG/1
1 TABLET ORAL EVERY 6 HOURS PRN
Refills: 0 | Status: DISCONTINUED | OUTPATIENT
Start: 2025-04-02 | End: 2025-04-07

## 2025-04-02 RX ORDER — ATROPINE SULFATE 0.4 MG/ML
0.4 INJECTION, SOLUTION INTRAMUSCULAR; INTRAVENOUS; SUBCUTANEOUS ONCE AS NEEDED
Status: DISCONTINUED | OUTPATIENT
Start: 2025-04-02 | End: 2025-04-02 | Stop reason: HOSPADM

## 2025-04-02 RX ORDER — HYDROCODONE BITARTRATE AND ACETAMINOPHEN 5; 325 MG/1; MG/1
1 TABLET ORAL ONCE AS NEEDED
Status: DISCONTINUED | OUTPATIENT
Start: 2025-04-02 | End: 2025-04-02 | Stop reason: HOSPADM

## 2025-04-02 RX ORDER — MIDAZOLAM HYDROCHLORIDE 1 MG/ML
1 INJECTION, SOLUTION INTRAMUSCULAR; INTRAVENOUS
Status: DISCONTINUED | OUTPATIENT
Start: 2025-04-02 | End: 2025-04-02 | Stop reason: HOSPADM

## 2025-04-02 RX ORDER — ATORVASTATIN CALCIUM 10 MG/1
10 TABLET, FILM COATED ORAL NIGHTLY
Status: DISCONTINUED | OUTPATIENT
Start: 2025-04-02 | End: 2025-04-05

## 2025-04-02 RX ORDER — LIDOCAINE HYDROCHLORIDE 20 MG/ML
INJECTION, SOLUTION INFILTRATION; PERINEURAL AS NEEDED
Status: DISCONTINUED | OUTPATIENT
Start: 2025-04-02 | End: 2025-04-02 | Stop reason: SURG

## 2025-04-02 RX ORDER — ONDANSETRON 2 MG/ML
INJECTION INTRAMUSCULAR; INTRAVENOUS AS NEEDED
Status: DISCONTINUED | OUTPATIENT
Start: 2025-04-02 | End: 2025-04-02 | Stop reason: SURG

## 2025-04-02 RX ORDER — MULTIVITAMIN WITH IRON
1000 TABLET ORAL DAILY
Status: DISCONTINUED | OUTPATIENT
Start: 2025-04-03 | End: 2025-04-16 | Stop reason: HOSPADM

## 2025-04-02 RX ORDER — ONDANSETRON 2 MG/ML
4 INJECTION INTRAMUSCULAR; INTRAVENOUS ONCE AS NEEDED
Status: DISCONTINUED | OUTPATIENT
Start: 2025-04-02 | End: 2025-04-02 | Stop reason: HOSPADM

## 2025-04-02 RX ORDER — ACETAMINOPHEN 500 MG
1000 TABLET ORAL ONCE
Status: COMPLETED | OUTPATIENT
Start: 2025-04-02 | End: 2025-04-02

## 2025-04-02 RX ORDER — DEXAMETHASONE SODIUM PHOSPHATE 4 MG/ML
INJECTION, SOLUTION INTRA-ARTICULAR; INTRALESIONAL; INTRAMUSCULAR; INTRAVENOUS; SOFT TISSUE AS NEEDED
Status: DISCONTINUED | OUTPATIENT
Start: 2025-04-02 | End: 2025-04-02 | Stop reason: SURG

## 2025-04-02 RX ORDER — INSULIN LISPRO 100 [IU]/ML
2-7 INJECTION, SOLUTION INTRAVENOUS; SUBCUTANEOUS
Status: DISCONTINUED | OUTPATIENT
Start: 2025-04-02 | End: 2025-04-16 | Stop reason: HOSPADM

## 2025-04-02 RX ORDER — ERGOCALCIFEROL 1.25 MG/1
50000 CAPSULE, LIQUID FILLED ORAL 2 TIMES WEEKLY
Status: DISCONTINUED | OUTPATIENT
Start: 2025-04-03 | End: 2025-04-16 | Stop reason: HOSPADM

## 2025-04-02 RX ORDER — SODIUM CHLORIDE, SODIUM LACTATE, POTASSIUM CHLORIDE, CALCIUM CHLORIDE 600; 310; 30; 20 MG/100ML; MG/100ML; MG/100ML; MG/100ML
9 INJECTION, SOLUTION INTRAVENOUS CONTINUOUS
Status: DISCONTINUED | OUTPATIENT
Start: 2025-04-02 | End: 2025-04-02

## 2025-04-02 RX ORDER — LIDOCAINE HYDROCHLORIDE 10 MG/ML
0.5 INJECTION, SOLUTION INFILTRATION; PERINEURAL ONCE AS NEEDED
Status: DISCONTINUED | OUTPATIENT
Start: 2025-04-02 | End: 2025-04-02 | Stop reason: HOSPADM

## 2025-04-02 RX ORDER — MIDODRINE HYDROCHLORIDE 2.5 MG/1
2.5 TABLET ORAL 3 TIMES WEEKLY
COMMUNITY
End: 2025-04-16 | Stop reason: HOSPADM

## 2025-04-02 RX ORDER — SODIUM CHLORIDE 0.9 % (FLUSH) 0.9 %
10 SYRINGE (ML) INJECTION AS NEEDED
Status: DISCONTINUED | OUTPATIENT
Start: 2025-04-02 | End: 2025-04-16 | Stop reason: HOSPADM

## 2025-04-02 RX ORDER — NITROGLYCERIN 0.4 MG/1
0.4 TABLET SUBLINGUAL
Status: DISCONTINUED | OUTPATIENT
Start: 2025-04-02 | End: 2025-04-06

## 2025-04-02 RX ORDER — SODIUM CHLORIDE 0.9 % (FLUSH) 0.9 %
10 SYRINGE (ML) INJECTION EVERY 12 HOURS SCHEDULED
Status: DISCONTINUED | OUTPATIENT
Start: 2025-04-02 | End: 2025-04-16 | Stop reason: HOSPADM

## 2025-04-02 RX ORDER — BISACODYL 5 MG/1
5 TABLET, DELAYED RELEASE ORAL DAILY PRN
Status: DISCONTINUED | OUTPATIENT
Start: 2025-04-02 | End: 2025-04-13

## 2025-04-02 RX ORDER — ONDANSETRON 2 MG/ML
4 INJECTION INTRAMUSCULAR; INTRAVENOUS ONCE
Status: COMPLETED | OUTPATIENT
Start: 2025-04-02 | End: 2025-04-02

## 2025-04-02 RX ORDER — IBUPROFEN 600 MG/1
1 TABLET ORAL
Status: DISCONTINUED | OUTPATIENT
Start: 2025-04-02 | End: 2025-04-16 | Stop reason: HOSPADM

## 2025-04-02 RX ORDER — FENTANYL CITRATE 50 UG/ML
25 INJECTION, SOLUTION INTRAMUSCULAR; INTRAVENOUS
Status: DISCONTINUED | OUTPATIENT
Start: 2025-04-02 | End: 2025-04-02 | Stop reason: HOSPADM

## 2025-04-02 RX ORDER — EPHEDRINE SULFATE 50 MG/ML
5 INJECTION, SOLUTION INTRAVENOUS ONCE AS NEEDED
Status: DISCONTINUED | OUTPATIENT
Start: 2025-04-02 | End: 2025-04-02 | Stop reason: HOSPADM

## 2025-04-02 RX ORDER — MIDODRINE HYDROCHLORIDE 5 MG/1
2.5 TABLET ORAL
Status: DISCONTINUED | OUTPATIENT
Start: 2025-04-02 | End: 2025-04-03

## 2025-04-02 RX ORDER — IPRATROPIUM BROMIDE AND ALBUTEROL SULFATE 2.5; .5 MG/3ML; MG/3ML
3 SOLUTION RESPIRATORY (INHALATION) ONCE AS NEEDED
Status: DISCONTINUED | OUTPATIENT
Start: 2025-04-02 | End: 2025-04-02 | Stop reason: HOSPADM

## 2025-04-02 RX ORDER — ONDANSETRON 4 MG/1
4 TABLET, ORALLY DISINTEGRATING ORAL EVERY 6 HOURS PRN
Status: DISCONTINUED | OUTPATIENT
Start: 2025-04-02 | End: 2025-04-06

## 2025-04-02 RX ORDER — SODIUM CHLORIDE 9 MG/ML
9 INJECTION, SOLUTION INTRAVENOUS CONTINUOUS
Status: DISCONTINUED | OUTPATIENT
Start: 2025-04-02 | End: 2025-04-02

## 2025-04-02 RX ORDER — PHENYLEPHRINE HYDROCHLORIDE 10 MG/ML
INJECTION INTRAVENOUS AS NEEDED
Status: DISCONTINUED | OUTPATIENT
Start: 2025-04-02 | End: 2025-04-02 | Stop reason: SURG

## 2025-04-02 RX ORDER — FAMOTIDINE 10 MG/ML
20 INJECTION, SOLUTION INTRAVENOUS ONCE
Status: COMPLETED | OUTPATIENT
Start: 2025-04-02 | End: 2025-04-02

## 2025-04-02 RX ORDER — PROMETHAZINE HYDROCHLORIDE 25 MG/1
25 SUPPOSITORY RECTAL ONCE AS NEEDED
Status: DISCONTINUED | OUTPATIENT
Start: 2025-04-02 | End: 2025-04-02 | Stop reason: HOSPADM

## 2025-04-02 RX ORDER — ACETAMINOPHEN 160 MG/5ML
650 SOLUTION ORAL EVERY 4 HOURS PRN
Status: DISCONTINUED | OUTPATIENT
Start: 2025-04-02 | End: 2025-04-16 | Stop reason: HOSPADM

## 2025-04-02 RX ORDER — HYDROMORPHONE HYDROCHLORIDE 1 MG/ML
0.5 INJECTION, SOLUTION INTRAMUSCULAR; INTRAVENOUS; SUBCUTANEOUS ONCE
Refills: 0 | Status: COMPLETED | OUTPATIENT
Start: 2025-04-02 | End: 2025-04-02

## 2025-04-02 RX ORDER — FLUMAZENIL 0.1 MG/ML
0.2 INJECTION INTRAVENOUS AS NEEDED
Status: DISCONTINUED | OUTPATIENT
Start: 2025-04-02 | End: 2025-04-02 | Stop reason: HOSPADM

## 2025-04-02 RX ORDER — LANTHANUM CARBONATE 500 MG/1
2000 TABLET, CHEWABLE ORAL 2 TIMES DAILY WITH MEALS
Status: DISCONTINUED | OUTPATIENT
Start: 2025-04-02 | End: 2025-04-15 | Stop reason: CLARIF

## 2025-04-02 RX ORDER — SODIUM CHLORIDE 0.9 % (FLUSH) 0.9 %
3-10 SYRINGE (ML) INJECTION AS NEEDED
Status: DISCONTINUED | OUTPATIENT
Start: 2025-04-02 | End: 2025-04-02 | Stop reason: HOSPADM

## 2025-04-02 RX ORDER — NICOTINE POLACRILEX 4 MG
15 LOZENGE BUCCAL
Status: DISCONTINUED | OUTPATIENT
Start: 2025-04-02 | End: 2025-04-16 | Stop reason: HOSPADM

## 2025-04-02 RX ORDER — DIPHENHYDRAMINE HYDROCHLORIDE 50 MG/ML
12.5 INJECTION, SOLUTION INTRAMUSCULAR; INTRAVENOUS
Status: DISCONTINUED | OUTPATIENT
Start: 2025-04-02 | End: 2025-04-02 | Stop reason: HOSPADM

## 2025-04-02 RX ORDER — HYDRALAZINE HYDROCHLORIDE 20 MG/ML
5 INJECTION INTRAMUSCULAR; INTRAVENOUS
Status: DISCONTINUED | OUTPATIENT
Start: 2025-04-02 | End: 2025-04-02 | Stop reason: HOSPADM

## 2025-04-02 RX ORDER — PROPOFOL 10 MG/ML
VIAL (ML) INTRAVENOUS AS NEEDED
Status: DISCONTINUED | OUTPATIENT
Start: 2025-04-02 | End: 2025-04-02 | Stop reason: SURG

## 2025-04-02 RX ORDER — POLYETHYLENE GLYCOL 3350 17 G/17G
17 POWDER, FOR SOLUTION ORAL DAILY PRN
Status: DISCONTINUED | OUTPATIENT
Start: 2025-04-02 | End: 2025-04-13

## 2025-04-02 RX ORDER — ACETAMINOPHEN 325 MG/1
650 TABLET ORAL EVERY 4 HOURS PRN
Status: DISCONTINUED | OUTPATIENT
Start: 2025-04-02 | End: 2025-04-16 | Stop reason: HOSPADM

## 2025-04-02 RX ORDER — BISACODYL 10 MG
10 SUPPOSITORY, RECTAL RECTAL DAILY PRN
Status: DISCONTINUED | OUTPATIENT
Start: 2025-04-02 | End: 2025-04-13

## 2025-04-02 RX ORDER — FLUTICASONE PROPIONATE 50 MCG
2 SPRAY, SUSPENSION (ML) NASAL DAILY
Status: DISCONTINUED | OUTPATIENT
Start: 2025-04-03 | End: 2025-04-16 | Stop reason: HOSPADM

## 2025-04-02 RX ORDER — FENTANYL CITRATE 50 UG/ML
50 INJECTION, SOLUTION INTRAMUSCULAR; INTRAVENOUS ONCE AS NEEDED
Status: DISCONTINUED | OUTPATIENT
Start: 2025-04-02 | End: 2025-04-02 | Stop reason: HOSPADM

## 2025-04-02 RX ORDER — TORSEMIDE 100 MG/1
100 TABLET ORAL NIGHTLY
Status: DISCONTINUED | OUTPATIENT
Start: 2025-04-02 | End: 2025-04-06

## 2025-04-02 RX ORDER — DEXTROSE MONOHYDRATE 25 G/50ML
25 INJECTION, SOLUTION INTRAVENOUS
Status: DISCONTINUED | OUTPATIENT
Start: 2025-04-02 | End: 2025-04-16 | Stop reason: HOSPADM

## 2025-04-02 RX ORDER — SODIUM CHLORIDE 9 MG/ML
INJECTION, SOLUTION INTRAVENOUS CONTINUOUS PRN
Status: DISCONTINUED | OUTPATIENT
Start: 2025-04-02 | End: 2025-04-02 | Stop reason: SURG

## 2025-04-02 RX ORDER — CYCLOBENZAPRINE HCL 10 MG
10 TABLET ORAL NIGHTLY PRN
Status: DISCONTINUED | OUTPATIENT
Start: 2025-04-02 | End: 2025-04-16 | Stop reason: HOSPADM

## 2025-04-02 RX ORDER — AMOXICILLIN 250 MG
2 CAPSULE ORAL 2 TIMES DAILY PRN
Status: DISCONTINUED | OUTPATIENT
Start: 2025-04-02 | End: 2025-04-13

## 2025-04-02 RX ORDER — ONDANSETRON 2 MG/ML
4 INJECTION INTRAMUSCULAR; INTRAVENOUS EVERY 6 HOURS PRN
Status: DISCONTINUED | OUTPATIENT
Start: 2025-04-02 | End: 2025-04-06

## 2025-04-02 RX ORDER — MIDODRINE HYDROCHLORIDE 2.5 MG/1
2.5 TABLET ORAL
Status: DISCONTINUED | OUTPATIENT
Start: 2025-04-02 | End: 2025-04-02

## 2025-04-02 RX ORDER — FENTANYL CITRATE 50 UG/ML
INJECTION, SOLUTION INTRAMUSCULAR; INTRAVENOUS AS NEEDED
Status: DISCONTINUED | OUTPATIENT
Start: 2025-04-02 | End: 2025-04-02 | Stop reason: SURG

## 2025-04-02 RX ORDER — SODIUM CHLORIDE 0.9 % (FLUSH) 0.9 %
3 SYRINGE (ML) INJECTION EVERY 12 HOURS SCHEDULED
Status: DISCONTINUED | OUTPATIENT
Start: 2025-04-02 | End: 2025-04-02 | Stop reason: HOSPADM

## 2025-04-02 RX ORDER — SODIUM CHLORIDE 9 MG/ML
40 INJECTION, SOLUTION INTRAVENOUS AS NEEDED
Status: DISCONTINUED | OUTPATIENT
Start: 2025-04-02 | End: 2025-04-16 | Stop reason: HOSPADM

## 2025-04-02 RX ORDER — NALOXONE HCL 0.4 MG/ML
0.2 VIAL (ML) INJECTION AS NEEDED
Status: DISCONTINUED | OUTPATIENT
Start: 2025-04-02 | End: 2025-04-02 | Stop reason: HOSPADM

## 2025-04-02 RX ORDER — ALBUTEROL SULFATE 0.63 MG/3ML
0.63 SOLUTION RESPIRATORY (INHALATION) EVERY 6 HOURS PRN
Status: DISCONTINUED | OUTPATIENT
Start: 2025-04-02 | End: 2025-04-16 | Stop reason: HOSPADM

## 2025-04-02 RX ADMIN — DEXAMETHASONE SODIUM PHOSPHATE 4 MG: 4 INJECTION, SOLUTION INTRA-ARTICULAR; INTRALESIONAL; INTRAMUSCULAR; INTRAVENOUS; SOFT TISSUE at 17:04

## 2025-04-02 RX ADMIN — Medication 10 ML: at 13:34

## 2025-04-02 RX ADMIN — FENTANYL CITRATE 50 MCG: 50 INJECTION, SOLUTION INTRAMUSCULAR; INTRAVENOUS at 16:48

## 2025-04-02 RX ADMIN — PHENYLEPHRINE HYDROCHLORIDE 150 MCG: 10 INJECTION INTRAVENOUS at 17:01

## 2025-04-02 RX ADMIN — FAMOTIDINE 20 MG: 10 INJECTION INTRAVENOUS at 15:17

## 2025-04-02 RX ADMIN — PHENYLEPHRINE HYDROCHLORIDE 150 MCG: 10 INJECTION INTRAVENOUS at 17:13

## 2025-04-02 RX ADMIN — SUGAMMADEX 200 MG: 100 INJECTION, SOLUTION INTRAVENOUS at 17:14

## 2025-04-02 RX ADMIN — MIDODRINE HYDROCHLORIDE 2.5 MG: 2.5 TABLET ORAL at 17:36

## 2025-04-02 RX ADMIN — SUGAMMADEX 100 MG: 100 INJECTION, SOLUTION INTRAVENOUS at 17:19

## 2025-04-02 RX ADMIN — SODIUM CHLORIDE: 9 INJECTION, SOLUTION INTRAVENOUS at 16:44

## 2025-04-02 RX ADMIN — FENTANYL CITRATE 50 MCG: 50 INJECTION, SOLUTION INTRAMUSCULAR; INTRAVENOUS at 18:53

## 2025-04-02 RX ADMIN — HYDROMORPHONE HYDROCHLORIDE 0.5 MG: 1 INJECTION, SOLUTION INTRAMUSCULAR; INTRAVENOUS; SUBCUTANEOUS at 10:13

## 2025-04-02 RX ADMIN — SODIUM CHLORIDE, POTASSIUM CHLORIDE, SODIUM LACTATE AND CALCIUM CHLORIDE 500 ML: 600; 310; 30; 20 INJECTION, SOLUTION INTRAVENOUS at 10:18

## 2025-04-02 RX ADMIN — PROPOFOL 50 MG: 10 INJECTION, EMULSION INTRAVENOUS at 16:55

## 2025-04-02 RX ADMIN — PHENYLEPHRINE HYDROCHLORIDE 150 MCG: 10 INJECTION INTRAVENOUS at 17:07

## 2025-04-02 RX ADMIN — HYDROMORPHONE HYDROCHLORIDE 0.5 MG: 1 INJECTION, SOLUTION INTRAMUSCULAR; INTRAVENOUS; SUBCUTANEOUS at 08:08

## 2025-04-02 RX ADMIN — DROPERIDOL 0.62 MG: 2.5 INJECTION, SOLUTION INTRAMUSCULAR; INTRAVENOUS at 17:53

## 2025-04-02 RX ADMIN — PROPOFOL 140 MG: 10 INJECTION, EMULSION INTRAVENOUS at 16:48

## 2025-04-02 RX ADMIN — ONDANSETRON 4 MG: 2 INJECTION, SOLUTION INTRAMUSCULAR; INTRAVENOUS at 17:13

## 2025-04-02 RX ADMIN — LIDOCAINE HYDROCHLORIDE 40 MG: 20 INJECTION, SOLUTION INFILTRATION; PERINEURAL at 17:14

## 2025-04-02 RX ADMIN — CEFTRIAXONE 2000 MG: 2 INJECTION, POWDER, FOR SOLUTION INTRAMUSCULAR; INTRAVENOUS at 11:27

## 2025-04-02 RX ADMIN — Medication 3 ML: at 18:52

## 2025-04-02 RX ADMIN — HEPARIN SODIUM 2000 UNITS: 1000 INJECTION INTRAVENOUS; SUBCUTANEOUS at 21:12

## 2025-04-02 RX ADMIN — SODIUM CHLORIDE: 9 INJECTION, SOLUTION INTRAVENOUS at 16:46

## 2025-04-02 RX ADMIN — ACETAMINOPHEN 1000 MG: 500 TABLET, FILM COATED ORAL at 10:13

## 2025-04-02 RX ADMIN — ACETAMINOPHEN 650 MG: 325 TABLET, FILM COATED ORAL at 13:20

## 2025-04-02 RX ADMIN — SODIUM CHLORIDE 500 ML: 9 INJECTION, SOLUTION INTRAVENOUS at 16:04

## 2025-04-02 RX ADMIN — ONDANSETRON 4 MG: 2 INJECTION, SOLUTION INTRAMUSCULAR; INTRAVENOUS at 08:08

## 2025-04-02 RX ADMIN — HYDROMORPHONE HYDROCHLORIDE 0.5 MG: 1 INJECTION, SOLUTION INTRAMUSCULAR; INTRAVENOUS; SUBCUTANEOUS at 13:21

## 2025-04-02 NOTE — Clinical Note
The right DP pulse is +2. The right PT pulse is +1. The left radial pulse is +1. The left ulnar pulse is +1. The right femoral pulse is +2.

## 2025-04-02 NOTE — ANESTHESIA POSTPROCEDURE EVALUATION
"Patient: Kelly Pack    Procedure Summary       Date: 04/02/25 Room / Location: [facility] OR 01 / [facility] MAIN OR    Anesthesia Start: 1644 Anesthesia Stop: 1726    Procedure: CYSTOSCOPY RETROGRADE PYELOGRAM AND STENT INSERTION (Left) Diagnosis:       Emphysematous pyelonephritis of left kidney      Hydronephrosis of left kidney      Sepsis, due to unspecified organism, unspecified whether acute organ dysfunction present      (Emphysematous pyelonephritis of left kidney [N12])      (Hydronephrosis of left kidney [N13.30])      (Sepsis, due to unspecified organism, unspecified whether acute organ dysfunction present [A41.9])    Surgeons: Tl Gray MD Provider: Sveta Gray MD    Anesthesia Type: general ASA Status: 3            Anesthesia Type: general    Vitals  Vitals Value Taken Time   BP 84/51 04/02/25 18:00   Temp     Pulse 101 04/02/25 18:08   Resp 16 04/02/25 18:00   SpO2 96 % 04/02/25 18:08   Vitals shown include unfiled device data.        Post Anesthesia Care and Evaluation    Patient location during evaluation: bedside  Patient participation: complete - patient participated  Level of consciousness: awake  Pain management: adequate    Airway patency: patent  Anesthetic complications: No anesthetic complications    Cardiovascular status: acceptable  Respiratory status: acceptable  Hydration status: acceptable    Comments: *BP (!) 84/51 (BP Location: Left arm, Patient Position: Lying)   Pulse 104   Temp 38.1 °C (100.5 °F) (Oral)   Resp 16   Ht 175.3 cm (69\")   Wt 110 kg (243 lb)   SpO2 90%   BMI 35.88 kg/m²       "

## 2025-04-02 NOTE — PERIOPERATIVE NURSING NOTE
Lab called with critical lab values for Lactic Acid and Beta Hydroxybutyrate. Notified Dr. Tl Goode with Anesthesia and Dr. Tl Gray Surgeon with Urology. No new orders.    Notified VICTORIA Huerta on floor to facilitate care team.

## 2025-04-02 NOTE — Clinical Note
Hemostasis started on the left ulnar artery. R-Band was used in achieving hemostasis. Radial compression device applied to vessel. Hemostasis achieved successfully. Closure device additional comment: TR band with 16 cc of air

## 2025-04-02 NOTE — H&P
Patient Name:  Kelly Pack  YOB: 1970  MRN:  [id number]  Admit Date:  4/2/2025  Patient Care Team:  Kim Benjamin APRN as PCP - General (Nurse Practitioner)  Miguel Stahl MD as Consulting Physician (Hematology and Oncology)  Radha Martinez APRN as Referring Physician (Nurse Practitioner)      Subjective   History Present Illness     Chief Complaint   Patient presents with   • Abdominal Pain       Ms. Pack is a 54 y.o.  with a history of ESRD on HD, anemia of chronic disease, asthma that presents to [facility] complaining of left-sided abdominal pain, nausea, vomiting, decreased oral intake.    History of Present Illness  Pleasant 54-year-old who reports waking up on Sunday at 2 AM with severe left-sided abdominal pain.  Abdominal pain has persisted since then and she has developed nausea and vomiting to the point where she is unable to tolerate/keep down water.  Patient reports that she has chronic morning nausea related to gallbladder removal several years ago but nausea and vomiting is now constant.  She was able to take some Tylenol which helped symptoms slightly but after Tylenol wore off symptoms returned.  She is also been having significant chills and reported fever when he she was in the ER.    Review of Systems   Constitutional:  Positive for appetite change, chills and fever. Negative for fatigue.   Respiratory:  Negative for cough and shortness of breath.    Cardiovascular:  Negative for chest pain, palpitations and leg swelling.   Gastrointestinal:  Positive for abdominal pain, nausea and vomiting.   Genitourinary:  Positive for flank pain.   Neurological:  Negative for weakness.        Personal History     Past Medical History:   Diagnosis Date   • Albuminuria    • Allergic     Seasonal, grass, cats and some dogs   • Allergic rhinitis    • Asthma     allergy triggered   • Bell's palsy    • Cataract     Had surgery on both eyes   • Cholelithiasis      Removed. Have bile reflux/ vomiting   • CKD (chronic kidney disease)    • Diabetes mellitus    • Drug therapy    • Endometrial cancer    • HL (hearing loss)     In L ear   • Hyperlipidemia    • Hypertension    • Low back pain    • Migraine    • Obesity    • Peripheral neuropathy    • Renal insufficiency    • Right otitis media    • Type II diabetes mellitus    • Visual impairment     Diabetic retinopathy. Oil in L eye due to retina detachment s.   • Vitamin D deficiency      Past Surgical History:   Procedure Laterality Date   • ARTERIOVENOUS FISTULA  05/2023   • CATARACT EXTRACTION     • CHOLECYSTECTOMY     • EYE SURGERY      NOV 2020   • HYSTERECTOMY      due to cancer   • INSERTION HEMODIALYSIS CATHETER Right 06/23/2023    Procedure: HEMODIALYSIS CATHETER INSERTION;  Surgeon: Mikael Mackay MD;  Location: [facility] MAIN OR;  Service: Vascular;  Laterality: Right;   • OOPHORECTOMY     • VITRECTOMY PARS PLANA Left 01/28/2020    Procedure: 25G VITRECTOMY-ENDO LASER;  Surgeon: Lazarus, Howard S., MD;  Location: [facility] MAIN OR;  Service: Ophthalmology     Family History   Problem Relation Age of Onset   • Breast cancer Mother    • Cancer Mother         Breast cancer, back in the 90s   • Alzheimer's disease Father    • Dementia Father    • Diabetes Father    • Hyperlipidemia Father    • Hypertension Father    • Hearing loss Father    • Mental illness Father         Alzheimer’s   • Asthma Maternal Grandfather    • Malig Hyperthermia Neg Hx      Social History     Tobacco Use   • Smoking status: Never   • Smokeless tobacco: Never   Vaping Use   • Vaping status: Never Used   Substance Use Topics   • Alcohol use: Not Currently     Comment: 3-4 drinks PER YEAR!   • Drug use: No     No current facility-administered medications on file prior to encounter.     Current Outpatient Medications on File Prior to Encounter   Medication Sig Dispense Refill   • albuterol sulfate  (90 Base) MCG/ACT inhaler INHALE 2 PUFFS BY MOUTH  EVERY 6 HOURS AS NEEDED FOR WHEEZING FOR SHORTNESS OF BREATH (Patient taking differently: Inhale 2 puffs Every 6 (Six) Hours As Needed.) 18 g 0   • benzonatate (TESSALON) 100 MG capsule Take 1-2 capsules by mouth 3 (Three) Times a Day As Needed.     • carvedilol (COREG) 25 MG tablet Take 1 tablet by mouth 2 (Two) Times a Day With Meals. (Patient taking differently: Take  by mouth See Admin Instructions. Takes 1 tablet 2 times daily on Monday, Wednesday and Fridays   Takes 1 tablet every evening on Tuesdays, Thursdays, and Saturdays) 60 tablet 1   • cyclobenzaprine (FLEXERIL) 10 MG tablet Take 1 tablet by mouth At Night As Needed for Muscle Spasms. 30 tablet 0   • fluticasone (FLONASE) 50 MCG/ACT nasal spray 2 sprays by Each Nare route Daily. 48 g 1   • lanthanum (FOSRENOL) 1000 MG chewable tablet Chew 2 tablets 2 (Two) Times a Day With Meals.     • Magnesium Glycinate 120 MG capsule Take 2 capsules by mouth At Night As Needed.     • midodrine (PROAMATINE) 2.5 MG tablet Take 1 tablet by mouth 3 (Three) Times a Week. Tuesdays, Thursdays and Saturdays     • simvastatin (ZOCOR) 20 MG tablet Take 1 tablet by mouth Daily. 90 tablet 1   • torsemide (DEMADEX) 100 MG tablet Take 1 tablet by mouth Daily for 30 days. (Patient taking differently: Take 1 tablet by mouth Every Night.) 30 tablet 0   • vitamin B-12 (VITAMIN B-12) 1000 MCG tablet Take 1 tablet by mouth Daily. 30 tablet 0   • vitamin D (ERGOCALCIFEROL) 1.25 MG (17416 UT) capsule capsule Take 1 capsule by mouth 2 (Two) Times a Week. Mondays and Thursdays     • gabapentin (NEURONTIN) 100 MG capsule Take 1 capsule by mouth 3 (Three) Times a Day for 30 days. (Patient taking differently: Take 3 capsules by mouth At Night As Needed.) 90 capsule 0   • [DISCONTINUED] albuterol sulfate  (90 Base) MCG/ACT inhaler Inhale 2 puffs Every 6 (Six) Hours As Needed for Shortness of Air or Wheezing. 18 g 0   • [DISCONTINUED] doxycycline (VIBRAMYCIN) 100 MG capsule Take 1 capsule  by mouth 2 (Two) Times a Day. 20 capsule 0   • [DISCONTINUED] fluticasone (FLONASE) 50 MCG/ACT nasal spray Administer 2 sprays into the nostril(s) as directed by provider Daily. 18.2 g 0   • [DISCONTINUED] methylPREDNISolone (MEDROL) 4 MG dose pack see package     • [DISCONTINUED] promethazine-dextromethorphan (PROMETHAZINE-DM) 6.25-15 MG/5ML syrup Take 5 mL by mouth 4 (Four) Times a Day As Needed for Cough. 180 mL 0     Allergies   Allergen Reactions   • Tomato Swelling     THROAT   • Cat Dander Itching     WATERY EYES   • Mixed Grasses Itching     WATERY EYES   • Latex Itching   • Penicillin G Unknown - High Severity       Objective    Objective     Vital Signs  Temp:  [97.5 °F (36.4 °C)-103 °F (39.4 °C)] 100.5 °F (38.1 °C)  Heart Rate:  [] 96  Resp:  [18-20] 19  BP: ()/(51-87) 100/52  SpO2:  [92 %-100 %] 99 %  on  Flow (L/min) (Oxygen Therapy):  [2-3] 3;   Device (Oxygen Therapy): nasal cannula  Body mass index is 35.88 kg/m².    Physical Exam  Constitutional:       General: She is not in acute distress.     Appearance: She is obese. She is ill-appearing.   HENT:      Head: Normocephalic and atraumatic.      Mouth/Throat:      Mouth: Mucous membranes are moist.      Pharynx: Oropharynx is clear.   Eyes:      Extraocular Movements: Extraocular movements intact.      Conjunctiva/sclera: Conjunctivae normal.      Pupils: Pupils are equal, round, and reactive to light.   Cardiovascular:      Rate and Rhythm: Normal rate and regular rhythm.   Pulmonary:      Effort: Pulmonary effort is normal. No respiratory distress.      Breath sounds: Normal breath sounds. No wheezing.   Abdominal:      General: Abdomen is flat. There is no distension.      Palpations: Abdomen is soft.      Tenderness: There is left CVA tenderness.   Musculoskeletal:         General: No swelling or tenderness.      Right lower leg: No edema.      Left lower leg: No edema.   Skin:     General: Skin is warm and dry.   Neurological:       General: No focal deficit present.      Mental Status: She is alert and oriented to person, place, and time. Mental status is at baseline.   Psychiatric:         Mood and Affect: Mood normal.         Behavior: Behavior normal.         Thought Content: Thought content normal.         Judgment: Judgment normal.         Results Review:  I reviewed the patient's new clinical results.  I reviewed the patient's new imaging results and agree with the interpretation.  I reviewed the patient's other test results and agree with the interpretation  I personally viewed and interpreted the patient's EKG/Telemetry data  Discussed with ED provider.    Lab Results (last 24 hours)       Procedure Component Value Units Date/Time    CBC & Differential [[id number]]  (Abnormal) Collected: 04/02/25 0750    Specimen: Blood Updated: 04/02/25 0806    Narrative:      The following orders were created for panel order CBC & Differential.  Procedure                               Abnormality         Status                     ---------                               -----------         ------                     CBC Auto Differential[[id number]]        Abnormal            Final result                 Please view results for these tests on the individual orders.    Comprehensive Metabolic Panel [[id number]]  (Abnormal) Collected: 04/02/25 0750    Specimen: Blood Updated: 04/02/25 0827     Glucose 244 mg/dL      BUN 79 mg/dL      Creatinine 11.69 mg/dL      Sodium 132 mmol/L      Potassium 4.7 mmol/L      Comment: Slight hemolysis detected by analyzer. Result may be falsely elevated.        Chloride 92 mmol/L      CO2 19.5 mmol/L      Calcium 8.2 mg/dL      Total Protein 7.2 g/dL      Albumin 2.9 g/dL      ALT (SGPT) 6 U/L      AST (SGOT) 11 U/L      Alkaline Phosphatase 160 U/L      Total Bilirubin 0.3 mg/dL      Globulin 4.3 gm/dL      A/G Ratio 0.7 g/dL      BUN/Creatinine Ratio 6.8     Anion Gap 20.5 mmol/L      eGFR 3.6 mL/min/1.73     Narrative:       GFR Categories in Chronic Kidney Disease (CKD)      GFR Category          GFR (mL/min/1.73)    Interpretation  G1                     90 or greater         Normal or high (1)  G2                      60-89                Mild decrease (1)  G3a                   45-59                Mild to moderate decrease  G3b                   30-44                Moderate to severe decrease  G4                    15-29                Severe decrease  G5                    14 or less           Kidney failure          (1)In the absence of evidence of kidney disease, neither GFR category G1 or G2 fulfill the criteria for CKD.    eGFR calculation 2021 CKD-EPI creatinine equation, which does not include race as a factor    Lipase [[id number]]  (Normal) Collected: 04/02/25 0750    Specimen: Blood Updated: 04/02/25 0827     Lipase 22 U/L     CBC Auto Differential [[id number]]  (Abnormal) Collected: 04/02/25 0750    Specimen: Blood Updated: 04/02/25 0806     WBC 18.66 10*3/mm3      RBC 3.23 10*6/mm3      Hemoglobin 10.4 g/dL      Hematocrit 31.4 %      MCV 97.2 fL      MCH 32.2 pg      MCHC 33.1 g/dL      RDW 13.4 %      RDW-SD 47.1 fl      MPV 10.3 fL      Platelets 244 10*3/mm3      Neutrophil % 89.6 %      Lymphocyte % 2.7 %      Monocyte % 5.8 %      Eosinophil % 0.8 %      Basophil % 0.3 %      Immature Grans % 0.8 %      Neutrophils, Absolute 16.72 10*3/mm3      Lymphocytes, Absolute 0.51 10*3/mm3      Monocytes, Absolute 1.08 10*3/mm3      Eosinophils, Absolute 0.15 10*3/mm3      Basophils, Absolute 0.05 10*3/mm3      Immature Grans, Absolute 0.15 10*3/mm3      nRBC 0.0 /100 WBC     Ketone Bodies, Serum (Not performed at [place]) [[id number]]  (Abnormal) Collected: 04/02/25 0750    Specimen: Blood Updated: 04/02/25 1524    Narrative:      The following orders were created for panel order Ketone Bodies, Serum (Not performed at [place]).  Procedure                               Abnormality         Status                      ---------                               -----------         ------                     Beta Hydroxybutyrate Nilay...[[id number]]  Abnormal            Final result                 Please view results for these tests on the individual orders.    Beta Hydroxybutyrate Quantitative [[id number]]  (Abnormal) Collected: 04/02/25 0750    Specimen: Blood Updated: 04/02/25 1524     Beta-Hydroxybutyrate Quant 0.416 mmol/L     Narrative:      In the assessment of possible diabetic ketoacidosis, the test should be interpreted along with other clinical and laboratory findings.  A level greater than 1 mmol/L should require further evaluation and levels of more than 3 mmol/L require immediate medical review.    Respiratory Panel PCR w/COVID-19(SARS-CoV-2) [facility] In-House, NP Swab in UTM/VTM, 2 HR TAT - Swab, Nasopharynx [[id number]]  (Normal) Collected: 04/02/25 0756    Specimen: Swab from Nasopharynx Updated: 04/02/25 0858     ADENOVIRUS, PCR Not Detected     Coronavirus 229E Not Detected     Coronavirus HKU1 Not Detected     Coronavirus NL63 Not Detected     Coronavirus OC43 Not Detected     COVID19 Not Detected     Human Metapneumovirus Not Detected     Human Rhinovirus/Enterovirus Not Detected     Influenza A PCR Not Detected     Influenza B PCR Not Detected     Parainfluenza Virus 1 Not Detected     Parainfluenza Virus 2 Not Detected     Parainfluenza Virus 3 Not Detected     Parainfluenza Virus 4 Not Detected     RSV, PCR Not Detected     Bordetella pertussis pcr Not Detected     Bordetella parapertussis PCR Not Detected     Chlamydophila pneumoniae PCR Not Detected     Mycoplasma pneumo by PCR Not Detected    Narrative:      In the setting of a positive respiratory panel with a viral infection PLUS a negative procalcitonin without other underlying concern for bacterial infection, consider observing off antibiotics or discontinuation of antibiotics and continue supportive care. If the respiratory panel is  positive for atypical bacterial infection (Bordetella pertussis, Chlamydophila pneumoniae, or Mycoplasma pneumoniae), consider antibiotic de-escalation to target atypical bacterial infection.    Urinalysis With Culture If Indicated - Urine, Clean Catch [[id number]]  (Abnormal) Collected: 04/02/25 1017    Specimen: Urine, Clean Catch Updated: 04/02/25 1042     Color, UA Yellow     Appearance, UA Cloudy     pH, UA 5.5     Specific Gravity, UA 1.025     Glucose,  mg/dL (Trace)     Ketones, UA Trace     Bilirubin, UA Negative     Blood, UA Moderate (2+)     Protein, UA >=300 mg/dL (3+)     Leuk Esterase, UA Large (3+)     Nitrite, UA Negative     Urobilinogen, UA 0.2 E.U./dL    Narrative:      In absence of clinical symptoms, the presence of pyuria, bacteria, and/or nitrites on the urinalysis result does not correlate with infection.    Urinalysis, Microscopic Only - Urine, Clean Catch [[id number]]  (Abnormal) Collected: 04/02/25 1017    Specimen: Urine, Clean Catch Updated: 04/02/25 1046     RBC, UA None Seen /HPF      WBC, UA Too Numerous to Count /HPF      Bacteria, UA Trace /HPF      Squamous Epithelial Cells, UA 3-6 /HPF      Hyaline Casts, UA None Seen /LPF      Methodology Automated Microscopy    Narrative:      Unspun microscopic    Urine Culture - Urine, Urine, Clean Catch [[id number]] Collected: 04/02/25 1017    Specimen: Urine, Clean Catch Updated: 04/02/25 1046    Blood Culture - Blood, Hand, Left [[id number]] Collected: 04/02/25 1043    Specimen: Blood from Hand, Left Updated: 04/02/25 1056    Lactic Acid, Plasma [[id number]]  (Abnormal) Collected: 04/02/25 1048    Specimen: Blood Updated: 04/02/25 1122     Lactate 2.6 mmol/L     Blood Gas, Venous - [[id number]]  (Abnormal) Collected: 04/02/25 1052    Specimen: Venous Blood Updated: 04/02/25 1057     Site Left Brachial     pH, Venous 7.362 pH Units      pCO2, Venous 41.1 mm Hg      pO2, Venous 26.2 mm Hg      HCO3, Venous 23.3 mmol/L      Base Excess,  Venous -2.0 mmol/L      Comment: Serial Number: [id number]  [id number]        O2 Saturation, Venous 46.0 %      Barometric Pressure for Blood Gas 743.3000 mmHg      Modality Room Air     FIO2 21 %      Rate 18 Breaths/minute      Device Comment drawn by [id number]. drawn at 1048    Blood Culture - Blood, Arm, Left [[id number]] Collected: 04/02/25 1113    Specimen: Blood from Arm, Left Updated: 04/02/25 1124    STAT Lactic Acid, Reflex [[id number]]  (Abnormal) Collected: 04/02/25 1405    Specimen: Blood Updated: 04/02/25 1502     Lactate 3.4 mmol/L     POC Glucose Once [[id number]]  (Abnormal) Collected: 04/02/25 1430    Specimen: Blood Updated: 04/02/25 1431     Glucose 241 mg/dL     POC Glucose Once [[id number]]  (Abnormal) Collected: 04/02/25 1504    Specimen: Blood Updated: 04/02/25 1512     Glucose 231 mg/dL             Imaging Results (Last 24 Hours)       Procedure Component Value Units Date/Time    CT Abdomen Pelvis Without Contrast [[id number]] Collected: 04/02/25 0858     Updated: 04/02/25 0910    Narrative:      CT ABDOMEN PELVIS WO CONTRAST-     DATE OF EXAM: 4/2/2025 8:22 AM     INDICATION: left abdominal pain, n/v/d x 3 days.     COMPARISON: CT guided renal biopsy 11/14/2022. Radiographs of the sacrum  and coccyx 6/24/2023.     TECHNIQUE: Multiple contiguous axial images were acquired through the  abdomen and pelvis without the intravenous administration of contrast.  Reformatted coronal and sagittal sequences were also reviewed. Radiation  dose reduction techniques were utilized, including automated exposure  control and exposure modulation based on body size.     FINDINGS:  The included lung bases are clear. Partially imaged calcified coronary  artery disease and trace pericardial fluid. Relative hyperdensity of the  interventricular septum suggest anemia.     Status post cholecystectomy. The liver, spleen, pancreas, and adrenal  glands are unremarkable. Moderate left hydronephrosis and hydroureter  with left  perinephric and periureteral fat stranding and air distended  calyces in the upper pole of the left kidney, suggesting emphysematous  pyelitis. Bilateral renal calcifications are likely vascular and  nonobstructing. No obstructing renal or ureteral stone is identified.  The urinary bladder is nondistended. Diffuse urinary bladder wall  thickening could be accentuated by under distention. Status post  hysterectomy. The adnexa are not definitively identified.     Small periumbilical hernia containing a short knuckle of nondistended  transverse colon. Mild colorectal stool. No bowel obstruction or  significant bowel wall thickening. The appendix is normal.     Nonspecific small volume free fluid in the pelvis. No free  intraperitoneal air. No pathologically enlarged lymph nodes in the  abdomen or pelvis. Mild to moderate calcified atherosclerotic disease in  the abdominal aorta and its distal branches without aneurysm.     Mild rotary lumbar levoscoliosis. Multilevel lumbar spondylosis, most  severe at L5-S1. Mild to moderate bilateral hip and SI joint DJD. No  acute osseous abnormality or concerning osseous lesion.       Impression:         1. Moderate left hydronephrosis and hydroureter with left perinephric  and periureteral fat stranding and air distended calyces in the upper  pole of the left kidney, suggesting emphysematous pyelitis. Recommend  correlating with urinalysis.  2. Urinary bladder wall thickening could be accentuated by  underdistention but could also reflect a nonspecific cystitis.  3. Small periumbilical hernia containing a short knuckle of nondistended  transverse colon.     This report was finalized on 4/2/2025 9:07 AM by Corbin Mayberry MD on  Workstation: [id number]               Results for orders placed during the hospital encounter of 11/08/22    Adult Transthoracic Echo Complete W/ Cont if Necessary Per Protocol    Interpretation Summary  •  Left ventricular systolic function is normal. Left  ventricular ejection fraction appears to be 56 - 60%.  •  Left ventricular diastolic function was not assessed.  •  The right ventricular cavity is mildly dilated but normal systolic function.  •  Estimated right ventricular systolic pressure from tricuspid regurgitation is mildly elevated (35-45 mmHg).  •  No significant valvular stenosis or regurgitation noted.      No orders to display        Assessment/Plan     Active Hospital Problems    Diagnosis  POA   • **Sepsis [A41.9]  Yes   • Emphysematous pyelonephritis of left kidney [N12]  Unknown   • Hydronephrosis of left kidney [N13.30]  Unknown   • ESRD (end stage renal disease) [N18.6]  Yes   • Uncontrolled type 2 diabetes mellitus with hyperglycemia [E11.65]  Yes   • Essential hypertension [I10]  Yes   • Asthma [J45.909]  Yes   • Hyperlipidemia [E78.5]  Yes      Resolved Hospital Problems   No resolved problems to display.       Ms. Pack is a 54 y.o.  with a history of ESRD on HD, anemia of chronic disease, asthma who presents with severe left-sided pain, nausea, vomiting, found to have emphysematous pyelitis and sepsis.    Sepsis (febrile 103, tachycardic 114, WBC 18) related to emphysematous pyelitis  Left sided hydronephrosis  Lactic acidosis  - urology consulted, patient going for urgent cystoscopy, left stent placement  - continue rocephin; follow up urine and blood cx  - fluid bolus per renal    ESRD on HD  Anemia of chronic disease  - renal consulted; d/w Dr. Connolly at bedside  - pt did miss HD yesterday due to feeling poorly  - torsemide held due to sepsis/normal pressures; defer to renal when to restart  - dialysis ordered per renal  - midodrine, lanthanum continued from home     HTN  - torsemide and coreg held given sepsis/normal BP  - will resume as able    Type 2 diabetes mellitus  - no home DM meds on med rec; BG are >200  - start SSI and monitor, repat A1c, last one from 2024    Asthma  - prn nebs (home regimen)    I discussed the patient's  findings and my recommendations with patient, nursing staff, and consulting provider.    VTE Prophylaxis - SCDs.  Code Status - Full code.       Génesis Miles MD  [facility]  04/02/25  16:13 EDT

## 2025-04-02 NOTE — CASE MANAGEMENT/SOCIAL WORK
Discharge Planning Assessment  [facility]     Patient Name: Kelly Pack  MRN: [id number]  Today's Date: 4/2/2025    Admit Date: 4/2/2025    Plan: Plan home.  CLARENCE Tucker RN   Discharge Needs Assessment       Row Name 04/02/25 1338       Living Environment    People in Home alone    Current Living Arrangements apartment    Potentially Unsafe Housing Conditions none    In the past 12 months has the electric, gas, oil, or water company threatened to shut off services in your home? No    Primary Care Provided by self    Provides Primary Care For no one    Family Caregiver if Needed friend(s)    Family Caregiver Names Friend  ( Chantal Carroll)    Quality of Family Relationships involved;supportive    Able to Return to Prior Arrangements yes    Living Arrangement Comments Pt lives alone in a first floor apartment.       Resource/Environmental Concerns    Resource/Environmental Concerns none    Transportation Concerns none       Transportation Needs    In the past 12 months, has lack of transportation kept you from medical appointments or from getting medications? no    In the past 12 months, has lack of transportation kept you from meetings, work, or from getting things needed for daily living? No       Food Insecurity    Within the past 12 months, you worried that your food would run out before you got the money to buy more. Never true    Within the past 12 months, the food you bought just didn't last and you didn't have money to get more. Never true       Transition Planning    Patient/Family Anticipates Transition to home    Patient/Family Anticipated Services at Transition none    Transportation Anticipated family or friend will provide       Discharge Needs Assessment    Readmission Within the Last 30 Days no previous admission in last 30 days    Equipment Currently Used at Home rollator    Concerns to be Addressed no discharge needs identified;denies needs/concerns at this time    Anticipated Changes Related  to Illness none    Equipment Needed After Discharge rollator                   Discharge Plan       Row Name 04/02/25 1340       Plan    Plan Plan home.  CLRAENCE Tucker RN    Patient/Family in Agreement with Plan yes    Plan Comments FACE SHEET VERIFIED/ IM LETTER SIGNED.  Spoke with pt at bedside. Pt's PCP is YOSSI Fernandez. Pt lives alone in a first floor apartment.  Pt is independent with ADLs.   Pt has a rollator for home use.  Pt gets her prescriptions at [facility].  Pt denies any issues affording medications. Pt is not current with HH. Pt has not been in SNF. [facility]  Pt denies any discharge needs. Plan home.  CLARENCE Tucker RN                  Selected Continued Care - Episodes Includes continued care and service providers with selected services from the active episodes listed below             Demographic Summary       Row Name 04/02/25 1338       General Information    Admission Type inpatient    Arrived From emergency department    Required Notices Provided Important Message from Medicare    Referral Source admission list    Reason for Consult discharge planning    Preferred Language English                   Functional Status       Row Name 04/02/25 1338       Functional Status    Usual Activity Tolerance moderate    Current Activity Tolerance moderate       Functional Status, IADL    Medications independent    Meal Preparation independent    Housekeeping independent    Laundry independent    Shopping independent       Mental Status    General Appearance WDL WDL                   Psychosocial    No documentation.                  Abuse/Neglect    No documentation.                  Legal    No documentation.                  Substance Abuse    No documentation.                  Patient Forms    No documentation.                     Cecy Tucker, RN

## 2025-04-02 NOTE — ED PROVIDER NOTES
EMERGENCY DEPARTMENT MD ATTESTATION NOTE    Room Number:  E664/1  PCP: Kim Benjamin APRN  Independent Historians: Patient    HPI:  A complete HPI/ROS/PMH/PSH/SH/FH are unobtainable due to: None    Chronic or social conditions impacting patient care (Social Determinants of Health): None      Context: Kelly Pack is a 54 y.o. female with a medical history of stage renal disease on dialysis, anemia, hypertension, type 2 diabetes who presents to the ED c/o acute abdominal pain.  The patient reports that since Sunday she has had left-sided abdominal pain.  She reports she felt generally weak.  She reports she last went to dialysis on Saturday.  She did not go yesterday because she does not feel well.  She reports she had a UTI a few weeks ago.  She denies any urinary symptoms.  She denies any stool changes.  She reports she makes urine.        Review of prior external notes (non-ED) -and- Review of prior external test results outside of this encounter:  Laboratory evaluation 8/31/2024 shows a BUN of 24    Prescription drug monitoring program review:           PHYSICAL EXAM    I have reviewed the triage vital signs and nursing notes.    ED Triage Vitals   Temp Heart Rate Resp BP SpO2   04/02/25 0726 04/02/25 0726 04/02/25 0726 04/02/25 0734 04/02/25 0726   97.5 °F (36.4 °C) 97 18 169/81 97 %      Temp src Heart Rate Source Patient Position BP Location FiO2 (%)   04/02/25 0726 -- 04/02/25 0734 04/02/25 0734 --   Tympanic  Lying Left arm        Physical Exam  GENERAL: Awake, alert, no acute distress  SKIN: Warm, dry  HENT: Normocephalic, atraumatic  EYES: no scleral icterus  CV: regular rhythm, regular rate  RESPIRATORY: normal effort, lungs clear  ABDOMEN: soft, moderate tenderness in the left mid abdomen, nondistended  MUSCULOSKELETAL: no deformity  NEURO: alert, moves all extremities, follows commands            MEDICATIONS GIVEN IN ER  Medications   sodium chloride 0.9 % flush 10 mL (10 mL Intravenous Given  4/2/25 1334)   sodium chloride 0.9 % flush 10 mL (has no administration in time range)   sodium chloride 0.9 % infusion 40 mL (has no administration in time range)   ondansetron ODT (ZOFRAN-ODT) disintegrating tablet 4 mg (has no administration in time range)     Or   ondansetron (ZOFRAN) injection 4 mg (has no administration in time range)   melatonin tablet 5 mg (has no administration in time range)   nitroglycerin (NITROSTAT) SL tablet 0.4 mg (has no administration in time range)   acetaminophen (TYLENOL) tablet 650 mg (650 mg Oral Given 4/2/25 1320)     Or   acetaminophen (TYLENOL) 160 MG/5ML oral solution 650 mg ( Oral Not Given:  See Alt 4/2/25 1320)     Or   acetaminophen (TYLENOL) suppository 650 mg ( Rectal Not Given:  See Alt 4/2/25 1320)   sennosides-docusate (PERICOLACE) 8.6-50 MG per tablet 2 tablet (has no administration in time range)     And   polyethylene glycol (MIRALAX) packet 17 g (has no administration in time range)     And   bisacodyl (DULCOLAX) EC tablet 5 mg (has no administration in time range)     And   bisacodyl (DULCOLAX) suppository 10 mg (has no administration in time range)   HYDROmorphone (DILAUDID) injection 0.5 mg (0.5 mg Intravenous Given 4/2/25 1321)   oxyCODONE-acetaminophen (PERCOCET) 5-325 MG per tablet 1 tablet (has no administration in time range)   HYDROmorphone (DILAUDID) injection 0.5 mg (0.5 mg Intravenous Given 4/2/25 0808)   ondansetron (ZOFRAN) injection 4 mg (4 mg Intravenous Given 4/2/25 0808)   lactated ringers bolus 500 mL (0 mL Intravenous Stopped 4/2/25 1125)   cefTRIAXone (ROCEPHIN) 2,000 mg in sodium chloride 0.9 % 100 mL MBP (2,000 mg Intravenous New Bag 4/2/25 1127)   HYDROmorphone (DILAUDID) injection 0.5 mg (0.5 mg Intravenous Given 4/2/25 1013)   acetaminophen (TYLENOL) tablet 1,000 mg (1,000 mg Oral Given 4/2/25 1013)         ORDERS PLACED DURING THIS VISIT:  Orders Placed This Encounter   Procedures    Respiratory Panel PCR w/COVID-19(SARS-CoV-2)  CHRISTINA/GARFIELD/MARCELO/PAD/COR/AMY In-House, NP Swab in UTM/VTM, 2 HR TAT - Swab, Nasopharynx    Gastrointestinal Panel, PCR - Stool, Per Rectum    Clostridioides difficile Toxin - Stool, Per Rectum    Clostridioides difficile Toxin, PCR - Stool, Per Rectum    Blood Culture - Blood,    Blood Culture - Blood,    Urine Culture - Urine,    CT Abdomen Pelvis Without Contrast    FL Retrograde Pyelogram In OR    Comprehensive Metabolic Panel    Lipase    CBC Auto Differential    Lactic Acid, Plasma    Urinalysis With Culture If Indicated - Urine, Clean Catch    Blood Gas, Venous -    Urinalysis, Microscopic Only - Urine, Clean Catch    Blood Gas, Venous -    STAT Lactic Acid, Reflex    Beta Hydroxybutyrate Quantitative    Basic Metabolic Panel    Phosphorus    Magnesium    NPO Diet NPO Type: Sips with Meds    Vital Signs    Intake & Output    Weigh Patient    Oral Care    Place Sequential Compression Device    Maintain Sequential Compression Device    Maintain IV Access    Telemetry - Place Orders & Notify Provider of Results When Patient Experiences Acute Chest Pain, Dysrhythmia or Respiratory Distress    May Be Off Telemetry for Tests    Code Status and Medical Interventions: CPR (Attempt to Resuscitate); Full Support    Nephrology (on -call MD unless specified)    LHA (on-call MD unless specified) Details    Urology (on-call MD unless specified)    Patient Isolation Contact Spore    Insert Peripheral IV    Hemodialysis Inpatient    Inpatient Admission    CBC & Differential    Ketone Bodies, Serum (Not performed at [place])    CBC & Differential         PROCEDURES  Procedures            PROGRESS, DATA ANALYSIS, CONSULTS, AND MEDICAL DECISION MAKING  All labs have been independently interpreted by me.  All radiology studies have been reviewed by me. All EKG's have been independently viewed and interpreted by me.  Discussion below represents my analysis of pertinent findings related to patient's condition, differential diagnosis,  treatment plan and final disposition.    Differential diagnosis includes but is not limited to UTI, pyelonephritis, colitis, diverticulitis, kidney stone.    Clinical Scores:                                     ED Course as of 04/02/25 1425   Wed Apr 02, 2025   0814 WBC(!): 18.66 [TR]   0828 BUN(!): 79 [TR]   0828 Potassium: 4.7 [TR]   0828 CT Abdomen Pelvis Without Contrast  My independent interpretation of the imaging study is left hydronephrosis [TR]   0946 Reassessed the patient, counseled her on her lab and imaging results thus far.  She is going to give us a urine specimen now.  CT findings suggest pyelonephritis, she does have a leukocytosis of 18.6.  Ordered an empiric dose of Rocephin 2 g.  Lactic and blood cultures ordered.  I rechecked her temperature which was 101.3.  I ordered a dose of Tylenol. [KA]   1109 pH, Venous: 7.362 [KA]   1128 Lactate(!!): 2.6 [KA]   1153 I discussed patient with REDD Barrientos.  We discussed patient's history presentation workup and she agrees to admit.  I do have a call out to nephrology still, she also requests a call to urology due to findings of hydronephrosis so I have placed that as well. [KA]   1202 I discussed patient with YOSSI Tapia for urology including history presentation and imaging.  They will consult. [KA]   1230 Recommended 30 mL/kg bolus not received because it would be harmful or detrimental to the patient.  Reason: Renal Failure.   Ordered 500 mL of IV Fluid as bolus.   [KA]      ED Course User Index  [KA] Lillian Winslow PA-C  [TR] Boby Peralta MD       MDM: The patient appears uncomfortable.  Plan laboratory evaluation as well as CT imaging.  Will provide medication for pain and nausea.      COMPLEXITY OF CARE  The patient requires admission.    Please note that portions of this document were completed with a voice recognition program.    Note Disclaimer: At [facility], we believe that sharing information builds trust and better  relationships. You are receiving this note because you recently visited [facility]. It is possible you will see health information before a provider has talked with you about it. This kind of information can be easy to misunderstand. To help you fully understand what it means for your health, we urge you to discuss this note with your provider.         Boby Peralta MD  04/02/25 8931

## 2025-04-02 NOTE — PROGRESS NOTES
Clinical Pharmacy Services: Medication History    Kelly Pack is a 54 y.o. female presenting to [facility] for   Chief Complaint   Patient presents with    Abdominal Pain       She  has a past medical history of Albuminuria, Allergic, Allergic rhinitis, Asthma, Bell's palsy, Cataract, Cholelithiasis, CKD (chronic kidney disease), Diabetes mellitus, Drug therapy, Endometrial cancer, HL (hearing loss), Hyperlipidemia, Hypertension, Low back pain, Migraine, Obesity, Peripheral neuropathy, Renal insufficiency, Right otitis media, Type II diabetes mellitus, Visual impairment, and Vitamin D deficiency.    Allergies as of 04/02/2025 - Reviewed 04/02/2025   Allergen Reaction Noted    Tomato Swelling 11/09/2022    Cat dander Itching 03/10/2023    Mixed grasses Itching 03/10/2023    Latex Itching 04/02/2025    Penicillin g Unknown - High Severity 04/02/2025       Medication information was obtained from: Patient   Pharmacy and Phone Number:     Prior to Admission Medications       Prescriptions Last Dose Informant Patient Reported? Taking?    albuterol sulfate  (90 Base) MCG/ACT inhaler Past Week Self No Yes    INHALE 2 PUFFS BY MOUTH EVERY 6 HOURS AS NEEDED FOR WHEEZING FOR SHORTNESS OF BREATH    Patient taking differently:  Inhale 2 puffs Every 6 (Six) Hours As Needed.    benzonatate (TESSALON) 100 MG capsule Past Week Self Yes Yes    Take 1-2 capsules by mouth 3 (Three) Times a Day As Needed.    carvedilol (COREG) 25 MG tablet Past Week Self No Yes    Take 1 tablet by mouth 2 (Two) Times a Day With Meals.    Patient taking differently:  Take  by mouth See Admin Instructions. Takes 1 tablet 2 times daily on Monday, Wednesday and Fridays   Takes 1 tablet every evening on Tuesdays, Thursdays, and Saturdays    cyclobenzaprine (FLEXERIL) 10 MG tablet Past Week Self No Yes    Take 1 tablet by mouth At Night As Needed for Muscle Spasms.    fluticasone (FLONASE) 50 MCG/ACT nasal spray Past Week Self No Yes     2 sprays by Each Nare route Daily.    lanthanum (FOSRENOL) 1000 MG chewable tablet Past Week Self Yes Yes    Chew 2 tablets 2 (Two) Times a Day With Meals.    Magnesium Glycinate 120 MG capsule Past Week Self Yes Yes    Take 2 capsules by mouth At Night As Needed.    midodrine (PROAMATINE) 2.5 MG tablet Past Week Pharmacy Yes Yes    Take 1 tablet by mouth 3 (Three) Times a Week. Tuesdays, Thursdays and Saturdays    simvastatin (ZOCOR) 20 MG tablet Past Week Self No Yes    Take 1 tablet by mouth Daily.    torsemide (DEMADEX) 100 MG tablet Past Week Self, Pharmacy No Yes    Take 1 tablet by mouth Daily for 30 days.    Patient taking differently:  Take 1 tablet by mouth Every Night.    vitamin B-12 (VITAMIN B-12) 1000 MCG tablet Past Week Self No Yes    Take 1 tablet by mouth Daily.    vitamin D (ERGOCALCIFEROL) 1.25 MG (20973 UT) capsule capsule Past Week Self Yes Yes    Take 1 capsule by mouth 2 (Two) Times a Week. Mondays and Thursdays    gabapentin (NEURONTIN) 100 MG capsule More than a month Self No No    Take 1 capsule by mouth 3 (Three) Times a Day for 30 days.    Patient taking differently:  Take 3 capsules by mouth At Night As Needed.              Medication notes:     This medication list is complete to the best of my knowledge as of 4/2/2025    Please call if questions.    Gil Barbosa  Medication History Technician  [phone]    4/2/2025 11:06 EDT

## 2025-04-02 NOTE — ANESTHESIA PREPROCEDURE EVALUATION
Anesthesia Evaluation     Patient summary reviewed and Nursing notes reviewed   NPO Solid Status: > 8 hours  NPO Liquid Status: > 4 hours           Airway   Mallampati: II  Neck ROM: full  No difficulty expected  Dental - normal exam     Pulmonary     breath sounds clear to auscultation  (+) asthma,  Cardiovascular     Rhythm: regular    (+) hypertension, hyperlipidemia      Neuro/Psych  (+) headaches, numbness    ROS Comment: [place] palsey  GI/Hepatic/Renal/Endo    (+) obesity, renal disease- dialysis, diabetes mellitus    Musculoskeletal     Abdominal   (+) obese   Substance History      OB/GYN          Other      history of cancer                Anesthesia Plan    ASA 3     general     intravenous induction     Anesthetic plan, risks, benefits, and alternatives have been provided, discussed and informed consent has been obtained with: patient.    CODE STATUS:    Code Status (Patient has no pulse and is not breathing): CPR (Attempt to Resuscitate)  Medical Interventions (Patient has pulse or is breathing): Full Support

## 2025-04-02 NOTE — CASE MANAGEMENT/SOCIAL WORK
Continued Stay Note  [facility]     Patient Name: Kelly Pack  MRN: [id number]  Today's Date: 4/2/2025    Admit Date: 4/2/2025    Plan: Plan home.  CLARENCE Tucker RN   Discharge Plan       Row Name 04/02/25 6696       Plan    Plan Plan home.  CLARENCE Tucker RN    Patient/Family in Agreement with Plan yes    Plan Comments FACE SHEET VERIFIED/ IM LETTER SIGNED.  Spoke with pt at bedside. Pt's PCP is YOSSI Fernandez. Pt lives alone in a first floor apartment.  Pt is independent with ADLs.   Pt has a rollator for home use.  Pt gets her prescriptions at [facility].  Pt denies any issues affording medications. Pt is not current with HH. Pt has not been in SNF. Pt is a HD pt at [facility].  Pt denies any discharge needs. Plan home.  CLARENCE Tucker RN                   Discharge Codes    No documentation.                       Cecy Tucker RN

## 2025-04-02 NOTE — ANESTHESIA PROCEDURE NOTES
Airway  Date/Time: 4/2/2025 4:59 PM    General Information and Staff    Anesthesiologist: Sveta Gray MD  CRNA/CAA: Roseann Ortiz CRNA    Indications and Patient Condition  Indications for airway management: airway protection    Preoxygenated: yes  MILS maintained throughout    Mask difficulty assessment: 1 - vent by mask    Final Airway Details    Final airway type: endotracheal airway      Successful airway: ETT  Cuffed: yes   Successful intubation technique: direct laryngoscopy  Adjuncts used in placement: Bougie  Endotracheal tube insertion site: oral  Blade: Jose  Blade size: 3  ETT size (mm): 7.5  Cormack-Lehane Classification: grade I - full view of glottis  Placement verified by: chest auscultation and capnometry   Measured from: lips  ETT/EBT  to lips (cm): 22  Number of attempts at approach: 2  Assessment: lips, teeth, and gum same as pre-op and atraumatic intubation    Additional Comments  E Angel LYLES attempted intubation. Atraumatic. Unsuccessful. Dr. Gray intubated with bougie

## 2025-04-02 NOTE — CONSULTS
"       FIRST UROLOGY CONSULT      Patient Identification:  NAME:  Kelly Pack  Age:  54 y.o.   Sex:  female   :  1970   MRN:  [id number]     Chief complaint: Abdominal pain, nausea and vomiting    History of present illness:      Pt is a 54 y.o. female with a history of ESRD on hemodialysis that presented to the ER on 25 with complaints of acute left sided abdominal pain with associated nausea and vomiting.     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  Patient presents with a 3 day history of left abdominal pain, nausea, vomiting and subjective fever.  Symptoms worsened over the last few days prompting patient to seek medical care. She was scheduled for dialysis yesterday but reports she was took weak.  Patient reports history of recent UTI x 3 weeks ago for which she was treated with antibiotics. Patient does not recall if a urine culture was sent but reports that PCP told her that her urine was \"nitrite negative indicating no infection.\" Patient still makes urine despite being on HD. She reports that she has noticed decreased UOP since becoming ill as she has not been able to eat or drink.     In hospital:  -Temp 103 on admission, tachycardic, BP stable  -WBC - 18.66  -Lactate - 2.6  -Creat - 11.69 - HD patient   -UA - Large LE, negative nitrites, moderate blood, TNTC WBC, trace bacteria, 3-6 SE cells  -UCx - In process  -Blood culture - In process    -CT - IMPRESSION:     1. Moderate left hydronephrosis and hydroureter with left perinephric  and periureteral fat stranding and air distended calyces in the upper  pole of the left kidney, suggesting emphysematous pyelitis. Recommend  correlating with urinalysis.  2. Urinary bladder wall thickening could be accentuated by  underdistention but could also reflect a nonspecific cystitis.    Asked to see    Past medical history:  Past Medical History:   Diagnosis Date    Albuminuria     Allergic     " Seasonal, grass, cats and some dogs    Allergic rhinitis     Asthma     allergy triggered    Bell's palsy     Cataract     Had surgery on both eyes    Cholelithiasis     Removed. Have bile reflux/ vomiting    CKD (chronic kidney disease)     Diabetes mellitus     Drug therapy     Endometrial cancer     HL (hearing loss)     In L ear    Hyperlipidemia     Hypertension     Low back pain     Migraine     Obesity     Peripheral neuropathy     Renal insufficiency     Right otitis media     Type II diabetes mellitus     Visual impairment     Diabetic retinopathy. Oil in L eye due to retina detachment s.    Vitamin D deficiency        Past surgical history:  Past Surgical History:   Procedure Laterality Date    ARTERIOVENOUS FISTULA  05/2023    CATARACT EXTRACTION      CHOLECYSTECTOMY      EYE SURGERY      NOV 2020    HYSTERECTOMY      due to cancer    INSERTION HEMODIALYSIS CATHETER Right 06/23/2023    Procedure: HEMODIALYSIS CATHETER INSERTION;  Surgeon: Mikael Mackay MD;  [place] [facility] [place];  Service: Vascular;  Laterality: Right;    OOPHORECTOMY      VITRECTOMY PARS PLANA Left 01/28/2020    Procedure: 25G VITRECTOMY-ENDO LASER;  Surgeon: Lazarus, Howard S., MD;  Location: [facility] MAIN OR;  Service: Ophthalmology       Allergies:  Tomato, Cat dander, Mixed grasses, Latex, and Penicillin g    Home medications:  (Not in a hospital admission)       Hospital medications:    No current facility-administered medications for this encounter.        Family history:  Family History   Problem Relation Age of Onset    Breast cancer Mother     Cancer Mother         Breast cancer, back in the 90s    Alzheimer's disease Father     Dementia Father     Diabetes Father     Hyperlipidemia Father     Hypertension Father     Hearing loss Father     Mental illness Father         Alzheimer’s    Asthma Maternal Grandfather        Social history:  Social History     Tobacco Use    Smoking status: Never    Smokeless tobacco: Never    Vaping Use    Vaping status: Never Used   Substance Use Topics    Alcohol use: Not Currently     Comment: 3-4 drinks PER YEAR!    Drug use: No       Review of systems:      12 point negative except as in HPI    Objective:  TMax 24 hours:   Temp (24hrs), Av.3 °F (37.9 °C), Min:97.5 °F (36.4 °C), Max:103 °F (39.4 °C)      Vitals Ranges:   Temp:  [97.5 °F (36.4 °C)-103 °F (39.4 °C)] 103 °F (39.4 °C)  Heart Rate:  [] 109  Resp:  [18] 18  BP: (115-169)/(68-87) 115/68    Intake/Output Last 3 shifts:  No intake/output data recorded.     Physical Exam:    General Appearance:    Alert, ill appearing, rigors   Back:     Left CVA tenderness   Abdomen:     Soft, ND, diffuse tenderness, left > right    :    Pelvic not performed   Neuro/Psych:   Orientation intact, mood/affect pleasant       Results review:   I reviewed the patient's new clinical results.    Data review:  Lab Results (last 24 hours)       Procedure Component Value Units Date/Time    Ketone Bodies, Serum (Not performed at [facility]) [[id number]] Collected: 25 0750    Specimen: Blood Updated: 25 114    Narrative:      The following orders were created for panel order Ketone Bodies, Serum (Not performed at [facility]).  Procedure                               Abnormality         Status                     ---------                               -----------         ------                     Beta Hydroxybutyrate Nilay...[[id number]]                      In process                   Please view results for these tests on the individual orders.    Beta Hydroxybutyrate Quantitative [[id number]] Collected: 25 0750    Specimen: Blood Updated: 25 1141    Blood Culture - Blood, Arm, Left [[id number]] Collected: 25 1113    Specimen: Blood from Arm, Left Updated: 25 1124    Lactic Acid, Plasma [[id number]]  (Abnormal) Collected: 25 1048    Specimen: Blood Updated: 25 1122     Lactate 2.6 mmol/L     Blood Gas, Venous -  [[id number]]  (Abnormal) Collected: 04/02/25 1052    Specimen: Venous Blood Updated: 04/02/25 1057     Site Left Brachial     pH, Venous 7.362 pH Units      pCO2, Venous 41.1 mm Hg      pO2, Venous 26.2 mm Hg      HCO3, Venous 23.3 mmol/L      Base Excess, Venous -2.0 mmol/L      Comment: Serial Number: [id number]:  [id number]        O2 Saturation, Venous 46.0 %      Barometric Pressure for Blood Gas 743.3000 mmHg      Modality Room Air     FIO2 21 %      Rate 18 Breaths/minute      Device Comment drawn by [id number]. drawn at 1048    Blood Culture - Blood, Hand, Left [[id number]] Collected: 04/02/25 1043    Specimen: Blood from Hand, Left Updated: 04/02/25 1056    Urinalysis, Microscopic Only - Urine, Clean Catch [[id number]]  (Abnormal) Collected: 04/02/25 1017    Specimen: Urine, Clean Catch Updated: 04/02/25 1046     RBC, UA None Seen /HPF      WBC, UA Too Numerous to Count /HPF      Bacteria, UA Trace /HPF      Squamous Epithelial Cells, UA 3-6 /HPF      Hyaline Casts, UA None Seen /LPF      Methodology Automated Microscopy    Narrative:      Unspun microscopic    Urine Culture - Urine, Urine, Clean Catch [[id number]] Collected: 04/02/25 1017    Specimen: Urine, Clean Catch Updated: 04/02/25 1046    Urinalysis With Culture If Indicated - Urine, Clean Catch [[id number]]  (Abnormal) Collected: 04/02/25 1017    Specimen: Urine, Clean Catch Updated: 04/02/25 1042     Color, UA Yellow     Appearance, UA Cloudy     pH, UA 5.5     Specific Gravity, UA 1.025     Glucose,  mg/dL (Trace)     Ketones, UA Trace     Bilirubin, UA Negative     Blood, UA Moderate (2+)     Protein, UA >=300 mg/dL (3+)     Leuk Esterase, UA Large (3+)     Nitrite, UA Negative     Urobilinogen, UA 0.2 E.U./dL    Narrative:      In absence of clinical symptoms, the presence of pyuria, bacteria, and/or nitrites on the urinalysis result does not correlate with infection.    Respiratory Panel PCR w/COVID-19(SARS-CoV-2) [facility] In-House,  NP Swab in UTM/VTM, 2 HR TAT - Swab, Nasopharynx [[id number]]  (Normal) Collected: 04/02/25 0756    Specimen: Swab from Nasopharynx Updated: 04/02/25 0858     ADENOVIRUS, PCR Not Detected     Coronavirus 229E Not Detected     Coronavirus HKU1 Not Detected     Coronavirus NL63 Not Detected     Coronavirus OC43 Not Detected     COVID19 Not Detected     Human Metapneumovirus Not Detected     Human Rhinovirus/Enterovirus Not Detected     Influenza A PCR Not Detected     Influenza B PCR Not Detected     Parainfluenza Virus 1 Not Detected     Parainfluenza Virus 2 Not Detected     Parainfluenza Virus 3 Not Detected     Parainfluenza Virus 4 Not Detected     RSV, PCR Not Detected     Bordetella pertussis pcr Not Detected     Bordetella parapertussis PCR Not Detected     Chlamydophila pneumoniae PCR Not Detected     Mycoplasma pneumo by PCR Not Detected    Narrative:      In the setting of a positive respiratory panel with a viral infection PLUS a negative procalcitonin without other underlying concern for bacterial infection, consider observing off antibiotics or discontinuation of antibiotics and continue supportive care. If the respiratory panel is positive for atypical bacterial infection (Bordetella pertussis, Chlamydophila pneumoniae, or Mycoplasma pneumoniae), consider antibiotic de-escalation to target atypical bacterial infection.    Lipase [[id number]]  (Normal) Collected: 04/02/25 0750    Specimen: Blood Updated: 04/02/25 0827     Lipase 22 U/L     Comprehensive Metabolic Panel [[id number]]  (Abnormal) Collected: 04/02/25 0750    Specimen: Blood Updated: 04/02/25 0827     Glucose 244 mg/dL      BUN 79 mg/dL      Creatinine 11.69 mg/dL      Sodium 132 mmol/L      Potassium 4.7 mmol/L      Comment: Slight hemolysis detected by analyzer. Result may be falsely elevated.        Chloride 92 mmol/L      CO2 19.5 mmol/L      Calcium 8.2 mg/dL      Total Protein 7.2 g/dL      Albumin 2.9 g/dL      ALT (SGPT) 6 U/L      AST  (SGOT) 11 U/L      Alkaline Phosphatase 160 U/L      Total Bilirubin 0.3 mg/dL      Globulin 4.3 gm/dL      A/G Ratio 0.7 g/dL      BUN/Creatinine Ratio 6.8     Anion Gap 20.5 mmol/L      eGFR 3.6 mL/min/1.73     Narrative:      GFR Categories in Chronic Kidney Disease (CKD)      GFR Category          GFR (mL/min/1.73)    Interpretation  G1                     90 or greater         Normal or high (1)  G2                      60-89                Mild decrease (1)  G3a                   45-59                Mild to moderate decrease  G3b                   30-44                Moderate to severe decrease  G4                    15-29                Severe decrease  G5                    14 or less           Kidney failure          (1)In the absence of evidence of kidney disease, neither GFR category G1 or G2 fulfill the criteria for CKD.    eGFR calculation 2021 CKD-EPI creatinine equation, which does not include race as a factor    CBC & Differential [[id number]]  (Abnormal) Collected: 04/02/25 0750    Specimen: Blood Updated: 04/02/25 0806    Narrative:      The following orders were created for panel order CBC & Differential.  Procedure                               Abnormality         Status                     ---------                               -----------         ------                     CBC Auto Differential[[id number]]        Abnormal            Final result                 Please view results for these tests on the individual orders.    CBC Auto Differential [[id number]]  (Abnormal) Collected: 04/02/25 0750    Specimen: Blood Updated: 04/02/25 0806     WBC 18.66 10*3/mm3      RBC 3.23 10*6/mm3      Hemoglobin 10.4 g/dL      Hematocrit 31.4 %      MCV 97.2 fL      MCH 32.2 pg      MCHC 33.1 g/dL      RDW 13.4 %      RDW-SD 47.1 fl      MPV 10.3 fL      Platelets 244 10*3/mm3      Neutrophil % 89.6 %      Lymphocyte % 2.7 %      Monocyte % 5.8 %      Eosinophil % 0.8 %      Basophil % 0.3 %      Immature  Grans % 0.8 %      Neutrophils, Absolute 16.72 10*3/mm3      Lymphocytes, Absolute 0.51 10*3/mm3      Monocytes, Absolute 1.08 10*3/mm3      Eosinophils, Absolute 0.15 10*3/mm3      Basophils, Absolute 0.05 10*3/mm3      Immature Grans, Absolute 0.15 10*3/mm3      nRBC 0.0 /100 WBC              Imaging:  Imaging Results (Last 24 Hours)       Procedure Component Value Units Date/Time    CT Abdomen Pelvis Without Contrast [[id number]] Collected: 04/02/25 0858     Updated: 04/02/25 0910    Narrative:      CT ABDOMEN PELVIS WO CONTRAST-     DATE OF EXAM: 4/2/2025 8:22 AM     INDICATION: left abdominal pain, n/v/d x 3 days.     COMPARISON: CT guided renal biopsy 11/14/2022. Radiographs of the sacrum  and coccyx 6/24/2023.     TECHNIQUE: Multiple contiguous axial images were acquired through the  abdomen and pelvis without the intravenous administration of contrast.  Reformatted coronal and sagittal sequences were also reviewed. Radiation  dose reduction techniques were utilized, including automated exposure  control and exposure modulation based on body size.     FINDINGS:  The included lung bases are clear. Partially imaged calcified coronary  artery disease and trace pericardial fluid. Relative hyperdensity of the  interventricular septum suggest anemia.     Status post cholecystectomy. The liver, spleen, pancreas, and adrenal  glands are unremarkable. Moderate left hydronephrosis and hydroureter  with left perinephric and periureteral fat stranding and air distended  calyces in the upper pole of the left kidney, suggesting emphysematous  pyelitis. Bilateral renal calcifications are likely vascular and  nonobstructing. No obstructing renal or ureteral stone is identified.  The urinary bladder is nondistended. Diffuse urinary bladder wall  thickening could be accentuated by under distention. Status post  hysterectomy. The adnexa are not definitively identified.     Small periumbilical hernia containing a short knuckle of  nondistended  transverse colon. Mild colorectal stool. No bowel obstruction or  significant bowel wall thickening. The appendix is normal.     Nonspecific small volume free fluid in the pelvis. No free  intraperitoneal air. No pathologically enlarged lymph nodes in the  abdomen or pelvis. Mild to moderate calcified atherosclerotic disease in  the abdominal aorta and its distal branches without aneurysm.     Mild rotary lumbar levoscoliosis. Multilevel lumbar spondylosis, most  severe at L5-S1. Mild to moderate bilateral hip and SI joint DJD. No  acute osseous abnormality or concerning osseous lesion.       Impression:         1. Moderate left hydronephrosis and hydroureter with left perinephric  and periureteral fat stranding and air distended calyces in the upper  pole of the left kidney, suggesting emphysematous pyelitis. Recommend  correlating with urinalysis.  2. Urinary bladder wall thickening could be accentuated by  underdistention but could also reflect a nonspecific cystitis.  3. Small periumbilical hernia containing a short knuckle of nondistended  transverse colon.     This report was finalized on 4/2/2025 9:07 AM by Corbin Mayberry MD on  Workstation: [id number]                Assessment:     Emphysematous pyelonephritis  Hydronephrosis, left  Sepsis    Plan:   - Keep NPO   - Consent  - Add on for urgent cystoscopy, left stent placement. R/B/O discussed and patient agrees to surgical intervention.   - Continue antibiotic therapy.  Adjust antibiotics based on urine and blood cultures.  - Urology following    Joanne Tsang, YOSSI  04/02/25      Plan discussion: The pertinent medical findings were discussed in detail with , who actively participated in the review of available data, the direction of additional testing, and formulation of the care plan. This was discussed with the patient in detail.  The patient's questions were addressed, and they expressed understanding of the proposed management.

## 2025-04-02 NOTE — OP NOTE
CYSTOSCOPY RETROGRADE PYELOGRAM AND STENT INSERTION  Procedure Note    Kelly Pack  4/2/2025    Pre-op Diagnosis:   Emphysematous pyelonephritis of left kidney [N12]  Hydronephrosis of left kidney [N13.30]  Sepsis, due to unspecified organism, unspecified whether acute organ dysfunction present [A41.9]    Post-op Diagnosis:     Post-Op Diagnosis Codes:     * Emphysematous pyelonephritis of left kidney [N12]     * Hydronephrosis of left kidney [N13.30]     * Sepsis, due to unspecified organism, unspecified whether acute organ dysfunction present [A41.9]    Procedure(s):  CYSTOSCOPY RETROGRADE PYELOGRAM AND STENT INSERTION    Surgeon(s):  Tl Gray MD    Anesthesia: General    Staff:   Circulator: Krystyna Morillo RN; Tiff Bates RN  Scrub Person: Ny Gonzalez; Gil Gonzalez    Estimated Blood Loss: minimal    Specimens:                Order Name Source Comment Collection Info Order Time   URINE CULTURE Urine, Catheter  Collected By: Tl Gray MD 4/2/2025  5:11 PM     Release to patient   Routine Release              Drains: * No LDAs found *    Findings: Thick and purulent urine in the left collecting system obtained for culture.  Gas and purulence noted in the left collecting system.  No definitive stone seen.    Complications: None apparent    Indications: 54-year-old female end-stage renal disease but does make urine she is dialyzed 3 times a day comes in with fevers chills left-sided flank pain.  She has what appears to be emphysematous pyelonephritis on the left side with air in the collecting system and hydronephrosis.  She now presents for urgent stent placement.  No obvious stone seen.    Procedure: Was taken the operative suite after form sent was obtained.  IV antibiotics had already been given in the emergency room.  She was given general anesthesia.  Placed lithotomy.  Prepped and draped in a sterile fashion.  Surgical timeout was performed.  The cystoscope was  inserted.  Her bladder was unremarkable.  No acute or chronic cystitis was seen.  Bilateral retrograde pyelograms were performed with an open-ended ureteral catheter and half-strength contrast.  The right collecting system was normal with a normal collecting system and with no hydronephrosis or filling defects.  It drained well.  No caliectasis.  The left collecting system showed a normal caliber ureter in all 3 segments and then a very dilated pelvis with air seen in the pelvis and the upper tracts of the superior pole left kidney.  The Pollick catheter was passed up and thickened urine and pus was obtained from the left collecting system.  About 30 cc was extracted and we sent this off for culture.  The guidewire was replaced.  A 6 Dutch by 26 cm stent was placed the left collecting system.  Purulent urine could be seen draining from the stent.  She was awoken and taken to recovery stable condition.      Tl Gray MD     Date: 4/2/2025  Time: 17:20 EDT

## 2025-04-02 NOTE — CONSULTS
[facility]:    Referring Provider: Dr. Miles  Reason for Consultation: ESRD    Subjective     Chief complaint: Abdominal pain    History of present illness:  Patient is a 55 yo WF with h/o ESRD on TTS HD.  Her last HD was on Saturday.  She presents with L abdominal pain.  She states she had a recent UTI.  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  Urology has been consulted.  Temp of 102.9.   Patient has received ABX in the ER.      History  Past Medical History:   Diagnosis Date    Albuminuria     Allergic     Seasonal, grass, cats and some dogs    Allergic rhinitis     Asthma     allergy triggered    Bell's palsy     Cataract     Had surgery on both eyes    Cholelithiasis     Removed. Have bile reflux/ vomiting    CKD (chronic kidney disease)     Diabetes mellitus     Drug therapy     Endometrial cancer     HL (hearing loss)     In L ear    Hyperlipidemia     Hypertension     Low back pain     Migraine     Obesity     Peripheral neuropathy     Renal insufficiency     Right otitis media     Type II diabetes mellitus     Visual impairment     Diabetic retinopathy. Oil in L eye due to retina detachment s.    Vitamin D deficiency    ,   Past Surgical History:   Procedure Laterality Date    ARTERIOVENOUS FISTULA  05/2023    CATARACT EXTRACTION      CHOLECYSTECTOMY      EYE SURGERY      NOV 2020    HYSTERECTOMY      due to cancer    INSERTION HEMODIALYSIS CATHETER Right 06/23/2023    Procedure: HEMODIALYSIS CATHETER INSERTION;  Surgeon: Mikael Mackay MD;  Location: [facility] MAIN OR;  Service: Vascular;  Laterality: Right;    OOPHORECTOMY      VITRECTOMY PARS PLANA Left 01/28/2020    Procedure: 25G VITRECTOMY-ENDO LASER;  Surgeon: Lazarus, Howard S., MD;  Location: [facility] MAIN OR;  Service: Ophthalmology   ,   Family History   Problem Relation Age of Onset    Breast cancer Mother     Cancer Mother         Breast cancer, back in the 90s    Alzheimer's disease Father      Dementia Father     Diabetes Father     Hyperlipidemia Father     Hypertension Father     Hearing loss Father     Mental illness Father         Alzheimer’s    Asthma Maternal Grandfather    ,   Social History     Socioeconomic History    Marital status: Single   Tobacco Use    Smoking status: Never    Smokeless tobacco: Never   Vaping Use    Vaping status: Never Used   Substance and Sexual Activity    Alcohol use: Not Currently     Comment: 3-4 drinks PER YEAR!    Drug use: No    Sexual activity: Not Currently     Partners: Male     Birth control/protection: Condom     E-cigarette/Vaping    E-cigarette/Vaping Use Never User      E-cigarette/Vaping Substances    Nicotine No     THC No     CBD No     Flavoring No      E-cigarette/Vaping Devices    Disposable No     Pre-filled or Refillable Cartridge No     Refillable Tank No     Pre-filled Pod No          ,   Medications Prior to Admission   Medication Sig Dispense Refill Last Dose/Taking    albuterol sulfate  (90 Base) MCG/ACT inhaler INHALE 2 PUFFS BY MOUTH EVERY 6 HOURS AS NEEDED FOR WHEEZING FOR SHORTNESS OF BREATH (Patient taking differently: Inhale 2 puffs Every 6 (Six) Hours As Needed.) 18 g 0 Past Week    benzonatate (TESSALON) 100 MG capsule Take 1-2 capsules by mouth 3 (Three) Times a Day As Needed.   Past Week    carvedilol (COREG) 25 MG tablet Take 1 tablet by mouth 2 (Two) Times a Day With Meals. (Patient taking differently: Take  by mouth See Admin Instructions. Takes 1 tablet 2 times daily on Monday, Wednesday and Fridays   Takes 1 tablet every evening on Tuesdays, Thursdays, and Saturdays) 60 tablet 1 Past Week    cyclobenzaprine (FLEXERIL) 10 MG tablet Take 1 tablet by mouth At Night As Needed for Muscle Spasms. 30 tablet 0 Past Week    fluticasone (FLONASE) 50 MCG/ACT nasal spray 2 sprays by Each Nare route Daily. 48 g 1 Past Week    lanthanum (FOSRENOL) 1000 MG chewable tablet Chew 2 tablets 2 (Two) Times a Day With Meals.   Past Week     Magnesium Glycinate 120 MG capsule Take 2 capsules by mouth At Night As Needed.   Past Week    midodrine (PROAMATINE) 2.5 MG tablet Take 1 tablet by mouth 3 (Three) Times a Week. Tuesdays, Thursdays and Saturdays   Past Week    simvastatin (ZOCOR) 20 MG tablet Take 1 tablet by mouth Daily. 90 tablet 1 Past Week    torsemide (DEMADEX) 100 MG tablet Take 1 tablet by mouth Daily for 30 days. (Patient taking differently: Take 1 tablet by mouth Every Night.) 30 tablet 0 Past Week    vitamin B-12 (VITAMIN B-12) 1000 MCG tablet Take 1 tablet by mouth Daily. 30 tablet 0 Past Week    vitamin D (ERGOCALCIFEROL) 1.25 MG (79690 UT) capsule capsule Take 1 capsule by mouth 2 (Two) Times a Week. Mondays and Thursdays   Past Week    gabapentin (NEURONTIN) 100 MG capsule Take 1 capsule by mouth 3 (Three) Times a Day for 30 days. (Patient taking differently: Take 3 capsules by mouth At Night As Needed.) 90 capsule 0 More than a month   , Scheduled Meds:  , Continuous Infusions:  No current facility-administered medications for this encounter. , PRN Meds:  , and Allergies:  Tomato, Cat dander, Mixed grasses, Latex, and Penicillin g    Review of Systems  Pertinent items are noted in HPI    Objective     Vital Signs  Temp:  [97.5 °F (36.4 °C)-103 °F (39.4 °C)] 102.9 °F (39.4 °C)  Heart Rate:  [] 110  Resp:  [18] 18  BP: (115-169)/(67-87) 122/67    I/O this shift:  In: 500 [IV Piggyback:500]  Out: -   No intake/output data recorded.    Physical Exam:  GEN: Awake, NAD  ENT: PERRL, EOMI, MMM  NECK: Supple, no JVD  CHEST: CTAB, no W/R/C  CV: RRR, no M/G/R  ABD: Soft, +BS  SKIN: Warm and Dry  NEURO: CN's intact      Results Review:   I reviewed the patient's new clinical results.    WBC WBC   Date Value Ref Range Status   04/02/2025 18.66 (H) 3.40 - 10.80 10*3/mm3 Final      HGB Hemoglobin   Date Value Ref Range Status   04/02/2025 10.4 (L) 12.0 - 15.9 g/dL Final      HCT Hematocrit   Date Value Ref Range Status   04/02/2025 31.4 (L)  "34.0 - 46.6 % Final      Platlets No results found for: \"LABPLAT\"   MCV MCV   Date Value Ref Range Status   04/02/2025 97.2 (H) 79.0 - 97.0 fL Final          Sodium Sodium   Date Value Ref Range Status   04/02/2025 132 (L) 136 - 145 mmol/L Final      Potassium Potassium   Date Value Ref Range Status   04/02/2025 4.7 3.5 - 5.2 mmol/L Final     Comment:     Slight hemolysis detected by analyzer. Result may be falsely elevated.      Chloride Chloride   Date Value Ref Range Status   04/02/2025 92 (L) 98 - 107 mmol/L Final      CO2 CO2   Date Value Ref Range Status   04/02/2025 19.5 (L) 22.0 - 29.0 mmol/L Final      BUN BUN   Date Value Ref Range Status   04/02/2025 79 (H) 6 - 20 mg/dL Final      Creatinine Creatinine   Date Value Ref Range Status   04/02/2025 11.69 (H) 0.57 - 1.00 mg/dL Final      Calcium Calcium   Date Value Ref Range Status   04/02/2025 8.2 (L) 8.6 - 10.5 mg/dL Final      PO4 No results found for: \"CAPO4\"   Albumin Albumin   Date Value Ref Range Status   04/02/2025 2.9 (L) 3.5 - 5.2 g/dL Final      Magnesium No results found for: \"MG\"   Uric Acid No results found for: \"URICACID\"           No current facility-administered medications for this encounter.      Assessment & Plan     1) ESRD - on TTS HD, last HD was Tuesday  2) Abdominal pain  3) L hydronephrosis with emphysematous pyelitis  4) DM  5) HTN  6) Metabolic acidosis  7) Anemia of CKD    PLAN: HD today.  Will transition back to TTS schedule eventually.  Urology has been consulted.  On IV ABX.  F/U cultures.  Will follow along.      Dusty Connolly MD  [facility]  04/02/25  @NOW            "

## 2025-04-02 NOTE — ED PROVIDER NOTES
EMERGENCY DEPARTMENT ENCOUNTER  Room Number:  E664/1  PCP: Kim Benjamin APRN  Independent Historians: Patient      HPI:  Chief Complaint: had concerns including Abdominal Pain.     A complete HPI/ROS/PMH/PSH/SH/FH are unobtainable due to: None    Chronic or social conditions impacting patient care (Social Determinants of Health): None      Context: The patient is a 54 y.o. female with a medical history of ESRD on dialysis, type 2 diabetes, anemia, hypertension, hyperlipidemia, asthma who presents to the ED c/o acute left-sided abdominal pain, nausea, vomiting, diarrhea.  Symptoms started 3 days ago when she woke in the night with left-sided pain and nausea and vomiting.  Symptoms have persisted and worsened since.  Was able to tolerate only a very small amount of water yesterday, otherwise has had vomiting with all intake.  Dialysis schedule is Tuesday Thursday Saturday, most recent dialysis was Saturday, missed yesterday due to feeling ill.  She has had some chills and headache as well.  Denies any hematuria dysuria urinary frequency.  States she took antibiotics for UTI about 3 weeks ago.  She has previously had cholecystectomy hysterectomy and oophorectomy.      Review of prior external notes (non-ED) -and- Review of prior external test results outside of this encounter:  Labs performed 8/31/2024 and hemoglobin was 7.5, white blood cell count was 9.9 potassium was 3.5.        PAST MEDICAL HISTORY  Active Ambulatory Problems     Diagnosis Date Noted    Hyperlipidemia 09/17/2019    Allergic rhinitis 09/17/2019    Nephrotic range proteinuria 09/17/2019    Asthma 04/08/2021    Essential hypertension 04/08/2021    Uncontrolled type 2 diabetes mellitus with hyperglycemia 04/08/2021    Acute renal failure superimposed on stage 3a chronic kidney disease 11/08/2022    Anasarca 11/09/2022    Suspected sleep apnea     Anemia     Bilateral lower extremity edema     Elevated troponin     Acute renal failure  superimposed on chronic kidney disease 06/22/2023    Type 2 diabetes mellitus with chronic kidney disease 06/23/2023    Symptomatic anemia 08/30/2024    ESRD (end stage renal disease) 08/30/2024     Resolved Ambulatory Problems     Diagnosis Date Noted    Hypertensive emergency      Past Medical History:   Diagnosis Date    Albuminuria     Allergic     Bell's palsy     Cataract     Cholelithiasis     CKD (chronic kidney disease)     Diabetes mellitus     Drug therapy     Endometrial cancer     HL (hearing loss)     Hypertension     Low back pain     Migraine     Obesity     Peripheral neuropathy     Renal insufficiency     Right otitis media     Type II diabetes mellitus     Visual impairment     Vitamin D deficiency          PAST SURGICAL HISTORY  Past Surgical History:   Procedure Laterality Date    ARTERIOVENOUS FISTULA  05/2023    CATARACT EXTRACTION      CHOLECYSTECTOMY      EYE SURGERY      NOV 2020    HYSTERECTOMY      due to cancer    INSERTION HEMODIALYSIS CATHETER Right 06/23/2023    Procedure: HEMODIALYSIS CATHETER INSERTION;  Surgeon: Mikael Mackay MD;  Location: [facility] OR;  Service: Vascular;  Laterality: Right;    OOPHORECTOMY      VITRECTOMY PARS PLANA Left 01/28/2020    Procedure: 25G VITRECTOMY-ENDO LASER;  Surgeon: Lazarus, Howard S., MD;  Location: [facility] OR;  Service: Ophthalmology         FAMILY HISTORY  Family History   Problem Relation Age of Onset    Breast cancer Mother     Cancer Mother         Breast cancer, back in the 90s    Alzheimer's disease Father     Dementia Father     Diabetes Father     Hyperlipidemia Father     Hypertension Father     Hearing loss Father     Mental illness Father         Alzheimer’s    Asthma Maternal Grandfather          SOCIAL HISTORY  Social History     Socioeconomic History    Marital status: Single   Tobacco Use    Smoking status: Never    Smokeless tobacco: Never   Vaping Use    Vaping status: Never Used   Substance and Sexual Activity     Alcohol use: Not Currently     Comment: 3-4 drinks PER YEAR!    Drug use: No    Sexual activity: Not Currently     Partners: Male     Birth control/protection: Condom         ALLERGIES  Tomato, Cat dander, Mixed grasses, Latex, and Penicillin g      REVIEW OF SYSTEMS  Review of Systems  Included in HPI  All systems reviewed and negative except for those discussed in HPI.      PHYSICAL EXAM    I have reviewed the triage vital signs and nursing notes.    ED Triage Vitals   Temp Heart Rate Resp BP SpO2   04/02/25 0726 04/02/25 0726 04/02/25 0726 04/02/25 0734 04/02/25 0726   97.5 °F (36.4 °C) 97 18 169/81 97 %      Temp src Heart Rate Source Patient Position BP Location FiO2 (%)   04/02/25 0726 -- 04/02/25 0734 04/02/25 0734 --   Tympanic  Lying Left arm        Physical Exam  GENERAL: alert, no acute distress  SKIN: Warm, dry  HENT: Normocephalic, atraumatic  EYES: no scleral icterus  CV: regular rhythm, regular rate  RESPIRATORY: normal effort, lungs clear  ABDOMEN: nondistended, soft, nondistended, diffusely tender, more so diffusely in the left upper mid and lower abdomen.  No guarding or rigidity.  MUSCULOSKELETAL: no deformity  NEURO: alert, moves all extremities, follows commands            LAB RESULTS  Recent Results (from the past 24 hours)   Comprehensive Metabolic Panel    Collection Time: 04/02/25  7:50 AM    Specimen: Blood   Result Value Ref Range    Glucose 244 (H) 65 - 99 mg/dL    BUN 79 (H) 6 - 20 mg/dL    Creatinine 11.69 (H) 0.57 - 1.00 mg/dL    Sodium 132 (L) 136 - 145 mmol/L    Potassium 4.7 3.5 - 5.2 mmol/L    Chloride 92 (L) 98 - 107 mmol/L    CO2 19.5 (L) 22.0 - 29.0 mmol/L    Calcium 8.2 (L) 8.6 - 10.5 mg/dL    Total Protein 7.2 6.0 - 8.5 g/dL    Albumin 2.9 (L) 3.5 - 5.2 g/dL    ALT (SGPT) 6 1 - 33 U/L    AST (SGOT) 11 1 - 32 U/L    Alkaline Phosphatase 160 (H) 39 - 117 U/L    Total Bilirubin 0.3 0.0 - 1.2 mg/dL    Globulin 4.3 gm/dL    A/G Ratio 0.7 g/dL    BUN/Creatinine Ratio 6.8 (L) 7.0 -  25.0    Anion Gap 20.5 (H) 5.0 - 15.0 mmol/L    eGFR 3.6 (L) >60.0 mL/min/1.73   Lipase    Collection Time: 04/02/25  7:50 AM    Specimen: Blood   Result Value Ref Range    Lipase 22 13 - 60 U/L   CBC Auto Differential    Collection Time: 04/02/25  7:50 AM    Specimen: Blood   Result Value Ref Range    WBC 18.66 (H) 3.40 - 10.80 10*3/mm3    RBC 3.23 (L) 3.77 - 5.28 10*6/mm3    Hemoglobin 10.4 (L) 12.0 - 15.9 g/dL    Hematocrit 31.4 (L) 34.0 - 46.6 %    MCV 97.2 (H) 79.0 - 97.0 fL    MCH 32.2 26.6 - 33.0 pg    MCHC 33.1 31.5 - 35.7 g/dL    RDW 13.4 12.3 - 15.4 %    RDW-SD 47.1 37.0 - 54.0 fl    MPV 10.3 6.0 - 12.0 fL    Platelets 244 140 - 450 10*3/mm3    Neutrophil % 89.6 (H) 42.7 - 76.0 %    Lymphocyte % 2.7 (L) 19.6 - 45.3 %    Monocyte % 5.8 5.0 - 12.0 %    Eosinophil % 0.8 0.3 - 6.2 %    Basophil % 0.3 0.0 - 1.5 %    Immature Grans % 0.8 (H) 0.0 - 0.5 %    Neutrophils, Absolute 16.72 (H) 1.70 - 7.00 10*3/mm3    Lymphocytes, Absolute 0.51 (L) 0.70 - 3.10 10*3/mm3    Monocytes, Absolute 1.08 (H) 0.10 - 0.90 10*3/mm3    Eosinophils, Absolute 0.15 0.00 - 0.40 10*3/mm3    Basophils, Absolute 0.05 0.00 - 0.20 10*3/mm3    Immature Grans, Absolute 0.15 (H) 0.00 - 0.05 10*3/mm3    nRBC 0.0 0.0 - 0.2 /100 WBC   Respiratory Panel PCR w/COVID-19(SARS-CoV-2) [facility]/MARCELO/[facility]/AMY In-House, NP Swab in UTM/VTM, 2 HR TAT - Swab, Nasopharynx    Collection Time: 04/02/25  7:56 AM    Specimen: Nasopharynx; Swab   Result Value Ref Range    ADENOVIRUS, PCR Not Detected Not Detected    Coronavirus 229E Not Detected Not Detected    Coronavirus HKU1 Not Detected Not Detected    Coronavirus NL63 Not Detected Not Detected    Coronavirus OC43 Not Detected Not Detected    COVID19 Not Detected Not Detected - Ref. Range    Human Metapneumovirus Not Detected Not Detected    Human Rhinovirus/Enterovirus Not Detected Not Detected    Influenza A PCR Not Detected Not Detected    Influenza B PCR Not Detected Not Detected    Parainfluenza Virus 1  Not Detected Not Detected    Parainfluenza Virus 2 Not Detected Not Detected    Parainfluenza Virus 3 Not Detected Not Detected    Parainfluenza Virus 4 Not Detected Not Detected    RSV, PCR Not Detected Not Detected    Bordetella pertussis pcr Not Detected Not Detected    Bordetella parapertussis PCR Not Detected Not Detected    Chlamydophila pneumoniae PCR Not Detected Not Detected    Mycoplasma pneumo by PCR Not Detected Not Detected   Urinalysis With Culture If Indicated - Urine, Clean Catch    Collection Time: 04/02/25 10:17 AM    Specimen: Urine, Clean Catch   Result Value Ref Range    Color, UA Yellow Yellow, Straw    Appearance, UA Cloudy (A) Clear    pH, UA 5.5 5.0 - 8.0    Specific Gravity, UA 1.025 1.005 - 1.030    Glucose,  mg/dL (Trace) (A) Negative    Ketones, UA Trace (A) Negative    Bilirubin, UA Negative Negative    Blood, UA Moderate (2+) (A) Negative    Protein, UA >=300 mg/dL (3+) (A) Negative    Leuk Esterase, UA Large (3+) (A) Negative    Nitrite, UA Negative Negative    Urobilinogen, UA 0.2 E.U./dL 0.2 - 1.0 E.U./dL   Urinalysis, Microscopic Only - Urine, Clean Catch    Collection Time: 04/02/25 10:17 AM    Specimen: Urine, Clean Catch   Result Value Ref Range    RBC, UA None Seen None Seen, 0-2 /HPF    WBC, UA Too Numerous to Count (A) None Seen, 0-2 /HPF    Bacteria, UA Trace (A) None Seen /HPF    Squamous Epithelial Cells, UA 3-6 (A) None Seen, 0-2 /HPF    Hyaline Casts, UA None Seen None Seen /LPF    Methodology Automated Microscopy    Lactic Acid, Plasma    Collection Time: 04/02/25 10:48 AM    Specimen: Blood   Result Value Ref Range    Lactate 2.6 (C) 0.5 - 2.0 mmol/L   Blood Gas, Venous -    Collection Time: 04/02/25 10:52 AM    Specimen: Venous Blood   Result Value Ref Range    Site Left Brachial     pH, Venous 7.362 7.310 - 7.410 pH Units    pCO2, Venous 41.1 41.0 - 51.0 mm Hg    pO2, Venous 26.2 (L) 35.0 - 45.0 mm Hg    HCO3, Venous 23.3 22.0 - 28.0 mmol/L    Base Excess,  Venous -2.0 -2.0 - 2.0 mmol/L    O2 Saturation, Venous 46.0 45.0 - 75.0 %    Barometric Pressure for Blood Gas 743.3000 mmHg    Modality Room Air     FIO2 21 %    Rate 18 Breaths/minute    Device Comment drawn by [id number]. drawn at 1048          RADIOLOGY  CT Abdomen Pelvis Without Contrast  Result Date: 4/2/2025  CT ABDOMEN PELVIS WO CONTRAST-  DATE OF EXAM: 4/2/2025 8:22 AM  INDICATION: left abdominal pain, n/v/d x 3 days.  COMPARISON: CT guided renal biopsy 11/14/2022. Radiographs of the sacrum and coccyx 6/24/2023.  TECHNIQUE: Multiple contiguous axial images were acquired through the abdomen and pelvis without the intravenous administration of contrast. Reformatted coronal and sagittal sequences were also reviewed. Radiation dose reduction techniques were utilized, including automated exposure control and exposure modulation based on body size.  FINDINGS: The included lung bases are clear. Partially imaged calcified coronary artery disease and trace pericardial fluid. Relative hyperdensity of the interventricular septum suggest anemia.  Status post cholecystectomy. The liver, spleen, pancreas, and adrenal glands are unremarkable. Moderate left hydronephrosis and hydroureter with left perinephric and periureteral fat stranding and air distended calyces in the upper pole of the left kidney, suggesting emphysematous pyelitis. Bilateral renal calcifications are likely vascular and nonobstructing. No obstructing renal or ureteral stone is identified. The urinary bladder is nondistended. Diffuse urinary bladder wall thickening could be accentuated by under distention. Status post hysterectomy. The adnexa are not definitively identified.  Small periumbilical hernia containing a short knuckle of nondistended transverse colon. Mild colorectal stool. No bowel obstruction or significant bowel wall thickening. The appendix is normal.  Nonspecific small volume free fluid in the pelvis. No free intraperitoneal air. No  pathologically enlarged lymph nodes in the abdomen or pelvis. Mild to moderate calcified atherosclerotic disease in the abdominal aorta and its distal branches without aneurysm.  Mild rotary lumbar levoscoliosis. Multilevel lumbar spondylosis, most severe at L5-S1. Mild to moderate bilateral hip and SI joint DJD. No acute osseous abnormality or concerning osseous lesion.       1. Moderate left hydronephrosis and hydroureter with left perinephric and periureteral fat stranding and air distended calyces in the upper pole of the left kidney, suggesting emphysematous pyelitis. Recommend correlating with urinalysis. 2. Urinary bladder wall thickening could be accentuated by underdistention but could also reflect a nonspecific cystitis. 3. Small periumbilical hernia containing a short knuckle of nondistended transverse colon.  This report was finalized on 4/2/2025 9:07 AM by Corbin Mayberry MD on Workstation: [id number]          MEDICATIONS GIVEN IN ER  Medications   HYDROmorphone (DILAUDID) injection 0.5 mg (0.5 mg Intravenous Given 4/2/25 0808)   ondansetron (ZOFRAN) injection 4 mg (4 mg Intravenous Given 4/2/25 0808)   lactated ringers bolus 500 mL (0 mL Intravenous Stopped 4/2/25 1125)   cefTRIAXone (ROCEPHIN) 2,000 mg in sodium chloride 0.9 % 100 mL MBP (2,000 mg Intravenous New Bag 4/2/25 1127)   HYDROmorphone (DILAUDID) injection 0.5 mg (0.5 mg Intravenous Given 4/2/25 1013)   acetaminophen (TYLENOL) tablet 1,000 mg (1,000 mg Oral Given 4/2/25 1013)         ORDERS PLACED DURING THIS VISIT:  Orders Placed This Encounter   Procedures    Respiratory Panel PCR w/COVID-19(SARS-CoV-2) [facility] In-House, NP Swab in UTM/VTM, 2 HR TAT - Swab, Nasopharynx    Gastrointestinal Panel, PCR - Stool, Per Rectum    Clostridioides difficile Toxin - Stool, Per Rectum    Clostridioides difficile Toxin, PCR - Stool, Per Rectum    Blood Culture - Blood,    Blood Culture - Blood,    Urine Culture - Urine,    CT Abdomen  Pelvis Without Contrast    Comprehensive Metabolic Panel    Lipase    CBC Auto Differential    Lactic Acid, Plasma    Urinalysis With Culture If Indicated - Urine, Clean Catch    Blood Gas, Venous -    Urinalysis, Microscopic Only - Urine, Clean Catch    Blood Gas, Venous -    STAT Lactic Acid, Reflex    Beta Hydroxybutyrate Quantitative    NPO Diet NPO Type: Strict NPO    Nephrology (on -call MD unless specified)    LHA (on-call MD unless specified) Details    Urology (on-call MD unless specified)    Patient Isolation Contact Spore    Hemodialysis Inpatient    Inpatient Admission    CBC & Differential    Ketone Bodies, Serum (Not performed at [place])         OUTPATIENT MEDICATION MANAGEMENT:  No current Epic-ordered facility-administered medications on file.     No current Epic-ordered outpatient medications on file.         PROCEDURES  Procedures      Critical care provider statement:    Critical care time (minutes): 38.   Critical care time was exclusive of:  Separately billable procedures and treating other patients   Critical care was necessary to treat or prevent imminent or life-threatening deterioration of the following conditions:  Sepsis   Critical care was time spent personally by me on the following activities:  Development of treatment plan with patient or surrogate, discussions with consultants, evaluation of patient's response to treatment, examination of patient, obtaining history from patient or surrogate, ordering and performing treatments and interventions, ordering and review of laboratory studies, ordering and review of radiographic studies, pulse oximetry, re-evaluation of patient's condition and review of old charts. Critical Care indicators:        PROGRESS, DATA ANALYSIS, CONSULTS, AND MEDICAL DECISION MAKING  All labs have been independently interpreted by me.  All radiology studies have been reviewed by me. All EKG's have been independently viewed and interpreted by me.  Discussion below  represents my analysis of pertinent findings related to patient's condition, differential diagnosis, treatment plan and final disposition.    DIFFERENTIAL    Differential diagnosis includes but is not limited to:  - hepatobiliary pathology such as cholecystitis, cholangitis, and symptomatic cholelithiasis  - Pancreatitis  - Dyspepsia  - Small bowel obstruction  - Appendicitis  - Diverticulitis  - UTI including pyelonephritis  - Ureteral stone  - Zoster  - Colitis, including infectious and ischemic  - Atypical ACS      Clinical Scores:                  ED Course as of 04/02/25 1231   Wed Apr 02, 2025   0814 WBC(!): 18.66 [TR]   0828 BUN(!): 79 [TR]   0828 Potassium: 4.7 [TR]   0828 CT Abdomen Pelvis Without Contrast  My independent interpretation of the imaging study is left hydronephrosis [TR]   0946 Reassessed the patient, counseled her on her lab and imaging results thus far.  She is going to give us a urine specimen now.  CT findings suggest pyelonephritis, she does have a leukocytosis of 18.6.  Ordered an empiric dose of Rocephin 2 g.  Lactic and blood cultures ordered.  I rechecked her temperature which was 101.3.  I ordered a dose of Tylenol. [KA]   1109 pH, Venous: 7.362 [KA]   1128 Lactate(!!): 2.6 [KA]   1153 I discussed patient with REDD Barrientos.  We discussed patient's history presentation workup and she agrees to admit.  I do have a call out to nephrology still, she also requests a call to urology due to findings of hydronephrosis so I have placed that as well. [KA]   1202 I discussed patient with YOSSI Tapia for urology including history presentation and imaging.  They will consult. [KA]   1230 Recommended 30 mL/kg bolus not received because it would be harmful or detrimental to the patient.  Reason: Renal Failure.   Ordered 500 mL of IV Fluid as bolus.   [KA]      ED Course User Index  [KA] Lillian Winslow PA-C  [TR] Boby Peralta MD             AS OF 12:31 EDT VITALS:    BP - 115/68  HR  - 109  TEMP - 100.2 °F (37.9 °C) (Axillary)  O2 SATS - 96%    COMPLEXITY OF CARE  The patient requires admission.      DIAGNOSIS  Final diagnoses:   Sepsis without acute organ dysfunction, due to unspecified organism   Emphysematous pyelonephritis of left kidney   ESRD on dialysis         DISPOSITION  ED Disposition       ED Disposition   Decision to Admit    Condition   --    Comment   Level of Care: Telemetry [5]   Diagnosis: Sepsis [[id number]]   Admitting Physician: MAXIMO WORTHY [[id number]]   Attending Physician: MAXIMO WORTHY [[id number]]   Certification: I Certify That Inpatient Hospital Services Are Medically Necessary For Greater Than 2 Midnights                                Please note that portions of this document were completed with a voice recognition program.    Note Disclaimer: At [facility], we believe that sharing information builds trust and better relationships. You are receiving this note because you recently visited [facility]. It is possible you will see health information before a provider has talked with you about it. This kind of information can be easy to misunderstand. To help you fully understand what it means for your health, we urge you to discuss this note with your provider.         Lillian Winslow PA-C  04/02/25 [id number]

## 2025-04-02 NOTE — ED NOTES
"Nursing report ED to floor  Kelly Pack  54 y.o.  female    HPI :  HPI  Stated Reason for Visit: abd pain, N/V/D, h/a, and chills since Sunday; Dialysis Tues, Thurs, Sat; last dialysis Saturday  History Obtained From: patient    Chief Complaint  Chief Complaint   Patient presents with    Abdominal Pain       Admitting doctor:   Génesis Miles MD    Admitting diagnosis:   The primary encounter diagnosis was Sepsis without acute organ dysfunction, due to unspecified organism. A diagnosis of Emphysematous pyelonephritis of left kidney was also pertinent to this visit.    Code status:   Current Code Status       Date Active Code Status Order ID Comments User Context       Prior            Allergies:   Tomato, Cat dander, Mixed grasses, Latex, and Penicillin g    Isolation:   Contact Spore    Intake and Output    Intake/Output Summary (Last 24 hours) at 4/2/2025 1152  Last data filed at 4/2/2025 1125  Gross per 24 hour   Intake 500 ml   Output --   Net 500 ml       Weight:       04/02/25  1049   Weight: 110 kg (243 lb)       Most recent vitals:   Vitals:    04/02/25 1020 04/02/25 1049 04/02/25 1131 04/02/25 1144   BP:   115/68    BP Location:       Patient Position:       Pulse: 108  103 109   Resp:       Temp:       TempSrc:       SpO2: 96%  97% 96%   Weight:  110 kg (243 lb)     Height:  175.3 cm (69\")         Active LDAs/IV Access:   Lines, Drains & Airways       Active LDAs       Name Placement date Placement time Site Days    Peripheral IV 04/02/25 0804 Left;Medial Antecubital 04/02/25  0804  Antecubital  less than 1    Hemodialysis Cath Double 06/23/23  1224  Chest  648                    Labs (abnormal labs have a star):   Labs Reviewed   COMPREHENSIVE METABOLIC PANEL - Abnormal; Notable for the following components:       Result Value    Glucose 244 (*)     BUN 79 (*)     Creatinine 11.69 (*)     Sodium 132 (*)     Chloride 92 (*)     CO2 19.5 (*)     Calcium 8.2 (*)     Albumin 2.9 (*)     Alkaline " Phosphatase 160 (*)     BUN/Creatinine Ratio 6.8 (*)     Anion Gap 20.5 (*)     eGFR 3.6 (*)     All other components within normal limits    Narrative:     GFR Categories in Chronic Kidney Disease (CKD)      GFR Category          GFR (mL/min/1.73)    Interpretation  G1                     90 or greater         Normal or high (1)  G2                      60-89                Mild decrease (1)  G3a                   45-59                Mild to moderate decrease  G3b                   30-44                Moderate to severe decrease  G4                    15-29                Severe decrease  G5                    14 or less           Kidney failure          (1)In the absence of evidence of kidney disease, neither GFR category G1 or G2 fulfill the criteria for CKD.    eGFR calculation 2021 CKD-EPI creatinine equation, which does not include race as a factor   CBC WITH AUTO DIFFERENTIAL - Abnormal; Notable for the following components:    WBC 18.66 (*)     RBC 3.23 (*)     Hemoglobin 10.4 (*)     Hematocrit 31.4 (*)     MCV 97.2 (*)     Neutrophil % 89.6 (*)     Lymphocyte % 2.7 (*)     Immature Grans % 0.8 (*)     Neutrophils, Absolute 16.72 (*)     Lymphocytes, Absolute 0.51 (*)     Monocytes, Absolute 1.08 (*)     Immature Grans, Absolute 0.15 (*)     All other components within normal limits   LACTIC ACID, PLASMA - Abnormal; Notable for the following components:    Lactate 2.6 (*)     All other components within normal limits   URINALYSIS W/ CULTURE IF INDICATED - Abnormal; Notable for the following components:    Appearance, UA Cloudy (*)     Glucose,  mg/dL (Trace) (*)     Ketones, UA Trace (*)     Blood, UA Moderate (2+) (*)     Protein, UA >=300 mg/dL (3+) (*)     Leuk Esterase, UA Large (3+) (*)     All other components within normal limits    Narrative:     In absence of clinical symptoms, the presence of pyuria, bacteria, and/or nitrites on the urinalysis result does not correlate with infection.    URINALYSIS, MICROSCOPIC ONLY - Abnormal; Notable for the following components:    WBC, UA Too Numerous to Count (*)     Bacteria, UA Trace (*)     Squamous Epithelial Cells, UA 3-6 (*)     All other components within normal limits    Narrative:     Unspun microscopic   BLOOD GAS, VENOUS - Abnormal; Notable for the following components:    pO2, Venous 26.2 (*)     All other components within normal limits   RESPIRATORY PANEL PCR W/ COVID-19 (SARS-COV-2), NP SWAB IN UTM/VTP, 2 HR TAT - Normal    Narrative:     In the setting of a positive respiratory panel with a viral infection PLUS a negative procalcitonin without other underlying concern for bacterial infection, consider observing off antibiotics or discontinuation of antibiotics and continue supportive care. If the respiratory panel is positive for atypical bacterial infection (Bordetella pertussis, Chlamydophila pneumoniae, or Mycoplasma pneumoniae), consider antibiotic de-escalation to target atypical bacterial infection.   LIPASE - Normal   GASTROINTESTINAL PANEL, PCR (PREFERRED) DOES NOT INCLUDE CDIFF   CLOSTRIDIOIDES DIFFICILE TOXIN    Narrative:     The following orders were created for panel order Clostridioides difficile Toxin - Stool, Per Rectum.  Procedure                               Abnormality         Status                     ---------                               -----------         ------                     Clostridioides difficile...[[id number]]                                                   Please view results for these tests on the individual orders.   CLOSTRIDIOIDES DIFFICILE TOXIN, PCR   BLOOD CULTURE   BLOOD CULTURE   URINE CULTURE   BLOOD GAS, VENOUS   LACTIC ACID, REFLEX   BETA HYDROXYBUTYRATE QUANTITATIVE   CBC AND DIFFERENTIAL    Narrative:     The following orders were created for panel order CBC & Differential.  Procedure                               Abnormality         Status                     ---------                                -----------         ------                     CBC Auto Differential[[id number]]        Abnormal            Final result                 Please view results for these tests on the individual orders.   KETONE BODIES SERUM    Narrative:     The following orders were created for panel order Ketone Bodies, Serum (Not performed at [facility]).  Procedure                               Abnormality         Status                     ---------                               -----------         ------                     Beta Hydroxybutyrate Nilay...[[id number]]                      In process                   Please view results for these tests on the individual orders.       EKG:   No orders to display       Meds given in ED:   Medications   cefTRIAXone (ROCEPHIN) 2,000 mg in sodium chloride 0.9 % 100 mL MBP (2,000 mg Intravenous New Bag 4/2/25 1127)   HYDROmorphone (DILAUDID) injection 0.5 mg (0.5 mg Intravenous Given 4/2/25 0808)   ondansetron (ZOFRAN) injection 4 mg (4 mg Intravenous Given 4/2/25 0808)   lactated ringers bolus 500 mL (0 mL Intravenous Stopped 4/2/25 1125)   HYDROmorphone (DILAUDID) injection 0.5 mg (0.5 mg Intravenous Given 4/2/25 1013)   acetaminophen (TYLENOL) tablet 1,000 mg (1,000 mg Oral Given 4/2/25 1013)       Imaging results:  CT Abdomen Pelvis Without Contrast  Result Date: 4/2/2025   1. Moderate left hydronephrosis and hydroureter with left perinephric and periureteral fat stranding and air distended calyces in the upper pole of the left kidney, suggesting emphysematous pyelitis. Recommend correlating with urinalysis. 2. Urinary bladder wall thickening could be accentuated by underdistention but could also reflect a nonspecific cystitis. 3. Small periumbilical hernia containing a short knuckle of nondistended transverse colon.  This report was finalized on 4/2/2025 9:07 AM by Corbin Mayberry MD on Workstation: [id number]        Ambulatory status:   - assist Xs 1    Social issues:   Social  History     Socioeconomic History    Marital status: Single   Tobacco Use    Smoking status: Never    Smokeless tobacco: Never   Vaping Use    Vaping status: Never Used   Substance and Sexual Activity    Alcohol use: Not Currently     Comment: 3-4 drinks PER YEAR!    Drug use: No    Sexual activity: Not Currently     Partners: Male     Birth control/protection: Condom       Peripheral Neurovascular  Peripheral Neurovascular (Adult)  Peripheral Neurovascular WDL: WDL    Neuro Cognitive  Neuro Cognitive (Adult)  Cognitive/Neuro/Behavioral WDL: WDL    Learning  Learning Assessment  Learning Readiness and Ability: no barriers identified  Education Provided  Person Taught: patient  Teaching Method: verbal instruction  Teaching Focus: symptom/problem overview, diagnostic test, medical device/equipment use  Education Outcome Evaluation: acceptance expressed, verbalizes understanding    Respiratory  Respiratory WDL  Respiratory WDL: WDL    Abdominal Pain       Pain Assessments  Pain (Adult)  (0-10) Pain Rating: Rest: 10  Pain Location: abdomen  Pain Side/Orientation: left  Pain Description: constant, sharp, stabbing, aching  Nonverbal Indicators of Pain: restless, grimace  Pain Management Interventions: pain medication given  Response to Pain Interventions: intervention not effective per patient    NIH Stroke Scale       Velvet Hess RN  04/02/25 11:52 EDT

## 2025-04-02 NOTE — Clinical Note
A 6 fr sheath was successfully inserted with ultrasound guidance into the left ulnar artery and left ulnar artery.

## 2025-04-02 NOTE — PLAN OF CARE
Problem: Adult Inpatient Plan of Care  Goal: Plan of Care Review  Outcome: Progressing  Flowsheets (Taken 4/2/2025 4275)  Progress: improving  Outcome Evaluation: Patient has been cooperative during shift. Patient had cysto and stent placement. Monitor blood glucose and temperature. Patient temperature and pain treated. [facility] said to call back the patient had completed her cysto. Patient treated for nausea in ER. AOx4, assist x1, 2LO2. SR. Will continue to monitor and assist patient as needed.  Plan of Care Reviewed With: patient  Goal: Patient-Specific Goal (Individualized)  Outcome: Progressing  Goal: Absence of Hospital-Acquired Illness or Injury  Outcome: Progressing  Goal: Optimal Comfort and Wellbeing  Outcome: Progressing  Goal: Readiness for Transition of Care  Outcome: Progressing   Goal Outcome Evaluation:  Plan of Care Reviewed With: patient        Progress: improving  Outcome Evaluation: Patient has been cooperative during shift. Patient had cysto and stent placement. Monitor blood glucose and temperature. Patient temperature and pain treated. [facility] said to call back the patient had completed her cysto. Patient treated for nausea in ER. AOx4, assist x1, 2LO2. SR. Will continue to monitor and assist patient as needed.

## 2025-04-03 LAB
ANION GAP SERPL CALCULATED.3IONS-SCNC: 15.9 MMOL/L (ref 5–15)
BASOPHILS # BLD AUTO: 0.04 10*3/MM3 (ref 0–0.2)
BASOPHILS NFR BLD AUTO: 0.2 % (ref 0–1.5)
BUN SERPL-MCNC: 46 MG/DL (ref 6–20)
BUN/CREAT SERPL: 7.1 (ref 7–25)
CALCIUM SPEC-SCNC: 8.1 MG/DL (ref 8.6–10.5)
CHLORIDE SERPL-SCNC: 91 MMOL/L (ref 98–107)
CO2 SERPL-SCNC: 23.1 MMOL/L (ref 22–29)
CREAT SERPL-MCNC: 6.52 MG/DL (ref 0.57–1)
DEPRECATED RDW RBC AUTO: 44.8 FL (ref 37–54)
EGFRCR SERPLBLD CKD-EPI 2021: 7.2 ML/MIN/1.73
EOSINOPHIL # BLD AUTO: 0.05 10*3/MM3 (ref 0–0.4)
EOSINOPHIL NFR BLD AUTO: 0.2 % (ref 0.3–6.2)
ERYTHROCYTE [DISTWIDTH] IN BLOOD BY AUTOMATED COUNT: 13.1 % (ref 12.3–15.4)
GLUCOSE BLDC GLUCOMTR-MCNC: 269 MG/DL (ref 70–130)
GLUCOSE BLDC GLUCOMTR-MCNC: 304 MG/DL (ref 70–130)
GLUCOSE BLDC GLUCOMTR-MCNC: 319 MG/DL (ref 70–130)
GLUCOSE BLDC GLUCOMTR-MCNC: 341 MG/DL (ref 70–130)
GLUCOSE SERPL-MCNC: 302 MG/DL (ref 65–99)
HCT VFR BLD AUTO: 27.2 % (ref 34–46.6)
HGB BLD-MCNC: 9.4 G/DL (ref 12–15.9)
IMM GRANULOCYTES # BLD AUTO: 0.5 10*3/MM3 (ref 0–0.05)
IMM GRANULOCYTES NFR BLD AUTO: 2.4 % (ref 0–0.5)
LYMPHOCYTES # BLD AUTO: 0.64 10*3/MM3 (ref 0.7–3.1)
LYMPHOCYTES NFR BLD AUTO: 3.1 % (ref 19.6–45.3)
MAGNESIUM SERPL-MCNC: 2.3 MG/DL (ref 1.6–2.6)
MCH RBC QN AUTO: 33.1 PG (ref 26.6–33)
MCHC RBC AUTO-ENTMCNC: 34.6 G/DL (ref 31.5–35.7)
MCV RBC AUTO: 95.8 FL (ref 79–97)
MONOCYTES # BLD AUTO: 1.07 10*3/MM3 (ref 0.1–0.9)
MONOCYTES NFR BLD AUTO: 5.1 % (ref 5–12)
NEUTROPHILS NFR BLD AUTO: 18.5 10*3/MM3 (ref 1.7–7)
NEUTROPHILS NFR BLD AUTO: 89 % (ref 42.7–76)
NRBC BLD AUTO-RTO: 0 /100 WBC (ref 0–0.2)
PHOSPHATE SERPL-MCNC: 4.5 MG/DL (ref 2.5–4.5)
PLATELET # BLD AUTO: 219 10*3/MM3 (ref 140–450)
PMV BLD AUTO: 10.8 FL (ref 6–12)
POTASSIUM SERPL-SCNC: 4.1 MMOL/L (ref 3.5–5.2)
RBC # BLD AUTO: 2.84 10*6/MM3 (ref 3.77–5.28)
SODIUM SERPL-SCNC: 130 MMOL/L (ref 136–145)
WBC NRBC COR # BLD AUTO: 20.8 10*3/MM3 (ref 3.4–10.8)

## 2025-04-03 PROCEDURE — 63710000001 INSULIN GLARGINE PER 5 UNITS: Performed by: STUDENT IN AN ORGANIZED HEALTH CARE EDUCATION/TRAINING PROGRAM

## 2025-04-03 PROCEDURE — 97530 THERAPEUTIC ACTIVITIES: CPT

## 2025-04-03 PROCEDURE — 25010000002 ERTAPENEM PER 500 MG: Performed by: NURSE PRACTITIONER

## 2025-04-03 PROCEDURE — 97162 PT EVAL MOD COMPLEX 30 MIN: CPT

## 2025-04-03 PROCEDURE — 99223 1ST HOSP IP/OBS HIGH 75: CPT | Performed by: STUDENT IN AN ORGANIZED HEALTH CARE EDUCATION/TRAINING PROGRAM

## 2025-04-03 PROCEDURE — G0545 PR INHERENT VISIT TO INPT: HCPCS | Performed by: STUDENT IN AN ORGANIZED HEALTH CARE EDUCATION/TRAINING PROGRAM

## 2025-04-03 PROCEDURE — 63710000001 INSULIN LISPRO (HUMAN) PER 5 UNITS: Performed by: UROLOGY

## 2025-04-03 PROCEDURE — 25010000002 HYDROMORPHONE PER 4 MG: Performed by: UROLOGY

## 2025-04-03 PROCEDURE — 84100 ASSAY OF PHOSPHORUS: CPT | Performed by: UROLOGY

## 2025-04-03 PROCEDURE — 85025 COMPLETE CBC W/AUTO DIFF WBC: CPT | Performed by: UROLOGY

## 2025-04-03 PROCEDURE — 80048 BASIC METABOLIC PNL TOTAL CA: CPT | Performed by: UROLOGY

## 2025-04-03 PROCEDURE — 83735 ASSAY OF MAGNESIUM: CPT | Performed by: UROLOGY

## 2025-04-03 PROCEDURE — 82948 REAGENT STRIP/BLOOD GLUCOSE: CPT

## 2025-04-03 RX ORDER — SODIUM CHLORIDE 0.9 % (FLUSH) 0.9 %
20 SYRINGE (ML) INJECTION AS NEEDED
Status: DISCONTINUED | OUTPATIENT
Start: 2025-04-03 | End: 2025-04-16 | Stop reason: HOSPADM

## 2025-04-03 RX ORDER — MIDODRINE HYDROCHLORIDE 2.5 MG/1
2.5 TABLET ORAL ONCE
Status: DISCONTINUED | OUTPATIENT
Start: 2025-04-03 | End: 2025-04-07

## 2025-04-03 RX ORDER — SODIUM CHLORIDE 0.9 % (FLUSH) 0.9 %
10 SYRINGE (ML) INJECTION EVERY 12 HOURS SCHEDULED
Status: DISCONTINUED | OUTPATIENT
Start: 2025-04-03 | End: 2025-04-16 | Stop reason: HOSPADM

## 2025-04-03 RX ORDER — SODIUM CHLORIDE 0.9 % (FLUSH) 0.9 %
10 SYRINGE (ML) INJECTION AS NEEDED
Status: DISCONTINUED | OUTPATIENT
Start: 2025-04-03 | End: 2025-04-16 | Stop reason: HOSPADM

## 2025-04-03 RX ORDER — HEPARIN SODIUM (PORCINE) LOCK FLUSH IV SOLN 100 UNIT/ML 100 UNIT/ML
3 SOLUTION INTRAVENOUS DAILY PRN
Status: DISCONTINUED | OUTPATIENT
Start: 2025-04-03 | End: 2025-04-06

## 2025-04-03 RX ORDER — MIDODRINE HYDROCHLORIDE 5 MG/1
5 TABLET ORAL DAILY
Status: DISCONTINUED | OUTPATIENT
Start: 2025-04-04 | End: 2025-04-07

## 2025-04-03 RX ORDER — SODIUM CHLORIDE 9 MG/ML
40 INJECTION, SOLUTION INTRAVENOUS AS NEEDED
Status: DISCONTINUED | OUTPATIENT
Start: 2025-04-03 | End: 2025-04-16 | Stop reason: HOSPADM

## 2025-04-03 RX ADMIN — LANTHANUM CARBONATE 2000 MG: 500 TABLET, CHEWABLE ORAL at 17:32

## 2025-04-03 RX ADMIN — INSULIN GLARGINE 10 UNITS: 100 INJECTION, SOLUTION SUBCUTANEOUS at 21:24

## 2025-04-03 RX ADMIN — ERGOCALCIFEROL 50000 UNITS: 1.25 CAPSULE ORAL at 07:53

## 2025-04-03 RX ADMIN — CYCLOBENZAPRINE HYDROCHLORIDE 10 MG: 10 TABLET, FILM COATED ORAL at 01:11

## 2025-04-03 RX ADMIN — Medication 10 ML: at 09:15

## 2025-04-03 RX ADMIN — Medication 5 MG: at 01:11

## 2025-04-03 RX ADMIN — LANTHANUM CARBONATE 2000 MG: 500 TABLET, CHEWABLE ORAL at 01:10

## 2025-04-03 RX ADMIN — Medication 1000 MCG: at 07:53

## 2025-04-03 RX ADMIN — INSULIN LISPRO 6 UNITS: 100 INJECTION, SOLUTION INTRAVENOUS; SUBCUTANEOUS at 01:11

## 2025-04-03 RX ADMIN — HYDROMORPHONE HYDROCHLORIDE 0.5 MG: 1 INJECTION, SOLUTION INTRAMUSCULAR; INTRAVENOUS; SUBCUTANEOUS at 01:15

## 2025-04-03 RX ADMIN — INSULIN LISPRO 5 UNITS: 100 INJECTION, SOLUTION INTRAVENOUS; SUBCUTANEOUS at 16:38

## 2025-04-03 RX ADMIN — CYCLOBENZAPRINE HYDROCHLORIDE 10 MG: 10 TABLET, FILM COATED ORAL at 21:31

## 2025-04-03 RX ADMIN — ATORVASTATIN CALCIUM 10 MG: 10 TABLET ORAL at 01:10

## 2025-04-03 RX ADMIN — GABAPENTIN 300 MG: 300 CAPSULE ORAL at 21:31

## 2025-04-03 RX ADMIN — INSULIN LISPRO 5 UNITS: 100 INJECTION, SOLUTION INTRAVENOUS; SUBCUTANEOUS at 21:24

## 2025-04-03 RX ADMIN — MIDODRINE HYDROCHLORIDE 2.5 MG: 2.5 TABLET ORAL at 09:14

## 2025-04-03 RX ADMIN — Medication 10 ML: at 07:54

## 2025-04-03 RX ADMIN — INSULIN LISPRO 4 UNITS: 100 INJECTION, SOLUTION INTRAVENOUS; SUBCUTANEOUS at 07:53

## 2025-04-03 RX ADMIN — INSULIN LISPRO 5 UNITS: 100 INJECTION, SOLUTION INTRAVENOUS; SUBCUTANEOUS at 10:58

## 2025-04-03 RX ADMIN — ERTAPENEM SODIUM 500 MG: 1 INJECTION, POWDER, LYOPHILIZED, FOR SOLUTION INTRAMUSCULAR; INTRAVENOUS at 02:30

## 2025-04-03 RX ADMIN — GABAPENTIN 300 MG: 300 CAPSULE ORAL at 01:11

## 2025-04-03 RX ADMIN — LANTHANUM CARBONATE 2000 MG: 500 TABLET, CHEWABLE ORAL at 07:53

## 2025-04-03 RX ADMIN — ERTAPENEM SODIUM 500 MG: 1 INJECTION, POWDER, LYOPHILIZED, FOR SOLUTION INTRAMUSCULAR; INTRAVENOUS at 23:39

## 2025-04-03 RX ADMIN — Medication 10 ML: at 09:14

## 2025-04-03 NOTE — PROGRESS NOTES
"  [facility] Progress Note    Chief Complaint: No new issues    He looks good and she is doing much better.  Urine is starting to clear up.  Still some hematuria but certainly no purulence like she had yesterday.  No shaking chills.  Temperature curve is down.    Review of Systems:    The following systems were reviewed and negative;  respiratory, cardiovascular, and gastrointestinal          Vital Signs  /69   Pulse 95   Temp 97.9 °F (36.6 °C) (Oral)   Resp 18   Ht 175.3 cm (69\")   Wt 110 kg (243 lb)   SpO2 94%   BMI 35.88 kg/m²     Physical Exam:     General Appearance:    Alert, cooperative, NAD   HEENT:    No trauma, pupils reactive, hearing intact   Back:     No CVA tenderness   Lungs:     Respirations unlabored, no wheezing    Heart:    RRR, intact peripheral pulses   Abdomen:   Benign   :  External genitalia normal   Extremities:   No edema, no deformity   Lymphatic:   No neck or groin LAD   Skin:   No bleeding, bruising or rashes   Neuro/Psych:   Orientation intact, mood/affect pleasant, no focal findings        Results Review:     I reviewed the patient's new clinical results.  Lab Results (last 24 hours)       Procedure Component Value Units Date/Time    POC Glucose Once [[id number]]  (Abnormal) Collected: 04/03/25 1539    Specimen: Blood Updated: 04/03/25 1542     Glucose 304 mg/dL     Blood Culture - Blood, Arm, Left [[id number]]  (Abnormal) Collected: 04/02/25 1113    Specimen: Blood from Arm, Left Updated: 04/03/25 1402     Blood Culture Gram Negative Bacilli     Isolated from Aerobic and Anaerobic Bottles     Gram Stain Anaerobic Bottle Gram negative bacilli      Aerobic Bottle Gram negative bacilli    POC Glucose Once [[id number]]  (Abnormal) Collected: 04/03/25 1036    Specimen: Blood Updated: 04/03/25 1041     Glucose 341 mg/dL     Urine Culture - Urine, Urine, Catheter [[id number]]  (Abnormal) Collected: 04/02/25 1707    Specimen: Urine, Catheter Updated: 04/03/25 0911     Urine " Culture >100,000 CFU/mL Gram Negative Bacilli    Narrative:      Colonization of the urinary tract without infection is common. Treatment is discouraged unless the patient is symptomatic, pregnant, or undergoing an invasive urologic procedure.    Urine Culture - Urine, Urine, Clean Catch [[id number]]  (Abnormal) Collected: 04/02/25 1017    Specimen: Urine, Clean Catch Updated: 04/03/25 0911     Urine Culture >100,000 CFU/mL Gram Negative Bacilli    Narrative:      Colonization of the urinary tract without infection is common. Treatment is discouraged unless the patient is symptomatic, pregnant, or undergoing an invasive urologic procedure.    Basic Metabolic Panel [[id number]]  (Abnormal) Collected: 04/03/25 0619    Specimen: Blood Updated: 04/03/25 0712     Glucose 302 mg/dL      BUN 46 mg/dL      Creatinine 6.52 mg/dL      Sodium 130 mmol/L      Potassium 4.1 mmol/L      Chloride 91 mmol/L      CO2 23.1 mmol/L      Calcium 8.1 mg/dL      BUN/Creatinine Ratio 7.1     Anion Gap 15.9 mmol/L      eGFR 7.2 mL/min/1.73     Narrative:      GFR Categories in Chronic Kidney Disease (CKD)      GFR Category          GFR (mL/min/1.73)    Interpretation  G1                     90 or greater         Normal or high (1)  G2                      60-89                Mild decrease (1)  G3a                   45-59                Mild to moderate decrease  G3b                   30-44                Moderate to severe decrease  G4                    15-29                Severe decrease  G5                    14 or less           Kidney failure          (1)In the absence of evidence of kidney disease, neither GFR category G1 or G2 fulfill the criteria for CKD.    eGFR calculation 2021 CKD-EPI creatinine equation, which does not include race as a factor    Phosphorus [[id number]]  (Normal) Collected: 04/03/25 0619    Specimen: Blood Updated: 04/03/25 0712     Phosphorus 4.5 mg/dL     Magnesium [[id number]]  (Normal) Collected: 04/03/25 0619     Specimen: Blood Updated: 04/03/25 0712     Magnesium 2.3 mg/dL     Blood Culture - Blood, Hand, Left [[id number]]  (Abnormal) Collected: 04/02/25 1043    Specimen: Blood from Hand, Left Updated: 04/03/25 0702     Blood Culture Growth present, too young to evaluate     Gram Stain Anaerobic Bottle Gram negative bacilli      Aerobic Bottle Gram negative bacilli    CBC & Differential [[id number]]  (Abnormal) Collected: 04/03/25 0619    Specimen: Blood Updated: 04/03/25 0652    Narrative:      The following orders were created for panel order CBC & Differential.  Procedure                               Abnormality         Status                     ---------                               -----------         ------                     CBC Auto Differential[[id number]]        Abnormal            Final result                 Please view results for these tests on the individual orders.    CBC Auto Differential [[id number]]  (Abnormal) Collected: 04/03/25 0619    Specimen: Blood Updated: 04/03/25 0652     WBC 20.80 10*3/mm3      RBC 2.84 10*6/mm3      Hemoglobin 9.4 g/dL      Hematocrit 27.2 %      MCV 95.8 fL      MCH 33.1 pg      MCHC 34.6 g/dL      RDW 13.1 %      RDW-SD 44.8 fl      MPV 10.8 fL      Platelets 219 10*3/mm3      Neutrophil % 89.0 %      Lymphocyte % 3.1 %      Monocyte % 5.1 %      Eosinophil % 0.2 %      Basophil % 0.2 %      Immature Grans % 2.4 %      Neutrophils, Absolute 18.50 10*3/mm3      Lymphocytes, Absolute 0.64 10*3/mm3      Monocytes, Absolute 1.07 10*3/mm3      Eosinophils, Absolute 0.05 10*3/mm3      Basophils, Absolute 0.04 10*3/mm3      Immature Grans, Absolute 0.50 10*3/mm3      nRBC 0.0 /100 WBC     POC Glucose Once [[id number]]  (Abnormal) Collected: 04/03/25 0629    Specimen: Blood Updated: 04/03/25 0633     Glucose 269 mg/dL     Hepatitis B Surface Antigen [[id number]]  (Normal) Collected: 04/02/25 2113    Specimen: Blood Updated: 04/02/25 2229     Hepatitis B Surface Ag Non-Reactive     STAT Lactic Acid, Reflex [[id number]]  (Normal) Collected: 04/02/25 2113    Specimen: Blood Updated: 04/02/25 2152     Lactate 1.9 mmol/L     POC Glucose Once [[id number]]  (Abnormal) Collected: 04/02/25 2108    Specimen: Blood Updated: 04/02/25 2110     Glucose 351 mg/dL     Blood Culture ID, PCR - Blood, Hand, Left [[id number]]  (Abnormal) Collected: 04/02/25 1043    Specimen: Blood from Hand, Left Updated: 04/02/25 2022     BCID, PCR Escherichia coli. Identification by BCID2 PCR.     BCID, PCR 2 CTX-M (ESBL) detected. Identification by BCID2 PCR     BOTTLE TYPE Anaerobic Bottle    STAT Lactic Acid, Reflex [[id number]]  (Abnormal) Collected: 04/02/25 1925    Specimen: Blood Updated: 04/02/25 1956     Lactate 2.8 mmol/L           Imaging Results (Last 24 Hours)       Procedure Component Value Units Date/Time    FL Retrograde Pyelogram In OR [[id number]] Collected: 04/02/25 2040     Updated: 04/02/25 2040    Narrative:      RETROGRADE PYELOGRAM INTERPRETATION ONLY 04/02/2025     HISTORY: Retrograde pyelogram.     FINDINGS: Contrast is seen in the bilateral collecting systems and  ureters. 1 or 2 tiny filling defects are seen in the proximal right  ureter just distal to the right ureteral pelvic junction which may  represent 1 or 2 tiny stones or air bubbles. There is a moderate size  filling defect in the region of the left ureteral pelvic junction. No  stones of this size are seen on the CT scan from 04/02/2025 and this  could represent air bubble or possibly a hematoma. There is mild  dilatation of the left collecting system. Left mid to distal ureter  appears normal. Double-J left ureteral stent is seen in good position.     Fluoroscopy time 24 seconds 5 images 4 mGy (K, A, R)                     Medication Review:   I have personally reviewed    Current Facility-Administered Medications:     acetaminophen (TYLENOL) tablet 650 mg, 650 mg, Oral, Q4H PRN, 650 mg at 04/02/25 1320 **OR** acetaminophen (TYLENOL) 160 MG/5ML  oral solution 650 mg, 650 mg, Oral, Q4H PRN **OR** acetaminophen (TYLENOL) suppository 650 mg, 650 mg, Rectal, Q4H PRN, Tl Gray MD    albuterol (ACCUNEB) nebulizer solution 0.63 mg, 0.63 mg, Nebulization, Q6H PRN, Tl Gray MD    atorvastatin (LIPITOR) tablet 10 mg, 10 mg, Oral, Nightly, Tl Gray MD, 10 mg at 04/03/25 0110    sennosides-docusate (PERICOLACE) 8.6-50 MG per tablet 2 tablet, 2 tablet, Oral, BID PRN **AND** polyethylene glycol (MIRALAX) packet 17 g, 17 g, Oral, Daily PRN **AND** bisacodyl (DULCOLAX) EC tablet 5 mg, 5 mg, Oral, Daily PRN **AND** bisacodyl (DULCOLAX) suppository 10 mg, 10 mg, Rectal, Daily PRN, Tl Gray MD    cyclobenzaprine (FLEXERIL) tablet 10 mg, 10 mg, Oral, Nightly PRN, Tl Gray MD, 10 mg at 04/03/25 0111    dextrose (D50W) (25 g/50 mL) IV injection 25 g, 25 g, Intravenous, Q15 Min PRN, Tl Gray MD    dextrose (GLUTOSE) oral gel 15 g, 15 g, Oral, Q15 Min PRN, Tl Gray MD    ertapenem (INVanz) 500 mg in sodium chloride 0.9 % 50 mL IVPB, 500 mg, Intravenous, Q24H, Segundo Cisneros APRN, Last Rate: 100 mL/hr at 04/03/25 0230, 500 mg at 04/03/25 0230    fluticasone (FLONASE) 50 MCG/ACT nasal spray 2 spray, 2 spray, Each Nare, Daily, Tl Gray MD    gabapentin (NEURONTIN) capsule 300 mg, 300 mg, Oral, Nightly PRN, Tl Gray MD, 300 mg at 04/03/25 0111    glucagon (GLUCAGEN) injection 1 mg, 1 mg, Intramuscular, Q15 Min PRN, Tl Gray MD    heparin (porcine) injection 2,000 Units, 2,000 Units, Intracatheter, PRN, Dusty Connolly MD, 2,000 Units at 04/02/25 2112    heparin injection 300 Units, 3 mL, Intravenous, Daily PRN, Génesis Miles MD    HYDROmorphone (DILAUDID) injection 0.5 mg, 0.5 mg, Intravenous, Q2H PRN, Tl Gray MD, 0.5 mg at 04/03/25 0115    insulin glargine (LANTUS, SEMGLEE) injection 10 Units, 10 Units, Subcutaneous,  Nightly, Génesis Miles MD    insulin lispro (HUMALOG/ADMELOG) injection 2-7 Units, 2-7 Units, Subcutaneous, 4x Daily AC & at Bedtime, Tl Gray MD, 5 Units at 04/03/25 1638    lanthanum (FOSRENOL) chewable tablet 2,000 mg, 2,000 mg, Oral, BID With Meals, Tl Gray MD, 2,000 mg at 04/03/25 1732    melatonin tablet 5 mg, 5 mg, Oral, Nightly PRN, Tl Gray MD, 5 mg at 04/03/25 0111    midodrine (PROAMATINE) tablet 2.5 mg, 2.5 mg, Oral, Once, Génesis Miles MD    [START ON 4/4/2025] midodrine (PROAMATINE) tablet 5 mg, 5 mg, Oral, Daily, Génesis Miles MD    nitroglycerin (NITROSTAT) SL tablet 0.4 mg, 0.4 mg, Sublingual, Q5 Min PRN, Tl Gray MD    ondansetron ODT (ZOFRAN-ODT) disintegrating tablet 4 mg, 4 mg, Oral, Q6H PRN **OR** ondansetron (ZOFRAN) injection 4 mg, 4 mg, Intravenous, Q6H PRN, Tl Gray MD    oxyCODONE-acetaminophen (PERCOCET) 5-325 MG per tablet 1 tablet, 1 tablet, Oral, Q6H PRN, Tl Gray MD    sodium chloride 0.9 % flush 10 mL, 10 mL, Intravenous, Q12H, Tl Gray MD, 10 mL at 04/03/25 0754    sodium chloride 0.9 % flush 10 mL, 10 mL, Intravenous, PRN, Tl Gray MD    sodium chloride 0.9 % flush 10 mL, 10 mL, Intravenous, Q12H, Génesis Miles MD, 10 mL at 04/03/25 0915    sodium chloride 0.9 % flush 10 mL, 10 mL, Intravenous, Q12H, Génesis Miles MD, 10 mL at 04/03/25 0914    sodium chloride 0.9 % flush 10 mL, 10 mL, Intravenous, PRN, Génesis Miles MD    sodium chloride 0.9 % flush 20 mL, 20 mL, Intravenous, PRN, Génesis Miles MD    sodium chloride 0.9 % infusion 40 mL, 40 mL, Intravenous, PRN, Tl Gray MD    sodium chloride 0.9 % infusion 40 mL, 40 mL, Intravenous, PRN, Génesis Miles MD    [Held by provider] torsemide (DEMADEX) tablet 100 mg, 100 mg, Oral, Nightly, Tl Gray MD    vitamin B-12 (CYANOCOBALAMIN) tablet 1,000 mcg, 1,000 mcg,  Oral, Daily, Tl Gray MD, 1,000 mcg at 04/03/25 0753    vitamin D (ERGOCALCIFEROL) capsule 50,000 Units, 50,000 Units, Oral, Once per day on Monday Thursday, Tl Gray MD, 50,000 Units at 04/03/25 0753    Allergies:    Tomato, Cat dander, Mixed grasses, Latex, and Penicillin g    Assessment:    Active Problems:    Sepsis    Hyperlipidemia    Asthma    Essential hypertension    Uncontrolled type 2 diabetes mellitus with hyperglycemia    ESRD (end stage renal disease)    Emphysematous pyelonephritis of left kidney    Hydronephrosis of left kidney       Nonobstructing pyelonephritis with hydronephrosis.  Findings consistent with low-grade emphysematous pyelonephritis    Plan:    Continue stent for now.  Antibiotics to be decided upon by infectious disease.  Plan to remove the stent in about 2 weeks in the office setting.  We will sign off.  Call if needed this admission and we will be in touch with her to get her stent out.      Tl Gray MD    4/3/2025  17:52 EDT

## 2025-04-03 NOTE — PLAN OF CARE
Goal Outcome Evaluation:  Pt resting in bed. HD completed, pt tolerated well. Medicated for abd pain per MAR. No other c/o voiced. VSS No s/s of distress. Plan of care ongoing

## 2025-04-03 NOTE — PLAN OF CARE
Problem: Adult Inpatient Plan of Care  Goal: Plan of Care Review  Outcome: Progressing  Flowsheets (Taken 4/3/2025 1449)  Progress: improving  Outcome Evaluation: Patient has been cooperative during shift. AOx4, assist x1, room air, SR. HD [facility] called for tomorrow 4/4. ID consulted. Need stool sample. No complaints of pain, nausea or SOA. Will continue to monitor and assist patient as needed.  Plan of Care Reviewed With: patient  Goal: Patient-Specific Goal (Individualized)  Outcome: Progressing  Goal: Absence of Hospital-Acquired Illness or Injury  Outcome: Progressing  Intervention: Identify and Manage Fall Risk  Recent Flowsheet Documentation  Taken 4/3/2025 1345 by Miguel Louise RN  Safety Promotion/Fall Prevention:   assistive device/personal items within reach   fall prevention program maintained   nonskid shoes/slippers when out of bed   safety round/check completed  Taken 4/3/2025 1200 by Miguel Louise RN  Safety Promotion/Fall Prevention:   assistive device/personal items within reach   fall prevention program maintained   nonskid shoes/slippers when out of bed   safety round/check completed  Taken 4/3/2025 1000 by Miguel Louise RN  Safety Promotion/Fall Prevention:   assistive device/personal items within reach   fall prevention program maintained   nonskid shoes/slippers when out of bed   safety round/check completed  Taken 4/3/2025 0743 by Miguel Louise RN  Safety Promotion/Fall Prevention:   assistive device/personal items within reach   fall prevention program maintained   nonskid shoes/slippers when out of bed   safety round/check completed  Intervention: Prevent Skin Injury  Recent Flowsheet Documentation  Taken 4/3/2025 1345 by Miguel Louise RN  Body Position: supine  Taken 4/3/2025 1200 by Miguel Louise RN  Body Position: supine  Taken 4/3/2025 1000 by Miguel Louise RN  Body Position: supine  Taken 4/3/2025 0743 by Miguel Louise RN  Body Position:  supine  Goal: Optimal Comfort and Wellbeing  Outcome: Progressing  Goal: Readiness for Transition of Care  Outcome: Progressing  Intervention: Mutually Develop Transition Plan  Recent Flowsheet Documentation  Taken 4/3/2025 0920 by Miguel Louise, RN  Equipment Currently Used at Home: rollator   Goal Outcome Evaluation:  Plan of Care Reviewed With: patient        Progress: improving  Outcome Evaluation: Patient has been cooperative during shift. AOx4, assist x1, room air, SR. HD [facility] called for tomorrow 4/4. ID consulted. Need stool sample. No complaints of pain, nausea or SOA. Will continue to monitor and assist patient as needed.

## 2025-04-03 NOTE — THERAPY EVALUATION
Patient Name: Kelly Pack  : 1970    MRN: [id number]                              Today's Date: 4/3/2025       Admit Date: 2025    Visit Dx:     ICD-10-CM ICD-9-CM   1. Hydronephrosis of left kidney  N13.30 591   2. Sepsis without acute organ dysfunction, due to unspecified organism  A41.9 038.9     995.91   3. Emphysematous pyelonephritis of left kidney  N12 590.80   4. ESRD on dialysis  N18.6 585.6    Z99.2 V45.11   5. Sepsis, due to unspecified organism, unspecified whether acute organ dysfunction present  A41.9 038.9     995.91     Patient Active Problem List   Diagnosis    Hyperlipidemia    Allergic rhinitis    Nephrotic range proteinuria    Asthma    Essential hypertension    Uncontrolled type 2 diabetes mellitus with hyperglycemia    Acute renal failure superimposed on stage 3a chronic kidney disease    Anasarca    Suspected sleep apnea    Anemia    Bilateral lower extremity edema    Elevated troponin    Acute renal failure superimposed on chronic kidney disease    Type 2 diabetes mellitus with chronic kidney disease    Symptomatic anemia    ESRD (end stage renal disease)    Sepsis    Emphysematous pyelonephritis of left kidney    Hydronephrosis of left kidney     Past Medical History:   Diagnosis Date    Albuminuria     Allergic     Seasonal, grass, cats and some dogs    Allergic rhinitis     Asthma     allergy triggered    Bell's palsy     Cataract     Had surgery on both eyes    Cholelithiasis     Removed. Have bile reflux/ vomiting    CKD (chronic kidney disease)     Diabetes mellitus     Drug therapy     Endometrial cancer     HL (hearing loss)     In L ear    Hyperlipidemia     Hypertension     Low back pain     Migraine     Obesity     Peripheral neuropathy     Renal insufficiency     Right otitis media     Type II diabetes mellitus     Visual impairment     Diabetic retinopathy. Oil in L eye due to retina detachment s.    Vitamin D deficiency      Past Surgical History:   Procedure  Laterality Date    ARTERIOVENOUS FISTULA  05/2023    CATARACT EXTRACTION      CHOLECYSTECTOMY      CYSTOSCOPY, RETROGRADE PYELOGRAM AND STENT INSERTION Left 4/2/2025    Procedure: CYSTOSCOPY RETROGRADE PYELOGRAM AND STENT INSERTION;  Surgeon: Tl Gray MD;  Location: [facility];  Service: Urology;  Laterality: Left;    EYE SURGERY      NOV 2020    HYSTERECTOMY      due to cancer    INSERTION HEMODIALYSIS CATHETER Right 06/23/2023    Procedure: HEMODIALYSIS CATHETER INSERTION;  Surgeon: Mikael Mackay MD;  Location: [facility];  Service: Vascular;  Laterality: Right;    OOPHORECTOMY      VITRECTOMY PARS PLANA Left 01/28/2020    Procedure: 25G VITRECTOMY-ENDO LASER;  Surgeon: Lazarus, Howard S., MD;  Location: [facility];  Service: Ophthalmology      General Information       Row Name 04/03/25 1611          Physical Therapy Time and Intention    Document Type evaluation  -EJ     Mode of Treatment physical therapy  -       Row Name 04/03/25 1611          General Information    Patient Profile Reviewed yes  -EJ     Prior Level of Function independent:;all household mobility;community mobility;ADL's  -EJ     Existing Precautions/Restrictions fall  -EJ     Barriers to Rehab none identified  -EJ       Row Name 04/03/25 1611          Living Environment    Current Living Arrangements apartment  -EJ     People in Home alone  -EJ       Row Name 04/03/25 1611          Home Main Entrance    Number of Stairs, Main Entrance none  -EJ       Row Name 04/03/25 1611          Cognition    Orientation Status (Cognition) oriented x 4  -EJ       Row Name 04/03/25 1611          Safety Issues/Impairments Affecting Functional Mobility    Impairments Affecting Function (Mobility) strength  -EJ               User Key  (r) = Recorded By, (t) = Taken By, (c) = Cosigned By      Initials Name Provider Type    EJ Leonie Huggins, PT Physical Therapist                   Mobility       Row Name 04/03/25 1616           Bed Mobility    Bed Mobility supine-sit;sit-supine  -EJ     Supine-Sit [place] (Bed Mobility) verbal cues;standby assist  -EJ     Sit-Supine [place] (Bed Mobility) verbal cues;standby assist;contact guard  -EJ     Assistive Device (Bed Mobility) head of bed elevated  -EJ       [facility] Name 04/03/25 1613          Sit-Stand Transfer    Sit-Stand [place] (Transfers) verbal cues;contact guard  -EJ       Row Name 04/03/25 1613          Gait/Stairs (Locomotion)    [place] Level (Gait) verbal cues;contact guard  -EJ     Distance in Feet (Gait) 5  -EJ     Deviations/Abnormal Patterns (Gait) aniceto decreased  -EJ     Comment, (Gait/Stairs) decreased distance 2' low BP in standing  -EJ               User Key  (r) = Recorded By, (t) = Taken By, (c) = Cosigned By      Initials Name Provider Type    Leonie Puentes, PT Physical Therapist                   Obj/Interventions       Row Name 04/03/25 1614          Range of Motion Comprehensive    General Range of Motion no range of motion deficits identified  -EJ       Row Name 04/03/25 1614          Strength Comprehensive (MMT)    Comment, General Manual Muscle Testing (MMT) Assessment generalized weakness  -EJ       Row Name 04/03/25 1614          Balance    Balance Assessment sitting static balance;standing static balance;standing dynamic balance  -EJ     Static Sitting Balance supervision  -EJ     Position, Sitting Balance sitting edge of bed  -EJ     Static Standing Balance contact guard  -EJ     Dynamic Standing Balance contact guard  -EJ               User Key  (r) = Recorded By, (t) = Taken By, (c) = Cosigned By      Initials Name Provider Type    Leonie Puentes, PT Physical Therapist                   Goals/Plan       Row Name 04/03/25 1621          Bed Mobility Goal 1 (PT)    Activity/Assistive Device (Bed Mobility Goal 1, PT) bed mobility activities, all  -EJ     [place] Level/Cues Needed (Bed Mobility Goal 1, PT) supervision required   -EJ     Time Frame (Bed Mobility Goal 1, PT) 2 weeks  -EJ       Row Name 04/03/25 1621          Transfer Goal 1 (PT)    Activity/Assistive Device (Transfer Goal 1, PT) transfers, all  -EJ     [place] Level/Cues Needed (Transfer Goal 1, PT) standby assist  -EJ     Time Frame (Transfer Goal 1, PT) 2 weeks  -EJ       Row Name 04/03/25 1621          Gait Training Goal 1 (PT)    Activity/Assistive Device (Gait Training Goal 1, PT) gait (walking locomotion)  -EJ     [place] Level (Gait Training Goal 1, PT) contact guard required  -EJ     Distance (Gait Training Goal 1, PT) 150  -EJ     Time Frame (Gait Training Goal 1, PT) 2 weeks  -EJ       Row Name 04/03/25 1621          Therapy Assessment/Plan (PT)    Planned Therapy Interventions (PT) balance training;bed mobility training;gait training;home exercise program;transfer training;stretching;strengthening;stair training;ROM (range of motion);patient/family education  -EJ               User Key  (r) = Recorded By, (t) = Taken By, (c) = Cosigned By      Initials Name Provider Type    EJ Leonie Huggins, PT Physical Therapist                   Clinical Impression       Row Name 04/03/25 1614          Pain    Pretreatment Pain Rating 0/10 - no pain  -EJ     Posttreatment Pain Rating 0/10 - no pain  -EJ       Row Name 04/03/25 1614          Plan of Care Review    Plan of Care Reviewed With patient  -EJ     Outcome Evaluation Pt seen for PT eval this AM. She was admitted to [facility] yesterday. SHe is a 54 y.o. female with a medical history of ESRD on dialysis, type 2 diabetes, anemia, hypertension, hyperlipidemia, asthma who presents to the ED c/o acute left-sided abdominal pain, nausea, vomiting, diarrhea. Pt had cystoscopy yesterday. At baseline, pt lives at home alone and is independent w mobility and ADLs. She does not use AD. Pt reports being told she has orthostatic hypotension and reports some of her dialysis treatments have to be stopped early due to low BP.  Today, pt presents w generalized and weakness and decreased activity tolerance. Activity primarily limited today due to positive orthostatics when mobilizing out of bed. She was able to transition to EOB w supervision. She stood w SBA/CGA and took several small steps to recliner. BP taken in supine, sitting, and standing w a continued drop in BP in each position. Once pt in recliner, BP taken again and now even lower at 79/56. Nsg assistant present and RN notified. Pt assisted back to bed. Once back in supine, BP increased to 121/69. RN present at this time. Pt hopeful to return home at [place]. She will continue to benefit from skilled PT to maximize safety, strength, and independence w mobility.  -EJ       Row Name 04/03/25 1614          Therapy Assessment/Plan (PT)    Rehab Potential (PT) good  -EJ     Criteria for Skilled Interventions Met (PT) yes  -EJ     Therapy Frequency (PT) 6 times/wk  -EJ       Row Name 04/03/25 1614          Positioning and Restraints    Pre-Treatment Position in bed  -EJ     Post Treatment Position bed  -EJ     In Bed notified nsg;supine;call light within reach;encouraged to call for assist;exit alarm on;with nsg  -EJ               User Key  (r) = Recorded By, (t) = Taken By, (c) = Cosigned By      Initials Name Provider Type    Leonie Puentes, PT Physical Therapist                   Outcome Measures       Row Name 04/03/25 1621 04/03/25 0743       How much help from another person do you currently need...    Turning from your back to your side while in flat bed without using bedrails? 4  -EJ 4  -MS    Moving from lying on back to sitting on the side of a flat bed without bedrails? 4  -EJ 3  -MS    Moving to and from a bed to a chair (including a wheelchair)? 3  -EJ 3  -MS    Standing up from a chair using your arms (e.g., wheelchair, bedside chair)? 3  -EJ 3  -MS    Climbing 3-5 steps with a railing? 2  -EJ 2  -MS    To walk in hospital room? 3  -EJ 3  -MS    AM-PAC 6 Clicks Score  (PT) 19  -EJ 18  -MS              User Key  (r) = Recorded By, (t) = Taken By, (c) = Cosigned By      Initials Name Provider Type    Miguel Moctezuma, RN Registered Nurse    Leonie Puentes, PT Physical Therapist                                   PT Recommendation and Plan  Planned Therapy Interventions (PT): balance training, bed mobility training, gait training, home exercise program, transfer training, stretching, strengthening, stair training, ROM (range of motion), patient/family education  Outcome Evaluation: Pt seen for PT eval this AM. She was admitted to [facility] yesterday. SHe is a 54 y.o. female with a medical history of ESRD on dialysis, type 2 diabetes, anemia, hypertension, hyperlipidemia, asthma who presents to the ED c/o acute left-sided abdominal pain, nausea, vomiting, diarrhea. Pt had cystoscopy yesterday. At baseline, pt lives at home alone and is independent w mobility and ADLs. She does not use AD. Pt reports being told she has orthostatic hypotension and reports some of her dialysis treatments have to be stopped early due to low BP. Today, pt presents w generalized and weakness and decreased activity tolerance. Activity primarily limited today due to positive orthostatics when mobilizing out of bed. She was able to transition to EOB w supervision. She stood w SBA/CGA and took several small steps to recliner. BP taken in supine, sitting, and standing w a continued drop in BP in each position. Once pt in recliner, BP taken again and now even lower at 79/56. Nsg assistant present and RN notified. Pt assisted back to bed. Once back in supine, BP increased to 121/69. RN present at this time. Pt hopeful to return home at [place]. She will continue to benefit from skilled PT to maximize safety, strength, and independence w mobility.     Time Calculation:         PT Charges       Row Name 04/03/25 1881             Time Calculation    Start Time 0945  -EJ      Stop Time 1016  -EJ      Time Calculation  (min) 31 min  -EJ      PT Received On 04/03/25  -EJ      PT - Next Appointment 04/04/25  -EJ      PT Goal Re-Cert Due Date 04/17/25  -EJ         Time Calculation- PT    Total Timed Code Minutes- PT 25 minute(s)  -EJ                User Key  (r) = Recorded By, (t) = Taken By, (c) = Cosigned By      Initials Name Provider Type    EJ Leonie Huggins, PT Physical Therapist                  Therapy Charges for Today       Code Description Service Date Service Provider Modifiers Qty    94262842441 HC PT EVAL MOD COMPLEXITY 3 4/3/2025 Leonie Huggins, PT GP 1    26948864323 HC PT THERAPEUTIC ACT EA 15 MIN 4/3/2025 Leonie Huggins, PT GP 2            PT G-Codes  AM-PAC 6 Clicks Score (PT): 19  PT Discharge Summary  Anticipated Discharge Disposition (PT): home, home with assist    Leonie Huggins, PT  4/3/2025

## 2025-04-03 NOTE — PROGRESS NOTES
Name: Kelly Pack ADMIT: 2025   : 1970  PCP: Kim Benjamin APRN    MRN: [id number] LOS: 1 days   AGE/SEX: 54 y.o. female  ROOM: [facility]     Subjective   Subjective   Resting in bed, had an episode of low blood pressure this morning when she got up with PT however she reported that she has been diagnosed with orthostatic hypotension before and takes midodrine for this.       Objective   Objective   Vital Signs  Temp:  [97.9 °F (36.6 °C)-98.8 °F (37.1 °C)] 97.9 °F (36.6 °C)  Heart Rate:  [] 95  Resp:  [16-20] 18  BP: ()/(50-86) 121/69  SpO2:  [88 %-100 %] 94 %  on  Flow (L/min) (Oxygen Therapy):  [2-3] 2;   Device (Oxygen Therapy): room air  Body mass index is 35.88 kg/m².  Physical Exam  Constitutional:       General: She is not in acute distress.     Appearance: Normal appearance. She is not ill-appearing.   Neck:      Comments: Right neck TDC  Cardiovascular:      Rate and Rhythm: Normal rate and regular rhythm.      Pulses: Normal pulses.      Heart sounds: No murmur heard.  Pulmonary:      Effort: Pulmonary effort is normal. No respiratory distress.      Breath sounds: Normal breath sounds. No wheezing.   Abdominal:      General: Abdomen is flat. Bowel sounds are normal. There is no distension.      Palpations: Abdomen is soft.   Musculoskeletal:         General: No swelling or tenderness.      Right lower leg: No edema.      Left lower leg: No edema.   Skin:     General: Skin is warm and dry.   Neurological:      General: No focal deficit present.      Mental Status: She is alert and oriented to person, place, and time. Mental status is at baseline.         Results Review     I reviewed the patient's new clinical results.  Results from last 7 days   Lab Units 25  0619 25  0750   WBC 10*3/mm3 20.80* 18.66*   HEMOGLOBIN g/dL 9.4* 10.4*   PLATELETS 10*3/mm3 219 244     Results from last 7 days   Lab Units 25  0619 25  0750   SODIUM mmol/L 130* 132*    POTASSIUM mmol/L 4.1 4.7   CHLORIDE mmol/L 91* 92*   CO2 mmol/L 23.1 19.5*   BUN mg/dL 46* 79*   CREATININE mg/dL 6.52* 11.69*   GLUCOSE mg/dL 302* 244*   Estimated Creatinine Clearance: 13.3 mL/min (A) (by C-G formula based on SCr of 6.52 mg/dL (H)).  Results from last 7 days   Lab Units 04/02/25  0750   ALBUMIN g/dL 2.9*   BILIRUBIN mg/dL 0.3   ALK PHOS U/L 160*   AST (SGOT) U/L 11   ALT (SGPT) U/L 6     Results from last 7 days   Lab Units 04/03/25  0619 04/02/25  0750   CALCIUM mg/dL 8.1* 8.2*   ALBUMIN g/dL  --  2.9*   MAGNESIUM mg/dL 2.3  --    PHOSPHORUS mg/dL 4.5  --      Results from last 7 days   Lab Units 04/02/25  2113 04/02/25  1925 04/02/25  1405 04/02/25  1048   LACTATE mmol/L 1.9 2.8* 3.4* 2.6*     COVID19   Date Value Ref Range Status   04/02/2025 Not Detected Not Detected - Ref. Range Final   12/03/2024 Not Detected Not Detected - Ref. Range Final     Hemoglobin A1C   Date/Time Value Ref Range Status   04/02/2025 0750 8.20 (H) 4.80 - 5.60 % Final     Glucose   Date/Time Value Ref Range Status   04/03/2025 1036 341 (H) 70 - 130 mg/dL Final   04/03/2025 0629 269 (H) 70 - 130 mg/dL Final   04/02/2025 2108 351 (H) 70 - 130 mg/dL Final   04/02/2025 1504 231 (H) 70 - 130 mg/dL Final   04/02/2025 1430 241 (H) 70 - 130 mg/dL Final       FL Retrograde Pyelogram In OR  RETROGRADE PYELOGRAM INTERPRETATION ONLY 04/02/2025     HISTORY: Retrograde pyelogram.     FINDINGS: Contrast is seen in the bilateral collecting systems and  ureters. 1 or 2 tiny filling defects are seen in the proximal right  ureter just distal to the right ureteral pelvic junction which may  represent 1 or 2 tiny stones or air bubbles. There is a moderate size  filling defect in the region of the left ureteral pelvic junction. No  stones of this size are seen on the CT scan from 04/02/2025 and this  could represent air bubble or possibly a hematoma. There is mild  dilatation of the left collecting system. Left mid to distal  ureter  appears normal. Double-J left ureteral stent is seen in good position.     Fluoroscopy time 24 seconds 5 images 4 mGy (K, A, R)           CT Abdomen Pelvis Without Contrast  Narrative: CT ABDOMEN PELVIS WO CONTRAST-     DATE OF EXAM: 4/2/2025 8:22 AM     INDICATION: left abdominal pain, n/v/d x 3 days.     COMPARISON: CT guided renal biopsy 11/14/2022. Radiographs of the sacrum  and coccyx 6/24/2023.     TECHNIQUE: Multiple contiguous axial images were acquired through the  abdomen and pelvis without the intravenous administration of contrast.  Reformatted coronal and sagittal sequences were also reviewed. Radiation  dose reduction techniques were utilized, including automated exposure  control and exposure modulation based on body size.     FINDINGS:  The included lung bases are clear. Partially imaged calcified coronary  artery disease and trace pericardial fluid. Relative hyperdensity of the  interventricular septum suggest anemia.     Status post cholecystectomy. The liver, spleen, pancreas, and adrenal  glands are unremarkable. Moderate left hydronephrosis and hydroureter  with left perinephric and periureteral fat stranding and air distended  calyces in the upper pole of the left kidney, suggesting emphysematous  pyelitis. Bilateral renal calcifications are likely vascular and  nonobstructing. No obstructing renal or ureteral stone is identified.  The urinary bladder is nondistended. Diffuse urinary bladder wall  thickening could be accentuated by under distention. Status post  hysterectomy. The adnexa are not definitively identified.     Small periumbilical hernia containing a short knuckle of nondistended  transverse colon. Mild colorectal stool. No bowel obstruction or  significant bowel wall thickening. The appendix is normal.     Nonspecific small volume free fluid in the pelvis. No free  intraperitoneal air. No pathologically enlarged lymph nodes in the  abdomen or pelvis. Mild to moderate  calcified atherosclerotic disease in  the abdominal aorta and its distal branches without aneurysm.     Mild rotary lumbar levoscoliosis. Multilevel lumbar spondylosis, most  severe at L5-S1. Mild to moderate bilateral hip and SI joint DJD. No  acute osseous abnormality or concerning osseous lesion.     Impression:    1. Moderate left hydronephrosis and hydroureter with left perinephric  and periureteral fat stranding and air distended calyces in the upper  pole of the left kidney, suggesting emphysematous pyelitis. Recommend  correlating with urinalysis.  2. Urinary bladder wall thickening could be accentuated by  underdistention but could also reflect a nonspecific cystitis.  3. Small periumbilical hernia containing a short knuckle of nondistended  transverse colon.     This report was finalized on 4/2/2025 9:07 AM by Corbin Mayberry MD on  Workstation: [id number]       Scheduled Medications  atorvastatin, 10 mg, Oral, Nightly  ertapenem, 500 mg, Intravenous, Q24H  fluticasone, 2 spray, Each Nare, Daily  insulin lispro, 2-7 Units, Subcutaneous, 4x Daily AC & at Bedtime  lanthanum, 2,000 mg, Oral, BID With Meals  midodrine, 2.5 mg, Oral, Once  [START ON 4/4/2025] midodrine, 5 mg, Oral, Daily  sodium chloride, 10 mL, Intravenous, Q12H  sodium chloride, 10 mL, Intravenous, Q12H  sodium chloride, 10 mL, Intravenous, Q12H  [Held by provider] torsemide, 100 mg, Oral, Nightly  cyanocobalamin, 1,000 mcg, Oral, Daily  vitamin D, 50,000 Units, Oral, Once per day on Monday Thursday    Infusions   Diet  Diet: Regular/House; Fluid Consistency: Thin (IDDSI 0)         I have personally reviewed:  [x]  Laboratory   []  Microbiology   []  Radiology   [x]  EKG/Telemetry   []  Cardiology/Vascular   []  Pathology   []  Records    Assessment/Plan     Active Hospital Problems    Diagnosis  POA    **Sepsis [A41.9]  Yes    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       Ms. Pack is a 54 y.o.  with a history of ESRD on HD, anemia of chronic disease, asthma who presents with severe left-sided pain, nausea, vomiting, found to have emphysematous pyelitis and sepsis.     ESBL E coli bacteremia  Sepsis (febrile 103, tachycardic 114, WBC 18) related to emphysematous pyelitis  Left sided hydronephrosis  Lactic acidosis  - urology consulted, patient to OR for cystoscopy, left stent placement 4/2/25  - continue rocephin; follow up urine and blood cx  - blood cx now + for ESBL e coli, ID consulted and abx adjusted to ertapenem     ESRD on HD  Anemia of chronic disease  - renal consulted; d/w Dr. Connolly at bedside  - pt did miss HD yesterday due to feeling poorly  - torsemide held due to sepsis/normal pressures; defer to renal when to restart  - dialysis ordered per renal  - midodrine, lanthanum continued from home     Orthostatic hypotension  - on middrine on non HD days  - BP low here- increase to daily 5mg     HTN  - torsemide and coreg held given sepsis/normal BP  - will resume as able     Type 2 diabetes mellitus  - no home DM meds on med rec; BG are >200  - start SSI and monitor, repat A1c, last one from 2024     Asthma  - prn nebs (home regimen)    SCDs for DVT prophylaxis.  Full code.  Discussed with patient and nursing staff.  Anticipated discharge home, TBD        Génesis Miles MD  [facility]  04/03/25  15:12 EDT    I wore protective equipment throughout this patient encounter including gloves.  Hand hygiene was performed before donning protective equipment and after removal when leaving the room.    Expected Discharge Date and Time       Expected Discharge Date Expected  Discharge Time    Apr 7, 2025              Copied text in this note has been reviewed and is accurate as of 04/03/25.

## 2025-04-03 NOTE — PROGRESS NOTES
"RENAL/KCC:     LOS: 1 day    Patient Care Team:  Kim Benjamin APRN as PCP - General (Nurse Practitioner)  Miguel Stahl MD as Consulting Physician (Hematology and Oncology)  Radha Martinez APRN as Referring Physician (Nurse Practitioner)    Chief Complaint:  ESRD, Abdominal pain    Subjective     Interval History:   Chart reviewed  S/P cysto and L ureteral stent placement on 4/2/25  Had HD yesterday  Pain is slightly better but still severe      Objective     Vital Sign Min/Max for last 24 hours  Temp  Min: 97.9 °F (36.6 °C)  Max: 103 °F (39.4 °C)   BP  Min: 76/52  Max: 162/87   Pulse  Min: 82  Max: 114   Resp  Min: 16  Max: 20   SpO2  Min: 88 %  Max: 100 %   Flow (L/min) (Oxygen Therapy)  Min: 2  Max: 3   Weight  Min: 110 kg (243 lb)  Max: 110 kg (243 lb)     Flowsheet Rows      Flowsheet Row First Filed Value   Admission Height 175.3 cm (69\") Documented at 04/02/2025 1049   Admission Weight 110 kg (243 lb) Documented at 04/02/2025 1049            No intake/output data recorded.  I/O last 3 completed shifts:  In: 1830 [P.O.:330; I.V.:500; IV Piggyback:1000]  Out: -     Physical Exam:  GEN: Awake, NAD  ENT: PERRL, EOMI, MMM  NECK: Supple, no JVD  CHEST: CTAB, no W/R/C  CV: RRR, no M/G/R  ABD: Soft, NT, +BS  SKIN: Warm and Dry  NEURO: CN's intact      WBC WBC   Date Value Ref Range Status   04/03/2025 20.80 (H) 3.40 - 10.80 10*3/mm3 Final   04/02/2025 18.66 (H) 3.40 - 10.80 10*3/mm3 Final      HGB Hemoglobin   Date Value Ref Range Status   04/03/2025 9.4 (L) 12.0 - 15.9 g/dL Final   04/02/2025 10.4 (L) 12.0 - 15.9 g/dL Final      HCT Hematocrit   Date Value Ref Range Status   04/03/2025 27.2 (L) 34.0 - 46.6 % Final   04/02/2025 31.4 (L) 34.0 - 46.6 % Final      Platlets No results found for: \"LABPLAT\"   MCV MCV   Date Value Ref Range Status   04/03/2025 95.8 79.0 - 97.0 fL Final   04/02/2025 97.2 (H) 79.0 - 97.0 fL Final          Sodium Sodium   Date Value Ref Range Status   04/03/2025 130 (L) 136 - " "145 mmol/L Final   04/02/2025 132 (L) 136 - 145 mmol/L Final      Potassium Potassium   Date Value Ref Range Status   04/03/2025 4.1 3.5 - 5.2 mmol/L Final   04/02/2025 4.7 3.5 - 5.2 mmol/L Final     Comment:     Slight hemolysis detected by analyzer. Result may be falsely elevated.      Chloride Chloride   Date Value Ref Range Status   04/03/2025 91 (L) 98 - 107 mmol/L Final   04/02/2025 92 (L) 98 - 107 mmol/L Final      CO2 CO2   Date Value Ref Range Status   04/03/2025 23.1 22.0 - 29.0 mmol/L Final   04/02/2025 19.5 (L) 22.0 - 29.0 mmol/L Final      BUN BUN   Date Value Ref Range Status   04/03/2025 46 (H) 6 - 20 mg/dL Final   04/02/2025 79 (H) 6 - 20 mg/dL Final      Creatinine Creatinine   Date Value Ref Range Status   04/03/2025 6.52 (H) 0.57 - 1.00 mg/dL Final   04/02/2025 11.69 (H) 0.57 - 1.00 mg/dL Final      Calcium Calcium   Date Value Ref Range Status   04/03/2025 8.1 (L) 8.6 - 10.5 mg/dL Final   04/02/2025 8.2 (L) 8.6 - 10.5 mg/dL Final      PO4 No results found for: \"CAPO4\"   Albumin Albumin   Date Value Ref Range Status   04/02/2025 2.9 (L) 3.5 - 5.2 g/dL Final      Magnesium Magnesium   Date Value Ref Range Status   04/03/2025 2.3 1.6 - 2.6 mg/dL Final      Uric Acid No results found for: \"URICACID\"        Results Review:     I reviewed the patient's new clinical results.    atorvastatin, 10 mg, Oral, Nightly  ertapenem, 500 mg, Intravenous, Q24H  fluticasone, 2 spray, Each Nare, Daily  insulin lispro, 2-7 Units, Subcutaneous, 4x Daily AC & at Bedtime  lanthanum, 2,000 mg, Oral, BID With Meals  midodrine, 2.5 mg, Oral, Once per day on Tuesday Thursday Saturday  sodium chloride, 10 mL, Intravenous, Q12H  [Held by provider] torsemide, 100 mg, Oral, Nightly  cyanocobalamin, 1,000 mcg, Oral, Daily  vitamin D, 50,000 Units, Oral, Once per day on Monday Thursday           Medication Review: Reviewed    Assessment & Plan     1) ESRD - on TTS HD  2) Abdominal pain  3) L hydronephrosis with emphysematous " pyelonephritis  4) DM  5) Hypotension - on Midodrine on non-HD days  6) Metabolic acidosis  7) Anemia of CKD  8) ESBL UTI/Pyelo  9) Bacteremia    Plan: HD tomorrow then Saturday to resume TTS schedule.  IV ABX per primary.  S/P cysto and stent placement with  following.  Continue Midodrine and have room to increase if needed.        Dusty Connolly MD  [facility]  04/03/25  08:04 EDT

## 2025-04-03 NOTE — PLAN OF CARE
Goal Outcome Evaluation:  Plan of Care Reviewed With: patient           Outcome Evaluation: Pt seen for PT prisca this AM. She was admitted to [facility] yesterday. SHe is a 54 y.o. female with a medical history of ESRD on dialysis, type 2 diabetes, anemia, hypertension, hyperlipidemia, asthma who presents to the ED c/o acute left-sided abdominal pain, nausea, vomiting, diarrhea. Pt had cystoscopy yesterday. At baseline, pt lives at home alone and is independent w mobility and ADLs. She does not use AD. Pt reports being told she has orthostatic hypotension and reports some of her dialysis treatments have to be stopped early due to low BP. Today, pt presents w generalized and weakness and decreased activity tolerance. Activity primarily limited today due to positive orthostatics when mobilizing out of bed. She was able to transition to EOB w supervision. She stood w SBA/CGA and took several small steps to recliner. BP taken in supine, sitting, and standing w a continued drop in BP in each position. Once pt in recliner, BP taken again and now even lower at 79/56. Nsg assistant present and RN notified. Pt assisted back to bed. Once back in supine, BP increased to 121/69. RN present at this time. Pt hopeful to return home at [place]. She will continue to benefit from skilled PT to maximize safety, strength, and independence w mobility.    Anticipated Discharge Disposition (PT): home, home with assist

## 2025-04-04 ENCOUNTER — APPOINTMENT (OUTPATIENT)
Dept: GENERAL RADIOLOGY | Facility: HOSPITAL | Age: 55
End: 2025-04-04
Payer: MEDICARE

## 2025-04-04 LAB
ANION GAP SERPL CALCULATED.3IONS-SCNC: 21.1 MMOL/L (ref 5–15)
ANION GAP SERPL CALCULATED.3IONS-SCNC: 21.7 MMOL/L (ref 5–15)
APTT PPP: 31 SECONDS (ref 22.7–35.4)
ARTERIAL PATENCY WRIST A: POSITIVE
ATMOSPHERIC PRESS: 751.5 MMHG
B-OH-BUTYR SERPL-SCNC: 0.63 MMOL/L (ref 0.02–0.27)
BACTERIA SPEC AEROBE CULT: ABNORMAL
BASE EXCESS BLDA CALC-SCNC: -2.3 MMOL/L (ref 0–2)
BASOPHILS # BLD AUTO: 0.04 10*3/MM3 (ref 0–0.2)
BASOPHILS NFR BLD AUTO: 0.2 % (ref 0–1.5)
BDY SITE: ABNORMAL
BUN SERPL-MCNC: 71 MG/DL (ref 6–20)
BUN SERPL-MCNC: 81 MG/DL (ref 6–20)
BUN/CREAT SERPL: 8.4 (ref 7–25)
BUN/CREAT SERPL: 9.5 (ref 7–25)
CALCIUM SPEC-SCNC: 8.3 MG/DL (ref 8.6–10.5)
CALCIUM SPEC-SCNC: 8.5 MG/DL (ref 8.6–10.5)
CHLORIDE SERPL-SCNC: 91 MMOL/L (ref 98–107)
CHLORIDE SERPL-SCNC: 93 MMOL/L (ref 98–107)
CO2 SERPL-SCNC: 17.9 MMOL/L (ref 22–29)
CO2 SERPL-SCNC: 19.3 MMOL/L (ref 22–29)
CREAT SERPL-MCNC: 8.42 MG/DL (ref 0.57–1)
CREAT SERPL-MCNC: 8.56 MG/DL (ref 0.57–1)
D-LACTATE SERPL-SCNC: 2.4 MMOL/L (ref 0.5–2)
D-LACTATE SERPL-SCNC: 4 MMOL/L (ref 0.5–2)
DEPRECATED RDW RBC AUTO: 46.3 FL (ref 37–54)
DEPRECATED RDW RBC AUTO: 46.9 FL (ref 37–54)
DEVICE COMMENT: ABNORMAL
EGFRCR SERPLBLD CKD-EPI 2021: 5.2 ML/MIN/1.73
EGFRCR SERPLBLD CKD-EPI 2021: 5.3 ML/MIN/1.73
EOSINOPHIL # BLD AUTO: 0.01 10*3/MM3 (ref 0–0.4)
EOSINOPHIL NFR BLD AUTO: 0.1 % (ref 0.3–6.2)
ERYTHROCYTE [DISTWIDTH] IN BLOOD BY AUTOMATED COUNT: 13.4 % (ref 12.3–15.4)
ERYTHROCYTE [DISTWIDTH] IN BLOOD BY AUTOMATED COUNT: 13.5 % (ref 12.3–15.4)
GEN 5 1HR TROPONIN T REFLEX: 251 NG/L
GLUCOSE BLDC GLUCOMTR-MCNC: 212 MG/DL (ref 70–130)
GLUCOSE BLDC GLUCOMTR-MCNC: 296 MG/DL (ref 70–130)
GLUCOSE BLDC GLUCOMTR-MCNC: 296 MG/DL (ref 70–130)
GLUCOSE BLDC GLUCOMTR-MCNC: 306 MG/DL (ref 70–130)
GLUCOSE BLDC GLUCOMTR-MCNC: 316 MG/DL (ref 70–130)
GLUCOSE SERPL-MCNC: 307 MG/DL (ref 65–99)
GLUCOSE SERPL-MCNC: 327 MG/DL (ref 65–99)
GRAM STN SPEC: ABNORMAL
HCO3 BLDA-SCNC: 22.5 MMOL/L (ref 22–28)
HCT VFR BLD AUTO: 24.3 % (ref 34–46.6)
HCT VFR BLD AUTO: 27.7 % (ref 34–46.6)
HEMODILUTION: NO
HGB BLD-MCNC: 8.8 G/DL (ref 12–15.9)
HGB BLD-MCNC: 9.4 G/DL (ref 12–15.9)
IMM GRANULOCYTES # BLD AUTO: 0.41 10*3/MM3 (ref 0–0.05)
IMM GRANULOCYTES NFR BLD AUTO: 2.4 % (ref 0–0.5)
INHALED O2 CONCENTRATION: 100 %
INR PPP: 1.29 (ref 0.9–1.1)
ISOLATED FROM: ABNORMAL
ISOLATED FROM: ABNORMAL
LYMPHOCYTES # BLD AUTO: 1.36 10*3/MM3 (ref 0.7–3.1)
LYMPHOCYTES NFR BLD AUTO: 8.1 % (ref 19.6–45.3)
MCH RBC QN AUTO: 32.5 PG (ref 26.6–33)
MCH RBC QN AUTO: 35.2 PG (ref 26.6–33)
MCHC RBC AUTO-ENTMCNC: 33.9 G/DL (ref 31.5–35.7)
MCHC RBC AUTO-ENTMCNC: 36.2 G/DL (ref 31.5–35.7)
MCV RBC AUTO: 95.8 FL (ref 79–97)
MCV RBC AUTO: 97.2 FL (ref 79–97)
MODALITY: ABNORMAL
MONOCYTES # BLD AUTO: 1.39 10*3/MM3 (ref 0.1–0.9)
MONOCYTES NFR BLD AUTO: 8.3 % (ref 5–12)
NEUTROPHILS NFR BLD AUTO: 13.63 10*3/MM3 (ref 1.7–7)
NEUTROPHILS NFR BLD AUTO: 80.9 % (ref 42.7–76)
NRBC BLD AUTO-RTO: 0.2 /100 WBC (ref 0–0.2)
O2 A-A PPRESDIFF RESPIRATORY: 0.8 MMHG
PCO2 BLDA: 37.6 MM HG (ref 35–45)
PEEP RESPIRATORY: 5 CM[H2O]
PH BLDA: 7.38 PH UNITS (ref 7.35–7.45)
PLATELET # BLD AUTO: 249 10*3/MM3 (ref 140–450)
PLATELET # BLD AUTO: 277 10*3/MM3 (ref 140–450)
PMV BLD AUTO: 11.3 FL (ref 6–12)
PMV BLD AUTO: 11.6 FL (ref 6–12)
PO2 BLD: 538 MM[HG] (ref 0–500)
PO2 BLDA: 538.3 MM HG (ref 80–100)
POTASSIUM SERPL-SCNC: 4.2 MMOL/L (ref 3.5–5.2)
POTASSIUM SERPL-SCNC: 4.9 MMOL/L (ref 3.5–5.2)
PROTHROMBIN TIME: 16 SECONDS (ref 11.7–14.2)
QT INTERVAL: 304 MS
QTC INTERVAL: 441 MS
RBC # BLD AUTO: 2.5 10*6/MM3 (ref 3.77–5.28)
RBC # BLD AUTO: 2.89 10*6/MM3 (ref 3.77–5.28)
SAO2 % BLDCOA: 100 % (ref 92–98.5)
SET MECH RESP RATE: 16
SODIUM SERPL-SCNC: 132 MMOL/L (ref 136–145)
SODIUM SERPL-SCNC: 132 MMOL/L (ref 136–145)
TOTAL RATE: 28 BREATHS/MINUTE
TROPONIN T % DELTA: 92
TROPONIN T NUMERIC DELTA: 120 NG/L
TROPONIN T SERPL HS-MCNC: 131 NG/L
VENTILATOR MODE: AC
VT ON VENT VENT: 500 ML
WBC NRBC COR # BLD AUTO: 16.84 10*3/MM3 (ref 3.4–10.8)
WBC NRBC COR # BLD AUTO: 17.21 10*3/MM3 (ref 3.4–10.8)

## 2025-04-04 PROCEDURE — 82010 KETONE BODYS QUAN: CPT | Performed by: INTERNAL MEDICINE

## 2025-04-04 PROCEDURE — 25010000002 EPINEPHRINE 1 MG/10ML SOLUTION PREFILLED SYRINGE: Performed by: STUDENT IN AN ORGANIZED HEALTH CARE EDUCATION/TRAINING PROGRAM

## 2025-04-04 PROCEDURE — 25010000002 HEPARIN (PORCINE) PER 1000 UNITS: Performed by: INTERNAL MEDICINE

## 2025-04-04 PROCEDURE — 36600 WITHDRAWAL OF ARTERIAL BLOOD: CPT

## 2025-04-04 PROCEDURE — 82803 BLOOD GASES ANY COMBINATION: CPT

## 2025-04-04 PROCEDURE — 25010000002 ONDANSETRON PER 1 MG: Performed by: UROLOGY

## 2025-04-04 PROCEDURE — 25010000002 HEPARIN (PORCINE) 25000-0.45 UT/250ML-% SOLUTION: Performed by: INTERNAL MEDICINE

## 2025-04-04 PROCEDURE — 80048 BASIC METABOLIC PNL TOTAL CA: CPT | Performed by: INTERNAL MEDICINE

## 2025-04-04 PROCEDURE — 71045 X-RAY EXAM CHEST 1 VIEW: CPT

## 2025-04-04 PROCEDURE — 25010000002 PROPOFOL 10 MG/ML EMULSION: Performed by: INTERNAL MEDICINE

## 2025-04-04 PROCEDURE — 5A1935Z RESPIRATORY VENTILATION, LESS THAN 24 CONSECUTIVE HOURS: ICD-10-PCS | Performed by: STUDENT IN AN ORGANIZED HEALTH CARE EDUCATION/TRAINING PROGRAM

## 2025-04-04 PROCEDURE — 94003 VENT MGMT INPAT SUBQ DAY: CPT

## 2025-04-04 PROCEDURE — 25010000002 ERTAPENEM PER 500 MG: Performed by: STUDENT IN AN ORGANIZED HEALTH CARE EDUCATION/TRAINING PROGRAM

## 2025-04-04 PROCEDURE — 92950 HEART/LUNG RESUSCITATION CPR: CPT

## 2025-04-04 PROCEDURE — 82948 REAGENT STRIP/BLOOD GLUCOSE: CPT

## 2025-04-04 PROCEDURE — 93005 ELECTROCARDIOGRAM TRACING: CPT | Performed by: UROLOGY

## 2025-04-04 PROCEDURE — G0545 PR INHERENT VISIT TO INPT: HCPCS | Performed by: STUDENT IN AN ORGANIZED HEALTH CARE EDUCATION/TRAINING PROGRAM

## 2025-04-04 PROCEDURE — 85025 COMPLETE CBC W/AUTO DIFF WBC: CPT | Performed by: INTERNAL MEDICINE

## 2025-04-04 PROCEDURE — 93010 ELECTROCARDIOGRAM REPORT: CPT | Performed by: INTERNAL MEDICINE

## 2025-04-04 PROCEDURE — 31500 INSERT EMERGENCY AIRWAY: CPT

## 2025-04-04 PROCEDURE — 94002 VENT MGMT INPAT INIT DAY: CPT

## 2025-04-04 PROCEDURE — 94761 N-INVAS EAR/PLS OXIMETRY MLT: CPT

## 2025-04-04 PROCEDURE — 83605 ASSAY OF LACTIC ACID: CPT | Performed by: INTERNAL MEDICINE

## 2025-04-04 PROCEDURE — 63710000001 INSULIN LISPRO (HUMAN) PER 5 UNITS: Performed by: UROLOGY

## 2025-04-04 PROCEDURE — 94760 N-INVAS EAR/PLS OXIMETRY 1: CPT

## 2025-04-04 PROCEDURE — 85730 THROMBOPLASTIN TIME PARTIAL: CPT | Performed by: INTERNAL MEDICINE

## 2025-04-04 PROCEDURE — 25010000002 HEPARIN LOCK FLUSH PER 10 UNITS: Performed by: INTERNAL MEDICINE

## 2025-04-04 PROCEDURE — 94799 UNLISTED PULMONARY SVC/PX: CPT

## 2025-04-04 PROCEDURE — 84484 ASSAY OF TROPONIN QUANT: CPT | Performed by: INTERNAL MEDICINE

## 2025-04-04 PROCEDURE — 63710000001 INSULIN GLARGINE PER 5 UNITS: Performed by: STUDENT IN AN ORGANIZED HEALTH CARE EDUCATION/TRAINING PROGRAM

## 2025-04-04 PROCEDURE — 85027 COMPLETE CBC AUTOMATED: CPT | Performed by: INTERNAL MEDICINE

## 2025-04-04 PROCEDURE — 63710000001 INSULIN GLARGINE PER 5 UNITS: Performed by: INTERNAL MEDICINE

## 2025-04-04 PROCEDURE — 0BH17EZ INSERTION OF ENDOTRACHEAL AIRWAY INTO TRACHEA, VIA NATURAL OR ARTIFICIAL OPENING: ICD-10-PCS | Performed by: STUDENT IN AN ORGANIZED HEALTH CARE EDUCATION/TRAINING PROGRAM

## 2025-04-04 PROCEDURE — 85610 PROTHROMBIN TIME: CPT | Performed by: INTERNAL MEDICINE

## 2025-04-04 PROCEDURE — 99232 SBSQ HOSP IP/OBS MODERATE 35: CPT | Performed by: STUDENT IN AN ORGANIZED HEALTH CARE EDUCATION/TRAINING PROGRAM

## 2025-04-04 PROCEDURE — 63710000001 INSULIN LISPRO (HUMAN) PER 5 UNITS: Performed by: STUDENT IN AN ORGANIZED HEALTH CARE EDUCATION/TRAINING PROGRAM

## 2025-04-04 PROCEDURE — 5A1D70Z PERFORMANCE OF URINARY FILTRATION, INTERMITTENT, LESS THAN 6 HOURS PER DAY: ICD-10-PCS | Performed by: INTERNAL MEDICINE

## 2025-04-04 RX ORDER — ROCURONIUM BROMIDE 10 MG/ML
INJECTION, SOLUTION INTRAVENOUS
Status: COMPLETED | OUTPATIENT
Start: 2025-04-04 | End: 2025-04-04

## 2025-04-04 RX ORDER — SODIUM CHLORIDE 0.9 % (FLUSH) 0.9 %
10 SYRINGE (ML) INJECTION EVERY 12 HOURS SCHEDULED
Status: DISCONTINUED | OUTPATIENT
Start: 2025-04-04 | End: 2025-04-16 | Stop reason: HOSPADM

## 2025-04-04 RX ORDER — SODIUM CHLORIDE 0.9 % (FLUSH) 0.9 %
20 SYRINGE (ML) INJECTION AS NEEDED
Status: DISCONTINUED | OUTPATIENT
Start: 2025-04-04 | End: 2025-04-16 | Stop reason: HOSPADM

## 2025-04-04 RX ORDER — INSULIN LISPRO 100 [IU]/ML
4 INJECTION, SOLUTION INTRAVENOUS; SUBCUTANEOUS
Status: DISCONTINUED | OUTPATIENT
Start: 2025-04-04 | End: 2025-04-07

## 2025-04-04 RX ORDER — ETOMIDATE 2 MG/ML
INJECTION INTRAVENOUS
Status: COMPLETED | OUTPATIENT
Start: 2025-04-04 | End: 2025-04-04

## 2025-04-04 RX ORDER — HEPARIN SODIUM 5000 [USP'U]/ML
5000 INJECTION, SOLUTION INTRAVENOUS; SUBCUTANEOUS EVERY 8 HOURS SCHEDULED
Status: DISCONTINUED | OUTPATIENT
Start: 2025-04-04 | End: 2025-04-04

## 2025-04-04 RX ORDER — HEPARIN SODIUM 10000 [USP'U]/100ML
9.09 INJECTION, SOLUTION INTRAVENOUS
Status: DISCONTINUED | OUTPATIENT
Start: 2025-04-04 | End: 2025-04-06

## 2025-04-04 RX ORDER — SODIUM CHLORIDE 9 MG/ML
40 INJECTION, SOLUTION INTRAVENOUS AS NEEDED
Status: DISCONTINUED | OUTPATIENT
Start: 2025-04-04 | End: 2025-04-16 | Stop reason: HOSPADM

## 2025-04-04 RX ORDER — SODIUM CHLORIDE 0.9 % (FLUSH) 0.9 %
10 SYRINGE (ML) INJECTION AS NEEDED
Status: DISCONTINUED | OUTPATIENT
Start: 2025-04-04 | End: 2025-04-16 | Stop reason: HOSPADM

## 2025-04-04 RX ORDER — HEPARIN SODIUM (PORCINE) LOCK FLUSH IV SOLN 100 UNIT/ML 100 UNIT/ML
1.6 SOLUTION INTRAVENOUS AS NEEDED
Status: DISCONTINUED | OUTPATIENT
Start: 2025-04-04 | End: 2025-04-06

## 2025-04-04 RX ADMIN — INSULIN GLARGINE 10 UNITS: 100 INJECTION, SOLUTION SUBCUTANEOUS at 09:17

## 2025-04-04 RX ADMIN — HEPARIN SODIUM 9.09 UNITS/KG/HR: 10000 INJECTION, SOLUTION INTRAVENOUS at 18:00

## 2025-04-04 RX ADMIN — INSULIN LISPRO 5 UNITS: 100 INJECTION, SOLUTION INTRAVENOUS; SUBCUTANEOUS at 09:17

## 2025-04-04 RX ADMIN — PROPOFOL 30 MCG/KG/MIN: 10 INJECTION, EMULSION INTRAVENOUS at 18:51

## 2025-04-04 RX ADMIN — Medication 10 ML: at 20:35

## 2025-04-04 RX ADMIN — PROPOFOL 10 MCG/KG/MIN: 10 INJECTION, EMULSION INTRAVENOUS at 15:25

## 2025-04-04 RX ADMIN — INSULIN GLARGINE 15 UNITS: 100 INJECTION, SOLUTION SUBCUTANEOUS at 20:33

## 2025-04-04 RX ADMIN — INSULIN LISPRO 4 UNITS: 100 INJECTION, SOLUTION INTRAVENOUS; SUBCUTANEOUS at 12:17

## 2025-04-04 RX ADMIN — INSULIN LISPRO 4 UNITS: 100 INJECTION, SOLUTION INTRAVENOUS; SUBCUTANEOUS at 12:00

## 2025-04-04 RX ADMIN — INSULIN LISPRO 3 UNITS: 100 INJECTION, SOLUTION INTRAVENOUS; SUBCUTANEOUS at 20:35

## 2025-04-04 RX ADMIN — EPINEPHRINE 1 MG: 0.1 INJECTION INTRACARDIAC; INTRAVENOUS at 14:34

## 2025-04-04 RX ADMIN — ERTAPENEM SODIUM 500 MG: 1 INJECTION, POWDER, LYOPHILIZED, FOR SOLUTION INTRAMUSCULAR; INTRAVENOUS at 23:47

## 2025-04-04 RX ADMIN — HEPARIN 160 UNITS: 100 SYRINGE at 15:45

## 2025-04-04 RX ADMIN — HEPARIN SODIUM 2000 UNITS: 1000 INJECTION INTRAVENOUS; SUBCUTANEOUS at 14:07

## 2025-04-04 RX ADMIN — ROCURONIUM BROMIDE 50 MG: 10 INJECTION INTRAVENOUS at 14:36

## 2025-04-04 RX ADMIN — ONDANSETRON 4 MG: 2 INJECTION, SOLUTION INTRAMUSCULAR; INTRAVENOUS at 03:59

## 2025-04-04 RX ADMIN — ETOMIDATE INJECTION 20 MG: 2 SOLUTION INTRAVENOUS at 14:35

## 2025-04-04 RX ADMIN — INSULIN LISPRO 6 UNITS: 100 INJECTION, SOLUTION INTRAVENOUS; SUBCUTANEOUS at 17:55

## 2025-04-04 RX ADMIN — ONDANSETRON 4 MG: 2 INJECTION, SOLUTION INTRAMUSCULAR; INTRAVENOUS at 09:27

## 2025-04-04 NOTE — SIGNIFICANT NOTE
04/04/25 1448   OTHER   Discipline physical therapist   Rehab Time/Intention   Session Not Performed other (see comments)  (Pt in active code even this PM and transferring to CICU. PT will hold and follow up another date due to change in medical status.)   Recommendation   PT - Next Appointment 04/07/25

## 2025-04-04 NOTE — NURSING NOTE
HD treatment initiated, after 20mins on HD, Primary nurse entered room and stated Pt was bradycardic per monitor room. Attempted to arouse pt, Primary nurse stated no pulse found. Code initiated. Blood returned, HD terminated. Nephrology notified.    Net Volume removed: 0ml  Pre v/s: 131/75, 95, 18, 97.4, 109.5kg    Chacho Recinos/Erik, RN  [facility] Acute

## 2025-04-04 NOTE — CONSULTS
Referring Provider: Dr. Miles  Reason for Consultation: Cardiac arrest    Patient Care Team:  Kim Benjamin APRN as PCP - General (Nurse Practitioner)  Miguel Stahl MD as Consulting Physician (Hematology and Oncology)  Radha Martinez APRN as Referring Physician (Nurse Practitioner)    Chief complaint:   Abdominal pain    History of present illness:    Subjective   This is a 54-year-old female patient with history of ESRD, on HD TTS started about a year ago.    She presented to the hospital in 4/2 for abdominal pain.  Noted to have UTI with left pyelonephritis and hydronephrosis for which she underwent cystoscopy and left ureteral stent placement on 4/2.  She had ESBL septicemia.    Patient was undergoing dialysis today and had about 1 hour worth of dialysis when she became bradycardic and unresponsive.  There was no trigger.  CODE BLUE was initiated.  She received chest compression and 1 round of epinephrine with subsequent ROSC.  However, she was having agonal breathing and therefore she was intubated.    She was transferred to the intensive care unit following the event.  Shortly after transfer, she was alert and moving extremities and following commands but it was opted to keep her on mechanical elation while waiting for CK taking the reason for the cardiac arrest.    ABG on AC/VC 16/500/500 %/5: 7.38/37/538.  Labs from this morning: Anion gap 21; bicarb 19; glucose 327; sodium 132; WBC 16K (was 20K yesterday)    She had mild lactic acidosis on presentation with max lactic acid 3.4 but it was corrected to 1.91 on 4/2.    Echo on 11/9/2022: EF normal.  RVSP 35-45.    Per my discussion with the patient's sister was at bedside, she indicated that patient had a similar event in the past when she passed out during dialysis and required what it looks like chest compressions briefly but did not have to be intubated and she was sent to the ER.  She is not aware of any particular diagnosis  at that time.  I do not see a records about that.  She also said that the patient is always cold since she had started dialysis and it seems like patient is on midodrine at home.    Review of Systems  Cannot obtain.  Patient is on the ventilator.    History  Past Medical History:   Diagnosis Date    Albuminuria     Allergic     Seasonal, grass, cats and some dogs    Allergic rhinitis     Asthma     allergy triggered    Bell's palsy     Cataract     Had surgery on both eyes    Cholelithiasis     Removed. Have bile reflux/ vomiting    CKD (chronic kidney disease)     Diabetes mellitus     Drug therapy     Endometrial cancer     HL (hearing loss)     In L ear    Hyperlipidemia     Hypertension     Low back pain     Migraine     Obesity     Peripheral neuropathy     Renal insufficiency     Right otitis media     Type II diabetes mellitus     Visual impairment     Diabetic retinopathy. Oil in L eye due to retina detachment s.    Vitamin D deficiency    ,   Past Surgical History:   Procedure Laterality Date    ARTERIOVENOUS FISTULA  05/2023    CATARACT EXTRACTION      CHOLECYSTECTOMY      CYSTOSCOPY, RETROGRADE PYELOGRAM AND STENT INSERTION Left 4/2/2025    Procedure: CYSTOSCOPY RETROGRADE PYELOGRAM AND STENT INSERTION;  Surgeon: Tl Gray MD;  Location: [facility];  Service: Urology;  Laterality: Left;    EYE SURGERY      NOV 2020    HYSTERECTOMY      due to cancer    INSERTION HEMODIALYSIS CATHETER Right 06/23/2023    Procedure: HEMODIALYSIS CATHETER INSERTION;  Surgeon: Mikael Mackay MD;  Location: [facility] OR;  Service: Vascular;  Laterality: Right;    OOPHORECTOMY      VITRECTOMY PARS PLANA Left 01/28/2020    Procedure: 25G VITRECTOMY-ENDO LASER;  Surgeon: Lazarus, Howard S., MD;  Location: [facility] OR;  Service: Ophthalmology   ,   Family History   Problem Relation Age of Onset    Breast cancer Mother     Cancer Mother         Breast cancer, back in the 90s    Alzheimer's disease Father      Dementia Father     Diabetes Father     Hyperlipidemia Father     Hypertension Father     Hearing loss Father     Mental illness Father         Alzheimer’s    Asthma Maternal Grandfather     Rohan Hyperthermia Neg Hx    ,   Social History     Socioeconomic History    Marital status: Single   Tobacco Use    Smoking status: Never    Smokeless tobacco: Never   Vaping Use    Vaping status: Never Used   Substance and Sexual Activity    Alcohol use: Not Currently     Comment: 3-4 drinks PER YEAR!    Drug use: No    Sexual activity: Not Currently     Partners: Male     Birth control/protection: Condom     E-cigarette/Vaping    E-cigarette/Vaping Use Never User      E-cigarette/Vaping Substances    Nicotine No     THC No     CBD No     Flavoring No      E-cigarette/Vaping Devices    Disposable No     Pre-filled or Refillable Cartridge No     Refillable Tank No     Pre-filled Pod No          ,   Medications Prior to Admission   Medication Sig Dispense Refill Last Dose/Taking    albuterol sulfate  (90 Base) MCG/ACT inhaler INHALE 2 PUFFS BY MOUTH EVERY 6 HOURS AS NEEDED FOR WHEEZING FOR SHORTNESS OF BREATH (Patient taking differently: Inhale 2 puffs Every 6 (Six) Hours As Needed.) 18 g 0 Past Week    benzonatate (TESSALON) 100 MG capsule Take 1-2 capsules by mouth 3 (Three) Times a Day As Needed.   Past Week    carvedilol (COREG) 25 MG tablet Take 1 tablet by mouth 2 (Two) Times a Day With Meals. (Patient taking differently: Take  by mouth See Admin Instructions. Takes 1 tablet 2 times daily on Monday, Wednesday and Fridays   Takes 1 tablet every evening on Tuesdays, Thursdays, and Saturdays) 60 tablet 1 Past Week    cyclobenzaprine (FLEXERIL) 10 MG tablet Take 1 tablet by mouth At Night As Needed for Muscle Spasms. 30 tablet 0 Past Week    fluticasone (FLONASE) 50 MCG/ACT nasal spray 2 sprays by Each Nare route Daily. 48 g 1 Past Week    lanthanum (FOSRENOL) 1000 MG chewable tablet Chew 2 tablets 2 (Two) Times a Day  With Meals.   Past Week    Magnesium Glycinate 120 MG capsule Take 2 capsules by mouth At Night As Needed.   Past Week    midodrine (PROAMATINE) 2.5 MG tablet Take 1 tablet by mouth 3 (Three) Times a Week. Tuesdays, Thursdays and Saturdays   Past Week    simvastatin (ZOCOR) 20 MG tablet Take 1 tablet by mouth Daily. 90 tablet 1 Past Week    torsemide (DEMADEX) 100 MG tablet Take 1 tablet by mouth Daily for 30 days. (Patient taking differently: Take 1 tablet by mouth Every Night.) 30 tablet 0 Past Week    vitamin B-12 (VITAMIN B-12) 1000 MCG tablet Take 1 tablet by mouth Daily. 30 tablet 0 Past Week    vitamin D (ERGOCALCIFEROL) 1.25 MG (55312 UT) capsule capsule Take 1 capsule by mouth 2 (Two) Times a Week. Mondays and Thursdays   Past Week    gabapentin (NEURONTIN) 100 MG capsule Take 1 capsule by mouth 3 (Three) Times a Day for 30 days. (Patient taking differently: Take 3 capsules by mouth At Night As Needed.) 90 capsule 0 More than a month   , Scheduled Meds:  atorvastatin, 10 mg, Oral, Nightly  ertapenem, 500 mg, Intravenous, Q24H  fluticasone, 2 spray, Each Nare, Daily  insulin glargine, 10 Units, Subcutaneous, Q12H  insulin lispro, 2-7 Units, Subcutaneous, 4x Daily AC & at Bedtime  insulin lispro, 4 Units, Subcutaneous, TID With Meals  lanthanum, 2,000 mg, Oral, BID With Meals  midodrine, 2.5 mg, Oral, Once  midodrine, 5 mg, Oral, Daily  sodium chloride, 10 mL, Intravenous, Q12H  sodium chloride, 10 mL, Intravenous, Q12H  sodium chloride, 10 mL, Intravenous, Q12H  sodium chloride, 10 mL, Intravenous, Q12H  sodium chloride, 10 mL, Intravenous, Q12H  [Held by provider] torsemide, 100 mg, Oral, Nightly  cyanocobalamin, 1,000 mcg, Oral, Daily  vitamin D, 50,000 Units, Oral, Once per day on Monday Thursday   , Continuous Infusions:  propofol, 5-50 mcg/kg/min, Last Rate: 30 mcg/kg/min (04/04/25 1226)   , PRN Meds:    acetaminophen **OR** acetaminophen **OR** acetaminophen    albuterol    senna-docusate sodium  **AND** polyethylene glycol **AND** bisacodyl **AND** bisacodyl    cyclobenzaprine    dextrose    dextrose    gabapentin    glucagon (human recombinant)    heparin (porcine)    heparin    heparin    HYDROmorphone    melatonin    nitroglycerin    ondansetron ODT **OR** ondansetron    oxyCODONE-acetaminophen    sodium chloride    sodium chloride    sodium chloride    sodium chloride    sodium chloride    sodium chloride    sodium chloride    sodium chloride, and Allergies:  Tomato, Cat dander, Mixed grasses, Latex, and Penicillin g    Objective     Vital Signs   Temp:  [97.5 °F (36.4 °C)-98.5 °F (36.9 °C)] 98.2 °F (36.8 °C)  Heart Rate:  [] 106  Resp:  [16-18] 18  BP: ()/(48-76) 133/75  FiO2 (%):  [98 %] 98 %    PPE used per hospital policy    Physical Exam:  Constitutional: Not in acute distress.  Eyes: Injected conjunctivae, EOMI. pupils equal reactive to light.  ENMT: Mireles 3. No oral thrush. Tonsils grade  Neck: Large. Trachea midline. No thyromegaly  Heart: RRR, no murmur  Lungs: Good and equal air entry bilaterally.  Non labored breathing.   Abdomen: Obese. Soft. No tenderness or dullness. No HSM.  Extremities: No cyanosis, clubbing or pitting edema.  Warm extremities and well-perfused.  Neuro: Sedated.  Does not follow commands.  Psych: Cannot obtain while on sedation.  Integumentary: No rash.  Normal skin turgor  Lymphatic: No palpable cervical or supraclavicular lymph nodes.      Diagnostic imaging:  I personally and independently reviewed the following images:   CXR 4/4/25: ET tube in good position.  Increased interstitial marking on the left    EKG 4/4/25: 1-2 mm ST depression in leads II, V3, V4, V5 and V6.    Laboratory workup:    Results from last 7 days   Lab Units 04/04/25  0628 04/03/25  0619 04/02/25  0750   SODIUM mmol/L 132* 130* 132*   POTASSIUM mmol/L 4.9 4.1 4.7   CHLORIDE mmol/L 91* 91* 92*   CO2 mmol/L 19.3* 23.1 19.5*   BUN mg/dL 71* 46* 79*   CREATININE mg/dL 8.42* 6.52*  11.69*   GLUCOSE mg/dL 327* 302* 244*   CALCIUM mg/dL 8.3* 8.1* 8.2*         Results from last 7 days   Lab Units 04/04/25  0628 04/03/25  0619 04/02/25  0750   WBC 10*3/mm3 16.84* 20.80* 18.66*   HEMOGLOBIN g/dL 9.4* 9.4* 10.4*   HEMATOCRIT % 27.7* 27.2* 31.4*   PLATELETS 10*3/mm3 277 219 244               Assessment   PEA cardiac arrest: Unclear trigger.  S/p ROSC within <5 minutes.  Mechanical ventilation management for airway protection.  Left emphysematous pyelonephritis and hydronephrosis s/p cystoscopy and stent placement 4/2/2025  Sepsis, secondary to UTI  Lactic acidosis on presentation, resolved but now has high anion gap acidosis  High anion gap acidosis  Ischemic EKG changes: ST depression in inferolateral leads.  Uncontrolled DM type II with hyperglycemia: A1c 8.2  Severe ALEISHA, LIN 58/h on HST 12/29/2022, on auto CPAP    Orthostatic hypotension, on midodrine  HTN  Anemia of chronic disease    Recommendations:    Mechanical initial management: Settings reviewed and adjusted.  Opted to keep the patient on mechanical ventilation while investigating the reason of the arrest.  Repeat EKG in AM.  Check troponin stat.   Check echocardiogram.  Consult cardiology.  Not sure what triggered the rest.  Patient has sepsis but seems like she was improving with antibiotics and was not really hypotensive or in shock.  There seem to be maybe a history of vasovagal event in the past during dialysis so that could be a contributor.  Other possibility could be an event of sleep apnea while doing dialysis, complicated by bradycardia.    Check lactic acid and beta hydroxybutyrate.  Increase Lantus to 15 units twice daily.  It was started today at 10 units twice daily. Continue insulin sliding scale.  Antibiotics with Ertapenem  Initiate heparin for DVT prophylaxis  TTM deferred as patient was neuro intact after resuscitation.    Anat Molina MD  04/04/25  15:25 EDT    Time: Critical care 43 min

## 2025-04-04 NOTE — SIGNIFICANT NOTE
Received call from CTU at 1428 pt bradying down to 30s. Immediately entered pt room and pt found to be pulseless and apneic. CPR initiated and code blue called. Pt on dialysis, HD RN at bedside, when code occurred.

## 2025-04-04 NOTE — PROGRESS NOTES
" LOS: 2 days     Chief Complaint: ESBL bacteremia    Interval History: Patient resting comfortably this morning.  Did not awaken from sleep.    Vital Signs  Temp:  [97.5 °F (36.4 °C)-98.5 °F (36.9 °C)] 98.5 °F (36.9 °C)  Heart Rate:  [] 101  Resp:  [16-18] 18  BP: ()/(48-76) 103/53    Physical Exam:  General: In no acute distress  HEENT: Oropharynx clear, moist mucous membranes  Respiratory: Normal work of breathing  Skin: No rashes or lesions in exposed areas  Extremities: No edema, cyanosis  Access: Peripheral IV.    Antibiotics:  Anti-Infectives (From admission, onward)      Ordered     Dose/Rate Route Frequency Start Stop    04/02/25 2249  ertapenem (INVanz) 500 mg in sodium chloride 0.9 % 50 mL IVPB        Ordering Provider: Segundo Cisneros APRN    500 mg  100 mL/hr over 30 Minutes Intravenous Every 24 Hours 04/02/25 2345 04/12/25 2344    04/02/25 0933  cefTRIAXone (ROCEPHIN) 2,000 mg in sodium chloride 0.9 % 100 mL MBP        Ordering Provider: Lillian Winslow PA-C    2,000 mg  200 mL/hr over 30 Minutes Intravenous Once 04/02/25 0949 04/02/25 1157             Results Review:     I reviewed the patient's new clinical results.    Lab Results   Component Value Date    WBC 16.84 (H) 04/04/2025    HGB 9.4 (L) 04/04/2025    HCT 27.7 (L) 04/04/2025    MCV 95.8 04/04/2025     04/04/2025     Lab Results   Component Value Date    GLUCOSE 327 (H) 04/04/2025    BUN 71 (H) 04/04/2025    CREATININE 8.42 (H) 04/04/2025    EGFRIFNONA 45 (L) 10/20/2021    EGFRIFAFRI 8 (L) 03/10/2023    BCR 8.4 04/04/2025    CO2 19.3 (L) 04/04/2025    CALCIUM 8.3 (L) 04/04/2025    ALBUMIN 2.9 (L) 04/02/2025    AST 11 04/02/2025    ALT 6 04/02/2025       No results found for: \"VANCOPEAK\", \"VANCOTROUGH\", \"VANCORANDOM\"     Microbiology:  Microbiology Results (last 10 days)       Procedure Component Value - Date/Time    Urine Culture - Urine, Urine, Catheter [[id number]]  (Abnormal) Collected: 04/02/25 1707    Lab Status: " Preliminary result Specimen: Urine, Catheter Updated: 04/03/25 0911     Urine Culture >100,000 CFU/mL Gram Negative Bacilli    Narrative:      Colonization of the urinary tract without infection is common. Treatment is discouraged unless the patient is symptomatic, pregnant, or undergoing an invasive urologic procedure.    Blood Culture - Blood, Arm, Left [[id number]]  (Abnormal) Collected: 04/02/25 1113    Lab Status: Final result Specimen: Blood from Arm, Left Updated: 04/04/25 0640     Blood Culture Escherichia coli ESBL     Comment:   Consider infectious disease consult.  Susceptibility results may not correlate to clinical outcomes.  For ESBL-producing infections in the blood, a carbapenem is recommended as first-line therapy for optimal clinical outcomes.        Isolated from Aerobic and Anaerobic Bottles     Gram Stain Anaerobic Bottle Gram negative bacilli      Aerobic Bottle Gram negative bacilli    Narrative:      Refer to previous blood culture collected on 04/02/2025 at 1043 for MICs.      Blood Culture - Blood, Hand, Left [[id number]]  (Abnormal)  (Susceptibility) Collected: 04/02/25 1043    Lab Status: Final result Specimen: Blood from Hand, Left Updated: 04/04/25 0639     Blood Culture Escherichia coli ESBL     Comment:   Consider infectious disease consult.  Susceptibility results may not correlate to clinical outcomes.  For ESBL-producing infections in the blood, a carbapenem is recommended as first-line therapy for optimal clinical outcomes.        Isolated from Aerobic and Anaerobic Bottles     Gram Stain Anaerobic Bottle Gram negative bacilli      Aerobic Bottle Gram negative bacilli    Susceptibility        Escherichia coli ESBL      LEANN      Ciprofloxacin Resistant      Ertapenem Susceptible      Levofloxacin Resistant      Meropenem Susceptible      Trimethoprim + Sulfamethoxazole Resistant                       Susceptibility Comments       Escherichia coli ESBL    With the exception of  urinary-sourced infections, aminoglycosides should not be used as monotherapy.               Blood Culture ID, PCR - Blood, Hand, Left [[id number]]  (Abnormal) Collected: 04/02/25 1043    Lab Status: Final result Specimen: Blood from Hand, Left Updated: 04/02/25 2022     BCID, PCR Escherichia coli. Identification by BCID2 PCR.     BCID, PCR 2 CTX-M (ESBL) detected. Identification by BCID2 PCR     BOTTLE TYPE Anaerobic Bottle    Urine Culture - Urine, Urine, Clean Catch [[id number]]  (Abnormal) Collected: 04/02/25 1017    Lab Status: Preliminary result Specimen: Urine, Clean Catch Updated: 04/03/25 0911     Urine Culture >100,000 CFU/mL Gram Negative Bacilli    Narrative:      Colonization of the urinary tract without infection is common. Treatment is discouraged unless the patient is symptomatic, pregnant, or undergoing an invasive urologic procedure.    Respiratory Panel PCR w/COVID-19(SARS-CoV-2) [facility]O/[facility] In-House, NP Swab in UTM/VTM, 2 HR TAT - Swab, Nasopharynx [[id number]]  (Normal) Collected: 04/02/25 0756    Lab Status: Final result Specimen: Swab from Nasopharynx Updated: 04/02/25 0858     ADENOVIRUS, PCR Not Detected     Coronavirus 229E Not Detected     Coronavirus HKU1 Not Detected     Coronavirus NL63 Not Detected     Coronavirus OC43 Not Detected     COVID19 Not Detected     Human Metapneumovirus Not Detected     Human Rhinovirus/Enterovirus Not Detected     Influenza A PCR Not Detected     Influenza B PCR Not Detected     Parainfluenza Virus 1 Not Detected     Parainfluenza Virus 2 Not Detected     Parainfluenza Virus 3 Not Detected     Parainfluenza Virus 4 Not Detected     RSV, PCR Not Detected     Bordetella pertussis pcr Not Detected     Bordetella parapertussis PCR Not Detected     Chlamydophila pneumoniae PCR Not Detected     Mycoplasma pneumo by PCR Not Detected    Narrative:      In the setting of a positive respiratory panel with a viral infection PLUS a negative procalcitonin  without other underlying concern for bacterial infection, consider observing off antibiotics or discontinuation of antibiotics and continue supportive care. If the respiratory panel is positive for atypical bacterial infection (Bordetella pertussis, Chlamydophila pneumoniae, or Mycoplasma pneumoniae), consider antibiotic de-escalation to target atypical bacterial infection.               Isolation:   Contact Spore, Contact    Assessment    #ESBL E. coli septicemia  #Left pyelonephritis  #Left hydronephrosis status post cystoscopy and left ureteral stent insertion 4/2  #ESRD on hemodialysis  #Type 2 diabetes    Fever has resolved and leukocytosis improving down to 16.  Urine culture with greater than 100,000 gram-negative bacilli.  Continue ertapenem 500 mg daily dosed for end-stage renal disease.  Would recommend a total 14-day course through end date 4/16.    On discharge patient has 2 options for ertapenem dosing.  First would be to have 1 g ertapenem thrice weekly dosed after dialysis on dialysis days.  The alternative if dialysis is not willing to administer the antibiotic would be for daily dosing via midline.  Daily dosing would be ertapenem 500 mg daily for the same duration.    Urology planning stent removal in approximately 2 weeks and antibiotic course should allow for time for scheduling for removal.      Thank you for allowing me to be involved in the care of this patient. Infectious diseases will sign off at this time with antibiotics plan in place, but please call me at [phone] if any further ID questions or new ID concerns.    -----------------------------------------------------------------------------------------------  The above decisions regarding antimicrobials incorporates elements of engaging in complex medical decision-making associated with antimicrobial prescribing including considerations such as antimicrobial resistance patterns, emergence of new variants/strains, recent antibiotic  exposure, interactions/complications from comorbidities including concurrent infections, public health considerations to minimize development of antimicrobial resistance, and emerging and re-emerging infections.

## 2025-04-04 NOTE — PROGRESS NOTES
Name: Kelly Pack ADMIT: 2025   : 1970  PCP: Kim Benjamin APRN    MRN: [id number] LOS: 2 days   AGE/SEX: 54 y.o. female  ROOM: [facility]     Subjective   Subjective   Resting in bed, she is sleeping deeply but will awaken to voice.  Blood pressure remains labile, mostly normal.  Midodrine increased yesterday to address.  Discussed with CCP about plan for antibiotics, patient prefers antibiotics dosed with dialysis.     Objective   Objective   Vital Signs  Temp:  [97.5 °F (36.4 °C)-98.5 °F (36.9 °C)] 98.2 °F (36.8 °C)  Heart Rate:  [] 106  Resp:  [16-18] 18  BP: ()/(48-76) 133/75  SpO2:  [94 %-97 %] 95 %  on   ;   Device (Oxygen Therapy): room air  Body mass index is 35.88 kg/m².  Physical Exam  Constitutional:       General: She is not in acute distress.     Appearance: Normal appearance. She is not ill-appearing.   Neck:      Comments: Right neck TDC  Cardiovascular:      Rate and Rhythm: Normal rate and regular rhythm.      Pulses: Normal pulses.      Heart sounds: No murmur heard.  Pulmonary:      Effort: Pulmonary effort is normal. No respiratory distress.      Breath sounds: Normal breath sounds. No wheezing.   Abdominal:      General: Abdomen is flat. Bowel sounds are normal. There is no distension.      Palpations: Abdomen is soft.   Musculoskeletal:         General: No swelling or tenderness.      Right lower leg: No edema.      Left lower leg: No edema.   Skin:     General: Skin is warm and dry.   Neurological:      General: No focal deficit present.      Mental Status: She is alert and oriented to person, place, and time. Mental status is at baseline.         Results Review     I reviewed the patient's new clinical results.  Results from last 7 days   Lab Units 25  0628 25  0619 25  0750   WBC 10*3/mm3 16.84* 20.80* 18.66*   HEMOGLOBIN g/dL 9.4* 9.4* 10.4*   PLATELETS 10*3/mm3 277 219 244     Results from last 7 days   Lab Units 25  0628  04/03/25  0619 04/02/25  0750   SODIUM mmol/L 132* 130* 132*   POTASSIUM mmol/L 4.9 4.1 4.7   CHLORIDE mmol/L 91* 91* 92*   CO2 mmol/L 19.3* 23.1 19.5*   BUN mg/dL 71* 46* 79*   CREATININE mg/dL 8.42* 6.52* 11.69*   GLUCOSE mg/dL 327* 302* 244*   Estimated Creatinine Clearance: 10.3 mL/min (A) (by C-G formula based on SCr of 8.42 mg/dL (H)).  Results from last 7 days   Lab Units 04/02/25  0750   ALBUMIN g/dL 2.9*   BILIRUBIN mg/dL 0.3   ALK PHOS U/L 160*   AST (SGOT) U/L 11   ALT (SGPT) U/L 6     Results from last 7 days   Lab Units 04/04/25  0628 04/03/25  0619 04/02/25  0750   CALCIUM mg/dL 8.3* 8.1* 8.2*   ALBUMIN g/dL  --   --  2.9*   MAGNESIUM mg/dL  --  2.3  --    PHOSPHORUS mg/dL  --  4.5  --      Results from last 7 days   Lab Units 04/02/25  2113 04/02/25  1925 04/02/25  1405 04/02/25  1048   LACTATE mmol/L 1.9 2.8* 3.4* 2.6*     COVID19   Date Value Ref Range Status   04/02/2025 Not Detected Not Detected - Ref. Range Final   12/03/2024 Not Detected Not Detected - Ref. Range Final     Hemoglobin A1C   Date/Time Value Ref Range Status   04/02/2025 0750 8.20 (H) 4.80 - 5.60 % Final     Glucose   Date/Time Value Ref Range Status   04/04/2025 1034 296 (H) 70 - 130 mg/dL Final   04/04/2025 0544 316 (H) 70 - 130 mg/dL Final   04/04/2025 0333 296 (H) 70 - 130 mg/dL Final   04/03/2025 2048 319 (H) 70 - 130 mg/dL Final   04/03/2025 1539 304 (H) 70 - 130 mg/dL Final   04/03/2025 1036 341 (H) 70 - 130 mg/dL Final   04/03/2025 0629 269 (H) 70 - 130 mg/dL Final       FL Retrograde Pyelogram In OR  RETROGRADE PYELOGRAM INTERPRETATION ONLY 04/02/2025     HISTORY: Retrograde pyelogram.     FINDINGS: Contrast is seen in the bilateral collecting systems and  ureters. 1 or 2 tiny filling defects are seen in the proximal right  ureter just distal to the right ureteral pelvic junction which may  represent 1 or 2 tiny stones or air bubbles. There is a moderate size  filling defect in the region of the left ureteral pelvic  junction. No  stones of this size are seen on the CT scan from 04/02/2025 and this  could represent air bubble or possibly a hematoma. There is mild  dilatation of the left collecting system. Left mid to distal ureter  appears normal. Double-J left ureteral stent is seen in good position.     Fluoroscopy time 24 seconds 5 images 4 mGy (K, A, R)           This report was finalized on 4/4/2025 10:44 AM by Dr. Jae Cheema M.D on Workstation: [id number]       Scheduled Medications  atorvastatin, 10 mg, Oral, Nightly  ertapenem, 500 mg, Intravenous, Q24H  fluticasone, 2 spray, Each Nare, Daily  insulin glargine, 10 Units, Subcutaneous, Q12H  insulin lispro, 2-7 Units, Subcutaneous, 4x Daily AC & at Bedtime  lanthanum, 2,000 mg, Oral, BID With Meals  midodrine, 2.5 mg, Oral, Once  midodrine, 5 mg, Oral, Daily  sodium chloride, 10 mL, Intravenous, Q12H  sodium chloride, 10 mL, Intravenous, Q12H  sodium chloride, 10 mL, Intravenous, Q12H  [Held by provider] torsemide, 100 mg, Oral, Nightly  cyanocobalamin, 1,000 mcg, Oral, Daily  vitamin D, 50,000 Units, Oral, Once per day on Monday Thursday    Infusions   Diet  Diet: Regular/House; Fluid Consistency: Thin (IDDSI 0)         I have personally reviewed:  [x]  Laboratory   []  Microbiology   []  Radiology   [x]  EKG/Telemetry   []  Cardiology/Vascular   []  Pathology   []  Records    Assessment/Plan     Active Hospital Problems    Diagnosis  POA    **Sepsis [A41.9]  Yes    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       Ms. Pack is a 54 y.o.  with a history of ESRD on HD, anemia of chronic disease, asthma who presents with  severe left-sided pain, nausea, vomiting, found to have emphysematous pyelitis and sepsis.     ESBL E coli bacteremia  Sepsis (febrile 103, tachycardic 114, WBC 18) related to emphysematous pyelitis  Left sided hydronephrosis  Lactic acidosis  - urology consulted, patient to OR for cystoscopy, left stent placement 4/2/25  - continue rocephin; follow up urine and blood cx  - blood cx now + for ESBL e coli, ID consulted and abx adjusted to ertapenem  -Plan for 14-day course of ertapenem dose to thrice weekly with dialysis     ESRD on HD  Anemia of chronic disease  - renal consulted; d/w Dr. Connolly at bedside  - pt did miss HD yesterday due to feeling poorly  - torsemide held due to sepsis/normal pressures; defer to renal when to restart  - dialysis ordered per renal  - midodrine, lanthanum continued from home   -Per nephrology no plan for dialysis today and Sunday and patient may discharge after to resume her Tuesday Thursday Saturday dialysis routine    Orthostatic hypotension  - on middrine on non HD days  - BP low here- increase to daily 5mg on/3/25     HTN  - torsemide and coreg held given sepsis/normal BP  - will resume as able-continue to hold given low/normal BP/lability     Type 2 diabetes mellitus  - no home DM meds on med rec; BG are >200  -Change diet to carb consistent, continue Lantus and sliding scale, add prandial insulin/continue to adjust as needed     Asthma  - prn nebs (home regimen)    SCDs for DVT prophylaxis.  Full code.  Discussed with patient and nursing staff.  And CCP.  Anticipated discharge home, TBD        Génesis Miles MD  [facility]  04/04/25  12:13 EDT    I wore protective equipment throughout this patient encounter including gloves.  Hand hygiene was performed before donning protective equipment and after removal when leaving the room.    Expected Discharge Date and Time       Expected Discharge Date Expected Discharge Time    Apr 7, 2025              Copied  text in this note has been reviewed and is accurate as of 04/04/25.

## 2025-04-04 NOTE — PLAN OF CARE
Goal Outcome Evaluation:   Transferred from telemetry s/p PEA arrest. Arouses to voice and follows commands. Heparin gtt started as ordered. Plan of care explained to family members. Will continue to monitor.

## 2025-04-04 NOTE — CASE MANAGEMENT/SOCIAL WORK
Continued Stay Note  [facility]     Patient Name: Kelly Pack  MRN: [id number]  Today's Date: 4/4/2025    Admit Date: 4/2/2025    Plan: Plan home with outpatient antibotics at her dialysis center.  CLARENCE Tucker RN   Discharge Plan       Row Name 04/04/25 1103       Plan    Plan Plan home with outpatient antibiotics at her dialysis center.  CLARENCE Tucker RN    Patient/Family in Agreement with Plan yes    Plan Comments Spoke with pt at bedside.  Per ID-Fever has resolved and leukocytosis improving down to 16.  Urine culture with greater than 100,000 gram-negative bacilli.  Continue ertapenem 500 mg daily dosed for end-stage renal disease.  Would recommend a total 14-day course through end date 4/16.     On discharge patient has 2 options for ertapenem dosing.  First would be to have 1 g ertapenem thrice weekly dosed after dialysis on dialysis days.  The alternative if dialysis is not willing to administer the antibiotic would be for daily dosing via midline.  Daily dosing would be ertapenem 500 mg daily for the same duration.  Pt states she prefers antibiotics to be given at her outpatient dialysis center.  Called and spoke with Monica ([phone]) and faxed over ID notes.  Plan home with outpatient antibiotics at her dialysis center. CLARENCE Tucker RN                   Discharge Codes    No documentation.                 Expected Discharge Date and Time       Expected Discharge Date Expected Discharge Time    Apr 7, 2025               Cecy Tucker RN

## 2025-04-04 NOTE — PLAN OF CARE
Goal Outcome Evaluation:   Pt resting in bed. Medicated for nausea, relief noted. No other c/o voiced. VSS No s/s of distress. Plan of care ongoing

## 2025-04-04 NOTE — PROGRESS NOTES
"RENAL/[facility]:     LOS: 2 days    Patient Care Team:  Kmi Benjamin APRN as PCP - General (Nurse Practitioner)  Miguel Stahl MD as Consulting Physician (Hematology and Oncology)  Radha Martinez APRN as Referring Physician (Nurse Practitioner)    Chief Complaint:  ESRD, Abdominal pain    Subjective     Interval History:   Chart reviewed  S/P cysto and L ureteral stent placement on 4/2/25  C/O continued pain  For HD today  Hematuria noted      Objective     Vital Sign Min/Max for last 24 hours  Temp  Min: 97.5 °F (36.4 °C)  Max: 98.5 °F (36.9 °C)   BP  Min: 79/48  Max: 129/72   Pulse  Min: 91  Max: 106   Resp  Min: 16  Max: 18   SpO2  Min: 94 %  Max: 97 %   No data recorded   No data recorded     Flowsheet Rows      Flowsheet Row First Filed Value   Admission Height 175.3 cm (69\") Documented at 04/02/2025 1049   Admission Weight 110 kg (243 lb) Documented at 04/02/2025 1049            No intake/output data recorded.  I/O last 3 completed shifts:  In: 330 [P.O.:330]  Out: -     Physical Exam:  GEN: Awake, NAD  ENT: PERRL, EOMI, MMM  NECK: Supple, no JVD  CHEST: CTAB, no W/R/C  CV: RRR, no M/G/R  ABD: Soft, NT, +BS  SKIN: Warm and Dry  NEURO: CN's intact      WBC WBC   Date Value Ref Range Status   04/04/2025 16.84 (H) 3.40 - 10.80 10*3/mm3 Final   04/03/2025 20.80 (H) 3.40 - 10.80 10*3/mm3 Final   04/02/2025 18.66 (H) 3.40 - 10.80 10*3/mm3 Final      HGB Hemoglobin   Date Value Ref Range Status   04/04/2025 9.4 (L) 12.0 - 15.9 g/dL Final   04/03/2025 9.4 (L) 12.0 - 15.9 g/dL Final   04/02/2025 10.4 (L) 12.0 - 15.9 g/dL Final      HCT Hematocrit   Date Value Ref Range Status   04/04/2025 27.7 (L) 34.0 - 46.6 % Final   04/03/2025 27.2 (L) 34.0 - 46.6 % Final   04/02/2025 31.4 (L) 34.0 - 46.6 % Final      Platlets No results found for: \"LABPLAT\"   MCV MCV   Date Value Ref Range Status   04/04/2025 95.8 79.0 - 97.0 fL Final   04/03/2025 95.8 79.0 - 97.0 fL Final   04/02/2025 97.2 (H) 79.0 - 97.0 fL Final    " "      Sodium Sodium   Date Value Ref Range Status   04/04/2025 132 (L) 136 - 145 mmol/L Final   04/03/2025 130 (L) 136 - 145 mmol/L Final   04/02/2025 132 (L) 136 - 145 mmol/L Final      Potassium Potassium   Date Value Ref Range Status   04/04/2025 4.9 3.5 - 5.2 mmol/L Final     Comment:     Slight hemolysis detected by analyzer. Result may be falsely elevated.   04/03/2025 4.1 3.5 - 5.2 mmol/L Final   04/02/2025 4.7 3.5 - 5.2 mmol/L Final     Comment:     Slight hemolysis detected by analyzer. Result may be falsely elevated.      Chloride Chloride   Date Value Ref Range Status   04/04/2025 91 (L) 98 - 107 mmol/L Final   04/03/2025 91 (L) 98 - 107 mmol/L Final   04/02/2025 92 (L) 98 - 107 mmol/L Final      CO2 CO2   Date Value Ref Range Status   04/04/2025 19.3 (L) 22.0 - 29.0 mmol/L Final   04/03/2025 23.1 22.0 - 29.0 mmol/L Final   04/02/2025 19.5 (L) 22.0 - 29.0 mmol/L Final      BUN BUN   Date Value Ref Range Status   04/04/2025 71 (H) 6 - 20 mg/dL Final   04/03/2025 46 (H) 6 - 20 mg/dL Final   04/02/2025 79 (H) 6 - 20 mg/dL Final      Creatinine Creatinine   Date Value Ref Range Status   04/04/2025 8.42 (H) 0.57 - 1.00 mg/dL Final   04/03/2025 6.52 (H) 0.57 - 1.00 mg/dL Final   04/02/2025 11.69 (H) 0.57 - 1.00 mg/dL Final      Calcium Calcium   Date Value Ref Range Status   04/04/2025 8.3 (L) 8.6 - 10.5 mg/dL Final   04/03/2025 8.1 (L) 8.6 - 10.5 mg/dL Final   04/02/2025 8.2 (L) 8.6 - 10.5 mg/dL Final      PO4 No results found for: \"CAPO4\"   Albumin Albumin   Date Value Ref Range Status   04/02/2025 2.9 (L) 3.5 - 5.2 g/dL Final      Magnesium Magnesium   Date Value Ref Range Status   04/03/2025 2.3 1.6 - 2.6 mg/dL Final      Uric Acid No results found for: \"URICACID\"        Results Review:     I reviewed the patient's new clinical results.    atorvastatin, 10 mg, Oral, Nightly  ertapenem, 500 mg, Intravenous, Q24H  fluticasone, 2 spray, Each Nare, Daily  insulin glargine, 10 Units, Subcutaneous, " Nightly  insulin lispro, 2-7 Units, Subcutaneous, 4x Daily AC & at Bedtime  lanthanum, 2,000 mg, Oral, BID With Meals  midodrine, 2.5 mg, Oral, Once  midodrine, 5 mg, Oral, Daily  sodium chloride, 10 mL, Intravenous, Q12H  sodium chloride, 10 mL, Intravenous, Q12H  sodium chloride, 10 mL, Intravenous, Q12H  [Held by provider] torsemide, 100 mg, Oral, Nightly  cyanocobalamin, 1,000 mcg, Oral, Daily  vitamin D, 50,000 Units, Oral, Once per day on Monday Thursday           Medication Review: Reviewed    Assessment & Plan     1) ESRD - on TTS HD  2) Abdominal pain  3) L hydronephrosis with emphysematous pyelonephritis  4) DM  5) Hypotension - on Midodrine on non-HD days  6) Metabolic acidosis  7) Anemia of CKD  8) ESBL UTI/Pyelo  9) Bacteremia    Plan: HD today then Sunday and then Tuesday to get back on schedule.  IV ABX per primary.  S/P cysto and stent placement with  following.  Continue Midodrine and have room to increase if needed.        Dusty Connolly MD  [facility]  04/04/25  07:58 EDT

## 2025-04-04 NOTE — NURSING NOTE
"Pt diff to arouse this am. Awoke to sternal rub and was alert and oriented x4 once awake.  Only c/o being \"tired\". VSS.  Upper extremities cool to touch at that time. Discussed w/ Dr Miles on unit - no new orders.  At 1200 pt awakes spontaneously, sitting up eating lunch.  "

## 2025-04-05 ENCOUNTER — APPOINTMENT (OUTPATIENT)
Dept: CARDIOLOGY | Facility: HOSPITAL | Age: 55
End: 2025-04-05
Payer: MEDICARE

## 2025-04-05 LAB
ANION GAP SERPL CALCULATED.3IONS-SCNC: 17.6 MMOL/L (ref 5–15)
ANISOCYTOSIS BLD QL: ABNORMAL
AORTIC DIMENSIONLESS INDEX: 0.51 (DI)
APTT PPP: 36.9 SECONDS (ref 22.7–35.4)
APTT PPP: 36.9 SECONDS (ref 22.7–35.4)
APTT PPP: 37.8 SECONDS (ref 22.7–35.4)
ASCENDING AORTA: 3.5 CM
AV MEAN PRESS GRAD SYS DOP V1V2: 7.3 MMHG
AV VMAX SYS DOP: 182.4 CM/SEC
BASOPHILS # BLD MANUAL: 0.13 10*3/MM3 (ref 0–0.2)
BASOPHILS NFR BLD MANUAL: 1 % (ref 0–1.5)
BH CV ECHO MEAS - AO MAX PG: 13.3 MMHG
BH CV ECHO MEAS - AO V2 VTI: 36.8 CM
BH CV ECHO MEAS - AVA(I,D): 1.82 CM2
BH CV ECHO MEAS - EDV(CUBED): 120.6 ML
BH CV ECHO MEAS - EDV(MOD-SP2): 145 ML
BH CV ECHO MEAS - EDV(MOD-SP4): 130 ML
BH CV ECHO MEAS - EF(MOD-SP2): 50.3 %
BH CV ECHO MEAS - EF(MOD-SP4): 46.9 %
BH CV ECHO MEAS - ESV(CUBED): 61.1 ML
BH CV ECHO MEAS - ESV(MOD-SP2): 72 ML
BH CV ECHO MEAS - ESV(MOD-SP4): 69 ML
BH CV ECHO MEAS - FS: 20.3 %
BH CV ECHO MEAS - IVS/LVPW: 1.11 CM
BH CV ECHO MEAS - IVSD: 1.06 CM
BH CV ECHO MEAS - LAT PEAK E' VEL: 9.6 CM/SEC
BH CV ECHO MEAS - LV DIASTOLIC VOL/BSA (35-75): 57.9 CM2
BH CV ECHO MEAS - LV MASS(C)D: 180.1 GRAMS
BH CV ECHO MEAS - LV MAX PG: 3.1 MMHG
BH CV ECHO MEAS - LV MEAN PG: 1.62 MMHG
BH CV ECHO MEAS - LV SYSTOLIC VOL/BSA (12-30): 30.7 CM2
BH CV ECHO MEAS - LV V1 MAX: 87.9 CM/SEC
BH CV ECHO MEAS - LV V1 VTI: 18.6 CM
BH CV ECHO MEAS - LVIDD: 4.9 CM
BH CV ECHO MEAS - LVIDS: 3.9 CM
BH CV ECHO MEAS - LVOT AREA: 3.6 CM2
BH CV ECHO MEAS - LVOT DIAM: 2.14 CM
BH CV ECHO MEAS - LVPWD: 0.95 CM
BH CV ECHO MEAS - MED PEAK E' VEL: 7.1 CM/SEC
BH CV ECHO MEAS - MV A DUR: 0.18 SEC
BH CV ECHO MEAS - MV A MAX VEL: 122.5 CM/SEC
BH CV ECHO MEAS - MV DEC SLOPE: 408 CM/SEC2
BH CV ECHO MEAS - MV DEC TIME: 0.21 SEC
BH CV ECHO MEAS - MV E MAX VEL: 82.5 CM/SEC
BH CV ECHO MEAS - MV E/A: 0.67
BH CV ECHO MEAS - MV MAX PG: 5.8 MMHG
BH CV ECHO MEAS - MV MEAN PG: 2.36 MMHG
BH CV ECHO MEAS - MV P1/2T: 72.3 MSEC
BH CV ECHO MEAS - MV V2 VTI: 30.7 CM
BH CV ECHO MEAS - MVA(P1/2T): 3 CM2
BH CV ECHO MEAS - MVA(VTI): 2.18 CM2
BH CV ECHO MEAS - PA ACC TIME: 0.16 SEC
BH CV ECHO MEAS - PA V2 MAX: 91.5 CM/SEC
BH CV ECHO MEAS - PI END-D VEL: 124.9 CM/SEC
BH CV ECHO MEAS - PULM A REVS DUR: 0.17 SEC
BH CV ECHO MEAS - PULM A REVS VEL: 29.4 CM/SEC
BH CV ECHO MEAS - PULM DIAS VEL: 40.9 CM/SEC
BH CV ECHO MEAS - PULM S/D: 1.2
BH CV ECHO MEAS - PULM SYS VEL: 49.1 CM/SEC
BH CV ECHO MEAS - QP/QS: 0.97
BH CV ECHO MEAS - RAP SYSTOLE: 8 MMHG
BH CV ECHO MEAS - RV MAX PG: 1.74 MMHG
BH CV ECHO MEAS - RV V1 MAX: 66 CM/SEC
BH CV ECHO MEAS - RV V1 VTI: 16 CM
BH CV ECHO MEAS - RVOT DIAM: 2.27 CM
BH CV ECHO MEAS - RVSP: 46.6 MMHG
BH CV ECHO MEAS - SV(LVOT): 67 ML
BH CV ECHO MEAS - SV(MOD-SP2): 73 ML
BH CV ECHO MEAS - SV(MOD-SP4): 61 ML
BH CV ECHO MEAS - SV(RVOT): 64.7 ML
BH CV ECHO MEAS - SVI(LVOT): 29.9 ML/M2
BH CV ECHO MEAS - SVI(MOD-SP2): 32.5 ML/M2
BH CV ECHO MEAS - SVI(MOD-SP4): 27.2 ML/M2
BH CV ECHO MEAS - TAPSE (>1.6): 2.2 CM
BH CV ECHO MEAS - TR MAX PG: 38.6 MMHG
BH CV ECHO MEAS - TR MAX VEL: 310.8 CM/SEC
BH CV ECHO MEASUREMENTS AVERAGE E/E' RATIO: 9.88
BH CV XLRA - RV BASE: 4.1 CM
BH CV XLRA - RV LENGTH: 8.9 CM
BH CV XLRA - RV MID: 3.5 CM
BH CV XLRA - TDI S': 10.7 CM/SEC
BUN SERPL-MCNC: 91 MG/DL (ref 6–20)
BUN/CREAT SERPL: 9.7 (ref 7–25)
CALCIUM SPEC-SCNC: 8.2 MG/DL (ref 8.6–10.5)
CHLORIDE SERPL-SCNC: 95 MMOL/L (ref 98–107)
CO2 SERPL-SCNC: 21.4 MMOL/L (ref 22–29)
CREAT SERPL-MCNC: 9.34 MG/DL (ref 0.57–1)
D-LACTATE SERPL-SCNC: 1.6 MMOL/L (ref 0.5–2)
DEPRECATED RDW RBC AUTO: 49.3 FL (ref 37–54)
EGFRCR SERPLBLD CKD-EPI 2021: 4.7 ML/MIN/1.73
EOSINOPHIL # BLD MANUAL: 0.53 10*3/MM3 (ref 0–0.4)
EOSINOPHIL NFR BLD MANUAL: 4 % (ref 0.3–6.2)
ERYTHROCYTE [DISTWIDTH] IN BLOOD BY AUTOMATED COUNT: 14.1 % (ref 12.3–15.4)
GLUCOSE BLDC GLUCOMTR-MCNC: 103 MG/DL (ref 70–130)
GLUCOSE BLDC GLUCOMTR-MCNC: 110 MG/DL (ref 70–130)
GLUCOSE BLDC GLUCOMTR-MCNC: 119 MG/DL (ref 70–130)
GLUCOSE BLDC GLUCOMTR-MCNC: 180 MG/DL (ref 70–130)
GLUCOSE BLDC GLUCOMTR-MCNC: 98 MG/DL (ref 70–130)
GLUCOSE BLDC GLUCOMTR-MCNC: 99 MG/DL (ref 70–130)
GLUCOSE SERPL-MCNC: 100 MG/DL (ref 65–99)
HCT VFR BLD AUTO: 28.4 % (ref 34–46.6)
HGB BLD-MCNC: 8.9 G/DL (ref 12–15.9)
LEFT ATRIUM VOLUME INDEX: 35.3 ML/M2
LEFT ATRIUM VOLUME: 79 ML
LV EF BIPLANE MOD: 48.8 %
LYMPHOCYTES # BLD MANUAL: 1.58 10*3/MM3 (ref 0.7–3.1)
LYMPHOCYTES NFR BLD MANUAL: 7 % (ref 5–12)
MACROCYTES BLD QL SMEAR: ABNORMAL
MCH RBC QN AUTO: 30.7 PG (ref 26.6–33)
MCHC RBC AUTO-ENTMCNC: 31.3 G/DL (ref 31.5–35.7)
MCV RBC AUTO: 97.9 FL (ref 79–97)
METAMYELOCYTES NFR BLD MANUAL: 1 % (ref 0–0)
MONOCYTES # BLD: 0.92 10*3/MM3 (ref 0.1–0.9)
MYELOCYTES NFR BLD MANUAL: 3 % (ref 0–0)
NEUTROPHILS # BLD AUTO: 9.5 10*3/MM3 (ref 1.7–7)
NEUTROPHILS NFR BLD MANUAL: 72 % (ref 42.7–76)
NRBC BLD AUTO-RTO: 0 /100 WBC (ref 0–0.2)
PLAT MORPH BLD: NORMAL
PLATELET # BLD AUTO: 264 10*3/MM3 (ref 140–450)
PMV BLD AUTO: 11.3 FL (ref 6–12)
POLYCHROMASIA BLD QL SMEAR: ABNORMAL
POTASSIUM SERPL-SCNC: 4.1 MMOL/L (ref 3.5–5.2)
QT INTERVAL: 470 MS
QTC INTERVAL: 497 MS
RBC # BLD AUTO: 2.9 10*6/MM3 (ref 3.77–5.28)
SINUS: 3.2 CM
SODIUM SERPL-SCNC: 134 MMOL/L (ref 136–145)
STJ: 2.21 CM
VARIANT LYMPHS NFR BLD MANUAL: 12 % (ref 19.6–45.3)
WBC MORPH BLD: NORMAL
WBC NRBC COR # BLD AUTO: 13.2 10*3/MM3 (ref 3.4–10.8)

## 2025-04-05 PROCEDURE — 80048 BASIC METABOLIC PNL TOTAL CA: CPT | Performed by: INTERNAL MEDICINE

## 2025-04-05 PROCEDURE — 85730 THROMBOPLASTIN TIME PARTIAL: CPT | Performed by: INTERNAL MEDICINE

## 2025-04-05 PROCEDURE — 85007 BL SMEAR W/DIFF WBC COUNT: CPT | Performed by: INTERNAL MEDICINE

## 2025-04-05 PROCEDURE — 94799 UNLISTED PULMONARY SVC/PX: CPT

## 2025-04-05 PROCEDURE — 63710000001 INSULIN GLARGINE PER 5 UNITS: Performed by: INTERNAL MEDICINE

## 2025-04-05 PROCEDURE — 93010 ELECTROCARDIOGRAM REPORT: CPT | Performed by: INTERNAL MEDICINE

## 2025-04-05 PROCEDURE — 94003 VENT MGMT INPAT SUBQ DAY: CPT

## 2025-04-05 PROCEDURE — 83605 ASSAY OF LACTIC ACID: CPT | Performed by: INTERNAL MEDICINE

## 2025-04-05 PROCEDURE — 82948 REAGENT STRIP/BLOOD GLUCOSE: CPT

## 2025-04-05 PROCEDURE — 99223 1ST HOSP IP/OBS HIGH 75: CPT | Performed by: INTERNAL MEDICINE

## 2025-04-05 PROCEDURE — 25010000002 PROPOFOL 10 MG/ML EMULSION: Performed by: INTERNAL MEDICINE

## 2025-04-05 PROCEDURE — 85025 COMPLETE CBC W/AUTO DIFF WBC: CPT | Performed by: INTERNAL MEDICINE

## 2025-04-05 PROCEDURE — 25010000002 ERTAPENEM PER 500 MG: Performed by: STUDENT IN AN ORGANIZED HEALTH CARE EDUCATION/TRAINING PROGRAM

## 2025-04-05 PROCEDURE — 94760 N-INVAS EAR/PLS OXIMETRY 1: CPT

## 2025-04-05 PROCEDURE — 94761 N-INVAS EAR/PLS OXIMETRY MLT: CPT

## 2025-04-05 PROCEDURE — 25010000002 HEPARIN (PORCINE) 25000-0.45 UT/250ML-% SOLUTION: Performed by: INTERNAL MEDICINE

## 2025-04-05 PROCEDURE — 93005 ELECTROCARDIOGRAM TRACING: CPT | Performed by: INTERNAL MEDICINE

## 2025-04-05 PROCEDURE — 93306 TTE W/DOPPLER COMPLETE: CPT | Performed by: INTERNAL MEDICINE

## 2025-04-05 PROCEDURE — 63710000001 INSULIN LISPRO (HUMAN) PER 5 UNITS: Performed by: STUDENT IN AN ORGANIZED HEALTH CARE EDUCATION/TRAINING PROGRAM

## 2025-04-05 PROCEDURE — 25010000002 ALTEPLASE 2 MG RECONSTITUTED SOLUTION: Performed by: INTERNAL MEDICINE

## 2025-04-05 PROCEDURE — 93306 TTE W/DOPPLER COMPLETE: CPT

## 2025-04-05 RX ORDER — ASPIRIN 81 MG/1
81 TABLET ORAL DAILY
Status: DISCONTINUED | OUTPATIENT
Start: 2025-04-05 | End: 2025-04-16 | Stop reason: HOSPADM

## 2025-04-05 RX ORDER — ATORVASTATIN CALCIUM 20 MG/1
40 TABLET, FILM COATED ORAL NIGHTLY
Status: DISCONTINUED | OUTPATIENT
Start: 2025-04-05 | End: 2025-04-16 | Stop reason: HOSPADM

## 2025-04-05 RX ORDER — LIDOCAINE 4 G/G
1 PATCH TOPICAL
Status: COMPLETED | OUTPATIENT
Start: 2025-04-05 | End: 2025-04-05

## 2025-04-05 RX ADMIN — Medication 10 ML: at 20:57

## 2025-04-05 RX ADMIN — MUPIROCIN 1 APPLICATION: 20 OINTMENT TOPICAL at 20:52

## 2025-04-05 RX ADMIN — Medication 10 ML: at 10:22

## 2025-04-05 RX ADMIN — Medication 10 ML: at 10:21

## 2025-04-05 RX ADMIN — MUPIROCIN 1 APPLICATION: 20 OINTMENT TOPICAL at 09:24

## 2025-04-05 RX ADMIN — PROPOFOL 25 MCG/KG/MIN: 10 INJECTION, EMULSION INTRAVENOUS at 00:15

## 2025-04-05 RX ADMIN — PROPOFOL 15 MCG/KG/MIN: 10 INJECTION, EMULSION INTRAVENOUS at 05:09

## 2025-04-05 RX ADMIN — ERTAPENEM SODIUM 500 MG: 1 INJECTION, POWDER, LYOPHILIZED, FOR SOLUTION INTRAMUSCULAR; INTRAVENOUS at 23:28

## 2025-04-05 RX ADMIN — INSULIN GLARGINE 15 UNITS: 100 INJECTION, SOLUTION SUBCUTANEOUS at 20:56

## 2025-04-05 RX ADMIN — LANTHANUM CARBONATE 2000 MG: 500 TABLET, CHEWABLE ORAL at 17:03

## 2025-04-05 RX ADMIN — Medication 10 ML: at 20:53

## 2025-04-05 RX ADMIN — ATORVASTATIN CALCIUM 40 MG: 20 TABLET, FILM COATED ORAL at 20:52

## 2025-04-05 RX ADMIN — INSULIN GLARGINE 15 UNITS: 100 INJECTION, SOLUTION SUBCUTANEOUS at 08:19

## 2025-04-05 RX ADMIN — ACETAMINOPHEN 650 MG: 325 TABLET, FILM COATED ORAL at 17:39

## 2025-04-05 RX ADMIN — ASPIRIN 81 MG: 81 TABLET, COATED ORAL at 11:46

## 2025-04-05 RX ADMIN — HEPARIN SODIUM 15.09 UNITS/KG/HR: 10000 INJECTION, SOLUTION INTRAVENOUS at 13:51

## 2025-04-05 RX ADMIN — INSULIN LISPRO 4 UNITS: 100 INJECTION, SOLUTION INTRAVENOUS; SUBCUTANEOUS at 17:03

## 2025-04-05 RX ADMIN — LIDOCAINE PAIN RELIEF 1 PATCH: 560 PATCH TOPICAL at 11:47

## 2025-04-05 RX ADMIN — ALTEPLASE: 2.2 INJECTION, POWDER, LYOPHILIZED, FOR SOLUTION INTRAVENOUS at 17:39

## 2025-04-05 RX ADMIN — OXYCODONE AND ACETAMINOPHEN 1 TABLET: 5; 325 TABLET ORAL at 23:41

## 2025-04-05 NOTE — PLAN OF CARE
Goal Outcome Evaluation:              Outcome Evaluation: Patient remains in CICU on vent. Heparin and prop gtt titrated, see MAR. Patient following commands. No acute events over night. Continue to monitor.

## 2025-04-05 NOTE — CODE DOCUMENTATION
I responded to a CODE BLUE on patient that started at approximately 2:29 PM on 4/4/2025.  Reportedly the monitoring techs had called the  and said patient was bradying down.  When they went to her she was without a pulse and that is why CPR was started.  Presumably was in PEA.  She received 1 of epi and was placed on defib monitor.  She received roughly 2 rounds of ACLS with ROSC achieved at 2:35 PM.  I did intubate the patient during the code please see separate procedure note.

## 2025-04-05 NOTE — CONSULTS
Date of Hospital Visit: 25  Encounter Provider: Damian Nguyen MD  Place of Service: [facility]  Patient Name: Kelly Pack  :1970  [id number]  Referral Provider: Génesis Miles MD    Chief complaint: Elevated troponins    History of Present Illness: 54-year-old woman with history of diabetes on hemodialysis for the last year came in with abdominal pain was found to have ESBL sepsis.  She actually underwent stenting of her ureter.  This was 3 days ago yesterday on hemodialysis early into the course she became bradycardic did not have any blood pressure and this was witnessed in the hospital a code was called she received CPR briefly and 1 round of epinephrine with return of spontaneous circulation but she did not end up getting intubated.  Her currently she is awake on the ventilator she is trying to wean off of it.  She has been having lowish blood pressures her mother is here and says this is not the first time she has dropped her blood pressure on hemodialysis.  She is not having chest pain now not having pain anywhere now.  She never previously has had cardiac problems last A1c that I see was 8 her troponins went to 131 then to 251 and her ECG yesterday showed sinus tach with diffuse ST depression and ST elevation in aVR.      Past Medical History:   Diagnosis Date    Albuminuria     Allergic     Seasonal, grass, cats and some dogs    Allergic rhinitis     Asthma     allergy triggered    Bell's palsy     Cataract     Had surgery on both eyes    Cholelithiasis     Removed. Have bile reflux/ vomiting    CKD (chronic kidney disease)     Diabetes mellitus     Drug therapy     Endometrial cancer     HL (hearing loss)     In L ear    Hyperlipidemia     Hypertension     Low back pain     Migraine     Obesity     Peripheral neuropathy     Renal insufficiency     Right otitis media     Type II diabetes mellitus     Visual impairment     Diabetic retinopathy.  Oil in L eye due to retina detachment s.    Vitamin D deficiency        Past Surgical History:   Procedure Laterality Date    ARTERIOVENOUS FISTULA  05/2023    CATARACT EXTRACTION      CHOLECYSTECTOMY      CYSTOSCOPY, RETROGRADE PYELOGRAM AND STENT INSERTION Left 4/2/2025    Procedure: CYSTOSCOPY RETROGRADE PYELOGRAM AND STENT INSERTION;  Surgeon: Tl Gray MD;  Location: [facility];  Service: Urology;  Laterality: Left;    EYE SURGERY      NOV 2020    HYSTERECTOMY      due to cancer    INSERTION HEMODIALYSIS CATHETER Right 06/23/2023    Procedure: HEMODIALYSIS CATHETER INSERTION;  Surgeon: Mikael Mackay MD;  Location: [facility];  Service: Vascular;  Laterality: Right;    OOPHORECTOMY      VITRECTOMY PARS PLANA Left 01/28/2020    Procedure: 25G VITRECTOMY-ENDO LASER;  Surgeon: Lazarus, Howard S., MD;  Location: [facility];  Service: Ophthalmology       Medications Prior to Admission   Medication Sig Dispense Refill Last Dose/Taking    albuterol sulfate  (90 Base) MCG/ACT inhaler INHALE 2 PUFFS BY MOUTH EVERY 6 HOURS AS NEEDED FOR WHEEZING FOR SHORTNESS OF BREATH (Patient taking differently: Inhale 2 puffs Every 6 (Six) Hours As Needed.) 18 g 0 Past Week    benzonatate (TESSALON) 100 MG capsule Take 1-2 capsules by mouth 3 (Three) Times a Day As Needed.   Past Week    carvedilol (COREG) 25 MG tablet Take 1 tablet by mouth 2 (Two) Times a Day With Meals. (Patient taking differently: Take  by mouth See Admin Instructions. Takes 1 tablet 2 times daily on Monday, Wednesday and Fridays   Takes 1 tablet every evening on Tuesdays, Thursdays, and Saturdays) 60 tablet 1 Past Week    cyclobenzaprine (FLEXERIL) 10 MG tablet Take 1 tablet by mouth At Night As Needed for Muscle Spasms. 30 tablet 0 Past Week    fluticasone (FLONASE) 50 MCG/ACT nasal spray 2 sprays by Each Nare route Daily. 48 g 1 Past Week    lanthanum (FOSRENOL) 1000 MG chewable tablet Chew 2 tablets 2 (Two) Times a Day With  Meals.   Past Week    Magnesium Glycinate 120 MG capsule Take 2 capsules by mouth At Night As Needed.   Past Week    midodrine (PROAMATINE) 2.5 MG tablet Take 1 tablet by mouth 3 (Three) Times a Week. Tuesdays, Thursdays and Saturdays   Past Week    simvastatin (ZOCOR) 20 MG tablet Take 1 tablet by mouth Daily. 90 tablet 1 Past Week    torsemide (DEMADEX) 100 MG tablet Take 1 tablet by mouth Daily for 30 days. (Patient taking differently: Take 1 tablet by mouth Every Night.) 30 tablet 0 Past Week    vitamin B-12 (VITAMIN B-12) 1000 MCG tablet Take 1 tablet by mouth Daily. 30 tablet 0 Past Week    vitamin D (ERGOCALCIFEROL) 1.25 MG (30625 UT) capsule capsule Take 1 capsule by mouth 2 (Two) Times a Week. Mondays and Thursdays   Past Week    gabapentin (NEURONTIN) 100 MG capsule Take 1 capsule by mouth 3 (Three) Times a Day for 30 days. (Patient taking differently: Take 3 capsules by mouth At Night As Needed.) 90 capsule 0 More than a month       Current Meds  Scheduled Meds:atorvastatin, 10 mg, Oral, Nightly  ertapenem, 500 mg, Intravenous, Q24H  fluticasone, 2 spray, Each Nare, Daily  insulin glargine, 15 Units, Subcutaneous, Q12H  insulin lispro, 2-7 Units, Subcutaneous, 4x Daily AC & at Bedtime  insulin lispro, 4 Units, Subcutaneous, TID With Meals  lanthanum, 2,000 mg, Oral, BID With Meals  midodrine, 2.5 mg, Oral, Once  midodrine, 5 mg, Oral, Daily  mupirocin, 1 Application, Each Nare, BID  sodium chloride, 10 mL, Intravenous, Q12H  sodium chloride, 10 mL, Intravenous, Q12H  sodium chloride, 10 mL, Intravenous, Q12H  sodium chloride, 10 mL, Intravenous, Q12H  sodium chloride, 10 mL, Intravenous, Q12H  [Held by provider] torsemide, 100 mg, Oral, Nightly  cyanocobalamin, 1,000 mcg, Oral, Daily  vitamin D, 50,000 Units, Oral, Once per day on Monday Thursday      Continuous Infusions:heparin, 9.09 Units/kg/hr, Last Rate: 15.09 Units/kg/hr (04/05/25 0927)  propofol, 5-50 mcg/kg/min, Last Rate: Stopped (04/05/25  0929)      PRN Meds:.  acetaminophen **OR** acetaminophen **OR** acetaminophen    albuterol    senna-docusate sodium **AND** polyethylene glycol **AND** bisacodyl **AND** bisacodyl    cyclobenzaprine    dextrose    dextrose    gabapentin    glucagon (human recombinant)    heparin (porcine)    heparin    heparin    HYDROmorphone    melatonin    nitroglycerin    ondansetron ODT **OR** ondansetron    oxyCODONE-acetaminophen    sodium chloride    sodium chloride    sodium chloride    sodium chloride    sodium chloride    sodium chloride    sodium chloride    sodium chloride    Allergies as of 04/02/2025 - Reviewed 04/02/2025   Allergen Reaction Noted    Tomato Swelling 11/09/2022    Cat dander Itching 03/10/2023    Mixed grasses Itching 03/10/2023    Latex Itching 04/02/2025    Penicillin g Unknown - High Severity 04/02/2025       Social History     Socioeconomic History    Marital status: Single   Tobacco Use    Smoking status: Never    Smokeless tobacco: Never   Vaping Use    Vaping status: Never Used   Substance and Sexual Activity    Alcohol use: Not Currently     Comment: 3-4 drinks PER YEAR!    Drug use: No    Sexual activity: Not Currently     Partners: Male     Birth control/protection: Condom       Family History   Problem Relation Age of Onset    Breast cancer Mother     Cancer Mother         Breast cancer, back in the 90s    Alzheimer's disease Father     Dementia Father     Diabetes Father     Hyperlipidemia Father     Hypertension Father     Hearing loss Father     Mental illness Father         Alzheimer’s    Asthma Maternal Grandfather     Malig Hyperthermia Neg Hx        REVIEW OF SYSTEMS:   ROS was performed and is negative except as outlined in HPI     REVIEW OF SYSTEMS:   Not obtainable      Objective:   Temp:  [97 °F (36.1 °C)-99.8 °F (37.7 °C)] 97.7 °F (36.5 °C)  Heart Rate:  [] 67  Resp:  [0-20] 20  BP: ()/() 118/72  FiO2 (%):  [28 %-98 %] 28 %  Body mass index is 35.94  "kg/m².  Flowsheet Rows      Flowsheet Row First Filed Value   Admission Height 175.3 cm (69\") Documented at 04/02/2025 1049   Admission Weight 110 kg (243 lb) Documented at 04/02/2025 1049          Vitals:    04/05/25 0815   BP:    Pulse: 67   Resp: 20   Temp:    SpO2: 100%       Head:    Normocephalic, without obvious abnormality, atraumatic   Eyes:            Lids and lashes normal, conjunctivae and sclerae normal, no   icterus, no pallor   Ears:    Ears appear intact with no abnormalities noted   Throat:   No oral lesions, dentition good   Neck:   No adenopathy, supple, trachea midline, no thyromegaly, no   carotid bruit, no JVD   Lungs:     Breath sounds are equal and clear to auscultation    Heart:    Normal S1 and S2, RRR, No M/G/R   Abdomen:     Normal bowel sounds, no masses, no organomegaly, soft        non-tender, non-distended, no guarding   Extremities:   Moves all extremities well, no edema, no cyanosis, no redness   Pulses:   Pulses palpable and equal bilaterally.    Skin:  Psychiatric:   No bleeding, bruising or rash    Awake, alert and oriented x 3, normal mood and affect             I personally viewed and interpreted the patient's EKG/Telemetry data    Assessment:  Active Hospital Problems    Diagnosis  POA    **Sepsis [A41.9]  Yes    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       Plan: This is a relatively young woman with diabetes end-stage renal failure on dialysis recent sepsis who had a hypotensive brief cardiac arrest.  Elevated troponins.  ECG changes consistent with ischemia her initial ECG is very high risk EKG today is improved but different " than what she had previously.  I think we need to check an echo treat her sepsis try and get her off the ventilator but she is going to require a cardiac cath at some point this would be considered probably a type II non-ST elevation MI secondary to either hypotension.    Damian Nguyen MD  04/05/25  09:38 EDT.

## 2025-04-05 NOTE — PLAN OF CARE
Goal Outcome Evaluation:  Plan of Care Reviewed With: patient        Progress: improving  Outcome Evaluation: Pt remains in CICU. Extubated today, on 2 L NC. Heparin gtt infusing per MAR. Receiving HD. Patient updated on plan of care.

## 2025-04-05 NOTE — SIGNIFICANT NOTE
04/05/25 0912   Vent Information   Vent Serial Number [id number]   Settings   FiO2 (%) 28 %   PEEP/CPAP (cm H2O) 5 cm H20   Insp Rise Time (%) 70 %   Pressure Support (cm H2O) 7 cm H20   Trigger Sensitivity Flow (L/min) 3 L/min   Disconnect Sensitivity (%) 75 %   Readings   ETCO2 (mmHg) 40 mmHg   Resp Rate (Observed) Vent 17   I:E Ratio (Obs) 1:1.6   Vt Mandatory Ins (observed, mL) 631 mL   Vt Spontaneous (mL) (Obs) 574 mL   Vt, Exp (observed, mL) 574 mL   Minute Ventilation (L/min) (Obs) 9.43 L/min   PIP Observed (cm H2O) 12 cm H2O   MAP (cm H2O) 7.9   PEEP Observed cmH2O 4.6 cmH2O   Plateau Pressure (cm H2O) 0 cm H2O   Driving Pressure (cm H2O) -4.6 cm H2O   Static Compliance (L/cm H2O) 0   Dynamic Compliance (L/cm H2O) 29 L/cm H2O   Alarms   Insp Pressure High (cm H2O) 40 cm H2O   MV High (L/min) 24 L/min   MV Low (L/min) 3.15 L/min   Vt High (ML) 1000 ML   Vt Low (ML) 300 ML   Resp Rate High (bpm) 40 bpm   Apnea Interval (sec) 20 seconds   Apnea Resp Rate (bpm) 16 bpm   Apnea Volume (mL) 500   Apnea Peak Flow (L/min) 60 L/min   Apnea FIO2% 100 %   B = Both Spontaneous Awakening and Breathing Trials   Was patient receiving mechanical ventilation? Yes   Safety Screen Spontaneous Breathing Trial (SBT)  Proceed with SBT - No exclusion criteria met   Spontaneous Breathing Trial (SBT) Outcome SBT Passed   $ SBT Readiness to Wean yes   Vt Spontaneous (mL) 694 mL   Vital Capacity (mL) 1557   Effort (VC) good   RSBI 32   Negative Inspiratory Force (cm H2O) -40   Effort (NIF) good   Resp Rate (Obs) 22

## 2025-04-05 NOTE — PROGRESS NOTES
"RENAL/KCC:     LOS: 3 days    Patient Care Team:  Kim Benjamin APRN as PCP - General (Nurse Practitioner)  Miguel Stahl MD as Consulting Physician (Hematology and Oncology)  Radha Martinez APRN as Referring Physician (Nurse Practitioner)    Chief Complaint:  ESRD, Abdominal pain    Subjective     Interval History:   Chart reviewed  S/P cysto and L ureteral stent placement on 4/2/25  Was on HD yesterday, an hour into treatment, she became bradycardic and unresponsive. Code blue caller. CPR started and one dose of epi given. ROSC in <5 mins (per notes).   She was intubated and transferred to ICU.   Shortly after, she was awake, moving extremities and following commands.   It was decided to keep intubated for the time being.     Currently no clear trigger for arrest.     Currently SBT.       Objective     Vital Sign Min/Max for last 24 hours  Temp  Min: 97 °F (36.1 °C)  Max: 99.8 °F (37.7 °C)   BP  Min: 90/62  Max: 193/97   Pulse  Min: 62  Max: 140   Resp  Min: 0  Max: 18   SpO2  Min: 87 %  Max: 100 %   No data recorded   Weight  Min: 110 kg (243 lb 6.2 oz)  Max: 110 kg (243 lb 6.2 oz)     Flowsheet Rows      Flowsheet Row First Filed Value   Admission Height 175.3 cm (69\") Documented at 04/02/2025 1049   Admission Weight 110 kg (243 lb) Documented at 04/02/2025 1049            No intake/output data recorded.  I/O last 3 completed shifts:  In: 491 [I.V.:491]  Out: 0     Physical Exam:  GEN: intubated, ill appearing, but awake   ENT: PERRL, EOMI, MMM. ETT in place  NECK: Supple, no JVD. RIJ tunneled HD catheter   CHEST: CTAB, no W/R/C. On mechanical ventilation. SBT  CV: RRR, no M/G/R  ABD: Soft, NT, +BS  SKIN: Warm and Dry  NEURO: she is awake, following commands, able to move all extremities. Able to communicate she wants the ETT out.   RUE AVG +thrill and bruit     WBC WBC   Date Value Ref Range Status   04/04/2025 17.21 (H) 3.40 - 10.80 10*3/mm3 Final   04/04/2025 16.84 (H) 3.40 - 10.80 10*3/mm3 " "Final   04/03/2025 20.80 (H) 3.40 - 10.80 10*3/mm3 Final      HGB Hemoglobin   Date Value Ref Range Status   04/04/2025 8.8 (L) 12.0 - 15.9 g/dL Final   04/04/2025 9.4 (L) 12.0 - 15.9 g/dL Final   04/03/2025 9.4 (L) 12.0 - 15.9 g/dL Final      HCT Hematocrit   Date Value Ref Range Status   04/04/2025 24.3 (L) 34.0 - 46.6 % Final   04/04/2025 27.7 (L) 34.0 - 46.6 % Final   04/03/2025 27.2 (L) 34.0 - 46.6 % Final      Platlets No results found for: \"LABPLAT\"   MCV MCV   Date Value Ref Range Status   04/04/2025 97.2 (H) 79.0 - 97.0 fL Final   04/04/2025 95.8 79.0 - 97.0 fL Final   04/03/2025 95.8 79.0 - 97.0 fL Final          Sodium Sodium   Date Value Ref Range Status   04/04/2025 132 (L) 136 - 145 mmol/L Final   04/04/2025 132 (L) 136 - 145 mmol/L Final   04/03/2025 130 (L) 136 - 145 mmol/L Final      Potassium Potassium   Date Value Ref Range Status   04/04/2025 4.2 3.5 - 5.2 mmol/L Final   04/04/2025 4.9 3.5 - 5.2 mmol/L Final     Comment:     Slight hemolysis detected by analyzer. Result may be falsely elevated.   04/03/2025 4.1 3.5 - 5.2 mmol/L Final      Chloride Chloride   Date Value Ref Range Status   04/04/2025 93 (L) 98 - 107 mmol/L Final   04/04/2025 91 (L) 98 - 107 mmol/L Final   04/03/2025 91 (L) 98 - 107 mmol/L Final      CO2 CO2   Date Value Ref Range Status   04/04/2025 17.9 (L) 22.0 - 29.0 mmol/L Final   04/04/2025 19.3 (L) 22.0 - 29.0 mmol/L Final   04/03/2025 23.1 22.0 - 29.0 mmol/L Final      BUN BUN   Date Value Ref Range Status   04/04/2025 81 (H) 6 - 20 mg/dL Final   04/04/2025 71 (H) 6 - 20 mg/dL Final   04/03/2025 46 (H) 6 - 20 mg/dL Final      Creatinine Creatinine   Date Value Ref Range Status   04/04/2025 8.56 (H) 0.57 - 1.00 mg/dL Final   04/04/2025 8.42 (H) 0.57 - 1.00 mg/dL Final   04/03/2025 6.52 (H) 0.57 - 1.00 mg/dL Final      Calcium Calcium   Date Value Ref Range Status   04/04/2025 8.5 (L) 8.6 - 10.5 mg/dL Final   04/04/2025 8.3 (L) 8.6 - 10.5 mg/dL Final   04/03/2025 8.1 (L) 8.6 - " "10.5 mg/dL Final      PO4 No results found for: \"CAPO4\"   Albumin No results found for: \"ALBUMIN\"     Magnesium Magnesium   Date Value Ref Range Status   04/03/2025 2.3 1.6 - 2.6 mg/dL Final      Uric Acid No results found for: \"URICACID\"        Results Review:     I reviewed the patient's new clinical results.    atorvastatin, 10 mg, Oral, Nightly  ertapenem, 500 mg, Intravenous, Q24H  fluticasone, 2 spray, Each Nare, Daily  insulin glargine, 15 Units, Subcutaneous, Q12H  insulin lispro, 2-7 Units, Subcutaneous, 4x Daily AC & at Bedtime  insulin lispro, 4 Units, Subcutaneous, TID With Meals  lanthanum, 2,000 mg, Oral, BID With Meals  midodrine, 2.5 mg, Oral, Once  midodrine, 5 mg, Oral, Daily  mupirocin, 1 Application, Each Nare, BID  sodium chloride, 10 mL, Intravenous, Q12H  sodium chloride, 10 mL, Intravenous, Q12H  sodium chloride, 10 mL, Intravenous, Q12H  sodium chloride, 10 mL, Intravenous, Q12H  sodium chloride, 10 mL, Intravenous, Q12H  [Held by provider] torsemide, 100 mg, Oral, Nightly  cyanocobalamin, 1,000 mcg, Oral, Daily  vitamin D, 50,000 Units, Oral, Once per day on Monday Thursday      heparin, 9.09 Units/kg/hr, Last Rate: 12.09 Units/kg/hr (04/05/25 0033)  propofol, 5-50 mcg/kg/min, Last Rate: 20 mcg/kg/min (04/05/25 0630)        Medication Review: Reviewed    Assessment & Plan     1) ESRD - on TTS HD  2) Abdominal pain  3) L hydronephrosis with emphysematous pyelonephritis  4) DM  5) Hypotension - on Midodrine on non-HD days  6) Metabolic acidosis  7) Anemia of CKD  8) ESBL UTI/Pyelo  9) Bacteremia  10) s/p cardiac arrest: bradycardia with subsequent PEA.     Plan:   Currently unclear trigger for cardiac arrest.   Labs prior to dialysis a not critical, with normal K, and mild metabolic acidosis.   Post arrest labs, also with no significant derangements (not enough to be certain it was cause of arrest).   Labs from this am are pending.   Will plan for HD today.   Discussed with sister  Will " follow        Sima Gandara MD  [facility]  04/05/25  08:05 EDT

## 2025-04-05 NOTE — PROCEDURES
Endotracheal Intubation Procedure Note    Indication for endotracheal intubation:  cardiac arrest .  Sedation: etomidate and rocuronium .  Equipment: A video GlideScope was used and Jose 3 laryngoscope blade and 7.5mm cuffed endotracheal tube.  Number of attempts: 1.  ETT location confirmed by by auscultation, by CXR, and ETCO2 monitor.    Mehdi Bowman DO

## 2025-04-05 NOTE — PROGRESS NOTES
[facility]     Mar/chart reviewed  Follow up PEA arrest, ventilator management  Did well with SBT today, extubated now  Some chest discomfort    Vital Sign Min/Max for last 24 hours  Temp  Min: 97 °F (36.1 °C)  Max: 99.8 °F (37.7 °C)   BP  Min: 90/62  Max: 193/97   Pulse  Min: 62  Max: 140   Resp  Min: 0  Max: 20   SpO2  Min: 87 %  Max: 100 %   Flow (L/min) (Oxygen Therapy)  Min: 2  Max: 2   Weight  Min: 110 kg (243 lb 6.2 oz)  Max: 110 kg (243 lb 6.2 oz)     Appears ill axox3,   perrl, eomi, normal sclera  mmm, no jvd, trachea midline, neck supple,  chest cta bilaterally, no crackles, no wheezes,   rrr,   soft, nt, nd +bs,  no c/c/e  Skin warm, dry no rashes    Labs: 4/5: reviewed:  Glucose 100  Bun 91  Cr 9.34  Na 134  Bicarb 21  Wbc 13.2  Hgb 8.9  Plts 264    Micro: ESBL E. Coli --urine and blood    A/P:  PEA cardiac arrest -- unclear etiology -- cardiology following, planning further eval.  Ventilator management -- reviewed weaning parameters and extubated patient earlier today, she is doing well post extubation from a respiratory standpoint, doubt respiratory cause for arrest  ESBL septicemia - urinary source, urology with plans for stent removal at somepoint, ID has left instructions for antibiotics  ESRD - nephrology following  Hypotensioin -- on midodinre  Hydronephrogis and pyelo -- on antiboitics, s/p stent  DM -- ssi, lantus  Anemia  Severe brianna -- resume cpap at hs    CC 36 mins.

## 2025-04-06 PROBLEM — I21.4 NSTEMI (NON-ST ELEVATED MYOCARDIAL INFARCTION): Status: ACTIVE | Noted: 2025-04-06

## 2025-04-06 LAB
ANION GAP SERPL CALCULATED.3IONS-SCNC: 15 MMOL/L (ref 5–15)
ANISOCYTOSIS BLD QL: ABNORMAL
APTT PPP: 47.2 SECONDS (ref 22.7–35.4)
APTT PPP: 79.1 SECONDS (ref 22.7–35.4)
BASOPHILS # BLD MANUAL: 0 10*3/MM3 (ref 0–0.2)
BASOPHILS NFR BLD MANUAL: 0 % (ref 0–1.5)
BUN SERPL-MCNC: 60 MG/DL (ref 6–20)
BUN/CREAT SERPL: 8.8 (ref 7–25)
CALCIUM SPEC-SCNC: 8 MG/DL (ref 8.6–10.5)
CHLORIDE SERPL-SCNC: 97 MMOL/L (ref 98–107)
CHOLEST SERPL-MCNC: 143 MG/DL (ref 0–200)
CO2 SERPL-SCNC: 23 MMOL/L (ref 22–29)
CREAT SERPL-MCNC: 6.85 MG/DL (ref 0.57–1)
DEPRECATED RDW RBC AUTO: 47.1 FL (ref 37–54)
EGFRCR SERPLBLD CKD-EPI 2021: 6.7 ML/MIN/1.73
EOSINOPHIL # BLD MANUAL: 0 10*3/MM3 (ref 0–0.4)
EOSINOPHIL NFR BLD MANUAL: 0 % (ref 0.3–6.2)
ERYTHROCYTE [DISTWIDTH] IN BLOOD BY AUTOMATED COUNT: 13.5 % (ref 12.3–15.4)
GLUCOSE BLDC GLUCOMTR-MCNC: 107 MG/DL (ref 70–130)
GLUCOSE BLDC GLUCOMTR-MCNC: 112 MG/DL (ref 70–130)
GLUCOSE BLDC GLUCOMTR-MCNC: 125 MG/DL (ref 70–130)
GLUCOSE BLDC GLUCOMTR-MCNC: 99 MG/DL (ref 70–130)
GLUCOSE SERPL-MCNC: 139 MG/DL (ref 65–99)
HCT VFR BLD AUTO: 23.6 % (ref 34–46.6)
HDLC SERPL-MCNC: 12 MG/DL (ref 40–60)
HGB BLD-MCNC: 7.9 G/DL (ref 12–15.9)
HYPOCHROMIA BLD QL: ABNORMAL
LDLC SERPL CALC-MCNC: 67 MG/DL (ref 0–100)
LDLC/HDLC SERPL: 4.2 {RATIO}
LYMPHOCYTES # BLD MANUAL: 0.86 10*3/MM3 (ref 0.7–3.1)
LYMPHOCYTES NFR BLD MANUAL: 5.1 % (ref 5–12)
MACROCYTES BLD QL SMEAR: ABNORMAL
MCH RBC QN AUTO: 32.8 PG (ref 26.6–33)
MCHC RBC AUTO-ENTMCNC: 33.5 G/DL (ref 31.5–35.7)
MCV RBC AUTO: 97.9 FL (ref 79–97)
MONOCYTES # BLD: 0.86 10*3/MM3 (ref 0.1–0.9)
MYELOCYTES NFR BLD MANUAL: 3.1 % (ref 0–0)
NEUTROPHILS # BLD AUTO: 14.56 10*3/MM3 (ref 1.7–7)
NEUTROPHILS NFR BLD MANUAL: 86.7 % (ref 42.7–76)
NRBC BLD AUTO-RTO: 0.2 /100 WBC (ref 0–0.2)
PLAT MORPH BLD: NORMAL
PLATELET # BLD AUTO: 252 10*3/MM3 (ref 140–450)
PMV BLD AUTO: 11.3 FL (ref 6–12)
POLYCHROMASIA BLD QL SMEAR: ABNORMAL
POTASSIUM SERPL-SCNC: 3.6 MMOL/L (ref 3.5–5.2)
RBC # BLD AUTO: 2.41 10*6/MM3 (ref 3.77–5.28)
SODIUM SERPL-SCNC: 135 MMOL/L (ref 136–145)
TRIGL SERPL-MCNC: 403 MG/DL (ref 0–150)
VARIANT LYMPHS NFR BLD MANUAL: 5.1 % (ref 19.6–45.3)
VLDLC SERPL-MCNC: 64 MG/DL (ref 5–40)
WBC MORPH BLD: NORMAL
WBC NRBC COR # BLD AUTO: 16.79 10*3/MM3 (ref 3.4–10.8)

## 2025-04-06 PROCEDURE — B2111ZZ FLUOROSCOPY OF MULTIPLE CORONARY ARTERIES USING LOW OSMOLAR CONTRAST: ICD-10-PCS | Performed by: INTERNAL MEDICINE

## 2025-04-06 PROCEDURE — 85730 THROMBOPLASTIN TIME PARTIAL: CPT | Performed by: INTERNAL MEDICINE

## 2025-04-06 PROCEDURE — 85025 COMPLETE CBC W/AUTO DIFF WBC: CPT | Performed by: INTERNAL MEDICINE

## 2025-04-06 PROCEDURE — 25010000002 LIDOCAINE 2% SOLUTION: Performed by: INTERNAL MEDICINE

## 2025-04-06 PROCEDURE — 25010000002 MIDAZOLAM PER 1 MG: Performed by: INTERNAL MEDICINE

## 2025-04-06 PROCEDURE — C1769 GUIDE WIRE: HCPCS | Performed by: INTERNAL MEDICINE

## 2025-04-06 PROCEDURE — 85007 BL SMEAR W/DIFF WBC COUNT: CPT | Performed by: INTERNAL MEDICINE

## 2025-04-06 PROCEDURE — 25010000002 FENTANYL CITRATE (PF) 50 MCG/ML SOLUTION: Performed by: INTERNAL MEDICINE

## 2025-04-06 PROCEDURE — 63710000001 ONDANSETRON ODT 4 MG TABLET DISPERSIBLE: Performed by: UROLOGY

## 2025-04-06 PROCEDURE — 25510000001 IOPAMIDOL PER 1 ML: Performed by: INTERNAL MEDICINE

## 2025-04-06 PROCEDURE — C1894 INTRO/SHEATH, NON-LASER: HCPCS | Performed by: INTERNAL MEDICINE

## 2025-04-06 PROCEDURE — 63710000001 INSULIN GLARGINE PER 5 UNITS: Performed by: INTERNAL MEDICINE

## 2025-04-06 PROCEDURE — 25010000002 HYDROMORPHONE PER 4 MG: Performed by: UROLOGY

## 2025-04-06 PROCEDURE — 25010000002 ONDANSETRON PER 1 MG: Performed by: INTERNAL MEDICINE

## 2025-04-06 PROCEDURE — 99232 SBSQ HOSP IP/OBS MODERATE 35: CPT | Performed by: INTERNAL MEDICINE

## 2025-04-06 PROCEDURE — 25010000002 HEPARIN (PORCINE) 25000-0.45 UT/250ML-% SOLUTION: Performed by: INTERNAL MEDICINE

## 2025-04-06 PROCEDURE — 25010000002 HEPARIN (PORCINE) PER 1000 UNITS: Performed by: INTERNAL MEDICINE

## 2025-04-06 PROCEDURE — 4A023N7 MEASUREMENT OF CARDIAC SAMPLING AND PRESSURE, LEFT HEART, PERCUTANEOUS APPROACH: ICD-10-PCS | Performed by: INTERNAL MEDICINE

## 2025-04-06 PROCEDURE — 80061 LIPID PANEL: CPT | Performed by: INTERNAL MEDICINE

## 2025-04-06 PROCEDURE — B2151ZZ FLUOROSCOPY OF LEFT HEART USING LOW OSMOLAR CONTRAST: ICD-10-PCS | Performed by: INTERNAL MEDICINE

## 2025-04-06 PROCEDURE — 82948 REAGENT STRIP/BLOOD GLUCOSE: CPT

## 2025-04-06 PROCEDURE — 93458 L HRT ARTERY/VENTRICLE ANGIO: CPT | Performed by: INTERNAL MEDICINE

## 2025-04-06 PROCEDURE — 80048 BASIC METABOLIC PNL TOTAL CA: CPT

## 2025-04-06 RX ORDER — IOPAMIDOL 755 MG/ML
INJECTION, SOLUTION INTRAVASCULAR
Status: DISCONTINUED | OUTPATIENT
Start: 2025-04-06 | End: 2025-04-06 | Stop reason: HOSPADM

## 2025-04-06 RX ORDER — FENTANYL CITRATE 50 UG/ML
INJECTION, SOLUTION INTRAMUSCULAR; INTRAVENOUS
Status: DISCONTINUED | OUTPATIENT
Start: 2025-04-06 | End: 2025-04-06 | Stop reason: HOSPADM

## 2025-04-06 RX ORDER — ONDANSETRON 4 MG/1
4 TABLET, ORALLY DISINTEGRATING ORAL EVERY 6 HOURS PRN
Status: DISCONTINUED | OUTPATIENT
Start: 2025-04-06 | End: 2025-04-16 | Stop reason: HOSPADM

## 2025-04-06 RX ORDER — NITROGLYCERIN 0.4 MG/1
0.4 TABLET SUBLINGUAL
Status: DISCONTINUED | OUTPATIENT
Start: 2025-04-06 | End: 2025-04-16 | Stop reason: HOSPADM

## 2025-04-06 RX ORDER — HYDROCODONE BITARTRATE AND ACETAMINOPHEN 5; 325 MG/1; MG/1
1 TABLET ORAL EVERY 4 HOURS PRN
Status: DISCONTINUED | OUTPATIENT
Start: 2025-04-06 | End: 2025-04-07

## 2025-04-06 RX ORDER — ONDANSETRON 2 MG/ML
4 INJECTION INTRAMUSCULAR; INTRAVENOUS EVERY 6 HOURS PRN
Status: DISCONTINUED | OUTPATIENT
Start: 2025-04-06 | End: 2025-04-16 | Stop reason: HOSPADM

## 2025-04-06 RX ORDER — MIDAZOLAM HYDROCHLORIDE 1 MG/ML
INJECTION, SOLUTION INTRAMUSCULAR; INTRAVENOUS
Status: DISCONTINUED | OUTPATIENT
Start: 2025-04-06 | End: 2025-04-06 | Stop reason: HOSPADM

## 2025-04-06 RX ORDER — HEPARIN SODIUM 1000 [USP'U]/ML
INJECTION, SOLUTION INTRAVENOUS; SUBCUTANEOUS
Status: DISCONTINUED | OUTPATIENT
Start: 2025-04-06 | End: 2025-04-06 | Stop reason: HOSPADM

## 2025-04-06 RX ORDER — LIDOCAINE HYDROCHLORIDE 20 MG/ML
INJECTION, SOLUTION INFILTRATION; PERINEURAL
Status: DISCONTINUED | OUTPATIENT
Start: 2025-04-06 | End: 2025-04-06 | Stop reason: HOSPADM

## 2025-04-06 RX ORDER — VERAPAMIL HYDROCHLORIDE 2.5 MG/ML
INJECTION INTRAVENOUS
Status: DISCONTINUED | OUTPATIENT
Start: 2025-04-06 | End: 2025-04-06 | Stop reason: HOSPADM

## 2025-04-06 RX ADMIN — FLUTICASONE PROPIONATE 2 SPRAY: 50 SPRAY, METERED NASAL at 08:47

## 2025-04-06 RX ADMIN — HEPARIN SODIUM 21.09 UNITS/KG/HR: 10000 INJECTION, SOLUTION INTRAVENOUS at 03:43

## 2025-04-06 RX ADMIN — ONDANSETRON 4 MG: 2 INJECTION, SOLUTION INTRAMUSCULAR; INTRAVENOUS at 20:05

## 2025-04-06 RX ADMIN — MUPIROCIN 1 APPLICATION: 20 OINTMENT TOPICAL at 08:33

## 2025-04-06 RX ADMIN — Medication 1000 MCG: at 08:43

## 2025-04-06 RX ADMIN — Medication 10 ML: at 08:00

## 2025-04-06 RX ADMIN — Medication 10 ML: at 21:44

## 2025-04-06 RX ADMIN — MUPIROCIN 1 APPLICATION: 20 OINTMENT TOPICAL at 21:44

## 2025-04-06 RX ADMIN — HYDROMORPHONE HYDROCHLORIDE 0.5 MG: 1 INJECTION, SOLUTION INTRAMUSCULAR; INTRAVENOUS; SUBCUTANEOUS at 04:11

## 2025-04-06 RX ADMIN — CYCLOBENZAPRINE HYDROCHLORIDE 10 MG: 10 TABLET, FILM COATED ORAL at 21:43

## 2025-04-06 RX ADMIN — INSULIN GLARGINE 15 UNITS: 100 INJECTION, SOLUTION SUBCUTANEOUS at 21:44

## 2025-04-06 RX ADMIN — OXYCODONE AND ACETAMINOPHEN 1 TABLET: 5; 325 TABLET ORAL at 21:43

## 2025-04-06 RX ADMIN — ASPIRIN 81 MG: 81 TABLET, COATED ORAL at 08:43

## 2025-04-06 RX ADMIN — ATORVASTATIN CALCIUM 40 MG: 20 TABLET, FILM COATED ORAL at 21:43

## 2025-04-06 RX ADMIN — ONDANSETRON 4 MG: 4 TABLET, ORALLY DISINTEGRATING ORAL at 12:34

## 2025-04-06 RX ADMIN — HYDROMORPHONE HYDROCHLORIDE 0.5 MG: 1 INJECTION, SOLUTION INTRAMUSCULAR; INTRAVENOUS; SUBCUTANEOUS at 07:32

## 2025-04-06 RX ADMIN — HYDROMORPHONE HYDROCHLORIDE 0.5 MG: 1 INJECTION, SOLUTION INTRAMUSCULAR; INTRAVENOUS; SUBCUTANEOUS at 09:59

## 2025-04-06 NOTE — PLAN OF CARE
Goal Outcome Evaluation:              Outcome Evaluation: Patient remains in CICU on 2L NC. HD completed and tolerated well. Heparin gtt titrated per protocol. Pain treated with PRN meds. VSS. Continue to monitor.

## 2025-04-06 NOTE — PROGRESS NOTES
"RENAL/[facility]:     LOS: 4 days    Patient Care Team:  Kim Benjamin APRN as PCP - General (Nurse Practitioner)  Miguel Stahl MD as Consulting Physician (Hematology and Oncology)  Radha Martinez APRN as Referring Physician (Nurse Practitioner)    Chief Complaint:  ESRD, Abdominal pain    Subjective     Interval History:   Chart reviewed  S/P cysto and L ureteral stent placement on 4/2/25  Extubated yesterday   Feeling well.       Objective     Vital Sign Min/Max for last 24 hours  Temp  Min: 97 °F (36.1 °C)  Max: 99.3 °F (37.4 °C)   BP  Min: 107/58  Max: 171/85   Pulse  Min: 62  Max: 84   Resp  Min: 16  Max: 22   SpO2  Min: 93 %  Max: 100 %   Flow (L/min) (Oxygen Therapy)  Min: 2  Max: 2   Weight  Min: 110 kg (242 lb 8.1 oz)  Max: 112 kg (247 lb 2.2 oz)     Flowsheet Rows      Flowsheet Row First Filed Value   Admission Height 175.3 cm (69\") Documented at 04/02/2025 1049   Admission Weight 110 kg (243 lb) Documented at 04/02/2025 1049            No intake/output data recorded.  I/O last 3 completed shifts:  In: 1575.4 [P.O.:440; I.V.:1135.4]  Out: -     Physical Exam:  GEN: looks much better, sitting up in bed, NAD  ENT: PERRL, EOMI, MMM.  NECK: Supple, no JVD. RIJ tunneled HD catheter   CHEST: CTAB, no W/R/C. On mechanical ventilation. SBT  CV: RRR, no M/G/R  ABD: Soft, NT, +BS  SKIN: Warm and Dry  NEURO: AAOX3   RUE AVF +thrill and bruit     WBC WBC   Date Value Ref Range Status   04/06/2025 16.79 (H) 3.40 - 10.80 10*3/mm3 Final   04/05/2025 13.20 (H) 3.40 - 10.80 10*3/mm3 Final   04/04/2025 17.21 (H) 3.40 - 10.80 10*3/mm3 Final   04/04/2025 16.84 (H) 3.40 - 10.80 10*3/mm3 Final      HGB Hemoglobin   Date Value Ref Range Status   04/06/2025 7.9 (L) 12.0 - 15.9 g/dL Final   04/05/2025 8.9 (L) 12.0 - 15.9 g/dL Final   04/04/2025 8.8 (L) 12.0 - 15.9 g/dL Final   04/04/2025 9.4 (L) 12.0 - 15.9 g/dL Final      HCT Hematocrit   Date Value Ref Range Status   04/06/2025 23.6 (L) 34.0 - 46.6 % Final   04/05/2025 " "28.4 (L) 34.0 - 46.6 % Final   04/04/2025 24.3 (L) 34.0 - 46.6 % Final   04/04/2025 27.7 (L) 34.0 - 46.6 % Final      Platlets No results found for: \"LABPLAT\"   MCV MCV   Date Value Ref Range Status   04/06/2025 97.9 (H) 79.0 - 97.0 fL Final   04/05/2025 97.9 (H) 79.0 - 97.0 fL Final   04/04/2025 97.2 (H) 79.0 - 97.0 fL Final   04/04/2025 95.8 79.0 - 97.0 fL Final          Sodium Sodium   Date Value Ref Range Status   04/06/2025 135 (L) 136 - 145 mmol/L Final   04/05/2025 134 (L) 136 - 145 mmol/L Final   04/04/2025 132 (L) 136 - 145 mmol/L Final   04/04/2025 132 (L) 136 - 145 mmol/L Final      Potassium Potassium   Date Value Ref Range Status   04/06/2025 3.6 3.5 - 5.2 mmol/L Final   04/05/2025 4.1 3.5 - 5.2 mmol/L Final   04/04/2025 4.2 3.5 - 5.2 mmol/L Final   04/04/2025 4.9 3.5 - 5.2 mmol/L Final     Comment:     Slight hemolysis detected by analyzer. Result may be falsely elevated.      Chloride Chloride   Date Value Ref Range Status   04/06/2025 97 (L) 98 - 107 mmol/L Final   04/05/2025 95 (L) 98 - 107 mmol/L Final   04/04/2025 93 (L) 98 - 107 mmol/L Final   04/04/2025 91 (L) 98 - 107 mmol/L Final      CO2 CO2   Date Value Ref Range Status   04/06/2025 23.0 22.0 - 29.0 mmol/L Final   04/05/2025 21.4 (L) 22.0 - 29.0 mmol/L Final   04/04/2025 17.9 (L) 22.0 - 29.0 mmol/L Final   04/04/2025 19.3 (L) 22.0 - 29.0 mmol/L Final      BUN BUN   Date Value Ref Range Status   04/06/2025 60 (H) 6 - 20 mg/dL Final   04/05/2025 91 (H) 6 - 20 mg/dL Final   04/04/2025 81 (H) 6 - 20 mg/dL Final   04/04/2025 71 (H) 6 - 20 mg/dL Final      Creatinine Creatinine   Date Value Ref Range Status   04/06/2025 6.85 (H) 0.57 - 1.00 mg/dL Final   04/05/2025 9.34 (H) 0.57 - 1.00 mg/dL Final   04/04/2025 8.56 (H) 0.57 - 1.00 mg/dL Final   04/04/2025 8.42 (H) 0.57 - 1.00 mg/dL Final      Calcium Calcium   Date Value Ref Range Status   04/06/2025 8.0 (L) 8.6 - 10.5 mg/dL Final   04/05/2025 8.2 (L) 8.6 - 10.5 mg/dL Final   04/04/2025 8.5 (L) " "8.6 - 10.5 mg/dL Final   04/04/2025 8.3 (L) 8.6 - 10.5 mg/dL Final      PO4 No results found for: \"CAPO4\"   Albumin No results found for: \"ALBUMIN\"     Magnesium No results found for: \"MG\"     Uric Acid No results found for: \"URICACID\"        Results Review:     I reviewed the patient's new clinical results.    aspirin, 81 mg, Oral, Daily  atorvastatin, 40 mg, Oral, Nightly  ertapenem, 500 mg, Intravenous, Q24H  fluticasone, 2 spray, Each Nare, Daily  insulin glargine, 15 Units, Subcutaneous, Q12H  insulin lispro, 2-7 Units, Subcutaneous, 4x Daily AC & at Bedtime  insulin lispro, 4 Units, Subcutaneous, TID With Meals  lanthanum, 2,000 mg, Oral, BID With Meals  midodrine, 2.5 mg, Oral, Once  midodrine, 5 mg, Oral, Daily  mupirocin, 1 Application, Each Nare, BID  sodium chloride, 10 mL, Intravenous, Q12H  sodium chloride, 10 mL, Intravenous, Q12H  sodium chloride, 10 mL, Intravenous, Q12H  sodium chloride, 10 mL, Intravenous, Q12H  sodium chloride, 10 mL, Intravenous, Q12H  [Held by provider] torsemide, 100 mg, Oral, Nightly  cyanocobalamin, 1,000 mcg, Oral, Daily  vitamin D, 50,000 Units, Oral, Once per day on Monday Thursday      heparin, 9.09 Units/kg/hr, Last Rate: 21.09 Units/kg/hr (04/06/25 5423)        Medication Review: Reviewed    Assessment & Plan     1) ESRD - on TTS HD  2) Abdominal pain  3) L hydronephrosis with emphysematous pyelonephritis  4) DM  5) Hypotension - on Midodrine on non-HD days  6) Metabolic acidosis  7) Anemia of CKD  8) ESBL UTI/Pyelo  9) Bacteremia  10) s/p cardiac arrest: bradycardia with subsequent PEA.     Plan:   S/p Hd yesterday.   Will plan for HD TTS   Cardiology following for recent cardiac arrest.   She still has a tunneled HD catheter. Can be arrange for DC as outpatient once staff at her Hd unit are able to consistently cannulate her access.   Will follow       Sima Gandara MD  [facility]  04/06/25  07:57 EDT      "

## 2025-04-06 NOTE — PROGRESS NOTES
[facility]      Mar/chart reviewed  Follow up PEA arrest, ventilator management  Some expected chest discomfort    Vital Sign Min/Max for last 24 hours  Temp  Min: 97 °F (36.1 °C)  Max: 99.3 °F (37.4 °C)   BP  Min: 107/58  Max: 171/85   Pulse  Min: 62  Max: 84   Resp  Min: 16  Max: 22   SpO2  Min: 93 %  Max: 100 %   Flow (L/min) (Oxygen Therapy)  Min: 2  Max: 2   Weight  Min: 110 kg (242 lb 8.1 oz)  Max: 112 kg (247 lb 2.2 oz)     appears ill axox3,   perrl, eomi, normal sclera  mmm, no jvd, trachea midline, neck supple,  chest cta bilaterally, no crackles, no wheezes,   rrr,   soft, nt, nd +bs,  no c/c/e  Skin warm, dry no rashes    Labs: 4/6: reviewed:  Wbc 16  Hgb 7.9  Plts 252    Glucose 139  Bun 60  Cr 6.85  Bicarb 23    A/P:  PEA cardiac arrest -- unclear etiology -- cardiology following, planning further eval.  Ventilator management -- reviewed weaning parameters and extubated patient earlier today, she is doing well post extubation from a respiratory standpoint, doubt respiratory cause for arrest  ESBL septicemia - urinary source, urology with plans for stent removal at somepoint, ID has left instructions for antibiotics  ESRD - nephrology following  Hypotensioin -- on midodinre  Hydronephrogis and pyelo -- on antiboitics, s/p stent  DM -- ssi, lantus  Anemia  Severe brianna -- resume cpap at     Cards to take for cath today  No objection to move out of unit.

## 2025-04-06 NOTE — PROGRESS NOTES
"Kelly Pack  1970 54 y.o.  [id number]      Patient Care Team:  Kim Benjamin APRN as PCP - General (Nurse Practitioner)  Miguel Stahl MD as Consulting Physician (Hematology and Oncology)  Radha Martinez APRN as Referring Physician (Nurse Practitioner)    CC: Diabetes, end-stage renal disease on dialysis, PEA arrest on dialysis.  Markedly abnormal ECG and elevated troponins consistent with a non-ST elevation MI, EF 45 to 50%    Interval History: She is doing okay overnight but she does have some pain in her chest it seems musculoskeletal likely related to her CPR      Objective   Vital Signs  Temp:  [97 °F (36.1 °C)-99.3 °F (37.4 °C)] 98.3 °F (36.8 °C)  Heart Rate:  [62-84] 78  Resp:  [16-22] 22  BP: (107-171)/(53-99) 146/75  FiO2 (%):  [27 %-39 %] 27 %    Intake/Output Summary (Last 24 hours) at 4/6/2025 0742  Last data filed at 4/6/2025 0420  Gross per 24 hour   Intake 1084.41 ml   Output --   Net 1084.41 ml     Flowsheet Rows      Flowsheet Row First Filed Value   Admission Height 175.3 cm (69\") Documented at 04/02/2025 1049   Admission Weight 110 kg (243 lb) Documented at 04/02/2025 1049            Physical Exam:   General Appearance:    Alert,oriented, in no acute distress   Lungs:     Clear to auscultation,BS are equal    Heart:    Normal S1 and S2, RRR without murmur, gallop or rub   HEENT:    Sclerae are clear, no JVD or adenopathy   Abdomen:     Normal bowel sounds, soft nontender, nondistended, no HSM   Extremities:   Moves all extremities well, no edema, no cyanosis, no             Redness, no rash     Medication Review:      aspirin, 81 mg, Oral, Daily  atorvastatin, 40 mg, Oral, Nightly  ertapenem, 500 mg, Intravenous, Q24H  fluticasone, 2 spray, Each Nare, Daily  insulin glargine, 15 Units, Subcutaneous, Q12H  insulin lispro, 2-7 Units, Subcutaneous, 4x Daily AC & at Bedtime  insulin lispro, 4 Units, Subcutaneous, TID With Meals  lanthanum, 2,000 mg, Oral, BID With " Meals  midodrine, 2.5 mg, Oral, Once  midodrine, 5 mg, Oral, Daily  mupirocin, 1 Application, Each Nare, BID  sodium chloride, 10 mL, Intravenous, Q12H  sodium chloride, 10 mL, Intravenous, Q12H  sodium chloride, 10 mL, Intravenous, Q12H  sodium chloride, 10 mL, Intravenous, Q12H  sodium chloride, 10 mL, Intravenous, Q12H  [Held by provider] torsemide, 100 mg, Oral, Nightly  cyanocobalamin, 1,000 mcg, Oral, Daily  vitamin D, 50,000 Units, Oral, Once per day on Monday Thursday      heparin, 9.09 Units/kg/hr, Last Rate: 21.09 Units/kg/hr (04/06/25 0343)          I reviewed the patient's new clinical results.  I personally viewed and interpreted the patient's EKG/Telemetry data    Assessment/Plan  Active Hospital Problems    Diagnosis  POA    **Sepsis [A41.9]  Yes    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       She had a PEA arrest has elevated troponins but the more concerning thing is a really markedly abnormal ECG initially and then even yesterday's was abnormal.  She has a remarkably low HDL and her LDL is 67.  I think we are to take her to the lab today.  Her hemoglobin is drifted down to 7.9.  She does not appear to be actively bleeding anywhere.  I think we can still move forward this working to try and do this from the arm although she does not have a great pulse in her left arm we cannot go on her right arm.  I have discussed the risk versus benefits of this with her and she agrees and understands    Damian Nguyen MD  04/06/25  07:42 EDT

## 2025-04-06 NOTE — NURSING NOTE
HD note:  Hemodialysis completed with total of 0ml fluid removal. Poor flow from both lumens of catheter; cannulated AVF for arterial; cathflo instilled into both lumens of catheter post tx as ordered. Post tx dialysis access care completed and report given to primary nurse.      Tammy Salmeron  [facility]

## 2025-04-07 PROBLEM — E66.01 CLASS 2 SEVERE OBESITY WITH SERIOUS COMORBIDITY IN ADULT: Status: ACTIVE | Noted: 2025-04-07

## 2025-04-07 PROBLEM — E66.812 CLASS 2 SEVERE OBESITY WITH SERIOUS COMORBIDITY IN ADULT: Status: ACTIVE | Noted: 2025-04-07

## 2025-04-07 LAB
ANION GAP SERPL CALCULATED.3IONS-SCNC: 14.6 MMOL/L (ref 5–15)
ANISOCYTOSIS BLD QL: ABNORMAL
BASOPHILS # BLD MANUAL: 0 10*3/MM3 (ref 0–0.2)
BASOPHILS NFR BLD MANUAL: 0 % (ref 0–1.5)
BUN SERPL-MCNC: 66 MG/DL (ref 6–20)
BUN/CREAT SERPL: 7.7 (ref 7–25)
CALCIUM SPEC-SCNC: 8.3 MG/DL (ref 8.6–10.5)
CHLORIDE SERPL-SCNC: 99 MMOL/L (ref 98–107)
CO2 SERPL-SCNC: 22.4 MMOL/L (ref 22–29)
CREAT SERPL-MCNC: 8.6 MG/DL (ref 0.57–1)
DEPRECATED RDW RBC AUTO: 49.3 FL (ref 37–54)
EGFRCR SERPLBLD CKD-EPI 2021: 5.1 ML/MIN/1.73
EOSINOPHIL # BLD MANUAL: 0.33 10*3/MM3 (ref 0–0.4)
EOSINOPHIL NFR BLD MANUAL: 2 % (ref 0.3–6.2)
ERYTHROCYTE [DISTWIDTH] IN BLOOD BY AUTOMATED COUNT: 13.5 % (ref 12.3–15.4)
GLUCOSE BLDC GLUCOMTR-MCNC: 109 MG/DL (ref 70–130)
GLUCOSE BLDC GLUCOMTR-MCNC: 142 MG/DL (ref 70–130)
GLUCOSE BLDC GLUCOMTR-MCNC: 152 MG/DL (ref 70–130)
GLUCOSE BLDC GLUCOMTR-MCNC: 162 MG/DL (ref 70–130)
GLUCOSE BLDC GLUCOMTR-MCNC: 234 MG/DL (ref 70–130)
GLUCOSE SERPL-MCNC: 118 MG/DL (ref 65–99)
HCT VFR BLD AUTO: 25.4 % (ref 34–46.6)
HGB BLD-MCNC: 8 G/DL (ref 12–15.9)
LYMPHOCYTES # BLD MANUAL: 1.68 10*3/MM3 (ref 0.7–3.1)
LYMPHOCYTES NFR BLD MANUAL: 9.1 % (ref 5–12)
MACROCYTES BLD QL SMEAR: ABNORMAL
MCH RBC QN AUTO: 31.9 PG (ref 26.6–33)
MCHC RBC AUTO-ENTMCNC: 31.5 G/DL (ref 31.5–35.7)
MCV RBC AUTO: 101.2 FL (ref 79–97)
METAMYELOCYTES NFR BLD MANUAL: 2 % (ref 0–0)
MONOCYTES # BLD: 1.52 10*3/MM3 (ref 0.1–0.9)
MYELOCYTES NFR BLD MANUAL: 1 % (ref 0–0)
NEUTROPHILS # BLD AUTO: 12.62 10*3/MM3 (ref 1.7–7)
NEUTROPHILS NFR BLD MANUAL: 75.8 % (ref 42.7–76)
NRBC BLD AUTO-RTO: 0.4 /100 WBC (ref 0–0.2)
PLAT MORPH BLD: NORMAL
PLATELET # BLD AUTO: 309 10*3/MM3 (ref 140–450)
PMV BLD AUTO: 10.8 FL (ref 6–12)
POLYCHROMASIA BLD QL SMEAR: ABNORMAL
POTASSIUM SERPL-SCNC: 4 MMOL/L (ref 3.5–5.2)
RBC # BLD AUTO: 2.51 10*6/MM3 (ref 3.77–5.28)
SODIUM SERPL-SCNC: 136 MMOL/L (ref 136–145)
VARIANT LYMPHS NFR BLD MANUAL: 10.1 % (ref 19.6–45.3)
WBC MORPH BLD: NORMAL
WBC NRBC COR # BLD AUTO: 16.65 10*3/MM3 (ref 3.4–10.8)

## 2025-04-07 PROCEDURE — 63710000001 INSULIN LISPRO (HUMAN) PER 5 UNITS: Performed by: INTERNAL MEDICINE

## 2025-04-07 PROCEDURE — 94660 CPAP INITIATION&MGMT: CPT

## 2025-04-07 PROCEDURE — 82948 REAGENT STRIP/BLOOD GLUCOSE: CPT

## 2025-04-07 PROCEDURE — 80048 BASIC METABOLIC PNL TOTAL CA: CPT | Performed by: INTERNAL MEDICINE

## 2025-04-07 PROCEDURE — 63710000001 INSULIN GLARGINE PER 5 UNITS: Performed by: INTERNAL MEDICINE

## 2025-04-07 PROCEDURE — 83695 ASSAY OF LIPOPROTEIN(A): CPT | Performed by: INTERNAL MEDICINE

## 2025-04-07 PROCEDURE — 85025 COMPLETE CBC W/AUTO DIFF WBC: CPT | Performed by: INTERNAL MEDICINE

## 2025-04-07 PROCEDURE — 97530 THERAPEUTIC ACTIVITIES: CPT

## 2025-04-07 PROCEDURE — 85007 BL SMEAR W/DIFF WBC COUNT: CPT | Performed by: INTERNAL MEDICINE

## 2025-04-07 PROCEDURE — 99232 SBSQ HOSP IP/OBS MODERATE 35: CPT | Performed by: INTERNAL MEDICINE

## 2025-04-07 PROCEDURE — 25010000002 ERTAPENEM PER 500 MG: Performed by: INTERNAL MEDICINE

## 2025-04-07 PROCEDURE — 94799 UNLISTED PULMONARY SVC/PX: CPT

## 2025-04-07 RX ORDER — HYDROCODONE BITARTRATE AND ACETAMINOPHEN 7.5; 325 MG/1; MG/1
1 TABLET ORAL EVERY 4 HOURS PRN
Refills: 0 | Status: DISCONTINUED | OUTPATIENT
Start: 2025-04-07 | End: 2025-04-09

## 2025-04-07 RX ORDER — HEPARIN SODIUM 1000 [USP'U]/ML
4000 INJECTION, SOLUTION INTRAVENOUS; SUBCUTANEOUS AS NEEDED
Status: DISCONTINUED | OUTPATIENT
Start: 2025-04-07 | End: 2025-04-16 | Stop reason: HOSPADM

## 2025-04-07 RX ORDER — MIDODRINE HYDROCHLORIDE 5 MG/1
5 TABLET ORAL
Status: DISCONTINUED | OUTPATIENT
Start: 2025-04-08 | End: 2025-04-14

## 2025-04-07 RX ORDER — LIDOCAINE 4 G/G
1 PATCH TOPICAL
Status: DISCONTINUED | OUTPATIENT
Start: 2025-04-07 | End: 2025-04-16 | Stop reason: HOSPADM

## 2025-04-07 RX ADMIN — Medication 10 ML: at 22:43

## 2025-04-07 RX ADMIN — MUPIROCIN 1 APPLICATION: 20 OINTMENT TOPICAL at 08:20

## 2025-04-07 RX ADMIN — Medication 10 ML: at 08:20

## 2025-04-07 RX ADMIN — LANTHANUM CARBONATE 2000 MG: 500 TABLET, CHEWABLE ORAL at 08:19

## 2025-04-07 RX ADMIN — INSULIN GLARGINE 15 UNITS: 100 INJECTION, SOLUTION SUBCUTANEOUS at 22:43

## 2025-04-07 RX ADMIN — HYDROCODONE BITARTRATE AND ACETAMINOPHEN 1 TABLET: 7.5; 325 TABLET ORAL at 13:38

## 2025-04-07 RX ADMIN — INSULIN LISPRO 2 UNITS: 100 INJECTION, SOLUTION INTRAVENOUS; SUBCUTANEOUS at 17:36

## 2025-04-07 RX ADMIN — ASPIRIN 81 MG: 81 TABLET, COATED ORAL at 08:19

## 2025-04-07 RX ADMIN — ERTAPENEM SODIUM 500 MG: 1 INJECTION, POWDER, LYOPHILIZED, FOR SOLUTION INTRAMUSCULAR; INTRAVENOUS at 00:29

## 2025-04-07 RX ADMIN — LANTHANUM CARBONATE 2000 MG: 500 TABLET, CHEWABLE ORAL at 17:36

## 2025-04-07 RX ADMIN — ERGOCALCIFEROL 50000 UNITS: 1.25 CAPSULE ORAL at 08:19

## 2025-04-07 RX ADMIN — MUPIROCIN 1 APPLICATION: 20 OINTMENT TOPICAL at 22:44

## 2025-04-07 RX ADMIN — INSULIN LISPRO 3 UNITS: 100 INJECTION, SOLUTION INTRAVENOUS; SUBCUTANEOUS at 22:43

## 2025-04-07 RX ADMIN — INSULIN LISPRO 2 UNITS: 100 INJECTION, SOLUTION INTRAVENOUS; SUBCUTANEOUS at 11:40

## 2025-04-07 RX ADMIN — Medication 1000 MCG: at 08:20

## 2025-04-07 RX ADMIN — ATORVASTATIN CALCIUM 40 MG: 20 TABLET, FILM COATED ORAL at 22:43

## 2025-04-07 RX ADMIN — HYDROCODONE BITARTRATE AND ACETAMINOPHEN 1 TABLET: 7.5; 325 TABLET ORAL at 22:43

## 2025-04-07 RX ADMIN — CYCLOBENZAPRINE HYDROCHLORIDE 10 MG: 10 TABLET, FILM COATED ORAL at 22:43

## 2025-04-07 RX ADMIN — HYDROCODONE BITARTRATE AND ACETAMINOPHEN 1 TABLET: 7.5; 325 TABLET ORAL at 18:38

## 2025-04-07 RX ADMIN — OXYCODONE AND ACETAMINOPHEN 1 TABLET: 5; 325 TABLET ORAL at 03:47

## 2025-04-07 RX ADMIN — HYDROCODONE BITARTRATE AND ACETAMINOPHEN 1 TABLET: 5; 325 TABLET ORAL at 08:24

## 2025-04-07 RX ADMIN — Medication 10 ML: at 22:44

## 2025-04-07 RX ADMIN — LIDOCAINE 1 PATCH: 4 PATCH TOPICAL at 22:43

## 2025-04-07 NOTE — CASE MANAGEMENT/SOCIAL WORK
Continued Stay Note  [facility]     Patient Name: Kelly Pack  MRN: [id number]  Today's Date: 4/7/2025    Admit Date: 4/2/2025    Plan: Home with dialysis/outpt antibiotics, family to transport   Discharge Plan       Row Name 04/07/25 1441       Plan    Plan Home with dialysis/outpt antibiotics, family to transport    Plan Comments Met with pt at bedside. Introduced self and explained role of . Confirmed previous dc planning notes. Discussed with pt that CCP would follow to assess for medical clearance for dc home. Advised pt that CCP is available should any further needs arise.                   Discharge Codes    No documentation.                 Expected Discharge Date and Time       Expected Discharge Date Expected Discharge Time    Apr 10, 2025               Tara Xavier RN

## 2025-04-07 NOTE — PLAN OF CARE
Goal Outcome Evaluation:  Plan of Care Reviewed With: patient        Progress: improving  Outcome Evaluation: Pt seen for PT tx this PM. Pt was in bed w/ mother in room at beg of session. Pt sat up to EOB w/ SV as she stated she was not allowed to use L UE after recent procedure. Pt stood from EOB w/ mod A. Pt amb around bed to chair w/ slow, unsteady and guarded gait req min a + R HHA. Pt performed sit to stand from chair 5x req min/mod A x 1. Pt also completed B LE ther ex x 10-15 reps. Pt was UIC w/ chair alarm set at end of session. Educ pt on imp of continued mobility w/ staff to chair or BR to incr strength and endurance. Rec IPR after dc to address deficits.    Anticipated Discharge Disposition (PT): inpatient rehabilitation facility

## 2025-04-07 NOTE — THERAPY TREATMENT NOTE
Patient Name: Kelly Pack  : 1970    MRN: [id number]                              Today's Date: 2025       Admit Date: 2025    Visit Dx:     ICD-10-CM ICD-9-CM   1. NSTEMI (non-ST elevated myocardial infarction)  I21.4 410.70   2. Sepsis without acute organ dysfunction, due to unspecified organism  A41.9 038.9     995.91   3. Emphysematous pyelonephritis of left kidney  N12 590.80   4. ESRD on dialysis  N18.6 585.6    Z99.2 V45.11   5. Hydronephrosis of left kidney  N13.30 591   6. Sepsis, due to unspecified organism, unspecified whether acute organ dysfunction present  A41.9 038.9     995.91     Patient Active Problem List   Diagnosis    Hyperlipidemia    Allergic rhinitis    Nephrotic range proteinuria    Asthma    Essential hypertension    Uncontrolled type 2 diabetes mellitus with hyperglycemia    Acute renal failure superimposed on stage 3a chronic kidney disease    Anasarca    Suspected sleep apnea    Anemia    Bilateral lower extremity edema    Elevated troponin    Acute renal failure superimposed on chronic kidney disease    Type 2 diabetes mellitus with chronic kidney disease    Symptomatic anemia    ESRD (end stage renal disease)    Sepsis    Emphysematous pyelonephritis of left kidney    Hydronephrosis of left kidney    NSTEMI (non-ST elevated myocardial infarction)    Class 2 severe obesity with serious comorbidity in adult     Past Medical History:   Diagnosis Date    Albuminuria     Allergic     Seasonal, grass, cats and some dogs    Allergic rhinitis     Asthma     allergy triggered    Bell's palsy     Cataract     Had surgery on both eyes    Cholelithiasis     Removed. Have bile reflux/ vomiting    CKD (chronic kidney disease)     Diabetes mellitus     Drug therapy     Endometrial cancer     HL (hearing loss)     In L ear    Hyperlipidemia     Hypertension     Low back pain     Migraine     Obesity     Peripheral neuropathy     Renal insufficiency     Right otitis media     Type II  diabetes mellitus     Visual impairment     Diabetic retinopathy. Oil in L eye due to retina detachment s.    Vitamin D deficiency      Past Surgical History:   Procedure Laterality Date    ARTERIOVENOUS FISTULA  05/2023    CARDIAC CATHETERIZATION N/A 4/6/2025    Procedure: Left Heart Cath;  Surgeon: Damian Nguyen MD;  Location: [facility] INVASIVE LOCATION;  Service: Cardiology;  Laterality: N/A;    CARDIAC CATHETERIZATION N/A 4/6/2025    Procedure: Coronary angiography;  Surgeon: Damian Nguyen MD;  Location: [facility] CATH INVASIVE LOCATION;  Service: Cardiology;  Laterality: N/A;    CATARACT EXTRACTION      CHOLECYSTECTOMY      CYSTOSCOPY, RETROGRADE PYELOGRAM AND STENT INSERTION Left 4/2/2025    Procedure: CYSTOSCOPY RETROGRADE PYELOGRAM AND STENT INSERTION;  Surgeon: Tl Gray MD;  Location: [facility] OR;  Service: Urology;  Laterality: Left;    EYE SURGERY      NOV 2020    HYSTERECTOMY      due to cancer    INSERTION HEMODIALYSIS CATHETER Right 06/23/2023    Procedure: HEMODIALYSIS CATHETER INSERTION;  Surgeon: Mikael Mackay MD;  Location: [facility] OR;  Service: Vascular;  Laterality: Right;    OOPHORECTOMY      VITRECTOMY PARS PLANA Left 01/28/2020    Procedure: 25G VITRECTOMY-ENDO LASER;  Surgeon: Lazarus, Howard S., MD;  Location: [facility] OR;  Service: Ophthalmology      General Information       Row Name 04/07/25 1444          Physical Therapy Time and Intention    Document Type therapy note (daily note)  -     Mode of Treatment physical therapy  -       Row Name 04/07/25 1444          General Information    Existing Precautions/Restrictions fall  -       Row Name 04/07/25 1444          Safety Issues/Impairments Affecting Functional Mobility    Impairments Affecting Function (Mobility) balance;endurance/activity tolerance;strength  -     Comment, Safety Issues/Impairments (Mobility) gt belt and non skid socks donned  -               User Key  (r) = Recorded By, (t) =  Taken By, (c) = Cosigned By      Initials Name Provider Type     Yoselin Godfrey PTA Physical Therapist Assistant                   Mobility       Row Name 04/07/25 1445          Bed Mobility    Bed Mobility supine-sit  -PH     Supine-Sit [place] (Bed Mobility) supervision  -PH     Comment, (Bed Mobility) Pt stated she was not allowed to use L UE during 2/2 recent procedure  -PH       Row Name 04/07/25 1445          Sit-Stand Transfer    Sit-Stand [place] (Transfers) minimum assist (75% patient effort);moderate assist (50% patient effort);verbal cues;nonverbal cues (demo/gesture)  -PH     Assistive Device (Sit-Stand Transfers) other (see comments)  -PH     Comment, (Sit-Stand Transfer) PTAs hand on gt belt only w/ pt req min to mod A for sit to stand 6x; 1x from EOB and 5x from chair  -PH       Row Name 04/07/25 1445          Gait/Stairs (Locomotion)    [place] Level (Gait) minimum assist (75% patient effort);verbal cues;nonverbal cues (demo/gesture)  -PH     Assistive Device (Gait) other (see comments)  R HHA  -PH     Distance in Feet (Gait) 15  -PH     Deviations/Abnormal Patterns (Gait) aniceto decreased;festinating/shuffling;gait speed decreased;stride length decreased  -PH     Bilateral Gait Deviations heel strike decreased  -PH     [place] Level (Stairs) unable to assess  -PH     Comment, (Gait/Stairs) slow, unsteady and guarded  -PH               User Key  (r) = Recorded By, (t) = Taken By, (c) = Cosigned By      Initials Name Provider Type     Yoselin Godfrey PTA Physical Therapist Assistant                   Obj/Interventions       Row Name 04/07/25 1449          Motor Skills    Therapeutic Exercise other (see comments)  -PH       Row Name 04/07/25 1449          Balance    Balance Assessment sitting static balance;sitting dynamic balance  -PH     Static Sitting Balance supervision  -PH     Dynamic Sitting Balance supervision  -PH     Position, Sitting Balance sitting  edge of bed;sitting in chair  -PH     Static Standing Balance minimal assist;contact guard;verbal cues;non-verbal cues (demo/gesture)  -PH     Position/Device Used, Standing Balance other (see comments)  R HHA  -PH               User Key  (r) = Recorded By, (t) = Taken By, (c) = Cosigned By      Initials Name Provider Type     Yoselin Godfrey PTA Physical Therapist Assistant                   Goals/Plan    No documentation.                  Clinical Impression       Row Name 04/07/25 1451          Pain    Pretreatment Pain Rating 5/10  -PH     Posttreatment Pain Rating 6/10  -PH       Row Name 04/07/25 1451          Plan of Care Review    Plan of Care Reviewed With patient  -PH     Progress improving  -PH     Outcome Evaluation Pt seen for PT tx this PM. Pt was in bed w/ mother in room at beg of session. Pt sat up to EOB w/ SV as she stated she was not allowed to use L UE after recent procedure. Pt stood from EOB w/ mod A. Pt amb around bed to chair w/ slow, unsteady and guarded gait req min a + R HHA. Pt performed sit to stand from chair 5x req min/mod A x 1. Pt also completed B LE ther ex x 10-15 reps. Pt was UIC w/ chair alarm set at end of session. Educ pt on imp of continued mobility w/ staff to chair or BR to incr strength and endurance. Rec IPR after dc to address deficits.  -PH       Row Name 04/07/25 1451          Vital Signs    O2 Delivery Pre Treatment room air  -PH     O2 Delivery Intra Treatment room air  -PH     O2 Delivery Post Treatment room air  -PH       Row Name 04/07/25 1451          Positioning and Restraints    Pre-Treatment Position in bed  -PH     Post Treatment Position chair  -PH     In Bed notified nsg;fowlers;call light within reach;encouraged to call for assist;exit alarm on;with family/caregiver  -PH               User Key  (r) = Recorded By, (t) = Taken By, (c) = Cosigned By      Initials Name Provider Type     Yoselin Godfrey PTA Physical Therapist Assistant                    Outcome Measures       Row Name 04/07/25 1459          How much help from another person do you currently need...    Turning from your back to your side while in flat bed without using bedrails? 3  -PH     Moving from lying on back to sitting on the side of a flat bed without bedrails? 3  -PH     Moving to and from a bed to a chair (including a wheelchair)? 3  -PH     Standing up from a chair using your arms (e.g., wheelchair, bedside chair)? 2  -PH     Climbing 3-5 steps with a railing? 2  -PH     To walk in hospital room? 3  -PH     AM-PAC 6 Clicks Score (PT) 16  -PH     Highest Level of Mobility Goal 5 --> Static standing  -PH       Row Name 04/07/25 1459          Functional Assessment    Outcome Measure Options AM-PAC 6 Clicks Basic Mobility (PT)  -               User Key  (r) = Recorded By, (t) = Taken By, (c) = Cosigned By      Initials Name Provider Type     Yoselin Godfrey PTA Physical Therapist Assistant                                 Physical Therapy Education       Title: PT OT SLP Therapies (Done)       Topic: Physical Therapy (Done)       Point: Mobility training (Done)       Learning Progress Summary            Patient Acceptance, E,TB,D, VU,NR by  at 4/7/2025 1500                      Point: Home exercise program (Done)       Learning Progress Summary            Patient Acceptance, E,TB,D, VU,NR by  at 4/7/2025 1500                      Point: Body mechanics (Done)       Learning Progress Summary            Patient Acceptance, E,TB,D, VU,NR by  at 4/7/2025 1500                      Point: Precautions (Done)       Learning Progress Summary            Patient Acceptance, E,TB,D, VU,NR by  at 4/7/2025 1500                                      User Key       Initials Effective Dates Name Provider Type Discipline     06/16/21 -  Yoselin Godfrey PTA Physical Therapist Assistant PT                  PT Recommendation and Plan     Progress: improving  Outcome  Evaluation: Pt seen for PT tx this PM. Pt was in bed w/ mother in room at beg of session. Pt sat up to EOB w/ SV as she stated she was not allowed to use L UE after recent procedure. Pt stood from EOB w/ mod A. Pt amb around bed to chair w/ slow, unsteady and guarded gait req min a + R HHA. Pt performed sit to stand from chair 5x req min/mod A x 1. Pt also completed B LE ther ex x 10-15 reps. Pt was UIC w/ chair alarm set at end of session. Educ pt on imp of continued mobility w/ staff to chair or BR to incr strength and endurance. Rec IPR after dc to address deficits.     Time Calculation:         PT Charges       Row Name 04/07/25 1500             Time Calculation    Start Time 1421  -PH      Stop Time 1447  -PH      Time Calculation (min) 26 min  -PH      PT Received On 04/07/25  -PH      PT - Next Appointment 04/08/25  -PH         Timed Charges    76077 - PT Therapeutic Exercise Minutes 5  -PH      31004 - PT Therapeutic Activity Minutes 21  -PH         Total Minutes    Timed Charges Total Minutes 26  -PH       Total Minutes 26  -PH                User Key  (r) = Recorded By, (t) = Taken By, (c) = Cosigned By      Initials Name Provider Type    PH Yoselin Godfrey PTA Physical Therapist Assistant                  Therapy Charges for Today       Code Description Service Date Service Provider Modifiers Qty    76058754882  PT THERAPEUTIC ACT EA 15 MIN 4/7/2025 Yoselin Godfrey PTA GP 2            PT G-Codes  Outcome Measure Options: AM-PAC 6 Clicks Basic Mobility (PT)  AM-PAC 6 Clicks Score (PT): 16  PT Discharge Summary  Anticipated Discharge Disposition (PT): inpatient rehabilitation facility    Yoselin Godfrey PTA  4/7/2025

## 2025-04-07 NOTE — PROGRESS NOTES
Consult Daily Progress Note  [facility]   04/07/25      Patient Name:  Kelly Pack  MRN:  [id number]   YOB: 1970  Age: 54 y.o.  Sex: female  LOS: 5    Reason for Consult:  History of PEA arrest    Hospital Course:   54-year-old female with history of end-stage renal disease on hemodialysis who presented to the hospital on 4/2 with abdominal pain and found to have a urinary tract infection with left pyelonephritis and hydronephrosis for which she underwent cystoscopy with left ureteral stent placement on 4/2.  Underwent dialysis on 4/4 during which she became bradycardic and unresponsive with chest compressions performed and 1 round of epinephrine given with ROSC obtained and transferred to the ICU.    Interval History:  Patient transferred out of the ICU yesterday  Left heart catheterization yesterday with nonocclusive coronary disease.  Leukocytosis elevated but stable  Stable anemia  2 L nasal cannula  Denies any new complaints  Mild chest pain after chest compressions  No shortness of breath  No cough  No nausea vomiting    Physical Exam:  Vitals:    04/06/25 2324   BP: 126/63   Pulse: 66   Resp: 16   Temp: 98.6 °F (37 °C)   SpO2: 99%       Intake/Output    Intake/Output Summary (Last 24 hours) at 4/7/2025 0806  Last data filed at 4/7/2025 0540  Gross per 24 hour   Intake 50 ml   Output 0 ml   Net 50 ml       General: Alert, nontoxic, NAD  HEENT: NC/AT, EOMI, MMM  Neck: Supple, trachea midline  Cardiac: RRR, no murmur, gallops, rubs  Pulmonary: Clear to auscultation bilaterally, no adventitious breath sounds, normal respiratory effort  GI: Soft, non-tender, non-distended, normal bowel sounds  Extremities: Warm, well perfused, no LE edema  Skin: no visible rash  Neuro: CN II - XII grossly intact  Psychiatry: Normal mood and affect      Data Review:  Results from last 7 days   Lab Units 04/07/25  0503 04/06/25  0405 04/05/25  0747 04/04/25  1747 04/04/25  0628 04/03/25  0619  04/02/25  0750   WBC 10*3/mm3 16.65* 16.79* 13.20* 17.21* 16.84* 20.80* 18.66*   HEMOGLOBIN g/dL 8.0* 7.9* 8.9* 8.8* 9.4* 9.4* 10.4*   PLATELETS 10*3/mm3 309 252 264 249 277 219 244     Results from last 7 days   Lab Units 04/07/25  0503 04/06/25  0405 04/05/25  0747 04/04/25  1554 04/04/25  0628 04/03/25  0619 04/02/25  0750   SODIUM mmol/L 136 135* 134* 132* 132* 130* 132*   POTASSIUM mmol/L 4.0 3.6 4.1 4.2 4.9 4.1 4.7   CHLORIDE mmol/L 99 97* 95* 93* 91* 91* 92*   CO2 mmol/L 22.4 23.0 21.4* 17.9* 19.3* 23.1 19.5*   BUN mg/dL 66* 60* 91* 81* 71* 46* 79*   CREATININE mg/dL 8.60* 6.85* 9.34* 8.56* 8.42* 6.52* 11.69*   GLUCOSE mg/dL 118* 139* 100* 307* 327* 302* 244*   CALCIUM mg/dL 8.3* 8.0* 8.2* 8.5* 8.3* 8.1* 8.2*   MAGNESIUM mg/dL  --   --   --   --   --  2.3  --    PHOSPHORUS mg/dL  --   --   --   --   --  4.5  --    Estimated Creatinine Clearance: 10.2 mL/min (A) (by C-G formula based on SCr of 8.6 mg/dL (H)).    Results from last 7 days   Lab Units 04/07/25  0503 04/06/25  0405 04/05/25  0747 04/05/25  0004 04/04/25  2102 04/04/25  1747 04/04/25  1714 04/02/25  1048 04/02/25  0750   AST (SGOT) U/L  --   --   --   --   --   --   --   --  11   ALT (SGPT) U/L  --   --   --   --   --   --   --   --  6   LACTATE mmol/L  --   --   --  1.6 2.4*  --  4.0*   < >  --    PLATELETS 10*3/mm3 309 252 264  --   --    < >  --    < > 244    < > = values in this interval not displayed.       Results from last 7 days   Lab Units 04/04/25  1541   PH, ARTERIAL pH units 7.385   PCO2, ARTERIAL mm Hg 37.6   PO2 ART mm Hg 538.3*   HCO3 ART mmol/L 22.5       Result Review:  I have personally reviewed the results from the time of this admission to 4/7/2025 08:06 EDT and agree with these findings:  [x]  Laboratory list / accordion  [x]  Microbiology  [x]  Radiology  []  EKG/Telemetry   [x]  Cardiology/Vascular   []  Pathology  [x]  Old records  []  Other:    Imaging:  Reviewed chest images personally from past 3 days    ASSESSMENT  /   PLAN:    PEA cardiac arrest  Acute hypoxic respiratory failure  Respiratory failure requiring intubation and mechanical ventilation, resolved  ESBL bacteremia  End-stage renal disease on hemodialysis  Hypotension on midodrine  Hydronephrosis and pyelonephritis  Diabetes mellitus  Anemia  Severe obstructive sleep apnea (AHI 58/h)  Obesity    - Patient initially presented to the hospital with abdominal pain and found to have ESBL bacteremia secondary to hydronephrosis and pyelonephritis status post stent placement by urology.  Was undergoing dialysis and suffered PEA cardiac arrest status post ROSC and intubation.  Was extubated and transferred to the floor 4/6.  - Doing well from a respiratory standpoint, was able to wean her to room air with saturations maintaining greater than 90%.  - Ongoing dialysis as per nephrology  - Antibiotics with ertapenem for ESBL bacteremia  -On midodrine for chronic hypotension  - Leukocytosis remains stably elevated, will need to monitor  - Sliding scale insulin for diabetes  - CPAP ordered for hospital use for severe obstructive sleep apnea.  She follows with Dr. Santana to sleep clinic.  - Remainder of care as per hospitalist primary    All issues new to me today.  Prior hospital course, labs and imaging reviewed.    Thank you for allowing us to participate in this patients care. Pulmonary will continue to follow.    Wilfred Wilkinson MD  [facility]  Pulmonary and Critical Care Medicine, Interventional Pulmonology    Parts of this note may be an electronic transcription/translation of spoken language to printed text using the Dragon dictation system.

## 2025-04-07 NOTE — PROGRESS NOTES
[facility] Medicine Services  PROGRESS NOTE    Patient Name: Kelly Pack  : 1970  MRN: [id number]    Date of Admission: 2025  Primary Care Physician: Kim Benjamin APRN    Subjective   Subjective     CC:  Follow-up multiple medical issues    Subjective: Patient states she is diving quite a bit of pain from her chest compressions.  She is frustrated by her acute issues.  We discussed cardiology recommendations.  She has no new complaints.    Review of Systems  No current fevers or chills  No current shortness of breath or cough  No current nausea, vomiting, or diarrhea       Objective   Objective     Vital Signs:   Temp:  [97.5 °F (36.4 °C)-98.6 °F (37 °C)] 97.5 °F (36.4 °C)  Heart Rate:  [66-80] 69  Resp:  [16-18] 16  BP: (126-165)/(63-93) 140/78        Physical Exam:  Constitutional: Awake, alert  HENT: NCAT, mucous membranes moist, neck supple  Respiratory: No cough or wheezes, normal respirations, nonlabored breathing   Cardiovascular: Pulse rate is normal, palpable radial pulses  Gastrointestinal:  Soft, nontender, nondistended  Musculoskeletal: Significant obese, BMI is 36.5  Psychiatric: Appropriate affect, cooperative, conversational  Neurologic: No slurred speech or facial droop, follows commands  Skin: No rashes or jaundice, warm      Results Reviewed:  Results from last 7 days   Lab Units 25  0503 25  0405 25  0747 25  1747   WBC 10*3/mm3 16.65* 16.79* 13.20* 17.21*   HEMOGLOBIN g/dL 8.0* 7.9* 8.9* 8.8*   HEMATOCRIT % 25.4* 23.6* 28.4* 24.3*   PLATELETS 10*3/mm3 309 252 264 249   INR   --   --   --  1.29*     Results from last 7 days   Lab Units 25  0503 25  0405 25  0747 25  1714 25  1554 25  0619 25  0750   SODIUM mmol/L 136 135* 134*  --  132*   < > 132*   POTASSIUM mmol/L 4.0 3.6 4.1  --  4.2   < > 4.7   CHLORIDE mmol/L 99 97* 95*  --  93*   < > 92*   CO2 mmol/L 22.4 23.0 21.4*  --  17.9*   < > 19.5*   BUN mg/dL  66* 60* 91*  --  81*   < > 79*   CREATININE mg/dL 8.60* 6.85* 9.34*  --  8.56*   < > 11.69*   GLUCOSE mg/dL 118* 139* 100*  --  307*   < > 244*   CALCIUM mg/dL 8.3* 8.0* 8.2*  --  8.5*   < > 8.2*   ALK PHOS U/L  --   --   --   --   --   --  160*   ALT (SGPT) U/L  --   --   --   --   --   --  6   AST (SGOT) U/L  --   --   --   --   --   --  11   HSTROP T ng/L  --   --   --  251* 131*  --   --     < > = values in this interval not displayed.     Estimated Creatinine Clearance: 10.2 mL/min (A) (by C-G formula based on SCr of 8.6 mg/dL (H)).    Microbiology Results Abnormal       Procedure Component Value - Date/Time    Urine Culture - Urine, Urine, Catheter [[id number]]  (Abnormal)  (Susceptibility) Collected: 04/02/25 1707    Lab Status: Final result Specimen: Urine, Catheter Updated: 04/04/25 0947     Urine Culture >100,000 CFU/mL Escherichia coli ESBL    Narrative:      Colonization of the urinary tract without infection is common. Treatment is discouraged unless the patient is symptomatic, pregnant, or undergoing an invasive urologic procedure.  Recent outcomes data supports the use of pip/tazo in the treatment of susceptible ESBL infections for uncomplicated UTI. Consider use of pip/tazo as a carbapenem-sparing regimen in applicable patients.    Susceptibility        Escherichia coli ESBL      LEANN      Ciprofloxacin Resistant      Ertapenem Susceptible      Gentamicin Susceptible      Levofloxacin Resistant      Meropenem Susceptible      Nitrofurantoin Susceptible      Piperacillin + Tazobactam Susceptible      Trimethoprim + Sulfamethoxazole Resistant                           Urine Culture - Urine, Urine, Clean Catch [[id number]]  (Abnormal)  (Susceptibility) Collected: 04/02/25 1017    Lab Status: Final result Specimen: Urine, Clean Catch Updated: 04/04/25 0945     Urine Culture >100,000 CFU/mL Escherichia coli ESBL    Narrative:      Colonization of the urinary tract without infection is common. Treatment is  discouraged unless the patient is symptomatic, pregnant, or undergoing an invasive urologic procedure.  Recent outcomes data supports the use of pip/tazo in the treatment of susceptible ESBL infections for uncomplicated UTI. Consider use of pip/tazo as a carbapenem-sparing regimen in applicable patients.    Susceptibility        Escherichia coli ESBL      LEANN      Ciprofloxacin Resistant      Ertapenem Susceptible      Gentamicin Susceptible      Levofloxacin Resistant      Meropenem Susceptible      Nitrofurantoin Susceptible      Piperacillin + Tazobactam Susceptible      Trimethoprim + Sulfamethoxazole Resistant                           Blood Culture - Blood, Arm, Left [[id number]]  (Abnormal) Collected: 04/02/25 1113    Lab Status: Final result Specimen: Blood from Arm, Left Updated: 04/04/25 0640     Blood Culture Escherichia coli ESBL     Comment:   Consider infectious disease consult.  Susceptibility results may not correlate to clinical outcomes.  For ESBL-producing infections in the blood, a carbapenem is recommended as first-line therapy for optimal clinical outcomes.        Isolated from Aerobic and Anaerobic Bottles     Gram Stain Anaerobic Bottle Gram negative bacilli      Aerobic Bottle Gram negative bacilli    Narrative:      Refer to previous blood culture collected on 04/02/2025 at 1043 for MICs.      Blood Culture - Blood, Hand, Left [[id number]]  (Abnormal)  (Susceptibility) Collected: 04/02/25 1043    Lab Status: Final result Specimen: Blood from Hand, Left Updated: 04/04/25 0639     Blood Culture Escherichia coli ESBL     Comment:   Consider infectious disease consult.  Susceptibility results may not correlate to clinical outcomes.  For ESBL-producing infections in the blood, a carbapenem is recommended as first-line therapy for optimal clinical outcomes.        Isolated from Aerobic and Anaerobic Bottles     Gram Stain Anaerobic Bottle Gram negative bacilli      Aerobic Bottle Gram negative  bacilli    Susceptibility        Escherichia coli ESBL      LEANN      Ciprofloxacin Resistant      Ertapenem Susceptible      Levofloxacin Resistant      Meropenem Susceptible      Trimethoprim + Sulfamethoxazole Resistant                       Susceptibility Comments       Escherichia coli ESBL    With the exception of urinary-sourced infections, aminoglycosides should not be used as monotherapy.               Blood Culture ID, PCR - Blood, Hand, Left [[id number]]  (Abnormal) Collected: 04/02/25 1043    Lab Status: Final result Specimen: Blood from Hand, Left Updated: 04/02/25 2022     BCID, PCR Escherichia coli. Identification by BCID2 PCR.     BCID, PCR 2 CTX-M (ESBL) detected. Identification by BCID2 PCR     BOTTLE TYPE Anaerobic Bottle            Imaging Results (Last 24 Hours)       ** No results found for the last 24 hours. **            Results for orders placed during the hospital encounter of 04/02/25    Adult Transthoracic Echo Complete W/ Cont if Necessary Per Protocol    Interpretation Summary    Left ventricular systolic function is low normal. Calculated left ventricular EF = 48.8%    Left ventricular diastolic function is consistent with (grade I) impaired relaxation.    The left atrial cavity is mildly dilated.    There is mild calcification of the aortic valve. No stenosis present    Calculated right ventricular systolic pressure from tricuspid regurgitation is 47 mmHg.    Mild to moderate pulmonary hypertension is present.    Mild mitral valve regurgitation is present.      I have reviewed the medications:  Scheduled Meds:aspirin, 81 mg, Oral, Daily  atorvastatin, 40 mg, Oral, Nightly  [START ON 4/8/2025] epoetin kindra/kindra-epbx, 10,000 Units, Intravenous, Once per day on Tuesday Thursday Saturday  ertapenem, 500 mg, Intravenous, Q24H  fluticasone, 2 spray, Each Nare, Daily  insulin glargine, 15 Units, Subcutaneous, Nightly  insulin lispro, 2-7 Units, Subcutaneous, 4x Daily AC & at  Bedtime  lanthanum, 2,000 mg, Oral, BID With Meals  [START ON 4/8/2025] midodrine, 5 mg, Oral, Once per day on Monday Tuesday Thursday Saturday  mupirocin, 1 Application, Each Nare, BID  sodium chloride, 10 mL, Intravenous, Q12H  sodium chloride, 10 mL, Intravenous, Q12H  sodium chloride, 10 mL, Intravenous, Q12H  sodium chloride, 10 mL, Intravenous, Q12H  sodium chloride, 10 mL, Intravenous, Q12H  cyanocobalamin, 1,000 mcg, Oral, Daily  vitamin D, 50,000 Units, Oral, Once per day on Monday Thursday      Continuous Infusions:   PRN Meds:.  acetaminophen **OR** acetaminophen **OR** acetaminophen    albuterol    senna-docusate sodium **AND** polyethylene glycol **AND** bisacodyl **AND** bisacodyl    cyclobenzaprine    dextrose    dextrose    gabapentin    glucagon (human recombinant)    heparin (porcine)    HYDROcodone-acetaminophen    melatonin    nitroglycerin    ondansetron ODT **OR** ondansetron    sodium chloride    sodium chloride    sodium chloride    sodium chloride    sodium chloride    sodium chloride    sodium chloride    sodium chloride    Assessment & Plan   Assessment & Plan     Active Hospital Problems    Diagnosis  POA    **Sepsis [A41.9]  Yes    Class 2 severe obesity with serious comorbidity in adult [E66.812, E66.01]  Yes    NSTEMI (non-ST elevated myocardial infarction) [I21.4]  Unknown    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        Brief Hospital Course to date:  Kelly Pack is a 54 y.o. female with a history of ESRD on HD, anemia of chronic disease, asthma who presents with severe left-sided pain, nausea, vomiting, found to have emphysematous  pyelitis and sepsis.  Patient developed PEA cardiac arrest while undergoing dialysis on 4/4/2025 with ROSC and was transferred to the intensive care unit after intubation for airway protection.  Patient was ultimately extubated and transferred out of the ICU on 4/6/2025.  She underwent heart catheterization on 4/6/2025 which showed only very mild nonobstructive coronary disease that was not thought to have contributed to her PEA arrest.    Discussion/plan for today:  All medical problems are new to my management today.  Adjust pain medications.  Adjust insulin.  OT and PT consult.  Case discussed with cardiology over the phone.  Otherwise per below.     ESBL E coli bacteremia  Sepsis (febrile 103, tachycardic 114, WBC 18) related to emphysematous pyelitis  Left sided hydronephrosis  Lactic acidosis  - urology consulted, patient to OR for cystoscopy, left stent placement 4/2/25  - continue rocephin; follow up urine and blood cx  - blood cx now + for ESBL e coli, ID consulted and abx adjusted to ertapenem  -Plan for 14-day course of ertapenem      ESRD on HD  Anemia of chronic disease  - renal consulted;   - dialysis per renal  - midodrine, lanthanum continued from home      Orthostatic hypotension  - on middrine, adjust per nephrology  - Patient has had issues with intermittent hypotension.  Monitor and adjust as needed.  Likely worsened due to infection     HTN  - torsemide and coreg held given sepsis/normal BP  - will resume as able-continue to hold given low/normal BP/lability     Type 2 diabetes mellitus  - no home DM meds on med rec;   - Monitor glucose and adjust insulin as needed     Asthma  - prn nebs (home regimen)     SCDs for DVT prophylaxis.  Full code.  Discussed with patient   Anticipated discharge home, TBD      CODE STATUS:   Code Status and Medical Interventions: CPR (Attempt to Resuscitate); Full Support   Ordered at: 04/02/25 1306     Code Status (Patient has no pulse and is not breathing):    CPR  (Attempt to Resuscitate)     Medical Interventions (Patient has pulse or is breathing):    Full Support       Matt Johnson MD  04/07/25

## 2025-04-07 NOTE — PROGRESS NOTES
"Kelly Pack  1970 54 y.o.  [id number]      Patient Care Team:  Kim Benjamin APRN as PCP - General (Nurse Practitioner)  Miguel Stahl MD as Consulting Physician (Hematology and Oncology)  Radha Martinez APRN as Referring Physician (Nurse Practitioner)    CC: Diabetes, end-stage renal disease on dialysis, PEA arrest on dialysis.  Markedly abnormal ECG and elevated troponins consistent with a non-ST elevation MI, EF 45 to 50%    Interval History: No significant change still has some substernal chest pain due to CPR      Objective   Vital Signs  Temp:  [98.2 °F (36.8 °C)-98.6 °F (37 °C)] 98.6 °F (37 °C)  Heart Rate:  [66-80] 66  Resp:  [13-18] 16  BP: (126-165)/(63-93) 126/63    Intake/Output Summary (Last 24 hours) at 4/7/2025 0742  Last data filed at 4/7/2025 0540  Gross per 24 hour   Intake 50 ml   Output 0 ml   Net 50 ml     Flowsheet Rows      Flowsheet Row First Filed Value   Admission Height 175.3 cm (69\") Documented at 04/02/2025 1049   Admission Weight 110 kg (243 lb) Documented at 04/02/2025 1049            Physical Exam:   General Appearance:    Alert,oriented, in no acute distress   Lungs:     Clear to auscultation,BS are equal    Heart:    Normal S1 and S2, RRR without murmur, gallop or rub   HEENT:    Sclerae are clear, no JVD or adenopathy   Abdomen:     Normal bowel sounds, soft nontender, nondistended, no HSM   Extremities:   Moves all extremities well, no edema, no cyanosis, no             Redness, no rash     Medication Review:      aspirin, 81 mg, Oral, Daily  atorvastatin, 40 mg, Oral, Nightly  ertapenem, 500 mg, Intravenous, Q24H  fluticasone, 2 spray, Each Nare, Daily  insulin glargine, 15 Units, Subcutaneous, Nightly  insulin lispro, 2-7 Units, Subcutaneous, 4x Daily AC & at Bedtime  lanthanum, 2,000 mg, Oral, BID With Meals  midodrine, 2.5 mg, Oral, Once  midodrine, 5 mg, Oral, Daily  mupirocin, 1 Application, Each Nare, BID  sodium chloride, 10 mL, Intravenous, " Q12H  sodium chloride, 10 mL, Intravenous, Q12H  sodium chloride, 10 mL, Intravenous, Q12H  sodium chloride, 10 mL, Intravenous, Q12H  sodium chloride, 10 mL, Intravenous, Q12H  cyanocobalamin, 1,000 mcg, Oral, Daily  vitamin D, 50,000 Units, Oral, Once per day on Monday Thursday               I reviewed the patient's new clinical results.  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       She had a PEA arrest has elevated troponins but the more concerning thing is a really markedly abnormal ECG initially and then even yesterday's was abnormal.  She has a remarkably low HDL and her LDL is 67.  We did a cardiac cath on her yesterday she just has very mild nonobstructive coronary disease which I do not have an explanation for her ECG change.  She has a mildly reduced LV and what I like to do is just check her echo in about a month and make sure it is recovered.  Risk modification will continue to be important with her weight and her glucose.  From our standpoint we will sign off and she will see me in a month and we will reecho her at that time    Damian Nguyen MD  04/07/25  07:42 EDT

## 2025-04-07 NOTE — PROGRESS NOTES
"RENAL/[facility]:     LOS: 5 days    Patient Care Team:  Kim Benjamin APRN as PCP - General (Nurse Practitioner)  Miguel Stahl MD as Consulting Physician (Hematology and Oncology)  Radha Martinez APRN as Referring Physician (Nurse Practitioner)    Chief Complaint:  ESRD, Abdominal pain    Subjective     Interval History:   Chart reviewed  S/P cysto and L ureteral stent placement on 4/2/25  Extubated on Saturday  Feeling well, denies new complaints      Objective     Vital Sign Min/Max for last 24 hours  Temp  Min: 97.5 °F (36.4 °C)  Max: 98.6 °F (37 °C)   BP  Min: 126/63  Max: 165/93   Pulse  Min: 66  Max: 80   Resp  Min: 13  Max: 18   SpO2  Min: 95 %  Max: 100 %   Flow (L/min) (Oxygen Therapy)  Min: 2  Max: 2   No data recorded     Flowsheet Rows      Flowsheet Row First Filed Value   Admission Height 175.3 cm (69\") Documented at 04/02/2025 1049   Admission Weight 110 kg (243 lb) Documented at 04/02/2025 1049            No intake/output data recorded.  I/O last 3 completed shifts:  In: 627.6 [P.O.:240; I.V.:337.6; IV Piggyback:50]  Out: 0     Physical Exam:  GEN: Awake, alert, responsive, no acute distress, obese  ENT: PERRL, EOMI, MMM.  NECK: Supple, no JVD. RIJ tunneled HD catheter   CHEST: CTAB, no W/R/C. On mechanical ventilation. SBT  CV: RRR, no M/G/R  ABD: Soft, NT, +BS  SKIN: Warm and Dry  NEURO: AAOX3   RUE AVF +thrill and bruit     WBC WBC   Date Value Ref Range Status   04/07/2025 16.65 (H) 3.40 - 10.80 10*3/mm3 Final   04/06/2025 16.79 (H) 3.40 - 10.80 10*3/mm3 Final   04/05/2025 13.20 (H) 3.40 - 10.80 10*3/mm3 Final   04/04/2025 17.21 (H) 3.40 - 10.80 10*3/mm3 Final      HGB Hemoglobin   Date Value Ref Range Status   04/07/2025 8.0 (L) 12.0 - 15.9 g/dL Final   04/06/2025 7.9 (L) 12.0 - 15.9 g/dL Final   04/05/2025 8.9 (L) 12.0 - 15.9 g/dL Final   04/04/2025 8.8 (L) 12.0 - 15.9 g/dL Final      HCT Hematocrit   Date Value Ref Range Status   04/07/2025 25.4 (L) 34.0 - 46.6 % Final   04/06/2025 " "23.6 (L) 34.0 - 46.6 % Final   04/05/2025 28.4 (L) 34.0 - 46.6 % Final   04/04/2025 24.3 (L) 34.0 - 46.6 % Final      Platlets No results found for: \"LABPLAT\"   MCV MCV   Date Value Ref Range Status   04/07/2025 101.2 (H) 79.0 - 97.0 fL Final   04/06/2025 97.9 (H) 79.0 - 97.0 fL Final   04/05/2025 97.9 (H) 79.0 - 97.0 fL Final   04/04/2025 97.2 (H) 79.0 - 97.0 fL Final          Sodium Sodium   Date Value Ref Range Status   04/07/2025 136 136 - 145 mmol/L Final   04/06/2025 135 (L) 136 - 145 mmol/L Final   04/05/2025 134 (L) 136 - 145 mmol/L Final   04/04/2025 132 (L) 136 - 145 mmol/L Final      Potassium Potassium   Date Value Ref Range Status   04/07/2025 4.0 3.5 - 5.2 mmol/L Final   04/06/2025 3.6 3.5 - 5.2 mmol/L Final   04/05/2025 4.1 3.5 - 5.2 mmol/L Final   04/04/2025 4.2 3.5 - 5.2 mmol/L Final      Chloride Chloride   Date Value Ref Range Status   04/07/2025 99 98 - 107 mmol/L Final   04/06/2025 97 (L) 98 - 107 mmol/L Final   04/05/2025 95 (L) 98 - 107 mmol/L Final   04/04/2025 93 (L) 98 - 107 mmol/L Final      CO2 CO2   Date Value Ref Range Status   04/07/2025 22.4 22.0 - 29.0 mmol/L Final   04/06/2025 23.0 22.0 - 29.0 mmol/L Final   04/05/2025 21.4 (L) 22.0 - 29.0 mmol/L Final   04/04/2025 17.9 (L) 22.0 - 29.0 mmol/L Final      BUN BUN   Date Value Ref Range Status   04/07/2025 66 (H) 6 - 20 mg/dL Final   04/06/2025 60 (H) 6 - 20 mg/dL Final   04/05/2025 91 (H) 6 - 20 mg/dL Final   04/04/2025 81 (H) 6 - 20 mg/dL Final      Creatinine Creatinine   Date Value Ref Range Status   04/07/2025 8.60 (H) 0.57 - 1.00 mg/dL Final   04/06/2025 6.85 (H) 0.57 - 1.00 mg/dL Final   04/05/2025 9.34 (H) 0.57 - 1.00 mg/dL Final   04/04/2025 8.56 (H) 0.57 - 1.00 mg/dL Final      Calcium Calcium   Date Value Ref Range Status   04/07/2025 8.3 (L) 8.6 - 10.5 mg/dL Final   04/06/2025 8.0 (L) 8.6 - 10.5 mg/dL Final   04/05/2025 8.2 (L) 8.6 - 10.5 mg/dL Final   04/04/2025 8.5 (L) 8.6 - 10.5 mg/dL Final      PO4 No results found for: " "\"CAPO4\"   Albumin No results found for: \"ALBUMIN\"     Magnesium No results found for: \"MG\"     Uric Acid No results found for: \"URICACID\"        Results Review:     I reviewed the patient's new clinical results.    aspirin, 81 mg, Oral, Daily  atorvastatin, 40 mg, Oral, Nightly  ertapenem, 500 mg, Intravenous, Q24H  fluticasone, 2 spray, Each Nare, Daily  insulin glargine, 15 Units, Subcutaneous, Nightly  insulin lispro, 2-7 Units, Subcutaneous, 4x Daily AC & at Bedtime  lanthanum, 2,000 mg, Oral, BID With Meals  midodrine, 2.5 mg, Oral, Once  midodrine, 5 mg, Oral, Daily  mupirocin, 1 Application, Each Nare, BID  sodium chloride, 10 mL, Intravenous, Q12H  sodium chloride, 10 mL, Intravenous, Q12H  sodium chloride, 10 mL, Intravenous, Q12H  sodium chloride, 10 mL, Intravenous, Q12H  sodium chloride, 10 mL, Intravenous, Q12H  cyanocobalamin, 1,000 mcg, Oral, Daily  vitamin D, 50,000 Units, Oral, Once per day on Monday Thursday             Medication Review: Reviewed    Assessment & Plan     1) ESRD - on TTS HD, CODE BLUE with dialysis on Friday, completed a full dialysis treatment on 4/5, no UF at that time.  2) Abdominal pain  3) L hydronephrosis with emphysematous pyelonephritis, status post cystoscopy/stent on 4/2  4) DM  5) Hypotension - on Midodrine on non-HD days but currently normotensive  6) Metabolic acidosis, improved  7) Anemia of CKD  8) ESBL UTI/Pyelo  9) Bacteremia  10) s/p cardiac arrest: bradycardia with subsequent PEA.       Plan:   Clinically stable today, euvolemic.  Electrolytes and volume at goal  Plan dialysis tomorrow to continue her usual, Tuesday Thursday Saturday schedule  Still unclear etiology for her PEA episode last Friday  She has been persistently hypertensive so will change Midodrine to as needed with HD  Cardiac cath with nonobstructive disease, cardiology plans no further workup at this time and has signed off  Tunnel catheter in place for access, okay to use AV fistula as well  UF " with HD 1 to 2 L, as BP tolerates  Epogen with HD for anemia  Renally dose antibiotics for GFR less than 10      Tyler Roman MD  [facility]  04/07/25  12:18 EDT

## 2025-04-08 ENCOUNTER — APPOINTMENT (OUTPATIENT)
Dept: GENERAL RADIOLOGY | Facility: HOSPITAL | Age: 55
End: 2025-04-08
Payer: MEDICARE

## 2025-04-08 LAB
ALBUMIN SERPL-MCNC: 3 G/DL (ref 3.5–5.2)
ANION GAP SERPL CALCULATED.3IONS-SCNC: 17.2 MMOL/L (ref 5–15)
ARTERIAL PATENCY WRIST A: POSITIVE
ATMOSPHERIC PRESS: 752.1 MMHG
BASE EXCESS BLDA CALC-SCNC: 2.3 MMOL/L (ref 0–2)
BASOPHILS # BLD MANUAL: 0.15 10*3/MM3 (ref 0–0.2)
BASOPHILS NFR BLD MANUAL: 1 % (ref 0–1.5)
BDY SITE: ABNORMAL
BUN BLDA-MCNC: 36 MG/DL (ref 8–26)
BUN SERPL-MCNC: 72 MG/DL (ref 6–20)
BUN/CREAT SERPL: 6.7 (ref 7–25)
CA-I BLDA-SCNC: 1.15 MMOL/L (ref 2.15–2.5)
CALCIUM SPEC-SCNC: 8.2 MG/DL (ref 8.6–10.5)
CHLORIDE BLDA-SCNC: 97 MMOL/L (ref 98–107)
CHLORIDE SERPL-SCNC: 95 MMOL/L (ref 98–107)
CO2 BLDA-SCNC: 27.2 MMOL/L (ref 23–27)
CO2 SERPL-SCNC: 21.8 MMOL/L (ref 22–29)
CREAT BLDA-MCNC: 5.61 MG/DL (ref 0.6–1.3)
CREAT SERPL-MCNC: 10.74 MG/DL (ref 0.57–1)
D-LACTATE SERPL-SCNC: 1.7 MMOL/L
DEPRECATED RDW RBC AUTO: 48.5 FL (ref 37–54)
DEVICE COMMENT: ABNORMAL
DEVICE COMMENT: ABNORMAL
DEVICE COMMENT: NORMAL
EGFRCR SERPLBLD CKD-EPI 2021: 3.9 ML/MIN/1.73
EOSINOPHIL # BLD MANUAL: 0.61 10*3/MM3 (ref 0–0.4)
EOSINOPHIL NFR BLD MANUAL: 4 % (ref 0.3–6.2)
ERYTHROCYTE [DISTWIDTH] IN BLOOD BY AUTOMATED COUNT: 13.8 % (ref 12.3–15.4)
GAS FLOW AIRWAY: 15 LPM
GLUCOSE BLDC GLUCOMTR-MCNC: 112 MG/DL (ref 70–130)
GLUCOSE BLDC GLUCOMTR-MCNC: 119 MG/DL (ref 70–130)
GLUCOSE BLDC GLUCOMTR-MCNC: 121 MG/DL (ref 70–130)
GLUCOSE BLDC GLUCOMTR-MCNC: 128 MG/DL (ref 70–130)
GLUCOSE BLDC GLUCOMTR-MCNC: 140 MG/DL (ref 65–99)
GLUCOSE BLDC GLUCOMTR-MCNC: 141 MG/DL (ref 70–130)
GLUCOSE BLDC GLUCOMTR-MCNC: 143 MG/DL (ref 70–130)
GLUCOSE BLDC GLUCOMTR-MCNC: 181 MG/DL (ref 70–130)
GLUCOSE SERPL-MCNC: 104 MG/DL (ref 65–99)
HCO3 BLDA-SCNC: 28 MMOL/L (ref 22–28)
HCT VFR BLD AUTO: 27 % (ref 34–46.6)
HCT VFR BLDA CALC: 27 % (ref 38–51)
HEMODILUTION: NO
HGB BLD-MCNC: 8.9 G/DL (ref 12–15.9)
HGB BLDA-MCNC: 9 G/DL (ref 12–17)
INHALED O2 CONCENTRATION: 100 %
LPA SERPL-SCNC: 41.2 NMOL/L
LYMPHOCYTES # BLD MANUAL: 0.92 10*3/MM3 (ref 0.7–3.1)
LYMPHOCYTES NFR BLD MANUAL: 2 % (ref 5–12)
MAGNESIUM SERPL-MCNC: 2.9 MG/DL (ref 1.6–2.6)
MCH RBC QN AUTO: 32.7 PG (ref 26.6–33)
MCHC RBC AUTO-ENTMCNC: 33 G/DL (ref 31.5–35.7)
MCV RBC AUTO: 99.3 FL (ref 79–97)
METAMYELOCYTES NFR BLD MANUAL: 3 % (ref 0–0)
MODALITY: ABNORMAL
MONOCYTES # BLD: 0.31 10*3/MM3 (ref 0.1–0.9)
MYELOCYTES NFR BLD MANUAL: 2 % (ref 0–0)
NEUTROPHILS # BLD AUTO: 12.55 10*3/MM3 (ref 1.7–7)
NEUTROPHILS NFR BLD MANUAL: 82 % (ref 42.7–76)
NRBC BLD AUTO-RTO: 0.1 /100 WBC (ref 0–0.2)
O2 A-A PPRESDIFF RESPIRATORY: 0.3 MMHG
PCO2 BLDA: 48.2 MM HG (ref 35–45)
PH BLDA: 7.37 PH UNITS (ref 7.35–7.45)
PHOSPHATE SERPL-MCNC: 5.7 MG/DL (ref 2.5–4.5)
PLAT MORPH BLD: NORMAL
PLATELET # BLD AUTO: 376 10*3/MM3 (ref 140–450)
PMV BLD AUTO: 10.5 FL (ref 6–12)
PO2 BLD: 213 MM[HG] (ref 0–500)
PO2 BLDA: 213.3 MM HG (ref 80–100)
POLYCHROMASIA BLD QL SMEAR: ABNORMAL
POTASSIUM BLDA-SCNC: 4.8 MMOL/L (ref 3.5–5.2)
POTASSIUM SERPL-SCNC: 4.1 MMOL/L (ref 3.5–5.2)
RBC # BLD AUTO: 2.72 10*6/MM3 (ref 3.77–5.28)
SAO2 % BLDCOA: 99.7 % (ref 92–98.5)
SODIUM BLD-SCNC: 133 MMOL/L (ref 136–145)
SODIUM SERPL-SCNC: 134 MMOL/L (ref 136–145)
TOTAL RATE: 16 BREATHS/MINUTE
VARIANT LYMPHS NFR BLD MANUAL: 6 % (ref 19.6–45.3)
WBC MORPH BLD: NORMAL
WBC NRBC COR # BLD AUTO: 15.31 10*3/MM3 (ref 3.4–10.8)

## 2025-04-08 PROCEDURE — 85007 BL SMEAR W/DIFF WBC COUNT: CPT | Performed by: INTERNAL MEDICINE

## 2025-04-08 PROCEDURE — 25010000002 EPOETIN ALFA-EPBX 10000 UNIT/ML SOLUTION: Performed by: INTERNAL MEDICINE

## 2025-04-08 PROCEDURE — 36600 WITHDRAWAL OF ARTERIAL BLOOD: CPT

## 2025-04-08 PROCEDURE — 94761 N-INVAS EAR/PLS OXIMETRY MLT: CPT

## 2025-04-08 PROCEDURE — 71045 X-RAY EXAM CHEST 1 VIEW: CPT

## 2025-04-08 PROCEDURE — 83735 ASSAY OF MAGNESIUM: CPT | Performed by: INTERNAL MEDICINE

## 2025-04-08 PROCEDURE — 82948 REAGENT STRIP/BLOOD GLUCOSE: CPT

## 2025-04-08 PROCEDURE — 80069 RENAL FUNCTION PANEL: CPT | Performed by: INTERNAL MEDICINE

## 2025-04-08 PROCEDURE — 83605 ASSAY OF LACTIC ACID: CPT

## 2025-04-08 PROCEDURE — 63710000001 INSULIN GLARGINE PER 5 UNITS: Performed by: INTERNAL MEDICINE

## 2025-04-08 PROCEDURE — 63710000001 INSULIN LISPRO (HUMAN) PER 5 UNITS: Performed by: INTERNAL MEDICINE

## 2025-04-08 PROCEDURE — 25010000002 ALBUMIN HUMAN 25% PER 50 ML: Performed by: INTERNAL MEDICINE

## 2025-04-08 PROCEDURE — 25010000002 ERTAPENEM PER 500 MG: Performed by: INTERNAL MEDICINE

## 2025-04-08 PROCEDURE — 80047 BASIC METABLC PNL IONIZED CA: CPT

## 2025-04-08 PROCEDURE — 82803 BLOOD GASES ANY COMBINATION: CPT

## 2025-04-08 PROCEDURE — P9047 ALBUMIN (HUMAN), 25%, 50ML: HCPCS | Performed by: INTERNAL MEDICINE

## 2025-04-08 PROCEDURE — 94799 UNLISTED PULMONARY SVC/PX: CPT

## 2025-04-08 PROCEDURE — 85018 HEMOGLOBIN: CPT

## 2025-04-08 PROCEDURE — 85025 COMPLETE CBC W/AUTO DIFF WBC: CPT | Performed by: INTERNAL MEDICINE

## 2025-04-08 RX ORDER — INSULIN LISPRO 100 [IU]/ML
2 INJECTION, SOLUTION INTRAVENOUS; SUBCUTANEOUS
Status: DISCONTINUED | OUTPATIENT
Start: 2025-04-08 | End: 2025-04-08

## 2025-04-08 RX ORDER — INSULIN LISPRO 100 [IU]/ML
3 INJECTION, SOLUTION INTRAVENOUS; SUBCUTANEOUS
Status: DISCONTINUED | OUTPATIENT
Start: 2025-04-08 | End: 2025-04-14

## 2025-04-08 RX ORDER — ALBUMIN (HUMAN) 12.5 G/50ML
12.5 SOLUTION INTRAVENOUS ONCE
Status: COMPLETED | OUTPATIENT
Start: 2025-04-08 | End: 2025-04-08

## 2025-04-08 RX ADMIN — Medication 10 ML: at 08:14

## 2025-04-08 RX ADMIN — FLUTICASONE PROPIONATE 2 SPRAY: 50 SPRAY, METERED NASAL at 08:13

## 2025-04-08 RX ADMIN — ERTAPENEM SODIUM 500 MG: 1 INJECTION, POWDER, LYOPHILIZED, FOR SOLUTION INTRAMUSCULAR; INTRAVENOUS at 00:55

## 2025-04-08 RX ADMIN — INSULIN GLARGINE 13 UNITS: 100 INJECTION, SOLUTION SUBCUTANEOUS at 21:02

## 2025-04-08 RX ADMIN — INSULIN LISPRO 2 UNITS: 100 INJECTION, SOLUTION INTRAVENOUS; SUBCUTANEOUS at 21:02

## 2025-04-08 RX ADMIN — ATORVASTATIN CALCIUM 40 MG: 20 TABLET, FILM COATED ORAL at 21:02

## 2025-04-08 RX ADMIN — MUPIROCIN 1 APPLICATION: 20 OINTMENT TOPICAL at 21:02

## 2025-04-08 RX ADMIN — Medication 10 ML: at 21:03

## 2025-04-08 RX ADMIN — HYDROCODONE BITARTRATE AND ACETAMINOPHEN 1 TABLET: 7.5; 325 TABLET ORAL at 03:36

## 2025-04-08 RX ADMIN — HYDROCODONE BITARTRATE AND ACETAMINOPHEN 1 TABLET: 7.5; 325 TABLET ORAL at 14:17

## 2025-04-08 RX ADMIN — ERTAPENEM SODIUM 500 MG: 1 INJECTION, POWDER, LYOPHILIZED, FOR SOLUTION INTRAMUSCULAR; INTRAVENOUS at 23:40

## 2025-04-08 RX ADMIN — MUPIROCIN 1 APPLICATION: 20 OINTMENT TOPICAL at 08:12

## 2025-04-08 RX ADMIN — HYDROCODONE BITARTRATE AND ACETAMINOPHEN 1 TABLET: 7.5; 325 TABLET ORAL at 21:19

## 2025-04-08 RX ADMIN — EPOETIN ALFA-EPBX 10000 UNITS: 10000 INJECTION, SOLUTION INTRAVENOUS; SUBCUTANEOUS at 11:52

## 2025-04-08 RX ADMIN — ALBUMIN (HUMAN) 12.5 G: 0.25 INJECTION, SOLUTION INTRAVENOUS at 11:53

## 2025-04-08 RX ADMIN — ACETAMINOPHEN 650 MG: 650 SOLUTION ORAL at 18:21

## 2025-04-08 RX ADMIN — Medication 1000 MCG: at 08:37

## 2025-04-08 RX ADMIN — ASPIRIN 81 MG: 81 TABLET, COATED ORAL at 08:13

## 2025-04-08 RX ADMIN — INSULIN LISPRO 2 UNITS: 100 INJECTION, SOLUTION INTRAVENOUS; SUBCUTANEOUS at 08:35

## 2025-04-08 RX ADMIN — LANTHANUM CARBONATE 2000 MG: 500 TABLET, CHEWABLE ORAL at 08:13

## 2025-04-08 RX ADMIN — INSULIN LISPRO 3 UNITS: 100 INJECTION, SOLUTION INTRAVENOUS; SUBCUTANEOUS at 14:17

## 2025-04-08 RX ADMIN — LANTHANUM CARBONATE 2000 MG: 500 TABLET, CHEWABLE ORAL at 20:30

## 2025-04-08 RX ADMIN — HYDROCODONE BITARTRATE AND ACETAMINOPHEN 1 TABLET: 7.5; 325 TABLET ORAL at 08:12

## 2025-04-08 NOTE — PROGRESS NOTES
Consult Daily Progress Note  [facility]   04/08/25      Patient Name:  Kelly Pack  MRN:  [id number]   YOB: 1970  Age: 54 y.o.  Sex: female  LOS: 6    Reason for Consult:  History of PEA arrest    Hospital Course:   54-year-old female with history of end-stage renal disease on hemodialysis who presented to the hospital on 4/2 with abdominal pain and found to have a urinary tract infection with left pyelonephritis and hydronephrosis for which she underwent cystoscopy with left ureteral stent placement on 4/2.  Underwent dialysis on 4/4 during which she became bradycardic and unresponsive with chest compressions performed and 1 round of epinephrine given with ROSC obtained and transferred to the ICU.    Interval History:  No acute events overnight  Febrile to 100.6 this morning  Leukocytosis mildly improved  2 L nasal cannula  Continues to have some chest pain after chest compressions  Noticing some pain when taking deep breaths  No shortness of breath  Has no cough    Physical Exam:  Vitals:    04/08/25 0749   BP: (!) 138/116   Pulse: 93   Resp: 16   Temp: (!) 100.6 °F (38.1 °C)   SpO2: (!) 81%       Intake/Output  No intake or output data in the 24 hours ending 04/08/25 0814      General: Alert, nontoxic, NAD  HEENT: NC/AT, EOMI, MMM  Neck: Supple, trachea midline  Cardiac: RRR, no murmur, gallops, rubs  Pulmonary: Clear to auscultation bilaterally, no adventitious breath sounds, normal respiratory effort  GI: Soft, non-tender, non-distended, normal bowel sounds  Extremities: Warm, well perfused, no LE edema  Skin: no visible rash  Neuro: CN II - XII grossly intact  Psychiatry: Normal mood and affect      Data Review:  Results from last 7 days   Lab Units 04/08/25  0532 04/07/25  0503 04/06/25  0405 04/05/25  0747 04/04/25  1747 04/04/25  0628 04/03/25  0619   WBC 10*3/mm3 15.31* 16.65* 16.79* 13.20* 17.21* 16.84* 20.80*   HEMOGLOBIN g/dL 8.9* 8.0* 7.9* 8.9* 8.8* 9.4* 9.4*    PLATELETS 10*3/mm3 376 309 252 264 249 277 219     Results from last 7 days   Lab Units 04/08/25  0532 04/07/25  0503 04/06/25  0405 04/05/25  0747 04/04/25  1554 04/04/25  0628 04/03/25  0619   SODIUM mmol/L 134* 136 135* 134* 132* 132* 130*   POTASSIUM mmol/L 4.1 4.0 3.6 4.1 4.2 4.9 4.1   CHLORIDE mmol/L 95* 99 97* 95* 93* 91* 91*   CO2 mmol/L 21.8* 22.4 23.0 21.4* 17.9* 19.3* 23.1   BUN mg/dL 72* 66* 60* 91* 81* 71* 46*   CREATININE mg/dL 10.74* 8.60* 6.85* 9.34* 8.56* 8.42* 6.52*   GLUCOSE mg/dL 104* 118* 139* 100* 307* 327* 302*   CALCIUM mg/dL 8.2* 8.3* 8.0* 8.2* 8.5* 8.3* 8.1*   MAGNESIUM mg/dL 2.9*  --   --   --   --   --  2.3   PHOSPHORUS mg/dL 5.7*  --   --   --   --   --  4.5   Estimated Creatinine Clearance: 8.2 mL/min (A) (by C-G formula based on SCr of 10.74 mg/dL (H)).    Results from last 7 days   Lab Units 04/08/25  0532 04/07/25  0503 04/06/25  0405 04/05/25  0747 04/05/25  0004 04/04/25  2102 04/04/25  1747 04/04/25  1714 04/02/25  1048 04/02/25  0750   AST (SGOT) U/L  --   --   --   --   --   --   --   --   --  11   ALT (SGPT) U/L  --   --   --   --   --   --   --   --   --  6   LACTATE mmol/L  --   --   --   --  1.6 2.4*  --  4.0*   < >  --    PLATELETS 10*3/mm3 376 309 252   < >  --   --    < >  --    < > 244    < > = values in this interval not displayed.       Results from last 7 days   Lab Units 04/04/25  1541   PH, ARTERIAL pH units 7.385   PCO2, ARTERIAL mm Hg 37.6   PO2 ART mm Hg 538.3*   HCO3 ART mmol/L 22.5       Result Review:  I have personally reviewed the results from the time of this admission to 4/8/2025 08:14 EDT and agree with these findings:  [x]  Laboratory list / accordion  [x]  Microbiology  [x]  Radiology  []  EKG/Telemetry   [x]  Cardiology/Vascular   []  Pathology  [x]  Old records  []  Other:    Imaging:  Reviewed chest images personally from past 3 days    ASSESSMENT  /  PLAN:    PEA cardiac arrest  Acute hypoxic respiratory failure  Respiratory failure requiring  intubation and mechanical ventilation, resolved  ESBL bacteremia  End-stage renal disease on hemodialysis  Hypotension on midodrine  Hydronephrosis and pyelonephritis  Diabetes mellitus  Anemia  Severe obstructive sleep apnea (AHI 58/h)  Obesity    - Patient initially presented to the hospital with abdominal pain and found to have ESBL bacteremia secondary to hydronephrosis and pyelonephritis status post stent placement by urology.  Was undergoing dialysis and suffered PEA cardiac arrest status post ROSC and intubation.  Was extubated and transferred to the floor 4/6.  - Doing well from a respiratory standpoint, on 2 L nasal cannula, wean for SpO2 goal greater than 90%.  - Ongoing dialysis as per nephrology  - Antibiotics with ertapenem for ESBL bacteremia  -On midodrine for hypotension with dialysis  - Leukocytosis slowly improving  - Sliding scale insulin for diabetes  - CPAP ordered for hospital use for severe obstructive sleep apnea.  She follows with Dr. Santana in sleep clinic.  - Remainder of care as per hospitalist primary    Thank you for allowing us to participate in this patients care. Pulmonary will continue to follow.    Wilfred Wilkinson MD  [facility]  Pulmonary and Critical Care Medicine, Interventional Pulmonology    Parts of this note may be an electronic transcription/translation of spoken language to printed text using the Dragon dictation system.

## 2025-04-08 NOTE — DISCHARGE PLACEMENT REQUEST
"Kelly Pack (54 y.o. Female)       Date of Birth   1970    Social Security Number       Address   [address] [place] [id number]    Home Phone   [phone]    MRN   [id number]       Gnosticism   Islam    Marital Status   Single                            Admission Date   4/2/2025    Admission Type   Emergency    Admitting Provider   Génesis Miles MD    Attending Provider   Matt Johnson MD    Department, Room/Bed   [address], [address]       Discharge Date       Discharge Disposition       Discharge Destination                                 Attending Provider: Matt Johnson MD    Allergies: Tomato, Mixed Grasses, Penicillin G, Cat Dander, Latex    Isolation: Contact   Infection: ESBL E coli (04/04/25)   Code Status: CPR    Ht: 175.3 cm (69.02\")   Wt: 112 kg (247 lb 2.2 oz)    Admission Cmt: None   Principal Problem: Sepsis [A41.9]                   Active Insurance as of 4/2/2025       Primary Coverage       Payor Plan Insurance Group Employer/Plan Group    MEDICARE MEDICARE A & B        Payor Plan Address Payor Plan Phone Number Payor Plan Fax Number Effective Dates    [address] [phone]  10/1/2023 - None Entered    [facility] [id number]         Subscriber Name Subscriber Birth Date Member ID       KELLY PACK 1970 [id number]               Secondary Coverage       Payor Plan Insurance Group Employer/Plan Group    HUMANA HUMANA [facility]              [id number]       Payor Plan Address Payor Plan Phone Number Payor Plan Fax Number Effective Dates    [address] [address]   3/1/2025 - None Entered    [facility] [id number]         Subscriber Name Subscriber Birth Date Member ID       KELLY PACK 1970 [id number]                     Emergency Contacts        (Rel.) Home Phone Work Phone Mobile Phone    sanket pack (Mother) -- -- [phone]                "

## 2025-04-08 NOTE — PROGRESS NOTES
Responded stat to CODE BLUE called on floor which was downgraded to respiratory arrest.  Never lost a pulse.  Transferred to ICU and alert and oxygenation stable with saturations 94%.  Mental status has improved.  Has a history of end-stage renal disease and noted episode of bradycardia and PEA with prior dialysis.  Currently on antibiotics for ESBL bacteremia with pyelonephritis and status post stent placement by urology.  Able to protect her airway.  Chart reviewed.    .  Vitals:    04/08/25 0920 04/08/25 1155 04/08/25 1300 04/08/25 1407   BP: 139/56 100/40 154/69 138/78   BP Location: Left leg  Left leg Left leg   Patient Position: Lying  Lying Lying   Pulse: 94 88 88 84   Resp: 16  16 16   Temp: 99.6 °F (37.6 °C)  97.8 °F (36.6 °C) (!) 101.1 °F (38.4 °C)   TempSrc: Temporal  Temporal Oral   SpO2:    100%   Weight:       Height:         No distress, no JVD  Lungs diminished in bases.  No wheezing or rhonchi  Regular rate and rhythm  Abdomen is soft  2+ edema    Results from last 7 days   Lab Units 04/08/25  1711 04/08/25  0532 04/07/25  0503 04/06/25  0405   WBC 10*3/mm3  --  15.31* 16.65* 16.79*   HEMOGLOBIN g/dL  --  8.9* 8.0* 7.9*   HEMOGLOBIN, POC g/dL 9.0*  --   --   --    HEMATOCRIT %  --  27.0* 25.4* 23.6*   HEMATOCRIT POC % 27*  --   --   --    PLATELETS 10*3/mm3  --  376 309 252     Results from last 7 days   Lab Units 04/08/25  1711 04/08/25  0532 04/07/25  0503 04/06/25  0405 04/03/25  0619 04/02/25  0750   SODIUM mmol/L  --  134* 136 135*   < > 132*   POTASSIUM mmol/L  --  4.1 4.0 3.6   < > 4.7   CHLORIDE mmol/L  --  95* 99 97*   < > 92*   CO2 mmol/L  --  21.8* 22.4 23.0   < > 19.5*   BUN mg/dL  --  72* 66* 60*   < > 79*   CREATININE mg/dL 5.61* 10.74* 8.60* 6.85*   < > 11.69*   CALCIUM mg/dL  --  8.2* 8.3* 8.0*   < > 8.2*   BILIRUBIN mg/dL  --   --   --   --   --  0.3   ALK PHOS U/L  --   --   --   --   --  160*   ALT (SGPT) U/L  --   --   --   --   --  6   AST (SGOT) U/L  --   --   --   --   --  11    GLUCOSE mg/dL  --  104* 118* 139*   < > 244*    < > = values in this interval not displayed.     Results from last 7 days   Lab Units 04/08/25  1711   PH, ARTERIAL pH units 7.372   PO2 ART mm Hg 213.3*   PCO2, ARTERIAL mm Hg 48.2*   HCO3 ART mmol/L 28.0         Lab 04/08/25  1711 04/05/25  0004 04/04/25  2102 04/04/25  1714 04/02/25  2113   LACTATE 1.7 1.6 2.4* 4.0* 1.9     Cardiac catheterization from 4/6 report reviewed    2D echo 4/5 reviewed        Assessment & Plan  1) ESRD - on TTS HD, CODE BLUE with dialysis on Friday, completed a full dialysis treatment on 4/5, no UF at that time.  2) Abdominal pain  3) L hydronephrosis with emphysematous pyelonephritis, status post cystoscopy/stent on 4/2  4) DM  5) Hypotension - on Midodrine on non-HD days but currently normotensive  6) Metabolic acidosis, improved  7) Anemia of CKD  8) ESBL UTI/Pyelo  9) Bacteremia  10) s/p cardiac arrest: bradycardia with subsequent PEA.       Monitor closely in the intensive care unit.  Unclear etiology of patient's acute process requiring CODE BLUE to be called.  Patient is not postictal so doubt this is a seizure.  More likely to be an arrhythmia.  Will ask cardiology come back on board and reevaluate.  They signed off yesterday.  Will repeat chest x-ray.        CCT: 45 min    Electronically signed by Lm Weller MD, 04/08/25, 5:55 PM EDT.

## 2025-04-08 NOTE — SIGNIFICANT NOTE
04/08/25 1541   OTHER   Discipline occupational therapist   Rehab Time/Intention   Session Not Performed patient unavailable for evaluation  (patient in dialysis at OT AM and PM attempt. OT will follow up for evaluation completion as patient available.)   Recommendation   OT - Next Appointment 04/09/25

## 2025-04-08 NOTE — SIGNIFICANT NOTE
04/08/25 1355   OTHER   Discipline physical therapist   Rehab Time/Intention   Session Not Performed other (see comments);patient unavailable for treatment  (pt in dialysis at time of attempt, will follow up as time allows)   Recommendation   PT - Next Appointment 04/09/25

## 2025-04-08 NOTE — PROGRESS NOTES
[facility] Medicine Services  PROGRESS NOTE    Patient Name: Kelly Pack  : 1970  MRN: [id number]    Date of Admission: 2025  Primary Care Physician: Kim Benjamin APRN    Subjective   Subjective     CC:  Follow-up multiple medical issues    Subjective:   Patient seen while receiving dialysis today.  She feels better today.  No new complaints.  She is tolerating dialysis fine so far.  She denies any lightheadedness.  Dialysis nurse does report due to multiple IVs in 1 arm and recent heart catheterization they are currently using leg cuff to check blood pressure    Review of Systems  No current fevers or chills  No current shortness of breath or cough  No current nausea, vomiting, or diarrhea       Objective   Objective     Vital Signs:   Temp:  [97.7 °F (36.5 °C)-100.6 °F (38.1 °C)] 99.6 °F (37.6 °C)  Heart Rate:  [60-94] 94  Resp:  [16-18] 16  BP: (138-151)/() 139/56        Physical Exam:  Constitutional: Awake, alert  HENT: NCAT, mucous membranes moist, neck supple  Respiratory: No cough or wheezes, normal respirations, nonlabored breathing   Cardiovascular: Pulse rate is normal, palpable radial pulses  Gastrointestinal:  Soft, nontender, nondistended  Musculoskeletal: Significant obese, BMI is 36.5  Psychiatric: Appropriate affect, cooperative, conversational  Neurologic: No slurred speech or facial droop, follows commands  Skin: Some old bruising noted, no rashes or jaundice, warm      Results Reviewed:  Results from last 7 days   Lab Units 25  0532 25  0503 25  0405 25  0747 25  1747   WBC 10*3/mm3 15.31* 16.65* 16.79*   < > 17.21*   HEMOGLOBIN g/dL 8.9* 8.0* 7.9*   < > 8.8*   HEMATOCRIT % 27.0* 25.4* 23.6*   < > 24.3*   PLATELETS 10*3/mm3 376 309 252   < > 249   INR   --   --   --   --  1.29*    < > = values in this interval not displayed.     Results from last 7 days   Lab Units 25  0532 25  0503 25  0405 25  0747  04/04/25  1714 04/04/25  1554 04/03/25  0619 04/02/25  0750   SODIUM mmol/L 134* 136 135*   < >  --  132*   < > 132*   POTASSIUM mmol/L 4.1 4.0 3.6   < >  --  4.2   < > 4.7   CHLORIDE mmol/L 95* 99 97*   < >  --  93*   < > 92*   CO2 mmol/L 21.8* 22.4 23.0   < >  --  17.9*   < > 19.5*   BUN mg/dL 72* 66* 60*   < >  --  81*   < > 79*   CREATININE mg/dL 10.74* 8.60* 6.85*   < >  --  8.56*   < > 11.69*   GLUCOSE mg/dL 104* 118* 139*   < >  --  307*   < > 244*   CALCIUM mg/dL 8.2* 8.3* 8.0*   < >  --  8.5*   < > 8.2*   ALK PHOS U/L  --   --   --   --   --   --   --  160*   ALT (SGPT) U/L  --   --   --   --   --   --   --  6   AST (SGOT) U/L  --   --   --   --   --   --   --  11   HSTROP T ng/L  --   --   --   --  251* 131*  --   --     < > = values in this interval not displayed.     Estimated Creatinine Clearance: 8.2 mL/min (A) (by C-G formula based on SCr of 10.74 mg/dL (H)).    Microbiology Results Abnormal       Procedure Component Value - Date/Time    Urine Culture - Urine, Urine, Catheter [[id number]]  (Abnormal)  (Susceptibility) Collected: 04/02/25 1707    Lab Status: Final result Specimen: Urine, Catheter Updated: 04/04/25 0907     Urine Culture >100,000 CFU/mL Escherichia coli ESBL    Narrative:      Colonization of the urinary tract without infection is common. Treatment is discouraged unless the patient is symptomatic, pregnant, or undergoing an invasive urologic procedure.  Recent outcomes data supports the use of pip/tazo in the treatment of susceptible ESBL infections for uncomplicated UTI. Consider use of pip/tazo as a carbapenem-sparing regimen in applicable patients.    Susceptibility        Escherichia coli ESBL      LEANN      Ciprofloxacin Resistant      Ertapenem Susceptible      Gentamicin Susceptible      Levofloxacin Resistant      Meropenem Susceptible      Nitrofurantoin Susceptible      Piperacillin + Tazobactam Susceptible      Trimethoprim + Sulfamethoxazole Resistant                            Urine Culture - Urine, Urine, Clean Catch [[id number]]  (Abnormal)  (Susceptibility) Collected: 04/02/25 1017    Lab Status: Final result Specimen: Urine, Clean Catch Updated: 04/04/25 0945     Urine Culture >100,000 CFU/mL Escherichia coli ESBL    Narrative:      Colonization of the urinary tract without infection is common. Treatment is discouraged unless the patient is symptomatic, pregnant, or undergoing an invasive urologic procedure.  Recent outcomes data supports the use of pip/tazo in the treatment of susceptible ESBL infections for uncomplicated UTI. Consider use of pip/tazo as a carbapenem-sparing regimen in applicable patients.    Susceptibility        Escherichia coli ESBL      LEANN      Ciprofloxacin Resistant      Ertapenem Susceptible      Gentamicin Susceptible      Levofloxacin Resistant      Meropenem Susceptible      Nitrofurantoin Susceptible      Piperacillin + Tazobactam Susceptible      Trimethoprim + Sulfamethoxazole Resistant                           Blood Culture - Blood, Arm, Left [[id number]]  (Abnormal) Collected: 04/02/25 1113    Lab Status: Final result Specimen: Blood from Arm, Left Updated: 04/04/25 0640     Blood Culture Escherichia coli ESBL     Comment:   Consider infectious disease consult.  Susceptibility results may not correlate to clinical outcomes.  For ESBL-producing infections in the blood, a carbapenem is recommended as first-line therapy for optimal clinical outcomes.        Isolated from Aerobic and Anaerobic Bottles     Gram Stain Anaerobic Bottle Gram negative bacilli      Aerobic Bottle Gram negative bacilli    Narrative:      Refer to previous blood culture collected on 04/02/2025 at 1043 for MICs.      Blood Culture - Blood, Hand, Left [[id number]]  (Abnormal)  (Susceptibility) Collected: 04/02/25 1043    Lab Status: Final result Specimen: Blood from Hand, Left Updated: 04/04/25 0639     Blood Culture Escherichia coli ESBL     Comment:   Consider infectious disease  consult.  Susceptibility results may not correlate to clinical outcomes.  For ESBL-producing infections in the blood, a carbapenem is recommended as first-line therapy for optimal clinical outcomes.        Isolated from Aerobic and Anaerobic Bottles     Gram Stain Anaerobic Bottle Gram negative bacilli      Aerobic Bottle Gram negative bacilli    Susceptibility        Escherichia coli ESBL      LEANN      Ciprofloxacin Resistant      Ertapenem Susceptible      Levofloxacin Resistant      Meropenem Susceptible      Trimethoprim + Sulfamethoxazole Resistant                       Susceptibility Comments       Escherichia coli ESBL    With the exception of urinary-sourced infections, aminoglycosides should not be used as monotherapy.               Blood Culture ID, PCR - Blood, Hand, Left [[id number]]  (Abnormal) Collected: 04/02/25 1043    Lab Status: Final result Specimen: Blood from Hand, Left Updated: 04/02/25 2022     BCID, PCR Escherichia coli. Identification by BCID2 PCR.     BCID, PCR 2 CTX-M (ESBL) detected. Identification by BCID2 PCR     BOTTLE TYPE Anaerobic Bottle            Imaging Results (Last 24 Hours)       ** No results found for the last 24 hours. **            Results for orders placed during the hospital encounter of 04/02/25    Adult Transthoracic Echo Complete W/ Cont if Necessary Per Protocol    Interpretation Summary    Left ventricular systolic function is low normal. Calculated left ventricular EF = 48.8%    Left ventricular diastolic function is consistent with (grade I) impaired relaxation.    The left atrial cavity is mildly dilated.    There is mild calcification of the aortic valve. No stenosis present    Calculated right ventricular systolic pressure from tricuspid regurgitation is 47 mmHg.    Mild to moderate pulmonary hypertension is present.    Mild mitral valve regurgitation is present.      I have reviewed the medications:  Scheduled Meds:aspirin, 81 mg, Oral, Daily  atorvastatin, 40  mg, Oral, Nightly  epoetin kindra/kindra-epbx, 10,000 Units, Intravenous, Once per day on Tuesday Thursday Saturday  ertapenem, 500 mg, Intravenous, Q24H  fluticasone, 2 spray, Each Nare, Daily  insulin glargine, 13 Units, Subcutaneous, Nightly  insulin lispro, 2 Units, Subcutaneous, TID With Meals  insulin lispro, 2-7 Units, Subcutaneous, 4x Daily AC & at Bedtime  lanthanum, 2,000 mg, Oral, BID With Meals  Lidocaine, 1 patch, Transdermal, Q24H  midodrine, 5 mg, Oral, Once per day on Monday Tuesday Thursday Saturday  mupirocin, 1 Application, Each Nare, BID  sodium chloride, 10 mL, Intravenous, Q12H  sodium chloride, 10 mL, Intravenous, Q12H  sodium chloride, 10 mL, Intravenous, Q12H  sodium chloride, 10 mL, Intravenous, Q12H  sodium chloride, 10 mL, Intravenous, Q12H  cyanocobalamin, 1,000 mcg, Oral, Daily  vitamin D, 50,000 Units, Oral, Once per day on Monday Thursday      Continuous Infusions:   PRN Meds:.  acetaminophen **OR** acetaminophen **OR** acetaminophen    albuterol    senna-docusate sodium **AND** polyethylene glycol **AND** bisacodyl **AND** bisacodyl    cyclobenzaprine    dextrose    dextrose    gabapentin    glucagon (human recombinant)    heparin (porcine)    HYDROcodone-acetaminophen    melatonin    nitroglycerin    ondansetron ODT **OR** ondansetron    sodium chloride    sodium chloride    sodium chloride    sodium chloride    sodium chloride    sodium chloride    sodium chloride    sodium chloride    Assessment & Plan   Assessment & Plan     Active Hospital Problems    Diagnosis  POA    **Sepsis [A41.9]  Yes    Class 2 severe obesity with serious comorbidity in adult [E66.812, E66.01]  Yes    NSTEMI (non-ST elevated myocardial infarction) [I21.4]  Unknown    Emphysematous pyelonephritis of left kidney [N12]  Unknown    Hydronephrosis of left kidney [N13.30]  Unknown    ESRD (end stage renal disease) [N18.6]  Yes    Uncontrolled type 2 diabetes mellitus with hyperglycemia [E11.65]  Yes    Essential  hypertension [I10]  Yes    Asthma [J45.909]  Yes    Hyperlipidemia [E78.5]  Yes      Resolved Hospital Problems   No resolved problems to display.        Brief Hospital Course to date:  Kelly Pack is a 54 y.o. female with a history of ESRD on HD, anemia of chronic disease, asthma who presents with severe left-sided pain, nausea, vomiting, found to have emphysematous pyelitis and sepsis.  Patient developed PEA cardiac arrest while undergoing dialysis on 4/4/2025 with ROSC and was transferred to the intensive care unit after intubation for airway protection.  Patient was ultimately extubated and transferred out of the ICU on 4/6/2025.  She underwent heart catheterization on 4/6/2025 which showed only very mild nonobstructive coronary disease that was not thought to have contributed to her PEA arrest.    Discussion/plan for today:  Dialysis today.  Continue PT OT as able.  Continue renally dosed ertapenem.  Leukocytosis is gradual to normalize.  Discussed with dialysis nurse unfortunately they are using leg cuff to monitor blood pressure due to dialysis access and recent heart catheterization.  Glucose reviewed and insulin slightly adjusted today.  Otherwise per below.     ESBL E coli bacteremia  Sepsis (febrile 103, tachycardic 114, WBC 18) related to emphysematous pyelitis  Left sided hydronephrosis  Lactic acidosis  - urology consulted, patient to OR for cystoscopy, left stent placement 4/2/25  - continue rocephin; follow up urine and blood cx  - blood cx now + for ESBL e coli, ID consulted and abx adjusted to ertapenem  -Plan for 14-day course of ertapenem, anticipate gradual improvement    PEA arrest:  Arrest occurred on dialysis on 4/4 with ROSC after chest compressions and 1 round of epinephrine.  Patient reportedly had an episode of bradycardia and hypotension.  She underwent left heart catheterization which showed only very mild nonobstructive coronary disease and was not thought to be contributing.   Cardiology told me they were suspicious patient developed an acute episode of hypotension causing her arrest.  Monitoring carefully, midodrine with dialysis     ESRD on HD  Anemia of chronic disease  - renal consulted;   - dialysis per renal     Orthostatic hypotension  - on middrine with dialysis, adjust per nephrology  - Patient has had issues with intermittent hypotension.  Monitor and adjust as needed.  Likely worsened due to infection     HTN  - torsemide and coreg held given sepsis/normal BP  - will resume as able-continue to hold given low/normal BP/lability    Type 2 diabetes mellitus  - no home DM meds on med rec;   - Monitor glucose and adjust insulin as needed     Asthma  - prn nebs (home regimen), stable    Physical debility: PT.  Therapy recommending rehab facility.  Patient wants to go home.     SCDs for DVT prophylaxis.  Full code.  Discussed with patient   Anticipated discharge ?home, TBD      CODE STATUS:   Code Status and Medical Interventions: CPR (Attempt to Resuscitate); Full Support   Ordered at: 04/02/25 1306     Code Status (Patient has no pulse and is not breathing):    CPR (Attempt to Resuscitate)     Medical Interventions (Patient has pulse or is breathing):    Full Support       Matt Johnson MD  04/08/25

## 2025-04-08 NOTE — PROGRESS NOTES
"RENAL/KCC:     LOS: 6 days    Patient Care Team:  Kim Benjamin APRN as PCP - General (Nurse Practitioner)  Miguel Stahl MD as Consulting Physician (Hematology and Oncology)  Radha Martinez APRN as Referring Physician (Nurse Practitioner)    Chief Complaint:  ESRD, Abdominal pain    Subjective     Interval History:   4/7: Chart reviewed  S/P cysto and L ureteral stent placement on 4/2/25  Extubated on Saturday  Feeling well, denies new complaints    4/8: Patient seen and examined on hemodialysis, she was seen about 30 minutes into her treatment, tolerating well so far, BP in the 140s, set for 2 L UF  She denies any shortness of breath chest pain or edema      Objective     Vital Sign Min/Max for last 24 hours  Temp  Min: 97.7 °F (36.5 °C)  Max: 100.6 °F (38.1 °C)   BP  Min: 138/116  Max: 151/88   Pulse  Min: 60  Max: 94   Resp  Min: 16  Max: 18   SpO2  Min: 81 %  Max: 100 %   Flow (L/min) (Oxygen Therapy)  Min: 2  Max: 2   No data recorded     Flowsheet Rows      Flowsheet Row First Filed Value   Admission Height 175.3 cm (69\") Documented at 04/02/2025 1049   Admission Weight 110 kg (243 lb) Documented at 04/02/2025 1049            No intake/output data recorded.  I/O last 3 completed shifts:  In: 50 [IV Piggyback:50]  Out: -     Physical Exam:  GEN: Awake, alert, responsive, no acute distress, obese  ENT: PERRL, EOMI, MMM.  NECK: Supple, no JVD. RIJ tunneled HD catheter remains in place  CHEST: CTAB, no W/R/C.  CV: RRR, no M/G/R  ABD: Soft, NT, +BS  SKIN: Warm and Dry  NEURO: AAOX3   RUE AVF +thrill and bruit     WBC WBC   Date Value Ref Range Status   04/08/2025 15.31 (H) 3.40 - 10.80 10*3/mm3 Final   04/07/2025 16.65 (H) 3.40 - 10.80 10*3/mm3 Final   04/06/2025 16.79 (H) 3.40 - 10.80 10*3/mm3 Final      HGB Hemoglobin   Date Value Ref Range Status   04/08/2025 8.9 (L) 12.0 - 15.9 g/dL Final   04/07/2025 8.0 (L) 12.0 - 15.9 g/dL Final   04/06/2025 7.9 (L) 12.0 - 15.9 g/dL Final      HCT Hematocrit " "  Date Value Ref Range Status   04/08/2025 27.0 (L) 34.0 - 46.6 % Final   04/07/2025 25.4 (L) 34.0 - 46.6 % Final   04/06/2025 23.6 (L) 34.0 - 46.6 % Final      Platlets No results found for: \"LABPLAT\"   MCV MCV   Date Value Ref Range Status   04/08/2025 99.3 (H) 79.0 - 97.0 fL Final   04/07/2025 101.2 (H) 79.0 - 97.0 fL Final   04/06/2025 97.9 (H) 79.0 - 97.0 fL Final          Sodium Sodium   Date Value Ref Range Status   04/08/2025 134 (L) 136 - 145 mmol/L Final   04/07/2025 136 136 - 145 mmol/L Final   04/06/2025 135 (L) 136 - 145 mmol/L Final      Potassium Potassium   Date Value Ref Range Status   04/08/2025 4.1 3.5 - 5.2 mmol/L Final   04/07/2025 4.0 3.5 - 5.2 mmol/L Final   04/06/2025 3.6 3.5 - 5.2 mmol/L Final      Chloride Chloride   Date Value Ref Range Status   04/08/2025 95 (L) 98 - 107 mmol/L Final   04/07/2025 99 98 - 107 mmol/L Final   04/06/2025 97 (L) 98 - 107 mmol/L Final      CO2 CO2   Date Value Ref Range Status   04/08/2025 21.8 (L) 22.0 - 29.0 mmol/L Final   04/07/2025 22.4 22.0 - 29.0 mmol/L Final   04/06/2025 23.0 22.0 - 29.0 mmol/L Final      BUN BUN   Date Value Ref Range Status   04/08/2025 72 (H) 6 - 20 mg/dL Final   04/07/2025 66 (H) 6 - 20 mg/dL Final   04/06/2025 60 (H) 6 - 20 mg/dL Final      Creatinine Creatinine   Date Value Ref Range Status   04/08/2025 10.74 (H) 0.57 - 1.00 mg/dL Final   04/07/2025 8.60 (H) 0.57 - 1.00 mg/dL Final   04/06/2025 6.85 (H) 0.57 - 1.00 mg/dL Final      Calcium Calcium   Date Value Ref Range Status   04/08/2025 8.2 (L) 8.6 - 10.5 mg/dL Final   04/07/2025 8.3 (L) 8.6 - 10.5 mg/dL Final   04/06/2025 8.0 (L) 8.6 - 10.5 mg/dL Final      PO4 No results found for: \"CAPO4\"   Albumin Albumin   Date Value Ref Range Status   04/08/2025 3.0 (L) 3.5 - 5.2 g/dL Final        Magnesium Magnesium   Date Value Ref Range Status   04/08/2025 2.9 (H) 1.6 - 2.6 mg/dL Final        Uric Acid No results found for: \"URICACID\"        Results Review:     I reviewed the patient's " new clinical results.    albumin human, 12.5 g, Intravenous, Once  aspirin, 81 mg, Oral, Daily  atorvastatin, 40 mg, Oral, Nightly  epoetin kindra/kindra-epbx, 10,000 Units, Intravenous, Once per day on Tuesday Thursday Saturday  ertapenem, 500 mg, Intravenous, Q24H  fluticasone, 2 spray, Each Nare, Daily  insulin glargine, 13 Units, Subcutaneous, Nightly  insulin lispro, 2-7 Units, Subcutaneous, 4x Daily AC & at Bedtime  insulin lispro, 3 Units, Subcutaneous, TID With Meals  lanthanum, 2,000 mg, Oral, BID With Meals  Lidocaine, 1 patch, Transdermal, Q24H  midodrine, 5 mg, Oral, Once per day on Monday Tuesday Thursday Saturday  mupirocin, 1 Application, Each Nare, BID  sodium chloride, 10 mL, Intravenous, Q12H  sodium chloride, 10 mL, Intravenous, Q12H  sodium chloride, 10 mL, Intravenous, Q12H  sodium chloride, 10 mL, Intravenous, Q12H  sodium chloride, 10 mL, Intravenous, Q12H  cyanocobalamin, 1,000 mcg, Oral, Daily  vitamin D, 50,000 Units, Oral, Once per day on Monday Thursday             Medication Review: Reviewed    Assessment & Plan     1) ESRD - on TTS HD, CODE BLUE with dialysis on Friday, completed a full dialysis treatment on 4/5, no UF at that time.  2) Abdominal pain  3) L hydronephrosis with emphysematous pyelonephritis, status post cystoscopy/stent on 4/2  4) DM  5) Hypotension - on Midodrine on non-HD days but currently normotensive  6) Metabolic acidosis, improved  7) Anemia of CKD  8) ESBL UTI/Pyelo  9) Bacteremia  10) s/p cardiac arrest: bradycardia with subsequent PEA.       Plan:   Clinically stable today, euvolemic.  Electrolytes and volume at goal  Dialysis today and Tuesday Thursday Saturday  UF 2 L with HD as tolerated.  Patient was concerned about UF on dialysis today but we did not take any fluid off her last treatment and her PEA episode happened right at the start of dialysis last week.  So that would be an unlikely cause for the PEA due to excessive volume removal that close to the start  of her treatment.  Current UF goals appropriate    She has been persistently hypertensive so changed Midodrine to be given prior to HD  Cardiac cath with nonobstructive disease, cardiology plans no further workup at this time and has signed off  Tunnel catheter in place for access, okay to use AV fistula as well  Epogen with HD for anemia  Renally dose antibiotics for GFR less than 10  Discussed with patient and HD RN      Tyler Roman MD  [facility]  04/08/25  11:50 EDT

## 2025-04-08 NOTE — PLAN OF CARE
Goal Outcome Evaluation:  Pt back to     Room 446 after dialysis-complaining of pain  Pt alert and oriented at this time  Had nursing assistant to heat up lunch  Pt ate small amounts  VS as noted  Pt requested a pain pill for a chest pain of8 @1410  Pt received a norco at that time as ordered. When dinner taken to patient pt would not arouse  o2 sats upper 90's B/P as noted-attempted to wake pt up with sternal rub without success  Rapid called followed by CODE 300 r/t pt.'s respiratory decline  Code team here-pt transferred to ICU  Pt.'s mother was called and informed of new status update  Sister had also called but mother said she would call her  Dr Parrish informed at the beginning of Rapid  Pt transferred to Room 384  Safety maintained this shift                                              Problem: Adult Inpatient Plan of Care  Goal: Plan of Care Review  Outcome: Unable to Meet

## 2025-04-08 NOTE — CASE MANAGEMENT/SOCIAL WORK
Continued Stay Note  [facility]     Patient Name: Kelly Pack  MRN: [id number]  Today's Date: 4/8/2025    Admit Date: 4/2/2025    Plan: IRB vs home   Discharge Plan       Row Name 04/08/25 1339       Plan    Plan IRB vs home    Plan Comments Discussed with pt therapy recommendations for IRB, pt agreeable for referral to [facility]. Referral placed in [facility], VM left for [facility].                   Discharge Codes    No documentation.                 Expected Discharge Date and Time       Expected Discharge Date Expected Discharge Time    Apr 10, 2025               Tara Xavier RN

## 2025-04-08 NOTE — NURSING NOTE
hd without incident. tolerated. removed 2 L. albumin x 1 dose. declined midodrine dose to be given. r. cw tunnelled dialysis catheter drsg current, cdi. was flushed and re heparinized on the 5th. r. ua avf used for hd without incident. avf needles removed x 2. hemostasis achieved. stable, no c/o post completion of hd.

## 2025-04-08 NOTE — PLAN OF CARE
Goal Outcome Evaluation:  Plan of Care Reviewed With: patient           Outcome Evaluation: vss. pt only tolerated a couple hours wearing the cpap overnight d/t anxiety. 2L NC in place while asleep, o2 remains stable. up with assist x1 to bathroom, patient has some dizziness when standing. norco q4hr prn pain-see mar. flexeril prn at bedtime with some relief of muscle strain/chest pain. HD ordered for today. IV invanz given per order. IS at bedside, encouraged to use while awake. resting between care.

## 2025-04-09 ENCOUNTER — APPOINTMENT (OUTPATIENT)
Dept: CT IMAGING | Facility: HOSPITAL | Age: 55
End: 2025-04-09
Payer: MEDICARE

## 2025-04-09 LAB
ANION GAP SERPL CALCULATED.3IONS-SCNC: 14.7 MMOL/L (ref 5–15)
BUN SERPL-MCNC: 42 MG/DL (ref 6–20)
BUN/CREAT SERPL: 6.4 (ref 7–25)
CALCIUM SPEC-SCNC: 8.6 MG/DL (ref 8.6–10.5)
CHLORIDE SERPL-SCNC: 94 MMOL/L (ref 98–107)
CO2 SERPL-SCNC: 20.3 MMOL/L (ref 22–29)
CREAT SERPL-MCNC: 6.61 MG/DL (ref 0.57–1)
DEPRECATED RDW RBC AUTO: 49.7 FL (ref 37–54)
EGFRCR SERPLBLD CKD-EPI 2021: 7 ML/MIN/1.73
ERYTHROCYTE [DISTWIDTH] IN BLOOD BY AUTOMATED COUNT: 14 % (ref 12.3–15.4)
GLUCOSE BLDC GLUCOMTR-MCNC: 135 MG/DL (ref 70–130)
GLUCOSE BLDC GLUCOMTR-MCNC: 150 MG/DL (ref 70–130)
GLUCOSE BLDC GLUCOMTR-MCNC: 159 MG/DL (ref 70–130)
GLUCOSE BLDC GLUCOMTR-MCNC: 163 MG/DL (ref 70–130)
GLUCOSE SERPL-MCNC: 124 MG/DL (ref 65–99)
HCT VFR BLD AUTO: 28.8 % (ref 34–46.6)
HGB BLD-MCNC: 9.1 G/DL (ref 12–15.9)
MCH RBC QN AUTO: 31.4 PG (ref 26.6–33)
MCHC RBC AUTO-ENTMCNC: 31.6 G/DL (ref 31.5–35.7)
MCV RBC AUTO: 99.3 FL (ref 79–97)
MRSA DNA SPEC QL NAA+PROBE: NORMAL
PLATELET # BLD AUTO: 268 10*3/MM3 (ref 140–450)
PMV BLD AUTO: 11 FL (ref 6–12)
POTASSIUM SERPL-SCNC: 4.7 MMOL/L (ref 3.5–5.2)
RBC # BLD AUTO: 2.9 10*6/MM3 (ref 3.77–5.28)
SODIUM SERPL-SCNC: 129 MMOL/L (ref 136–145)
WBC NRBC COR # BLD AUTO: 20.95 10*3/MM3 (ref 3.4–10.8)

## 2025-04-09 PROCEDURE — 99232 SBSQ HOSP IP/OBS MODERATE 35: CPT | Performed by: INTERNAL MEDICINE

## 2025-04-09 PROCEDURE — 63710000001 INSULIN LISPRO (HUMAN) PER 5 UNITS: Performed by: INTERNAL MEDICINE

## 2025-04-09 PROCEDURE — G0545 PR INHERENT VISIT TO INPT: HCPCS | Performed by: STUDENT IN AN ORGANIZED HEALTH CARE EDUCATION/TRAINING PROGRAM

## 2025-04-09 PROCEDURE — 85027 COMPLETE CBC AUTOMATED: CPT

## 2025-04-09 PROCEDURE — 97166 OT EVAL MOD COMPLEX 45 MIN: CPT

## 2025-04-09 PROCEDURE — 99233 SBSQ HOSP IP/OBS HIGH 50: CPT | Performed by: STUDENT IN AN ORGANIZED HEALTH CARE EDUCATION/TRAINING PROGRAM

## 2025-04-09 PROCEDURE — 97530 THERAPEUTIC ACTIVITIES: CPT

## 2025-04-09 PROCEDURE — 93010 ELECTROCARDIOGRAM REPORT: CPT | Performed by: INTERNAL MEDICINE

## 2025-04-09 PROCEDURE — 63710000001 INSULIN GLARGINE PER 5 UNITS: Performed by: INTERNAL MEDICINE

## 2025-04-09 PROCEDURE — 25010000002 ERTAPENEM PER 500 MG: Performed by: INTERNAL MEDICINE

## 2025-04-09 PROCEDURE — 80048 BASIC METABOLIC PNL TOTAL CA: CPT

## 2025-04-09 PROCEDURE — 25510000001 IOPAMIDOL PER 1 ML: Performed by: STUDENT IN AN ORGANIZED HEALTH CARE EDUCATION/TRAINING PROGRAM

## 2025-04-09 PROCEDURE — 82948 REAGENT STRIP/BLOOD GLUCOSE: CPT

## 2025-04-09 PROCEDURE — 93005 ELECTROCARDIOGRAM TRACING: CPT | Performed by: INTERNAL MEDICINE

## 2025-04-09 PROCEDURE — 87641 MR-STAPH DNA AMP PROBE: CPT | Performed by: STUDENT IN AN ORGANIZED HEALTH CARE EDUCATION/TRAINING PROGRAM

## 2025-04-09 PROCEDURE — 71275 CT ANGIOGRAPHY CHEST: CPT

## 2025-04-09 RX ORDER — HEPARIN SODIUM 1000 [USP'U]/ML
4000 INJECTION, SOLUTION INTRAVENOUS; SUBCUTANEOUS AS NEEDED
Status: DISCONTINUED | OUTPATIENT
Start: 2025-04-09 | End: 2025-04-16 | Stop reason: HOSPADM

## 2025-04-09 RX ORDER — IOPAMIDOL 755 MG/ML
100 INJECTION, SOLUTION INTRAVASCULAR
Status: COMPLETED | OUTPATIENT
Start: 2025-04-09 | End: 2025-04-09

## 2025-04-09 RX ADMIN — MUPIROCIN 1 APPLICATION: 20 OINTMENT TOPICAL at 08:24

## 2025-04-09 RX ADMIN — INSULIN LISPRO 3 UNITS: 100 INJECTION, SOLUTION INTRAVENOUS; SUBCUTANEOUS at 08:22

## 2025-04-09 RX ADMIN — INSULIN LISPRO 3 UNITS: 100 INJECTION, SOLUTION INTRAVENOUS; SUBCUTANEOUS at 12:50

## 2025-04-09 RX ADMIN — INSULIN GLARGINE 13 UNITS: 100 INJECTION, SOLUTION SUBCUTANEOUS at 21:19

## 2025-04-09 RX ADMIN — ATORVASTATIN CALCIUM 40 MG: 20 TABLET, FILM COATED ORAL at 21:19

## 2025-04-09 RX ADMIN — INSULIN LISPRO 2 UNITS: 100 INJECTION, SOLUTION INTRAVENOUS; SUBCUTANEOUS at 12:50

## 2025-04-09 RX ADMIN — Medication 10 ML: at 08:24

## 2025-04-09 RX ADMIN — INSULIN LISPRO 2 UNITS: 100 INJECTION, SOLUTION INTRAVENOUS; SUBCUTANEOUS at 21:19

## 2025-04-09 RX ADMIN — LANTHANUM CARBONATE 2000 MG: 500 TABLET, CHEWABLE ORAL at 17:53

## 2025-04-09 RX ADMIN — Medication 10 ML: at 21:19

## 2025-04-09 RX ADMIN — IOPAMIDOL 95 ML: 755 INJECTION, SOLUTION INTRAVENOUS at 20:51

## 2025-04-09 RX ADMIN — ACETAMINOPHEN 650 MG: 325 TABLET, FILM COATED ORAL at 12:51

## 2025-04-09 RX ADMIN — MUPIROCIN 1 APPLICATION: 20 OINTMENT TOPICAL at 21:20

## 2025-04-09 RX ADMIN — INSULIN LISPRO 3 UNITS: 100 INJECTION, SOLUTION INTRAVENOUS; SUBCUTANEOUS at 17:53

## 2025-04-09 RX ADMIN — ACETAMINOPHEN 650 MG: 325 TABLET, FILM COATED ORAL at 17:59

## 2025-04-09 RX ADMIN — CYCLOBENZAPRINE HYDROCHLORIDE 10 MG: 10 TABLET, FILM COATED ORAL at 21:19

## 2025-04-09 RX ADMIN — Medication 1000 MCG: at 08:22

## 2025-04-09 RX ADMIN — LANTHANUM CARBONATE 2000 MG: 500 TABLET, CHEWABLE ORAL at 08:23

## 2025-04-09 RX ADMIN — ERTAPENEM SODIUM 500 MG: 1 INJECTION, POWDER, LYOPHILIZED, FOR SOLUTION INTRAMUSCULAR; INTRAVENOUS at 22:55

## 2025-04-09 RX ADMIN — LIDOCAINE 1 PATCH: 4 PATCH TOPICAL at 08:23

## 2025-04-09 RX ADMIN — ASPIRIN 81 MG: 81 TABLET, COATED ORAL at 08:23

## 2025-04-09 RX ADMIN — INSULIN LISPRO 2 UNITS: 100 INJECTION, SOLUTION INTRAVENOUS; SUBCUTANEOUS at 17:52

## 2025-04-09 RX ADMIN — ACETAMINOPHEN 650 MG: 325 TABLET, FILM COATED ORAL at 22:55

## 2025-04-09 NOTE — PROGRESS NOTES
" LOS: 7 days     Chief Complaint: ESBL bacteremia    Interval History: Status post respiratory arrest yesterday with no loss of pulse fluids.  Patient transferred to the ICU.  Tmax of 103.1.  ID reconsulted for fever.  Currently on ertapenem.    Vital Signs  Temp:  [97.4 °F (36.3 °C)-103.1 °F (39.5 °C)] 98.2 °F (36.8 °C)  Heart Rate:  [59-94] 77  Resp:  [16-20] 20  BP: ()/() 167/79    Physical Exam:  General: In no acute distress  HEENT: Oropharynx clear, moist mucous membranes  Respiratory: Normal work of breathing.  On nasal cannula.  Skin: No rashes or lesions in exposed areas  Extremities: No edema, cyanosis  Access: Peripheral IV.    Antibiotics:  Anti-Infectives (From admission, onward)      Ordered     Dose/Rate Route Frequency Start Stop    04/02/25 2249  ertapenem (INVanz) 500 mg in sodium chloride 0.9 % 50 mL IVPB        Ordering Provider: Damian Nguyen MD    500 mg  100 mL/hr over 30 Minutes Intravenous Every 24 Hours 04/02/25 2345 04/16/25 2359    04/02/25 0933  cefTRIAXone (ROCEPHIN) 2,000 mg in sodium chloride 0.9 % 100 mL MBP        Ordering Provider: Lillian Winslow PA-C    2,000 mg  200 mL/hr over 30 Minutes Intravenous Once 04/02/25 0949 04/02/25 1157             Results Review:     I reviewed the patient's new clinical results.    Lab Results   Component Value Date    WBC 15.31 (H) 04/08/2025    HGB 9.0 (L) 04/08/2025    HCT 27 (L) 04/08/2025    MCV 99.3 (H) 04/08/2025     04/08/2025     Lab Results   Component Value Date    GLUCOSE 104 (H) 04/08/2025    BUN 72 (H) 04/08/2025    CREATININE 5.61 (H) 04/08/2025    EGFRIFNONA 45 (L) 10/20/2021    EGFRIFAFRI 8 (L) 03/10/2023    BCR 6.7 (L) 04/08/2025    CO2 21.8 (L) 04/08/2025    CALCIUM 8.2 (L) 04/08/2025    ALBUMIN 3.0 (L) 04/08/2025    AST 11 04/02/2025    ALT 6 04/02/2025       No results found for: \"VANCOPEAK\", \"VANCOTROUGH\", \"VANCORANDOM\"     Microbiology:  Microbiology Results (last 10 days)       Procedure Component " Value - Date/Time    Urine Culture - Urine, Urine, Catheter [[id number]]  (Abnormal)  (Susceptibility) Collected: 04/02/25 1707    Lab Status: Final result Specimen: Urine, Catheter Updated: 04/04/25 0947     Urine Culture >100,000 CFU/mL Escherichia coli ESBL    Narrative:      Colonization of the urinary tract without infection is common. Treatment is discouraged unless the patient is symptomatic, pregnant, or undergoing an invasive urologic procedure.  Recent outcomes data supports the use of pip/tazo in the treatment of susceptible ESBL infections for uncomplicated UTI. Consider use of pip/tazo as a carbapenem-sparing regimen in applicable patients.    Susceptibility        Escherichia coli ESBL      LEANN      Ciprofloxacin Resistant      Ertapenem Susceptible      Gentamicin Susceptible      Levofloxacin Resistant      Meropenem Susceptible      Nitrofurantoin Susceptible      Piperacillin + Tazobactam Susceptible      Trimethoprim + Sulfamethoxazole Resistant                           Blood Culture - Blood, Arm, Left [[id number]]  (Abnormal) Collected: 04/02/25 1113    Lab Status: Final result Specimen: Blood from Arm, Left Updated: 04/04/25 0640     Blood Culture Escherichia coli ESBL     Comment:   Consider infectious disease consult.  Susceptibility results may not correlate to clinical outcomes.  For ESBL-producing infections in the blood, a carbapenem is recommended as first-line therapy for optimal clinical outcomes.        Isolated from Aerobic and Anaerobic Bottles     Gram Stain Anaerobic Bottle Gram negative bacilli      Aerobic Bottle Gram negative bacilli    Narrative:      Refer to previous blood culture collected on 04/02/2025 at 1043 for MICs.      Blood Culture - Blood, Hand, Left [[id number]]  (Abnormal)  (Susceptibility) Collected: 04/02/25 1043    Lab Status: Final result Specimen: Blood from Hand, Left Updated: 04/04/25 0639     Blood Culture Escherichia coli ESBL     Comment:   Consider  infectious disease consult.  Susceptibility results may not correlate to clinical outcomes.  For ESBL-producing infections in the blood, a carbapenem is recommended as first-line therapy for optimal clinical outcomes.        Isolated from Aerobic and Anaerobic Bottles     Gram Stain Anaerobic Bottle Gram negative bacilli      Aerobic Bottle Gram negative bacilli    Susceptibility        Escherichia coli ESBL      LEANN      Ciprofloxacin Resistant      Ertapenem Susceptible      Levofloxacin Resistant      Meropenem Susceptible      Trimethoprim + Sulfamethoxazole Resistant                       Susceptibility Comments       Escherichia coli ESBL    With the exception of urinary-sourced infections, aminoglycosides should not be used as monotherapy.               Blood Culture ID, PCR - Blood, Hand, Left [[id number]]  (Abnormal) Collected: 04/02/25 1043    Lab Status: Final result Specimen: Blood from Hand, Left Updated: 04/02/25 2022     BCID, PCR Escherichia coli. Identification by BCID2 PCR.     BCID, PCR 2 CTX-M (ESBL) detected. Identification by BCID2 PCR     BOTTLE TYPE Anaerobic Bottle    Urine Culture - Urine, Urine, Clean Catch [[id number]]  (Abnormal)  (Susceptibility) Collected: 04/02/25 1017    Lab Status: Final result Specimen: Urine, Clean Catch Updated: 04/04/25 0945     Urine Culture >100,000 CFU/mL Escherichia coli ESBL    Narrative:      Colonization of the urinary tract without infection is common. Treatment is discouraged unless the patient is symptomatic, pregnant, or undergoing an invasive urologic procedure.  Recent outcomes data supports the use of pip/tazo in the treatment of susceptible ESBL infections for uncomplicated UTI. Consider use of pip/tazo as a carbapenem-sparing regimen in applicable patients.    Susceptibility        Escherichia coli ESBL      LEANN      Ciprofloxacin Resistant      Ertapenem Susceptible      Gentamicin Susceptible      Levofloxacin Resistant      Meropenem Susceptible       Nitrofurantoin Susceptible      Piperacillin + Tazobactam Susceptible      Trimethoprim + Sulfamethoxazole Resistant                           Respiratory Panel PCR w/COVID-19(SARS-CoV-2) [facility] In-House, NP Swab in UTM/VTM, 2 HR TAT - Swab, Nasopharynx [[id number]]  (Normal) Collected: 04/02/25 0756    Lab Status: Final result Specimen: Swab from Nasopharynx Updated: 04/02/25 0858     ADENOVIRUS, PCR Not Detected     Coronavirus 229E Not Detected     Coronavirus HKU1 Not Detected     Coronavirus NL63 Not Detected     Coronavirus OC43 Not Detected     COVID19 Not Detected     Human Metapneumovirus Not Detected     Human Rhinovirus/Enterovirus Not Detected     Influenza A PCR Not Detected     Influenza B PCR Not Detected     Parainfluenza Virus 1 Not Detected     Parainfluenza Virus 2 Not Detected     Parainfluenza Virus 3 Not Detected     Parainfluenza Virus 4 Not Detected     RSV, PCR Not Detected     Bordetella pertussis pcr Not Detected     Bordetella parapertussis PCR Not Detected     Chlamydophila pneumoniae PCR Not Detected     Mycoplasma pneumo by PCR Not Detected    Narrative:      In the setting of a positive respiratory panel with a viral infection PLUS a negative procalcitonin without other underlying concern for bacterial infection, consider observing off antibiotics or discontinuation of antibiotics and continue supportive care. If the respiratory panel is positive for atypical bacterial infection (Bordetella pertussis, Chlamydophila pneumoniae, or Mycoplasma pneumoniae), consider antibiotic de-escalation to target atypical bacterial infection.               Isolation:   Contact    Assessment    #ESBL E. coli septicemia  #Left pyelonephritis  #Left hydronephrosis status post cystoscopy and left ureteral stent insertion 4/2  #ESRD on hemodialysis  #Type 2 diabetes    Repeat blood cultures and obtain MRSA nares.  Chest x-ray with no acute changes.  Continue to follow fever curve.   Continue ertapenem 500 mg daily dosed for end-stage renal disease.  Continue prior end date of 4/16.          -----------------------------------------------------------------------------------------------  The above decisions regarding antimicrobials incorporates elements of engaging in complex medical decision-making associated with antimicrobial prescribing including considerations such as antimicrobial resistance patterns, emergence of new variants/strains, recent antibiotic exposure, interactions/complications from comorbidities including concurrent infections, public health considerations to minimize development of antimicrobial resistance, and emerging and re-emerging infections.

## 2025-04-09 NOTE — PLAN OF CARE
Goal Outcome Evaluation:  Plan of Care Reviewed With: patient           Outcome Evaluation: Pt had a code upgraded to rapid yesterday with respiratory arrest, transferred to ICU, responded well with O2, pt presents today with weakness, pain, and impaired functional mobility, she was assisted to edge of bed and stood at bedside, took BP readings, pt able to stand for a short time and returned to sitting with feeling weak, once seated edge of bed she began to feel light headed and was assisted to supine, she did have a significant drop in BP. Overall poor toleration of activity today. Pt will cont to benefit from PT and will progress as tolerated.

## 2025-04-09 NOTE — THERAPY EVALUATION
Patient Name: Kelly Pack  : 1970    MRN: [id number]                              Today's Date: 2025       Admit Date: 2025    Visit Dx:     ICD-10-CM ICD-9-CM   1. NSTEMI (non-ST elevated myocardial infarction)  I21.4 410.70   2. Sepsis without acute organ dysfunction, due to unspecified organism  A41.9 038.9     995.91   3. Emphysematous pyelonephritis of left kidney  N12 590.80   4. ESRD on dialysis  N18.6 585.6    Z99.2 V45.11   5. Hydronephrosis of left kidney  N13.30 591   6. Sepsis, due to unspecified organism, unspecified whether acute organ dysfunction present  A41.9 038.9     995.91     Patient Active Problem List   Diagnosis    Hyperlipidemia    Allergic rhinitis    Nephrotic range proteinuria    Asthma    Essential hypertension    Uncontrolled type 2 diabetes mellitus with hyperglycemia    Acute renal failure superimposed on stage 3a chronic kidney disease    Anasarca    Suspected sleep apnea    Anemia    Bilateral lower extremity edema    Elevated troponin    Acute renal failure superimposed on chronic kidney disease    Type 2 diabetes mellitus with chronic kidney disease    Symptomatic anemia    ESRD (end stage renal disease)    Sepsis    Emphysematous pyelonephritis of left kidney    Hydronephrosis of left kidney    NSTEMI (non-ST elevated myocardial infarction)    Class 2 severe obesity with serious comorbidity in adult     Past Medical History:   Diagnosis Date    Albuminuria     Allergic     Seasonal, grass, cats and some dogs    Allergic rhinitis     Asthma     allergy triggered    Bell's palsy     Cataract     Had surgery on both eyes    Cholelithiasis     Removed. Have bile reflux/ vomiting    CKD (chronic kidney disease)     Diabetes mellitus     Drug therapy     Endometrial cancer     HL (hearing loss)     In L ear    Hyperlipidemia     Hypertension     Low back pain     Migraine     Obesity     Peripheral neuropathy     Renal insufficiency     Right otitis media     Type II  diabetes mellitus     Visual impairment     Diabetic retinopathy. Oil in L eye due to retina detachment s.    Vitamin D deficiency      Past Surgical History:   Procedure Laterality Date    ARTERIOVENOUS FISTULA  05/2023    CARDIAC CATHETERIZATION N/A 4/6/2025    Procedure: Left Heart Cath;  Surgeon: Damian Nguyen MD;  Location: [facility] CATH INVASIVE LOCATION;  Service: Cardiology;  Laterality: N/A;    CARDIAC CATHETERIZATION N/A 4/6/2025    Procedure: Coronary angiography;  Surgeon: Damian Nguyen MD;  Location: [facility] CATH INVASIVE LOCATION;  Service: Cardiology;  Laterality: N/A;    CATARACT EXTRACTION      CHOLECYSTECTOMY      CYSTOSCOPY, RETROGRADE PYELOGRAM AND STENT INSERTION Left 4/2/2025    Procedure: CYSTOSCOPY RETROGRADE PYELOGRAM AND STENT INSERTION;  Surgeon: Tl Gray MD;  Location: [facility] OR;  Service: Urology;  Laterality: Left;    EYE SURGERY      NOV 2020    HYSTERECTOMY      due to cancer    INSERTION HEMODIALYSIS CATHETER Right 06/23/2023    Procedure: HEMODIALYSIS CATHETER INSERTION;  Surgeon: Mikael Mackay MD;  Location: [facility] OR;  Service: Vascular;  Laterality: Right;    OOPHORECTOMY      VITRECTOMY PARS PLANA Left 01/28/2020    Procedure: 25G VITRECTOMY-ENDO LASER;  Surgeon: Lazarus, Howard S., MD;  Location: [facility] MAIN OR;  Service: Ophthalmology      General Information       Row Name 04/09/25 1105          OT Time and Intention    Document Type evaluation  -JW     Mode of Treatment occupational therapy  -JW     Symptoms Noted During/After Treatment significant change in vital signs  -JW       Row Name 04/09/25 1104          General Information    Patient Profile Reviewed yes  -JW     Prior Level of Function independent:;ADL's;all household mobility  w/o AD  -JW     Existing Precautions/Restrictions fall;orthostatic hypotension  -JW     Barriers to Rehab medically complex  -JW       Row Name 04/09/25 1100          Living Environment    Current Living  Arrangements apartment  -     People in Home alone  -       Row Name 04/09/25 1105          Cognition    Orientation Status (Cognition) oriented x 4  -       Row Name 04/09/25 1105          Safety Issues/Impairments Affecting Functional Mobility    Impairments Affecting Function (Mobility) endurance/activity tolerance;strength;balance  -               User Key  (r) = Recorded By, (t) = Taken By, (c) = Cosigned By      Initials Name Provider Type     Sindy Duarte OT Occupational Therapist                     Mobility/ADL's       Row Name 04/09/25 1224          Bed Mobility    Bed Mobility supine-sit;sit-supine  -     Supine-Sit Sebastian (Bed Mobility) standby assist  -     Sit-Supine Sebastian (Bed Mobility) --  ModAx2 d/t hypotensive  -     Comment, (Bed Mobility) inc assist to return to bed d/t hypotension  -       Row Name 04/09/25 1224          Transfers    Transfers sit-stand transfer  -       Row Name 04/09/25 1224          Sit-Stand Transfer    Sit-Stand Sebastian (Transfers) minimum assist (75% patient effort);2 person assist  -     Comment, (Sit-Stand Transfer) HHA. Stands ~1 mins with CGA, returns to sitting EOB and becomes light headed. BP taken with significant drop in SBP  -       Row Name 04/09/25 1224          Functional Mobility    Functional Mobility- Comment lateral steps to HOB with Chema  -[facility] Name 04/09/25 1224          Activities of Daily Living    BADL Assessment/Intervention --  deferred d/t medical issues  -               User Key  (r) = Recorded By, (t) = Taken By, (c) = Cosigned By      Initials Name Provider Type     Sindy Duarte OT Occupational Therapist                   Obj/Interventions       Row Name 04/09/25 1227          Sensory Assessment (Somatosensory)    Sensory Assessment (Somatosensory) sensation intact  -       Row Name 04/09/25 1227          Vision Assessment/Intervention    Visual Impairment/Limitations WNL  -       Row Name  04/09/25 1227          Range of Motion Comprehensive    General Range of Motion bilateral upper extremity ROM WNL  -       Row Name 04/09/25 1227          Strength Comprehensive (MMT)    Comment, General Manual Muscle Testing (MMT) Assessment UE strength WFL  -       Row Name 04/09/25 1227          Motor Skills    Motor Skills functional endurance  -     Functional Endurance quick to faituge  -[facility] Name 04/09/25 1227          Balance    Static Sitting Balance standby assist  -JW     Dynamic Sitting Balance standby assist;contact guard  -JW     Position, Sitting Balance sitting edge of bed  -     Static Standing Balance minimal assist  -     Dynamic Standing Balance minimal assist  -JW     Position/Device Used, Standing Balance supported  -     Balance Interventions sitting  -JW               User Key  (r) = Recorded By, (t) = Taken By, (c) = Cosigned By      Initials Name Provider Type    Sindy Foster OT Occupational Therapist                   Goals/Plan       Row Name 04/09/25 1240          Transfer Goal 1 (OT)    Activity/Assistive Device (Transfer Goal 1, OT) transfers, all  -     Sylva Level/Cues Needed (Transfer Goal 1, OT) standby assist  -     Time Frame (Transfer Goal 1, OT) 2 weeks  -JW     Progress/Outcome (Transfer Goal 1, OT) goal ongoing  -[facility] Name 04/09/25 1240          Bathing Goal 1 (OT)    Activity/Device (Bathing Goal 1, OT) bathing skills, all  -JW     Sylva Level/Cues Needed (Bathing Goal 1, OT) minimum assist (75% or more patient effort)  -     Time Frame (Bathing Goal 1, OT) 2 weeks  -     Strategies/Barriers (Bathing Goal 1, OT) sitting EOB vs at sink  -     Progress/Outcomes (Bathing Goal 1, OT) goal ongoing  -[facility] Name 04/09/25 1240          Dressing Goal 1 (OT)    Activity/Device (Dressing Goal 1, OT) dressing skills, all  -     Sylva/Cues Needed (Dressing Goal 1, OT) modified independence  -     Time Frame  (Dressing Goal 1, OT) 2 weeks  -JW     Progress/Outcome (Dressing Goal 1, OT) goal ongoing  -       Row Name 04/09/25 1240          Toileting Goal 1 (OT)    Activity/Device (Toileting Goal 1, OT) toileting skills, all  -JW     Tyler Level/Cues Needed (Toileting Goal 1, OT) modified independence;standby assist  -JW     Time Frame (Toileting Goal 1, OT) 2 weeks  -JW     Progress/Outcome (Toileting Goal 1, OT) goal ongoing  -       Row Name 04/09/25 1240          Grooming Goal 1 (OT)    Activity/Device (Grooming Goal 1, OT) grooming skills, all  -JW     Tyler (Grooming Goal 1, OT) modified independence  -JW     Time Frame (Grooming Goal 1, OT) 2 weeks  -JW     Strategies/Barriers (Grooming Goal 1, OT) sitting/standing at sink  -JW     Progress/Outcome (Grooming Goal 1, OT) goal ongoing  -       Row Name 04/09/25 1240          Therapy Assessment/Plan (OT)    Planned Therapy Interventions (OT) activity tolerance training;functional balance retraining;occupation/activity based interventions;BADL retraining;patient/caregiver education/training;transfer/mobility retraining;strengthening exercise  -               User Key  (r) = Recorded By, (t) = Taken By, (c) = Cosigned By      Initials Name Provider Type    Sindy Foster OT Occupational Therapist                   Clinical Impression       Row Name 04/09/25 1227          Pain Assessment    Pretreatment Pain Rating 8/10  -     Posttreatment Pain Rating 8/10  -     Pain Location chest;shoulder  -     Pain Side/Orientation left  -     Pain Management Interventions positioning techniques utilized  -     Pre/Posttreatment Pain Comment reports pain from CPR  -       Row Name 04/09/25 1227          Plan of Care Review    Plan of Care Reviewed With patient  -     Outcome Evaluation Pt admitted with acute left-sided abdominal pain, N/V/D. Dx pyelonephritis s/p cystoscopy with stent. Hx ESRD on HD with recent missed dialysis, recent UTI.  Hospitalization complicated by PEA arrest requiring CPR 4/4 during dialysis, also respiratory rapid response 4/8. Pt now in ICU, RN nunu'ed OT/PT. Pt is typically independent with ADLs and fxl mobility w/o AD. OT assessment today limited d/t orthostatic hypotension. SBP= Sup: 133, sit: 174, stand: 96, sup: 133. She sits EOB with SBA, stands with Chema, increased assist to return to supine d/t feeling faint. Further mobility/ADLs deferred, RN notified. OT will cont to follow, anticipate d/c to rehab as she presents below functional baseline.  -       Row Name 04/09/25 1227          Therapy Assessment/Plan (OT)    Rehab Potential (OT) fair  -     Criteria for Skilled Therapeutic Interventions Met (OT) yes;skilled treatment is necessary  -     Therapy Frequency (OT) 5 times/wk  -       Row Name 04/09/25 1227          Therapy Plan Review/Discharge Plan (OT)    Anticipated Discharge Disposition (OT) inpatient rehabilitation facility  -       Row Name 04/09/25 1227          Vital Signs    Pre Systolic BP Rehab 133  -JW     Pre Treatment Diastolic BP 67  -JW     Intra Systolic BP Rehab 174  -JW     Intra Treatment Diastolic   -JW     Post Systolic BP Rehab 96  -JW     Post Treatment Diastolic BP 74  133/67 once returned to supine  -JW     Pre Patient Position Supine  -JW     Intra Patient Position Standing  -JW     Post Patient Position Supine  -       Row Name 04/09/25 1227          Positioning and Restraints    Pre-Treatment Position in bed  -JW     Post Treatment Position bed  -JW     In Bed notified nsg;call light within reach;encouraged to call for assist;exit alarm on  -JW               User Key  (r) = Recorded By, (t) = Taken By, (c) = Cosigned By      Initials Name Provider Type    Sindy Foster OT Occupational Therapist                   Outcome Measures       Row Name 04/09/25 1242          How much help from another is currently needed...    Putting on and taking off regular lower body  clothing? 2  -JW     Bathing (including washing, rinsing, and drying) 2  -JW     Toileting (which includes using toilet bed pan or urinal) 2  -JW     Putting on and taking off regular upper body clothing 3  -JW     Taking care of personal grooming (such as brushing teeth) 3  -JW     Eating meals 4  -JW     AM-PAC 6 Clicks Score (OT) 16  -JW       Row Name 04/09/25 0800          How much help from another person do you currently need...    Turning from your back to your side while in flat bed without using bedrails? 3  -KH     Moving from lying on back to sitting on the side of a flat bed without bedrails? 3  -KH     Moving to and from a bed to a chair (including a wheelchair)? 2  -KH     Standing up from a chair using your arms (e.g., wheelchair, bedside chair)? 2  -KH     Climbing 3-5 steps with a railing? 1  -KH     To walk in hospital room? 2  -KH     AM-PAC 6 Clicks Score (PT) 13  -KH     Highest Level of Mobility Goal 4 --> Transfer to chair/commode  -       Row Name 04/09/25 1242          Functional Assessment    Outcome Measure Options AM-PAC 6 Clicks Daily Activity (OT)  -               User Key  (r) = Recorded By, (t) = Taken By, (c) = Cosigned By      Initials Name Provider Type    Sindy Foster OT Occupational Therapist    Pilar Avitia, RN Registered Nurse                    Occupational Therapy Education       Title: PT OT SLP Therapies (In Progress)       Topic: Occupational Therapy (In Progress)       Point: ADL training (Done)       Learning Progress Summary            Patient Acceptance, E, VU by KIARA at 4/9/2025 1242                      Point: Precautions (Done)       Learning Progress Summary            Patient Acceptance, E, VU by KIARA at 4/9/2025 1242                      Point: Body mechanics (Done)       Learning Progress Summary            Patient Acceptance, E, VU by KIARA at 4/9/2025 1242                                      User Key       Initials Effective Dates Name Provider Type  Discipline     06/10/21 -  Sindy Duarte OT Occupational Therapist OT                  OT Recommendation and Plan  Planned Therapy Interventions (OT): activity tolerance training, functional balance retraining, occupation/activity based interventions, BADL retraining, patient/caregiver education/training, transfer/mobility retraining, strengthening exercise  Therapy Frequency (OT): 5 times/wk  Plan of Care Review  Plan of Care Reviewed With: patient  Outcome Evaluation: Pt admitted with acute left-sided abdominal pain, N/V/D. Dx pyelonephritis s/p cystoscopy with stent. Hx ESRD on HD with recent missed dialysis, recent UTI. Hospitalization complicated by PEA arrest requiring CPR 4/4 during dialysis, also respiratory rapid response 4/8. Pt now in ICU, RN nunu'ed OT/PT. Pt is typically independent with ADLs and fxl mobility w/o AD. OT assessment today limited d/t orthostatic hypotension. SBP= Sup: 133, sit: 174, stand: 96, sup: 133. She sits EOB with SBA, stands with Chema, increased assist to return to supine d/t feeling faint. Further mobility/ADLs deferred, RN notified. OT will cont to follow, anticipate d/c to rehab as she presents below functional baseline.     Time Calculation:   Evaluation Complexity (OT)  Review Occupational Profile/Medical/Therapy History Complexity: expanded/moderate complexity  Assessment, Occupational Performance/Identification of Deficit Complexity: 3-5 performance deficits  Clinical Decision Making Complexity (OT): detailed assessment/moderate complexity  Overall Complexity of Evaluation (OT): moderate complexity     Time Calculation- OT       Row Name 04/09/25 1243             Time Calculation- OT    OT Start Time 0958  -      OT Stop Time 1020  -      OT Time Calculation (min) 22 min  -      Total Timed Code Minutes- OT 12 minute(s)  -      OT Received On 04/09/25  -      OT - Next Appointment 04/10/25  -      OT Goal Re-Cert Due Date 04/23/25  -         Timed Charges     14424 - OT Therapeutic Activity Minutes 12  -JW         Untimed Charges    OT Eval/Re-eval Minutes 10  -JW         Total Minutes    Timed Charges Total Minutes 12  -JW      Untimed Charges Total Minutes 10  -JW       Total Minutes 22  -JW                User Key  (r) = Recorded By, (t) = Taken By, (c) = Cosigned By      Initials Name Provider Type    Sindy Foster OT Occupational Therapist                  Therapy Charges for Today       Code Description Service Date Service Provider Modifiers Qty    [id number]  OT THERAPEUTIC ACT EA 15 MIN 4/9/2025 Sindy Duarte OT GO 1    [id number]  OT EVAL MOD COMPLEXITY 3 4/9/2025 Sindy Duarte OT GO 1                 Sindy Duarte OT  4/9/2025

## 2025-04-09 NOTE — NURSING NOTE
Morning labs of Blood cultures, renal function panel, Apolipoprotein were not drawn due to multiple RN and phlebotomist attempts. Pt refused any further attempts until someone with ultrasound could attempt. This RN paged IV therapy team twice to have them come assess and draw labs. IV team has not responded or seen patient. Info passed along to MD earlier this shift and info passed along to nightshift RN.

## 2025-04-09 NOTE — PROGRESS NOTES
"  Daily Progress Note.   [facility] INTENSIVE CARE  4/9/2025    Patient:  Name:  Kelly Pack  MRN:  [id number]  1970  54 y.o.  female         CC: pea arrest    Interval History:  Events overnight reviewed.  Discussed with nursing.  Reviewed Dr. Weller's note.  Discussed with patient.  She does not remember events post dialysis very well at all.  She is awake alert at present time some chest discomfort from chest compressions otherwise no acute issues.  Tolerating a diet     Physical Exam:  /79   Pulse 77   Temp 98.2 °F (36.8 °C) (Oral)   Resp 20   Ht 175.3 cm (69.02\")   Wt 112 kg (247 lb 2.2 oz)   SpO2 100%   BMI 36.48 kg/m²   Body mass index is 36.48 kg/m².    Intake/Output Summary (Last 24 hours) at 4/9/2025 0836  Last data filed at 4/9/2025 0500  Gross per 24 hour   Intake 366 ml   Output --   Net 366 ml     General appearance: Nontoxic, conversant   Eyes: anicteric sclerae, moist conjunctivae; no lidlag;    HENT: Atraumatic; oropharynx clear with moist mucous membranes    Neck: Trachea midline;  supple   Lungs: CTA, with mildly limited respiratory effort and no intercostal retractions right IJ tunneled HD cath  CV: RRR, no rub   Abdomen: Soft, truncal obesity; BS+  Extremities: No peripheral edema positive for right upper extremity HD access good thrill  Skin: WWP no diffuse visible rash  Psych: Appropriate affect, alert   Neuro: Moves all ext. CN II-XII. Speech intact.    Data Review:  Notable Labs:  Results from last 7 days   Lab Units 04/09/25  0829 04/08/25  1711 04/08/25  0532 04/07/25  0503 04/06/25  0405 04/05/25  0747 04/04/25  1747 04/04/25  0628   WBC 10*3/mm3 20.95*  --  15.31* 16.65* 16.79* 13.20* 17.21* 16.84*   HEMOGLOBIN g/dL 9.1*  --  8.9* 8.0* 7.9* 8.9* 8.8* 9.4*   HEMOGLOBIN, POC g/dL  --  9.0*  --   --   --   --   --   --    PLATELETS 10*3/mm3 268  --  246 309 252 264 249 277     Results from last 7 days   Lab Units 04/09/25  0829 04/08/25  1711 " 04/08/25  0532 04/07/25  0503 04/06/25  0405 04/05/25  0747 04/04/25  1554 04/04/25  0628 04/03/25  0619   SODIUM mmol/L 129*  --  134* 136 135* 134* 132* 132* 130*   POTASSIUM mmol/L 4.7  --  4.1 4.0 3.6 4.1 4.2 4.9 4.1   CHLORIDE mmol/L 94*  --  95* 99 97* 95* 93* 91* 91*   CO2 mmol/L 20.3*  --  21.8* 22.4 23.0 21.4* 17.9* 19.3* 23.1   BUN mg/dL 42*  --  72* 66* 60* 91* 81* 71* 46*   CREATININE mg/dL 6.61* 5.61* 10.74* 8.60* 6.85* 9.34* 8.56* 8.42* 6.52*   GLUCOSE mg/dL 124*  --  104* 118* 139* 100* 307* 327* 302*   CALCIUM mg/dL 8.6  --  8.2* 8.3* 8.0* 8.2* 8.5* 8.3* 8.1*   MAGNESIUM mg/dL  --   --  2.9*  --   --   --   --   --  2.3   PHOSPHORUS mg/dL  --   --  5.7*  --   --   --   --   --  4.5   Estimated Creatinine Clearance: 15.6 mL/min (A) (by C-G formula based on SCr of 5.61 mg/dL (H)).    Results from last 7 days   Lab Units 04/09/25  0829 04/08/25  1711 04/08/25  0532 04/07/25  0503 04/05/25  0747 04/05/25  0004 04/04/25  2102   LACTATE mmol/L  --  1.7  --   --   --  1.6 2.4*   PLATELETS 10*3/mm3 268  --  376 309   < >  --   --     < > = values in this interval not displayed.       Results from last 7 days   Lab Units 04/08/25  1711 04/04/25  1541   PH, ARTERIAL pH units 7.372 7.385   PCO2, ARTERIAL mm Hg 48.2* 37.6   PO2 ART mm Hg 213.3* 538.3*   HCO3 ART mmol/L 28.0 22.5       Imaging:  Reviewed chest images personally from past 3 days    ASSESSMENT  /  PLAN:  1) ESRD - on TTS HD, CODE BLUE with dialysis on Friday, completed a full dialysis treatment on 4/5, no UF at that time.  2) Abdominal pain  3) L hydronephrosis with emphysematous pyelonephritis, status post cystoscopy/stent on 4/2  4) DM  5) Hypotension - on Midodrine on non-HD days but currently normotensive  6) Metabolic acidosis, improved  7) Anemia of CKD  8) ESBL UTI/Pyelo  9) Bacteremia  10) s/p cardiac arrest: bradycardia with subsequent PEA.       Monitor in icu  Ask cards to revisit  Fever overnight, ask ID to reevaluate  Dw nephrology   Abel  Ct angio for completeness    Monitor on telemetry  Dc norco  Use Tylenol prn Pain    Resend morning labs reviewed    Maintain in ICU.    All issues new to me today.  Prior hospital course, labs and imaging reviewed.    Plan of care and patient course was reviewed with multidisciplinary team in morning rounds.      Electronically signed by Rodolfo Jason MD, 04/09/25, 8:36 AM EDT.  [facility]

## 2025-04-09 NOTE — PROGRESS NOTES
"Kelly Pack  1970 54 y.o.  [id number]      Patient Care Team:  Kim Benjamin APRN as PCP - General (Nurse Practitioner)  Miguel Stahl MD as Consulting Physician (Hematology and Oncology)  Radha Martinez APRN as Referring Physician (Nurse Practitioner)    CC: Diabetes, end-stage renal disease on dialysis, PEA arrest on dialysis.  Markedly abnormal ECG and elevated troponins consistent with a non-ST elevation MI, EF 45 to 50%    Interval History: She had dialysis yesterday was doing okay had little bit of chest pain probably from her CPR got a narcotic pain pill and then after that was found unresponsive with lowish sats and a rapid response was called.  No evidence of arrhythmia she was transferred to the ICU.  Today she is feeling fine not short of breath blood pressures have been okay    Objective   Vital Signs  Temp:  [97.4 °F (36.3 °C)-103.1 °F (39.5 °C)] 98.2 °F (36.8 °C)  Heart Rate:  [59-90] 80  Resp:  [14-20] 20  BP: ()/() 133/67    Intake/Output Summary (Last 24 hours) at 4/9/2025 1224  Last data filed at 4/9/2025 1000  Gross per 24 hour   Intake 566 ml   Output --   Net 566 ml     Flowsheet Rows      Flowsheet Row First Filed Value   Admission Height 175.3 cm (69\") Documented at 04/02/2025 1049   Admission Weight 110 kg (243 lb) Documented at 04/02/2025 1049            Physical Exam:   General Appearance:    Alert,oriented, in no acute distress   Lungs:     Clear to auscultation,BS are equal    Heart:    Normal S1 and S2, RRR without murmur, gallop or rub   HEENT:    Sclerae are clear, no JVD or adenopathy   Abdomen:     Normal bowel sounds, soft nontender, nondistended, no HSM   Extremities:   Moves all extremities well, no edema, no cyanosis, no             Redness, no rash     Medication Review:      aspirin, 81 mg, Oral, Daily  atorvastatin, 40 mg, Oral, Nightly  epoetin kindra/kindra-epbx, 10,000 Units, Intravenous, Once per day on Tuesday Thursday Saturday  ertapenem, " 500 mg, Intravenous, Q24H  fluticasone, 2 spray, Each Nare, Daily  insulin glargine, 13 Units, Subcutaneous, Nightly  insulin lispro, 2-7 Units, Subcutaneous, 4x Daily AC & at Bedtime  insulin lispro, 3 Units, Subcutaneous, TID With Meals  lanthanum, 2,000 mg, Oral, BID With Meals  Lidocaine, 1 patch, Transdermal, Q24H  midodrine, 5 mg, Oral, Once per day on Monday Tuesday Thursday Saturday  mupirocin, 1 Application, Each Nare, BID  sodium chloride, 10 mL, Intravenous, Q12H  sodium chloride, 10 mL, Intravenous, Q12H  sodium chloride, 10 mL, Intravenous, Q12H  sodium chloride, 10 mL, Intravenous, Q12H  sodium chloride, 10 mL, Intravenous, Q12H  cyanocobalamin, 1,000 mcg, Oral, Daily  vitamin D, 50,000 Units, Oral, Once per day on Monday Thursday               I reviewed the patient's new clinical results.  I personally viewed and interpreted the patient's EKG/Telemetry data    Assessment/Plan  Active Hospital Problems    Diagnosis  POA    **Sepsis [A41.9]  Yes    Class 2 severe obesity with serious comorbidity in adult [E66.812, E66.01]  Yes    NSTEMI (non-ST elevated myocardial infarction) [I21.4]  Unknown    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     She had a PEA arrest last week that I think was likely just due to hypovolemia and hypotension on dialysis we ended up doing a cath on her and she has mild nonobstructive coronary disease.  Her EF is about 45%.  She does have some nonspecific ECG changes and that has not changed on her EKG today.  I do not see anything that indicates an arrhythmia or a cardiac problem causing her rapid response I really think it may have  been the narcotic medicine suppressing her respiratory drive potentially at this point we will follow along but I do not think this is a primary cardiac issue  Damian Nguyen MD  04/09/25  12:24 EDT

## 2025-04-09 NOTE — PLAN OF CARE
Problem: Adult Inpatient Plan of Care  Goal: Patient-Specific Goal (Individualized)  Outcome: Progressing  Goal: Absence of Hospital-Acquired Illness or Injury  Outcome: Progressing  Intervention: Identify and Manage Fall Risk  Recent Flowsheet Documentation  Taken 4/9/2025 1800 by Pilar Casillas RN  Safety Promotion/Fall Prevention:   safety round/check completed   fall prevention program maintained  Taken 4/9/2025 1700 by Pilar Casillas RN  Safety Promotion/Fall Prevention:   safety round/check completed   fall prevention program maintained  Taken 4/9/2025 1600 by Pilar Casillas RN  Safety Promotion/Fall Prevention:   safety round/check completed   fall prevention program maintained  Taken 4/9/2025 1500 by Pilar Casillas RN  Safety Promotion/Fall Prevention:   safety round/check completed   fall prevention program maintained  Taken 4/9/2025 1400 by Pilar Casillas RN  Safety Promotion/Fall Prevention: safety round/check completed  Taken 4/9/2025 1300 by Pilar Casillas RN  Safety Promotion/Fall Prevention: safety round/check completed  Taken 4/9/2025 1200 by Pilar Casillas RN  Safety Promotion/Fall Prevention: safety round/check completed  Taken 4/9/2025 1100 by Pilar Casillas RN  Safety Promotion/Fall Prevention:   safety round/check completed   fall prevention program maintained  Taken 4/9/2025 1000 by Pilar Casillas RN  Safety Promotion/Fall Prevention:   safety round/check completed   fall prevention program maintained  Taken 4/9/2025 0900 by Pilar Casillas RN  Safety Promotion/Fall Prevention:   safety round/check completed   fall prevention program maintained  Taken 4/9/2025 0800 by Pilar Casillas RN  Safety Promotion/Fall Prevention:   safety round/check completed   fall prevention program maintained  Intervention: Prevent Skin Injury  Recent Flowsheet Documentation  Taken 4/9/2025 1800 by Pilar Casillas RN  Body Position: position changed independently  Taken 4/9/2025 1600 by Pilar Casillas  RN  Body Position: position changed independently  Skin Protection: incontinence pads utilized  Taken 4/9/2025 1400 by Pilar Casillas RN  Body Position: position changed independently  Taken 4/9/2025 1200 by Pilar Casillas RN  Body Position: position changed independently  Skin Protection: incontinence pads utilized  Taken 4/9/2025 1000 by Pilar Casillas RN  Body Position:   weight shifting   position changed independently  Taken 4/9/2025 0800 by Pilar Casillas RN  Body Position: position changed independently  Skin Protection: incontinence pads utilized  Intervention: Prevent and Manage VTE (Venous Thromboembolism) Risk  Recent Flowsheet Documentation  Taken 4/9/2025 1200 by Pilar Casillas RN  VTE Prevention/Management: patient refused intervention  Taken 4/9/2025 0800 by Pilar Casillas RN  VTE Prevention/Management: patient refused intervention  Intervention: Prevent Infection  Recent Flowsheet Documentation  Taken 4/9/2025 1800 by Pilar Casillas RN  Infection Prevention:   single patient room provided   rest/sleep promoted   personal protective equipment utilized   hand hygiene promoted  Taken 4/9/2025 1700 by Pilar Casillas RN  Infection Prevention:   single patient room provided   rest/sleep promoted   personal protective equipment utilized   hand hygiene promoted  Taken 4/9/2025 1600 by Pilar Casillas RN  Infection Prevention:   single patient room provided   rest/sleep promoted   personal protective equipment utilized   hand hygiene promoted  Taken 4/9/2025 1500 by Pilar Casillas RN  Infection Prevention:   single patient room provided   rest/sleep promoted   personal protective equipment utilized   hand hygiene promoted  Taken 4/9/2025 1400 by Pilar Casillas RN  Infection Prevention:   single patient room provided   rest/sleep promoted   personal protective equipment utilized   hand hygiene promoted  Taken 4/9/2025 1300 by Pilar aCsillas RN  Infection Prevention:   single patient room provided    rest/sleep promoted   personal protective equipment utilized   hand hygiene promoted  Taken 4/9/2025 1200 by Pilar Casillas RN  Infection Prevention:   single patient room provided   rest/sleep promoted   personal protective equipment utilized   hand hygiene promoted  Taken 4/9/2025 1100 by Pilar Casillas RN  Infection Prevention:   single patient room provided   rest/sleep promoted   personal protective equipment utilized   hand hygiene promoted  Taken 4/9/2025 1000 by Pilar Casillas RN  Infection Prevention:   single patient room provided   hand hygiene promoted   personal protective equipment utilized   rest/sleep promoted  Taken 4/9/2025 0900 by Pilar Casillas RN  Infection Prevention:   rest/sleep promoted   personal protective equipment utilized   hand hygiene promoted   single patient room provided  Taken 4/9/2025 0800 by Pilar Casillas RN  Infection Prevention:   single patient room provided   rest/sleep promoted   personal protective equipment utilized   hand hygiene promoted  Goal: Optimal Comfort and Wellbeing  Outcome: Progressing  Intervention: Monitor Pain and Promote Comfort  Recent Flowsheet Documentation  Taken 4/9/2025 1759 by Pilar Casillas RN  Pain Management Interventions:   pain medication given   position adjusted  Taken 4/9/2025 1700 by Pilar Casillas RN  Pain Management Interventions:   medication offered but refused   pain management plan reviewed with patient/caregiver   pillow support provided  Taken 4/9/2025 1600 by Pilar Casillas RN  Pain Management Interventions:   medication offered but refused   pillow support provided   position adjusted  Taken 4/9/2025 1500 by Pilar Casillas RN  Pain Management Interventions:   pain management plan reviewed with patient/caregiver   medication offered but refused   pillow support provided   relaxation techniques promoted   quiet environment facilitated  Taken 4/9/2025 1400 by Pilar Casillas RN  Pain Management Interventions:   pillow  support provided   position adjusted   medication offered but refused  Taken 4/9/2025 1300 by Pilar Casillas RN  Pain Management Interventions:   pillow support provided   pain medication given  Taken 4/9/2025 1200 by Pilar Casillas RN  Pain Management Interventions:   pillow support provided   position adjusted  Taken 4/9/2025 1100 by Pilar Casillas RN  Pain Management Interventions:   pillow support provided   position adjusted   medication offered but refused  Taken 4/9/2025 1000 by Pilar Casillas RN  Pain Management Interventions: no interventions per patient request  Taken 4/9/2025 0900 by Pilar Casillas RN  Pain Management Interventions: no interventions per patient request  Taken 4/9/2025 0800 by Pilar Casillas RN  Pain Management Interventions: (scheduled Lidocaine patch applied)   pain management plan reviewed with patient/caregiver   pain medication given  Intervention: Provide Person-Centered Care  Recent Flowsheet Documentation  Taken 4/9/2025 1600 by Pilar Casillas RN  Trust Relationship/Rapport:   choices provided   care explained   emotional support provided   empathic listening provided   questions answered   questions encouraged   reassurance provided   thoughts/feelings acknowledged  Taken 4/9/2025 1200 by Pilar Casillas RN  Trust Relationship/Rapport:   care explained   choices provided   emotional support provided   empathic listening provided   questions encouraged   questions answered   reassurance provided   thoughts/feelings acknowledged  Taken 4/9/2025 0800 by Pilar Casillas RN  Trust Relationship/Rapport:   care explained   choices provided   emotional support provided   empathic listening provided   questions answered   questions encouraged   reassurance provided   thoughts/feelings acknowledged  Goal: Readiness for Transition of Care  Outcome: Progressing     Problem: Fall Injury Risk  Goal: Absence of Fall and Fall-Related Injury  Outcome: Progressing  Intervention: Identify and  Manage Contributors  Recent Flowsheet Documentation  Taken 4/9/2025 1600 by Pilar Casillas RN  Self-Care Promotion: independence encouraged  Taken 4/9/2025 1200 by Pilar Casillas RN  Self-Care Promotion: independence encouraged  Taken 4/9/2025 0800 by Pilar Casillas RN  Medication Review/Management: medications reviewed  Self-Care Promotion: independence encouraged  Intervention: Promote Injury-Free Environment  Recent Flowsheet Documentation  Taken 4/9/2025 1800 by Pilar Casillas RN  Safety Promotion/Fall Prevention:   safety round/check completed   fall prevention program maintained  Taken 4/9/2025 1700 by Pilar Casillas RN  Safety Promotion/Fall Prevention:   safety round/check completed   fall prevention program maintained  Taken 4/9/2025 1600 by Pilar Casillas RN  Safety Promotion/Fall Prevention:   safety round/check completed   fall prevention program maintained  Taken 4/9/2025 1500 by Pilar Casillas RN  Safety Promotion/Fall Prevention:   safety round/check completed   fall prevention program maintained  Taken 4/9/2025 1400 by Pilar Casillas RN  Safety Promotion/Fall Prevention: safety round/check completed  Taken 4/9/2025 1300 by Pilar Casillas RN  Safety Promotion/Fall Prevention: safety round/check completed  Taken 4/9/2025 1200 by Pilar Casillas RN  Safety Promotion/Fall Prevention: safety round/check completed  Taken 4/9/2025 1100 by Pilar Casillas RN  Safety Promotion/Fall Prevention:   safety round/check completed   fall prevention program maintained  Taken 4/9/2025 1000 by Pilar Casillas RN  Safety Promotion/Fall Prevention:   safety round/check completed   fall prevention program maintained  Taken 4/9/2025 0900 by Pilar Casillas RN  Safety Promotion/Fall Prevention:   safety round/check completed   fall prevention program maintained  Taken 4/9/2025 0800 by Pilar Casillas RN  Safety Promotion/Fall Prevention:   safety round/check completed   fall prevention program maintained      Problem: Sepsis/Septic Shock  Goal: Optimal Coping  Outcome: Progressing  Intervention: Support Patient and Family Response  Recent Flowsheet Documentation  Taken 4/9/2025 1600 by Pilar Casillas RN  Supportive Measures:   active listening utilized   self-care encouraged   relaxation techniques promoted  Taken 4/9/2025 1200 by Pilar Casillas RN  Supportive Measures:   active listening utilized   relaxation techniques promoted   problem-solving facilitated  Taken 4/9/2025 0800 by Pilar Casillas RN  Supportive Measures:   active listening utilized   verbalization of feelings encouraged   self-care encouraged   relaxation techniques promoted  Goal: Absence of Bleeding  Outcome: Progressing  Intervention: Monitor and Manage Bleeding  Recent Flowsheet Documentation  Taken 4/9/2025 0800 by Pilar Casillas RN  Bleeding Precautions: blood pressure closely monitored  Goal: Blood Glucose Level Within Target Range  Outcome: Progressing  Intervention: Optimize Glycemic Control  Recent Flowsheet Documentation  Taken 4/9/2025 1600 by Pilar Casillas RN  Hyperglycemia Management: blood glucose monitored  Hypoglycemia Management: blood glucose monitored  Taken 4/9/2025 1200 by Pilar Casillas RN  Hyperglycemia Management: blood glucose monitored  Hypoglycemia Management: blood glucose monitored  Taken 4/9/2025 0800 by Pilar Casillas RN  Hyperglycemia Management: blood glucose monitored  Hypoglycemia Management: blood glucose monitored  Goal: Absence of Infection Signs and Symptoms  Outcome: Progressing  Intervention: Initiate Sepsis Management  Recent Flowsheet Documentation  Taken 4/9/2025 1800 by Pilar Casillas RN  Infection Prevention:   single patient room provided   rest/sleep promoted   personal protective equipment utilized   hand hygiene promoted  Isolation Precautions:   precautions maintained   contact  Taken 4/9/2025 1700 by Pilar Casillas RN  Infection Prevention:   single patient room provided   rest/sleep  promoted   personal protective equipment utilized   hand hygiene promoted  Isolation Precautions:   precautions maintained   contact  Taken 4/9/2025 1600 by Pilar Casillas RN  Infection Prevention:   single patient room provided   rest/sleep promoted   personal protective equipment utilized   hand hygiene promoted  Isolation Precautions:   precautions maintained   contact  Taken 4/9/2025 1500 by Pilar Casillas RN  Infection Prevention:   single patient room provided   rest/sleep promoted   personal protective equipment utilized   hand hygiene promoted  Isolation Precautions:   contact   precautions maintained  Taken 4/9/2025 1400 by Pilar Casillas RN  Infection Prevention:   single patient room provided   rest/sleep promoted   personal protective equipment utilized   hand hygiene promoted  Taken 4/9/2025 1300 by Pilar Casillas RN  Infection Prevention:   single patient room provided   rest/sleep promoted   personal protective equipment utilized   hand hygiene promoted  Isolation Precautions:   precautions maintained   contact  Taken 4/9/2025 1200 by Pilar Casillas RN  Infection Prevention:   single patient room provided   rest/sleep promoted   personal protective equipment utilized   hand hygiene promoted  Isolation Precautions:   precautions maintained   contact  Taken 4/9/2025 1100 by Pilar Casillas RN  Infection Management: cultures obtained and sent to lab  Infection Prevention:   single patient room provided   rest/sleep promoted   personal protective equipment utilized   hand hygiene promoted  Isolation Precautions:   precautions maintained   contact  Taken 4/9/2025 1000 by Pilar Casillas RN  Infection Prevention:   single patient room provided   hand hygiene promoted   personal protective equipment utilized   rest/sleep promoted  Isolation Precautions:   precautions maintained   contact  Taken 4/9/2025 0900 by Pilar Casillas RN  Infection Prevention:   rest/sleep promoted   personal protective  equipment utilized   hand hygiene promoted   single patient room provided  Isolation Precautions:   precautions maintained   contact  Taken 4/9/2025 0800 by Pilar Casillas RN  Infection Prevention:   single patient room provided   rest/sleep promoted   personal protective equipment utilized   hand hygiene promoted  Isolation Precautions:   precautions maintained   contact  Intervention: Promote Stabilization  Recent Flowsheet Documentation  Taken 4/9/2025 1600 by Pilar Casillas RN  Fluid/Electrolyte Management: fluids provided  Taken 4/9/2025 1200 by Pilar Casillas RN  Fluid/Electrolyte Management: fluids provided  Taken 4/9/2025 0800 by Pilar Casillas RN  Fluid/Electrolyte Management: fluids provided  Intervention: Promote Recovery  Recent Flowsheet Documentation  Taken 4/9/2025 1600 by Pilar Casillas RN  Sleep/Rest Enhancement:   noise level reduced   room darkened   relaxation techniques promoted  Taken 4/9/2025 1000 by Pilar Casillas RN  Activity Management: sitting, edge of bed  Taken 4/9/2025 0800 by Pilar Casillas RN  Activity Management: dorsiflexion/plantar flexion performed  Airway/Ventilation Management: oxygen therapy provided  Sleep/Rest Enhancement:   relaxation techniques promoted   regular sleep/rest pattern promoted  Goal: Optimal Nutrition Delivery  Outcome: Progressing     Problem: Skin Injury Risk Increased  Goal: Skin Health and Integrity  Outcome: Progressing  Intervention: Optimize Skin Protection  Recent Flowsheet Documentation  Taken 4/9/2025 1800 by Pilar Casillas RN  Head of Bed (HOB) Positioning: HOB at 60 degrees  Taken 4/9/2025 1600 by Pilar Casillas RN  Pressure Reduction Techniques:   frequent weight shift encouraged   heels elevated off bed   positioned off wounds   weight shift assistance provided  Head of Bed (HOB) Positioning: HOB at 60 degrees  Pressure Reduction Devices: specialty bed utilized  Skin Protection: incontinence pads utilized  Taken 4/9/2025 1400 by Sonja  VICTORIA Quezada  Head of Bed (HOB) Positioning: HOB at 60 degrees  Taken 4/9/2025 1200 by Pilar Casillas RN  Pressure Reduction Techniques:   frequent weight shift encouraged   heels elevated off bed   pressure points protected   weight shift assistance provided  Head of Bed (HOB) Positioning: HOB at 60 degrees  Pressure Reduction Devices: specialty bed utilized  Skin Protection: incontinence pads utilized  Taken 4/9/2025 1000 by Pilar Casillas RN  Activity Management: sitting, edge of bed  Head of Bed (HOB) Positioning: HOB at 60 degrees  Taken 4/9/2025 0800 by Pilar Casillas RN  Activity Management: dorsiflexion/plantar flexion performed  Pressure Reduction Techniques:   frequent weight shift encouraged   heels elevated off bed   pressure points protected   weight shift assistance provided  Head of Bed (HOB) Positioning: HOB at 60 degrees  Pressure Reduction Devices: specialty bed utilized  Skin Protection: incontinence pads utilized     Problem: Pain Acute  Goal: Optimal Pain Control and Function  Outcome: Progressing  Intervention: Optimize Psychosocial Wellbeing  Recent Flowsheet Documentation  Taken 4/9/2025 1600 by Pilar Casillas RN  Supportive Measures:   active listening utilized   self-care encouraged   relaxation techniques promoted  Taken 4/9/2025 1200 by Pilar Casillas RN  Supportive Measures:   active listening utilized   relaxation techniques promoted   problem-solving facilitated  Taken 4/9/2025 0800 by Pilar Casillas RN  Supportive Measures:   active listening utilized   verbalization of feelings encouraged   self-care encouraged   relaxation techniques promoted  Intervention: Develop Pain Management Plan  Recent Flowsheet Documentation  Taken 4/9/2025 1759 by Pilar Casillas RN  Pain Management Interventions:   pain medication given   position adjusted  Taken 4/9/2025 1700 by Pilar Casillas RN  Pain Management Interventions:   medication offered but refused   pain management plan reviewed with  patient/caregiver   pillow support provided  Taken 4/9/2025 1600 by Pilar Casillas RN  Pain Management Interventions:   medication offered but refused   pillow support provided   position adjusted  Taken 4/9/2025 1500 by Pilar Casillas RN  Pain Management Interventions:   pain management plan reviewed with patient/caregiver   medication offered but refused   pillow support provided   relaxation techniques promoted   quiet environment facilitated  Taken 4/9/2025 1400 by Pilar Casillas RN  Pain Management Interventions:   pillow support provided   position adjusted   medication offered but refused  Taken 4/9/2025 1300 by Pilar Casillas RN  Pain Management Interventions:   pillow support provided   pain medication given  Taken 4/9/2025 1200 by Pilar Casillas RN  Pain Management Interventions:   pillow support provided   position adjusted  Taken 4/9/2025 1100 by Pilar Casillas RN  Pain Management Interventions:   pillow support provided   position adjusted   medication offered but refused  Taken 4/9/2025 1000 by Pilar Casillas RN  Pain Management Interventions: no interventions per patient request  Taken 4/9/2025 0900 by Pilar Casillas RN  Pain Management Interventions: no interventions per patient request  Taken 4/9/2025 0800 by Pilar Casilals RN  Pain Management Interventions: (scheduled Lidocaine patch applied)   pain management plan reviewed with patient/caregiver   pain medication given  Intervention: Prevent or Manage Pain  Recent Flowsheet Documentation  Taken 4/9/2025 1600 by Pilar Casillas RN  Sensory Stimulation Regulation:   care clustered   quiet environment promoted  Sleep/Rest Enhancement:   noise level reduced   room darkened   relaxation techniques promoted  Taken 4/9/2025 0800 by Pilar Casillas RN  Sensory Stimulation Regulation:   care clustered   quiet environment promoted   television on  Sleep/Rest Enhancement:   relaxation techniques promoted   regular sleep/rest pattern  promoted  Medication Review/Management: medications reviewed     Problem: Hemodialysis  Goal: Safe, Effective Therapy Delivery  Outcome: Progressing  Intervention: Optimize Device Care and Function  Recent Flowsheet Documentation  Taken 4/9/2025 0800 by Pilar Casillas RN  Medication Review/Management: medications reviewed  Goal: Effective Tissue Perfusion  Outcome: Progressing  Goal: Absence of Infection Signs and Symptoms  Outcome: Progressing  Intervention: Prevent or Manage Infection  Recent Flowsheet Documentation  Taken 4/9/2025 1800 by Pilar Casillas RN  Infection Prevention:   single patient room provided   rest/sleep promoted   personal protective equipment utilized   hand hygiene promoted  Taken 4/9/2025 1700 by Pilar Casillas RN  Infection Prevention:   single patient room provided   rest/sleep promoted   personal protective equipment utilized   hand hygiene promoted  Taken 4/9/2025 1600 by Pilar Casillas RN  Infection Prevention:   single patient room provided   rest/sleep promoted   personal protective equipment utilized   hand hygiene promoted  Taken 4/9/2025 1500 by Pilar Casillas RN  Infection Prevention:   single patient room provided   rest/sleep promoted   personal protective equipment utilized   hand hygiene promoted  Taken 4/9/2025 1400 by Pilar Casillas RN  Infection Prevention:   single patient room provided   rest/sleep promoted   personal protective equipment utilized   hand hygiene promoted  Taken 4/9/2025 1300 by Pilar Casillas RN  Infection Prevention:   single patient room provided   rest/sleep promoted   personal protective equipment utilized   hand hygiene promoted  Taken 4/9/2025 1200 by Pilar Casillas RN  Infection Prevention:   single patient room provided   rest/sleep promoted   personal protective equipment utilized   hand hygiene promoted  Taken 4/9/2025 1100 by Pilar Casillas RN  Infection Management: cultures obtained and sent to lab  Infection Prevention:   single  patient room provided   rest/sleep promoted   personal protective equipment utilized   hand hygiene promoted  Taken 4/9/2025 1000 by Pilar Casillas RN  Infection Prevention:   single patient room provided   hand hygiene promoted   personal protective equipment utilized   rest/sleep promoted  Taken 4/9/2025 0900 by Pilar Casillas RN  Infection Prevention:   rest/sleep promoted   personal protective equipment utilized   hand hygiene promoted   single patient room provided  Taken 4/9/2025 0800 by Pilar Casillas RN  Infection Prevention:   single patient room provided   rest/sleep promoted   personal protective equipment utilized   hand hygiene promoted     Problem: Noninvasive Ventilation Acute  Goal: Effective Unassisted Ventilation and Oxygenation  Outcome: Progressing  Intervention: Monitor and Manage Noninvasive Ventilation  Recent Flowsheet Documentation  Taken 4/9/2025 0800 by Pilar Casillas RN  Airway/Ventilation Management: oxygen therapy provided   Goal Outcome Evaluation:

## 2025-04-09 NOTE — PLAN OF CARE
Goal Outcome Evaluation:   Patient rested well overnight. On 2L NC. Alert and oriented. No issues overnight.

## 2025-04-09 NOTE — PROGRESS NOTES
"RENAL/KCC:     LOS: 7 days    Patient Care Team:  Kim Benjamin APRN as PCP - General (Nurse Practitioner)  Miguel Stahl MD as Consulting Physician (Hematology and Oncology)  Radha Martinez APRN as Referring Physician (Nurse Practitioner)    Chief Complaint:  ESRD, Abdominal pain    Subjective     Interval History:   4/7: Chart reviewed  S/P cysto and L ureteral stent placement on 4/2/25  Extubated on Saturday  Feeling well, denies new complaints    4/8: Patient seen and examined on hemodialysis, she was seen about 30 minutes into her treatment, tolerating well so far, BP in the 140s, set for 2 L UF  She denies any shortness of breath chest pain or edema    4/9: She completed dialysis yesterday without event, 2 L of fluid was removed blood pressure was stable throughout her treatment  She declined to take midodrine with dialysis yesterday but looks like her blood pressure stable throughout the whole treatment  Another rapid response was called yesterday evening for respiratory arrest without PEA she was transferred to the ICU and is currently stable, somewhat hypertensive and O2 sats 100% on 2 L without any respiratory distress      Objective     Vital Sign Min/Max for last 24 hours  Temp  Min: 97.4 °F (36.3 °C)  Max: 103.1 °F (39.5 °C)   BP  Min: 92/71  Max: 187/88   Pulse  Min: 59  Max: 94   Resp  Min: 16  Max: 20   SpO2  Min: 91 %  Max: 100 %   Flow (L/min) (Oxygen Therapy)  Min: 2  Max: 6   No data recorded     Flowsheet Rows      Flowsheet Row First Filed Value   Admission Height 175.3 cm (69\") Documented at 04/02/2025 1049   Admission Weight 110 kg (243 lb) Documented at 04/02/2025 1049            No intake/output data recorded.  I/O last 3 completed shifts:  In: 366 [P.O.:240; I.V.:76; IV Piggyback:50]  Out: -     Physical Exam:  GEN: Awake, alert, responsive, no acute distress, obese  ENT: PERRL, EOMI, MMM.  NECK: Supple, no JVD. RIJ tunneled HD catheter remains in place  CHEST: CTAB, no " "W/R/C.  CV: RRR, no M/G/R  ABD: Soft, NT, +BS  SKIN: Warm and Dry  NEURO: AAOX3   RUE AVF +thrill and bruit     WBC WBC   Date Value Ref Range Status   04/08/2025 15.31 (H) 3.40 - 10.80 10*3/mm3 Final   04/07/2025 16.65 (H) 3.40 - 10.80 10*3/mm3 Final      HGB Hemoglobin   Date Value Ref Range Status   04/08/2025 9.0 (L) 12.0 - 17.0 g/dL Final     Comment:     Serial Number: [id number]:  [id number]   04/08/2025 8.9 (L) 12.0 - 15.9 g/dL Final   04/07/2025 8.0 (L) 12.0 - 15.9 g/dL Final      HCT Hematocrit   Date Value Ref Range Status   04/08/2025 27 (L) 38 - 51 % Final   04/08/2025 27.0 (L) 34.0 - 46.6 % Final   04/07/2025 25.4 (L) 34.0 - 46.6 % Final      Platlets No results found for: \"LABPLAT\"   MCV MCV   Date Value Ref Range Status   04/08/2025 99.3 (H) 79.0 - 97.0 fL Final   04/07/2025 101.2 (H) 79.0 - 97.0 fL Final          Sodium Sodium   Date Value Ref Range Status   04/08/2025 134 (L) 136 - 145 mmol/L Final   04/07/2025 136 136 - 145 mmol/L Final      Potassium Potassium   Date Value Ref Range Status   04/08/2025 4.1 3.5 - 5.2 mmol/L Final   04/07/2025 4.0 3.5 - 5.2 mmol/L Final      Chloride Chloride   Date Value Ref Range Status   04/08/2025 95 (L) 98 - 107 mmol/L Final   04/07/2025 99 98 - 107 mmol/L Final      CO2 CO2   Date Value Ref Range Status   04/08/2025 21.8 (L) 22.0 - 29.0 mmol/L Final   04/07/2025 22.4 22.0 - 29.0 mmol/L Final      BUN BUN   Date Value Ref Range Status   04/08/2025 72 (H) 6 - 20 mg/dL Final   04/07/2025 66 (H) 6 - 20 mg/dL Final      Creatinine Creatinine   Date Value Ref Range Status   04/08/2025 5.61 (H) 0.60 - 1.30 mg/dL Final   04/08/2025 10.74 (H) 0.57 - 1.00 mg/dL Final   04/07/2025 8.60 (H) 0.57 - 1.00 mg/dL Final      Calcium Calcium   Date Value Ref Range Status   04/08/2025 8.2 (L) 8.6 - 10.5 mg/dL Final   04/07/2025 8.3 (L) 8.6 - 10.5 mg/dL Final      PO4 No results found for: \"CAPO4\"   Albumin Albumin   Date Value Ref Range Status   04/08/2025 3.0 (L) 3.5 - 5.2 g/dL " "Final        Magnesium Magnesium   Date Value Ref Range Status   04/08/2025 2.9 (H) 1.6 - 2.6 mg/dL Final        Uric Acid No results found for: \"URICACID\"        Results Review:     I reviewed the patient's new clinical results.    aspirin, 81 mg, Oral, Daily  atorvastatin, 40 mg, Oral, Nightly  epoetin kindra/kindra-epbx, 10,000 Units, Intravenous, Once per day on Tuesday Thursday Saturday  ertapenem, 500 mg, Intravenous, Q24H  fluticasone, 2 spray, Each Nare, Daily  insulin glargine, 13 Units, Subcutaneous, Nightly  insulin lispro, 2-7 Units, Subcutaneous, 4x Daily AC & at Bedtime  insulin lispro, 3 Units, Subcutaneous, TID With Meals  lanthanum, 2,000 mg, Oral, BID With Meals  Lidocaine, 1 patch, Transdermal, Q24H  midodrine, 5 mg, Oral, Once per day on Monday Tuesday Thursday Saturday  mupirocin, 1 Application, Each Nare, BID  sodium chloride, 10 mL, Intravenous, Q12H  sodium chloride, 10 mL, Intravenous, Q12H  sodium chloride, 10 mL, Intravenous, Q12H  sodium chloride, 10 mL, Intravenous, Q12H  sodium chloride, 10 mL, Intravenous, Q12H  cyanocobalamin, 1,000 mcg, Oral, Daily  vitamin D, 50,000 Units, Oral, Once per day on Monday Thursday             Medication Review: Reviewed    Assessment & Plan     1) ESRD - on TTS HD, completed treatment yesterday without event, 2 L of fluid was removed.  2) Abdominal pain  3) L hydronephrosis with emphysematous pyelonephritis, status post cystoscopy/stent on 4/2  4) DM  5) Hypotension - on Midodrine on non-HD days but currently normotensive  6) Metabolic acidosis, improved  7) Anemia of CKD  8) ESBL UTI/Pyelo  9) Bacteremia  10) s/p cardiac arrest: bradycardia with subsequent PEA.  Another episode of rapid response last night with respiratory arrest, currently stable      Plan:   Events noted, rapid response called she was transferred to the ICU for respiratory arrest there was no mention of PEA with this episode  This happened yesterday evening several hours after dialysis " so I doubt that there was any contribution from hemodialysis.  Electrolyte panel obtained during rapid response showed stable electrolytes  She did tolerate 2 L of fluid removal well with HD yesterday without any hypotension and she did not require midodrine  Agree with having cardiology see her again to look for any possible arrhythmias contributing    Blood pressure stable with treatment yesterday and she did not need the midodrine  Continue midodrine as needed with HD for hypotension    Tunnel catheter in place for access, okay to use AV fistula as well  Epogen with HD for anemia  Renally dose antibiotics for GFR less than 10  Discussed with patient and Dr. Jason at bedside      Tyler Roman MD  [facility]  04/09/25  08:43 EDT

## 2025-04-09 NOTE — PLAN OF CARE
Goal Outcome Evaluation:  Plan of Care Reviewed With: patient           Outcome Evaluation: Pt admitted with acute left-sided abdominal pain, N/V/D. Dx pyelonephritis s/p cystoscopy with stent. Hx ESRD on HD with recent missed dialysis, recent UTI. Hospitalization complicated by PEA arrest requiring CPR 4/4 during dialysis, also respiratory rapid response 4/8. Pt now in ICU, RN okay'ed OT/PT. Pt is typically independent with ADLs and fxl mobility w/o AD. OT assessment today limited d/t orthostatic hypotension. SBP= Sup: 133, sit: 174, stand: 96, sup: 133. She sits EOB with SBA, stands with [place], increased assist to return to supine d/t feeling faint. Further mobility/ADLs deferred, RN notified. OT will cont to follow, anticipate d/c to rehab as she presents below functional baseline.

## 2025-04-09 NOTE — THERAPY TREATMENT NOTE
Patient Name: Kelly Pack  : 1970    MRN: [id number]                              Today's Date: 2025       Admit Date: 2025    Visit Dx:     ICD-10-CM ICD-9-CM   1. NSTEMI (non-ST elevated myocardial infarction)  I21.4 410.70   2. Sepsis without acute organ dysfunction, due to unspecified organism  A41.9 038.9     995.91   3. Emphysematous pyelonephritis of left kidney  N12 590.80   4. ESRD on dialysis  N18.6 585.6    Z99.2 V45.11   5. Hydronephrosis of left kidney  N13.30 591   6. Sepsis, due to unspecified organism, unspecified whether acute organ dysfunction present  A41.9 038.9     995.91     Patient Active Problem List   Diagnosis    Hyperlipidemia    Allergic rhinitis    Nephrotic range proteinuria    Asthma    Essential hypertension    Uncontrolled type 2 diabetes mellitus with hyperglycemia    Acute renal failure superimposed on stage 3a chronic kidney disease    Anasarca    Suspected sleep apnea    Anemia    Bilateral lower extremity edema    Elevated troponin    Acute renal failure superimposed on chronic kidney disease    Type 2 diabetes mellitus with chronic kidney disease    Symptomatic anemia    ESRD (end stage renal disease)    Sepsis    Emphysematous pyelonephritis of left kidney    Hydronephrosis of left kidney    NSTEMI (non-ST elevated myocardial infarction)    Class 2 severe obesity with serious comorbidity in adult     Past Medical History:   Diagnosis Date    Albuminuria     Allergic     Seasonal, grass, cats and some dogs    Allergic rhinitis     Asthma     allergy triggered    Bell's palsy     Cataract     Had surgery on both eyes    Cholelithiasis     Removed. Have bile reflux/ vomiting    CKD (chronic kidney disease)     Diabetes mellitus     Drug therapy     Endometrial cancer     HL (hearing loss)     In L ear    Hyperlipidemia     Hypertension     Low back pain     Migraine     Obesity     Peripheral neuropathy     Renal insufficiency     Right otitis media     Type II  diabetes mellitus     Visual impairment     Diabetic retinopathy. Oil in L eye due to retina detachment s.    Vitamin D deficiency      Past Surgical History:   Procedure Laterality Date    ARTERIOVENOUS FISTULA  05/2023    CARDIAC CATHETERIZATION N/A 4/6/2025    Procedure: Left Heart Cath;  Surgeon: Damian Nguyen MD;  Location: [facility] INVASIVE LOCATION;  Service: Cardiology;  Laterality: N/A;    CARDIAC CATHETERIZATION N/A 4/6/2025    Procedure: Coronary angiography;  Surgeon: Damian Nguyen MD;  Location: [facility] CATH INVASIVE LOCATION;  Service: Cardiology;  Laterality: N/A;    CATARACT EXTRACTION      CHOLECYSTECTOMY      CYSTOSCOPY, RETROGRADE PYELOGRAM AND STENT INSERTION Left 4/2/2025    Procedure: CYSTOSCOPY RETROGRADE PYELOGRAM AND STENT INSERTION;  Surgeon: Tl Gray MD;  Location: [facility] OR;  Service: Urology;  Laterality: Left;    EYE SURGERY      NOV 2020    HYSTERECTOMY      due to cancer    INSERTION HEMODIALYSIS CATHETER Right 06/23/2023    Procedure: HEMODIALYSIS CATHETER INSERTION;  Surgeon: Mikael Mackay MD;  Location: [facility] OR;  Service: Vascular;  Laterality: Right;    OOPHORECTOMY      VITRECTOMY PARS PLANA Left 01/28/2020    Procedure: 25G VITRECTOMY-ENDO LASER;  Surgeon: Lazarus, Howard S., MD;  Location: [facility] OR;  Service: Ophthalmology      General Information       Row Name 04/09/25 1255          Physical Therapy Time and Intention    Document Type therapy note (daily note)  -PC     Mode of Treatment physical therapy;co-treatment;occupational therapy  -PC       Row Name 04/09/25 1253          Safety Issues/Impairments Affecting Functional Mobility    Comment, Safety Issues/Impairments (Mobility) Co treatment medically appropriate and necessary due to patient acuity level, activity tolerance and safety of patient and staff. Treatment is focusing on progression of care and goals established in the POC.  -PC               User Key  (r) = Recorded By,  (t) = Taken By, (c) = Cosigned By      Initials Name Provider Type    PC Patsy Ortega PT Physical Therapist                   Mobility       Row Name 04/09/25 1255          Bed Mobility    Supine-Sit [place] (Bed Mobility) standby assist  -PC     Sit-Supine [place] (Bed Mobility) moderate assist (50% patient effort);2 person assist  -PC     Comment, (Bed Mobility) needed more assist to return to supine due to dizziness, hypotension  -PC       Row Name 04/09/25 1255          Sit-Stand Transfer    Sit-Stand [place] (Transfers) minimum assist (75% patient effort);2 person assist  -PC     Comment, (Sit-Stand Transfer) pt assisted to standing with min assist of 2, able to stand with CGA, closely supervised due to hx of orthostatics, pt not symptomatic in standing but c/o fatigue and not able to stand for very long, once seated, pt suddenly c/o dizziness and needed assist to return to supine  -PC       Row Name 04/09/25 1255          Gait/Stairs (Locomotion)    [place] Level (Gait) minimum assist (75% patient effort);verbal cues;nonverbal cues (demo/gesture);2 person assist  -PC     Assistive Device (Gait) walker, front-wheeled  -PC     Distance in Feet (Gait) 2  -PC     Deviations/Abnormal Patterns (Gait) aniceto decreased;festinating/shuffling;gait speed decreased;stride length decreased  -PC     Comment, (Gait/Stairs) pt took a few sidesteps to head of bed  -PC               User Key  (r) = Recorded By, (t) = Taken By, (c) = Cosigned By      Initials Name Provider Type    PC Patsy Ortega PT Physical Therapist                   Obj/Interventions       Row Name 04/09/25 1300          Balance    Static Sitting Balance standby assist  -PC     Dynamic Sitting Balance standby assist;contact guard  -PC     Position, Sitting Balance sitting edge of bed  -PC     Static Standing Balance minimal assist  -PC     Dynamic Standing Balance minimal assist  -PC               User Key  (r) = Recorded By, (t)  = Taken By, (c) = Cosigned By      Initials Name Provider Type    PC Patsy Ortega, PT Physical Therapist                   Goals/Plan    No documentation.                  Clinical Impression       Row Name 04/09/25 1301          Pain    Pretreatment Pain Rating 8/10  -PC     Posttreatment Pain Rating 8/10  -PC     Pain Location extremity;chest  -PC     Pain Side/Orientation left;upper  -PC     Pain Management Interventions positioning techniques utilized  -PC       Row Name 04/09/25 1301          Plan of Care Review    Plan of Care Reviewed With patient  -PC     Outcome Evaluation Pt had a code upgraded to rapid yesterday with respiratory arrest, transferred to ICU, responded well with O2, pt presents today with weakness, pain, and impaired functional mobility, she was assisted to edge of bed and stood at bedside, took BP readings, pt able to stand for a short time and returned to sitting with feeling weak, once seated edge of bed she began to feel light headed and was assisted to supine, she did have a significant drop in BP. Overall poor toleration of activity today. Pt will cont to benefit from PT and will progress as tolerated.  -PC       Row Name 04/09/25 1301          Vital Signs    Pre Systolic BP Rehab 133  -PC     Pre Treatment Diastolic BP 67  -PC     Intra Systolic BP Rehab 174  -PC     Intra Treatment Diastolic   -PC     Post Systolic BP Rehab 96  -PC     Post Treatment Diastolic BP 74  -PC       Row Name 04/09/25 1301          Positioning and Restraints    Pre-Treatment Position in bed  -PC     Post Treatment Position bed  -PC     In Bed supine;call light within reach;encouraged to call for assist;exit alarm on  -PC               User Key  (r) = Recorded By, (t) = Taken By, (c) = Cosigned By      Initials Name Provider Type    PC Patsy Ortega, PT Physical Therapist                   Outcome Measures       Row Name 04/09/25 1309 04/09/25 0800       How much help from another person do you  currently need...    Turning from your back to your side while in flat bed without using bedrails? 3  -PC 3  -KH    Moving from lying on back to sitting on the side of a flat bed without bedrails? 3  -PC 3  -KH    Moving to and from a bed to a chair (including a wheelchair)? 3  -PC 2  -KH    Standing up from a chair using your arms (e.g., wheelchair, bedside chair)? 3  -PC 2  -KH    Climbing 3-5 steps with a railing? 1  -PC 1  -KH    To walk in hospital room? 3  -PC 2  -KH    AM-PAC 6 Clicks Score (PT) 16  -PC 13  -KH    Highest Level of Mobility Goal 5 --> Static standing  -PC 4 --> Transfer to chair/commode  -KH      Row Name 04/09/25 1242          Functional Assessment    Outcome Measure Options AM-PAC 6 Clicks Daily Activity (OT)  -KIARA               User Key  (r) = Recorded By, (t) = Taken By, (c) = Cosigned By      Initials Name Provider Type    PC Patsy Ortega, PT Physical Therapist    Sindy Foster, OT Occupational Therapist    Pilar Avitia, RN Registered Nurse                                 Physical Therapy Education       Title: PT OT SLP Therapies (In Progress)       Topic: Physical Therapy (Done)       Point: Mobility training (Done)       Learning Progress Summary            Patient Acceptance, E,D, DU by PC at 4/9/2025 1310    Acceptance, E,TB,D, VU,NR by  at 4/7/2025 1500                      Point: Home exercise program (Done)       Learning Progress Summary            Patient Acceptance, E,D, DU by PC at 4/9/2025 1310    Acceptance, E,TB,D, VU,NR by PH at 4/7/2025 1500                      Point: Body mechanics (Done)       Learning Progress Summary            Patient Acceptance, E,D, DU by PC at 4/9/2025 1310    Acceptance, E,TB,D, VU,NR by PH at 4/7/2025 1500                      Point: Precautions (Done)       Learning Progress Summary            Patient Acceptance, E,D, DU by PC at 4/9/2025 1310    Acceptance, E,TB,D, VU,NR by PH at 4/7/2025 1500                                       User Key       Initials Effective Dates Name Provider Type Discipline    PC 06/16/21 -  Patsy Ortega PT Physical Therapist PT    PH 06/16/21 -  Yoselin Godfrey PTA Physical Therapist Assistant PT                  PT Recommendation and Plan     Outcome Evaluation: Pt had a code upgraded to rapid yesterday with respiratory arrest, transferred to ICU, responded well with O2, pt presents today with weakness, pain, and impaired functional mobility, she was assisted to edge of bed and stood at bedside, took BP readings, pt able to stand for a short time and returned to sitting with feeling weak, once seated edge of bed she began to feel light headed and was assisted to supine, she did have a significant drop in BP. Overall poor toleration of activity today. Pt will cont to benefit from PT and will progress as tolerated.     Time Calculation:         PT Charges       Row Name 04/09/25 1311             Time Calculation    Start Time 0958  -PC      Stop Time 1021  -PC      Time Calculation (min) 23 min  -PC      PT Received On 04/09/25  -PC      PT - Next Appointment 04/10/25  -PC                User Key  (r) = Recorded By, (t) = Taken By, (c) = Cosigned By      Initials Name Provider Type    PC Patsy Ortega, PT Physical Therapist                  Therapy Charges for Today       Code Description Service Date Service Provider Modifiers Qty    [id number]  PT THERAPEUTIC ACT EA 15 MIN 4/9/2025 Patsy Ortega PT GP 2            PT G-Codes  Outcome Measure Options: AM-PAC 6 Clicks Daily Activity (OT)  AM-PAC 6 Clicks Score (PT): 16  AM-PAC 6 Clicks Score (OT): 16       Patsy G. KATINA Ortega  4/9/2025

## 2025-04-10 LAB
ALBUMIN SERPL-MCNC: 2.6 G/DL (ref 3.5–5.2)
ALBUMIN SERPL-MCNC: 3.1 G/DL (ref 3.5–5.2)
ANION GAP SERPL CALCULATED.3IONS-SCNC: 12.5 MMOL/L (ref 5–15)
ANION GAP SERPL CALCULATED.3IONS-SCNC: 13 MMOL/L (ref 5–15)
BASOPHILS # BLD MANUAL: 0.07 10*3/MM3 (ref 0–0.2)
BASOPHILS NFR BLD MANUAL: 0.4 % (ref 0–1.5)
BUN SERPL-MCNC: 51 MG/DL (ref 6–20)
BUN SERPL-MCNC: 53 MG/DL (ref 6–20)
BUN/CREAT SERPL: 6.6 (ref 7–25)
BUN/CREAT SERPL: 6.9 (ref 7–25)
CALCIUM SPEC-SCNC: 8 MG/DL (ref 8.6–10.5)
CALCIUM SPEC-SCNC: 8.5 MG/DL (ref 8.6–10.5)
CHLORIDE SERPL-SCNC: 93 MMOL/L (ref 98–107)
CHLORIDE SERPL-SCNC: 93 MMOL/L (ref 98–107)
CO2 SERPL-SCNC: 20.5 MMOL/L (ref 22–29)
CO2 SERPL-SCNC: 21 MMOL/L (ref 22–29)
CREAT SERPL-MCNC: 7.36 MG/DL (ref 0.57–1)
CREAT SERPL-MCNC: 8 MG/DL (ref 0.57–1)
DEPRECATED RDW RBC AUTO: 51.2 FL (ref 37–54)
EGFRCR SERPLBLD CKD-EPI 2021: 5.6 ML/MIN/1.73
EGFRCR SERPLBLD CKD-EPI 2021: 6.2 ML/MIN/1.73
EOSINOPHIL # BLD MANUAL: 0.56 10*3/MM3 (ref 0–0.4)
EOSINOPHIL NFR BLD MANUAL: 3.3 % (ref 0.3–6.2)
ERYTHROCYTE [DISTWIDTH] IN BLOOD BY AUTOMATED COUNT: 14.2 % (ref 12.3–15.4)
GLUCOSE BLDC GLUCOMTR-MCNC: 106 MG/DL (ref 70–130)
GLUCOSE BLDC GLUCOMTR-MCNC: 114 MG/DL (ref 70–130)
GLUCOSE BLDC GLUCOMTR-MCNC: 119 MG/DL (ref 70–130)
GLUCOSE BLDC GLUCOMTR-MCNC: 138 MG/DL (ref 70–130)
GLUCOSE SERPL-MCNC: 127 MG/DL (ref 65–99)
GLUCOSE SERPL-MCNC: 98 MG/DL (ref 65–99)
HCT VFR BLD AUTO: 27.3 % (ref 34–46.6)
HGB BLD-MCNC: 8.5 G/DL (ref 12–15.9)
HYPOCHROMIA BLD QL: ABNORMAL
LYMPHOCYTES # BLD MANUAL: 1.18 10*3/MM3 (ref 0.7–3.1)
LYMPHOCYTES NFR BLD MANUAL: 4.5 % (ref 5–12)
MCH RBC QN AUTO: 31.4 PG (ref 26.6–33)
MCHC RBC AUTO-ENTMCNC: 31.1 G/DL (ref 31.5–35.7)
MCV RBC AUTO: 100.7 FL (ref 79–97)
METAMYELOCYTES NFR BLD MANUAL: 1.6 % (ref 0–0)
MONOCYTES # BLD: 0.77 10*3/MM3 (ref 0.1–0.9)
MYELOCYTES NFR BLD MANUAL: 0.8 % (ref 0–0)
NEUTROPHILS # BLD AUTO: 14.05 10*3/MM3 (ref 1.7–7)
NEUTROPHILS NFR BLD MANUAL: 82.4 % (ref 42.7–76)
NRBC BLD AUTO-RTO: 0.1 /100 WBC (ref 0–0.2)
PHOSPHATE SERPL-MCNC: 4.5 MG/DL (ref 2.5–4.5)
PHOSPHATE SERPL-MCNC: 4.9 MG/DL (ref 2.5–4.5)
PLAT MORPH BLD: NORMAL
PLATELET # BLD AUTO: 377 10*3/MM3 (ref 140–450)
PMV BLD AUTO: 10 FL (ref 6–12)
POTASSIUM SERPL-SCNC: 4.3 MMOL/L (ref 3.5–5.2)
POTASSIUM SERPL-SCNC: 4.6 MMOL/L (ref 3.5–5.2)
QT INTERVAL: 412 MS
QTC INTERVAL: 466 MS
RBC # BLD AUTO: 2.71 10*6/MM3 (ref 3.77–5.28)
SODIUM SERPL-SCNC: 126 MMOL/L (ref 136–145)
SODIUM SERPL-SCNC: 127 MMOL/L (ref 136–145)
VARIANT LYMPHS NFR BLD MANUAL: 6.9 % (ref 19.6–45.3)
WBC MORPH BLD: NORMAL
WBC NRBC COR # BLD AUTO: 17.05 10*3/MM3 (ref 3.4–10.8)

## 2025-04-10 PROCEDURE — G0545 PR INHERENT VISIT TO INPT: HCPCS | Performed by: STUDENT IN AN ORGANIZED HEALTH CARE EDUCATION/TRAINING PROGRAM

## 2025-04-10 PROCEDURE — 63710000001 INSULIN GLARGINE PER 5 UNITS: Performed by: INTERNAL MEDICINE

## 2025-04-10 PROCEDURE — 82948 REAGENT STRIP/BLOOD GLUCOSE: CPT

## 2025-04-10 PROCEDURE — 25010000002 HEPARIN (PORCINE) PER 1000 UNITS: Performed by: INTERNAL MEDICINE

## 2025-04-10 PROCEDURE — 25010000002 ERTAPENEM PER 500 MG: Performed by: INTERNAL MEDICINE

## 2025-04-10 PROCEDURE — 63710000001 INSULIN LISPRO (HUMAN) PER 5 UNITS: Performed by: INTERNAL MEDICINE

## 2025-04-10 PROCEDURE — 85025 COMPLETE CBC W/AUTO DIFF WBC: CPT | Performed by: INTERNAL MEDICINE

## 2025-04-10 PROCEDURE — 97110 THERAPEUTIC EXERCISES: CPT

## 2025-04-10 PROCEDURE — 80069 RENAL FUNCTION PANEL: CPT | Performed by: INTERNAL MEDICINE

## 2025-04-10 PROCEDURE — 82172 ASSAY OF APOLIPOPROTEIN: CPT | Performed by: INTERNAL MEDICINE

## 2025-04-10 PROCEDURE — 99232 SBSQ HOSP IP/OBS MODERATE 35: CPT | Performed by: INTERNAL MEDICINE

## 2025-04-10 PROCEDURE — 25010000002 EPOETIN ALFA-EPBX 10000 UNIT/ML SOLUTION: Performed by: INTERNAL MEDICINE

## 2025-04-10 PROCEDURE — 99232 SBSQ HOSP IP/OBS MODERATE 35: CPT | Performed by: STUDENT IN AN ORGANIZED HEALTH CARE EDUCATION/TRAINING PROGRAM

## 2025-04-10 PROCEDURE — 85007 BL SMEAR W/DIFF WBC COUNT: CPT | Performed by: INTERNAL MEDICINE

## 2025-04-10 RX ORDER — ALBUMIN (HUMAN) 12.5 G/50ML
12.5 SOLUTION INTRAVENOUS ONCE
Status: DISCONTINUED | OUTPATIENT
Start: 2025-04-10 | End: 2025-04-16 | Stop reason: HOSPADM

## 2025-04-10 RX ORDER — HEPARIN SODIUM 5000 [USP'U]/ML
5000 INJECTION, SOLUTION INTRAVENOUS; SUBCUTANEOUS EVERY 8 HOURS SCHEDULED
Status: DISCONTINUED | OUTPATIENT
Start: 2025-04-10 | End: 2025-04-16 | Stop reason: HOSPADM

## 2025-04-10 RX ORDER — HEPARIN SODIUM 1000 [USP'U]/ML
2000 INJECTION, SOLUTION INTRAVENOUS; SUBCUTANEOUS ONCE
Status: COMPLETED | OUTPATIENT
Start: 2025-04-10 | End: 2025-04-10

## 2025-04-10 RX ADMIN — INSULIN GLARGINE 13 UNITS: 100 INJECTION, SOLUTION SUBCUTANEOUS at 20:49

## 2025-04-10 RX ADMIN — Medication 10 ML: at 08:53

## 2025-04-10 RX ADMIN — ATORVASTATIN CALCIUM 40 MG: 20 TABLET, FILM COATED ORAL at 20:48

## 2025-04-10 RX ADMIN — HEPARIN SODIUM 5000 UNITS: 5000 INJECTION INTRAVENOUS; SUBCUTANEOUS at 21:00

## 2025-04-10 RX ADMIN — ASPIRIN 81 MG: 81 TABLET, COATED ORAL at 09:19

## 2025-04-10 RX ADMIN — ACETAMINOPHEN 650 MG: 325 TABLET, FILM COATED ORAL at 04:31

## 2025-04-10 RX ADMIN — ERTAPENEM SODIUM 500 MG: 1 INJECTION, POWDER, LYOPHILIZED, FOR SOLUTION INTRAMUSCULAR; INTRAVENOUS at 23:48

## 2025-04-10 RX ADMIN — Medication 1000 MCG: at 11:34

## 2025-04-10 RX ADMIN — EPOETIN ALFA-EPBX 10000 UNITS: 10000 INJECTION, SOLUTION INTRAVENOUS; SUBCUTANEOUS at 09:18

## 2025-04-10 RX ADMIN — INSULIN LISPRO 3 UNITS: 100 INJECTION, SOLUTION INTRAVENOUS; SUBCUTANEOUS at 08:30

## 2025-04-10 RX ADMIN — Medication 10 ML: at 20:49

## 2025-04-10 RX ADMIN — ACETAMINOPHEN 650 MG: 325 TABLET, FILM COATED ORAL at 20:48

## 2025-04-10 RX ADMIN — HEPARIN SODIUM 5000 UNITS: 5000 INJECTION INTRAVENOUS; SUBCUTANEOUS at 14:00

## 2025-04-10 RX ADMIN — LIDOCAINE 1 PATCH: 4 PATCH TOPICAL at 09:18

## 2025-04-10 RX ADMIN — ACETAMINOPHEN 650 MG: 325 TABLET, FILM COATED ORAL at 16:58

## 2025-04-10 RX ADMIN — HEPARIN SODIUM 4000 UNITS: 1000 INJECTION, SOLUTION INTRAVENOUS; SUBCUTANEOUS at 14:56

## 2025-04-10 RX ADMIN — ERGOCALCIFEROL 50000 UNITS: 1.25 CAPSULE ORAL at 11:34

## 2025-04-10 RX ADMIN — LANTHANUM CARBONATE 2000 MG: 500 TABLET, CHEWABLE ORAL at 08:30

## 2025-04-10 RX ADMIN — HEPARIN SODIUM 2000 UNITS: 1000 INJECTION, SOLUTION INTRAVENOUS; SUBCUTANEOUS at 11:30

## 2025-04-10 NOTE — THERAPY TREATMENT NOTE
Patient Name: Kelly Pack  : 1970    MRN: [id number]                              Today's Date: 4/10/2025       Admit Date: 2025    Visit Dx:     ICD-10-CM ICD-9-CM   1. NSTEMI (non-ST elevated myocardial infarction)  I21.4 410.70   2. Sepsis without acute organ dysfunction, due to unspecified organism  A41.9 038.9     995.91   3. Emphysematous pyelonephritis of left kidney  N12 590.80   4. ESRD on dialysis  N18.6 585.6    Z99.2 V45.11   5. Hydronephrosis of left kidney  N13.30 591   6. Sepsis, due to unspecified organism, unspecified whether acute organ dysfunction present  A41.9 038.9     995.91     Patient Active Problem List   Diagnosis    Hyperlipidemia    Allergic rhinitis    Nephrotic range proteinuria    Asthma    Essential hypertension    Uncontrolled type 2 diabetes mellitus with hyperglycemia    Acute renal failure superimposed on stage 3a chronic kidney disease    Anasarca    Suspected sleep apnea    Anemia    Bilateral lower extremity edema    Elevated troponin    Acute renal failure superimposed on chronic kidney disease    Type 2 diabetes mellitus with chronic kidney disease    Symptomatic anemia    ESRD (end stage renal disease)    Sepsis    Emphysematous pyelonephritis of left kidney    Hydronephrosis of left kidney    NSTEMI (non-ST elevated myocardial infarction)    Class 2 severe obesity with serious comorbidity in adult     Past Medical History:   Diagnosis Date    Albuminuria     Allergic     Seasonal, grass, cats and some dogs    Allergic rhinitis     Asthma     allergy triggered    Bell's palsy     Cataract     Had surgery on both eyes    Cholelithiasis     Removed. Have bile reflux/ vomiting    CKD (chronic kidney disease)     Diabetes mellitus     Drug therapy     Endometrial cancer     HL (hearing loss)     In L ear    Hyperlipidemia     Hypertension     Low back pain     Migraine     Obesity     Peripheral neuropathy     Renal insufficiency     Right otitis media     Type  II diabetes mellitus     Visual impairment     Diabetic retinopathy. Oil in L eye due to retina detachment s.    Vitamin D deficiency      Past Surgical History:   Procedure Laterality Date    ARTERIOVENOUS FISTULA  05/2023    CARDIAC CATHETERIZATION N/A 4/6/2025    Procedure: Left Heart Cath;  Surgeon: Damian Nguyen MD;  Location: [facility] INVASIVE LOCATION;  Service: Cardiology;  Laterality: N/A;    CARDIAC CATHETERIZATION N/A 4/6/2025    Procedure: Coronary angiography;  Surgeon: Damian Nguyen MD;  Location: [facility] CATH INVASIVE LOCATION;  Service: Cardiology;  Laterality: N/A;    CATARACT EXTRACTION      CHOLECYSTECTOMY      CYSTOSCOPY, RETROGRADE PYELOGRAM AND STENT INSERTION Left 4/2/2025    Procedure: CYSTOSCOPY RETROGRADE PYELOGRAM AND STENT INSERTION;  Surgeon: Tl Gray MD;  Location: [facility] OR;  Service: Urology;  Laterality: Left;    EYE SURGERY      NOV 2020    HYSTERECTOMY      due to cancer    INSERTION HEMODIALYSIS CATHETER Right 06/23/2023    Procedure: HEMODIALYSIS CATHETER INSERTION;  Surgeon: Mikael Mackay MD;  Location: [facility] OR;  Service: Vascular;  Laterality: Right;    OOPHORECTOMY      VITRECTOMY PARS PLANA Left 01/28/2020    Procedure: 25G VITRECTOMY-ENDO LASER;  Surgeon: Lazarus, Howard S., MD;  Location: [facility] OR;  Service: Ophthalmology      General Information       Row Name 04/10/25 0959          Physical Therapy Time and Intention    Document Type therapy note (daily note)  -     Mode of Treatment physical therapy  -       Row Name 04/10/25 0959          General Information    Existing Precautions/Restrictions fall;orthostatic hypotension  -PC       Row Name 04/10/25 0959          Cognition    Orientation Status (Cognition) oriented x 4  -PC       Row Name 04/10/25 0959          Safety Issues/Impairments Affecting Functional Mobility    Impairments Affecting Function (Mobility) endurance/activity tolerance;strength;balance  -PC                User Key  (r) = Recorded By, (t) = Taken By, (c) = Cosigned By      Initials Name Provider Type    PC Patsy Ortega PT Physical Therapist                   Mobility       Row Name 04/10/25 0959          Bed Mobility    Bed Mobility supine-sit;sit-supine  -PC     Supine-Sit [place] (Bed Mobility) standby assist  -PC     Sit-Supine [place] (Bed Mobility) contact guard  -PC     Assistive Device (Bed Mobility) head of bed elevated  -PC       Row Name 04/10/25 0959          Sit-Stand Transfer    Sit-Stand [place] (Transfers) minimum assist (75% patient effort);2 person assist  -PC     Assistive Device (Sit-Stand Transfers) --  HHA x 2  -PC       Row Name 04/10/25 0959          Gait/Stairs (Locomotion)    [place] Level (Gait) minimum assist (75% patient effort);verbal cues;nonverbal cues (demo/gesture);2 person assist  -PC     Assistive Device (Gait) --  HHA x 2  -PC     Distance in Feet (Gait) 6  6 ft, 3 ft stayed close to bed due to hx of orthostatics, multiple lines  -PC     Deviations/Abnormal Patterns (Gait) aniceto decreased;festinating/shuffling;gait speed decreased;stride length decreased  -PC     Bilateral Gait Deviations heel strike decreased  -PC               User Key  (r) = Recorded By, (t) = Taken By, (c) = Cosigned By      Initials Name Provider Type    PC Patsy Ortega PT Physical Therapist                   Obj/Interventions       Row Name 04/10/25 1006          Motor Skills    Therapeutic Exercise --  AP, LAQ 10 reps  -PC       Row Name 04/10/25 1006          Balance    Static Sitting Balance standby assist  -PC     Dynamic Sitting Balance standby assist  -PC     Position, Sitting Balance sitting edge of bed  -PC     Static Standing Balance minimal assist  -PC     Dynamic Standing Balance minimal assist;2-person assist  -PC     Balance Interventions sitting  -PC     Comment, Balance allowed pt to sit for sitting tolerance, did some LE ex, to help avoid orthostatic event   -PC               User Key  (r) = Recorded By, (t) = Taken By, (c) = Cosigned By      Initials Name Provider Type    PC Patsy Ortega, PT Physical Therapist                   Goals/Plan    No documentation.                  Clinical Impression       Row Name 04/10/25 1010          Pain    Pain Location chest  -PC     Pre/Posttreatment Pain Comment pt still c/o pain in chest area from CPR, did not rate  -PC       Row Name 04/10/25 1010          Plan of Care Review    Plan of Care Reviewed With patient  -PC     Outcome Evaluation Pt with improved toleration to movement today, less assist required to get out of bed and stand, took some steps along bed and in room, gait still unsteady and shaky with min A x 2, fatigues quickly but no c/o dizziness or light headedness, BP readings are being taken in R calf and getting questionable readings in standing. PT will cont to follow and progress as tolerated  -PC       Row Name 04/10/25 1010          Vital Signs    Pre Systolic BP Rehab 151  -PC     Pre Treatment Diastolic BP 76  supine  -PC     Intra Systolic BP Rehab 151  -PC     Intra Treatment Diastolic   standing  -PC     Post Systolic BP Rehab 112  -PC     Post Treatment Diastolic BP 59  supine  -PC       Row Name 04/10/25 1010          Positioning and Restraints    Pre-Treatment Position in bed  -PC     Post Treatment Position bed  -PC     In Bed supine;call light within reach;encouraged to call for assist;exit alarm on  -PC               User Key  (r) = Recorded By, (t) = Taken By, (c) = Cosigned By      Initials Name Provider Type    PC Patsy Ortega, PT Physical Therapist                   Outcome Measures       Row Name 04/10/25 1015 04/10/25 0800       How much help from another person do you currently need...    Turning from your back to your side while in flat bed without using bedrails? 3  -PC 3  -NGA    Moving from lying on back to sitting on the side of a flat bed without bedrails? 3  -PC 3  -NGA     Moving to and from a bed to a chair (including a wheelchair)? 3  -PC 3  -NGA    Standing up from a chair using your arms (e.g., wheelchair, bedside chair)? 3  -PC 3  -NGA    Climbing 3-5 steps with a railing? 1  -PC 1  -NGA    To walk in hospital room? 2  -PC 2  -NGA    AM-PAC 6 Clicks Score (PT) 15  -PC 15  -NGA    Highest Level of Mobility Goal 4 --> Transfer to chair/commode  -PC 4 --> Transfer to chair/commode  -NGA              User Key  (r) = Recorded By, (t) = Taken By, (c) = Cosigned By      Initials Name Provider Type    PC Patsy Ortega, PT Physical Therapist    Kelly Sanders, RN Registered Nurse                                 Physical Therapy Education       Title: PT OT SLP Therapies (In Progress)       Topic: Physical Therapy (Done)       Point: Mobility training (Done)       Learning Progress Summary            Patient Acceptance, E,D, DU by PC at 4/10/2025 1015    Acceptance, E,D, DU by  at 4/9/2025 1310    Acceptance, E,TB,D, VU,NR by  at 4/7/2025 1500                      Point: Home exercise program (Done)       Learning Progress Summary            Patient Acceptance, E,D, DU by  at 4/10/2025 1015    Acceptance, E,D, DU by  at 4/9/2025 1310    Acceptance, E,TB,D, VU,NR by  at 4/7/2025 1500                      Point: Body mechanics (Done)       Learning Progress Summary            Patient Acceptance, E,D, DU by  at 4/10/2025 1015    Acceptance, E,D, DU by  at 4/9/2025 1310    Acceptance, E,TB,D, VU,NR by  at 4/7/2025 1500                      Point: Precautions (Done)       Learning Progress Summary            Patient Acceptance, E,D, DU by PC at 4/10/2025 1015    Acceptance, E,D, DU by  at 4/9/2025 1310    Acceptance, E,TB,D, VU,NR by  at 4/7/2025 1500                                      User Key       Initials Effective Dates Name Provider Type Discipline    PC 06/16/21 -  Patsy Ortega, PT Physical Therapist PT    PH 06/16/21 -  Yoselin Godfrey PTA Physical  Therapist Assistant PT                  PT Recommendation and Plan     Outcome Evaluation: Pt with improved toleration to movement today, less assist required to get out of bed and stand, took some steps along bed and in room, gait still unsteady and shaky with min A x 2, fatigues quickly but no c/o dizziness or light headedness, BP readings are being taken in R calf and getting questionable readings in standing. PT will cont to follow and progress as tolerated     Time Calculation:         PT Charges       Row Name 04/10/25 1015             Time Calculation    Start Time 0917  -PC      Stop Time 0943  -PC      Time Calculation (min) 26 min  -PC      PT Received On 04/10/25  -PC      PT - Next Appointment 04/11/25  -PC                User Key  (r) = Recorded By, (t) = Taken By, (c) = Cosigned By      Initials Name Provider Type    PC Patsy Ortega, PT Physical Therapist                  Therapy Charges for Today       Code Description Service Date Service Provider Modifiers Qty    24793527382  PT THERAPEUTIC ACT EA 15 MIN 4/9/2025 Patsy Ortega, PT GP 2    [id number]  PT THER PROC EA 15 MIN 4/10/2025 Patsy Ortega PT GP 2            PT G-Codes  Outcome Measure Options: AM-PAC 6 Clicks Daily Activity (OT)  AM-PAC 6 Clicks Score (PT): 15  AM-PAC 6 Clicks Score (OT): 16       Patsy Ortega PT  4/10/2025

## 2025-04-10 NOTE — CASE MANAGEMENT/SOCIAL WORK
Continued Stay Note  [facility]     Patient Name: Kelly Pack  MRN: [id number]  Today's Date: 4/10/2025    Admit Date: 4/2/2025    Plan: Acute rehab vs home   Discharge Plan       Row Name 04/10/25 0827       Plan    Plan Acute rehab vs home    Plan Comments [facility] witness [facility] acute rehab referral remains pending. Patient is currently in ICU. CCP will continue to follow for dc planning needs pending clinical course. KARINA, SAROJ                   Discharge Codes    No documentation.                 Expected Discharge Date and Time       Expected Discharge Date Expected Discharge Time    Apr 13, 2025               SAROJ Pinto

## 2025-04-10 NOTE — PROGRESS NOTES
"Kelly Pack  1970 54 y.o.  [id number]      Patient Care Team:  Kim Benjamin APRN as PCP - General (Nurse Practitioner)  Miguel Stahl MD as Consulting Physician (Hematology and Oncology)  Radha Martinez APRN as Referring Physician (Nurse Practitioner)    CC: Diabetes, end-stage renal disease on dialysis, PEA arrest on dialysis.  Markedly abnormal ECG and elevated troponins consistent with a non-ST elevation MI, EF 45 to 50%    Interval History: She has been stable rhythms been stable she is on dialysis today    Objective   Vital Signs  Temp:  [97.6 °F (36.4 °C)-98.7 °F (37.1 °C)] 98 °F (36.7 °C)  Heart Rate:  [61-79] 75  Resp:  [16-20] 16  BP: ()/() 152/70    Intake/Output Summary (Last 24 hours) at 4/10/2025 1415  Last data filed at 4/10/2025 0400  Gross per 24 hour   Intake 480 ml   Output 0 ml   Net 480 ml     Flowsheet Rows      Flowsheet Row First Filed Value   Admission Height 175.3 cm (69\") Documented at 04/02/2025 1049   Admission Weight 110 kg (243 lb) Documented at 04/02/2025 1049            Physical Exam:   General Appearance:    Alert,oriented, in no acute distress   Lungs:     Clear to auscultation,BS are equal    Heart:    Normal S1 and S2, RRR without murmur, gallop or rub   HEENT:    Sclerae are clear, no JVD or adenopathy   Abdomen:     Normal bowel sounds, soft nontender, nondistended, no HSM   Extremities:   Moves all extremities well, no edema, no cyanosis, no             Redness, no rash     Medication Review:      albumin human, 12.5 g, Intravenous, Once  aspirin, 81 mg, Oral, Daily  atorvastatin, 40 mg, Oral, Nightly  epoetin kindra/kindra-epbx, 10,000 Units, Intravenous, Once per day on Tuesday Thursday Saturday  ertapenem, 500 mg, Intravenous, Q24H  fluticasone, 2 spray, Each Nare, Daily  heparin (porcine), 5,000 Units, Subcutaneous, Q8H  heparin (porcine), 2,000 Units, Intracatheter, Once  insulin glargine, 13 Units, Subcutaneous, Nightly  insulin lispro, 2-7 " Urine strained no stone visualized Units, Subcutaneous, 4x Daily AC & at Bedtime  insulin lispro, 3 Units, Subcutaneous, TID With Meals  lanthanum, 2,000 mg, Oral, BID With Meals  Lidocaine, 1 patch, Transdermal, Q24H  midodrine, 5 mg, Oral, Once per day on Monday Tuesday Thursday Saturday  sodium chloride, 10 mL, Intravenous, Q12H  sodium chloride, 10 mL, Intravenous, Q12H  sodium chloride, 10 mL, Intravenous, Q12H  sodium chloride, 10 mL, Intravenous, Q12H  sodium chloride, 10 mL, Intravenous, Q12H  cyanocobalamin, 1,000 mcg, Oral, Daily  vitamin D, 50,000 Units, Oral, Once per day on Monday Thursday               I reviewed the patient's new clinical results.  I personally viewed and interpreted the patient's EKG/Telemetry data    Assessment/Plan  Active Hospital Problems    Diagnosis  POA    **Sepsis [A41.9]  Yes    Class 2 severe obesity with serious comorbidity in adult [E66.812, E66.01]  Yes    NSTEMI (non-ST elevated myocardial infarction) [I21.4]  Unknown    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     I think she may have had a little bit of respiratory depression from some narcotics after dialysis 2 days ago I am not seeing any cardiac issue recently was cath with mild nonobstructive coronary disease.  Rhythms been stable we will see her as needed  Damian Nguyen MD  04/10/25  14:15 EDT

## 2025-04-10 NOTE — PROGRESS NOTES
"RENAL/KCC:     LOS: 8 days    Patient Care Team:  Kim Benjamin APRN as PCP - General (Nurse Practitioner)  Miguel Stahl MD as Consulting Physician (Hematology and Oncology)  Radha Martinez APRN as Referring Physician (Nurse Practitioner)    Chief Complaint:  ESRD, Abdominal pain    Subjective     Interval History:   4/7: Chart reviewed  S/P cysto and L ureteral stent placement on 4/2/25  Extubated on Saturday  Feeling well, denies new complaints    4/8: Patient seen and examined on hemodialysis, she was seen about 30 minutes into her treatment, tolerating well so far, BP in the 140s, set for 2 L UF  She denies any shortness of breath chest pain or edema    4/9: She completed dialysis yesterday without event, 2 L of fluid was removed blood pressure was stable throughout her treatment  She declined to take midodrine with dialysis yesterday but looks like her blood pressure stable throughout the whole treatment  Another rapid response was called yesterday evening for respiratory arrest without PEA she was transferred to the ICU and is currently stable, somewhat hypertensive and O2 sats 100% on 2 L without any respiratory distress    April 10: Patient seen and examined in the ICU, on dialysis, tolerating well  She has been hypertensive throughout her treatment and has not needed Midodrine  Remains on oxygen, 1 to 2 L denies any worsening shortness of breath, no edema  Scheduled for 2 L UF but she refused that, goal is 1 L which looks like has been met        Objective     Vital Sign Min/Max for last 24 hours  Temp  Min: 97.6 °F (36.4 °C)  Max: 98.3 °F (36.8 °C)   BP  Min: 89/72  Max: 181/94   Pulse  Min: 61  Max: 79   Resp  Min: 16  Max: 16   SpO2  Min: 86 %  Max: 100 %   Flow (L/min) (Oxygen Therapy)  Min: 2  Max: 2   Weight  Min: 118 kg (260 lb 2.3 oz)  Max: 118 kg (260 lb 2.3 oz)     Flowsheet Rows      Flowsheet Row First Filed Value   Admission Height 175.3 cm (69\") Documented at 04/02/2025 1049 " "  Admission Weight 110 kg (243 lb) Documented at 04/02/2025 1049            I/O this shift:  In: -   Out: 1000   I/O last 3 completed shifts:  In: 1296 [P.O.:1220; I.V.:76]  Out: 0     Physical Exam:  GEN: Awake, alert, responsive, no acute distress, obese  ENT: PERRL, EOMI, MMM.  NECK: Supple, no JVD. RIJ tunneled HD catheter remains in place  CHEST: CTAB, no W/R/C.  CV: RRR, no M/G/R  ABD: Soft, NT, +BS  SKIN: Warm and Dry  NEURO: AAOX3   RUE AVF +thrill and bruit     WBC WBC   Date Value Ref Range Status   04/10/2025 17.05 (H) 3.40 - 10.80 10*3/mm3 Final   04/09/2025 20.95 (H) 3.40 - 10.80 10*3/mm3 Final   04/08/2025 15.31 (H) 3.40 - 10.80 10*3/mm3 Final      HGB Hemoglobin   Date Value Ref Range Status   04/10/2025 8.5 (L) 12.0 - 15.9 g/dL Final   04/09/2025 9.1 (L) 12.0 - 15.9 g/dL Final   04/08/2025 9.0 (L) 12.0 - 17.0 g/dL Final     Comment:     Serial Number: [id number]:  [id number]   04/08/2025 8.9 (L) 12.0 - 15.9 g/dL Final      HCT Hematocrit   Date Value Ref Range Status   04/10/2025 27.3 (L) 34.0 - 46.6 % Final   04/09/2025 28.8 (L) 34.0 - 46.6 % Final   04/08/2025 27 (L) 38 - 51 % Final   04/08/2025 27.0 (L) 34.0 - 46.6 % Final      Platlets No results found for: \"LABPLAT\"   MCV MCV   Date Value Ref Range Status   04/10/2025 100.7 (H) 79.0 - 97.0 fL Final   04/09/2025 99.3 (H) 79.0 - 97.0 fL Final   04/08/2025 99.3 (H) 79.0 - 97.0 fL Final          Sodium Sodium   Date Value Ref Range Status   04/10/2025 127 (L) 136 - 145 mmol/L Final   04/10/2025 126 (L) 136 - 145 mmol/L Final   04/09/2025 129 (L) 136 - 145 mmol/L Final   04/08/2025 134 (L) 136 - 145 mmol/L Final      Potassium Potassium   Date Value Ref Range Status   04/10/2025 4.6 3.5 - 5.2 mmol/L Final   04/10/2025 4.3 3.5 - 5.2 mmol/L Final     Comment:     Slight hemolysis detected by analyzer. Result may be falsely elevated.   04/09/2025 4.7 3.5 - 5.2 mmol/L Final     Comment:     Slight hemolysis detected by analyzer. Result may be falsely " "elevated.   04/08/2025 4.1 3.5 - 5.2 mmol/L Final      Chloride Chloride   Date Value Ref Range Status   04/10/2025 93 (L) 98 - 107 mmol/L Final   04/10/2025 93 (L) 98 - 107 mmol/L Final   04/09/2025 94 (L) 98 - 107 mmol/L Final   04/08/2025 95 (L) 98 - 107 mmol/L Final      CO2 CO2   Date Value Ref Range Status   04/10/2025 21.0 (L) 22.0 - 29.0 mmol/L Final   04/10/2025 20.5 (L) 22.0 - 29.0 mmol/L Final   04/09/2025 20.3 (L) 22.0 - 29.0 mmol/L Final   04/08/2025 21.8 (L) 22.0 - 29.0 mmol/L Final      BUN BUN   Date Value Ref Range Status   04/10/2025 53 (H) 6 - 20 mg/dL Final   04/10/2025 51 (H) 6 - 20 mg/dL Final   04/09/2025 42 (H) 6 - 20 mg/dL Final   04/08/2025 72 (H) 6 - 20 mg/dL Final      Creatinine Creatinine   Date Value Ref Range Status   04/10/2025 8.00 (H) 0.57 - 1.00 mg/dL Final   04/10/2025 7.36 (H) 0.57 - 1.00 mg/dL Final   04/09/2025 6.61 (H) 0.57 - 1.00 mg/dL Final   04/08/2025 5.61 (H) 0.60 - 1.30 mg/dL Final   04/08/2025 10.74 (H) 0.57 - 1.00 mg/dL Final      Calcium Calcium   Date Value Ref Range Status   04/10/2025 8.5 (L) 8.6 - 10.5 mg/dL Final   04/10/2025 8.0 (L) 8.6 - 10.5 mg/dL Final   04/09/2025 8.6 8.6 - 10.5 mg/dL Final   04/08/2025 8.2 (L) 8.6 - 10.5 mg/dL Final      PO4 No results found for: \"CAPO4\"   Albumin Albumin   Date Value Ref Range Status   04/10/2025 3.1 (L) 3.5 - 5.2 g/dL Final   04/10/2025 2.6 (L) 3.5 - 5.2 g/dL Final   04/08/2025 3.0 (L) 3.5 - 5.2 g/dL Final        Magnesium Magnesium   Date Value Ref Range Status   04/08/2025 2.9 (H) 1.6 - 2.6 mg/dL Final        Uric Acid No results found for: \"URICACID\"        Results Review:     I reviewed the patient's new clinical results.    albumin human, 12.5 g, Intravenous, Once  aspirin, 81 mg, Oral, Daily  atorvastatin, 40 mg, Oral, Nightly  epoetin kindra/kindra-epbx, 10,000 Units, Intravenous, Once per day on Tuesday Thursday Saturday  ertapenem, 500 mg, Intravenous, Q24H  fluticasone, 2 spray, Each Nare, Daily  heparin (porcine), " 5,000 Units, Subcutaneous, Q8H  insulin glargine, 13 Units, Subcutaneous, Nightly  insulin lispro, 2-7 Units, Subcutaneous, 4x Daily AC & at Bedtime  insulin lispro, 3 Units, Subcutaneous, TID With Meals  lanthanum, 2,000 mg, Oral, BID With Meals  Lidocaine, 1 patch, Transdermal, Q24H  midodrine, 5 mg, Oral, Once per day on Monday Tuesday Thursday Saturday  sodium chloride, 10 mL, Intravenous, Q12H  sodium chloride, 10 mL, Intravenous, Q12H  sodium chloride, 10 mL, Intravenous, Q12H  sodium chloride, 10 mL, Intravenous, Q12H  sodium chloride, 10 mL, Intravenous, Q12H  cyanocobalamin, 1,000 mcg, Oral, Daily  vitamin D, 50,000 Units, Oral, Once per day on Monday Thursday             Medication Review: Reviewed    Assessment & Plan     1) ESRD - on TTS HD, completed treatment yesterday without event, 2 L of fluid was removed.  2) Abdominal pain  3) L hydronephrosis with emphysematous pyelonephritis, status post cystoscopy/stent on 4/2  4) DM  5) Hypotension - on Midodrine on non-HD days but currently normotensive  6) Metabolic acidosis, improved  7) Anemia of CKD  8) ESBL UTI/Pyelo  9) Bacteremia  10) s/p cardiac arrest: bradycardia with subsequent PEA.  Another episode of rapid response last night with respiratory arrest, currently stable      Plan:   Patient seen and examined on dialysis, stable  No further hypotension noted she has not needed any recent midodrine and remains hypertensive  Electrolytes and volume at goal  UF with HD as tolerated  Was originally prescribed 2 L UF but she refused to have that much taken off and instead, 1 L was removed    Limit narcotics  Continue midodrine as needed with HD for hypotension  CTA essentially unremarkable, no pulmonary embolus    Tunnel catheter in place for access, okay to use AV fistula as well  Epogen with HD for anemia  Renally dose antibiotics for GFR less than 10  Discussed with patient and Dr. Jason at bedside and with Jose with dialysis      Tyler Neville  MD [facility]  04/10/25  16:06 EDT

## 2025-04-10 NOTE — PLAN OF CARE
Goal Outcome Evaluation:   Patient rested well overnight. On 2L NC.  PRN tylenol given for pain. Plan for HD this morning.

## 2025-04-10 NOTE — PROGRESS NOTES
" LOS: 8 days     Chief Complaint: ESBL bacteremia    Interval History: Patient sleeping comfortably this morning.  On room air.  No further fevers.  Tolerating antibiotics.    Vital Signs  Temp:  [97.6 °F (36.4 °C)-98.7 °F (37.1 °C)] 97.8 °F (36.6 °C)  Heart Rate:  [61-82] 62  Resp:  [13-20] 16  BP: ()/() 144/102    Physical Exam:  General: In no acute distress  HEENT: Oropharynx clear, moist mucous membranes  Respiratory: Normal work of breathing.  On nasal cannula.  Skin: No rashes or lesions in exposed areas  Extremities: No edema, cyanosis  Access: Peripheral IV.    Antibiotics:  Anti-Infectives (From admission, onward)      Ordered     Dose/Rate Route Frequency Start Stop    04/02/25 2249  ertapenem (INVanz) 500 mg in sodium chloride 0.9 % 50 mL IVPB        Ordering Provider: Damian Nguyen MD    500 mg  100 mL/hr over 30 Minutes Intravenous Every 24 Hours 04/02/25 2345 04/16/25 2359    04/02/25 0933  cefTRIAXone (ROCEPHIN) 2,000 mg in sodium chloride 0.9 % 100 mL MBP        Ordering Provider: Lillian Winslow PA-C    2,000 mg  200 mL/hr over 30 Minutes Intravenous Once 04/02/25 0949 04/02/25 1157             Results Review:     I reviewed the patient's new clinical results.    Lab Results   Component Value Date    WBC 20.95 (H) 04/09/2025    HGB 9.1 (L) 04/09/2025    HCT 28.8 (L) 04/09/2025    MCV 99.3 (H) 04/09/2025     04/09/2025     Lab Results   Component Value Date    GLUCOSE 98 04/10/2025    BUN 51 (H) 04/10/2025    CREATININE 7.36 (H) 04/10/2025    EGFRIFNONA 45 (L) 10/20/2021    EGFRIFAFRI 8 (L) 03/10/2023    BCR 6.9 (L) 04/10/2025    CO2 20.5 (L) 04/10/2025    CALCIUM 8.0 (L) 04/10/2025    ALBUMIN 2.6 (L) 04/10/2025    AST 11 04/02/2025    ALT 6 04/02/2025       No results found for: \"VANCOPEAK\", \"VANCOTROUGH\", \"VANCORANDOM\"     Microbiology:  Microbiology Results (last 10 days)       Procedure Component Value - Date/Time    MRSA Screen, PCR (Inpatient) - Swab, Nares [[id number]]  " (Normal) Collected: 04/09/25 1049    Lab Status: Final result Specimen: Swab from Nares Updated: 04/09/25 1211     MRSA PCR No MRSA Detected    Narrative:      The negative predictive value of this diagnostic test is high and should only be used to consider de-escalating anti-MRSA therapy. A positive result may indicate colonization with MRSA and must be correlated clinically.    Urine Culture - Urine, Urine, Catheter [[id number]]  (Abnormal)  (Susceptibility) Collected: 04/02/25 1707    Lab Status: Final result Specimen: Urine, Catheter Updated: 04/04/25 0947     Urine Culture >100,000 CFU/mL Escherichia coli ESBL    Narrative:      Colonization of the urinary tract without infection is common. Treatment is discouraged unless the patient is symptomatic, pregnant, or undergoing an invasive urologic procedure.  Recent outcomes data supports the use of pip/tazo in the treatment of susceptible ESBL infections for uncomplicated UTI. Consider use of pip/tazo as a carbapenem-sparing regimen in applicable patients.    Susceptibility        Escherichia coli ESBL      LEANN      Ciprofloxacin Resistant      Ertapenem Susceptible      Gentamicin Susceptible      Levofloxacin Resistant      Meropenem Susceptible      Nitrofurantoin Susceptible      Piperacillin + Tazobactam Susceptible      Trimethoprim + Sulfamethoxazole Resistant                           Blood Culture - Blood, Arm, Left [[id number]]  (Abnormal) Collected: 04/02/25 1113    Lab Status: Final result Specimen: Blood from Arm, Left Updated: 04/04/25 0640     Blood Culture Escherichia coli ESBL     Comment:   Consider infectious disease consult.  Susceptibility results may not correlate to clinical outcomes.  For ESBL-producing infections in the blood, a carbapenem is recommended as first-line therapy for optimal clinical outcomes.        Isolated from Aerobic and Anaerobic Bottles     Gram Stain Anaerobic Bottle Gram negative bacilli      Aerobic Bottle Gram  negative bacilli    Narrative:      Refer to previous blood culture collected on 04/02/2025 at 1043 for MICs.      Blood Culture - Blood, Hand, Left [[id number]]  (Abnormal)  (Susceptibility) Collected: 04/02/25 1043    Lab Status: Final result Specimen: Blood from Hand, Left Updated: 04/04/25 0639     Blood Culture Escherichia coli ESBL     Comment:   Consider infectious disease consult.  Susceptibility results may not correlate to clinical outcomes.  For ESBL-producing infections in the blood, a carbapenem is recommended as first-line therapy for optimal clinical outcomes.        Isolated from Aerobic and Anaerobic Bottles     Gram Stain Anaerobic Bottle Gram negative bacilli      Aerobic Bottle Gram negative bacilli    Susceptibility        Escherichia coli ESBL      LEANN      Ciprofloxacin Resistant      Ertapenem Susceptible      Levofloxacin Resistant      Meropenem Susceptible      Trimethoprim + Sulfamethoxazole Resistant                       Susceptibility Comments       Escherichia coli ESBL    With the exception of urinary-sourced infections, aminoglycosides should not be used as monotherapy.               Blood Culture ID, PCR - Blood, Hand, Left [[id number]]  (Abnormal) Collected: 04/02/25 1043    Lab Status: Final result Specimen: Blood from Hand, Left Updated: 04/02/25 2022     BCID, PCR Escherichia coli. Identification by BCID2 PCR.     BCID, PCR 2 CTX-M (ESBL) detected. Identification by BCID2 PCR     BOTTLE TYPE Anaerobic Bottle    Urine Culture - Urine, Urine, Clean Catch [[id number]]  (Abnormal)  (Susceptibility) Collected: 04/02/25 1017    Lab Status: Final result Specimen: Urine, Clean Catch Updated: 04/04/25 0945     Urine Culture >100,000 CFU/mL Escherichia coli ESBL    Narrative:      Colonization of the urinary tract without infection is common. Treatment is discouraged unless the patient is symptomatic, pregnant, or undergoing an invasive urologic procedure.  Recent outcomes data supports the  use of pip/tazo in the treatment of susceptible ESBL infections for uncomplicated UTI. Consider use of pip/tazo as a carbapenem-sparing regimen in applicable patients.    Susceptibility        Escherichia coli ESBL      LEANN      Ciprofloxacin Resistant      Ertapenem Susceptible      Gentamicin Susceptible      Levofloxacin Resistant      Meropenem Susceptible      Nitrofurantoin Susceptible      Piperacillin + Tazobactam Susceptible      Trimethoprim + Sulfamethoxazole Resistant                           Respiratory Panel PCR w/COVID-19(SARS-CoV-2) [facility]ST[facility]/G[facility]FIELD/MARCELO/PAD/[facility]/AMY In-House, NP Swab in UTM/VTM, 2 HR TAT - Swab, Nasopharynx [[id number]]  (Normal) Collected: 04/02/25 0756    Lab Status: Final result Specimen: Swab from Nasopharynx Updated: 04/02/25 0858     ADENOVIRUS, PCR Not Detected     Coronavirus 229E Not Detected     Coronavirus HKU1 Not Detected     Coronavirus NL63 Not Detected     Coronavirus OC43 Not Detected     COVID19 Not Detected     Human Metapneumovirus Not Detected     Human Rhinovirus/Enterovirus Not Detected     Influenza A PCR Not Detected     Influenza B PCR Not Detected     Parainfluenza Virus 1 Not Detected     Parainfluenza Virus 2 Not Detected     Parainfluenza Virus 3 Not Detected     Parainfluenza Virus 4 Not Detected     RSV, PCR Not Detected     Bordetella pertussis pcr Not Detected     Bordetella parapertussis PCR Not Detected     Chlamydophila pneumoniae PCR Not Detected     Mycoplasma pneumo by PCR Not Detected    Narrative:      In the setting of a positive respiratory panel with a viral infection PLUS a negative procalcitonin without other underlying concern for bacterial infection, consider observing off antibiotics or discontinuation of antibiotics and continue supportive care. If the respiratory panel is positive for atypical bacterial infection (Bordetella pertussis, Chlamydophila pneumoniae, or Mycoplasma pneumoniae), consider antibiotic de-escalation to target atypical  bacterial infection.               Isolation:   Contact    Assessment    #ESBL E. coli septicemia  #Left pyelonephritis  #Left hydronephrosis status post cystoscopy and left ureteral stent insertion 4/2  #ESRD on hemodialysis  #Type 2 diabetes    Repeat blood cultures unable to be obtained and she has remained afebrile.  MRSA nares is negative.  Continue ertapenem 500 mg daily for prior outlined course through end date of 4/16.  She will need outpatient follow-up with urology for stent removal.      -----------------------------------------------------------------------------------------------  The above decisions regarding antimicrobials incorporates elements of engaging in complex medical decision-making associated with antimicrobial prescribing including considerations such as antimicrobial resistance patterns, emergence of new variants/strains, recent antibiotic exposure, interactions/complications from comorbidities including concurrent infections, public health considerations to minimize development of antimicrobial resistance, and emerging and re-emerging infections.

## 2025-04-10 NOTE — PLAN OF CARE
Goal Outcome Evaluation:  Plan of Care Reviewed With: patient           Outcome Evaluation: Pt with improved toleration to movement today, less assist required to get out of bed and stand, took some steps along bed and in room, gait still unsteady and shaky with min A x 2, fatigues quickly but no c/o dizziness or light headedness, BP readings are being taken in R calf and getting questionable readings in standing. PT will cont to follow and progress as tolerated

## 2025-04-10 NOTE — PROGRESS NOTES
"  Daily Progress Note.   [facility]  4/10/2025    Patient:  Name:  Kelly Pack  MRN:  [id number]  1970  54 y.o.  female         CC: pea arrest    Interval History:  No acute events  Ct angio neg  Blood pressure on generous side  2lnc  No lethargy no confusion no seizure like episodes  Ct angio neg for pe    Physical Exam:  BP (!) 151/121   Pulse 77   Temp 97.8 °F (36.6 °C) (Oral)   Resp 16   Ht 175.3 cm (69.02\")   Wt 112 kg (247 lb 2.2 oz)   SpO2 97%   BMI 36.48 kg/m²   Body mass index is 36.48 kg/m².    Intake/Output Summary (Last 24 hours) at 4/10/2025 1014  Last data filed at 4/10/2025 0400  Gross per 24 hour   Intake 730 ml   Output 0 ml   Net 730 ml     General appearance: Nontoxic, conversant   Eyes: anicteric sclerae, moist conjunctivae; no lidlag;    HENT: Atraumatic; oropharynx clear with moist mucous membranes    Neck: Trachea midline;  supple large neck circumference  Lungs: CTA, with mildly limited respiratory effort and no intercostal retractions right IJ tunneled HD cath  CV: RRR, no rub   Abdomen: Soft, truncal obesity; BS+  Extremities: No peripheral edema positive for right upper extremity HD access    Skin: WWP no diffuse visible rash  Psych: Appropriate affect, alert   Neuro: Moves all ext. CN II-XII. Speech intact.    Data Review:  Notable Labs:  Results from last 7 days   Lab Units 04/09/25  0829 04/08/25  1711 04/08/25  0532 04/07/25  0503 04/06/25  0405 04/05/25  0747 04/04/25  1747 04/04/25  0628   WBC 10*3/mm3 20.95*  --  15.31* 16.65* 16.79* 13.20* 17.21* 16.84*   HEMOGLOBIN g/dL 9.1*  --  8.9* 8.0* 7.9* 8.9* 8.8* 9.4*   HEMOGLOBIN, POC g/dL  --  9.0*  --   --   --   --   --   --    PLATELETS 10*3/mm3 268  --  376 309 252 264 249 277     Results from last 7 days   Lab Units 04/10/25  0039 04/09/25  0829 04/08/25  1711 04/08/25  0532 04/07/25  0503 04/06/25  0405 04/05/25  0747 04/04/25  1554   SODIUM mmol/L 126* 129*  --  134* 136 135* 134* 132* "   POTASSIUM mmol/L 4.3 4.7  --  4.1 4.0 3.6 4.1 4.2   CHLORIDE mmol/L 93* 94*  --  95* 99 97* 95* 93*   CO2 mmol/L 20.5* 20.3*  --  21.8* 22.4 23.0 21.4* 17.9*   BUN mg/dL 51* 42*  --  72* 66* 60* 91* 81*   CREATININE mg/dL 7.36* 6.61* 5.61* 10.74* 8.60* 6.85* 9.34* 8.56*   GLUCOSE mg/dL 98 124*  --  104* 118* 139* 100* 307*   CALCIUM mg/dL 8.0* 8.6  --  8.2* 8.3* 8.0* 8.2* 8.5*   MAGNESIUM mg/dL  --   --   --  2.9*  --   --   --   --    PHOSPHORUS mg/dL 4.5  --   --  5.7*  --   --   --   --    Estimated Creatinine Clearance: 11.9 mL/min (A) (by C-G formula based on SCr of 7.36 mg/dL (H)).    Results from last 7 days   Lab Units 04/09/25  0829 04/08/25  1711 04/08/25  0532 04/07/25  0503 04/05/25  0747 04/05/25  0004 04/04/25  2102   LACTATE mmol/L  --  1.7  --   --   --  1.6 2.4*   PLATELETS 10*3/mm3 268  --  376 309   < >  --   --     < > = values in this interval not displayed.       Results from last 7 days   Lab Units 04/08/25  1711 04/04/25  1541   PH, ARTERIAL pH units 7.372 7.385   PCO2, ARTERIAL mm Hg 48.2* 37.6   PO2 ART mm Hg 213.3* 538.3*   HCO3 ART mmol/L 28.0 22.5       Imaging:  Reviewed chest images personally from past 3 days    ASSESSMENT  /  PLAN:  1) ESRD - on TTS HD, CODE BLUE with dialysis on Friday, completed a full dialysis treatment on 4/5, no UF at that time.  2) Abdominal pain  3) L hydronephrosis with emphysematous pyelonephritis, status post cystoscopy/stent on 4/2  4) DM  5) Hypotension - on Midodrine on non-HD days but currently normotensive  6) Metabolic acidosis, improved  7) Anemia of CKD  8) ESBL UTI/Pyelo  9) Bacteremia  10) s/p cardiac arrest: bradycardia with subsequent PEA.       Monitor in icu  Jean Carlos[reciate cards re-evaluation  Abx per ID cultures ordered, maybe can be obtained during hd?  Dc narcotics  Midodrine with HD  Ct angioneg    Monitor on telemetry    Use Tylenol prn Pain     Maintain in ICU.     Plan of care and patient course was reviewed with multidisciplinary team  in morning rounds.      Electronically signed by Rodolfo Jason MD, 04/10/25, 10:17 AM EDT.

## 2025-04-11 LAB
ALBUMIN SERPL-MCNC: 3 G/DL (ref 3.5–5.2)
ANION GAP SERPL CALCULATED.3IONS-SCNC: 14 MMOL/L (ref 5–15)
BASOPHILS # BLD AUTO: 0.09 10*3/MM3 (ref 0–0.2)
BASOPHILS NFR BLD AUTO: 0.5 % (ref 0–1.5)
BUN SERPL-MCNC: 35 MG/DL (ref 6–20)
BUN/CREAT SERPL: 5.9 (ref 7–25)
CALCIUM SPEC-SCNC: 8.5 MG/DL (ref 8.6–10.5)
CHLORIDE SERPL-SCNC: 95 MMOL/L (ref 98–107)
CO2 SERPL-SCNC: 23 MMOL/L (ref 22–29)
CREAT SERPL-MCNC: 5.94 MG/DL (ref 0.57–1)
DEPRECATED RDW RBC AUTO: 51.4 FL (ref 37–54)
EGFRCR SERPLBLD CKD-EPI 2021: 8 ML/MIN/1.73
EOSINOPHIL # BLD AUTO: 0.43 10*3/MM3 (ref 0–0.4)
EOSINOPHIL NFR BLD AUTO: 2.5 % (ref 0.3–6.2)
ERYTHROCYTE [DISTWIDTH] IN BLOOD BY AUTOMATED COUNT: 14.2 % (ref 12.3–15.4)
GLUCOSE BLDC GLUCOMTR-MCNC: 129 MG/DL (ref 70–130)
GLUCOSE BLDC GLUCOMTR-MCNC: 133 MG/DL (ref 70–130)
GLUCOSE BLDC GLUCOMTR-MCNC: 186 MG/DL (ref 70–130)
GLUCOSE BLDC GLUCOMTR-MCNC: 90 MG/DL (ref 70–130)
GLUCOSE SERPL-MCNC: 85 MG/DL (ref 65–99)
HCT VFR BLD AUTO: 28.2 % (ref 34–46.6)
HGB BLD-MCNC: 9 G/DL (ref 12–15.9)
IMM GRANULOCYTES # BLD AUTO: 0.81 10*3/MM3 (ref 0–0.05)
IMM GRANULOCYTES NFR BLD AUTO: 4.8 % (ref 0–0.5)
LYMPHOCYTES # BLD AUTO: 1.54 10*3/MM3 (ref 0.7–3.1)
LYMPHOCYTES NFR BLD AUTO: 9 % (ref 19.6–45.3)
MCH RBC QN AUTO: 31.7 PG (ref 26.6–33)
MCHC RBC AUTO-ENTMCNC: 31.9 G/DL (ref 31.5–35.7)
MCV RBC AUTO: 99.3 FL (ref 79–97)
MONOCYTES # BLD AUTO: 1.34 10*3/MM3 (ref 0.1–0.9)
MONOCYTES NFR BLD AUTO: 7.9 % (ref 5–12)
NEUTROPHILS NFR BLD AUTO: 12.81 10*3/MM3 (ref 1.7–7)
NEUTROPHILS NFR BLD AUTO: 75.3 % (ref 42.7–76)
NRBC BLD AUTO-RTO: 0.2 /100 WBC (ref 0–0.2)
PHOSPHATE SERPL-MCNC: 3.9 MG/DL (ref 2.5–4.5)
PLATELET # BLD AUTO: 402 10*3/MM3 (ref 140–450)
PMV BLD AUTO: 9.5 FL (ref 6–12)
POTASSIUM SERPL-SCNC: 4.2 MMOL/L (ref 3.5–5.2)
RBC # BLD AUTO: 2.84 10*6/MM3 (ref 3.77–5.28)
SODIUM SERPL-SCNC: 132 MMOL/L (ref 136–145)
WBC NRBC COR # BLD AUTO: 17.02 10*3/MM3 (ref 3.4–10.8)

## 2025-04-11 PROCEDURE — 63710000001 INSULIN GLARGINE PER 5 UNITS: Performed by: INTERNAL MEDICINE

## 2025-04-11 PROCEDURE — 99232 SBSQ HOSP IP/OBS MODERATE 35: CPT | Performed by: STUDENT IN AN ORGANIZED HEALTH CARE EDUCATION/TRAINING PROGRAM

## 2025-04-11 PROCEDURE — 25010000002 HEPARIN (PORCINE) PER 1000 UNITS: Performed by: INTERNAL MEDICINE

## 2025-04-11 PROCEDURE — 82948 REAGENT STRIP/BLOOD GLUCOSE: CPT

## 2025-04-11 PROCEDURE — 63710000001 INSULIN LISPRO (HUMAN) PER 5 UNITS: Performed by: INTERNAL MEDICINE

## 2025-04-11 PROCEDURE — 87040 BLOOD CULTURE FOR BACTERIA: CPT | Performed by: INTERNAL MEDICINE

## 2025-04-11 PROCEDURE — 97530 THERAPEUTIC ACTIVITIES: CPT

## 2025-04-11 PROCEDURE — G0545 PR INHERENT VISIT TO INPT: HCPCS | Performed by: STUDENT IN AN ORGANIZED HEALTH CARE EDUCATION/TRAINING PROGRAM

## 2025-04-11 PROCEDURE — 80069 RENAL FUNCTION PANEL: CPT | Performed by: INTERNAL MEDICINE

## 2025-04-11 PROCEDURE — 85025 COMPLETE CBC W/AUTO DIFF WBC: CPT | Performed by: INTERNAL MEDICINE

## 2025-04-11 PROCEDURE — 25010000002 ERTAPENEM PER 500 MG: Performed by: INTERNAL MEDICINE

## 2025-04-11 RX ADMIN — Medication 10 ML: at 21:53

## 2025-04-11 RX ADMIN — Medication 10 ML: at 09:01

## 2025-04-11 RX ADMIN — LIDOCAINE 1 PATCH: 4 PATCH TOPICAL at 08:32

## 2025-04-11 RX ADMIN — INSULIN LISPRO 3 UNITS: 100 INJECTION, SOLUTION INTRAVENOUS; SUBCUTANEOUS at 18:05

## 2025-04-11 RX ADMIN — LANTHANUM CARBONATE 2000 MG: 500 TABLET, CHEWABLE ORAL at 08:32

## 2025-04-11 RX ADMIN — ACETAMINOPHEN 650 MG: 325 TABLET, FILM COATED ORAL at 21:52

## 2025-04-11 RX ADMIN — HEPARIN SODIUM 5000 UNITS: 5000 INJECTION INTRAVENOUS; SUBCUTANEOUS at 21:53

## 2025-04-11 RX ADMIN — INSULIN GLARGINE 13 UNITS: 100 INJECTION, SOLUTION SUBCUTANEOUS at 21:53

## 2025-04-11 RX ADMIN — HEPARIN SODIUM 5000 UNITS: 5000 INJECTION INTRAVENOUS; SUBCUTANEOUS at 05:14

## 2025-04-11 RX ADMIN — ACETAMINOPHEN 650 MG: 325 TABLET, FILM COATED ORAL at 14:00

## 2025-04-11 RX ADMIN — Medication 1000 MCG: at 08:32

## 2025-04-11 RX ADMIN — ERTAPENEM SODIUM 500 MG: 1 INJECTION, POWDER, LYOPHILIZED, FOR SOLUTION INTRAMUSCULAR; INTRAVENOUS at 22:54

## 2025-04-11 RX ADMIN — HEPARIN SODIUM 5000 UNITS: 5000 INJECTION INTRAVENOUS; SUBCUTANEOUS at 13:55

## 2025-04-11 RX ADMIN — ATORVASTATIN CALCIUM 40 MG: 20 TABLET, FILM COATED ORAL at 21:53

## 2025-04-11 RX ADMIN — ACETAMINOPHEN 650 MG: 325 TABLET, FILM COATED ORAL at 05:19

## 2025-04-11 RX ADMIN — ASPIRIN 81 MG: 81 TABLET, COATED ORAL at 08:32

## 2025-04-11 NOTE — CASE MANAGEMENT/SOCIAL WORK
Continued Stay Note  [facility]     Patient Name: Kelly Pack  MRN: [id number]  Today's Date: 4/11/2025    Admit Date: 4/2/2025    Plan: Acute rehab referral pending   Discharge Plan       Row Name 04/11/25 1352       Plan    Plan Acute rehab referral pending    Plan Comments CCP spoke with [facility] who states they cannot accept. They ran it through their Nephrologist who declined. CCP spoke with patient at bedside and notified of [facility] declining. CCP discussed acute rehab referrals with HD are: [facility], [facility], and [facility]. Patient would like referral to [facility]. KARINA, CSW                   Discharge Codes    No documentation.                 Expected Discharge Date and Time       Expected Discharge Date Expected Discharge Time    Apr 15, 2025               SAROJ Pinto

## 2025-04-11 NOTE — PROGRESS NOTES
RENAL/K[facility]:     LOS: 9 days    Patient Care Team:  Kim Benjamni APRN as PCP - General (Nurse Practitioner)  Miguel Stahl MD as Consulting Physician (Hematology and Oncology)  Radha Martinez APRN as Referring Physician (Nurse Practitioner)    Chief Complaint:  ESRD, Abdominal pain    Subjective     Interval History:   4/7: Chart reviewed  S/P cysto and L ureteral stent placement on 4/2/25  Extubated on Saturday  Feeling well, denies new complaints    4/8: Patient seen and examined on hemodialysis, she was seen about 30 minutes into her treatment, tolerating well so far, BP in the 140s, set for 2 L UF  She denies any shortness of breath chest pain or edema    4/9: She completed dialysis yesterday without event, 2 L of fluid was removed blood pressure was stable throughout her treatment  She declined to take midodrine with dialysis yesterday but looks like her blood pressure stable throughout the whole treatment  Another rapid response was called yesterday evening for respiratory arrest without PEA she was transferred to the ICU and is currently stable, somewhat hypertensive and O2 sats 100% on 2 L without any respiratory distress    April 10: Patient seen and examined in the ICU, on dialysis, tolerating well  She has been hypertensive throughout her treatment and has not needed Midodrine  Remains on oxygen, 1 to 2 L denies any worsening shortness of breath, no edema  Scheduled for 2 L UF but she refused that, goal is 1 L which looks like has been met    April 11: She tolerated dialysis very well yesterday  Denies new complaints, BP stable overnight  Sats 100% on 1 L  No dyspnea no edema no chest pain    Objective     Vital Sign Min/Max for last 24 hours  Temp  Min: 97.2 °F (36.2 °C)  Max: 98.4 °F (36.9 °C)   BP  Min: 111/90  Max: 170/72   Pulse  Min: 62  Max: 86   No data recorded   SpO2  Min: 82 %  Max: 100 %   Flow (L/min) (Oxygen Therapy)  Min: 2  Max: 2   Weight  Min: 118 kg (260 lb 2.3 oz)  Max:  "118 kg (260 lb 2.3 oz)     Flowsheet Rows      Flowsheet Row First Filed Value   Admission Height 175.3 cm (69\") Documented at 04/02/2025 1049   Admission Weight 110 kg (243 lb) Documented at 04/02/2025 1049            No intake/output data recorded.  I/O last 3 completed shifts:  In: 480 [P.O.:480]  Out: 1000     Physical Exam:  GEN: Awake, alert, responsive, no acute distress, obese  ENT: PERRL, EOMI, MMM.  NECK: Supple, no JVD. RIJ tunneled HD catheter remains in place  CHEST: CTAB, no W/R/C.  CV: RRR, no M/G/R  ABD: Soft, NT, +BS  SKIN: Warm and Dry  NEURO: AAOX3   RUE AVF +thrill and bruit     WBC WBC   Date Value Ref Range Status   04/11/2025 17.02 (H) 3.40 - 10.80 10*3/mm3 Final   04/10/2025 17.05 (H) 3.40 - 10.80 10*3/mm3 Final   04/09/2025 20.95 (H) 3.40 - 10.80 10*3/mm3 Final      HGB Hemoglobin   Date Value Ref Range Status   04/11/2025 9.0 (L) 12.0 - 15.9 g/dL Final   04/10/2025 8.5 (L) 12.0 - 15.9 g/dL Final   04/09/2025 9.1 (L) 12.0 - 15.9 g/dL Final   04/08/2025 9.0 (L) 12.0 - 17.0 g/dL Final     Comment:     Serial Number: [id number]:  [id number]      HCT Hematocrit   Date Value Ref Range Status   04/11/2025 28.2 (L) 34.0 - 46.6 % Final   04/10/2025 27.3 (L) 34.0 - 46.6 % Final   04/09/2025 28.8 (L) 34.0 - 46.6 % Final   04/08/2025 27 (L) 38 - 51 % Final      Platlets No results found for: \"LABPLAT\"   MCV MCV   Date Value Ref Range Status   04/11/2025 99.3 (H) 79.0 - 97.0 fL Final   04/10/2025 100.7 (H) 79.0 - 97.0 fL Final   04/09/2025 99.3 (H) 79.0 - 97.0 fL Final          Sodium Sodium   Date Value Ref Range Status   04/11/2025 132 (L) 136 - 145 mmol/L Final   04/10/2025 127 (L) 136 - 145 mmol/L Final   04/10/2025 126 (L) 136 - 145 mmol/L Final   04/09/2025 129 (L) 136 - 145 mmol/L Final      Potassium Potassium   Date Value Ref Range Status   04/11/2025 4.2 3.5 - 5.2 mmol/L Final   04/10/2025 4.6 3.5 - 5.2 mmol/L Final   04/10/2025 4.3 3.5 - 5.2 mmol/L Final     Comment:     Slight hemolysis " "detected by analyzer. Result may be falsely elevated.   04/09/2025 4.7 3.5 - 5.2 mmol/L Final     Comment:     Slight hemolysis detected by analyzer. Result may be falsely elevated.      Chloride Chloride   Date Value Ref Range Status   04/11/2025 95 (L) 98 - 107 mmol/L Final   04/10/2025 93 (L) 98 - 107 mmol/L Final   04/10/2025 93 (L) 98 - 107 mmol/L Final   04/09/2025 94 (L) 98 - 107 mmol/L Final      CO2 CO2   Date Value Ref Range Status   04/11/2025 23.0 22.0 - 29.0 mmol/L Final   04/10/2025 21.0 (L) 22.0 - 29.0 mmol/L Final   04/10/2025 20.5 (L) 22.0 - 29.0 mmol/L Final   04/09/2025 20.3 (L) 22.0 - 29.0 mmol/L Final      BUN BUN   Date Value Ref Range Status   04/11/2025 35 (H) 6 - 20 mg/dL Final   04/10/2025 53 (H) 6 - 20 mg/dL Final   04/10/2025 51 (H) 6 - 20 mg/dL Final   04/09/2025 42 (H) 6 - 20 mg/dL Final      Creatinine Creatinine   Date Value Ref Range Status   04/11/2025 5.94 (H) 0.57 - 1.00 mg/dL Final   04/10/2025 8.00 (H) 0.57 - 1.00 mg/dL Final   04/10/2025 7.36 (H) 0.57 - 1.00 mg/dL Final   04/09/2025 6.61 (H) 0.57 - 1.00 mg/dL Final   04/08/2025 5.61 (H) 0.60 - 1.30 mg/dL Final      Calcium Calcium   Date Value Ref Range Status   04/11/2025 8.5 (L) 8.6 - 10.5 mg/dL Final   04/10/2025 8.5 (L) 8.6 - 10.5 mg/dL Final   04/10/2025 8.0 (L) 8.6 - 10.5 mg/dL Final   04/09/2025 8.6 8.6 - 10.5 mg/dL Final      PO4 No results found for: \"CAPO4\"   Albumin Albumin   Date Value Ref Range Status   04/11/2025 3.0 (L) 3.5 - 5.2 g/dL Final   04/10/2025 3.1 (L) 3.5 - 5.2 g/dL Final   04/10/2025 2.6 (L) 3.5 - 5.2 g/dL Final        Magnesium No results found for: \"MG\"       Uric Acid No results found for: \"URICACID\"        Results Review:     I reviewed the patient's new clinical results.    albumin human, 12.5 g, Intravenous, Once  aspirin, 81 mg, Oral, Daily  atorvastatin, 40 mg, Oral, Nightly  epoetin kindra/kindra-epbx, 10,000 Units, Intravenous, Once per day on Tuesday Thursday Saturday  ertapenem, 500 mg, " Intravenous, Q24H  fluticasone, 2 spray, Each Nare, Daily  heparin (porcine), 5,000 Units, Subcutaneous, Q8H  insulin glargine, 13 Units, Subcutaneous, Nightly  insulin lispro, 2-7 Units, Subcutaneous, 4x Daily AC & at Bedtime  insulin lispro, 3 Units, Subcutaneous, TID With Meals  lanthanum, 2,000 mg, Oral, BID With Meals  Lidocaine, 1 patch, Transdermal, Q24H  midodrine, 5 mg, Oral, Once per day on Monday Tuesday Thursday Saturday  sodium chloride, 10 mL, Intravenous, Q12H  sodium chloride, 10 mL, Intravenous, Q12H  sodium chloride, 10 mL, Intravenous, Q12H  sodium chloride, 10 mL, Intravenous, Q12H  sodium chloride, 10 mL, Intravenous, Q12H  cyanocobalamin, 1,000 mcg, Oral, Daily  vitamin D, 50,000 Units, Oral, Once per day on Monday Thursday             Medication Review: Reviewed    Assessment & Plan     1) ESRD - on TTS HD, completed treatment yesterday without event,  L of fluid was removed.  2) Abdominal pain, improved  3) L hydronephrosis with emphysematous pyelonephritis, status post cystoscopy/stent on 4/2  4) DM  5) Hypotension -stabilizing off midodrine  6) Metabolic acidosis, improved  7) Anemia of CKD  8) ESBL UTI/Pyelo  9) Bacteremia  10) s/p cardiac arrest: bradycardia with subsequent PEA.  Another episode of rapid response with respiratory arrest, currently stable, possibly related to narcotics      Plan:   She tolerated dialysis very well yesterday, 1 L of fluid was removed  Blood pressure remained stable throughout her treatment  Continue as needed midodrine with HD  Electrolytes and volume at goal today, oxygenation adequate    UF with HD as tolerated  Continue toLimit narcotics  Tunnel catheter in place for access, okay to use AV fistula as well  Epogen with HD for anemia  Renally dose antibiotics for GFR less than 10      Tyler Roman MD  [facility]  04/11/25  11:31 EDT

## 2025-04-11 NOTE — DISCHARGE PLACEMENT REQUEST
"Kelly Pack (54 y.o. Female)       Date of Birth   1970    Social Security Number       Address   [address] [facility] [address]    Home Phone   [phone]    MRN   [id number]       Hindu   Latter day    Marital Status   Single                            Admission Date   4/2/2025    Admission Type   Emergency    Admitting Provider   Génesis Miles MD    Attending Provider   Kevin Velasquez MD    Department, Room/Bed   [facility] INTENSIVE CARE, I384/1       Discharge Date       Discharge Disposition       Discharge Destination                                 Attending Provider: Kevin Velasquez MD    Allergies: Tomato, Mixed Grasses, Penicillin G, Cat Dander, Latex    Isolation: Contact   Infection: ESBL E coli (04/04/25)   Code Status: CPR    Ht: 175.3 cm (69.02\")   Wt: 118 kg (260 lb 2.3 oz)    Admission Cmt: None   Principal Problem: Sepsis [A41.9]                   Active Insurance as of 4/2/2025       Primary Coverage       Payor Plan Insurance Group Employer/Plan Group    MEDICARE MEDICARE A & B        Payor Plan Address Payor Plan Phone Number Payor Plan Fax Number Effective Dates    [address] [phone]  10/1/2023 - None Entered    formerly [facility] [address]         Subscriber Name Subscriber Birth Date Member ID       KELLY PACK 1970 [id number]               Secondary Coverage       Payor Plan Insurance Group Employer/Plan Group    HUMANA HUMANA [facility]              [id number]       Payor Plan Address Payor Plan Phone Number Payor Plan Fax Number Effective Dates    [address] [address]   3/1/2025 - None Entered    [facility] [address]         Subscriber Name Subscriber Birth Date Member ID       KELLY PACK 1970 [id number]                     Emergency Contacts        (Rel.) Home Phone Work Phone Mobile Phone    sanket pack (Mother) -- -- [phone]                "

## 2025-04-11 NOTE — PROGRESS NOTES
" LOS: 9 days     Chief Complaint: ESBL bacteremia    Interval History: Patient resting comfortably this morning.  Tolerating antibiotics.    Vital Signs  Temp:  [97.2 °F (36.2 °C)-98.4 °F (36.9 °C)] 97.3 °F (36.3 °C)  Heart Rate:  [62-86] 73  BP: (104-170)/() 115/98    Physical Exam:  General: In no acute distress  HEENT: Oropharynx clear, moist mucous membranes  Respiratory: Normal work of breathing.  On nasal cannula.  Skin: No rashes or lesions in exposed areas  Extremities: No edema, cyanosis  Access: Peripheral IV.    Antibiotics:  Anti-Infectives (From admission, onward)      Ordered     Dose/Rate Route Frequency Start Stop    04/02/25 2249  ertapenem (INVanz) 500 mg in sodium chloride 0.9 % 50 mL IVPB        Ordering Provider: Damian Nguyen MD    500 mg  100 mL/hr over 30 Minutes Intravenous Every 24 Hours 04/02/25 2345 04/16/25 2359    04/02/25 0933  cefTRIAXone (ROCEPHIN) 2,000 mg in sodium chloride 0.9 % 100 mL MBP        Ordering Provider: Lillian Winslow PA-C    2,000 mg  200 mL/hr over 30 Minutes Intravenous Once 04/02/25 0949 04/02/25 1157             Results Review:     I reviewed the patient's new clinical results.    Lab Results   Component Value Date    WBC 17.02 (H) 04/11/2025    HGB 9.0 (L) 04/11/2025    HCT 28.2 (L) 04/11/2025    MCV 99.3 (H) 04/11/2025     04/11/2025     Lab Results   Component Value Date    GLUCOSE 85 04/11/2025    BUN 35 (H) 04/11/2025    CREATININE 5.94 (H) 04/11/2025    EGFRIFNONA 45 (L) 10/20/2021    EGFRIFAFRI 8 (L) 03/10/2023    BCR 5.9 (L) 04/11/2025    CO2 23.0 04/11/2025    CALCIUM 8.5 (L) 04/11/2025    ALBUMIN 3.0 (L) 04/11/2025    AST 11 04/02/2025    ALT 6 04/02/2025       No results found for: \"VANCOPEAK\", \"VANCOTROUGH\", \"VANCORANDOM\"     Microbiology:  Microbiology Results (last 10 days)       Procedure Component Value - Date/Time    MRSA Screen, PCR (Inpatient) - Swab, Nares [[id number]]  (Normal) Collected: 04/09/25 1049    Lab Status: Final " result Specimen: Swab from Nares Updated: 04/09/25 1211     MRSA PCR No MRSA Detected    Narrative:      The negative predictive value of this diagnostic test is high and should only be used to consider de-escalating anti-MRSA therapy. A positive result may indicate colonization with MRSA and must be correlated clinically.    Urine Culture - Urine, Urine, Catheter [[id number]]  (Abnormal)  (Susceptibility) Collected: 04/02/25 1707    Lab Status: Final result Specimen: Urine, Catheter Updated: 04/04/25 0947     Urine Culture >100,000 CFU/mL Escherichia coli ESBL    Narrative:      Colonization of the urinary tract without infection is common. Treatment is discouraged unless the patient is symptomatic, pregnant, or undergoing an invasive urologic procedure.  Recent outcomes data supports the use of pip/tazo in the treatment of susceptible ESBL infections for uncomplicated UTI. Consider use of pip/tazo as a carbapenem-sparing regimen in applicable patients.    Susceptibility        Escherichia coli ESBL      LEANN      Ciprofloxacin Resistant      Ertapenem Susceptible      Gentamicin Susceptible      Levofloxacin Resistant      Meropenem Susceptible      Nitrofurantoin Susceptible      Piperacillin + Tazobactam Susceptible      Trimethoprim + Sulfamethoxazole Resistant                           Blood Culture - Blood, Arm, Left [[id number]]  (Abnormal) Collected: 04/02/25 1113    Lab Status: Final result Specimen: Blood from Arm, Left Updated: 04/04/25 0640     Blood Culture Escherichia coli ESBL     Comment:   Consider infectious disease consult.  Susceptibility results may not correlate to clinical outcomes.  For ESBL-producing infections in the blood, a carbapenem is recommended as first-line therapy for optimal clinical outcomes.        Isolated from Aerobic and Anaerobic Bottles     Gram Stain Anaerobic Bottle Gram negative bacilli      Aerobic Bottle Gram negative bacilli    Narrative:      Refer to previous blood  culture collected on 04/02/2025 at 1043 for MICs.      Blood Culture - Blood, Hand, Left [[id number]]  (Abnormal)  (Susceptibility) Collected: 04/02/25 1043    Lab Status: Final result Specimen: Blood from Hand, Left Updated: 04/04/25 0639     Blood Culture Escherichia coli ESBL     Comment:   Consider infectious disease consult.  Susceptibility results may not correlate to clinical outcomes.  For ESBL-producing infections in the blood, a carbapenem is recommended as first-line therapy for optimal clinical outcomes.        Isolated from Aerobic and Anaerobic Bottles     Gram Stain Anaerobic Bottle Gram negative bacilli      Aerobic Bottle Gram negative bacilli    Susceptibility        Escherichia coli ESBL      LEANN      Ciprofloxacin Resistant      Ertapenem Susceptible      Levofloxacin Resistant      Meropenem Susceptible      Trimethoprim + Sulfamethoxazole Resistant                       Susceptibility Comments       Escherichia coli ESBL    With the exception of urinary-sourced infections, aminoglycosides should not be used as monotherapy.               Blood Culture ID, PCR - Blood, Hand, Left [[id number]]  (Abnormal) Collected: 04/02/25 1043    Lab Status: Final result Specimen: Blood from Hand, Left Updated: 04/02/25 2022     BCID, PCR Escherichia coli. Identification by BCID2 PCR.     BCID, PCR 2 CTX-M (ESBL) detected. Identification by BCID2 PCR     BOTTLE TYPE Anaerobic Bottle    Urine Culture - Urine, Urine, Clean Catch [[id number]]  (Abnormal)  (Susceptibility) Collected: 04/02/25 1017    Lab Status: Final result Specimen: Urine, Clean Catch Updated: 04/04/25 0945     Urine Culture >100,000 CFU/mL Escherichia coli ESBL    Narrative:      Colonization of the urinary tract without infection is common. Treatment is discouraged unless the patient is symptomatic, pregnant, or undergoing an invasive urologic procedure.  Recent outcomes data supports the use of pip/tazo in the treatment of susceptible ESBL  infections for uncomplicated UTI. Consider use of pip/tazo as a carbapenem-sparing regimen in applicable patients.    Susceptibility        Escherichia coli ESBL      LEANN      Ciprofloxacin Resistant      Ertapenem Susceptible      Gentamicin Susceptible      Levofloxacin Resistant      Meropenem Susceptible      Nitrofurantoin Susceptible      Piperacillin + Tazobactam Susceptible      Trimethoprim + Sulfamethoxazole Resistant                           Respiratory Panel PCR w/COVID-19(SARS-CoV-2) CHRISTINA/GARFIELD/MARCELO/PAD/COR/AMY In-House, NP Swab in UTM/VTM, 2 HR TAT - Swab, Nasopharynx [[id number]]  (Normal) Collected: 04/02/25 0756    Lab Status: Final result Specimen: Swab from Nasopharynx Updated: 04/02/25 0858     ADENOVIRUS, PCR Not Detected     Coronavirus 229E Not Detected     Coronavirus HKU1 Not Detected     Coronavirus NL63 Not Detected     Coronavirus OC43 Not Detected     COVID19 Not Detected     Human Metapneumovirus Not Detected     Human Rhinovirus/Enterovirus Not Detected     Influenza A PCR Not Detected     Influenza B PCR Not Detected     Parainfluenza Virus 1 Not Detected     Parainfluenza Virus 2 Not Detected     Parainfluenza Virus 3 Not Detected     Parainfluenza Virus 4 Not Detected     RSV, PCR Not Detected     Bordetella pertussis pcr Not Detected     Bordetella parapertussis PCR Not Detected     Chlamydophila pneumoniae PCR Not Detected     Mycoplasma pneumo by PCR Not Detected    Narrative:      In the setting of a positive respiratory panel with a viral infection PLUS a negative procalcitonin without other underlying concern for bacterial infection, consider observing off antibiotics or discontinuation of antibiotics and continue supportive care. If the respiratory panel is positive for atypical bacterial infection (Bordetella pertussis, Chlamydophila pneumoniae, or Mycoplasma pneumoniae), consider antibiotic de-escalation to target atypical bacterial infection.               Isolation:    Contact    Assessment    #ESBL E. coli septicemia  #Left pyelonephritis  #Left hydronephrosis status post cystoscopy and left ureteral stent insertion 4/2  #ESRD on hemodialysis  #Type 2 diabetes    Patient has remained afebrile with some improvement in WBC.  Plan to continue ertapenem 500 mg daily for prior outlined course through end date of 4/16.  She will need outpatient follow-up with urology for stent removal.    ID will sign off.  -----------------------------------------------------------------------------------------------  The above decisions regarding antimicrobials incorporates elements of engaging in complex medical decision-making associated with antimicrobial prescribing including considerations such as antimicrobial resistance patterns, emergence of new variants/strains, recent antibiotic exposure, interactions/complications from comorbidities including concurrent infections, public health considerations to minimize development of antimicrobial resistance, and emerging and re-emerging infections.

## 2025-04-11 NOTE — PROGRESS NOTES
"  Daily Progress Note.   [facility] INTENSIVE CARE  4/11/2025    Patient:  Name:  Kelly Pack  MRN:  [id number]  1970  54 y.o.  female         CC: pea arrest  Summary patient is a 54-year-old with hemodialysis with emphysematous pyelonephritis urinary tract infection with bacteremia who experienced PEA arrest postdialysis and again had respiratory/CODE BLUE called on her after another round of hemodialysis with fluid removal where she refused midodrine and got narcotics.  She tolerated hemodialysis with only 1 L of fluid removed on 4 10/2025 and refused midodrine prior to her dialysis session.    Interval History:  Tolerated HD yesterday, refused midodrine prior to HD yesterday.  Refused fluid removal goal, did allow 1 liter fluid removal goal which was met.  Tolerated well no acute events afterwards.  No acute complaints today no lethargy no confusion    Physical Exam:  /98   Pulse 73   Temp 98 °F (36.7 °C) (Oral)   Resp 16   Ht 175.3 cm (69.02\")   Wt 118 kg (260 lb 2.3 oz)   SpO2 100%   BMI 38.40 kg/m²   Body mass index is 38.4 kg/m².    Intake/Output Summary (Last 24 hours) at 4/11/2025 0725  Last data filed at 4/10/2025 1552  Gross per 24 hour   Intake --   Output 1000 ml   Net -1000 ml     General appearance: Nontoxic, conversant   Eyes: anicteric sclerae, moist conjunctivae; no lidlag;    HENT: Atraumatic; oropharynx clear with moist mucous membranes    Neck: Trachea midline;  supple large neck circumference  Lungs: CTA, with calm unlabored espiratory effort and no intercostal retractions right IJ tunneled HD cath  CV: RRR, no rub   Abdomen: Soft, truncal obesity; BS+  Extremities: No peripheral edema positive for right upper extremity HD access    Skin: WWP no diffuse visible rash  Psych: Appropriate affect, alert   Neuro: Moves all ext. CN II-XII. Speech intact.    Data Review:  Notable Labs:  Results from last 7 days   Lab Units 04/11/25  0510 04/10/25  0957 " 04/09/25  0829 04/08/25  1711 04/08/25  0532 04/07/25  0503 04/06/25  0405 04/05/25  0747   WBC 10*3/mm3 17.02* 17.05* 20.95*  --  15.31* 16.65* 16.79* 13.20*   HEMOGLOBIN g/dL 9.0* 8.5* 9.1*  --  8.9* 8.0* 7.9* 8.9*   HEMOGLOBIN, POC g/dL  --   --   --  9.0*  --   --   --   --    PLATELETS 10*3/mm3 402 377 268  --  376 309 252 264     Results from last 7 days   Lab Units 04/11/25  0510 04/10/25  0957 04/10/25  0039 04/09/25 0829 04/08/25 1711 04/08/25  0532 04/07/25  0503 04/06/25  0405   SODIUM mmol/L 132* 127* 126* 129*  --  134* 136 135*   POTASSIUM mmol/L 4.2 4.6 4.3 4.7  --  4.1 4.0 3.6   CHLORIDE mmol/L 95* 93* 93* 94*  --  95* 99 97*   CO2 mmol/L 23.0 21.0* 20.5* 20.3*  --  21.8* 22.4 23.0   BUN mg/dL 35* 53* 51* 42*  --  72* 66* 60*   CREATININE mg/dL 5.94* 8.00* 7.36* 6.61* 5.61* 10.74* 8.60* 6.85*   GLUCOSE mg/dL 85 127* 98 124*  --  104* 118* 139*   CALCIUM mg/dL 8.5* 8.5* 8.0* 8.6  --  8.2* 8.3* 8.0*   MAGNESIUM mg/dL  --   --   --   --   --  2.9*  --   --    PHOSPHORUS mg/dL 3.9 4.9* 4.5  --   --  5.7*  --   --    Estimated Creatinine Clearance: 15.2 mL/min (A) (by C-G formula based on SCr of 5.94 mg/dL (H)).    Results from last 7 days   Lab Units 04/11/25  0510 04/10/25  0957 04/09/25  0829 04/08/25  1711 04/05/25  0747 04/05/25  0004 04/04/25  2102   LACTATE mmol/L  --   --   --  1.7  --  1.6 2.4*   PLATELETS 10*3/mm3 402 377 268  --    < >  --   --     < > = values in this interval not displayed.       Results from last 7 days   Lab Units 04/08/25  1711 04/04/25  1541   PH, ARTERIAL pH units 7.372 7.385   PCO2, ARTERIAL mm Hg 48.2* 37.6   PO2 ART mm Hg 213.3* 538.3*   HCO3 ART mmol/L 28.0 22.5       Imaging:  Reviewed chest images personally from past 3 days    ASSESSMENT  /  PLAN:  1) ESRD - on TTS HD, CODE BLUE with dialysis on Friday, completed a full dialysis treatment on 4/5, no UF at that time.  2) Abdominal pain  3) L hydronephrosis with emphysematous pyelonephritis, status post  cystoscopy/stent on 4/2  4) DM  5) Hypotension - on Midodrine on non-HD days but currently normotensive  6) Metabolic acidosis, improved  7) Anemia of CKD  8) ESBL UTI/Pyelo  9) Bacteremia  10) s/p cardiac arrest: bradycardia with subsequent PEA.  Persistent leukocytosis      Monitor in icu until after next HD session.      Appreciate cards re-evaluation  Abx per ID cultures ordered managing antibiotics  Dc narcotics seems sensitive to these  Midodrine with HD as patient allows  Ct angio neg    Use Tylenol prn Pain        Plan of care and patient course was reviewed with multidisciplinary team in morning rounds.      Electronically signed by Rodolfo Jason MD, 04/11/25, 7:27 AM EDT.

## 2025-04-11 NOTE — PROGRESS NOTES
"Nutrition Services    Patient Name: Kelly Pack  YOB: 1970  MRN: [id number]  Admission date: 4/2/2025    Assessment Date:  04/11/25    NUTRITION EVALUATION      Reason for Encounter Length of Stay   Diagnosis/Problem Admission Diagnosis:  Sepsis [A41.9]  Emphysematous pyelonephritis of left kidney [N12]  Sepsis without acute organ dysfunction, due to unspecified organism [A41.9]    Problem List:    Sepsis    Hyperlipidemia    Asthma    Essential hypertension    Uncontrolled type 2 diabetes mellitus with hyperglycemia    ESRD (end stage renal disease)    Emphysematous pyelonephritis of left kidney    Hydronephrosis of left kidney    NSTEMI (non-ST elevated myocardial infarction)    Class 2 severe obesity with serious comorbidity in adult     Narrative PEA arrest postdialysis        PO Diet Diet: Renal; Low Sodium (2-3g), Low Potassium, Low Phosphorus; Fluid Consistency: Thin (IDDSI 0)   Allergies Other: tomato   Supplements n/a   PO Intake % 75%       Chewing/Swallowing Difficulty no issues identified at this time       Medications reviewed   Labs  reviewedna 132, BUN 35, cr 5.94       Physical Findings alert, obese, oriented     Edema 2+ (mild), 3+ (moderate)    GI Function last bowel movement: 4/8   Skin Status skin intact    Lines/Drains dialysis catheter   I/O reviewed        Height  Weight  BMI  Weight Trend     Height: 175.3 cm (69.02\")  Weight: 118 kg (260 lb 2.3 oz) (04/10/25 1552)  Body mass index is 38.4 kg/m².  Loss, Gain         NFPE No clinical signs of muscle wasting or fat loss       Nutrition Problem (PES) Problem: Nutrition Appropriate for Condition at this Time  Etiology: Medical Diagnosis - sepsis    Signs/Symptoms: Report/Observation       Intervention/Plan Continued good po intake with meals    RD to follow up per protocol.     Results from last 7 days   Lab Units 04/11/25  0510 04/10/25  0957 04/10/25  0039   SODIUM mmol/L 132* 127* 126*   POTASSIUM mmol/L 4.2 4.6 4.3 "   CHLORIDE mmol/L 95* 93* 93*   CO2 mmol/L 23.0 21.0* 20.5*   BUN mg/dL 35* 53* 51*   CREATININE mg/dL 5.94* 8.00* 7.36*   CALCIUM mg/dL 8.5* 8.5* 8.0*   GLUCOSE mg/dL 85 127* 98     Results from last 7 days   Lab Units 04/11/25  0510 04/08/25  1711 04/08/25  0532 04/07/25  0503 04/06/25  0405   MAGNESIUM mg/dL  --   --  2.9*  --   --    PHOSPHORUS mg/dL 3.9   < > 5.7*  --   --    HEMOGLOBIN g/dL 9.0*   < > 8.9*   < > 7.9*   HEMOGLOBIN, POC   --    < >  --   --   --    HEMATOCRIT % 28.2*   < > 27.0*   < > 23.6*   HEMATOCRIT POC   --    < >  --   --   --    TRIGLYCERIDES mg/dL  --   --   --   --  403*    < > = values in this interval not displayed.     Lab Results   Component Value Date    HGBA1C 8.20 (H) 04/02/2025     Wt Readings from Last 10 Encounters:   04/10/25 118 kg (260 lb 2.3 oz)   03/14/25 112 kg (247 lb)   12/03/24 126 kg (278 lb)   08/30/24 126 kg (278 lb)   06/04/24 124 kg (274 lb)   10/17/23 117 kg (257 lb)   08/15/23 117 kg (257 lb 12.8 oz)   06/29/23 (!) 147 kg (324 lb 1.2 oz)   05/30/23 64 kg (141 lb)   04/12/23 (!) 141 kg (310 lb 12.8 oz)       Electronically signed by:  Livier Larose RD  04/11/25 14:24 EDT

## 2025-04-11 NOTE — THERAPY TREATMENT NOTE
Patient Name: Kelly Pack  : 1970    MRN: [id number]                              Today's Date: 2025       Admit Date: 2025    Visit Dx:     ICD-10-CM ICD-9-CM   1. NSTEMI (non-ST elevated myocardial infarction)  I21.4 410.70   2. Sepsis without acute organ dysfunction, due to unspecified organism  A41.9 038.9     995.91   3. Emphysematous pyelonephritis of left kidney  N12 590.80   4. ESRD on dialysis  N18.6 585.6    Z99.2 V45.11   5. Hydronephrosis of left kidney  N13.30 591   6. Sepsis, due to unspecified organism, unspecified whether acute organ dysfunction present  A41.9 038.9     995.91     Patient Active Problem List   Diagnosis    Hyperlipidemia    Allergic rhinitis    Nephrotic range proteinuria    Asthma    Essential hypertension    Uncontrolled type 2 diabetes mellitus with hyperglycemia    Acute renal failure superimposed on stage 3a chronic kidney disease    Anasarca    Suspected sleep apnea    Anemia    Bilateral lower extremity edema    Elevated troponin    Acute renal failure superimposed on chronic kidney disease    Type 2 diabetes mellitus with chronic kidney disease    Symptomatic anemia    ESRD (end stage renal disease)    Sepsis    Emphysematous pyelonephritis of left kidney    Hydronephrosis of left kidney    NSTEMI (non-ST elevated myocardial infarction)    Class 2 severe obesity with serious comorbidity in adult     Past Medical History:   Diagnosis Date    Albuminuria     Allergic     Seasonal, grass, cats and some dogs    Allergic rhinitis     Asthma     allergy triggered    Bell's palsy     Cataract     Had surgery on both eyes    Cholelithiasis     Removed. Have bile reflux/ vomiting    CKD (chronic kidney disease)     Diabetes mellitus     Drug therapy     Endometrial cancer     HL (hearing loss)     In L ear    Hyperlipidemia     Hypertension     Low back pain     Migraine     Obesity     Peripheral neuropathy     Renal insufficiency     Right otitis media     Type  II diabetes mellitus     Visual impairment     Diabetic retinopathy. Oil in L eye due to retina detachment s.    Vitamin D deficiency      Past Surgical History:   Procedure Laterality Date    ARTERIOVENOUS FISTULA  05/2023    CARDIAC CATHETERIZATION N/A 4/6/2025    Procedure: Left Heart Cath;  Surgeon: Damian Nguyen MD;  Location: [facility] CATH INVASIVE LOCATION;  Service: Cardiology;  Laterality: N/A;    CARDIAC CATHETERIZATION N/A 4/6/2025    Procedure: Coronary angiography;  Surgeon: Damian Nguyen MD;  Location: [facility] CATH INVASIVE LOCATION;  Service: Cardiology;  Laterality: N/A;    CATARACT EXTRACTION      CHOLECYSTECTOMY      CYSTOSCOPY, RETROGRADE PYELOGRAM AND STENT INSERTION Left 4/2/2025    Procedure: CYSTOSCOPY RETROGRADE PYELOGRAM AND STENT INSERTION;  Surgeon: Tl Gray MD;  Location: [facility] OR;  Service: Urology;  Laterality: Left;    EYE SURGERY      NOV 2020    HYSTERECTOMY      due to cancer    INSERTION HEMODIALYSIS CATHETER Right 06/23/2023    Procedure: HEMODIALYSIS CATHETER INSERTION;  Surgeon: Mikael Mackay MD;  Location: [facility] OR;  Service: Vascular;  Laterality: Right;    OOPHORECTOMY      VITRECTOMY PARS PLANA Left 01/28/2020    Procedure: 25G VITRECTOMY-ENDO LASER;  Surgeon: Lazarus, Howard S., MD;  Location: [facility] OR;  Service: Ophthalmology      General Information       Row Name 04/11/25 1224          Physical Therapy Time and Intention    Document Type therapy note (daily note)  -     Mode of Treatment physical therapy  -       Row Name 04/11/25 1224          General Information    Existing Precautions/Restrictions fall;orthostatic hypotension  -               User Key  (r) = Recorded By, (t) = Taken By, (c) = Cosigned By      Initials Name Provider Type    EJ Leonie Huggins, PT Physical Therapist                   Mobility       Row Name 04/11/25 1224          Bed Mobility    Supine-Sit [place] (Bed Mobility) standby assist  -      Sit-Supine [place] (Bed Mobility) not tested  -EJ     Assistive Device (Bed Mobility) head of bed elevated  -EJ       Row Name 04/11/25 1224          Bed-Chair Transfer    Bed-Chair [place] (Transfers) verbal cues;standby assist;contact guard  -EJ     Comment, (Bed-Chair Transfer) bed > BSC > chair  -EJ       Row Name 04/11/25 1224          Sit-Stand Transfer    Sit-Stand [place] (Transfers) verbal cues;standby assist;contact guard  -EJ       Row Name 04/11/25 1224          Gait/Stairs (Locomotion)    [place] Level (Gait) verbal cues;contact guard  -EJ     Distance in Feet (Gait) 5  -EJ     Deviations/Abnormal Patterns (Gait) aniceto decreased;stride length decreased  -EJ     Comment, (Gait/Stairs) several steps from bed to BSC and then to chair  -EJ               User Key  (r) = Recorded By, (t) = Taken By, (c) = Cosigned By      Initials Name Provider Type    Leonie Puentes, PT Physical Therapist                   Obj/Interventions    No documentation.                  Goals/Plan    No documentation.                  Clinical Impression       Row Name 04/11/25 1225          Pain    Pretreatment Pain Rating 0/10 - no pain  -EJ     Posttreatment Pain Rating 0/10 - no pain  -EJ       Row Name 04/11/25 1225          Plan of Care Review    Plan of Care Reviewed With patient  -EJ     Progress improving  -EJ     Outcome Evaluation Pt seen for PT this AM. She is doing well today w no complaints. Pt would like to use BSC and agreeable to sit up in chair. Pt able to transition to EOB w SBA. She stood w SBA/CGA and able to take several step to BSC and then stand again and take several steps to chair. Pt tolerated activity well w no c/o dizziness or feeling light headed. Pt left in recliner at end of sessin w all needs in reach. RN notified. Pt may benefit from rehab at [place] pending progress. Will continue to follow and progress as tolerated.  -EJ       Row Name 04/11/25 1225          Therapy  Assessment/Plan (PT)    Therapy Frequency (PT) 6 times/wk  -EJ       Row Name 04/11/25 1225          Positioning and Restraints    Pre-Treatment Position in bed  -EJ     Post Treatment Position chair  -EJ     In Chair notified nsg;reclined;call light within reach;encouraged to call for assist;exit alarm on  -EJ               User Key  (r) = Recorded By, (t) = Taken By, (c) = Cosigned By      Initials Name Provider Type    Leonie Puentes, PT Physical Therapist                   Outcome Measures       Row Name 04/11/25 1227          How much help from another person do you currently need...    Turning from your back to your side while in flat bed without using bedrails? 4  -EJ     Moving from lying on back to sitting on the side of a flat bed without bedrails? 3  -EJ     Moving to and from a bed to a chair (including a wheelchair)? 3  -EJ     Standing up from a chair using your arms (e.g., wheelchair, bedside chair)? 3  -EJ     Climbing 3-5 steps with a railing? 2  -EJ     To walk in hospital room? 3  -EJ     AM-PAC 6 Clicks Score (PT) 18  -EJ     Highest Level of Mobility Goal 6 --> Walk 10 steps or more  -EJ               User Key  (r) = Recorded By, (t) = Taken By, (c) = Cosigned By      Initials Name Provider Type    Leonie Puentes, PT Physical Therapist                                 Physical Therapy Education       Title: PT OT SLP Therapies (In Progress)       Topic: Physical Therapy (Done)       Point: Mobility training (Done)       Learning Progress Summary            Patient Acceptance, E,D, DU by PC at 4/10/2025 1015    Acceptance, E,D, DU by PC at 4/9/2025 1310    Acceptance, E,TB,D, VU,NR by PH at 4/7/2025 1500                      Point: Home exercise program (Done)       Learning Progress Summary            Patient Acceptance, E,D, DU by PC at 4/10/2025 1015    Acceptance, E,D, DU by PC at 4/9/2025 1310    Acceptance, E,TB,D, VU,NR by PH at 4/7/2025 1500                      Point: Body  mechanics (Done)       Learning Progress Summary            Patient Acceptance, E,D, DU by PC at 4/10/2025 1015    Acceptance, E,D, DU by  at 4/9/2025 1310    Acceptance, E,TB,D, VU,NR by  at 4/7/2025 1500                      Point: Precautions (Done)       Learning Progress Summary            Patient Acceptance, E,D, DU by  at 4/10/2025 1015    Acceptance, E,D, DU by  at 4/9/2025 1310    Acceptance, E,TB,D, VU,NR by  at 4/7/2025 1500                                      User Key       Initials Effective Dates Name Provider Type Discipline     06/16/21 -  Patsy Ortega PT Physical Therapist PT     06/16/21 -  Yoselin Godfrey PTA Physical Therapist Assistant PT                  PT Recommendation and Plan  Planned Therapy Interventions (PT): balance training, bed mobility training, gait training, home exercise program, transfer training, stretching, strengthening, stair training, ROM (range of motion), patient/family education  Progress: improving  Outcome Evaluation: Pt seen for PT this AM. She is doing well today w no complaints. Pt would like to use BSC and agreeable to sit up in chair. Pt able to transition to EOB w SBA. She stood w SBA/CGA and able to take several step to BSC and then stand again and take several steps to chair. Pt tolerated activity well w no c/o dizziness or feeling light headed. Pt left in recliner at end of sessin w all needs in reach. RN notified. Pt may benefit from rehab at [place] pending progress. Will continue to follow and progress as tolerated.     Time Calculation:         PT Charges       Row Name 04/11/25 1228             Time Calculation    Start Time 1116  -EJ      Stop Time 1135  -EJ      Time Calculation (min) 19 min  -EJ      PT Received On 04/11/25  -EJ      PT - Next Appointment 04/12/25  -EJ                User Key  (r) = Recorded By, (t) = Taken By, (c) = Cosigned By      Initials Name Provider Type    EJ Leonie Huggins, PT Physical Therapist                   Therapy Charges for Today       Code Description Service Date Service Provider Modifiers Qty    [id number]  PT THERAPEUTIC ACT EA 15 MIN 4/11/2025 Leonie Huggins, PT GP 1            PT G-Codes  Outcome Measure Options: AM-PAC 6 Clicks Daily Activity (OT)  AM-PAC 6 Clicks Score (PT): 18  AM-PAC 6 Clicks Score (OT): 16  PT Discharge Summary  Anticipated Discharge Disposition (PT): inpatient rehabilitation facility    Leonie Huggins, PT  4/11/2025

## 2025-04-11 NOTE — PLAN OF CARE
Goal Outcome Evaluation:  Plan of Care Reviewed With: patient        Progress: improving  Outcome Evaluation: Pt seen for PT this AM. She is doing well today w no complaints. Pt would like to use BSC and agreeable to sit up in chair. Pt able to transition to EOB w SBA. She stood w SBA/CGA and able to take several step to BSC and then stand again and take several steps to chair. Pt tolerated activity well w no c/o dizziness or feeling light headed. Pt left in recliner at end of sessin w all needs in reach. RN notified. Pt may benefit from rehab at [place] pending progress. Will continue to follow and progress as tolerated.    Anticipated Discharge Disposition (PT): inpatient rehabilitation facility

## 2025-04-12 LAB
ALBUMIN SERPL-MCNC: 3 G/DL (ref 3.5–5.2)
ANION GAP SERPL CALCULATED.3IONS-SCNC: 13 MMOL/L (ref 5–15)
BASOPHILS # BLD AUTO: 0.06 10*3/MM3 (ref 0–0.2)
BASOPHILS NFR BLD AUTO: 0.4 % (ref 0–1.5)
BUN SERPL-MCNC: 47 MG/DL (ref 6–20)
BUN/CREAT SERPL: 6 (ref 7–25)
CALCIUM SPEC-SCNC: 8.3 MG/DL (ref 8.6–10.5)
CHLORIDE SERPL-SCNC: 95 MMOL/L (ref 98–107)
CO2 SERPL-SCNC: 23 MMOL/L (ref 22–29)
CREAT SERPL-MCNC: 7.82 MG/DL (ref 0.57–1)
DEPRECATED RDW RBC AUTO: 48 FL (ref 37–54)
EGFRCR SERPLBLD CKD-EPI 2021: 5.8 ML/MIN/1.73
EOSINOPHIL # BLD AUTO: 0.41 10*3/MM3 (ref 0–0.4)
EOSINOPHIL NFR BLD AUTO: 2.6 % (ref 0.3–6.2)
ERYTHROCYTE [DISTWIDTH] IN BLOOD BY AUTOMATED COUNT: 13.7 % (ref 12.3–15.4)
GLUCOSE BLDC GLUCOMTR-MCNC: 181 MG/DL (ref 70–130)
GLUCOSE BLDC GLUCOMTR-MCNC: 89 MG/DL (ref 70–130)
GLUCOSE BLDC GLUCOMTR-MCNC: 92 MG/DL (ref 70–130)
GLUCOSE BLDC GLUCOMTR-MCNC: 98 MG/DL (ref 70–130)
GLUCOSE SERPL-MCNC: 86 MG/DL (ref 65–99)
HCT VFR BLD AUTO: 24.6 % (ref 34–46.6)
HGB BLD-MCNC: 8 G/DL (ref 12–15.9)
IMM GRANULOCYTES # BLD AUTO: 0.55 10*3/MM3 (ref 0–0.05)
IMM GRANULOCYTES NFR BLD AUTO: 3.5 % (ref 0–0.5)
LYMPHOCYTES # BLD AUTO: 1.46 10*3/MM3 (ref 0.7–3.1)
LYMPHOCYTES NFR BLD AUTO: 9.3 % (ref 19.6–45.3)
MCH RBC QN AUTO: 31.7 PG (ref 26.6–33)
MCHC RBC AUTO-ENTMCNC: 32.5 G/DL (ref 31.5–35.7)
MCV RBC AUTO: 97.6 FL (ref 79–97)
MONOCYTES # BLD AUTO: 1.27 10*3/MM3 (ref 0.1–0.9)
MONOCYTES NFR BLD AUTO: 8.1 % (ref 5–12)
NEUTROPHILS NFR BLD AUTO: 12.01 10*3/MM3 (ref 1.7–7)
NEUTROPHILS NFR BLD AUTO: 76.1 % (ref 42.7–76)
NRBC BLD AUTO-RTO: 0.1 /100 WBC (ref 0–0.2)
PHOSPHATE SERPL-MCNC: 5 MG/DL (ref 2.5–4.5)
PLATELET # BLD AUTO: 401 10*3/MM3 (ref 140–450)
PMV BLD AUTO: 9.5 FL (ref 6–12)
POTASSIUM SERPL-SCNC: 4.6 MMOL/L (ref 3.5–5.2)
RBC # BLD AUTO: 2.52 10*6/MM3 (ref 3.77–5.28)
SODIUM SERPL-SCNC: 131 MMOL/L (ref 136–145)
WBC NRBC COR # BLD AUTO: 15.76 10*3/MM3 (ref 3.4–10.8)

## 2025-04-12 PROCEDURE — 63710000001 INSULIN LISPRO (HUMAN) PER 5 UNITS: Performed by: INTERNAL MEDICINE

## 2025-04-12 PROCEDURE — 82948 REAGENT STRIP/BLOOD GLUCOSE: CPT

## 2025-04-12 PROCEDURE — 80069 RENAL FUNCTION PANEL: CPT | Performed by: INTERNAL MEDICINE

## 2025-04-12 PROCEDURE — 25010000002 ALTEPLASE 2 MG RECONSTITUTED SOLUTION: Performed by: STUDENT IN AN ORGANIZED HEALTH CARE EDUCATION/TRAINING PROGRAM

## 2025-04-12 PROCEDURE — 85025 COMPLETE CBC W/AUTO DIFF WBC: CPT | Performed by: INTERNAL MEDICINE

## 2025-04-12 PROCEDURE — 25010000002 HEPARIN (PORCINE) PER 1000 UNITS: Performed by: INTERNAL MEDICINE

## 2025-04-12 PROCEDURE — 25010000002 EPOETIN ALFA-EPBX 10000 UNIT/ML SOLUTION: Performed by: INTERNAL MEDICINE

## 2025-04-12 PROCEDURE — 63710000001 INSULIN GLARGINE PER 5 UNITS: Performed by: INTERNAL MEDICINE

## 2025-04-12 PROCEDURE — 25010000002 ERTAPENEM PER 500 MG: Performed by: INTERNAL MEDICINE

## 2025-04-12 RX ADMIN — INSULIN LISPRO 3 UNITS: 100 INJECTION, SOLUTION INTRAVENOUS; SUBCUTANEOUS at 09:30

## 2025-04-12 RX ADMIN — Medication 10 ML: at 21:22

## 2025-04-12 RX ADMIN — INSULIN LISPRO 2 UNITS: 100 INJECTION, SOLUTION INTRAVENOUS; SUBCUTANEOUS at 21:18

## 2025-04-12 RX ADMIN — Medication 10 ML: at 21:20

## 2025-04-12 RX ADMIN — EPOETIN ALFA-EPBX 10000 UNITS: 10000 INJECTION, SOLUTION INTRAVENOUS; SUBCUTANEOUS at 09:30

## 2025-04-12 RX ADMIN — LIDOCAINE 1 PATCH: 4 PATCH TOPICAL at 08:54

## 2025-04-12 RX ADMIN — HEPARIN SODIUM 5000 UNITS: 5000 INJECTION INTRAVENOUS; SUBCUTANEOUS at 05:10

## 2025-04-12 RX ADMIN — HEPARIN SODIUM 5000 UNITS: 5000 INJECTION INTRAVENOUS; SUBCUTANEOUS at 14:25

## 2025-04-12 RX ADMIN — LANTHANUM CARBONATE 2000 MG: 500 TABLET, CHEWABLE ORAL at 17:45

## 2025-04-12 RX ADMIN — HEPARIN SODIUM 5000 UNITS: 5000 INJECTION INTRAVENOUS; SUBCUTANEOUS at 22:56

## 2025-04-12 RX ADMIN — ASPIRIN 81 MG: 81 TABLET, COATED ORAL at 08:55

## 2025-04-12 RX ADMIN — ACETAMINOPHEN 650 MG: 325 TABLET, FILM COATED ORAL at 22:56

## 2025-04-12 RX ADMIN — ATORVASTATIN CALCIUM 40 MG: 20 TABLET, FILM COATED ORAL at 21:18

## 2025-04-12 RX ADMIN — INSULIN LISPRO 3 UNITS: 100 INJECTION, SOLUTION INTRAVENOUS; SUBCUTANEOUS at 17:45

## 2025-04-12 RX ADMIN — ACETAMINOPHEN 650 MG: 325 TABLET, FILM COATED ORAL at 05:14

## 2025-04-12 RX ADMIN — MIDODRINE HYDROCHLORIDE 5 MG: 5 TABLET ORAL at 11:39

## 2025-04-12 RX ADMIN — INSULIN LISPRO 3 UNITS: 100 INJECTION, SOLUTION INTRAVENOUS; SUBCUTANEOUS at 11:39

## 2025-04-12 RX ADMIN — ACETAMINOPHEN 650 MG: 325 TABLET, FILM COATED ORAL at 18:26

## 2025-04-12 RX ADMIN — INSULIN GLARGINE 13 UNITS: 100 INJECTION, SOLUTION SUBCUTANEOUS at 21:17

## 2025-04-12 RX ADMIN — Medication 1000 MCG: at 08:54

## 2025-04-12 RX ADMIN — ALTEPLASE: 2.2 INJECTION, POWDER, LYOPHILIZED, FOR SOLUTION INTRAVENOUS at 17:20

## 2025-04-12 RX ADMIN — ERTAPENEM SODIUM 500 MG: 1 INJECTION, POWDER, LYOPHILIZED, FOR SOLUTION INTRAMUSCULAR; INTRAVENOUS at 22:56

## 2025-04-12 NOTE — PROGRESS NOTES
Name: Kelly Pack ADMIT: 2025   : 1970  PCP: Kim Benjamin APRN    MRN: [id number] LOS: 10 days   AGE/SEX: 54 y.o. female  ROOM: [facility]     Subjective   Subjective     She was getting dialysis when I saw her. She had no complaints.        Objective   Objective   Vital Signs  Temp:  [97.5 °F (36.4 °C)-98.6 °F (37 °C)] 98.1 °F (36.7 °C)  Heart Rate:  [60-75] 68  Resp:  [18] 18  BP: ()/() 155/58  SpO2:  [78 %-100 %] 100 %  on  Flow (L/min) (Oxygen Therapy):  [1] 1;   Device (Oxygen Therapy): nasal cannula  Body mass index is 38.4 kg/m².  Physical Exam  Constitutional:       General: She is not in acute distress.     Appearance: She is obese. She is not toxic-appearing.   Cardiovascular:      Rate and Rhythm: Normal rate and regular rhythm.      Heart sounds: Normal heart sounds.   Pulmonary:      Effort: Pulmonary effort is normal.      Breath sounds: Normal breath sounds.   Abdominal:      General: Bowel sounds are normal.      Palpations: Abdomen is soft.   Neurological:      Mental Status: She is alert.   Psychiatric:         Mood and Affect: Mood normal.         Behavior: Behavior normal.         Results Review     I reviewed the patient's new clinical results.  Results from last 7 days   Lab Units 25  0732 25  0510 04/10/25  0957 25  0829   WBC 10*3/mm3 15.76* 17.02* 17.05* 20.95*   HEMOGLOBIN g/dL 8.0* 9.0* 8.5* 9.1*   PLATELETS 10*3/mm3 401 402 377 268     Results from last 7 days   Lab Units 25  0732 25  0510 04/10/25  0957 04/10/25  0039   SODIUM mmol/L 131* 132* 127* 126*   POTASSIUM mmol/L 4.6 4.2 4.6 4.3   CHLORIDE mmol/L 95* 95* 93* 93*   CO2 mmol/L 23.0 23.0 21.0* 20.5*   BUN mg/dL 47* 35* 53* 51*   CREATININE mg/dL 7.82* 5.94* 8.00* 7.36*   GLUCOSE mg/dL 86 85 127* 98   Estimated Creatinine Clearance: 11.5 mL/min (A) (by C-G formula based on SCr of 7.82 mg/dL (H)).  Results from last 7 days   Lab Units 25  0732 25  0510  04/10/25  0957 04/10/25  0039   ALBUMIN g/dL 3.0* 3.0* 3.1* 2.6*     Results from last 7 days   Lab Units 04/12/25  0732 04/11/25  0510 04/10/25  0957 04/10/25  0039 04/09/25  0829 04/08/25  0532   CALCIUM mg/dL 8.3* 8.5* 8.5* 8.0*   < > 8.2*   ALBUMIN g/dL 3.0* 3.0* 3.1* 2.6*  --  3.0*   MAGNESIUM mg/dL  --   --   --   --   --  2.9*   PHOSPHORUS mg/dL 5.0* 3.9 4.9* 4.5  --  5.7*    < > = values in this interval not displayed.     Results from last 7 days   Lab Units 04/08/25  1711   LACTATE mmol/L 1.7     COVID19   Date Value Ref Range Status   04/02/2025 Not Detected Not Detected - Ref. Range Final   12/03/2024 Not Detected Not Detected - Ref. Range Final   08/30/2024 Not Detected Not Detected - Ref. Range Final   06/22/2023 Not Detected Not Detected - Ref. Range Final     Glucose   Date/Time Value Ref Range Status   04/12/2025 1123 89 70 - 130 mg/dL Final   04/12/2025 0734 92 70 - 130 mg/dL Final   04/11/2025 2116 133 (H) 70 - 130 mg/dL Final   04/11/2025 1649 186 (H) 70 - 130 mg/dL Final   04/11/2025 1222 129 70 - 130 mg/dL Final   04/11/2025 0746 90 70 - 130 mg/dL Final   04/10/2025 2008 138 (H) 70 - 130 mg/dL Final       CT Angiogram Chest  Narrative: CTA CHEST WITH IV CONTRAST     HISTORY: two pea/code blue, rule out pe; I21.4-Non-ST elevation (NSTEMI)  myocardial infarction; A41.9-Sepsis, unspecified organism;  N58-Kftsbz-xwdtchlptpnw nephritis, not specified as acute or chronic;  N18.6-End stage renal disease; Z99.2-Dependence on renal dialysis;  N13.30-Unspecified hydronephrosis; A41.9-Sepsis, unspecified organism     COMPARISON: None     TECHNIQUE: CT angiography was performed of the chest with axial images  as well as coronal and sagittal reformatted MIP images provided  following administration of IV contrast. 3-D surface rendered reformats  were obtained of the pulmonary arteries and aorta. Radiation dose  reduction techniques were utilized, including automated exposure  control, and exposure  modulation based on body size.     FINDINGS:     There are bilateral faint groundglass densities throughout both lungs,  with areas of likely dependent atelectasis posteriorly on each side.  There is no effusion or pneumothorax.     Thoracic aorta is normal in caliber.  There are coronary atherosclerotic  vascular calcifications.  There is no suspicious mediastinal adenopathy  or other mass.     Images of the upper abdomen show no acute abnormality.  There is no  acute bony abnormality.     Bolus timing is good and there is no evidence of pulmonary embolism.     Impression:    Pulmonary arteries are well-opacified, and there is no evidence of  pulmonary embolism.     Faint but diffuse bilateral areas of parenchymal groundglass opacity,  without consolidation, effusion or pneumothorax. FINDINGS may be related  to recent resuscitation.           This report was finalized on 4/9/2025 9:37 PM by Dr. Yung Santiago M.D  on Workstation: [id number]       Scheduled Medications  albumin human, 12.5 g, Intravenous, Once  alteplase, 2 mg, Intracatheter, Once in Dialysis  aspirin, 81 mg, Oral, Daily  atorvastatin, 40 mg, Oral, Nightly  epoetin kindra/kindra-epbx, 10,000 Units, Intravenous, Once per day on Tuesday Thursday Saturday  ertapenem, 500 mg, Intravenous, Q24H  fluticasone, 2 spray, Each Nare, Daily  heparin (porcine), 5,000 Units, Subcutaneous, Q8H  insulin glargine, 13 Units, Subcutaneous, Nightly  insulin lispro, 2-7 Units, Subcutaneous, 4x Daily AC & at Bedtime  insulin lispro, 3 Units, Subcutaneous, TID With Meals  lanthanum, 2,000 mg, Oral, BID With Meals  Lidocaine, 1 patch, Transdermal, Q24H  midodrine, 5 mg, Oral, Once per day on Monday Tuesday Thursday Saturday  sodium chloride, 10 mL, Intravenous, Q12H  sodium chloride, 10 mL, Intravenous, Q12H  sodium chloride, 10 mL, Intravenous, Q12H  sodium chloride, 10 mL, Intravenous, Q12H  sodium chloride, 10 mL, Intravenous, Q12H  cyanocobalamin, 1,000 mcg, Oral,  Daily  vitamin D, 50,000 Units, Oral, Once per day on Monday Thursday    Infusions   Diet  Diet: Renal; Low Sodium (2-3g), Low Potassium, Low Phosphorus; Fluid Consistency: Thin (IDDSI 0)       Assessment/Plan     Active Hospital Problems    Diagnosis  POA    **Sepsis [A41.9]  Yes    Class 2 severe obesity with serious comorbidity in adult [E66.812, E66.01]  Yes    NSTEMI (non-ST elevated myocardial infarction) [I21.4]  Unknown    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       54 y.o. female admitted with Sepsis.    54 year old woman who presented initially with left sided abdominal pain and was found to have emphysematous pyelitis and left sided hydronephrosis due to ESBL E coli for which she was urgently taken for cytoscopy with stent insertion on 4/2/25. On 4/4/25, she experienced a PEA arrest during dialysis for which she was resuscitated quickly and intubated and transferred to the ICU. She improved quickly and was able to be extubated on 4/6/25 and she was transferred out of the ICU. She underwent cardiac catheterization which only showed very mild non-obstructive CAD. On 4/8/25, she experienced respiratory arrest while on dialysis which quickly resolved (the etiology of which was unclear). She was transferred back to the ICU for closer monitoring. She was reevaluated by cardiology who thought maybe she had respiratory depression from narcotics. She has done well since that time, tolerating several dialysis sessions without issue and so is being transferred back to the medical floor.    ESBL E coli causing emphysematous pyelonephritis and bacteremia causing severe  sepsis (fever, tachycardia, leukocytosis, lactic acidosis)  S/p cystoscopy and left ureteral stent insertion on 4/2/25  ID recommends ertapenem through 4/16 to complete 2 weeks of therapy  PEA arrest 4/4/25 followed by subsequent respiratory arrest on 4/8/25-unclear etiology. Her cardiac catheterization only showed very mild non-obstructive disease. No arrhythmias have been documented. Likely multifactorial related to hypotension during dialysis plus her infection and possibly some respiratory suppression related to narcotic use.  Non-obstructive CAD-asa/satin  Anemia of ESRD-hgb has been largely stable between 8-9 this admission  ESRD-HD being managed by nephrology  Type 2 diabetes-well controlled on basal/bolus regimen  Essential hypertension-adequate control acutely  Obesity  Heparin SC for DVT prophylaxis.  Full code.  Discussed with patient and nursing staff.  Anticipate discharge to acute rehab timing yet to be determined.      Kevin Velasquez MD  [facility]  04/12/25  15:43 EDT

## 2025-04-12 NOTE — PROGRESS NOTES
RENAL/[facility]:     LOS: 10 days    Patient Care Team:  Kim Benjamin APRN as PCP - General (Nurse Practitioner)  Miguel Stahl MD as Consulting Physician (Hematology and Oncology)  Radha Martinez APRN as Referring Physician (Nurse Practitioner)    Chief Complaint:  ESRD, Abdominal pain    Subjective     Interval History:   4/7: Chart reviewed  S/P cysto and L ureteral stent placement on 4/2/25  Extubated on Saturday  Feeling well, denies new complaints    4/8: Patient seen and examined on hemodialysis, she was seen about 30 minutes into her treatment, tolerating well so far, BP in the 140s, set for 2 L UF  She denies any shortness of breath chest pain or edema    4/9: She completed dialysis yesterday without event, 2 L of fluid was removed blood pressure was stable throughout her treatment  She declined to take midodrine with dialysis yesterday but looks like her blood pressure stable throughout the whole treatment  Another rapid response was called yesterday evening for respiratory arrest without PEA she was transferred to the ICU and is currently stable, somewhat hypertensive and O2 sats 100% on 2 L without any respiratory distress    April 10: Patient seen and examined in the ICU, on dialysis, tolerating well  She has been hypertensive throughout her treatment and has not needed Midodrine  Remains on oxygen, 1 to 2 L denies any worsening shortness of breath, no edema  Scheduled for 2 L UF but she refused that, goal is 1 L which looks like has been met    April 11: She tolerated dialysis very well yesterday  Denies new complaints, BP stable overnight  Sats 100% on 1 L  No dyspnea no edema no chest pain    April 12: Feels well denies complaints, slept well  Dialysis scheduled for today  No shortness of breath chest pain fevers chills nausea vomiting or edema      Objective     Vital Sign Min/Max for last 24 hours  Temp  Min: 97.5 °F (36.4 °C)  Max: 98.6 °F (37 °C)   BP  Min: 96/70  Max: 155/62   Pulse   "Min: 60  Max: 80   No data recorded   SpO2  Min: 84 %  Max: 100 %   Flow (L/min) (Oxygen Therapy)  Min: 1  Max: 1   No data recorded     Flowsheet Rows      Flowsheet Row First Filed Value   Admission Height 175.3 cm (69\") Documented at 04/02/2025 1049   Admission Weight 110 kg (243 lb) Documented at 04/02/2025 1049            No intake/output data recorded.  I/O last 3 completed shifts:  In: -   Out: 50 [Urine:50]    Physical Exam:  GEN: Awake, alert, responsive, no acute distress, obese  ENT: PERRL, EOMI, MMM.  NECK: Supple, no JVD. RIJ tunneled HD catheter remains in place  CHEST: CTAB, no W/R/C.  CV: RRR, no M/G/R  ABD: Soft, NT, +BS  SKIN: Warm and Dry  NEURO: AAOX3   RUE AVF +thrill and bruit       WBC WBC   Date Value Ref Range Status   04/12/2025 15.76 (H) 3.40 - 10.80 10*3/mm3 Final   04/11/2025 17.02 (H) 3.40 - 10.80 10*3/mm3 Final   04/10/2025 17.05 (H) 3.40 - 10.80 10*3/mm3 Final      HGB Hemoglobin   Date Value Ref Range Status   04/12/2025 8.0 (L) 12.0 - 15.9 g/dL Final   04/11/2025 9.0 (L) 12.0 - 15.9 g/dL Final   04/10/2025 8.5 (L) 12.0 - 15.9 g/dL Final      HCT Hematocrit   Date Value Ref Range Status   04/12/2025 24.6 (L) 34.0 - 46.6 % Final   04/11/2025 28.2 (L) 34.0 - 46.6 % Final   04/10/2025 27.3 (L) 34.0 - 46.6 % Final      Platlets No results found for: \"LABPLAT\"   MCV MCV   Date Value Ref Range Status   04/12/2025 97.6 (H) 79.0 - 97.0 fL Final   04/11/2025 99.3 (H) 79.0 - 97.0 fL Final   04/10/2025 100.7 (H) 79.0 - 97.0 fL Final          Sodium Sodium   Date Value Ref Range Status   04/12/2025 131 (L) 136 - 145 mmol/L Final   04/11/2025 132 (L) 136 - 145 mmol/L Final   04/10/2025 127 (L) 136 - 145 mmol/L Final   04/10/2025 126 (L) 136 - 145 mmol/L Final      Potassium Potassium   Date Value Ref Range Status   04/12/2025 4.6 3.5 - 5.2 mmol/L Final   04/11/2025 4.2 3.5 - 5.2 mmol/L Final   04/10/2025 4.6 3.5 - 5.2 mmol/L Final   04/10/2025 4.3 3.5 - 5.2 mmol/L Final     Comment:     Slight " "hemolysis detected by analyzer. Result may be falsely elevated.      Chloride Chloride   Date Value Ref Range Status   04/12/2025 95 (L) 98 - 107 mmol/L Final   04/11/2025 95 (L) 98 - 107 mmol/L Final   04/10/2025 93 (L) 98 - 107 mmol/L Final   04/10/2025 93 (L) 98 - 107 mmol/L Final      CO2 CO2   Date Value Ref Range Status   04/12/2025 23.0 22.0 - 29.0 mmol/L Final   04/11/2025 23.0 22.0 - 29.0 mmol/L Final   04/10/2025 21.0 (L) 22.0 - 29.0 mmol/L Final   04/10/2025 20.5 (L) 22.0 - 29.0 mmol/L Final      BUN BUN   Date Value Ref Range Status   04/12/2025 47 (H) 6 - 20 mg/dL Final   04/11/2025 35 (H) 6 - 20 mg/dL Final   04/10/2025 53 (H) 6 - 20 mg/dL Final   04/10/2025 51 (H) 6 - 20 mg/dL Final      Creatinine Creatinine   Date Value Ref Range Status   04/12/2025 7.82 (H) 0.57 - 1.00 mg/dL Final   04/11/2025 5.94 (H) 0.57 - 1.00 mg/dL Final   04/10/2025 8.00 (H) 0.57 - 1.00 mg/dL Final   04/10/2025 7.36 (H) 0.57 - 1.00 mg/dL Final      Calcium Calcium   Date Value Ref Range Status   04/12/2025 8.3 (L) 8.6 - 10.5 mg/dL Final   04/11/2025 8.5 (L) 8.6 - 10.5 mg/dL Final   04/10/2025 8.5 (L) 8.6 - 10.5 mg/dL Final   04/10/2025 8.0 (L) 8.6 - 10.5 mg/dL Final      PO4 No results found for: \"CAPO4\"   Albumin Albumin   Date Value Ref Range Status   04/12/2025 3.0 (L) 3.5 - 5.2 g/dL Final   04/11/2025 3.0 (L) 3.5 - 5.2 g/dL Final   04/10/2025 3.1 (L) 3.5 - 5.2 g/dL Final   04/10/2025 2.6 (L) 3.5 - 5.2 g/dL Final        Magnesium No results found for: \"MG\"       Uric Acid No results found for: \"URICACID\"        Results Review:     I reviewed the patient's new clinical results.    albumin human, 12.5 g, Intravenous, Once  aspirin, 81 mg, Oral, Daily  atorvastatin, 40 mg, Oral, Nightly  epoetin kindra/kindra-epbx, 10,000 Units, Intravenous, Once per day on Tuesday Thursday Saturday  ertapenem, 500 mg, Intravenous, Q24H  fluticasone, 2 spray, Each Nare, Daily  heparin (porcine), 5,000 Units, Subcutaneous, Q8H  insulin glargine, " 13 Units, Subcutaneous, Nightly  insulin lispro, 2-7 Units, Subcutaneous, 4x Daily AC & at Bedtime  insulin lispro, 3 Units, Subcutaneous, TID With Meals  lanthanum, 2,000 mg, Oral, BID With Meals  Lidocaine, 1 patch, Transdermal, Q24H  midodrine, 5 mg, Oral, Once per day on Monday Tuesday Thursday Saturday  sodium chloride, 10 mL, Intravenous, Q12H  sodium chloride, 10 mL, Intravenous, Q12H  sodium chloride, 10 mL, Intravenous, Q12H  sodium chloride, 10 mL, Intravenous, Q12H  sodium chloride, 10 mL, Intravenous, Q12H  cyanocobalamin, 1,000 mcg, Oral, Daily  vitamin D, 50,000 Units, Oral, Once per day on Monday Thursday             Medication Review: Reviewed    Assessment & Plan     1) ESRD - on TTS HD, HD today  2) Abdominal pain, improved  3) L hydronephrosis with emphysematous pyelonephritis, status post cystoscopy/stent on 4/2  4) DM  5) Hypotension -stabilizing off midodrine  6) Metabolic acidosis, improved  7) Anemia of CKD  8) ESBL UTI/Pyelo  9) Bacteremia  10) s/p cardiac arrest: bradycardia with subsequent PEA.  Another episode of rapid response with respiratory arrest, currently stable, possibly related to narcotics      Plan:   Dialysis today and Tuesday Thursday Saturday, UF 1.5 L as tolerated  Blood pressure has been stable on recent HD  Midodrine with HD as needed for hypotension  Electrolytes and volume at goal today, oxygenation adequate    UF with HD as tolerated  Continue to Limit narcotics, as able  Tunnel catheter in place for access, okay to use AV fistula as well  Epogen with HD for anemia  Renally dose antibiotics for GFR less than 10      Tyler Roman MD  [facility]  04/12/25  08:50 EDT

## 2025-04-12 NOTE — PLAN OF CARE
Goal Outcome Evaluation:                    Patient had HD today using HD catheter device AND the fistula.  1.5L off, hep locked device

## 2025-04-12 NOTE — SIGNIFICANT NOTE
04/12/25 1329   OTHER   Discipline physical therapist   Rehab Time/Intention   Session Not Performed other (see comments);patient unavailable for treatment  (pt in dialysis at this time, will follow up tomorrow.)   Recommendation   PT - Next Appointment 04/13/25

## 2025-04-12 NOTE — SIGNIFICANT NOTE
Tolerated HD. BP with gradual decrease. TDC with decrease function. AVF accessed for pull after talking with Dr LUDIN Roman and pt. AVF cannulated without difficulty. TDC ports locked with Cathflo.

## 2025-04-12 NOTE — PROGRESS NOTES
Dr. LUIS CARLOS Torres    [facility] INTENSIVE CARE        Patient ID:  Name:  Kelly Pack  MRN:  [id number]  1970  54 y.o.  female            CC/Reason for visit: E. coli urinary tract infection, ESBL E. Coli Bacteremia,  End-stage renal disease    Interval hx: Patient undergoing hemodialysis.  Ate and had some nausea but did not vomit.  Had small bowel movement but no pain or bloody stool  Notes of all medical practitioners reviewed      ROS: Denies chest pain or abdominal pain    I reviewed old medical records.  Past medical history, social history and family history: Unchanged from admission H&P.      Vitals:  Vitals:    04/12/25 1000 04/12/25 1015 04/12/25 1100 04/12/25 1200   BP: 136/63  149/56 155/58   Pulse: 71 67 65 68   Resp:    18   Temp:   98.1 °F (36.7 °C)    TempSrc:   Oral    SpO2: 99% 100% 100% 100%   Weight:       Height:         FiO2 (%): 27 %     Body mass index is 38.4 kg/m².    Intake/Output Summary (Last 24 hours) at 4/12/2025 1342  Last data filed at 4/11/2025 2100  Gross per 24 hour   Intake --   Output 50 ml   Net -50 ml       Exam:  GEN:  No distress  Alert, oriented x 4, moves all 4 extremities on command, fluent speech  LUNGS: Clear breath sounds bilat, no use of accessory muscles  CV:  Normal S1S2, without murmur, there is edema both legs  ABD:  Non tender, large body habitus hampers exam      Scheduled meds:  albumin human, 12.5 g, Intravenous, Once  aspirin, 81 mg, Oral, Daily  atorvastatin, 40 mg, Oral, Nightly  epoetin kindra/kindra-epbx, 10,000 Units, Intravenous, Once per day on Tuesday Thursday Saturday  ertapenem, 500 mg, Intravenous, Q24H  fluticasone, 2 spray, Each Nare, Daily  heparin (porcine), 5,000 Units, Subcutaneous, Q8H  insulin glargine, 13 Units, Subcutaneous, Nightly  insulin lispro, 2-7 Units, Subcutaneous, 4x Daily AC & at Bedtime  insulin lispro, 3 Units, Subcutaneous, TID With Meals  lanthanum, 2,000 mg, Oral, BID With Meals  Lidocaine, 1 patch,  Transdermal, Q24H  midodrine, 5 mg, Oral, Once per day on Monday Tuesday Thursday Saturday  sodium chloride, 10 mL, Intravenous, Q12H  sodium chloride, 10 mL, Intravenous, Q12H  sodium chloride, 10 mL, Intravenous, Q12H  sodium chloride, 10 mL, Intravenous, Q12H  sodium chloride, 10 mL, Intravenous, Q12H  cyanocobalamin, 1,000 mcg, Oral, Daily  vitamin D, 50,000 Units, Oral, Once per day on Monday Thursday      IV meds:                           Data Review:   I reviewed the patient's medications and new clinical results.            Results from last 7 days   Lab Units 04/12/25  0732 04/11/25  0510 04/10/25  0957   SODIUM mmol/L 131* 132* 127*   POTASSIUM mmol/L 4.6 4.2 4.6   CHLORIDE mmol/L 95* 95* 93*   CO2 mmol/L 23.0 23.0 21.0*   BUN mg/dL 47* 35* 53*   CREATININE mg/dL 7.82* 5.94* 8.00*   CALCIUM mg/dL 8.3* 8.5* 8.5*   GLUCOSE mg/dL 86 85 127*   WBC 10*3/mm3 15.76* 17.02* 17.05*   HEMOGLOBIN g/dL 8.0* 9.0* 8.5*   PLATELETS 10*3/mm3 401 402 377             Results from last 7 days   Lab Units 04/08/25  1711   PH, ARTERIAL pH units 7.372   PCO2, ARTERIAL mm Hg 48.2*   PO2 ART mm Hg 213.3*   O2 SATURATION ART % 99.7*   FLOW RATE lpm 15.0000   MODALITY  Bagging     Microbiology Results (last 10 days)       Procedure Component Value - Date/Time    MRSA Screen, PCR (Inpatient) - Swab, Nares [[id number]]  (Normal) Collected: 04/09/25 1049    Lab Status: Final result Specimen: Swab from Nares Updated: 04/09/25 1211     MRSA PCR No MRSA Detected    Narrative:      The negative predictive value of this diagnostic test is high and should only be used to consider de-escalating anti-MRSA therapy. A positive result may indicate colonization with MRSA and must be correlated clinically.    Urine Culture - Urine, Urine, Catheter [[id number]]  (Abnormal)  (Susceptibility) Collected: 04/02/25 1707    Lab Status: Final result Specimen: Urine, Catheter Updated: 04/04/25 0953     Urine Culture >100,000 CFU/mL Escherichia coli ESBL     Narrative:      Colonization of the urinary tract without infection is common. Treatment is discouraged unless the patient is symptomatic, pregnant, or undergoing an invasive urologic procedure.  Recent outcomes data supports the use of pip/tazo in the treatment of susceptible ESBL infections for uncomplicated UTI. Consider use of pip/tazo as a carbapenem-sparing regimen in applicable patients.    Susceptibility        Escherichia coli ESBL      LEANN      Ciprofloxacin Resistant      Ertapenem Susceptible      Gentamicin Susceptible      Levofloxacin Resistant      Meropenem Susceptible      Nitrofurantoin Susceptible      Piperacillin + Tazobactam Susceptible      Trimethoprim + Sulfamethoxazole Resistant                                      ASSESSMENT:     Sepsis  Anemia of chronic disease  E. coli bacteremia  E. coli urinary tract infection, pyelonephritis    Asthma    Essential hypertension    Uncontrolled type 2 diabetes mellitus with hyperglycemia    ESRD (end stage renal disease)    Emphysematous pyelonephritis of left kidney    Hydronephrosis of left kidney    NSTEMI (non-ST elevated myocardial infarction)    Class 2 severe obesity with serious comorbidity in adult  Status post cardiac arrest  Status post cystoscopy on April 2, emphysematous pyelonephritis      PLAN:  Patient and all problems new to me.  Has improved.  Infectious diseases is managing antibiotics.  Patient on midodrine, remains hypotensive especially around hemodialysis.  CT angiogram is negative.  Transfer to telemetry  Request hospitalist to take over service    This patient has several chronic medical conditions, and now several acute medical illnesses.  Extensive amount of data was reviewed.  Images were directly visualized by me.  This patient warrants high complexity medical decision-making.     I reviewed the chart and other providers notes and reviewed labs.  Copied text in this note has been reviewed and is accurate as of  today      Favio Trores MD  4/12/2025

## 2025-04-13 LAB
ALBUMIN SERPL-MCNC: 2.7 G/DL (ref 3.5–5.2)
ANION GAP SERPL CALCULATED.3IONS-SCNC: 12.3 MMOL/L (ref 5–15)
APO B SERPL-MCNC: 67 MG/DL
BASOPHILS # BLD AUTO: 0.08 10*3/MM3 (ref 0–0.2)
BASOPHILS NFR BLD AUTO: 0.5 % (ref 0–1.5)
BUN SERPL-MCNC: 28 MG/DL (ref 6–20)
BUN/CREAT SERPL: 5.4 (ref 7–25)
CALCIUM SPEC-SCNC: 8.2 MG/DL (ref 8.6–10.5)
CHLORIDE SERPL-SCNC: 94 MMOL/L (ref 98–107)
CO2 SERPL-SCNC: 23.7 MMOL/L (ref 22–29)
CREAT SERPL-MCNC: 5.17 MG/DL (ref 0.57–1)
DEPRECATED RDW RBC AUTO: 48.9 FL (ref 37–54)
EGFRCR SERPLBLD CKD-EPI 2021: 9.5 ML/MIN/1.73
EOSINOPHIL # BLD AUTO: 0.34 10*3/MM3 (ref 0–0.4)
EOSINOPHIL NFR BLD AUTO: 2.2 % (ref 0.3–6.2)
ERYTHROCYTE [DISTWIDTH] IN BLOOD BY AUTOMATED COUNT: 13.7 % (ref 12.3–15.4)
GLUCOSE BLDC GLUCOMTR-MCNC: 85 MG/DL (ref 70–130)
GLUCOSE BLDC GLUCOMTR-MCNC: 85 MG/DL (ref 70–130)
GLUCOSE BLDC GLUCOMTR-MCNC: 95 MG/DL (ref 70–130)
GLUCOSE BLDC GLUCOMTR-MCNC: 95 MG/DL (ref 70–130)
GLUCOSE SERPL-MCNC: 73 MG/DL (ref 65–99)
HCT VFR BLD AUTO: 26.3 % (ref 34–46.6)
HGB BLD-MCNC: 8.3 G/DL (ref 12–15.9)
IMM GRANULOCYTES # BLD AUTO: 0.38 10*3/MM3 (ref 0–0.05)
IMM GRANULOCYTES NFR BLD AUTO: 2.4 % (ref 0–0.5)
LYMPHOCYTES # BLD AUTO: 1.61 10*3/MM3 (ref 0.7–3.1)
LYMPHOCYTES NFR BLD AUTO: 10.2 % (ref 19.6–45.3)
MCH RBC QN AUTO: 31.3 PG (ref 26.6–33)
MCHC RBC AUTO-ENTMCNC: 31.6 G/DL (ref 31.5–35.7)
MCV RBC AUTO: 99.2 FL (ref 79–97)
MONOCYTES # BLD AUTO: 1.38 10*3/MM3 (ref 0.1–0.9)
MONOCYTES NFR BLD AUTO: 8.8 % (ref 5–12)
NEUTROPHILS NFR BLD AUTO: 11.94 10*3/MM3 (ref 1.7–7)
NEUTROPHILS NFR BLD AUTO: 75.9 % (ref 42.7–76)
NRBC BLD AUTO-RTO: 0 /100 WBC (ref 0–0.2)
PHOSPHATE SERPL-MCNC: 3.5 MG/DL (ref 2.5–4.5)
PLATELET # BLD AUTO: 389 10*3/MM3 (ref 140–450)
PMV BLD AUTO: 9.3 FL (ref 6–12)
POTASSIUM SERPL-SCNC: 4.2 MMOL/L (ref 3.5–5.2)
RBC # BLD AUTO: 2.65 10*6/MM3 (ref 3.77–5.28)
SODIUM SERPL-SCNC: 130 MMOL/L (ref 136–145)
WBC NRBC COR # BLD AUTO: 15.73 10*3/MM3 (ref 3.4–10.8)

## 2025-04-13 PROCEDURE — 25010000002 HEPARIN (PORCINE) PER 1000 UNITS: Performed by: INTERNAL MEDICINE

## 2025-04-13 PROCEDURE — 63710000001 INSULIN GLARGINE PER 5 UNITS: Performed by: INTERNAL MEDICINE

## 2025-04-13 PROCEDURE — 63710000001 INSULIN LISPRO (HUMAN) PER 5 UNITS: Performed by: INTERNAL MEDICINE

## 2025-04-13 PROCEDURE — 80069 RENAL FUNCTION PANEL: CPT | Performed by: INTERNAL MEDICINE

## 2025-04-13 PROCEDURE — 25010000002 ERTAPENEM PER 500 MG: Performed by: INTERNAL MEDICINE

## 2025-04-13 PROCEDURE — 85025 COMPLETE CBC W/AUTO DIFF WBC: CPT | Performed by: INTERNAL MEDICINE

## 2025-04-13 PROCEDURE — 82948 REAGENT STRIP/BLOOD GLUCOSE: CPT

## 2025-04-13 PROCEDURE — 97116 GAIT TRAINING THERAPY: CPT

## 2025-04-13 RX ORDER — POLYETHYLENE GLYCOL 3350 17 G/17G
17 POWDER, FOR SOLUTION ORAL DAILY
Status: DISCONTINUED | OUTPATIENT
Start: 2025-04-13 | End: 2025-04-16 | Stop reason: HOSPADM

## 2025-04-13 RX ORDER — BISACODYL 10 MG
10 SUPPOSITORY, RECTAL RECTAL DAILY PRN
Status: DISCONTINUED | OUTPATIENT
Start: 2025-04-13 | End: 2025-04-16 | Stop reason: HOSPADM

## 2025-04-13 RX ORDER — BISACODYL 5 MG/1
5 TABLET, DELAYED RELEASE ORAL DAILY PRN
Status: DISCONTINUED | OUTPATIENT
Start: 2025-04-13 | End: 2025-04-16 | Stop reason: HOSPADM

## 2025-04-13 RX ORDER — AMOXICILLIN 250 MG
2 CAPSULE ORAL 2 TIMES DAILY PRN
Status: DISCONTINUED | OUTPATIENT
Start: 2025-04-13 | End: 2025-04-16 | Stop reason: HOSPADM

## 2025-04-13 RX ADMIN — Medication 10 ML: at 21:21

## 2025-04-13 RX ADMIN — Medication 10 ML: at 08:24

## 2025-04-13 RX ADMIN — ACETAMINOPHEN 650 MG: 325 TABLET, FILM COATED ORAL at 12:34

## 2025-04-13 RX ADMIN — ERTAPENEM SODIUM 500 MG: 1 INJECTION, POWDER, LYOPHILIZED, FOR SOLUTION INTRAMUSCULAR; INTRAVENOUS at 22:47

## 2025-04-13 RX ADMIN — ACETAMINOPHEN 650 MG: 325 TABLET, FILM COATED ORAL at 02:52

## 2025-04-13 RX ADMIN — INSULIN LISPRO 3 UNITS: 100 INJECTION, SOLUTION INTRAVENOUS; SUBCUTANEOUS at 08:23

## 2025-04-13 RX ADMIN — INSULIN LISPRO 3 UNITS: 100 INJECTION, SOLUTION INTRAVENOUS; SUBCUTANEOUS at 12:34

## 2025-04-13 RX ADMIN — LANTHANUM CARBONATE 2000 MG: 500 TABLET, CHEWABLE ORAL at 08:40

## 2025-04-13 RX ADMIN — ACETAMINOPHEN 650 MG: 325 TABLET, FILM COATED ORAL at 22:46

## 2025-04-13 RX ADMIN — HEPARIN SODIUM 3200 UNITS: 1000 INJECTION INTRAVENOUS; SUBCUTANEOUS at 16:56

## 2025-04-13 RX ADMIN — ACETAMINOPHEN 650 MG: 325 TABLET, FILM COATED ORAL at 18:15

## 2025-04-13 RX ADMIN — HEPARIN SODIUM 5000 UNITS: 5000 INJECTION INTRAVENOUS; SUBCUTANEOUS at 06:48

## 2025-04-13 RX ADMIN — INSULIN GLARGINE 13 UNITS: 100 INJECTION, SOLUTION SUBCUTANEOUS at 22:47

## 2025-04-13 RX ADMIN — INSULIN LISPRO 3 UNITS: 100 INJECTION, SOLUTION INTRAVENOUS; SUBCUTANEOUS at 18:09

## 2025-04-13 RX ADMIN — ATORVASTATIN CALCIUM 40 MG: 20 TABLET, FILM COATED ORAL at 21:21

## 2025-04-13 RX ADMIN — Medication 1000 MCG: at 08:23

## 2025-04-13 RX ADMIN — HEPARIN SODIUM 5000 UNITS: 5000 INJECTION INTRAVENOUS; SUBCUTANEOUS at 21:21

## 2025-04-13 RX ADMIN — ACETAMINOPHEN 650 MG: 325 TABLET, FILM COATED ORAL at 06:48

## 2025-04-13 RX ADMIN — LIDOCAINE 1 PATCH: 4 PATCH TOPICAL at 08:23

## 2025-04-13 RX ADMIN — LANTHANUM CARBONATE 2000 MG: 500 TABLET, CHEWABLE ORAL at 18:09

## 2025-04-13 RX ADMIN — POLYETHYLENE GLYCOL 3350 17 G: 17 POWDER, FOR SOLUTION ORAL at 12:34

## 2025-04-13 RX ADMIN — ASPIRIN 81 MG: 81 TABLET, COATED ORAL at 08:23

## 2025-04-13 NOTE — PLAN OF CARE
Goal Outcome Evaluation:  Pt VSS on 2L NC. Pain managed with Prn acetaminophen. Report given to Trista KESSLER, 4N. Transported pt via stretcher with her belongings; stable. Plan of care ongoing.

## 2025-04-13 NOTE — THERAPY TREATMENT NOTE
Patient Name: Kelly Pack  : 1970    MRN: [id number]                              Today's Date: 2025       Admit Date: 2025    Visit Dx:     ICD-10-CM ICD-9-CM   1. NSTEMI (non-ST elevated myocardial infarction)  I21.4 410.70   2. Sepsis without acute organ dysfunction, due to unspecified organism  A41.9 038.9     995.91   3. Emphysematous pyelonephritis of left kidney  N12 590.80   4. ESRD on dialysis  N18.6 585.6    Z99.2 V45.11   5. Hydronephrosis of left kidney  N13.30 591   6. Sepsis, due to unspecified organism, unspecified whether acute organ dysfunction present  A41.9 038.9     995.91     Patient Active Problem List   Diagnosis    Hyperlipidemia    Allergic rhinitis    Nephrotic range proteinuria    Asthma    Essential hypertension    Uncontrolled type 2 diabetes mellitus with hyperglycemia    Acute renal failure superimposed on stage 3a chronic kidney disease    Anasarca    Suspected sleep apnea    Anemia    Bilateral lower extremity edema    Elevated troponin    Acute renal failure superimposed on chronic kidney disease    Type 2 diabetes mellitus with chronic kidney disease    Symptomatic anemia    ESRD (end stage renal disease)    Sepsis    Emphysematous pyelonephritis of left kidney    Hydronephrosis of left kidney    NSTEMI (non-ST elevated myocardial infarction)    Class 2 severe obesity with serious comorbidity in adult     Past Medical History:   Diagnosis Date    Albuminuria     Allergic     Seasonal, grass, cats and some dogs    Allergic rhinitis     Asthma     allergy triggered    Bell's palsy     Cataract     Had surgery on both eyes    Cholelithiasis     Removed. Have bile reflux/ vomiting    CKD (chronic kidney disease)     Diabetes mellitus     Drug therapy     Endometrial cancer     HL (hearing loss)     In L ear    Hyperlipidemia     Hypertension     Low back pain     Migraine     Obesity     Peripheral neuropathy     Renal insufficiency     Right otitis media     Type  II diabetes mellitus     Visual impairment     Diabetic retinopathy. Oil in L eye due to retina detachment s.    Vitamin D deficiency      Past Surgical History:   Procedure Laterality Date    ARTERIOVENOUS FISTULA  05/2023    CARDIAC CATHETERIZATION N/A 4/6/2025    Procedure: Left Heart Cath;  Surgeon: Damian Nguyen MD;  Location: [facility] CATH INVASIVE LOCATION;  Service: Cardiology;  Laterality: N/A;    CARDIAC CATHETERIZATION N/A 4/6/2025    Procedure: Coronary angiography;  Surgeon: Damian Nguyen MD;  Location: [facility] CATH INVASIVE LOCATION;  Service: Cardiology;  Laterality: N/A;    CATARACT EXTRACTION      CHOLECYSTECTOMY      CYSTOSCOPY, RETROGRADE PYELOGRAM AND STENT INSERTION Left 4/2/2025    Procedure: CYSTOSCOPY RETROGRADE PYELOGRAM AND STENT INSERTION;  Surgeon: Tl Gray MD;  Location: [facility] OR;  Service: Urology;  Laterality: Left;    EYE SURGERY      NOV 2020    HYSTERECTOMY      due to cancer    INSERTION HEMODIALYSIS CATHETER Right 06/23/2023    Procedure: HEMODIALYSIS CATHETER INSERTION;  Surgeon: Mikael Mackay MD;  Location: [facility] OR;  Service: Vascular;  Laterality: Right;    OOPHORECTOMY      VITRECTOMY PARS PLANA Left 01/28/2020    Procedure: 25G VITRECTOMY-ENDO LASER;  Surgeon: Lazarus, Howard S., MD;  Location: [facility] OR;  Service: Ophthalmology      General Information       Row Name 04/13/25 1247          Physical Therapy Time and Intention    Document Type therapy note (daily note)  -     Mode of Treatment physical therapy  -       Row Name 04/13/25 1247          General Information    Existing Precautions/Restrictions fall;oxygen therapy device and L/min  -       Row Name 04/13/25 1247          Cognition    Orientation Status (Cognition) oriented x 4  -               User Key  (r) = Recorded By, (t) = Taken By, (c) = Cosigned By      Initials Name Provider Type    SM Tali Durand, PTA Physical Therapist Assistant                    Mobility       Row Name 04/13/25 1248          Bed Mobility    Bed Mobility supine-sit  -     Supine-Sit [place] (Bed Mobility) supervision  -     Assistive Device (Bed Mobility) bed rails;head of bed elevated  -SM       Row Name 04/13/25 1248          Sit-Stand Transfer    Sit-Stand [place] (Transfers) standby assist  -SM       Row Name 04/13/25 1248          Gait/Stairs (Locomotion)    [place] Level (Gait) contact guard;minimum assist (75% patient effort)  -     Patient was able to Ambulate yes  -     Distance in Feet (Gait) 35  -     Deviations/Abnormal Patterns (Gait) ataxic;aniceto decreased;stride length decreased  -     Bilateral Gait Deviations forward flexed posture  -     Comment, (Gait/Stairs) slightly unsteady at times  -               User Key  (r) = Recorded By, (t) = Taken By, (c) = Cosigned By      Initials Name Provider Type    Tali Pollard PTA Physical Therapist Assistant                   Obj/Interventions       Row Name 04/13/25 1255          Motor Skills    Therapeutic Exercise --  seated AP, LAQ, marches x10 reps; standing heel/toe raises, marches, partial squats x10 reps  -               User Key  (r) = Recorded By, (t) = Taken By, (c) = Cosigned By      Initials Name Provider Type    Tali Pollard PTA Physical Therapist Assistant                   Goals/Plan    No documentation.                  Clinical Impression       Row Name 04/13/25 1256          Pain    Pretreatment Pain Rating 2/10  -     Posttreatment Pain Rating 2/10  -     Pain Location chest  -     Pain Management Interventions exercise or physical activity utilized;positioning techniques utilized  -SM       Row Name 04/13/25 1256          Positioning and Restraints    Pre-Treatment Position in bed  -     Post Treatment Position bathroom  -     Bathroom sitting;call light within reach;encouraged to call for assist  -               User Key  (r) = Recorded By, (t) =  Taken By, (c) = Cosigned By      Initials Name Provider Type    Tali Pollard PTA Physical Therapist Assistant                   Outcome Measures       Row Name 04/13/25 1259          How much help from another person do you currently need...    Turning from your back to your side while in flat bed without using bedrails? 3  -SM     Moving from lying on back to sitting on the side of a flat bed without bedrails? 3  -SM     Moving to and from a bed to a chair (including a wheelchair)? 3  -SM     Standing up from a chair using your arms (e.g., wheelchair, bedside chair)? 3  -SM     Climbing 3-5 steps with a railing? 3  -SM     To walk in hospital room? 3  -SM     AM-PAC 6 Clicks Score (PT) 18  -     Highest Level of Mobility Goal 6 --> Walk 10 steps or more  -       Row Name 04/13/25 1259          Functional Assessment    Outcome Measure Options AM-PAC 6 Clicks Basic Mobility (PT)  -               User Key  (r) = Recorded By, (t) = Taken By, (c) = Cosigned By      Initials Name Provider Type    Tali Pollard PTA Physical Therapist Assistant                                 Physical Therapy Education       Title: PT OT SLP Therapies (In Progress)       Topic: Physical Therapy (Done)       Point: Mobility training (Done)       Learning Progress Summary            Patient Acceptance, E,TB,D, VU,NR by  at 4/13/2025 1259    Acceptance, E,D, DU by PC at 4/10/2025 1015    Acceptance, E,D, DU by PC at 4/9/2025 1310    Acceptance, E,TB,D, VU,NR by  at 4/7/2025 1500                      Point: Home exercise program (Done)       Learning Progress Summary            Patient Acceptance, E,TB,D, VU,NR by  at 4/13/2025 1259    Acceptance, E,D, DU by PC at 4/10/2025 1015    Acceptance, E,D, DU by PC at 4/9/2025 1310    Acceptance, E,TB,D, VU,NR by  at 4/7/2025 1500                      Point: Body mechanics (Done)       Learning Progress Summary            Patient Acceptance, E,TB,D, VU,NR by  at  4/13/2025 1259    Acceptance, E,D, DU by  at 4/10/2025 1015    Acceptance, E,D, DU by  at 4/9/2025 1310    Acceptance, E,TB,D, VU,NR by  at 4/7/2025 1500                      Point: Precautions (Done)       Learning Progress Summary            Patient Acceptance, E,TB,D, VU,NR by  at 4/13/2025 1259    Acceptance, E,D, DU by  at 4/10/2025 1015    Acceptance, E,D, DU by  at 4/9/2025 1310    Acceptance, E,TB,D, VU,NR by  at 4/7/2025 1500                                      User Key       Initials Effective Dates Name Provider Type Discipline     06/16/21 -  Patsy Ortega PT Physical Therapist PT     03/07/18 -  Tali Durand PTA Physical Therapist Assistant PT     06/16/21 -  Yoselin Godfrey PTA Physical Therapist Assistant PT                  PT Recommendation and Plan     Progress: improving  Outcome Evaluation: Pt tolerated treatment well this date. Required SV for bed mobility, then SBA-CGA to stand. Pt ambulated 35ft w/ no AD, though slightly unsteady and requiring CGA-min A. Pt completed a few seated and standing LE exercises, though limited d/t fatigue. Encouraged pt to ambulate in room w/ nsg during the day.     Time Calculation:         PT Charges       Row Name 04/13/25 1302             Time Calculation    Start Time 0946  -      Stop Time 1004  -      Time Calculation (min) 18 min  -      PT Received On 04/13/25  -      PT - Next Appointment 04/14/25  -                User Key  (r) = Recorded By, (t) = Taken By, (c) = Cosigned By      Initials Name Provider Type     Tali Durand PTA Physical Therapist Assistant                  Therapy Charges for Today       Code Description Service Date Service Provider Modifiers Qty    55578710854 HC GAIT TRAINING EA 15 MIN 4/13/2025 Tali Durand PTA GP 1            PT G-Codes  Outcome Measure Options: AM-PAC 6 Clicks Basic Mobility (PT)  AM-PAC 6 Clicks Score (PT): 18  AM-PAC 6 Clicks Score (OT): 16  PT  Discharge Summary  Anticipated Discharge Disposition (PT): inpatient rehabilitation facility    Tali Durand, PTA  4/13/2025

## 2025-04-13 NOTE — PROGRESS NOTES
Name: Kelly Pack ADMIT: 2025   : 1970  PCP: Kim Benjamin APRN    MRN: [id number] LOS: 11 days   AGE/SEX: 54 y.o. female  ROOM: [facility]     Subjective   Subjective     No events overnight.  She tells me that she has not had a bowel movement since Thursday.  She otherwise is doing okay though she did not sleep particularly well because she was moved from the ICU late last night.       Objective   Objective   Vital Signs  Temp:  [97.7 °F (36.5 °C)-98.2 °F (36.8 °C)] 97.7 °F (36.5 °C)  Heart Rate:  [63-85] 71  Resp:  [18] 18  BP: (112-156)/() 147/49  SpO2:  [77 %-100 %] 98 %  on  Flow (L/min) (Oxygen Therapy):  [1-2] 1;   Device (Oxygen Therapy): room air  Body mass index is 38.22 kg/m².  Physical Exam  Constitutional:       General: She is not in acute distress.     Appearance: She is obese. She is not toxic-appearing.   Cardiovascular:      Rate and Rhythm: Normal rate and regular rhythm.      Heart sounds: Normal heart sounds.   Pulmonary:      Effort: Pulmonary effort is normal.      Breath sounds: Normal breath sounds.   Abdominal:      General: Bowel sounds are normal.      Palpations: Abdomen is soft.   Neurological:      Mental Status: She is alert.   Psychiatric:         Mood and Affect: Mood normal.         Behavior: Behavior normal.         Results Review     I reviewed the patient's new clinical results.  Results from last 7 days   Lab Units 25  03425  0732 25  0510 04/10/25  0957   WBC 10*3/mm3 15.73* 15.76* 17.02* 17.05*   HEMOGLOBIN g/dL 8.3* 8.0* 9.0* 8.5*   PLATELETS 10*3/mm3 389 401 402 377     Results from last 7 days   Lab Units 25  03425  0732 25  0510 04/10/25  0957   SODIUM mmol/L 130* 131* 132* 127*   POTASSIUM mmol/L 4.2 4.6 4.2 4.6   CHLORIDE mmol/L 94* 95* 95* 93*   CO2 mmol/L 23.7 23.0 23.0 21.0*   BUN mg/dL 28* 47* 35* 53*   CREATININE mg/dL 5.17* 7.82* 5.94* 8.00*   GLUCOSE mg/dL 73 86 85 127*   Estimated Creatinine  Clearance: 17.4 mL/min (A) (by C-G formula based on SCr of 5.17 mg/dL (H)).  Results from last 7 days   Lab Units 04/13/25  0344 04/12/25  0732 04/11/25  0510 04/10/25  0957   ALBUMIN g/dL 2.7* 3.0* 3.0* 3.1*     Results from last 7 days   Lab Units 04/13/25  0344 04/12/25  0732 04/11/25  0510 04/10/25  0957 04/09/25  0829 04/08/25  0532   CALCIUM mg/dL 8.2* 8.3* 8.5* 8.5*   < > 8.2*   ALBUMIN g/dL 2.7* 3.0* 3.0* 3.1*   < > 3.0*   MAGNESIUM mg/dL  --   --   --   --   --  2.9*   PHOSPHORUS mg/dL 3.5 5.0* 3.9 4.9*   < > 5.7*    < > = values in this interval not displayed.     Results from last 7 days   Lab Units 04/08/25  1711   LACTATE mmol/L 1.7     COVID19   Date Value Ref Range Status   04/02/2025 Not Detected Not Detected - Ref. Range Final   12/03/2024 Not Detected Not Detected - Ref. Range Final   08/30/2024 Not Detected Not Detected - Ref. Range Final   06/22/2023 Not Detected Not Detected - Ref. Range Final     Glucose   Date/Time Value Ref Range Status   04/13/2025 0751 85 70 - 130 mg/dL Final   04/12/2025 2054 181 (H) 70 - 130 mg/dL Final   04/12/2025 1632 98 70 - 130 mg/dL Final   04/12/2025 1123 89 70 - 130 mg/dL Final   04/12/2025 0734 92 70 - 130 mg/dL Final   04/11/2025 2116 133 (H) 70 - 130 mg/dL Final   04/11/2025 1649 186 (H) 70 - 130 mg/dL Final       CT Angiogram Chest  Narrative: CTA CHEST WITH IV CONTRAST     HISTORY: two pea/code blue, rule out pe; I21.4-Non-ST elevation (NSTEMI)  myocardial infarction; A41.9-Sepsis, unspecified organism;  T09-Rnrfud-cfjobbgifrjd nephritis, not specified as acute or chronic;  N18.6-End stage renal disease; Z99.2-Dependence on renal dialysis;  N13.30-Unspecified hydronephrosis; A41.9-Sepsis, unspecified organism     COMPARISON: None     TECHNIQUE: CT angiography was performed of the chest with axial images  as well as coronal and sagittal reformatted MIP images provided  following administration of IV contrast. 3-D surface rendered reformats  were obtained of  the pulmonary arteries and aorta. Radiation dose  reduction techniques were utilized, including automated exposure  control, and exposure modulation based on body size.     FINDINGS:     There are bilateral faint groundglass densities throughout both lungs,  with areas of likely dependent atelectasis posteriorly on each side.  There is no effusion or pneumothorax.     Thoracic aorta is normal in caliber.  There are coronary atherosclerotic  vascular calcifications.  There is no suspicious mediastinal adenopathy  or other mass.     Images of the upper abdomen show no acute abnormality.  There is no  acute bony abnormality.     Bolus timing is good and there is no evidence of pulmonary embolism.     Impression:    Pulmonary arteries are well-opacified, and there is no evidence of  pulmonary embolism.     Faint but diffuse bilateral areas of parenchymal groundglass opacity,  without consolidation, effusion or pneumothorax. FINDINGS may be related  to recent resuscitation.           This report was finalized on 4/9/2025 9:37 PM by Dr. Yung Santiago M.D  on Workstation: [id number]       Scheduled Medications  albumin human, 12.5 g, Intravenous, Once  aspirin, 81 mg, Oral, Daily  atorvastatin, 40 mg, Oral, Nightly  epoetin kindra/kindra-epbx, 10,000 Units, Intravenous, Once per day on Tuesday Thursday Saturday  ertapenem, 500 mg, Intravenous, Q24H  fluticasone, 2 spray, Each Nare, Daily  heparin (porcine), 5,000 Units, Subcutaneous, Q8H  insulin glargine, 13 Units, Subcutaneous, Nightly  insulin lispro, 2-7 Units, Subcutaneous, 4x Daily AC & at Bedtime  insulin lispro, 3 Units, Subcutaneous, TID With Meals  lanthanum, 2,000 mg, Oral, BID With Meals  Lidocaine, 1 patch, Transdermal, Q24H  midodrine, 5 mg, Oral, Once per day on Monday Tuesday Thursday Saturday  polyethylene glycol, 17 g, Oral, Daily  sodium chloride, 10 mL, Intravenous, Q12H  sodium chloride, 10 mL, Intravenous, Q12H  sodium chloride, 10 mL, Intravenous,  Q12H  sodium chloride, 10 mL, Intravenous, Q12H  sodium chloride, 10 mL, Intravenous, Q12H  cyanocobalamin, 1,000 mcg, Oral, Daily  vitamin D, 50,000 Units, Oral, Once per day on Monday Thursday    Infusions   Diet  Diet: Renal; Low Sodium (2-3g), Low Potassium, Low Phosphorus; Fluid Consistency: Thin (IDDSI 0)       Assessment/Plan     Active Hospital Problems    Diagnosis  POA    **Sepsis [A41.9]  Yes    Class 2 severe obesity with serious comorbidity in adult [E66.812, E66.01]  Yes    NSTEMI (non-ST elevated myocardial infarction) [I21.4]  Unknown    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       54 y.o. female admitted with Sepsis.    54 year old woman who presented initially with left sided abdominal pain and was found to have emphysematous pyelitis and left sided hydronephrosis due to ESBL E coli for which she was urgently taken for cytoscopy with stent insertion on 4/2/25. On 4/4/25, she experienced a PEA arrest during dialysis for which she was resuscitated quickly and intubated and transferred to the ICU. She improved quickly and was able to be extubated on 4/6/25 and she was transferred out of the ICU. She underwent cardiac catheterization which only showed very mild non-obstructive CAD. On 4/8/25, she experienced respiratory arrest while on dialysis which quickly resolved (the etiology of which was unclear). She was transferred back to the ICU for closer monitoring. She was reevaluated by cardiology who thought maybe she had respiratory depression from narcotics. She has done well since that time, tolerating several dialysis sessions without issue and so was  transferred back to the medical floor.    ESBL E coli causing emphysematous pyelonephritis and bacteremia causing severe sepsis (fever, tachycardia, leukocytosis, lactic acidosis)  S/p cystoscopy and left ureteral stent insertion on 4/2/25  ID recommends ertapenem through 4/16 to complete 2 weeks of therapy  PEA arrest 4/4/25 followed by subsequent respiratory arrest on 4/8/25-unclear etiology. Her cardiac catheterization only showed very mild non-obstructive disease. No arrhythmias have been documented. Likely multifactorial related to hypotension during dialysis plus her infection and possibly some respiratory suppression related to narcotic use.  Non-obstructive CAD-asa/satin  Anemia of ESRD-hgb has been largely stable between 8-9 this admission  ESRD-HD being managed by nephrology  Type 2 diabetes-well controlled on basal/bolus regimen  Essential hypertension-adequate control acutely  Constipation-schedule miralax  Obesity  Heparin SC for DVT prophylaxis.  Full code.  Discussed with patient and nursing staff.  Anticipate discharge to acute rehab  depending on her disposition and their ability to administer antibiotics peripherally, she'll be ready to go either once her antibiotic course has completed or once arrangements to an acute rehab facility have been made       Kevin Velasquez MD  [facility]  04/13/25  10:58 EDT

## 2025-04-13 NOTE — PLAN OF CARE
Goal Outcome Evaluation:   Pt aox4. Maintained on ra. Tylenol given prn for chest discomfort. Dialysis nurse came and completed ultrafiltration. Up to bathroom with x1 assist. Safety maintained.

## 2025-04-13 NOTE — PLAN OF CARE
Goal Outcome Evaluation:  Plan of Care Reviewed With: patient        Progress: improving  Outcome Evaluation: Pt tolerated treatment well this date. Required SV for bed mobility, then SBA-CGA to stand. Pt ambulated 35ft w/ no AD, though slightly unsteady and requiring CGA-min A. Pt completed a few seated and standing LE exercises, though limited d/t fatigue. Encouraged pt to ambulate in room w/ nsg during the day.    Anticipated Discharge Disposition (PT): inpatient rehabilitation facility

## 2025-04-13 NOTE — PLAN OF CARE
Goal Outcome Evaluation:   Pt transfer from CICU. VSS, pt on 1LNC.Tylenol given for pain. Pt appeared to sleep well between care. IV antibiotics given. Pt up to BR with 2 x assist. Will CTM, safety maintained.

## 2025-04-13 NOTE — NURSING NOTE
HD completed and terminated without complications, patient stable awake and oriented x3. Catheter clamped and capped with no signs of infection or bleeding noted. UF removed 3L sequential dialysis profile 2 as ordered. post tx /57 HR 71

## 2025-04-14 ENCOUNTER — APPOINTMENT (OUTPATIENT)
Dept: ULTRASOUND IMAGING | Facility: HOSPITAL | Age: 55
End: 2025-04-14
Payer: MEDICARE

## 2025-04-14 LAB
ALBUMIN SERPL-MCNC: 2.6 G/DL (ref 3.5–5.2)
ANION GAP SERPL CALCULATED.3IONS-SCNC: 15 MMOL/L (ref 5–15)
BASOPHILS # BLD AUTO: 0.13 10*3/MM3 (ref 0–0.2)
BASOPHILS NFR BLD AUTO: 1 % (ref 0–1.5)
BUN SERPL-MCNC: 40 MG/DL (ref 6–20)
BUN/CREAT SERPL: 6.1 (ref 7–25)
CALCIUM SPEC-SCNC: 8.3 MG/DL (ref 8.6–10.5)
CHLORIDE SERPL-SCNC: 93 MMOL/L (ref 98–107)
CO2 SERPL-SCNC: 17 MMOL/L (ref 22–29)
CREAT SERPL-MCNC: 6.59 MG/DL (ref 0.57–1)
DEPRECATED RDW RBC AUTO: 48.4 FL (ref 37–54)
EGFRCR SERPLBLD CKD-EPI 2021: 7.1 ML/MIN/1.73
EOSINOPHIL # BLD AUTO: 0.42 10*3/MM3 (ref 0–0.4)
EOSINOPHIL NFR BLD AUTO: 3.1 % (ref 0.3–6.2)
ERYTHROCYTE [DISTWIDTH] IN BLOOD BY AUTOMATED COUNT: 13.6 % (ref 12.3–15.4)
GLUCOSE BLDC GLUCOMTR-MCNC: 157 MG/DL (ref 70–130)
GLUCOSE BLDC GLUCOMTR-MCNC: 75 MG/DL (ref 70–130)
GLUCOSE BLDC GLUCOMTR-MCNC: 82 MG/DL (ref 70–130)
GLUCOSE BLDC GLUCOMTR-MCNC: 97 MG/DL (ref 70–130)
GLUCOSE SERPL-MCNC: 90 MG/DL (ref 65–99)
HCT VFR BLD AUTO: 28.8 % (ref 34–46.6)
HGB BLD-MCNC: 8.9 G/DL (ref 12–15.9)
IMM GRANULOCYTES # BLD AUTO: 0.4 10*3/MM3 (ref 0–0.05)
IMM GRANULOCYTES NFR BLD AUTO: 3 % (ref 0–0.5)
LYMPHOCYTES # BLD AUTO: 1.74 10*3/MM3 (ref 0.7–3.1)
LYMPHOCYTES NFR BLD AUTO: 13 % (ref 19.6–45.3)
MCH RBC QN AUTO: 30.7 PG (ref 26.6–33)
MCHC RBC AUTO-ENTMCNC: 30.9 G/DL (ref 31.5–35.7)
MCV RBC AUTO: 99.3 FL (ref 79–97)
MONOCYTES # BLD AUTO: 1.34 10*3/MM3 (ref 0.1–0.9)
MONOCYTES NFR BLD AUTO: 10 % (ref 5–12)
NEUTROPHILS NFR BLD AUTO: 69.9 % (ref 42.7–76)
NEUTROPHILS NFR BLD AUTO: 9.4 10*3/MM3 (ref 1.7–7)
NRBC BLD AUTO-RTO: 0 /100 WBC (ref 0–0.2)
PHOSPHATE SERPL-MCNC: 3.8 MG/DL (ref 2.5–4.5)
PLATELET # BLD AUTO: 365 10*3/MM3 (ref 140–450)
PMV BLD AUTO: 9.5 FL (ref 6–12)
POTASSIUM SERPL-SCNC: 4.8 MMOL/L (ref 3.5–5.2)
RBC # BLD AUTO: 2.9 10*6/MM3 (ref 3.77–5.28)
SODIUM SERPL-SCNC: 125 MMOL/L (ref 136–145)
WBC NRBC COR # BLD AUTO: 13.43 10*3/MM3 (ref 3.4–10.8)

## 2025-04-14 PROCEDURE — 63710000001 INSULIN GLARGINE PER 5 UNITS: Performed by: INTERNAL MEDICINE

## 2025-04-14 PROCEDURE — 97530 THERAPEUTIC ACTIVITIES: CPT

## 2025-04-14 PROCEDURE — 97535 SELF CARE MNGMENT TRAINING: CPT

## 2025-04-14 PROCEDURE — 63710000001 INSULIN LISPRO (HUMAN) PER 5 UNITS: Performed by: INTERNAL MEDICINE

## 2025-04-14 PROCEDURE — 25010000002 HEPARIN (PORCINE) PER 1000 UNITS: Performed by: INTERNAL MEDICINE

## 2025-04-14 PROCEDURE — 82948 REAGENT STRIP/BLOOD GLUCOSE: CPT

## 2025-04-14 PROCEDURE — 76642 ULTRASOUND BREAST LIMITED: CPT

## 2025-04-14 PROCEDURE — 80069 RENAL FUNCTION PANEL: CPT | Performed by: INTERNAL MEDICINE

## 2025-04-14 PROCEDURE — 85025 COMPLETE CBC W/AUTO DIFF WBC: CPT | Performed by: INTERNAL MEDICINE

## 2025-04-14 RX ORDER — MIDODRINE HYDROCHLORIDE 5 MG/1
5 TABLET ORAL DAILY PRN
Status: DISCONTINUED | OUTPATIENT
Start: 2025-04-14 | End: 2025-04-16 | Stop reason: HOSPADM

## 2025-04-14 RX ADMIN — INSULIN GLARGINE 13 UNITS: 100 INJECTION, SOLUTION SUBCUTANEOUS at 21:10

## 2025-04-14 RX ADMIN — FLUTICASONE PROPIONATE 2 SPRAY: 50 SPRAY, METERED NASAL at 08:11

## 2025-04-14 RX ADMIN — Medication 10 ML: at 21:10

## 2025-04-14 RX ADMIN — ATORVASTATIN CALCIUM 40 MG: 20 TABLET, FILM COATED ORAL at 21:10

## 2025-04-14 RX ADMIN — HEPARIN SODIUM 5000 UNITS: 5000 INJECTION INTRAVENOUS; SUBCUTANEOUS at 06:09

## 2025-04-14 RX ADMIN — LIDOCAINE 1 PATCH: 4 PATCH TOPICAL at 08:10

## 2025-04-14 RX ADMIN — HEPARIN SODIUM 5000 UNITS: 5000 INJECTION INTRAVENOUS; SUBCUTANEOUS at 13:01

## 2025-04-14 RX ADMIN — INSULIN LISPRO 3 UNITS: 100 INJECTION, SOLUTION INTRAVENOUS; SUBCUTANEOUS at 11:52

## 2025-04-14 RX ADMIN — HEPARIN SODIUM 5000 UNITS: 5000 INJECTION INTRAVENOUS; SUBCUTANEOUS at 21:10

## 2025-04-14 RX ADMIN — Medication 10 ML: at 21:11

## 2025-04-14 RX ADMIN — ACETAMINOPHEN 650 MG: 325 TABLET, FILM COATED ORAL at 21:10

## 2025-04-14 RX ADMIN — ACETAMINOPHEN 650 MG: 325 TABLET, FILM COATED ORAL at 08:11

## 2025-04-14 RX ADMIN — Medication 10 ML: at 08:12

## 2025-04-14 RX ADMIN — LANTHANUM CARBONATE 2000 MG: 500 TABLET, CHEWABLE ORAL at 17:25

## 2025-04-14 RX ADMIN — Medication 1000 MCG: at 08:11

## 2025-04-14 RX ADMIN — ACETAMINOPHEN 650 MG: 325 TABLET, FILM COATED ORAL at 11:52

## 2025-04-14 RX ADMIN — INSULIN LISPRO 3 UNITS: 100 INJECTION, SOLUTION INTRAVENOUS; SUBCUTANEOUS at 08:10

## 2025-04-14 RX ADMIN — LANTHANUM CARBONATE 2000 MG: 500 TABLET, CHEWABLE ORAL at 08:10

## 2025-04-14 RX ADMIN — ACETAMINOPHEN 650 MG: 325 TABLET, FILM COATED ORAL at 04:02

## 2025-04-14 RX ADMIN — INSULIN LISPRO 2 UNITS: 100 INJECTION, SOLUTION INTRAVENOUS; SUBCUTANEOUS at 21:10

## 2025-04-14 RX ADMIN — ERGOCALCIFEROL 50000 UNITS: 1.25 CAPSULE ORAL at 08:11

## 2025-04-14 RX ADMIN — ASPIRIN 81 MG: 81 TABLET, COATED ORAL at 08:11

## 2025-04-14 NOTE — PROGRESS NOTES
RENAL/K[facility]:     LOS: 12 days    Patient Care Team:  Kim Benjamin APRN as PCP - General (Nurse Practitioner)  Miguel Stahl MD as Consulting Physician (Hematology and Oncology)  Radha Martinez APRN as Referring Physician (Nurse Practitioner)    Chief Complaint:  ESRD, Abdominal pain    Subjective     Interval History:   4/7: Chart reviewed  S/P cysto and L ureteral stent placement on 4/2/25  Extubated on Saturday  Feeling well, denies new complaints    4/8: Patient seen and examined on hemodialysis, she was seen about 30 minutes into her treatment, tolerating well so far, BP in the 140s, set for 2 L UF  She denies any shortness of breath chest pain or edema    4/9: She completed dialysis yesterday without event, 2 L of fluid was removed blood pressure was stable throughout her treatment  She declined to take midodrine with dialysis yesterday but looks like her blood pressure stable throughout the whole treatment  Another rapid response was called yesterday evening for respiratory arrest without PEA she was transferred to the ICU and is currently stable, somewhat hypertensive and O2 sats 100% on 2 L without any respiratory distress    April 10: Patient seen and examined in the ICU, on dialysis, tolerating well  She has been hypertensive throughout her treatment and has not needed Midodrine  Remains on oxygen, 1 to 2 L denies any worsening shortness of breath, no edema  Scheduled for 2 L UF but she refused that, goal is 1 L which looks like has been met    April 11: She tolerated dialysis very well yesterday  Denies new complaints, BP stable overnight  Sats 100% on 1 L  No dyspnea no edema no chest pain    April 13:   feels better, still with chest tenderness that is worse with movement due to cpr   Bp near goal     April 14   Tolerated UF yesterday, right breat pain today     Objective     Vital Sign Min/Max for last 24 hours  Temp  Min: 97.5 °F (36.4 °C)  Max: 98.2 °F (36.8 °C)   BP  Min: 120/55  Max:  "140/64   Pulse  Min: 66  Max: 74   Resp  Min: 18  Max: 18   SpO2  Min: 94 %  Max: 99 %   Flow (L/min) (Oxygen Therapy)  Min: 2  Max: 2   No data recorded     Flowsheet Rows      Flowsheet Row First Filed Value   Admission Height 175.3 cm (69\") Documented at 04/02/2025 1049   Admission Weight 110 kg (243 lb) Documented at 04/02/2025 1049            No intake/output data recorded.  I/O last 3 completed shifts:  In: 1194 [P.O.:1194]  Out: -     Physical Exam:  GEN: Awake, alert, responsive, no acute distress, obese  ENT: PERRL, EOMI, MMM.  NECK: Supple, no JVD. RIJ tunneled HD catheter remains in place  CHEST: CTAB, no W/R/C.  CV: RRR, no M/G/R  ABD: Soft, NT, +BS  SKIN: Warm and Dry  NEURO: AAOX3   RUE AVF +thrill and bruit  +edema     WBC WBC   Date Value Ref Range Status   04/14/2025 13.43 (H) 3.40 - 10.80 10*3/mm3 Final   04/13/2025 15.73 (H) 3.40 - 10.80 10*3/mm3 Final   04/12/2025 15.76 (H) 3.40 - 10.80 10*3/mm3 Final      HGB Hemoglobin   Date Value Ref Range Status   04/14/2025 8.9 (L) 12.0 - 15.9 g/dL Final   04/13/2025 8.3 (L) 12.0 - 15.9 g/dL Final   04/12/2025 8.0 (L) 12.0 - 15.9 g/dL Final      HCT Hematocrit   Date Value Ref Range Status   04/14/2025 28.8 (L) 34.0 - 46.6 % Final   04/13/2025 26.3 (L) 34.0 - 46.6 % Final   04/12/2025 24.6 (L) 34.0 - 46.6 % Final      Platlets No results found for: \"LABPLAT\"   MCV MCV   Date Value Ref Range Status   04/14/2025 99.3 (H) 79.0 - 97.0 fL Final   04/13/2025 99.2 (H) 79.0 - 97.0 fL Final   04/12/2025 97.6 (H) 79.0 - 97.0 fL Final          Sodium Sodium   Date Value Ref Range Status   04/14/2025 125 (L) 136 - 145 mmol/L Final   04/13/2025 130 (L) 136 - 145 mmol/L Final   04/12/2025 131 (L) 136 - 145 mmol/L Final      Potassium Potassium   Date Value Ref Range Status   04/14/2025 4.8 3.5 - 5.2 mmol/L Final     Comment:     Slight hemolysis detected by analyzer. Result may be falsely elevated.   04/13/2025 4.2 3.5 - 5.2 mmol/L Final   04/12/2025 4.6 3.5 - 5.2 mmol/L " "Final      Chloride Chloride   Date Value Ref Range Status   04/14/2025 93 (L) 98 - 107 mmol/L Final   04/13/2025 94 (L) 98 - 107 mmol/L Final   04/12/2025 95 (L) 98 - 107 mmol/L Final      CO2 CO2   Date Value Ref Range Status   04/14/2025 17.0 (L) 22.0 - 29.0 mmol/L Final   04/13/2025 23.7 22.0 - 29.0 mmol/L Final   04/12/2025 23.0 22.0 - 29.0 mmol/L Final      BUN BUN   Date Value Ref Range Status   04/14/2025 40 (H) 6 - 20 mg/dL Final   04/13/2025 28 (H) 6 - 20 mg/dL Final   04/12/2025 47 (H) 6 - 20 mg/dL Final      Creatinine Creatinine   Date Value Ref Range Status   04/14/2025 6.59 (H) 0.57 - 1.00 mg/dL Final   04/13/2025 5.17 (H) 0.57 - 1.00 mg/dL Final   04/12/2025 7.82 (H) 0.57 - 1.00 mg/dL Final      Calcium Calcium   Date Value Ref Range Status   04/14/2025 8.3 (L) 8.6 - 10.5 mg/dL Final   04/13/2025 8.2 (L) 8.6 - 10.5 mg/dL Final   04/12/2025 8.3 (L) 8.6 - 10.5 mg/dL Final      PO4 No results found for: \"CAPO4\"   Albumin Albumin   Date Value Ref Range Status   04/14/2025 2.6 (L) 3.5 - 5.2 g/dL Final   04/13/2025 2.7 (L) 3.5 - 5.2 g/dL Final   04/12/2025 3.0 (L) 3.5 - 5.2 g/dL Final        Magnesium No results found for: \"MG\"       Uric Acid No results found for: \"URICACID\"        Results Review:     I reviewed the patient's new clinical results.    albumin human, 12.5 g, Intravenous, Once  aspirin, 81 mg, Oral, Daily  atorvastatin, 40 mg, Oral, Nightly  epoetin kindra/kindra-epbx, 10,000 Units, Intravenous, Once per day on Tuesday Thursday Saturday  ertapenem, 500 mg, Intravenous, Q24H  fluticasone, 2 spray, Each Nare, Daily  heparin (porcine), 5,000 Units, Subcutaneous, Q8H  insulin glargine, 13 Units, Subcutaneous, Nightly  insulin lispro, 2-7 Units, Subcutaneous, 4x Daily AC & at Bedtime  lanthanum, 2,000 mg, Oral, BID With Meals  Lidocaine, 1 patch, Transdermal, Q24H  polyethylene glycol, 17 g, Oral, Daily  sodium chloride, 10 mL, Intravenous, Q12H  sodium chloride, 10 mL, Intravenous, Q12H  sodium " Universal Safety Interventions chloride, 10 mL, Intravenous, Q12H  sodium chloride, 10 mL, Intravenous, Q12H  sodium chloride, 10 mL, Intravenous, Q12H  cyanocobalamin, 1,000 mcg, Oral, Daily  vitamin D, 50,000 Units, Oral, Once per day on Monday Thursday             Medication Review: Reviewed    Assessment & Plan     1) ESRD - on TTS HD, completed treatment Saturday   2) Abdominal pain, improved  3) L hydronephrosis with emphysematous pyelonephritis, status post cystoscopy/stent on 4/2  4) DM  5) Hypotension -stabilizing off midodrine  6) Metabolic acidosis  7) Anemia of CKD  8) ESBL UTI/Pyelo  9) Bacteremia  10) s/p cardiac arrest: bradycardia with subsequent PEA.  Another episode of rapid response with respiratory arrest, currently stable, possibly related to narcotics      Plan:   S/p UF treatment yesterday   Plan HD for am   Bp is now trending up   Plan to resume b-blocker if she tolerates UF on HD in am   Electrolytes and volume at goal today, oxygenation adequate    Tunnel catheter in place for access, okay to use AV fistula as well  Hx of infiltration   Epogen with HD for anemia  On Po4 binder with meals   Renally dose antibiotics for GFR less than 10      Maria De Jesus Roman MD  [facility]  04/14/25  14:58 EDT

## 2025-04-14 NOTE — PROGRESS NOTES
Name: Kelly Pack ADMIT: 2025   : 1970  PCP: Kim Benjamin APRN    MRN: [id number] LOS: 12 days   AGE/SEX: 54 y.o. female  ROOM: [facility]     Subjective   Subjective   No events. She reports that she is doing better.       Objective   Objective   Vital Signs  Temp:  [97.9 °F (36.6 °C)-98.2 °F (36.8 °C)] 97.9 °F (36.6 °C)  Heart Rate:  [66-68] 66  Resp:  [18] 18  BP: (120-139)/(49-55) 139/53  SpO2:  [96 %-99 %] 99 %  on  Flow (L/min) (Oxygen Therapy):  [2] 2;   Device (Oxygen Therapy): nasal cannula  Body mass index is 38.22 kg/m².  Physical Exam  Vitals reviewed.   Constitutional:       General: She is not in acute distress.     Appearance: She is obese.   Eyes:      General: No scleral icterus.  Cardiovascular:      Rate and Rhythm: Normal rate and regular rhythm.   Pulmonary:      Effort: No respiratory distress.   Abdominal:      General: There is no distension.      Palpations: Abdomen is soft.      Tenderness: There is no abdominal tenderness.   Musculoskeletal:      Right lower leg: No edema.      Left lower leg: No edema.   Skin:     General: Skin is warm and dry.   Neurological:      Mental Status: She is alert.   Psychiatric:         Mood and Affect: Mood normal.       Results Review     I reviewed the patient's new clinical results.  Results from last 7 days   Lab Units 25  0732 25  0510   WBC 10*3/mm3 13.43* 15.73* 15.76* 17.02*   HEMOGLOBIN g/dL 8.9* 8.3* 8.0* 9.0*   PLATELETS 10*3/mm3 365 389 401 402     Results from last 7 days   Lab Units 25  03425  0732 25  0510   SODIUM mmol/L 125* 130* 131* 132*   POTASSIUM mmol/L 4.8 4.2 4.6 4.2   CHLORIDE mmol/L 93* 94* 95* 95*   CO2 mmol/L 17.0* 23.7 23.0 23.0   BUN mg/dL 40* 28* 47* 35*   CREATININE mg/dL 6.59* 5.17* 7.82* 5.94*   GLUCOSE mg/dL 90 73 86 85   EGFR mL/min/1.73 7.1* 9.5* 5.8* 8.0*     Results from last 7 days   Lab Units 25  0347 25  0344  04/12/25  0732 04/11/25  0510   ALBUMIN g/dL 2.6* 2.7* 3.0* 3.0*     Results from last 7 days   Lab Units 04/14/25  0347 04/13/25  0344 04/12/25  0732 04/11/25  0510 04/09/25  0829 04/08/25  0532   CALCIUM mg/dL 8.3* 8.2* 8.3* 8.5*   < > 8.2*   ALBUMIN g/dL 2.6* 2.7* 3.0* 3.0*   < > 3.0*   MAGNESIUM mg/dL  --   --   --   --   --  2.9*   PHOSPHORUS mg/dL 3.8 3.5 5.0* 3.9   < > 5.7*    < > = values in this interval not displayed.     Results from last 7 days   Lab Units 04/08/25  1711   LACTATE mmol/L 1.7     Glucose   Date/Time Value Ref Range Status   04/14/2025 1120 82 70 - 130 mg/dL Final   04/14/2025 0750 75 70 - 130 mg/dL Final   04/13/2025 2237 95 70 - 130 mg/dL Final   04/13/2025 1752 85 70 - 130 mg/dL Final   04/13/2025 1209 95 70 - 130 mg/dL Final   04/13/2025 0751 85 70 - 130 mg/dL Final   04/12/2025 2054 181 (H) 70 - 130 mg/dL Final       No radiology results for the last day    I have personally reviewed all medications:  Scheduled Medications  albumin human, 12.5 g, Intravenous, Once  aspirin, 81 mg, Oral, Daily  atorvastatin, 40 mg, Oral, Nightly  epoetin kindra/kindra-epbx, 10,000 Units, Intravenous, Once per day on Tuesday Thursday Saturday  ertapenem, 500 mg, Intravenous, Q24H  fluticasone, 2 spray, Each Nare, Daily  heparin (porcine), 5,000 Units, Subcutaneous, Q8H  insulin glargine, 13 Units, Subcutaneous, Nightly  insulin lispro, 2-7 Units, Subcutaneous, 4x Daily AC & at Bedtime  insulin lispro, 3 Units, Subcutaneous, TID With Meals  lanthanum, 2,000 mg, Oral, BID With Meals  Lidocaine, 1 patch, Transdermal, Q24H  polyethylene glycol, 17 g, Oral, Daily  sodium chloride, 10 mL, Intravenous, Q12H  sodium chloride, 10 mL, Intravenous, Q12H  sodium chloride, 10 mL, Intravenous, Q12H  sodium chloride, 10 mL, Intravenous, Q12H  sodium chloride, 10 mL, Intravenous, Q12H  cyanocobalamin, 1,000 mcg, Oral, Daily  vitamin D, 50,000 Units, Oral, Once per day on Monday Thursday    Infusions   Diet  Diet: Renal;  Low Sodium (2-3g), Low Potassium, Low Phosphorus; Fluid Consistency: Thin (IDDSI 0)    I have personally reviewed:  [x]  Laboratory   [x]  Microbiology   [x]  Radiology   []  EKG/Telemetry  []  Cardiology/Vascular   []  Pathology    []  Records       Assessment/Plan     Active Hospital Problems    Diagnosis  POA    **Sepsis [A41.9]  Yes    Class 2 severe obesity with serious comorbidity in adult [E66.812, E66.01]  Yes    NSTEMI (non-ST elevated myocardial infarction) [I21.4]  Unknown    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       Hospital course summary:  54 year old woman who presented initially with left sided abdominal pain and was found to have emphysematous pyelitis and left sided hydronephrosis due to ESBL E coli for which she was urgently taken for cytoscopy with stent insertion on 4/2/25. On 4/4/25, she experienced a PEA arrest during dialysis for which she was resuscitated quickly and intubated and transferred to the ICU. She improved quickly and was able to be extubated on 4/6/25 and she was transferred out of the ICU. She underwent cardiac catheterization which only showed very mild non-obstructive CAD. On 4/8/25, she experienced respiratory arrest while on dialysis which quickly resolved (the etiology of which was unclear). She was transferred back to the ICU for closer monitoring. She was reevaluated by cardiology who thought maybe she had respiratory depression from narcotics. She has done well since that time, tolerating several dialysis sessions without issue and so was transferred back to the medical floor.       Plan:  Complete ABX for ESBL E.  coli/emphysematous pyelonephritis.  Course to be completed 4/16 per ID notes  - WBC trending down    ESRD: HD per nephrology.  Received additional session yesterday.  - Rescheduled midodrine as she takes at home  - Hyponatremia noted on labs, worse than yesterday.    DM2, tightly controlled currently.  Probably too tight really, will discontinue mealtime Humalog.  Remains on Lantus and SSI    Anemia stable    Remainder of her issues are stable today and as summarized previous.      DVT prophylaxis: Heparin SC  Dispo: Anticipate discharge to acute rehab tomorrow if able to do ABX there without the PICC line.  If not we will have to wait until ertapenem is complete.  Discussed with CCP    Mikael Leigh MD  [facility]  04/14/25  13:48 EDT

## 2025-04-14 NOTE — PLAN OF CARE
Goal Outcome Evaluation:      Pt aox4. Maintained on ra awake 2L nc when sleeping. Tylenol given prn for chest discomfort. Right breast ultrasound done this afternoon. Plan for hemodialysis in am. Up walking around the unit with OT. Safety maintained.

## 2025-04-14 NOTE — PLAN OF CARE
Problem: Adult Inpatient Plan of Care  Goal: Patient-Specific Goal (Individualized)  4/14/2025 0531 by Olivia Juares RN  Outcome: Progressing  4/14/2025 0530 by Olivia Juares RN  Outcome: Progressing  4/14/2025 0530 by Olivia Juares RN  Outcome: Progressing  Goal: Absence of Hospital-Acquired Illness or Injury  4/14/2025 0531 by Olivia Juares RN  Outcome: Progressing  4/14/2025 0530 by Olivia Juares RN  Outcome: Progressing  4/14/2025 0530 by Olivia Juares RN  Outcome: Progressing  Intervention: Identify and Manage Fall Risk  Recent Flowsheet Documentation  Taken 4/14/2025 0402 by Olivia Juares RN  Safety Promotion/Fall Prevention:   activity supervised   assistive device/personal items within reach   clutter free environment maintained   fall prevention program maintained   lighting adjusted   nonskid shoes/slippers when out of bed   room organization consistent   safety round/check completed  Taken 4/14/2025 0226 by Olivia Juares RN  Safety Promotion/Fall Prevention:   activity supervised   assistive device/personal items within reach   clutter free environment maintained   fall prevention program maintained   lighting adjusted   nonskid shoes/slippers when out of bed   room organization consistent   safety round/check completed  Taken 4/14/2025 0036 by Olivia Juares RN  Safety Promotion/Fall Prevention:   activity supervised   assistive device/personal items within reach   clutter free environment maintained   fall prevention program maintained   lighting adjusted   nonskid shoes/slippers when out of bed   room organization consistent   safety round/check completed  Taken 4/13/2025 2246 by Olivia Juares RN  Safety Promotion/Fall Prevention:   activity supervised   assistive device/personal items within reach   clutter free environment maintained   fall prevention program maintained   lighting adjusted   nonskid shoes/slippers when out of bed   room organization consistent   safety  round/check completed  Taken 4/13/2025 2121 by Olivia Juares RN  Safety Promotion/Fall Prevention:   activity supervised   assistive device/personal items within reach   clutter free environment maintained   fall prevention program maintained   lighting adjusted   nonskid shoes/slippers when out of bed   room organization consistent   safety round/check completed  Taken 4/13/2025 1955 by Olivia Juares RN  Safety Promotion/Fall Prevention:   activity supervised   assistive device/personal items within reach   clutter free environment maintained   fall prevention program maintained   lighting adjusted   nonskid shoes/slippers when out of bed   room organization consistent   safety round/check completed  Intervention: Prevent Skin Injury  Recent Flowsheet Documentation  Taken 4/14/2025 0402 by Olivia Juares RN  Body Position:   position changed independently   supine  Taken 4/14/2025 0226 by Olivia Juares RN  Body Position:   position changed independently   supine  Taken 4/14/2025 0036 by Olivia Juarse RN  Body Position:   position changed independently   supine  Taken 4/13/2025 2246 by Olivia Juares RN  Body Position:   position changed independently   sitting up in bed  Taken 4/13/2025 2121 by Olivia Juares RN  Body Position: position changed independently  Taken 4/13/2025 1955 by Olivia Juares RN  Body Position:   position changed independently   supine  Intervention: Prevent and Manage VTE (Venous Thromboembolism) Risk  Recent Flowsheet Documentation  Taken 4/14/2025 0036 by Olivia Juares RN  VTE Prevention/Management: (SQ Heparin)   bilateral   SCDs (sequential compression devices) off   patient refused intervention   other (see comments)  Taken 4/13/2025 2121 by Olivia Juares RN  VTE Prevention/Management: (SQ Heparin)   bilateral   SCDs (sequential compression devices) off   patient refused intervention   other (see comments)  Intervention: Prevent Infection  Recent Flowsheet  Documentation  Taken 4/14/2025 0402 by Olivia Juares RN  Infection Prevention:   hand hygiene promoted   personal protective equipment utilized   rest/sleep promoted   single patient room provided  Taken 4/14/2025 0036 by Olivia Juares RN  Infection Prevention:   hand hygiene promoted   personal protective equipment utilized   rest/sleep promoted   single patient room provided  Taken 4/13/2025 2246 by Olivia Juares RN  Infection Prevention:   hand hygiene promoted   personal protective equipment utilized   rest/sleep promoted   single patient room provided  Taken 4/13/2025 2121 by Olivia Juares RN  Infection Prevention:   hand hygiene promoted   personal protective equipment utilized   rest/sleep promoted   single patient room provided  Taken 4/13/2025 1955 by Olivia Juares RN  Infection Prevention:   hand hygiene promoted   personal protective equipment utilized   rest/sleep promoted   single patient room provided  Goal: Optimal Comfort and Wellbeing  4/14/2025 0531 by Olivia Juares RN  Outcome: Progressing  4/14/2025 0530 by Olivia Juares RN  Outcome: Progressing  4/14/2025 0530 by Olivia Juares RN  Outcome: Progressing  Intervention: Monitor Pain and Promote Comfort  Recent Flowsheet Documentation  Taken 4/14/2025 0402 by Olivia Juares RN  Pain Management Interventions: pain medication given  Taken 4/13/2025 2246 by Olivia Juares RN  Pain Management Interventions: pain medication given  Taken 4/13/2025 2121 by Olivia Juares RN  Pain Management Interventions: pain management plan reviewed with patient/caregiver  Intervention: Provide Person-Centered Care  Recent Flowsheet Documentation  Taken 4/13/2025 2121 by Olivia Juares RN  Trust Relationship/Rapport:   care explained   choices provided   thoughts/feelings acknowledged  Goal: Readiness for Transition of Care  4/14/2025 0531 by Olivia Juares RN  Outcome: Progressing  4/14/2025 0530 by Olivia Juares RN  Outcome: Progressing  4/14/2025  0530 by Little, Olivia, RN  Outcome: Progressing  Goal: Plan of Care Review  4/14/2025 0531 by Olivia Juares RN  Outcome: Progressing  4/14/2025 0530 by Olivia Juares RN  Outcome: Progressing  4/14/2025 0530 by Olivia Juares RN  Outcome: Progressing  Goal: Patient-Specific Goal (Individualized)  4/14/2025 0531 by Olivia Juares RN  Outcome: Progressing  4/14/2025 0530 by Olivia Juares RN  Outcome: Progressing  4/14/2025 0530 by Olivia Juares RN  Outcome: Progressing  Goal: Absence of Hospital-Acquired Illness or Injury  4/14/2025 0531 by Olivia Juares RN  Outcome: Progressing  4/14/2025 0530 by Olivia Juares RN  Outcome: Progressing  4/14/2025 0530 by Olivia Juares RN  Outcome: Progressing  Intervention: Identify and Manage Fall Risk  Recent Flowsheet Documentation  Taken 4/14/2025 0402 by Olivia Juares RN  Safety Promotion/Fall Prevention:   activity supervised   assistive device/personal items within reach   clutter free environment maintained   fall prevention program maintained   lighting adjusted   nonskid shoes/slippers when out of bed   room organization consistent   safety round/check completed  Taken 4/14/2025 0226 by Olivia Juares RN  Safety Promotion/Fall Prevention:   activity supervised   assistive device/personal items within reach   clutter free environment maintained   fall prevention program maintained   lighting adjusted   nonskid shoes/slippers when out of bed   room organization consistent   safety round/check completed  Taken 4/14/2025 0036 by Olivia Juares RN  Safety Promotion/Fall Prevention:   activity supervised   assistive device/personal items within reach   clutter free environment maintained   fall prevention program maintained   lighting adjusted   nonskid shoes/slippers when out of bed   room organization consistent   safety round/check completed  Taken 4/13/2025 2246 by Olivia Juares RN  Safety Promotion/Fall Prevention:   activity supervised   assistive  device/personal items within reach   clutter free environment maintained   fall prevention program maintained   lighting adjusted   nonskid shoes/slippers when out of bed   room organization consistent   safety round/check completed  Taken 4/13/2025 2121 by Olivia Juares RN  Safety Promotion/Fall Prevention:   activity supervised   assistive device/personal items within reach   clutter free environment maintained   fall prevention program maintained   lighting adjusted   nonskid shoes/slippers when out of bed   room organization consistent   safety round/check completed  Taken 4/13/2025 1955 by Olivia Juares RN  Safety Promotion/Fall Prevention:   activity supervised   assistive device/personal items within reach   clutter free environment maintained   fall prevention program maintained   lighting adjusted   nonskid shoes/slippers when out of bed   room organization consistent   safety round/check completed  Intervention: Prevent Skin Injury  Recent Flowsheet Documentation  Taken 4/14/2025 0402 by Olivia Juares RN  Body Position:   position changed independently   supine  Taken 4/14/2025 0226 by Olivia Juares RN  Body Position:   position changed independently   supine  Taken 4/14/2025 0036 by Olivia Juares RN  Body Position:   position changed independently   supine  Taken 4/13/2025 2246 by Olivia Juares RN  Body Position:   position changed independently   sitting up in bed  Taken 4/13/2025 2121 by Olivia Juares RN  Body Position: position changed independently  Taken 4/13/2025 1955 by Olivia Juares RN  Body Position:   position changed independently   supine  Intervention: Prevent and Manage VTE (Venous Thromboembolism) Risk  Recent Flowsheet Documentation  Taken 4/14/2025 0036 by Olivia Juares RN  VTE Prevention/Management: (SQ Heparin)   bilateral   SCDs (sequential compression devices) off   patient refused intervention   other (see comments)  Taken 4/13/2025 2121 by Olivia Juares RN  VTE  Prevention/Management: (SQ Heparin)   bilateral   SCDs (sequential compression devices) off   patient refused intervention   other (see comments)  Intervention: Prevent Infection  Recent Flowsheet Documentation  Taken 4/14/2025 0402 by Olivia Juares RN  Infection Prevention:   hand hygiene promoted   personal protective equipment utilized   rest/sleep promoted   single patient room provided  Taken 4/14/2025 0036 by Olivia Juares RN  Infection Prevention:   hand hygiene promoted   personal protective equipment utilized   rest/sleep promoted   single patient room provided  Taken 4/13/2025 2246 by Olivia Juares RN  Infection Prevention:   hand hygiene promoted   personal protective equipment utilized   rest/sleep promoted   single patient room provided  Taken 4/13/2025 2121 by Olivia Juares RN  Infection Prevention:   hand hygiene promoted   personal protective equipment utilized   rest/sleep promoted   single patient room provided  Taken 4/13/2025 1955 by Olivia Juares RN  Infection Prevention:   hand hygiene promoted   personal protective equipment utilized   rest/sleep promoted   single patient room provided  Goal: Optimal Comfort and Wellbeing  4/14/2025 0531 by Olivia Juares RN  Outcome: Progressing  4/14/2025 0530 by Olivia Juares RN  Outcome: Progressing  4/14/2025 0530 by Olivia Juares RN  Outcome: Progressing  Intervention: Monitor Pain and Promote Comfort  Recent Flowsheet Documentation  Taken 4/14/2025 0402 by Olivia Juares RN  Pain Management Interventions: pain medication given  Taken 4/13/2025 2246 by Olivia Juares RN  Pain Management Interventions: pain medication given  Taken 4/13/2025 2121 by Olivia Juares RN  Pain Management Interventions: pain management plan reviewed with patient/caregiver  Intervention: Provide Person-Centered Care  Recent Flowsheet Documentation  Taken 4/13/2025 2121 by Olivia Juares RN  Trust Relationship/Rapport:   care explained   choices provided    thoughts/feelings acknowledged  Goal: Readiness for Transition of Care  4/14/2025 0531 by Olivia Juares RN  Outcome: Progressing  4/14/2025 0530 by Olivia Juares RN  Outcome: Progressing  4/14/2025 0530 by Olivia Juares RN  Outcome: Progressing  Goal: Plan of Care Review  4/14/2025 0531 by Olivia Juares RN  Outcome: Progressing  4/14/2025 0530 by Olivia Juares RN  Outcome: Progressing  4/14/2025 0530 by Olivia Juares RN  Outcome: Progressing  Goal: Plan of Care Review  4/14/2025 0531 by Olivia Juares RN  Outcome: Progressing  4/14/2025 0530 by Olivia Juares RN  Outcome: Progressing  4/14/2025 0530 by Olivia Juares RN  Outcome: Progressing  Goal: Plan of Care Review  4/14/2025 0531 by Olivia Juares RN  Outcome: Progressing  4/14/2025 0530 by Olivia Juares RN  Outcome: Progressing  Goal: Patient-Specific Goal (Individualized)  4/14/2025 0531 by Olivia Juares RN  Outcome: Progressing  4/14/2025 0530 by Olivia Juares RN  Outcome: Progressing  Goal: Absence of Hospital-Acquired Illness or Injury  4/14/2025 0531 by Olivia Juares RN  Outcome: Progressing  4/14/2025 0530 by Olivia Juares RN  Outcome: Progressing  Intervention: Identify and Manage Fall Risk  Recent Flowsheet Documentation  Taken 4/14/2025 0402 by Olivia Juares RN  Safety Promotion/Fall Prevention:   activity supervised   assistive device/personal items within reach   clutter free environment maintained   fall prevention program maintained   lighting adjusted   nonskid shoes/slippers when out of bed   room organization consistent   safety round/check completed  Taken 4/14/2025 0226 by Olivia Juares RN  Safety Promotion/Fall Prevention:   activity supervised   assistive device/personal items within reach   clutter free environment maintained   fall prevention program maintained   lighting adjusted   nonskid shoes/slippers when out of bed   room organization consistent   safety round/check completed  Taken 4/14/2025 0036 by  Little, Olivia, RN  Safety Promotion/Fall Prevention:   activity supervised   assistive device/personal items within reach   clutter free environment maintained   fall prevention program maintained   lighting adjusted   nonskid shoes/slippers when out of bed   room organization consistent   safety round/check completed  Taken 4/13/2025 2246 by Olivia Juares RN  Safety Promotion/Fall Prevention:   activity supervised   assistive device/personal items within reach   clutter free environment maintained   fall prevention program maintained   lighting adjusted   nonskid shoes/slippers when out of bed   room organization consistent   safety round/check completed  Taken 4/13/2025 2121 by Olivia Juares RN  Safety Promotion/Fall Prevention:   activity supervised   assistive device/personal items within reach   clutter free environment maintained   fall prevention program maintained   lighting adjusted   nonskid shoes/slippers when out of bed   room organization consistent   safety round/check completed  Taken 4/13/2025 1955 by Olivia Juares RN  Safety Promotion/Fall Prevention:   activity supervised   assistive device/personal items within reach   clutter free environment maintained   fall prevention program maintained   lighting adjusted   nonskid shoes/slippers when out of bed   room organization consistent   safety round/check completed  Intervention: Prevent Skin Injury  Recent Flowsheet Documentation  Taken 4/14/2025 0402 by Olivia Juares RN  Body Position:   position changed independently   supine  Taken 4/14/2025 0226 by Olivia Juares RN  Body Position:   position changed independently   supine  Taken 4/14/2025 0036 by Olivia Juares RN  Body Position:   position changed independently   supine  Taken 4/13/2025 2246 by Olivia Juares RN  Body Position:   position changed independently   sitting up in bed  Taken 4/13/2025 2121 by Olivia Juares RN  Body Position: position changed independently  Taken  4/13/2025 1955 by Olivia Juares RN  Body Position:   position changed independently   supine  Intervention: Prevent and Manage VTE (Venous Thromboembolism) Risk  Recent Flowsheet Documentation  Taken 4/14/2025 0036 by Olivia Juares RN  VTE Prevention/Management: (SQ Heparin)   bilateral   SCDs (sequential compression devices) off   patient refused intervention   other (see comments)  Taken 4/13/2025 2121 by Olivia Juares RN  VTE Prevention/Management: (SQ Heparin)   bilateral   SCDs (sequential compression devices) off   patient refused intervention   other (see comments)  Intervention: Prevent Infection  Recent Flowsheet Documentation  Taken 4/14/2025 0402 by Olivia Juares RN  Infection Prevention:   hand hygiene promoted   personal protective equipment utilized   rest/sleep promoted   single patient room provided  Taken 4/14/2025 0036 by Olivia Juares RN  Infection Prevention:   hand hygiene promoted   personal protective equipment utilized   rest/sleep promoted   single patient room provided  Taken 4/13/2025 2246 by Olivia Juares RN  Infection Prevention:   hand hygiene promoted   personal protective equipment utilized   rest/sleep promoted   single patient room provided  Taken 4/13/2025 2121 by Olivia Juares RN  Infection Prevention:   hand hygiene promoted   personal protective equipment utilized   rest/sleep promoted   single patient room provided  Taken 4/13/2025 1955 by Olivia Juares RN  Infection Prevention:   hand hygiene promoted   personal protective equipment utilized   rest/sleep promoted   single patient room provided  Goal: Optimal Comfort and Wellbeing  4/14/2025 0531 by Olivia Juares RN  Outcome: Progressing  4/14/2025 0530 by Olivia Juares RN  Outcome: Progressing  Intervention: Monitor Pain and Promote Comfort  Recent Flowsheet Documentation  Taken 4/14/2025 0402 by Olivia Juares RN  Pain Management Interventions: pain medication given  Taken 4/13/2025 2246 by Blanquita  VICTORIA Baker  Pain Management Interventions: pain medication given  Taken 4/13/2025 2121 by Olivia Juares RN  Pain Management Interventions: pain management plan reviewed with patient/caregiver  Intervention: Provide Person-Centered Care  Recent Flowsheet Documentation  Taken 4/13/2025 2121 by Olivia Juares RN  Trust Relationship/Rapport:   care explained   choices provided   thoughts/feelings acknowledged  Goal: Readiness for Transition of Care  4/14/2025 0531 by Olivia Juares RN  Outcome: Progressing  4/14/2025 0530 by Olivia Juares RN  Outcome: Progressing     Problem: Fall Injury Risk  Goal: Absence of Fall and Fall-Related Injury  4/14/2025 0531 by Olivia Juares RN  Outcome: Progressing  4/14/2025 0530 by Olivia Juares RN  Outcome: Progressing  4/14/2025 0530 by Olivia Juares RN  Outcome: Progressing  Intervention: Identify and Manage Contributors  Recent Flowsheet Documentation  Taken 4/14/2025 0402 by Olivia Juares RN  Medication Review/Management: medications reviewed  Taken 4/13/2025 2246 by Olivia Juares RN  Medication Review/Management: medications reviewed  Taken 4/13/2025 2121 by Olivia Juares RN  Medication Review/Management: medications reviewed  Intervention: Promote Injury-Free Environment  Recent Flowsheet Documentation  Taken 4/14/2025 0402 by Olivia Juares RN  Safety Promotion/Fall Prevention:   activity supervised   assistive device/personal items within reach   clutter free environment maintained   fall prevention program maintained   lighting adjusted   nonskid shoes/slippers when out of bed   room organization consistent   safety round/check completed  Taken 4/14/2025 0226 by Olivia Juares RN  Safety Promotion/Fall Prevention:   activity supervised   assistive device/personal items within reach   clutter free environment maintained   fall prevention program maintained   lighting adjusted   nonskid shoes/slippers when out of bed   room organization consistent   safety  round/check completed  Taken 4/14/2025 0036 by Olivia Juares RN  Safety Promotion/Fall Prevention:   activity supervised   assistive device/personal items within reach   clutter free environment maintained   fall prevention program maintained   lighting adjusted   nonskid shoes/slippers when out of bed   room organization consistent   safety round/check completed  Taken 4/13/2025 2246 by Olivia Juares RN  Safety Promotion/Fall Prevention:   activity supervised   assistive device/personal items within reach   clutter free environment maintained   fall prevention program maintained   lighting adjusted   nonskid shoes/slippers when out of bed   room organization consistent   safety round/check completed  Taken 4/13/2025 2121 by Olivia Juares RN  Safety Promotion/Fall Prevention:   activity supervised   assistive device/personal items within reach   clutter free environment maintained   fall prevention program maintained   lighting adjusted   nonskid shoes/slippers when out of bed   room organization consistent   safety round/check completed  Taken 4/13/2025 1955 by Olivia Juares RN  Safety Promotion/Fall Prevention:   activity supervised   assistive device/personal items within reach   clutter free environment maintained   fall prevention program maintained   lighting adjusted   nonskid shoes/slippers when out of bed   room organization consistent   safety round/check completed     Problem: Sepsis/Septic Shock  Goal: Optimal Coping  4/14/2025 0531 by Olivia Juares RN  Outcome: Progressing  4/14/2025 0530 by Olivia Juares RN  Outcome: Progressing  4/14/2025 0530 by Olivia Juares RN  Outcome: Progressing  Goal: Absence of Bleeding  4/14/2025 0531 by Olivia Juares RN  Outcome: Progressing  4/14/2025 0530 by Olivia Juares RN  Outcome: Progressing  4/14/2025 0530 by Olivia Juares RN  Outcome: Progressing  Goal: Blood Glucose Level Within Target Range  4/14/2025 0531 by Olivia Juares RN  Outcome:  Progressing  4/14/2025 0530 by Olivia Juares RN  Outcome: Progressing  4/14/2025 0530 by Olivia Juares RN  Outcome: Progressing  Intervention: Optimize Glycemic Control  Recent Flowsheet Documentation  Taken 4/13/2025 2121 by Olivia Juares RN  Hypoglycemia Management: blood glucose monitored  Goal: Absence of Infection Signs and Symptoms  4/14/2025 0531 by Olivia Juares RN  Outcome: Progressing  4/14/2025 0530 by Olivia Juares RN  Outcome: Progressing  4/14/2025 0530 by Oilvia Juares RN  Outcome: Progressing  Intervention: Initiate Sepsis Management  Recent Flowsheet Documentation  Taken 4/14/2025 0402 by Olivia Juares RN  Infection Prevention:   hand hygiene promoted   personal protective equipment utilized   rest/sleep promoted   single patient room provided  Isolation Precautions:   contact   precautions maintained  Taken 4/14/2025 0036 by Olivia Juares RN  Infection Prevention:   hand hygiene promoted   personal protective equipment utilized   rest/sleep promoted   single patient room provided  Isolation Precautions:   contact   precautions maintained  Taken 4/13/2025 2246 by Olivia Juares RN  Infection Prevention:   hand hygiene promoted   personal protective equipment utilized   rest/sleep promoted   single patient room provided  Isolation Precautions:   contact   precautions maintained  Taken 4/13/2025 2121 by Olivia Juares RN  Infection Prevention:   hand hygiene promoted   personal protective equipment utilized   rest/sleep promoted   single patient room provided  Isolation Precautions:   contact   precautions maintained  Taken 4/13/2025 1955 by Olivia Juares RN  Infection Prevention:   hand hygiene promoted   personal protective equipment utilized   rest/sleep promoted   single patient room provided  Isolation Precautions:   contact   precautions maintained  Intervention: Promote Recovery  Recent Flowsheet Documentation  Taken 4/14/2025 0402 by Olivia Juares RN  Activity Management:  activity encouraged  Taken 4/14/2025 0226 by Olivia Juares RN  Activity Management: activity encouraged  Taken 4/14/2025 0036 by Olivia Juares RN  Activity Management: activity encouraged  Taken 4/13/2025 2246 by Olivia Juares RN  Activity Management: activity encouraged  Taken 4/13/2025 2121 by Olivia Juares RN  Activity Management: activity encouraged  Taken 4/13/2025 1955 by Olivia Juares RN  Activity Management: activity encouraged  Goal: Optimal Nutrition Delivery  4/14/2025 0531 by Olivia Juares RN  Outcome: Progressing  4/14/2025 0530 by Olivia Juares RN  Outcome: Progressing  4/14/2025 0530 by Olivia Juares RN  Outcome: Progressing     Problem: Skin Injury Risk Increased  Goal: Skin Health and Integrity  4/14/2025 0531 by Olviia Juares RN  Outcome: Progressing  4/14/2025 0530 by Olivia Juares RN  Outcome: Progressing  4/14/2025 0530 by Olivia Juares RN  Outcome: Progressing  Intervention: Optimize Skin Protection  Recent Flowsheet Documentation  Taken 4/14/2025 0402 by Olivia Juares RN  Activity Management: activity encouraged  Head of Bed (HOB) Positioning: HOB at 20-30 degrees  Taken 4/14/2025 0226 by Olivia Juares RN  Activity Management: activity encouraged  Head of Bed (HOB) Positioning: HOB at 20-30 degrees  Taken 4/14/2025 0036 by Olivia Juares RN  Activity Management: activity encouraged  Head of Bed (HOB) Positioning: HOB at 20-30 degrees  Taken 4/13/2025 2246 by Olivia Juares RN  Activity Management: activity encouraged  Head of Bed (HOB) Positioning: HOB at 30-45 degrees  Taken 4/13/2025 2121 by Olivia Juares RN  Activity Management: activity encouraged  Head of Bed (HOB) Positioning: HOB at 30-45 degrees  Taken 4/13/2025 1955 by Olivia Juares RN  Activity Management: activity encouraged  Head of Bed (HOB) Positioning: HOB at 20-30 degrees     Problem: Pain Acute  Goal: Optimal Pain Control and Function  4/14/2025 0531 by Olivia Juares RN  Outcome:  Progressing  4/14/2025 0530 by Olivia Juares RN  Outcome: Progressing  4/14/2025 0530 by Olivia Juares RN  Outcome: Progressing  Intervention: Optimize Psychosocial Wellbeing  Recent Flowsheet Documentation  Taken 4/13/2025 2121 by Olivia Juares RN  Diversional Activities: tablet  Intervention: Develop Pain Management Plan  Recent Flowsheet Documentation  Taken 4/14/2025 0402 by Olivia Juares RN  Pain Management Interventions: pain medication given  Taken 4/13/2025 2246 by Olivia Juares RN  Pain Management Interventions: pain medication given  Taken 4/13/2025 2121 by Olivia Juares RN  Pain Management Interventions: pain management plan reviewed with patient/caregiver  Intervention: Prevent or Manage Pain  Recent Flowsheet Documentation  Taken 4/14/2025 0402 by Olivia Juares RN  Medication Review/Management: medications reviewed  Taken 4/13/2025 2246 by Olivia Juares RN  Medication Review/Management: medications reviewed  Taken 4/13/2025 2121 by Olivia Juares RN  Medication Review/Management: medications reviewed     Problem: Hemodialysis  Goal: Safe, Effective Therapy Delivery  4/14/2025 0531 by Olivia Juares RN  Outcome: Progressing  4/14/2025 0530 by Olivia Juares RN  Outcome: Progressing  4/14/2025 0530 by Olivia Juares RN  Outcome: Progressing  Intervention: Optimize Device Care and Function  Recent Flowsheet Documentation  Taken 4/14/2025 0402 by Olivia Juares RN  Medication Review/Management: medications reviewed  Taken 4/13/2025 2246 by Olivia Juares RN  Medication Review/Management: medications reviewed  Taken 4/13/2025 2121 by Olivia Juares RN  Medication Review/Management: medications reviewed  Goal: Effective Tissue Perfusion  4/14/2025 0531 by Oilvia Juares RN  Outcome: Progressing  4/14/2025 0530 by Olivia Juares RN  Outcome: Progressing  4/14/2025 0530 by Olivia Juares RN  Outcome: Progressing  Goal: Absence of Infection Signs and Symptoms  4/14/2025 0531 by Blanquita  VICTORIA Baker  Outcome: Progressing  4/14/2025 0530 by Olivia Juares RN  Outcome: Progressing  4/14/2025 0530 by Olivia Juares RN  Outcome: Progressing  Intervention: Prevent or Manage Infection  Recent Flowsheet Documentation  Taken 4/14/2025 0402 by Olivia Juares RN  Infection Prevention:   hand hygiene promoted   personal protective equipment utilized   rest/sleep promoted   single patient room provided  Taken 4/14/2025 0036 by Olivia Juares RN  Infection Prevention:   hand hygiene promoted   personal protective equipment utilized   rest/sleep promoted   single patient room provided  Taken 4/13/2025 2246 by Olivia Juares RN  Infection Prevention:   hand hygiene promoted   personal protective equipment utilized   rest/sleep promoted   single patient room provided  Taken 4/13/2025 2121 by Olivai Juares RN  Infection Prevention:   hand hygiene promoted   personal protective equipment utilized   rest/sleep promoted   single patient room provided  Taken 4/13/2025 1955 by Olivia Juares RN  Infection Prevention:   hand hygiene promoted   personal protective equipment utilized   rest/sleep promoted   single patient room provided     Problem: Noninvasive Ventilation Acute  Goal: Effective Unassisted Ventilation and Oxygenation  4/14/2025 0531 by Olivia Juares RN  Outcome: Progressing  4/14/2025 0530 by Olivia Juares RN  Outcome: Progressing  4/14/2025 0530 by Olivia Juares RN  Outcome: Progressing     Problem: Mechanical Ventilation Invasive  Goal: Effective Communication  4/14/2025 0531 by Olivia Juares RN  Outcome: Progressing  4/14/2025 0530 by Olivia Juares RN  Outcome: Progressing  4/14/2025 0530 by Olivia Juares RN  Outcome: Progressing  Intervention: Ensure Effective Communication  Recent Flowsheet Documentation  Taken 4/13/2025 2121 by Olivia Juares RN  Trust Relationship/Rapport:   care explained   choices provided   thoughts/feelings acknowledged  Diversional Activities: tablet  Goal:  Optimal Device Function  4/14/2025 0531 by Olivia Juares RN  Outcome: Progressing  4/14/2025 0530 by Olivia Juares RN  Outcome: Progressing  4/14/2025 0530 by Olivia Juares RN  Outcome: Progressing  Goal: Mechanical Ventilation Liberation  4/14/2025 0531 by Olivia Juares RN  Outcome: Progressing  4/14/2025 0530 by Olivia Juares RN  Outcome: Progressing  4/14/2025 0530 by Olivia Juares RN  Outcome: Progressing  Intervention: Promote Extubation and Mechanical Ventilation Liberation  Recent Flowsheet Documentation  Taken 4/14/2025 0402 by Olivia Juares RN  Medication Review/Management: medications reviewed  Taken 4/13/2025 2246 by Olivia Juares RN  Medication Review/Management: medications reviewed  Taken 4/13/2025 2121 by Olivia Juares RN  Medication Review/Management: medications reviewed  Goal: Optimal Nutrition Delivery  4/14/2025 0531 by Olivia Juares RN  Outcome: Progressing  4/14/2025 0530 by Olivia Juares RN  Outcome: Progressing  4/14/2025 0530 by Olivia Juares RN  Outcome: Progressing  Goal: Absence of Device-Related Skin and Tissue Injury  4/14/2025 0531 by Olivia Juares RN  Outcome: Progressing  4/14/2025 0530 by Olivia Juares RN  Outcome: Progressing  4/14/2025 0530 by Olivia Juares RN  Outcome: Progressing  Goal: Absence of Ventilator-Induced Lung Injury  4/14/2025 0531 by Olivia Juares RN  Outcome: Progressing  4/14/2025 0530 by Olivia Juares RN  Outcome: Progressing  4/14/2025 0530 by Olivia Juares RN  Outcome: Progressing  Intervention: Prevent Ventilator-Associated Pneumonia  Recent Flowsheet Documentation  Taken 4/14/2025 0402 by Olivia Juares RN  Head of Bed (HOB) Positioning: HOB at 20-30 degrees  Taken 4/14/2025 0226 by Olivia Juares RN  Head of Bed (HOB) Positioning: HOB at 20-30 degrees  Taken 4/14/2025 0036 by Olivia Juares RN  Head of Bed (HOB) Positioning: HOB at 20-30 degrees  Taken 4/13/2025 2246 by Olivia Juares, RN  Head of Bed ([facility]) Positioning:  HOB at 30-45 degrees  Taken 4/13/2025 2121 by Olivia Juares, RN  Head of Bed (HOB) Positioning: HOB at 30-45 degrees  Taken 4/13/2025 1955 by Olivia Juares, RN  Head of Bed (HOB) Positioning: HOB at 20-30 degrees   Goal Outcome Evaluation:                                             02-Aug-2018

## 2025-04-14 NOTE — THERAPY TREATMENT NOTE
Patient Name: Kelly Pack  : 1970    MRN: [id number]                              Today's Date: 2025       Admit Date: 2025    Visit Dx:     ICD-10-CM ICD-9-CM   1. NSTEMI (non-ST elevated myocardial infarction)  I21.4 410.70   2. Sepsis without acute organ dysfunction, due to unspecified organism  A41.9 038.9     995.91   3. Emphysematous pyelonephritis of left kidney  N12 590.80   4. ESRD on dialysis  N18.6 585.6    Z99.2 V45.11   5. Hydronephrosis of left kidney  N13.30 591   6. Sepsis, due to unspecified organism, unspecified whether acute organ dysfunction present  A41.9 038.9     995.91     Patient Active Problem List   Diagnosis    Hyperlipidemia    Allergic rhinitis    Nephrotic range proteinuria    Asthma    Essential hypertension    Uncontrolled type 2 diabetes mellitus with hyperglycemia    Acute renal failure superimposed on stage 3a chronic kidney disease    Anasarca    Suspected sleep apnea    Anemia    Bilateral lower extremity edema    Elevated troponin    Acute renal failure superimposed on chronic kidney disease    Type 2 diabetes mellitus with chronic kidney disease    Symptomatic anemia    ESRD (end stage renal disease)    Sepsis    Emphysematous pyelonephritis of left kidney    Hydronephrosis of left kidney    NSTEMI (non-ST elevated myocardial infarction)    Class 2 severe obesity with serious comorbidity in adult     Past Medical History:   Diagnosis Date    Albuminuria     Allergic     Seasonal, grass, cats and some dogs    Allergic rhinitis     Asthma     allergy triggered    Bell's palsy     Cataract     Had surgery on both eyes    Cholelithiasis     Removed. Have bile reflux/ vomiting    CKD (chronic kidney disease)     Diabetes mellitus     Drug therapy     Endometrial cancer     HL (hearing loss)     In L ear    Hyperlipidemia     Hypertension     Low back pain     Migraine     Obesity     Peripheral neuropathy     Renal insufficiency     Right otitis media     Type  II diabetes mellitus     Visual impairment     Diabetic retinopathy. Oil in L eye due to retina detachment s.    Vitamin D deficiency      Past Surgical History:   Procedure Laterality Date    ARTERIOVENOUS FISTULA  05/2023    CARDIAC CATHETERIZATION N/A 4/6/2025    Procedure: Left Heart Cath;  Surgeon: Damian Nguyen MD;  Location: [facility] CATH INVASIVE LOCATION;  Service: Cardiology;  Laterality: N/A;    CARDIAC CATHETERIZATION N/A 4/6/2025    Procedure: Coronary angiography;  Surgeon: Damian Nguyen MD;  Location: [facility] CATH INVASIVE LOCATION;  Service: Cardiology;  Laterality: N/A;    CATARACT EXTRACTION      CHOLECYSTECTOMY      CYSTOSCOPY, RETROGRADE PYELOGRAM AND STENT INSERTION Left 4/2/2025    Procedure: CYSTOSCOPY RETROGRADE PYELOGRAM AND STENT INSERTION;  Surgeon: Tl Gray MD;  Location: [facility] OR;  Service: Urology;  Laterality: Left;    EYE SURGERY      NOV 2020    HYSTERECTOMY      due to cancer    INSERTION HEMODIALYSIS CATHETER Right 06/23/2023    Procedure: HEMODIALYSIS CATHETER INSERTION;  Surgeon: Mikael Mackay MD;  Location: [facility] OR;  Service: Vascular;  Laterality: Right;    OOPHORECTOMY      VITRECTOMY PARS PLANA Left 01/28/2020    Procedure: 25G VITRECTOMY-ENDO LASER;  Surgeon: Lazarus, Howard S., MD;  Location: [facility] OR;  Service: Ophthalmology      General Information       Row Name 04/14/25 1159          OT Time and Intention    Document Type therapy note (daily note)  -ES     Mode of Treatment individual therapy;occupational therapy  -ES     Patient Effort good  -ES       Row Name 04/14/25 1159          General Information    Existing Precautions/Restrictions fall;oxygen therapy device and L/min  -ES       Row Name 04/14/25 1159          Cognition    Orientation Status (Cognition) oriented x 3  Patient pleasant and cooperative, agreeable to therapy participation  -ES       Row Name 04/14/25 1159          Safety Issues/Impairments Affecting Functional  Mobility    Impairments Affecting Function (Mobility) endurance/activity tolerance;strength;balance  -ES               User Key  (r) = Recorded By, (t) = Taken By, (c) = Cosigned By      Initials Name Provider Type    Roseann Monteiro, LEESA/L, CSRS Occupational Therapist                     Mobility/ADL's       Row Name 04/14/25 1216          Bed Mobility    Bed Mobility supine-sit  -ES     Supine-Sit [place] (Bed Mobility) supervision  -ES       Row Name 04/14/25 1216          Transfers    Transfers sit-stand transfer  -ES     Comment, (Transfers) STS x4 completed throughout session  -ES       Row Name 04/14/25 1216          Sit-Stand Transfer    Sit-Stand [place] (Transfers) standby assist  -ES     Assistive Device (Sit-Stand Transfers) other (see comments)  No AD  -ES       Row Name 04/14/25 [facility]6          Functional Mobility    Functional Mobility- Ind. Level contact guard assist  -ES     Functional Mobility- Device walker, front-wheeled  -ES     Functional Mobility- Comment patient requests use of RW with longer distance mobility this session secondary to reports of fatigue. CGA with use of rolling walker.  -ES       Row Name 04/14/25 [facility]6          Activities of Daily Living    BADL Assessment/Intervention grooming;bathing;lower body dressing  -ES       Row Name 04/14/25 1216          Grooming Assessment/Training    [place] Level (Grooming) grooming skills;hair care, combing/brushing;set up  -ES     Position (Grooming) supported standing;unsupported sitting  -ES     Comment, (Grooming) initially complete grooming in stance at sinkside, with prolongued standing demonstrates fatigue and requests seated rest break. Completes task in seated position  -ES       Row Name 04/14/25 [facility]6          Bathing Assessment/Intervention    [place] Level (Bathing) bathing skills;other (see comments);minimum assist (75% patient effort)  hair washing  -ES     Position (Bathing) sink side;supported sitting  -ES      Comment, (Bathing) complete hair washing task in supported sitting at sinkside. Patient requires min A at times, but is able to manage task mainly on her own  -ES       Row Name 04/14/25 1216          Lower Body Dressing Assessment/Training    [place] Level (Lower Body Dressing) lower body dressing skills;don;socks;set up  -ES     Position (Lower Body Dressing) edge of bed sitting  -ES               User Key  (r) = Recorded By, (t) = Taken By, (c) = Cosigned By      Initials Name Provider Type    Roseann Monteiro OTR/L, JENNIFER Occupational Therapist                   Obj/Interventions       Row Name 04/14/25 1228          Motor Skills    Motor Skills functional endurance  -ES     Functional Endurance fair. Patient with quick fatigue, requiring standing rest breaks with mobility and seated rest breaks with ADL engagement in supported sitting. Patient on RA with all task engagement, O2 above 90% with all completion.  -ES       Row Name 04/14/25 1228          Balance    Balance Assessment sitting static balance;sitting dynamic balance;standing static balance;standing dynamic balance  -ES     Comment, Balance SBA seated EOB. SBA with seated ADLs. SBA in stance with static and dynamic ADLs along with functional mobility.  -ES               User Key  (r) = Recorded By, (t) = Taken By, (c) = Cosigned By      Initials Name Provider Type    Roseann Monteiro OTR/L, JENNIFER Occupational Therapist                   Goals/Plan    No documentation.                  Clinical Impression       Row Name 04/14/25 1229          Pain Assessment    Pretreatment Pain Rating 0/10 - no pain  -ES     Posttreatment Pain Rating 0/10 - no pain  -ES       Row Name 04/14/25 1229          Plan of Care Review    Plan of Care Reviewed With patient  -ES     Progress improving  -ES     Outcome Evaluation Patient with good participation in OT session this AM. Therapeutic intervention focused on increasing patient independence in ADLs through  compleiton of supported sitting and standing ADLs at sinkside. Patient with decreased tolerance, initially tolerating ADLs in stance, however with increased engagement time, requires sitting secondary to complaints of fatigue. OT and patient discussed energy conservation strategies to increase patient independence at time of discharge. Patient continues to require min A with ADL engagement. Patient will benefit from continued skilled OT intervention to address deficits related to strength, tolerance, balance, transfers and mobility.  -ES       Row Name 04/14/25 1229          Therapy Plan Review/Discharge Plan (OT)    Anticipated Discharge Disposition (OT) inpatient rehabilitation facility  -ES       Row Name 04/14/25 1229          Positioning and Restraints    Pre-Treatment Position in bed  -ES     Post Treatment Position chair  -ES     In Chair reclined;call light within reach;encouraged to call for assist;exit alarm on  -ES               User Key  (r) = Recorded By, (t) = Taken By, (c) = Cosigned By      Initials Name Provider Type    Roseann Monteiro OTR/L, CSRS Occupational Therapist                   Outcome Measures       Row Name 04/14/25 1232          How much help from another is currently needed...    Putting on and taking off regular lower body clothing? 3  -ES     Bathing (including washing, rinsing, and drying) 3  -ES     Toileting (which includes using toilet bed pan or urinal) 3  -ES     Putting on and taking off regular upper body clothing 3  -ES     Taking care of personal grooming (such as brushing teeth) 3  -ES     Eating meals 4  -ES     AM-PAC 6 Clicks Score (OT) 19  -ES       Row Name 04/14/25 1232          Functional Assessment    Outcome Measure Options AM-PAC 6 Clicks Daily Activity (OT)  -ES               User Key  (r) = Recorded By, (t) = Taken By, (c) = Cosigned By      Initials Name Provider Type    Roseann Monteiro, CATALINAR/L, CSRS Occupational Therapist                      OT  Recommendation and Plan     Plan of Care Review  Plan of Care Reviewed With: patient  Progress: improving  Outcome Evaluation: Patient with good participation in OT session this AM. Therapeutic intervention focused on increasing patient independence in ADLs through compleiton of supported sitting and standing ADLs at sinkside. Patient with decreased tolerance, initially tolerating ADLs in stance, however with increased engagement time, requires sitting secondary to complaints of fatigue. OT and patient discussed energy conservation strategies to increase patient independence at time of discharge. Patient continues to require min A with ADL engagement. Patient will benefit from continued skilled OT intervention to address deficits related to strength, tolerance, balance, transfers and mobility.     Time Calculation:         Time Calculation- OT       Row Name 04/14/25 1232             Time Calculation- OT    OT Start Time 1021  -ES      OT Stop Time 1103  -ES      OT Time Calculation (min) 42 min  -ES      Total Timed Code Minutes- OT 42 minute(s)  -ES      OT Received On 04/14/25  -ES      OT - Next Appointment 04/15/25  -ES         Timed Charges    26064 - OT Therapeutic Activity Minutes 15  -ES      57767 - OT Self Care/Mgmt Minutes 27  -ES         Total Minutes    Timed Charges Total Minutes 42  -ES       Total Minutes 42  -ES                User Key  (r) = Recorded By, (t) = Taken By, (c) = Cosigned By      Initials Name Provider Type    ES Roseann Forbes, OTR/L, CSRS Occupational Therapist                  Therapy Charges for Today       Code Description Service Date Service Provider Modifiers Qty    69218718943 HC OT THERAPEUTIC ACT EA 15 MIN 4/14/2025 Roseann Forbes, OTR/L, CSRS GO 1    34033564096 HC OT SELF CARE/MGMT/TRAIN EA 15 MIN 4/14/2025 Roseann Forbes, OTR/L, CSRS GO 2                 Roseann Forbes OTR/L, CSRS  4/14/2025

## 2025-04-14 NOTE — CASE MANAGEMENT/SOCIAL WORK
Continued Stay Note  [facility]     Patient Name: Kelly Pack  MRN: [id number]  Today's Date: 4/14/2025    Admit Date: 4/2/2025    Plan: [facility], will need transportation   Discharge Plan       Row Name 04/14/25 1147       Plan    Plan [facility] pending, will need transportation    Plan Comments CCP spoke with [facility], who will confirm if they can accept after bed huddle meeting. CCP met with pt , introduced self and explained role of . Confirmed  previous dc planning notes. Explained that CCP would follow to assess for discharge needs.,                   Discharge Codes    No documentation.                 Expected Discharge Date and Time       Expected Discharge Date Expected Discharge Time    Apr 15, 2025               Tara Xavier RN

## 2025-04-14 NOTE — PROGRESS NOTES
RENAL/KCC:     LOS: 12 days    Patient Care Team:  Kim Benjamin APRN as PCP - General (Nurse Practitioner)  Miguel Stahl MD as Consulting Physician (Hematology and Oncology)  Radha Martinez APRN as Referring Physician (Nurse Practitioner)    Chief Complaint:  ESRD, Abdominal pain    Subjective     Interval History:   4/7: Chart reviewed  S/P cysto and L ureteral stent placement on 4/2/25  Extubated on Saturday  Feeling well, denies new complaints    4/8: Patient seen and examined on hemodialysis, she was seen about 30 minutes into her treatment, tolerating well so far, BP in the 140s, set for 2 L UF  She denies any shortness of breath chest pain or edema    4/9: She completed dialysis yesterday without event, 2 L of fluid was removed blood pressure was stable throughout her treatment  She declined to take midodrine with dialysis yesterday but looks like her blood pressure stable throughout the whole treatment  Another rapid response was called yesterday evening for respiratory arrest without PEA she was transferred to the ICU and is currently stable, somewhat hypertensive and O2 sats 100% on 2 L without any respiratory distress    April 10: Patient seen and examined in the ICU, on dialysis, tolerating well  She has been hypertensive throughout her treatment and has not needed Midodrine  Remains on oxygen, 1 to 2 L denies any worsening shortness of breath, no edema  Scheduled for 2 L UF but she refused that, goal is 1 L which looks like has been met    April 11: She tolerated dialysis very well yesterday  Denies new complaints, BP stable overnight  Sats 100% on 1 L  No dyspnea no edema no chest pain    April 12:   feels better, still with chest tenderness that is worse with movement due to cpr   Bp near goal     Objective     Vital Sign Min/Max for last 24 hours  Temp  Min: 97.5 °F (36.4 °C)  Max: 98.2 °F (36.8 °C)   BP  Min: 120/55  Max: 140/64   Pulse  Min: 66  Max: 74   Resp  Min: 18  Max: 18  "  SpO2  Min: 94 %  Max: 99 %   Flow (L/min) (Oxygen Therapy)  Min: 2  Max: 2   No data recorded     Flowsheet Rows      Flowsheet Row First Filed Value   Admission Height 175.3 cm (69\") Documented at 04/02/2025 1049   Admission Weight 110 kg (243 lb) Documented at 04/02/2025 1049            No intake/output data recorded.  I/O last 3 completed shifts:  In: 1194 [P.O.:1194]  Out: -     Physical Exam:  GEN: Awake, alert, responsive, no acute distress, obese  ENT: PERRL, EOMI, MMM.  NECK: Supple, no JVD. RIJ tunneled HD catheter remains in place  CHEST: CTAB, no W/R/C.  CV: RRR, no M/G/R  ABD: Soft, NT, +BS  SKIN: Warm and Dry  NEURO: AAOX3   RUE AVF +thrill and bruit  +edema     WBC WBC   Date Value Ref Range Status   04/14/2025 13.43 (H) 3.40 - 10.80 10*3/mm3 Final   04/13/2025 15.73 (H) 3.40 - 10.80 10*3/mm3 Final   04/12/2025 15.76 (H) 3.40 - 10.80 10*3/mm3 Final      HGB Hemoglobin   Date Value Ref Range Status   04/14/2025 8.9 (L) 12.0 - 15.9 g/dL Final   04/13/2025 8.3 (L) 12.0 - 15.9 g/dL Final   04/12/2025 8.0 (L) 12.0 - 15.9 g/dL Final      HCT Hematocrit   Date Value Ref Range Status   04/14/2025 28.8 (L) 34.0 - 46.6 % Final   04/13/2025 26.3 (L) 34.0 - 46.6 % Final   04/12/2025 24.6 (L) 34.0 - 46.6 % Final      Platlets No results found for: \"LABPLAT\"   MCV MCV   Date Value Ref Range Status   04/14/2025 99.3 (H) 79.0 - 97.0 fL Final   04/13/2025 99.2 (H) 79.0 - 97.0 fL Final   04/12/2025 97.6 (H) 79.0 - 97.0 fL Final          Sodium Sodium   Date Value Ref Range Status   04/14/2025 125 (L) 136 - 145 mmol/L Final   04/13/2025 130 (L) 136 - 145 mmol/L Final   04/12/2025 131 (L) 136 - 145 mmol/L Final      Potassium Potassium   Date Value Ref Range Status   04/14/2025 4.8 3.5 - 5.2 mmol/L Final     Comment:     Slight hemolysis detected by analyzer. Result may be falsely elevated.   04/13/2025 4.2 3.5 - 5.2 mmol/L Final   04/12/2025 4.6 3.5 - 5.2 mmol/L Final      Chloride Chloride   Date Value Ref Range " "Status   04/14/2025 93 (L) 98 - 107 mmol/L Final   04/13/2025 94 (L) 98 - 107 mmol/L Final   04/12/2025 95 (L) 98 - 107 mmol/L Final      CO2 CO2   Date Value Ref Range Status   04/14/2025 17.0 (L) 22.0 - 29.0 mmol/L Final   04/13/2025 23.7 22.0 - 29.0 mmol/L Final   04/12/2025 23.0 22.0 - 29.0 mmol/L Final      BUN BUN   Date Value Ref Range Status   04/14/2025 40 (H) 6 - 20 mg/dL Final   04/13/2025 28 (H) 6 - 20 mg/dL Final   04/12/2025 47 (H) 6 - 20 mg/dL Final      Creatinine Creatinine   Date Value Ref Range Status   04/14/2025 6.59 (H) 0.57 - 1.00 mg/dL Final   04/13/2025 5.17 (H) 0.57 - 1.00 mg/dL Final   04/12/2025 7.82 (H) 0.57 - 1.00 mg/dL Final      Calcium Calcium   Date Value Ref Range Status   04/14/2025 8.3 (L) 8.6 - 10.5 mg/dL Final   04/13/2025 8.2 (L) 8.6 - 10.5 mg/dL Final   04/12/2025 8.3 (L) 8.6 - 10.5 mg/dL Final      PO4 No results found for: \"CAPO4\"   Albumin Albumin   Date Value Ref Range Status   04/14/2025 2.6 (L) 3.5 - 5.2 g/dL Final   04/13/2025 2.7 (L) 3.5 - 5.2 g/dL Final   04/12/2025 3.0 (L) 3.5 - 5.2 g/dL Final        Magnesium No results found for: \"MG\"       Uric Acid No results found for: \"URICACID\"        Results Review:     I reviewed the patient's new clinical results.    albumin human, 12.5 g, Intravenous, Once  aspirin, 81 mg, Oral, Daily  atorvastatin, 40 mg, Oral, Nightly  epoetin kindra/kindra-epbx, 10,000 Units, Intravenous, Once per day on Tuesday Thursday Saturday  ertapenem, 500 mg, Intravenous, Q24H  fluticasone, 2 spray, Each Nare, Daily  heparin (porcine), 5,000 Units, Subcutaneous, Q8H  insulin glargine, 13 Units, Subcutaneous, Nightly  insulin lispro, 2-7 Units, Subcutaneous, 4x Daily AC & at Bedtime  lanthanum, 2,000 mg, Oral, BID With Meals  Lidocaine, 1 patch, Transdermal, Q24H  polyethylene glycol, 17 g, Oral, Daily  sodium chloride, 10 mL, Intravenous, Q12H  sodium chloride, 10 mL, Intravenous, Q12H  sodium chloride, 10 mL, Intravenous, Q12H  sodium chloride, 10 " mL, Intravenous, Q12H  sodium chloride, 10 mL, Intravenous, Q12H  cyanocobalamin, 1,000 mcg, Oral, Daily  vitamin D, 50,000 Units, Oral, Once per day on Monday Thursday             Medication Review: Reviewed    Assessment & Plan     1) ESRD - on TTS HD, completed treatment Saturday   2) Abdominal pain, improved  3) L hydronephrosis with emphysematous pyelonephritis, status post cystoscopy/stent on 4/2  4) DM  5) Hypotension -stabilizing off midodrine  6) Metabolic acidosis  7) Anemia of CKD  8) ESBL UTI/Pyelo  9) Bacteremia  10) s/p cardiac arrest: bradycardia with subsequent PEA.  Another episode of rapid response with respiratory arrest, currently stable, possibly related to narcotics      Plan:   Plan for UF treatment today as pt over dry weight   Bp is now trending up   Continue prn midodrine with HD  Plan to resume b-blocker once bp trends up   Electrolytes and volume at goal today, oxygenation adequate    Continue toLimit narcotics  Tunnel catheter in place for access, okay to use AV fistula as well  Hx of infiltration   Epogen with HD for anemia  Renally dose antibiotics for GFR less than 10      Maria De Jesus Roman MD  [facility]  04/14/25  14:50 EDT

## 2025-04-14 NOTE — PLAN OF CARE
Goal Outcome Evaluation:  Plan of Care Reviewed With: patient        Progress: improving  Outcome Evaluation: Patient with good participation in OT session this AM. Therapeutic intervention focused on increasing patient independence in ADLs through compleiton of supported sitting and standing ADLs at sinkside. Patient with decreased tolerance, initially tolerating ADLs in stance, however with increased engagement time, requires sitting secondary to complaints of fatigue. OT and patient discussed energy conservation strategies to increase patient independence at time of discharge. Patient continues to require min A with ADL engagement. Patient will benefit from continued skilled OT intervention to address deficits related to strength, tolerance, balance, transfers and mobility.    Anticipated Discharge Disposition (OT): inpatient rehabilitation facility

## 2025-04-14 NOTE — CASE MANAGEMENT/SOCIAL WORK
Continued Stay Note  [facility]     Patient Name: Kelly Pack  MRN: [id number]  Today's Date: 4/14/2025    Admit Date: 4/2/2025    Plan: DC to [facility], will need transportation   Discharge Plan       Row Name 04/14/25 1622       Plan    Plan DC to [facility], will need transportation    Plan Comments Pt has been accepted at [facility] for 4-. Please send pt with her iv in place to complete her course of IVAB. Pt will need ambulance/stretcher transport.                   Discharge Codes    No documentation.                 Expected Discharge Date and Time       Expected Discharge Date Expected Discharge Time    Apr 15, 2025               Tara Xavier RN

## 2025-04-14 NOTE — PLAN OF CARE
Goal Outcome Evaluation:  Plan of Care Reviewed With: patient        Progress: improving  Outcome Evaluation: Pt was seen for PT tx this PM. Pt was in bed and sat up to EOB w/ SV. Pt requested to sit a few min 2/2 recent OH+ although denied dizziness when asked. Pt stood w/ SBA. Pt then amb 50'+ out into hallway w/ CGA and use of fww. Pt was mildly slow w/ no overt LOB. Activity intolerance limited distance. Pt performed seated ther ex after returning to room then returned to bed w/ SV.    Anticipated Discharge Disposition (PT): inpatient rehabilitation facility

## 2025-04-14 NOTE — THERAPY TREATMENT NOTE
Patient Name: Kelly Pack  : 1970    MRN: [id number]                              Today's Date: 2025       Admit Date: 2025    Visit Dx:     ICD-10-CM ICD-9-CM   1. NSTEMI (non-ST elevated myocardial infarction)  I21.4 410.70   2. Sepsis without acute organ dysfunction, due to unspecified organism  A41.9 038.9     995.91   3. Emphysematous pyelonephritis of left kidney  N12 590.80   4. ESRD on dialysis  N18.6 585.6    Z99.2 V45.11   5. Hydronephrosis of left kidney  N13.30 591   6. Sepsis, due to unspecified organism, unspecified whether acute organ dysfunction present  A41.9 038.9     995.91     Patient Active Problem List   Diagnosis    Hyperlipidemia    Allergic rhinitis    Nephrotic range proteinuria    Asthma    Essential hypertension    Uncontrolled type 2 diabetes mellitus with hyperglycemia    Acute renal failure superimposed on stage 3a chronic kidney disease    Anasarca    Suspected sleep apnea    Anemia    Bilateral lower extremity edema    Elevated troponin    Acute renal failure superimposed on chronic kidney disease    Type 2 diabetes mellitus with chronic kidney disease    Symptomatic anemia    ESRD (end stage renal disease)    Sepsis    Emphysematous pyelonephritis of left kidney    Hydronephrosis of left kidney    NSTEMI (non-ST elevated myocardial infarction)    Class 2 severe obesity with serious comorbidity in adult     Past Medical History:   Diagnosis Date    Albuminuria     Allergic     Seasonal, grass, cats and some dogs    Allergic rhinitis     Asthma     allergy triggered    Bell's palsy     Cataract     Had surgery on both eyes    Cholelithiasis     Removed. Have bile reflux/ vomiting    CKD (chronic kidney disease)     Diabetes mellitus     Drug therapy     Endometrial cancer     HL (hearing loss)     In L ear    Hyperlipidemia     Hypertension     Low back pain     Migraine     Obesity     Peripheral neuropathy     Renal insufficiency     Right otitis media     Type  II diabetes mellitus     Visual impairment     Diabetic retinopathy. Oil in L eye due to retina detachment s.    Vitamin D deficiency      Past Surgical History:   Procedure Laterality Date    ARTERIOVENOUS FISTULA  05/2023    CARDIAC CATHETERIZATION N/A 4/6/2025    Procedure: Left Heart Cath;  Surgeon: Damian Nguyen MD;  Location: [facility] CATH INVASIVE LOCATION;  Service: Cardiology;  Laterality: N/A;    CARDIAC CATHETERIZATION N/A 4/6/2025    Procedure: Coronary angiography;  Surgeon: Damian Nguyen MD;  Location: [facility] CATH INVASIVE LOCATION;  Service: Cardiology;  Laterality: N/A;    CATARACT EXTRACTION      CHOLECYSTECTOMY      CYSTOSCOPY, RETROGRADE PYELOGRAM AND STENT INSERTION Left 4/2/2025    Procedure: CYSTOSCOPY RETROGRADE PYELOGRAM AND STENT INSERTION;  Surgeon: Tl Gray MD;  Location: [facility] OR;  Service: Urology;  Laterality: Left;    EYE SURGERY      NOV 2020    HYSTERECTOMY      due to cancer    INSERTION HEMODIALYSIS CATHETER Right 06/23/2023    Procedure: HEMODIALYSIS CATHETER INSERTION;  Surgeon: Mikael Mackay MD;  Location: [facility] OR;  Service: Vascular;  Laterality: Right;    OOPHORECTOMY      VITRECTOMY PARS PLANA Left 01/28/2020    Procedure: 25G VITRECTOMY-ENDO LASER;  Surgeon: Lazarus, Howard S., MD;  Location: [facility] OR;  Service: Ophthalmology      General Information       Row Name 04/14/25 1408          Physical Therapy Time and Intention    Document Type therapy note (daily note)  -     Mode of Treatment physical therapy  -       Row Name 04/14/25 1408          General Information    Existing Precautions/Restrictions fall;oxygen therapy device and L/min  -PH       Row Name 04/14/25 1408          Cognition    Orientation Status (Cognition) oriented x 3  -PH       Row Name 04/14/25 1400          Safety Issues/Impairments Affecting Functional Mobility    Impairments Affecting Function (Mobility) endurance/activity tolerance;strength;balance  -PH      Comment, Safety Issues/Impairments (Mobility) gt belt and non skid socks donned  -PH               User Key  (r) = Recorded By, (t) = Taken By, (c) = Cosigned By      Initials Name Provider Type     Yoselin Godfrey PTA Physical Therapist Assistant                   Mobility       Row Name 04/14/25 1408          Bed Mobility    Bed Mobility supine-sit;sit-supine  -PH     Supine-Sit [place] (Bed Mobility) supervision  -PH     Sit-Supine [place] (Bed Mobility) supervision  -PH     Comment, (Bed Mobility) pt sat EOB 2/2 recent OH  -PH       Row Name 04/14/25 1408          Sit-Stand Transfer    Sit-Stand [place] (Transfers) standby assist  -PH     Assistive Device (Sit-Stand Transfers) --  no AD  -PH     Comment, (Sit-Stand Transfer) from EOB; incr time standing 2/2 recent OH w/ pt denying dizziness  -PH       Row Name 04/14/25 1408          Gait/Stairs (Locomotion)    [place] Level (Gait) contact guard;verbal cues;nonverbal cues (demo/gesture)  -PH     Assistive Device (Gait) walker, front-wheeled  -PH     Distance in Feet (Gait) 50  in hallway 50'+  -PH     Deviations/Abnormal Patterns (Gait) aniceto decreased;gait speed decreased;stride length decreased  -PH     Bilateral Gait Deviations forward flexed posture  -PH     [place] Level (Stairs) unable to assess  -PH     Comment, (Gait/Stairs) lean into fww for support noted; pt reports R ankle weakness w/ incr pronation and toe out noted  -PH               User Key  (r) = Recorded By, (t) = Taken By, (c) = Cosigned By      Initials Name Provider Type     Yoselin Godfrey PTA Physical Therapist Assistant                   Obj/Interventions       Row Name 04/14/25 1411          Motor Skills    Therapeutic Exercise other (see comments)  BAP, LAQ, seated march; x 15 reps each  -PH       Row Name 04/14/25 1411          Balance    Balance Assessment sitting static balance;sitting dynamic balance;standing static balance  -PH      Static Sitting Balance standby assist  -PH     Dynamic Sitting Balance standby assist  -PH     Static Standing Balance standby assist  -PH     Position/Device Used, Standing Balance walker, front-wheeled  -PH               User Key  (r) = Recorded By, (t) = Taken By, (c) = Cosigned By      Initials Name Provider Type     Yoselin Godfrey PTA Physical Therapist Assistant                   Goals/Plan    No documentation.                  Clinical Impression       Row Name 04/14/25 1411          Pain    Pretreatment Pain Rating 0/10 - no pain  -PH     Posttreatment Pain Rating 0/10 - no pain  -PH       Row Name 04/14/25 1411          Plan of Care Review    Plan of Care Reviewed With patient  -PH     Progress improving  -PH     Outcome Evaluation Pt was seen for PT tx this PM. Pt was in bed and sat up to EOB w/ SV. Pt requested to sit a few min 2/2 recent OH+ although denied dizziness when asked. Pt stood w/ SBA. Pt then amb 50'+ out into hallway w/ CGA and use of fww. Pt was mildly slow w/ no overt LOB. Activity intolerance limited distance. Pt performed seated ther ex after returning to room then returned to bed w/ SV.  -PH       Row Name 04/14/25 1411          Vital Signs    O2 Delivery Pre Treatment supplemental O2  -PH     O2 Delivery Intra Treatment room air  -PH     O2 Delivery Post Treatment room air  -PH       Row Name 04/14/25 1411          Positioning and Restraints    Pre-Treatment Position in bed  -PH     Post Treatment Position bed  declined chair - wanted to nap in bed  -PH     In Bed notified nsg;fowlers;call light within reach;encouraged to call for assist;exit alarm on  -PH               User Key  (r) = Recorded By, (t) = Taken By, (c) = Cosigned By      Initials Name Provider Type     Yoselin Godfrey PTA Physical Therapist Assistant                   Outcome Measures       Row Name 04/14/25 1414          How much help from another person do you currently need...    Turning from your  back to your side while in flat bed without using bedrails? 3  -PH     Moving from lying on back to sitting on the side of a flat bed without bedrails? 3  -PH     Moving to and from a bed to a chair (including a wheelchair)? 3  -PH     Standing up from a chair using your arms (e.g., wheelchair, bedside chair)? 3  -PH     Climbing 3-5 steps with a railing? 3  -PH     To walk in hospital room? 3  -PH     AM-PAC 6 Clicks Score (PT) 18  -PH     Highest Level of Mobility Goal 6 --> Walk 10 steps or more  -PH       Row Name 04/14/25 1414 04/14/25 1232       Functional Assessment    Outcome Measure Options AM-PAC 6 Clicks Basic Mobility (PT)  -PH AM-PAC 6 Clicks Daily Activity (OT)  -ES              User Key  (r) = Recorded By, (t) = Taken By, (c) = Cosigned By      Initials Name Provider Type    PH Yoselin Godfrey, JAUN Physical Therapist Assistant    Roseann Monteiro, OTR/L, CSRS Occupational Therapist                                 Physical Therapy Education       Title: PT OT SLP Therapies (In Progress)       Topic: Physical Therapy (Done)       Point: Mobility training (Done)       Learning Progress Summary            Patient Acceptance, E,TB,D, VU,NR by  at 4/14/2025 1414    Acceptance, E,TB,D, VU,NR by  at 4/13/2025 1259    Acceptance, E,D, DU by  at 4/10/2025 1015    Acceptance, E,D, DU by PC at 4/9/2025 1310    Acceptance, E,TB,D, VU,NR by  at 4/7/2025 1500                      Point: Home exercise program (Done)       Learning Progress Summary            Patient Acceptance, E,TB,D, VU,NR by  at 4/14/2025 1414    Acceptance, E,TB,D, VU,NR by  at 4/13/2025 1259    Acceptance, E,D, DU by  at 4/10/2025 1015    Acceptance, E,D, DU by  at 4/9/2025 1310    Acceptance, E,TB,D, VU,NR by  at 4/7/2025 1500                      Point: Body mechanics (Done)       Learning Progress Summary            Patient Acceptance, E,TB,D, VU,NR by  at 4/14/2025 1414    Acceptance, E,JACKIE FAIRBANKS VU, NR by  at  4/13/2025 1259    Acceptance, E,D, DU by  at 4/10/2025 1015    Acceptance, E,D, DU by  at 4/9/2025 1310    Acceptance, E,TB,D, VU,NR by  at 4/7/2025 1500                      Point: Precautions (Done)       Learning Progress Summary            Patient Acceptance, E,TB,D, VU,NR by  at 4/14/2025 1414    Acceptance, E,TB,D, VU,NR by  at 4/13/2025 1259    Acceptance, E,D, DU by  at 4/10/2025 1015    Acceptance, E,D, DU by  at 4/9/2025 1310    Acceptance, E,TB,D, VU,NR by  at 4/7/2025 1500                                      User Key       Initials Effective Dates Name Provider Type Discipline     06/16/21 -  Patsy Ortega PT Physical Therapist PT     03/07/18 -  Tali Durand PTA Physical Therapist Assistant PT     06/16/21 -  Yoselin Godfrey PTA Physical Therapist Assistant PT                  PT Recommendation and Plan     Progress: improving  Outcome Evaluation: Pt was seen for PT tx this PM. Pt was in bed and sat up to EOB w/ SV. Pt requested to sit a few min 2/2 recent OH+ although denied dizziness when asked. Pt stood w/ SBA. Pt then amb 50'+ out into hallway w/ CGA and use of fww. Pt was mildly slow w/ no overt LOB. Activity intolerance limited distance. Pt performed seated ther ex after returning to room then returned to bed w/ SV.     Time Calculation:         PT Charges       Row Name 04/14/25 1414             Time Calculation    Start Time 1314  -PH      Stop Time 1331  -PH      Time Calculation (min) 17 min  -PH      PT Received On 04/14/25  -PH      PT - Next Appointment 04/15/25  -PH         Timed Charges    27425 - PT Therapeutic Exercise Minutes 5  -PH      11408 - PT Therapeutic Activity Minutes 12  -PH         Total Minutes    Timed Charges Total Minutes 17  -PH       Total Minutes 17  -PH                User Key  (r) = Recorded By, (t) = Taken By, (c) = Cosigned By      Initials Name Provider Type     Yoselin Godfrey PTA Physical Therapist Assistant                   Therapy Charges for Today       Code Description Service Date Service Provider Modifiers Qty    [id number]  PT THERAPEUTIC ACT EA 15 MIN 4/14/2025 Yoselin Godfrey, JAUN GP 1            PT G-Codes  Outcome Measure Options: AM-PAC 6 Clicks Basic Mobility (PT)  AM-PAC 6 Clicks Score (PT): 18  AM-PAC 6 Clicks Score (OT): 19  PT Discharge Summary  Anticipated Discharge Disposition (PT): inpatient rehabilitation facility    Yoselin Godfrey PTA  4/14/2025

## 2025-04-15 PROBLEM — A41.9 SEPSIS: Status: RESOLVED | Noted: 2025-04-02 | Resolved: 2025-04-15

## 2025-04-15 PROBLEM — A49.9 ESBL (EXTENDED SPECTRUM BETA-LACTAMASE) PRODUCING BACTERIA INFECTION: Status: ACTIVE | Noted: 2025-04-15

## 2025-04-15 PROBLEM — Z16.12 ESBL (EXTENDED SPECTRUM BETA-LACTAMASE) PRODUCING BACTERIA INFECTION: Status: ACTIVE | Noted: 2025-04-15

## 2025-04-15 PROBLEM — A41.51 SEPSIS DUE TO ESCHERICHIA COLI WITH ACUTE RENAL FAILURE WITHOUT SEPTIC SHOCK: Status: RESOLVED | Noted: 2025-04-15 | Resolved: 2025-04-15

## 2025-04-15 PROBLEM — N17.9 SEPSIS DUE TO ESCHERICHIA COLI WITH ACUTE RENAL FAILURE WITHOUT SEPTIC SHOCK: Status: RESOLVED | Noted: 2025-04-15 | Resolved: 2025-04-15

## 2025-04-15 PROBLEM — R65.20 SEPSIS DUE TO ESCHERICHIA COLI WITH ACUTE RENAL FAILURE WITHOUT SEPTIC SHOCK: Status: RESOLVED | Noted: 2025-04-15 | Resolved: 2025-04-15

## 2025-04-15 PROBLEM — N12 EMPHYSEMATOUS PYELONEPHRITIS OF LEFT KIDNEY: Status: RESOLVED | Noted: 2025-04-02 | Resolved: 2025-04-15

## 2025-04-15 PROBLEM — N17.9 SEPSIS DUE TO ESCHERICHIA COLI WITH ACUTE RENAL FAILURE WITHOUT SEPTIC SHOCK: Status: ACTIVE | Noted: 2025-04-15

## 2025-04-15 PROBLEM — A41.51 SEPSIS DUE TO ESCHERICHIA COLI WITH ACUTE RENAL FAILURE WITHOUT SEPTIC SHOCK: Status: ACTIVE | Noted: 2025-04-15

## 2025-04-15 PROBLEM — R65.20 SEPSIS DUE TO ESCHERICHIA COLI WITH ACUTE RENAL FAILURE WITHOUT SEPTIC SHOCK: Status: ACTIVE | Noted: 2025-04-15

## 2025-04-15 LAB
ALBUMIN SERPL-MCNC: 2.8 G/DL (ref 3.5–5.2)
ANION GAP SERPL CALCULATED.3IONS-SCNC: 12.1 MMOL/L (ref 5–15)
BASOPHILS # BLD AUTO: 0.11 10*3/MM3 (ref 0–0.2)
BASOPHILS NFR BLD AUTO: 0.8 % (ref 0–1.5)
BUN SERPL-MCNC: 52 MG/DL (ref 6–20)
BUN/CREAT SERPL: 7.2 (ref 7–25)
CALCIUM SPEC-SCNC: 8.5 MG/DL (ref 8.6–10.5)
CHLORIDE SERPL-SCNC: 94 MMOL/L (ref 98–107)
CO2 SERPL-SCNC: 21.9 MMOL/L (ref 22–29)
CREAT SERPL-MCNC: 7.23 MG/DL (ref 0.57–1)
DEPRECATED RDW RBC AUTO: 48 FL (ref 37–54)
EGFRCR SERPLBLD CKD-EPI 2021: 6.3 ML/MIN/1.73
EOSINOPHIL # BLD AUTO: 0.47 10*3/MM3 (ref 0–0.4)
EOSINOPHIL NFR BLD AUTO: 3.5 % (ref 0.3–6.2)
ERYTHROCYTE [DISTWIDTH] IN BLOOD BY AUTOMATED COUNT: 13.8 % (ref 12.3–15.4)
GLUCOSE BLDC GLUCOMTR-MCNC: 122 MG/DL (ref 70–130)
GLUCOSE BLDC GLUCOMTR-MCNC: 155 MG/DL (ref 70–130)
GLUCOSE BLDC GLUCOMTR-MCNC: 86 MG/DL (ref 70–130)
GLUCOSE SERPL-MCNC: 87 MG/DL (ref 65–99)
HCT VFR BLD AUTO: 25.4 % (ref 34–46.6)
HGB BLD-MCNC: 8.2 G/DL (ref 12–15.9)
IMM GRANULOCYTES # BLD AUTO: 0.3 10*3/MM3 (ref 0–0.05)
IMM GRANULOCYTES NFR BLD AUTO: 2.3 % (ref 0–0.5)
LYMPHOCYTES # BLD AUTO: 1.49 10*3/MM3 (ref 0.7–3.1)
LYMPHOCYTES NFR BLD AUTO: 11.2 % (ref 19.6–45.3)
MCH RBC QN AUTO: 31.2 PG (ref 26.6–33)
MCHC RBC AUTO-ENTMCNC: 32.3 G/DL (ref 31.5–35.7)
MCV RBC AUTO: 96.6 FL (ref 79–97)
MONOCYTES # BLD AUTO: 1.24 10*3/MM3 (ref 0.1–0.9)
MONOCYTES NFR BLD AUTO: 9.3 % (ref 5–12)
NEUTROPHILS NFR BLD AUTO: 72.9 % (ref 42.7–76)
NEUTROPHILS NFR BLD AUTO: 9.71 10*3/MM3 (ref 1.7–7)
NRBC BLD AUTO-RTO: 0 /100 WBC (ref 0–0.2)
PHOSPHATE SERPL-MCNC: 4.6 MG/DL (ref 2.5–4.5)
PLATELET # BLD AUTO: 415 10*3/MM3 (ref 140–450)
PMV BLD AUTO: 8.9 FL (ref 6–12)
POTASSIUM SERPL-SCNC: 4.9 MMOL/L (ref 3.5–5.2)
RBC # BLD AUTO: 2.63 10*6/MM3 (ref 3.77–5.28)
SODIUM SERPL-SCNC: 128 MMOL/L (ref 136–145)
WBC NRBC COR # BLD AUTO: 13.32 10*3/MM3 (ref 3.4–10.8)

## 2025-04-15 PROCEDURE — 63710000001 INSULIN LISPRO (HUMAN) PER 5 UNITS: Performed by: INTERNAL MEDICINE

## 2025-04-15 PROCEDURE — 25010000002 ERTAPENEM PER 500 MG: Performed by: INTERNAL MEDICINE

## 2025-04-15 PROCEDURE — 25010000002 HEPARIN (PORCINE) PER 1000 UNITS: Performed by: INTERNAL MEDICINE

## 2025-04-15 PROCEDURE — 85025 COMPLETE CBC W/AUTO DIFF WBC: CPT | Performed by: INTERNAL MEDICINE

## 2025-04-15 PROCEDURE — 63710000001 INSULIN GLARGINE PER 5 UNITS: Performed by: INTERNAL MEDICINE

## 2025-04-15 PROCEDURE — 82948 REAGENT STRIP/BLOOD GLUCOSE: CPT

## 2025-04-15 PROCEDURE — 25010000002 EPOETIN ALFA-EPBX 10000 UNIT/ML SOLUTION: Performed by: INTERNAL MEDICINE

## 2025-04-15 PROCEDURE — 80069 RENAL FUNCTION PANEL: CPT | Performed by: INTERNAL MEDICINE

## 2025-04-15 RX ORDER — INSULIN LISPRO 100 [IU]/ML
2-7 INJECTION, SOLUTION INTRAVENOUS; SUBCUTANEOUS
Start: 2025-04-15

## 2025-04-15 RX ORDER — ASPIRIN 81 MG/1
81 TABLET ORAL DAILY
Start: 2025-04-15

## 2025-04-15 RX ORDER — GABAPENTIN 300 MG/1
300 CAPSULE ORAL NIGHTLY PRN
Start: 2025-04-15

## 2025-04-15 RX ORDER — SEVELAMER CARBONATE 800 MG/1
3200 TABLET, FILM COATED ORAL
Status: DISCONTINUED | OUTPATIENT
Start: 2025-04-15 | End: 2025-04-16 | Stop reason: HOSPADM

## 2025-04-15 RX ORDER — SEVELAMER CARBONATE 800 MG/1
3200 TABLET, FILM COATED ORAL
Start: 2025-04-15

## 2025-04-15 RX ORDER — MIDODRINE HYDROCHLORIDE 5 MG/1
5 TABLET ORAL DAILY PRN
Start: 2025-04-15

## 2025-04-15 RX ORDER — HEPARIN SODIUM 1000 [USP'U]/ML
4000 INJECTION, SOLUTION INTRAVENOUS; SUBCUTANEOUS AS NEEDED
Status: DISCONTINUED | OUTPATIENT
Start: 2025-04-15 | End: 2025-04-16 | Stop reason: HOSPADM

## 2025-04-15 RX ORDER — LIDOCAINE 4 G/G
1 PATCH TOPICAL
Start: 2025-04-15

## 2025-04-15 RX ADMIN — ACETAMINOPHEN 650 MG: 325 TABLET, FILM COATED ORAL at 13:26

## 2025-04-15 RX ADMIN — FLUTICASONE PROPIONATE 2 SPRAY: 50 SPRAY, METERED NASAL at 13:19

## 2025-04-15 RX ADMIN — Medication 10 ML: at 13:20

## 2025-04-15 RX ADMIN — Medication 10 ML: at 13:19

## 2025-04-15 RX ADMIN — HEPARIN SODIUM 5000 UNITS: 5000 INJECTION INTRAVENOUS; SUBCUTANEOUS at 21:04

## 2025-04-15 RX ADMIN — ASPIRIN 81 MG: 81 TABLET, COATED ORAL at 13:16

## 2025-04-15 RX ADMIN — Medication 1000 MCG: at 13:15

## 2025-04-15 RX ADMIN — EPOETIN ALFA-EPBX 10000 UNITS: 10000 INJECTION, SOLUTION INTRAVENOUS; SUBCUTANEOUS at 09:42

## 2025-04-15 RX ADMIN — Medication 10 ML: at 21:05

## 2025-04-15 RX ADMIN — HEPARIN SODIUM 5000 UNITS: 5000 INJECTION INTRAVENOUS; SUBCUTANEOUS at 06:24

## 2025-04-15 RX ADMIN — LIDOCAINE 1 PATCH: 4 PATCH TOPICAL at 13:15

## 2025-04-15 RX ADMIN — GABAPENTIN 300 MG: 300 CAPSULE ORAL at 21:09

## 2025-04-15 RX ADMIN — INSULIN GLARGINE 13 UNITS: 100 INJECTION, SOLUTION SUBCUTANEOUS at 21:04

## 2025-04-15 RX ADMIN — ERTAPENEM SODIUM 500 MG: 1 INJECTION, POWDER, LYOPHILIZED, FOR SOLUTION INTRAMUSCULAR; INTRAVENOUS at 23:52

## 2025-04-15 RX ADMIN — SEVELAMER CARBONATE 3200 MG: 800 TABLET, FILM COATED ORAL at 13:15

## 2025-04-15 RX ADMIN — INSULIN LISPRO 2 UNITS: 100 INJECTION, SOLUTION INTRAVENOUS; SUBCUTANEOUS at 21:04

## 2025-04-15 RX ADMIN — ACETAMINOPHEN 650 MG: 325 TABLET, FILM COATED ORAL at 18:02

## 2025-04-15 RX ADMIN — ACETAMINOPHEN 650 MG: 325 TABLET, FILM COATED ORAL at 22:22

## 2025-04-15 RX ADMIN — SEVELAMER CARBONATE 3200 MG: 800 TABLET, FILM COATED ORAL at 18:00

## 2025-04-15 RX ADMIN — ERTAPENEM SODIUM 500 MG: 1 INJECTION, POWDER, LYOPHILIZED, FOR SOLUTION INTRAMUSCULAR; INTRAVENOUS at 00:25

## 2025-04-15 RX ADMIN — HEPARIN SODIUM 4000 UNITS: 1000 INJECTION, SOLUTION INTRAVENOUS; SUBCUTANEOUS at 09:42

## 2025-04-15 RX ADMIN — ATORVASTATIN CALCIUM 40 MG: 20 TABLET, FILM COATED ORAL at 21:09

## 2025-04-15 RX ADMIN — HEPARIN SODIUM 5000 UNITS: 5000 INJECTION INTRAVENOUS; SUBCUTANEOUS at 13:16

## 2025-04-15 NOTE — PLAN OF CARE
Goal Outcome Evaluation:  Plan of Care Reviewed With: patient        Progress: improving  Outcome Evaluation: Pt wore 2L NC while sleeping. Continues on IV abx. Possible D/C today to [facility].

## 2025-04-15 NOTE — DISCHARGE SUMMARY
Patient Name: Kelly Pack  : 1970  MRN: [id number]    Date of Admission: 2025  Date of Discharge:  4/15/2025  Primary Care Physician: Kim Benjamin APRN      Chief Complaint:   Abdominal Pain      Discharge Diagnoses     Active Hospital Problems    Diagnosis  POA    ESBL (extended spectrum beta-lactamase) producing bacteria infection [A49.9, Z16.12]  Yes    Class 2 severe obesity with serious comorbidity in adult [E66.812, E66.01]  Yes    NSTEMI (non-ST elevated myocardial infarction) [I21.4]  Yes    Hydronephrosis of left kidney [N13.30]  Yes    ESRD (end stage renal disease) [N18.6]  Yes    Uncontrolled type 2 diabetes mellitus with hyperglycemia [E11.65]  Yes    Essential hypertension [I10]  Yes    Asthma [J45.909]  Yes    Hyperlipidemia [E78.5]  Yes      Resolved Hospital Problems    Diagnosis Date Resolved POA    Emphysematous pyelonephritis of left kidney [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 Pack is a 54 y.o. female with a history of ESRD on HD, anemia and DM2 among other issues who presented to [facility] initially complaining of abdominal pain.  Please see the admitting history and physical for further details.  She was found to have emphysematous pyelitis and left sided hydronephrosis on CT.  Labs and vitals were consistent with sepsis though she was not hypotensive.  She was urgently taken for cytoscopy with stent insertion on 25.  Cultures of the urine and blood were positive for ESBL E. coli.  On 25, she experienced a PEA arrest during dialysis for which she was resuscitated quickly and intubated and transferred to the ICU. She improved quickly and was able to be extubated on 25 and she was transferred out of the ICU.  Troponins were elevated and EKG was consistent with ischemia so she underwent cardiac catheterization which only showed very mild non-obstructive CAD.  Ultimately she was felt to have a  type II MI.   On 4/8/25, she experienced another brief respiratory arrest.  This 1 apparently occurred after HD but resolved relatively quickly.  Etiology of this was uncertain, no evidence for arrhythmia per cardiology notes or any ischemic event.  CTA was negative for PE.  There was some thought it could relate to narcotics so these were discontinued.  She has done well since that time, tolerating several dialysis sessions without issue and so was transferred back to the floor.  She has almost finished the full treatment course of ertapenem with only 2 remaining doses to be given this evening and tomorrow night.  This can be given via peripheral IV at acute rehab facility per discussion with CCP.  All medications are to be continued as ordered below.      Day of Discharge     Subjective:  She is feeling okay.  Feels a little constipated but did have a small bowel movement within the last day or 2.    Physical Exam:  Temp:  [97.5 °F (36.4 °C)-98.2 °F (36.8 °C)] 98.2 °F (36.8 °C)  Heart Rate:  [71-78] 78  Resp:  [18] 18  BP: (100-147)/(52-88) 147/71  Body mass index is 38.22 kg/m².  Physical Exam  Vitals reviewed.   Constitutional:       General: She is not in acute distress.     Appearance: She is obese.   Eyes:      General: No scleral icterus.  Cardiovascular:      Rate and Rhythm: Normal rate and regular rhythm.   Pulmonary:      Effort: No respiratory distress.   Abdominal:      General: There is no distension.      Palpations: Abdomen is soft.      Tenderness: There is no abdominal tenderness.   Musculoskeletal:      Right lower leg: Edema present.      Left lower leg: Edema present.   Skin:     General: Skin is warm and dry.   Neurological:      Mental Status: She is alert.   Psychiatric:         Mood and Affect: Mood normal.         Consultants     Consult Orders (all) (From admission, onward)       Start     Ordered    04/09/25 1310  Inpatient Cardiology Consult  Once        Specialty:  Cardiology   Provider:  Damian Nguyen MD    04/09/25 0826    04/09/25 0823  Inpatient Infectious Diseases Consult  Once        Specialty:  Infectious Diseases  Provider:  Ruddy Mnedoza DO    04/09/25 0822    04/04/25 1638  Inpatient Cardiology Consult  Once        Specialty:  Cardiology  Provider:  Daron Felder MD    04/04/25 1637    04/04/25 1502  Inpatient Pulmonology Consult  Once        Specialty:  Pulmonary Disease  Provider:  Anat Molina MD    04/04/25 1502    04/03/25 0736  Inpatient Infectious Diseases Consult  Once        Specialty:  Infectious Diseases  Provider:  Ruddy Mendoza DO    04/03/25 0735    04/03/25 0735  Inpatient Infectious Diseases Consult  Once,   Status:  Canceled        Specialty:  Infectious Diseases  Provider:  (Not yet assigned)    04/03/25 0735    04/02/25 1134  Urology (on-call MD unless specified)  Once        Specialty:  Urology  Provider:  (Not yet assigned)    04/02/25 1133    04/02/25 1103  [facility] (on-call MD unless specified) Details  Once        Specialty:  Hospitalist  Provider:  (Not yet assigned)    04/02/25 1102    04/02/25 0949  [facility] (on-call MD unless specified) Details  Once,   Status:  Canceled        Specialty:  Hospitalist  Provider:  (Not yet assigned)    04/02/25 0948    04/02/25 0932  Nephrology (on -call MD unless specified)  Once        Specialty:  Nephrology  Provider:  Maria De Jesus Roman MD    04/02/25 0931                  Procedures     4/2:  Cystoscopy with retrograde pyelogram and stent insertion    4/6:  Left Heart Cath, Coronary angiography    Imaging Results (All)       Procedure Component Value Units Date/Time    US Breast Right Limited [[id number]] Collected: 04/14/25 1526     Updated: 04/14/25 1608    Narrative:      LIMITED DIRECTED RIGHT BREAST ULTRASOUND     HISTORY: 54-year-old female with recent trauma to right breast during  transport from CT scan to stretcher. Pain.     Ultrasound evaluation of the area of concern concentrates to  the lower  outer right breast as well as to the upper inner quadrant. No  abnormality is seen. There is no evidence of hematoma or skin  thickening.     CONCLUSION: Negative directed right breast ultrasound. Continued  clinical correlation is recommended.     BI-RADS CATEGORY 1: Negative.     This report was finalized on 4/14/2025 4:04 PM by Dr. Anthony Palomares M.D  on Workstation: [id number]       CT Angiogram Chest [[id number]] Collected: 04/09/25 2131     Updated: 04/09/25 2140    Narrative:      CTA CHEST WITH IV CONTRAST     HISTORY: two pea/code blue, rule out pe; I21.4-Non-ST elevation (NSTEMI)  myocardial infarction; A41.9-Sepsis, unspecified organism;  P16-Hvlugs-youaqcommrnm nephritis, not specified as acute or chronic;  N18.6-End stage renal disease; Z99.2-Dependence on renal dialysis;  N13.30-Unspecified hydronephrosis; A41.9-Sepsis, unspecified organism     COMPARISON: None     TECHNIQUE: CT angiography was performed of the chest with axial images  as well as coronal and sagittal reformatted MIP images provided  following administration of IV contrast. 3-D surface rendered reformats  were obtained of the pulmonary arteries and aorta. Radiation dose  reduction techniques were utilized, including automated exposure  control, and exposure modulation based on body size.     FINDINGS:     There are bilateral faint groundglass densities throughout both lungs,  with areas of likely dependent atelectasis posteriorly on each side.  There is no effusion or pneumothorax.     Thoracic aorta is normal in caliber.  There are coronary atherosclerotic  vascular calcifications.  There is no suspicious mediastinal adenopathy  or other mass.     Images of the upper abdomen show no acute abnormality.  There is no  acute bony abnormality.     Bolus timing is good and there is no evidence of pulmonary embolism.       Impression:         Pulmonary arteries are well-opacified, and there is no evidence of  pulmonary embolism.      Faint but diffuse bilateral areas of parenchymal groundglass opacity,  without consolidation, effusion or pneumothorax. FINDINGS may be related  to recent resuscitation.           This report was finalized on 4/9/2025 9:37 PM by Dr. Yung Santiago M.D  on Workstation: [id number]       XR Chest 1 View [[id number]] Collected: 04/08/25 2005     Updated: 04/08/25 2010    Narrative:      XR CHEST 1 VW-     Clinical: Hypoxemia     COMPARISON 4/4/2025     FINDINGS: Endotracheal tube removed. Double-lumen central venous  catheter in position as before. There is cardiac enlargement. Monitoring  leads and pads superimpose the chest. No effusion, edema or  consolidation demonstrated. Patchy area of infiltrate/atelectasis right  base. No pneumothorax seen.     CONCLUSION: Endotracheal tube removed. Cardiomegaly, central venous  catheter in position as before. Suggestion of subtle  infiltrate/atelectasis right base.     This report was finalized on 4/8/2025 8:07 PM by Dr. Khai Resendiz M.D  on Workstation: [id number]       XR Chest 1 View [[id number]] Collected: 04/04/25 1704     Updated: 04/04/25 1708    Narrative:      XR CHEST 1 VW-     HISTORY: Female who is 54 years-old, endotracheal tube placement     TECHNIQUE: Frontal view of the chest     COMPARISON: 12/3/2024     FINDINGS:  Central venous catheter appears stable. Endotracheal tube tip is 2.2 cm  above the peterson. The heart is enlarged. Pulmonary vasculature appears  slightly congested. No focal pulmonary consolidation, pleural effusion,  or pneumothorax. No acute osseous process.       Impression:      Endotracheal intubation. No focal pulmonary consolidation.  Cardiomegaly with slight pulmonary vascular congestion.     This report was finalized on 4/4/2025 5:05 PM by Dr. Ronaldo Rodriguez M.D on Workstation: [id number]       FL Retrograde Pyelogram In OR [[id number]] Collected: 04/02/25 2040     Updated: 04/04/25 1050    Narrative:      RETROGRADE PYELOGRAM  INTERPRETATION ONLY 04/02/2025     HISTORY: Retrograde pyelogram.     FINDINGS: Contrast is seen in the bilateral collecting systems and  ureters. 1 or 2 tiny filling defects are seen in the proximal right  ureter just distal to the right ureteral pelvic junction which may  represent 1 or 2 tiny stones or air bubbles. There is a moderate size  filling defect in the region of the left ureteral pelvic junction. No  stones of this size are seen on the CT scan from 04/02/2025 and this  could represent air bubble or possibly a hematoma. There is mild  dilatation of the left collecting system. Left mid to distal ureter  appears normal. Double-J left ureteral stent is seen in good position.     Fluoroscopy time 24 seconds 5 images 4 mGy (K, A, R)           This report was finalized on 4/4/2025 10:44 AM by Dr. Jae Cheema M.D on Workstation: [id number]       CT Abdomen Pelvis Without Contrast [[id number]] Collected: 04/02/25 0858     Updated: 04/02/25 0910    Narrative:      CT ABDOMEN PELVIS WO CONTRAST-     DATE OF EXAM: 4/2/2025 8:22 AM     INDICATION: left abdominal pain, n/v/d x 3 days.     COMPARISON: CT guided renal biopsy 11/14/2022. Radiographs of the sacrum  and coccyx 6/24/2023.     TECHNIQUE: Multiple contiguous axial images were acquired through the  abdomen and pelvis without the intravenous administration of contrast.  Reformatted coronal and sagittal sequences were also reviewed. Radiation  dose reduction techniques were utilized, including automated exposure  control and exposure modulation based on body size.     FINDINGS:  The included lung bases are clear. Partially imaged calcified coronary  artery disease and trace pericardial fluid. Relative hyperdensity of the  interventricular septum suggest anemia.     Status post cholecystectomy. The liver, spleen, pancreas, and adrenal  glands are unremarkable. Moderate left hydronephrosis and hydroureter  with left perinephric and periureteral fat  stranding and air distended  calyces in the upper pole of the left kidney, suggesting emphysematous  pyelitis. Bilateral renal calcifications are likely vascular and  nonobstructing. No obstructing renal or ureteral stone is identified.  The urinary bladder is nondistended. Diffuse urinary bladder wall  thickening could be accentuated by under distention. Status post  hysterectomy. The adnexa are not definitively identified.     Small periumbilical hernia containing a short knuckle of nondistended  transverse colon. Mild colorectal stool. No bowel obstruction or  significant bowel wall thickening. The appendix is normal.     Nonspecific small volume free fluid in the pelvis. No free  intraperitoneal air. No pathologically enlarged lymph nodes in the  abdomen or pelvis. Mild to moderate calcified atherosclerotic disease in  the abdominal aorta and its distal branches without aneurysm.     Mild rotary lumbar levoscoliosis. Multilevel lumbar spondylosis, most  severe at L5-S1. Mild to moderate bilateral hip and SI joint DJD. No  acute osseous abnormality or concerning osseous lesion.       Impression:         1. Moderate left hydronephrosis and hydroureter with left perinephric  and periureteral fat stranding and air distended calyces in the upper  pole of the left kidney, suggesting emphysematous pyelitis. Recommend  correlating with urinalysis.  2. Urinary bladder wall thickening could be accentuated by  underdistention but could also reflect a nonspecific cystitis.  3. Small periumbilical hernia containing a short knuckle of nondistended  transverse colon.     This report was finalized on 4/2/2025 9:07 AM by Corbin Mayberry MD on  Workstation: [id number]               Results for orders placed during the hospital encounter of 04/02/25    Adult Transthoracic Echo Complete W/ Cont if Necessary Per Protocol    Interpretation Summary    Left ventricular systolic function is low normal. Calculated left ventricular EF =  "48.8%    Left ventricular diastolic function is consistent with (grade I) impaired relaxation.    The left atrial cavity is mildly dilated.    There is mild calcification of the aortic valve. No stenosis present    Calculated right ventricular systolic pressure from tricuspid regurgitation is 47 mmHg.    Mild to moderate pulmonary hypertension is present.    Mild mitral valve regurgitation is present.    Pertinent Labs     Results from last 7 days   Lab Units 04/15/25  0319 04/14/25  0347 04/13/25  0344 04/12/25  0732   WBC 10*3/mm3 13.32* 13.43* 15.73* 15.76*   HEMOGLOBIN g/dL 8.2* 8.9* 8.3* 8.0*   PLATELETS 10*3/mm3 415 365 389 401     Results from last 7 days   Lab Units 04/15/25  0319 04/14/25  0347 04/13/25  0344 04/12/25  0732   SODIUM mmol/L 128* 125* 130* 131*   POTASSIUM mmol/L 4.9 4.8 4.2 4.6   CHLORIDE mmol/L 94* 93* 94* 95*   CO2 mmol/L 21.9* 17.0* 23.7 23.0   BUN mg/dL 52* 40* 28* 47*   CREATININE mg/dL 7.23* 6.59* 5.17* 7.82*   GLUCOSE mg/dL 87 90 73 86   EGFR mL/min/1.73 6.3* 7.1* 9.5* 5.8*     Results from last 7 days   Lab Units 04/15/25  0319 04/14/25  0347 04/13/25  0344 04/12/25  0732   ALBUMIN g/dL 2.8* 2.6* 2.7* 3.0*     Results from last 7 days   Lab Units 04/15/25  0319 04/14/25  0347 04/13/25  0344 04/12/25  0732   CALCIUM mg/dL 8.5* 8.3* 8.2* 8.3*   ALBUMIN g/dL 2.8* 2.6* 2.7* 3.0*   PHOSPHORUS mg/dL 4.6* 3.8 3.5 5.0*               Invalid input(s): \"LDLCALC\"  Results from last 7 days   Lab Units 04/11/25  1905 04/11/25  1851 04/09/25  1049   BLOODCX  No growth at 3 days No growth at 3 days  --    MRSAPCR   --   --  No MRSA Detected         Test Results Pending at Discharge     Pending Results       None              Discharge Details        Discharge Medications        New Medications        Instructions Start Date   aspirin 81 MG EC tablet   81 mg, Oral, Daily      ertapenem in sodium chloride 0.9 % 50 mL IVPB   Indications: Bacteria in the Blood      insulin glargine 100 UNIT/ML " injection  Commonly known as: LANTUS, SEMGLEE   10 Units, Subcutaneous, Nightly      insulin lispro 100 UNIT/ML injection  Commonly known as: HUMALOG/ADMELOG   2-7 Units, Subcutaneous, 4 Times Daily Before Meals & Nightly      Lidocaine 4 %   1 patch, Transdermal, Every 24 Hours Scheduled, Remove & Discard patch within 12 hours or as directed by MD      sevelamer 800 MG tablet  Commonly known as: RENVELA   3,200 mg, Oral, 3 Times Daily With Meals             Changes to Medications        Instructions Start Date   albuterol sulfate  (90 Base) MCG/ACT inhaler  Commonly known as: PROVENTIL HFA;VENTOLIN HFA;PROAIR HFA  What changed: See the new instructions.   INHALE 2 PUFFS BY MOUTH EVERY 6 HOURS AS NEEDED FOR WHEEZING FOR SHORTNESS OF BREATH      gabapentin 300 MG capsule  Commonly known as: NEURONTIN  What changed:   medication strength  how much to take  when to take this  reasons to take this   300 mg, Oral, Nightly PRN      midodrine 5 MG tablet  Commonly known as: PROAMATINE  What changed:   medication strength  how much to take  when to take this  reasons to take this  additional instructions   5 mg, Oral, Daily PRN             Continue These Medications        Instructions Start Date   cyanocobalamin 1000 MCG tablet  Commonly known as: VITAMIN B-12   1,000 mcg, Oral, Daily      cyclobenzaprine 10 MG tablet  Commonly known as: FLEXERIL   10 mg, Oral, Nightly PRN      fluticasone 50 MCG/ACT nasal spray  Commonly known as: FLONASE   2 sprays, Each Nare, Daily      simvastatin 20 MG tablet  Commonly known as: ZOCOR   20 mg, Oral, Daily      vitamin D 1.25 MG (55297 UT) capsule capsule  Commonly known as: ERGOCALCIFEROL   50,000 Units, 2 Times Weekly             Stop These Medications      benzonatate 100 MG capsule  Commonly known as: TESSALON     carvedilol 25 MG tablet  Commonly known as: COREG     lanthanum 1000 MG chewable tablet  Commonly known as: FOSRENOL     Magnesium Glycinate 120 MG capsule      torsemide 100 MG tablet  Commonly known as: DEMADEX              Allergies   Allergen Reactions    Tomato Swelling     THROAT    Mixed Grasses Itching     WATERY EYES    Penicillin G Unknown - High Severity    Cat Dander Itching     WATERY EYES    Latex Itching       Discharge Disposition:  Rehab Facility or Unit (DC - External)      Discharge Diet:  Diet Order   Procedures    Diet: Renal; Low Sodium (2-3g), Low Potassium, Low Phosphorus; Fluid Consistency: Thin (IDDSI 0)       Discharge Activity:       CODE STATUS:    Code Status and Medical Interventions: CPR (Attempt to Resuscitate); Full Support   Ordered at: 04/02/25 1306     Code Status (Patient has no pulse and is not breathing):    CPR (Attempt to Resuscitate)     Medical Interventions (Patient has pulse or is breathing):    Full Support       Future Appointments   Date Time Provider Department Center   4/18/2025 11:00 AM Kim Benjamin APRN [id number]      Follow-up Information       Kim Benjamin APRN Follow up in 1 month(s).    Specialties: Nurse Practitioner, Family Medicine  Why: gonig to acute rehab first  Contact information:  [address] [address] [address] [phone]                             Time Spent on Discharge:  Greater than 30 minutes      Mikael Leigh MD  [facility]  04/15/25  12:05 EDT

## 2025-04-15 NOTE — NURSING NOTE
Called report early this shift to [facility] before canceling discharge. Attempted to call twice for update on discharge status, RN unavailable at that time. Pt doing well this shift, does cont to c/o pain from where compressions were performed on 04/04. Pain medication admin. Went to dialysis. Denies any further needs at this time. Call light within reach.

## 2025-04-15 NOTE — PROGRESS NOTES
RENAL/KCC:     LOS: 13 days    Patient Care Team:  Kim Benjamin APRN as PCP - General (Nurse Practitioner)  Miguel Stahl MD as Consulting Physician (Hematology and Oncology)  Radha Martinez APRN as Referring Physician (Nurse Practitioner)    Chief Complaint:  ESRD, Abdominal pain    Subjective     Interval History:   4/7: Chart reviewed  S/P cysto and L ureteral stent placement on 4/2/25  Extubated on Saturday  Feeling well, denies new complaints    4/8: Patient seen and examined on hemodialysis, she was seen about 30 minutes into her treatment, tolerating well so far, BP in the 140s, set for 2 L UF  She denies any shortness of breath chest pain or edema    4/9: She completed dialysis yesterday without event, 2 L of fluid was removed blood pressure was stable throughout her treatment  She declined to take midodrine with dialysis yesterday but looks like her blood pressure stable throughout the whole treatment  Another rapid response was called yesterday evening for respiratory arrest without PEA she was transferred to the ICU and is currently stable, somewhat hypertensive and O2 sats 100% on 2 L without any respiratory distress    April 10: Patient seen and examined in the ICU, on dialysis, tolerating well  She has been hypertensive throughout her treatment and has not needed Midodrine  Remains on oxygen, 1 to 2 L denies any worsening shortness of breath, no edema  Scheduled for 2 L UF but she refused that, goal is 1 L which looks like has been met    April 11: She tolerated dialysis very well yesterday  Denies new complaints, BP stable overnight  Sats 100% on 1 L  No dyspnea no edema no chest pain    April 13:   feels better, still with chest tenderness that is worse with movement due to cpr   Bp near goal     April 14   Tolerated UF yesterday, right breat pain today     4/15: She looks and feels well denies complaints getting ready to head down to dialysis      Objective     Vital Sign Min/Max for  "last 24 hours  Temp  Min: 97.5 °F (36.4 °C)  Max: 98.2 °F (36.8 °C)   BP  Min: 100/88  Max: 147/71   Pulse  Min: 71  Max: 78   Resp  Min: 18  Max: 18   SpO2  Min: 94 %  Max: 100 %   Flow (L/min) (Oxygen Therapy)  Min: 2  Max: 2   No data recorded     Flowsheet Rows      Flowsheet Row First Filed Value   Admission Height 175.3 cm (69\") Documented at 04/02/2025 1049   Admission Weight 110 kg (243 lb) Documented at 04/02/2025 1049            No intake/output data recorded.  I/O last 3 completed shifts:  In: 446 [P.O.:446]  Out: 50 [Urine:50]    Physical Exam:  GEN: Awake, alert, responsive, no acute distress, obese  ENT: PERRL, EOMI, MMM.  NECK: Supple, no JVD. RIJ tunneled HD catheter remains in place  CHEST: CTAB, no W/R/C.  CV: RRR, no M/G/R  ABD: Soft, NT, +BS  SKIN: Warm and Dry  NEURO: AAOX3   RUE AVF +thrill and bruit  +edema     WBC WBC   Date Value Ref Range Status   04/15/2025 13.32 (H) 3.40 - 10.80 10*3/mm3 Final   04/14/2025 13.43 (H) 3.40 - 10.80 10*3/mm3 Final   04/13/2025 15.73 (H) 3.40 - 10.80 10*3/mm3 Final      HGB Hemoglobin   Date Value Ref Range Status   04/15/2025 8.2 (L) 12.0 - 15.9 g/dL Final   04/14/2025 8.9 (L) 12.0 - 15.9 g/dL Final   04/13/2025 8.3 (L) 12.0 - 15.9 g/dL Final      HCT Hematocrit   Date Value Ref Range Status   04/15/2025 25.4 (L) 34.0 - 46.6 % Final   04/14/2025 28.8 (L) 34.0 - 46.6 % Final   04/13/2025 26.3 (L) 34.0 - 46.6 % Final      Platlets No results found for: \"LABPLAT\"   MCV MCV   Date Value Ref Range Status   04/15/2025 96.6 79.0 - 97.0 fL Final   04/14/2025 99.3 (H) 79.0 - 97.0 fL Final   04/13/2025 99.2 (H) 79.0 - 97.0 fL Final          Sodium Sodium   Date Value Ref Range Status   04/15/2025 128 (L) 136 - 145 mmol/L Final   04/14/2025 125 (L) 136 - 145 mmol/L Final   04/13/2025 130 (L) 136 - 145 mmol/L Final      Potassium Potassium   Date Value Ref Range Status   04/15/2025 4.9 3.5 - 5.2 mmol/L Final   04/14/2025 4.8 3.5 - 5.2 mmol/L Final     Comment:     Slight " "hemolysis detected by analyzer. Result may be falsely elevated.   04/13/2025 4.2 3.5 - 5.2 mmol/L Final      Chloride Chloride   Date Value Ref Range Status   04/15/2025 94 (L) 98 - 107 mmol/L Final   04/14/2025 93 (L) 98 - 107 mmol/L Final   04/13/2025 94 (L) 98 - 107 mmol/L Final      CO2 CO2   Date Value Ref Range Status   04/15/2025 21.9 (L) 22.0 - 29.0 mmol/L Final   04/14/2025 17.0 (L) 22.0 - 29.0 mmol/L Final   04/13/2025 23.7 22.0 - 29.0 mmol/L Final      BUN BUN   Date Value Ref Range Status   04/15/2025 52 (H) 6 - 20 mg/dL Final   04/14/2025 40 (H) 6 - 20 mg/dL Final   04/13/2025 28 (H) 6 - 20 mg/dL Final      Creatinine Creatinine   Date Value Ref Range Status   04/15/2025 7.23 (H) 0.57 - 1.00 mg/dL Final   04/14/2025 6.59 (H) 0.57 - 1.00 mg/dL Final   04/13/2025 5.17 (H) 0.57 - 1.00 mg/dL Final      Calcium Calcium   Date Value Ref Range Status   04/15/2025 8.5 (L) 8.6 - 10.5 mg/dL Final   04/14/2025 8.3 (L) 8.6 - 10.5 mg/dL Final   04/13/2025 8.2 (L) 8.6 - 10.5 mg/dL Final      PO4 No results found for: \"CAPO4\"   Albumin Albumin   Date Value Ref Range Status   04/15/2025 2.8 (L) 3.5 - 5.2 g/dL Final   04/14/2025 2.6 (L) 3.5 - 5.2 g/dL Final   04/13/2025 2.7 (L) 3.5 - 5.2 g/dL Final        Magnesium No results found for: \"MG\"       Uric Acid No results found for: \"URICACID\"        Results Review:     I reviewed the patient's new clinical results.    albumin human, 12.5 g, Intravenous, Once  aspirin, 81 mg, Oral, Daily  atorvastatin, 40 mg, Oral, Nightly  epoetin kindra/kindra-epbx, 10,000 Units, Intravenous, Once per day on Tuesday Thursday Saturday  ertapenem, 500 mg, Intravenous, Q24H  fluticasone, 2 spray, Each Nare, Daily  heparin (porcine), 5,000 Units, Subcutaneous, Q8H  insulin glargine, 13 Units, Subcutaneous, Nightly  insulin lispro, 2-7 Units, Subcutaneous, 4x Daily AC & at Bedtime  Lidocaine, 1 patch, Transdermal, Q24H  polyethylene glycol, 17 g, Oral, Daily  sevelamer, 3,200 mg, Oral, TID With " Meals  sodium chloride, 10 mL, Intravenous, Q12H  sodium chloride, 10 mL, Intravenous, Q12H  sodium chloride, 10 mL, Intravenous, Q12H  sodium chloride, 10 mL, Intravenous, Q12H  sodium chloride, 10 mL, Intravenous, Q12H  cyanocobalamin, 1,000 mcg, Oral, Daily  vitamin D, 50,000 Units, Oral, Once per day on Monday Thursday             Medication Review: Reviewed    Assessment & Plan     1) ESRD - on TTS HD, completed treatment Saturday   2) Abdominal pain, improved  3) L hydronephrosis with emphysematous pyelonephritis, status post cystoscopy/stent on 4/2  4) DM  5) Hypotension -stabilizing off midodrine  6) Metabolic acidosis  7) Anemia of CKD  8) ESBL UTI/Pyelo  9) Bacteremia  10) s/p cardiac arrest: bradycardia with subsequent PEA.  Another episode of rapid response with respiratory arrest, currently stable, possibly related to narcotics      Plan:   S/p UF treatment on Sunday  Plan HD today  Her dialysis schedule at [facility] will be Monday Wednesday Friday and she will be fine waiting until Friday for her next dialysis treatment as long as she stays on a renal diet and limits her fluids  Blood pressure stabilizing  Plan to resume b-blocker if she tolerates UF on HD today  Electrolytes and volume at goal today, oxygenation adequate    Tunnel catheter in place for access, okay to use AV fistula as well: Hx of infiltration   Epogen with HD for anemia  On Po4 binder with meals   Renally dose antibiotics for GFR less than 10  Stable for transfer to rehab at [facility] from nephrology standpoint at any time      Tyler Roman MD  [facility]  04/15/25  11:51 EDT

## 2025-04-15 NOTE — PROGRESS NOTES
Discharge apparently being held because facility now states they will not take her until her urologic stent is removed which had not been mentioned at any point until now when she is ready to go.  Will plan discharge once this is complete.

## 2025-04-15 NOTE — CASE MANAGEMENT/SOCIAL WORK
Continued Stay Note  [facility]     Patient Name: Kelly Pack  MRN: [id number]  Today's Date: 4/15/2025    Admit Date: 4/2/2025    Plan: [facility] IRF bed Wednesday   Discharge Plan       Row Name 04/15/25 1254       Plan    Plan [facility] IRF bed Wednesday    Patient/Family in Agreement with Plan yes    Plan Comments Call from Radha at [facility] she reports she will need to know how to bridge pt's HD as they only have MWF beds available and pt is a TTS schedule currently. Messaged Dr. Roman, he reports pt can wait until Friday for HD. Radha reports she saw where pt will need stent removed two weeks from 4/2 which is tomorrow. She is requesting pt stay had have stent removed tomorrow by Dr. Gray before discharging to [facility], RN messaged Dr. Gray he can remove stent tomorrow and then pt can d/c to [facility]. Messaged Dr. Leigh who will cancel d/c.                   Discharge Codes    No documentation.                 Expected Discharge Date and Time       Expected Discharge Date Expected Discharge Time    Apr 15, 2025               STEVIE Weir

## 2025-04-15 NOTE — NURSING NOTE
Dialysis complete, removed 3 liters with this treatment and no complications noted, pre and post vitals are in epic.  Dressing to dialysis catheter is current. Report given to Hiral Storey rn.

## 2025-04-16 VITALS
WEIGHT: 258.9 LBS | TEMPERATURE: 97.7 F | SYSTOLIC BLOOD PRESSURE: 136 MMHG | BODY MASS INDEX: 38.34 KG/M2 | HEIGHT: 69 IN | HEART RATE: 78 BPM | DIASTOLIC BLOOD PRESSURE: 64 MMHG | RESPIRATION RATE: 18 BRPM | OXYGEN SATURATION: 100 %

## 2025-04-16 LAB
ALBUMIN SERPL-MCNC: 2.8 G/DL (ref 3.5–5.2)
ANION GAP SERPL CALCULATED.3IONS-SCNC: 10.1 MMOL/L (ref 5–15)
BACTERIA SPEC AEROBE CULT: NORMAL
BACTERIA SPEC AEROBE CULT: NORMAL
BASOPHILS # BLD AUTO: 0.15 10*3/MM3 (ref 0–0.2)
BASOPHILS NFR BLD AUTO: 1.3 % (ref 0–1.5)
BUN SERPL-MCNC: 33 MG/DL (ref 6–20)
BUN/CREAT SERPL: 5.7 (ref 7–25)
CALCIUM SPEC-SCNC: 8.4 MG/DL (ref 8.6–10.5)
CHLORIDE SERPL-SCNC: 98 MMOL/L (ref 98–107)
CO2 SERPL-SCNC: 21.9 MMOL/L (ref 22–29)
CREAT SERPL-MCNC: 5.74 MG/DL (ref 0.57–1)
DEPRECATED RDW RBC AUTO: 49.4 FL (ref 37–54)
EGFRCR SERPLBLD CKD-EPI 2021: 8.3 ML/MIN/1.73
EOSINOPHIL # BLD AUTO: 0.42 10*3/MM3 (ref 0–0.4)
EOSINOPHIL NFR BLD AUTO: 3.7 % (ref 0.3–6.2)
ERYTHROCYTE [DISTWIDTH] IN BLOOD BY AUTOMATED COUNT: 13.8 % (ref 12.3–15.4)
GLUCOSE BLDC GLUCOMTR-MCNC: 75 MG/DL (ref 70–130)
GLUCOSE BLDC GLUCOMTR-MCNC: 88 MG/DL (ref 70–130)
GLUCOSE SERPL-MCNC: 99 MG/DL (ref 65–99)
HCT VFR BLD AUTO: 27.1 % (ref 34–46.6)
HGB BLD-MCNC: 8.6 G/DL (ref 12–15.9)
IMM GRANULOCYTES # BLD AUTO: 0.17 10*3/MM3 (ref 0–0.05)
IMM GRANULOCYTES NFR BLD AUTO: 1.5 % (ref 0–0.5)
LYMPHOCYTES # BLD AUTO: 1.57 10*3/MM3 (ref 0.7–3.1)
LYMPHOCYTES NFR BLD AUTO: 13.9 % (ref 19.6–45.3)
MCH RBC QN AUTO: 31.5 PG (ref 26.6–33)
MCHC RBC AUTO-ENTMCNC: 31.7 G/DL (ref 31.5–35.7)
MCV RBC AUTO: 99.3 FL (ref 79–97)
MONOCYTES # BLD AUTO: 1.28 10*3/MM3 (ref 0.1–0.9)
MONOCYTES NFR BLD AUTO: 11.3 % (ref 5–12)
NEUTROPHILS NFR BLD AUTO: 68.3 % (ref 42.7–76)
NEUTROPHILS NFR BLD AUTO: 7.71 10*3/MM3 (ref 1.7–7)
NRBC BLD AUTO-RTO: 0 /100 WBC (ref 0–0.2)
PHOSPHATE SERPL-MCNC: 4.4 MG/DL (ref 2.5–4.5)
PLATELET # BLD AUTO: 442 10*3/MM3 (ref 140–450)
PMV BLD AUTO: 8.8 FL (ref 6–12)
POTASSIUM SERPL-SCNC: 4.3 MMOL/L (ref 3.5–5.2)
RBC # BLD AUTO: 2.73 10*6/MM3 (ref 3.77–5.28)
SODIUM SERPL-SCNC: 130 MMOL/L (ref 136–145)
WBC NRBC COR # BLD AUTO: 11.3 10*3/MM3 (ref 3.4–10.8)

## 2025-04-16 PROCEDURE — 80069 RENAL FUNCTION PANEL: CPT | Performed by: INTERNAL MEDICINE

## 2025-04-16 PROCEDURE — 25010000002 ERTAPENEM PER 500 MG: Performed by: INTERNAL MEDICINE

## 2025-04-16 PROCEDURE — 85025 COMPLETE CBC W/AUTO DIFF WBC: CPT | Performed by: INTERNAL MEDICINE

## 2025-04-16 PROCEDURE — 82948 REAGENT STRIP/BLOOD GLUCOSE: CPT

## 2025-04-16 PROCEDURE — 25010000002 HEPARIN (PORCINE) PER 1000 UNITS: Performed by: INTERNAL MEDICINE

## 2025-04-16 RX ADMIN — Medication 10 ML: at 09:37

## 2025-04-16 RX ADMIN — ERTAPENEM SODIUM 500 MG: 1 INJECTION, POWDER, LYOPHILIZED, FOR SOLUTION INTRAMUSCULAR; INTRAVENOUS at 13:25

## 2025-04-16 RX ADMIN — SEVELAMER CARBONATE 3200 MG: 800 TABLET, FILM COATED ORAL at 12:43

## 2025-04-16 RX ADMIN — HEPARIN SODIUM 5000 UNITS: 5000 INJECTION INTRAVENOUS; SUBCUTANEOUS at 06:24

## 2025-04-16 RX ADMIN — ASPIRIN 81 MG: 81 TABLET, COATED ORAL at 09:25

## 2025-04-16 RX ADMIN — HEPARIN SODIUM 5000 UNITS: 5000 INJECTION INTRAVENOUS; SUBCUTANEOUS at 13:25

## 2025-04-16 RX ADMIN — Medication 1000 MCG: at 09:25

## 2025-04-16 RX ADMIN — LIDOCAINE 1 PATCH: 4 PATCH TOPICAL at 09:25

## 2025-04-16 RX ADMIN — ACETAMINOPHEN 650 MG: 325 TABLET, FILM COATED ORAL at 09:28

## 2025-04-16 RX ADMIN — FLUTICASONE PROPIONATE 2 SPRAY: 50 SPRAY, METERED NASAL at 09:25

## 2025-04-16 NOTE — PROGRESS NOTES
"  First Urology Progress Note    Chief Complaint: No complaints    Seen on the floor.  She had dialysis today.  Plan was initially let her go to [facility] rehab but she still has her stent in and they did not want to except her until the stent was removed.  She was actually not due to get her stent out till next week.  You are concerned about any air transport to the office setting.  She has no flank pain.    Review of Systems:    The following systems were reviewed and negative;  respiratory, cardiovascular, and gastrointestinal          Vital Signs  /74 (BP Location: Left leg, Patient Position: Lying)   Pulse 79   Temp 98.2 °F (36.8 °C) (Oral)   Resp 18   Ht 175.3 cm (69.02\")   Wt 117 kg (258 lb 14.4 oz)   SpO2 100%   BMI 38.22 kg/m²     Physical Exam:     General Appearance:    Alert, cooperative, NAD   HEENT:    No trauma, pupils reactive, hearing intact   Back:     No CVA tenderness   Lungs:     Respirations unlabored, no wheezing    Heart:    RRR, intact peripheral pulses   Abdomen:   Soft and benign   :  Sternal genitalia   Extremities:   No edema, no deformity   Lymphatic:   No neck or groin LAD   Skin:   No bleeding, bruising or rashes   Neuro/Psych:   Orientation intact, mood/affect pleasant, no focal findings        Results Review:     I reviewed the patient's new clinical results.  Lab Results (last 24 hours)       Procedure Component Value Units Date/Time    Blood Culture - Blood, Arm, Left [[id number]]  (Normal) Collected: 04/11/25 1851    Specimen: Blood from Arm, Left Updated: 04/15/25 1915     Blood Culture No growth at 4 days    Blood Culture - Blood, Hand, Left [[id number]]  (Normal) Collected: 04/11/25 1905    Specimen: Blood from Hand, Left Updated: 04/15/25 1915     Blood Culture No growth at 4 days    POC Glucose Once [[id number]]  (Normal) Collected: 04/15/25 1648    Specimen: Blood Updated: 04/15/25 1650     Glucose 122 mg/dL     POC Glucose Once [[id number]]  (Normal) Collected: " 04/15/25 0752    Specimen: Blood Updated: 04/15/25 0753     Glucose 86 mg/dL     Renal Function Panel [[id number]]  (Abnormal) Collected: 04/15/25 0319    Specimen: Blood Updated: 04/15/25 0412     Glucose 87 mg/dL      BUN 52 mg/dL      Creatinine 7.23 mg/dL      Sodium 128 mmol/L      Potassium 4.9 mmol/L      Chloride 94 mmol/L      CO2 21.9 mmol/L      Calcium 8.5 mg/dL      Albumin 2.8 g/dL      Phosphorus 4.6 mg/dL      Anion Gap 12.1 mmol/L      BUN/Creatinine Ratio 7.2     eGFR 6.3 mL/min/1.73     Narrative:      GFR Categories in Chronic Kidney Disease (CKD)      GFR Category          GFR (mL/min/1.73)    Interpretation  G1                     90 or greater         Normal or high (1)  G2                      60-89                Mild decrease (1)  G3a                   45-59                Mild to moderate decrease  G3b                   30-44                Moderate to severe decrease  G4                    15-29                Severe decrease  G5                    14 or less           Kidney failure          (1)In the absence of evidence of kidney disease, neither GFR category G1 or G2 fulfill the criteria for CKD.    eGFR calculation 2021 CKD-EPI creatinine equation, which does not include race as a factor    CBC & Differential [[id number]]  (Abnormal) Collected: 04/15/25 0319    Specimen: Blood Updated: 04/15/25 0351    Narrative:      The following orders were created for panel order CBC & Differential.  Procedure                               Abnormality         Status                     ---------                               -----------         ------                     CBC Auto Differential[[id number]]        Abnormal            Final result                 Please view results for these tests on the individual orders.    CBC Auto Differential [[id number]]  (Abnormal) Collected: 04/15/25 0319    Specimen: Blood Updated: 04/15/25 0351     WBC 13.32 10*3/mm3      RBC 2.63 10*6/mm3      Hemoglobin 8.2  g/dL      Hematocrit 25.4 %      MCV 96.6 fL      MCH 31.2 pg      MCHC 32.3 g/dL      RDW 13.8 %      RDW-SD 48.0 fl      MPV 8.9 fL      Platelets 415 10*3/mm3      Neutrophil % 72.9 %      Lymphocyte % 11.2 %      Monocyte % 9.3 %      Eosinophil % 3.5 %      Basophil % 0.8 %      Immature Grans % 2.3 %      Neutrophils, Absolute 9.71 10*3/mm3      Lymphocytes, Absolute 1.49 10*3/mm3      Monocytes, Absolute 1.24 10*3/mm3      Eosinophils, Absolute 0.47 10*3/mm3      Basophils, Absolute 0.11 10*3/mm3      Immature Grans, Absolute 0.30 10*3/mm3      nRBC 0.0 /100 WBC           Imaging Results (Last 24 Hours)       ** No results found for the last 24 hours. **            Medication Review:   I have personally reviewed    Current Facility-Administered Medications:     acetaminophen (TYLENOL) tablet 650 mg, 650 mg, Oral, Q4H PRN, 650 mg at 04/15/25 1802 **OR** acetaminophen (TYLENOL) 160 MG/5ML oral solution 650 mg, 650 mg, Oral, Q4H PRN, 650 mg at 04/08/25 1821 **OR** acetaminophen (TYLENOL) suppository 650 mg, 650 mg, Rectal, Q4H PRN, Favio Torres MD    albumin human 25 % IV SOLN 12.5 g, 12.5 g, Intravenous, Once, Favio Torres MD    albuterol (ACCUNEB) nebulizer solution 0.63 mg, 0.63 mg, Nebulization, Q6H PRN, Favio Torres MD    aspirin EC tablet 81 mg, 81 mg, Oral, Daily, Favio Torres MD, 81 mg at 04/15/25 1316    atorvastatin (LIPITOR) tablet 40 mg, 40 mg, Oral, Nightly, Favio Torres MD, 40 mg at 04/14/25 2110    sennosides-docusate (PERICOLACE) 8.6-50 MG per tablet 2 tablet, 2 tablet, Oral, BID PRN **AND** polyethylene glycol (MIRALAX) packet 17 g, 17 g, Oral, Daily, 17 g at 04/13/25 1234 **AND** bisacodyl (DULCOLAX) EC tablet 5 mg, 5 mg, Oral, Daily PRN **AND** bisacodyl (DULCOLAX) suppository 10 mg, 10 mg, Rectal, Daily PRN, Kevin Velasquez MD    cyclobenzaprine (FLEXERIL) tablet 10 mg, 10 mg, Oral, Nightly PRN, Favio Torres MD, 10 mg at 04/09/25 8748    dextrose (D50W) (25 g/50 mL) IV  injection 25 g, 25 g, Intravenous, Q15 Min PRN, Favio Torres MD    dextrose (GLUTOSE) oral gel 15 g, 15 g, Oral, Q15 Min PRN, Favio Torres MD    epoetin kindra-epbx (RETACRIT) injection 10,000 Units, 10,000 Units, Intravenous, Once per day on Tuesday Thursday Saturday, Favio Torres MD, 10,000 Units at 04/15/25 0942    ertapenem (INVanz) 500 mg in sodium chloride 0.9 % 50 mL IVPB, 500 mg, Intravenous, Q24H, Favio Torres MD, Last Rate: 100 mL/hr at 04/15/25 0025, 500 mg at 04/15/25 0025    fluticasone (FLONASE) 50 MCG/ACT nasal spray 2 spray, 2 spray, Each Nare, Daily, Favio Torres MD, 2 spray at 04/15/25 1319    gabapentin (NEURONTIN) capsule 300 mg, 300 mg, Oral, Nightly PRN, Favio Torres MD, 300 mg at 04/03/25 2131    glucagon (GLUCAGEN) injection 1 mg, 1 mg, Intramuscular, Q15 Min PRN, Favio Torres MD    heparin (porcine) 5000 UNIT/ML injection 5,000 Units, 5,000 Units, Subcutaneous, Q8H, Favio Torres MD, 5,000 Units at 04/15/25 1316    heparin (porcine) injection 4,000 Units, 4,000 Units, Intracatheter, PRN, Favio Torres MD, 3,200 Units at 04/13/25 1656    heparin (porcine) injection 4,000 Units, 4,000 Units, Intracatheter, PRN, Favio Torres MD, 4,000 Units at 04/10/25 1456    heparin (porcine) injection 4,000 Units, 4,000 Units, Intracatheter, PRN, Tyler Roman MD, 4,000 Units at 04/15/25 0942    insulin glargine (LANTUS, SEMGLEE) injection 13 Units, 13 Units, Subcutaneous, Nightly, Favio Torres MD, 13 Units at 04/14/25 2110    insulin lispro (HUMALOG/ADMELOG) injection 2-7 Units, 2-7 Units, Subcutaneous, 4x Daily AC & at Bedtime, Favio Torres MD, 2 Units at 04/14/25 2110    Lidocaine 4 % 1 patch, 1 patch, Transdermal, Q24H, Favio Torres MD, 1 patch at 04/15/25 1315    melatonin tablet 5 mg, 5 mg, Oral, Nightly PRN, Favio Torres MD, 5 mg at 04/03/25 0111    midodrine (PROAMATINE) tablet 5 mg, 5 mg, Oral, Daily PRN, Mikael Leigh MD     nitroglycerin (NITROSTAT) SL tablet 0.4 mg, 0.4 mg, Sublingual, Q5 Min PRN, Favio Torres MD    ondansetron ODT (ZOFRAN-ODT) disintegrating tablet 4 mg, 4 mg, Oral, Q6H PRN **OR** ondansetron (ZOFRAN) injection 4 mg, 4 mg, Intravenous, Q6H PRN, Favio Torres MD, 4 mg at 04/06/25 2005    sevelamer (RENVELA) tablet 3,200 mg, 3,200 mg, Oral, TID With Meals, Tyler Roman MD, 3,200 mg at 04/15/25 1800    sodium chloride 0.9 % flush 10 mL, 10 mL, Intravenous, Q12H, Favio Torres MD, 10 mL at 04/15/25 1319    sodium chloride 0.9 % flush 10 mL, 10 mL, Intravenous, PRN, Favio Torres MD    sodium chloride 0.9 % flush 10 mL, 10 mL, Intravenous, Q12H, Favio Torres MD, 10 mL at 04/15/25 1319    sodium chloride 0.9 % flush 10 mL, 10 mL, Intravenous, Q12H, Favio Torres MD, 10 mL at 04/15/25 1319    sodium chloride 0.9 % flush 10 mL, 10 mL, Intravenous, PRN, Favio Torres MD    sodium chloride 0.9 % flush 10 mL, 10 mL, Intravenous, Q12H, Favio Torres MD, 10 mL at 04/15/25 1319    sodium chloride 0.9 % flush 10 mL, 10 mL, Intravenous, Q12H, Favio Torres MD, 10 mL at 04/15/25 1320    sodium chloride 0.9 % flush 10 mL, 10 mL, Intravenous, PRNMelissa Ervin H., MD    sodium chloride 0.9 % flush 20 mL, 20 mL, Intravenous, PRN, Favio Torres MD    sodium chloride 0.9 % flush 20 mL, 20 mL, Intravenous, PRNMelissa Ervin H., MD    sodium chloride 0.9 % infusion 40 mL, 40 mL, Intravenous, PRN, Favio Torres MD    sodium chloride 0.9 % infusion 40 mL, 40 mL, Intravenous, PRN, Favio Torres MD    sodium chloride 0.9 % infusion 40 mL, 40 mL, Intravenous, PRN, Favio Torres MD    vitamin B-12 (CYANOCOBALAMIN) tablet 1,000 mcg, 1,000 mcg, Oral, Daily, Favio Torres MD, 1,000 mcg at 04/15/25 1315    vitamin D (ERGOCALCIFEROL) capsule 50,000 Units, 50,000 Units, Oral, Once per day on Monday Thursday, Favio Torres MD, 50,000 Units at 04/14/25 0811    Allergies:    Tomato, Mixed grasses,  Penicillin g, Cat dander, and Latex    Assessment:    Active Problems:    Hyperlipidemia    Asthma    Essential hypertension    Uncontrolled type 2 diabetes mellitus with hyperglycemia    ESRD (end stage renal disease)    Hydronephrosis of left kidney    NSTEMI (non-ST elevated myocardial infarction)    Class 2 severe obesity with serious comorbidity in adult    ESBL (extended spectrum beta-lactamase) producing bacteria infection       Pyelonephritis resolving with stent    Plan:    Plan to remove her stent out tomorrow under local anesthesia at the bedside.  Hopefully she can be discharged to [facility] tomorrow afternoon.      Tl Gray MD    4/15/2025  20:37 EDT

## 2025-04-16 NOTE — PROGRESS NOTES
First Urology Procedure Note    04/16/25 12:15pm        The patient was kept in the hospital bed and frog-legged.  She was prepped and draped in a sterile fashion.  The flexible cystoscope was inserted.  The stent was visualized and grasping forceps were used to remove the stent in its entirety.

## 2025-04-16 NOTE — CASE MANAGEMENT/SOCIAL WORK
Case Management Discharge Note      Final Note: pt dc'd to acute rehab at [facility]         Selected Continued Care - Discharged on 4/16/2025 Admission date: 4/2/2025 - Discharge disposition: Rehab Facility or Unit (DC - External)      Destination    No services have been selected for the patient.                Durable Medical Equipment    No services have been selected for the patient.                Dialysis/Infusion    No services have been selected for the patient.                Home Medical Care    No services have been selected for the patient.                Therapy    No services have been selected for the patient.                Community Resources    No services have been selected for the patient.                Community & DME    No services have been selected for the patient.                    Selected Continued Care - Episodes Includes continued care and service providers with selected services from the active episodes listed below          Transportation Services  W/C Van: [facility]    Final Discharge Disposition Code: 62 - inpatient rehab facility

## 2025-04-16 NOTE — PLAN OF CARE
Goal Outcome Evaluation:      VSS, Tylenol given for pain. Pt appeared to sleep well between care. 2LNC placed for low O2 while sleeping. IV antibiotics given. Pt NPO at [place] for stent removal this morning. Pt hopeful for D/C today. Will CTM, safety maintained.

## 2025-04-16 NOTE — DISCHARGE SUMMARY
Patient Name: Kelly Pack  : 1970  MRN: [id number]    Date of Admission: 2025  Date of Discharge:  2025  Primary Care Physician: Kim Benjamin APRN      Chief Complaint:   Abdominal Pain      Discharge Diagnoses     Active Hospital Problems    Diagnosis  POA   • ESBL (extended spectrum beta-lactamase) producing bacteria infection [A49.9, Z16.12]  Yes   • Class 2 severe obesity with serious comorbidity in adult [E66.812, E66.01]  Yes   • NSTEMI (non-ST elevated myocardial infarction) [I21.4]  Yes   • Hydronephrosis of left kidney [N13.30]  Yes   • ESRD (end stage renal disease) [N18.6]  Yes   • Uncontrolled type 2 diabetes mellitus with hyperglycemia [E11.65]  Yes   • Essential hypertension [I10]  Yes   • Asthma [J45.909]  Yes   • Hyperlipidemia [E78.5]  Yes      Resolved Hospital Problems    Diagnosis Date Resolved POA   • **Sepsis [A41.9] 04/15/2025 Yes   • Emphysematous pyelonephritis of left kidney [N12] 04/15/2025 Yes        Hospital Course     Ms. Pack is a 54 y.o. female with a history of ESRD on HD, anemia and DM2 among other issues who presented to [facility] initially complaining of abdominal pain.  Please see the admitting history and physical for further details.  She was found to have emphysematous pyelitis and left sided hydronephrosis on CT.  Labs and vitals were consistent with sepsis though she was not hypotensive.  She was urgently taken for cytoscopy with stent insertion on 25.  Cultures of the urine and blood were positive for ESBL E. coli.  On 25, she experienced a PEA arrest during dialysis for which she was resuscitated quickly and intubated and transferred to the ICU. She improved quickly and was able to be extubated on 25 and she was transferred out of the ICU.  Troponins were elevated and EKG was consistent with ischemia so she underwent cardiac catheterization which only showed very mild non-obstructive CAD.  Ultimately she was felt  to have a type II MI.   On 4/8/25, she experienced another brief respiratory arrest.  This 1 apparently occurred after HD but resolved relatively quickly.  Etiology of this was uncertain, no evidence for arrhythmia per cardiology notes or any ischemic event.  CTA was negative for PE.  There was some thought it could relate to narcotics so these were discontinued.  She has done well since that time, tolerating several dialysis sessions without issue and so was transferred back to the floor.  She has almost finished the full treatment course of ertapenem with only 2 remaining doses to be given this evening and tomorrow night.  This can be given via peripheral IV at acute rehab facility per discussion with CCP.  All medications are to be continued as ordered below.    Addendum 4/16:  Discharge was held overnight as rehab facility requested that her ureteral stent be removed prior to her transfer to that facility.  This was not able to be arranged until today.  In the meantime she has had no adverse events overnight and remains stable for discharge to rehab.  Urology was to come in today and remove the ureteral stent at bedside under local anesthesia.  Also note that we were able to get the last dose of ertapenem a bit early and she will not be discharged with a peripheral IV at this time.      Day of Discharge     Subjective:  No events    Physical Exam:  Temp:  [97.7 °F (36.5 °C)-98.2 °F (36.8 °C)] 97.7 °F (36.5 °C)  Heart Rate:  [74-79] 78  Resp:  [18] 18  BP: (123-150)/(64-77) 136/64  Body mass index is 38.22 kg/m².  Physical Exam  Vitals and nursing note reviewed.     Physical exam attempted but patient was in the process of getting cleaned up and then left to the rehab before I was able to come back.    Consultants     Consult Orders (all) (From admission, onward)       Start     Ordered    04/09/25 0812  Inpatient Cardiology Consult  Once        Specialty:  Cardiology  Provider:  Damian gNuyen MD    04/09/25  0826    04/09/25 0823  Inpatient Infectious Diseases Consult  Once        Specialty:  Infectious Diseases  Provider:  Ruddy Mendoza DO    04/09/25 0822    04/04/25 1638  Inpatient Cardiology Consult  Once        Specialty:  Cardiology  Provider:  Daron Felder MD    04/04/25 1637    04/04/25 1502  Inpatient Pulmonology Consult  Once        Specialty:  Pulmonary Disease  Provider:  Anat Molina MD    04/04/25 1502    04/03/25 0736  Inpatient Infectious Diseases Consult  Once        Specialty:  Infectious Diseases  Provider:  Ruddy Mendoza DO    04/03/25 0735    04/03/25 0735  Inpatient Infectious Diseases Consult  Once,   Status:  Canceled        Specialty:  Infectious Diseases  Provider:  (Not yet assigned)    04/03/25 0735    04/02/25 1134  Urology (on-call MD unless specified)  Once        Specialty:  Urology  Provider:  (Not yet assigned)    04/02/25 1133    04/02/25 1103  [facility] (on-call MD unless specified) Details  Once        Specialty:  Hospitalist  Provider:  (Not yet assigned)    04/02/25 1102    04/02/25 0949  [facility] (on-call MD unless specified) Details  Once,   Status:  Canceled        Specialty:  Hospitalist  Provider:  (Not yet assigned)    04/02/25 0948    04/02/25 0932  Nephrology (on -call MD unless specified)  Once        Specialty:  Nephrology  Provider:  Maria De Jesus Roman MD    04/02/25 0931                  Procedures     4/2:  Cystoscopy with retrograde pyelogram and stent insertion    4/6:  Left Heart Cath, Coronary angiography    Imaging Results (All)       Procedure Component Value Units Date/Time    US Breast Right Limited [[id number]] Collected: 04/14/25 1526     Updated: 04/14/25 1608    Narrative:      LIMITED DIRECTED RIGHT BREAST ULTRASOUND     HISTORY: 54-year-old female with recent trauma to right breast during  transport from CT scan to stretcher. Pain.     Ultrasound evaluation of the area of concern concentrates to the lower  outer right breast as well as to  the upper inner quadrant. No  abnormality is seen. There is no evidence of hematoma or skin  thickening.     CONCLUSION: Negative directed right breast ultrasound. Continued  clinical correlation is recommended.     BI-RADS CATEGORY 1: Negative.     This report was finalized on 4/14/2025 4:04 PM by Dr. Anthony Palomares M.D  on Workstation: [id number]       CT Angiogram Chest [[id number]] Collected: 04/09/25 2131     Updated: 04/09/25 2140    Narrative:      CTA CHEST WITH IV CONTRAST     HISTORY: two pea/code blue, rule out pe; I21.4-Non-ST elevation (NSTEMI)  myocardial infarction; A41.9-Sepsis, unspecified organism;  C77-Tdzqjt-wtvfrfomdjzj nephritis, not specified as acute or chronic;  N18.6-End stage renal disease; Z99.2-Dependence on renal dialysis;  N13.30-Unspecified hydronephrosis; A41.9-Sepsis, unspecified organism     COMPARISON: None     TECHNIQUE: CT angiography was performed of the chest with axial images  as well as coronal and sagittal reformatted MIP images provided  following administration of IV contrast. 3-D surface rendered reformats  were obtained of the pulmonary arteries and aorta. Radiation dose  reduction techniques were utilized, including automated exposure  control, and exposure modulation based on body size.     FINDINGS:     There are bilateral faint groundglass densities throughout both lungs,  with areas of likely dependent atelectasis posteriorly on each side.  There is no effusion or pneumothorax.     Thoracic aorta is normal in caliber.  There are coronary atherosclerotic  vascular calcifications.  There is no suspicious mediastinal adenopathy  or other mass.     Images of the upper abdomen show no acute abnormality.  There is no  acute bony abnormality.     Bolus timing is good and there is no evidence of pulmonary embolism.       Impression:         Pulmonary arteries are well-opacified, and there is no evidence of  pulmonary embolism.     Faint but diffuse bilateral areas of  parenchymal groundglass opacity,  without consolidation, effusion or pneumothorax. FINDINGS may be related  to recent resuscitation.           This report was finalized on 4/9/2025 9:37 PM by Dr. Yung Santiago M.D  on Workstation: [id number]       XR Chest 1 View [[id number]] Collected: 04/08/25 2005     Updated: 04/08/25 2010    Narrative:      XR CHEST 1 VW-     Clinical: Hypoxemia     COMPARISON 4/4/2025     FINDINGS: Endotracheal tube removed. Double-lumen central venous  catheter in position as before. There is cardiac enlargement. Monitoring  leads and pads superimpose the chest. No effusion, edema or  consolidation demonstrated. Patchy area of infiltrate/atelectasis right  base. No pneumothorax seen.     CONCLUSION: Endotracheal tube removed. Cardiomegaly, central venous  catheter in position as before. Suggestion of subtle  infiltrate/atelectasis right base.     This report was finalized on 4/8/2025 8:07 PM by Dr. Khai Resendiz M.D  on Workstation: [id number]       XR Chest 1 View [[id number]] Collected: 04/04/25 1704     Updated: 04/04/25 1708    Narrative:      XR CHEST 1 VW-     HISTORY: Female who is 54 years-old, endotracheal tube placement     TECHNIQUE: Frontal view of the chest     COMPARISON: 12/3/2024     FINDINGS:  Central venous catheter appears stable. Endotracheal tube tip is 2.2 cm  above the peterson. The heart is enlarged. Pulmonary vasculature appears  slightly congested. No focal pulmonary consolidation, pleural effusion,  or pneumothorax. No acute osseous process.       Impression:      Endotracheal intubation. No focal pulmonary consolidation.  Cardiomegaly with slight pulmonary vascular congestion.     This report was finalized on 4/4/2025 5:05 PM by Dr. Ronaldo Rodriguez M.D on Workstation: [id number]       FL Retrograde Pyelogram In OR [[id number]] Collected: 04/02/25 2040     Updated: 04/04/25 1050    Narrative:      RETROGRADE PYELOGRAM INTERPRETATION ONLY 04/02/2025     HISTORY:  Retrograde pyelogram.     FINDINGS: Contrast is seen in the bilateral collecting systems and  ureters. 1 or 2 tiny filling defects are seen in the proximal right  ureter just distal to the right ureteral pelvic junction which may  represent 1 or 2 tiny stones or air bubbles. There is a moderate size  filling defect in the region of the left ureteral pelvic junction. No  stones of this size are seen on the CT scan from 04/02/2025 and this  could represent air bubble or possibly a hematoma. There is mild  dilatation of the left collecting system. Left mid to distal ureter  appears normal. Double-J left ureteral stent is seen in good position.     Fluoroscopy time 24 seconds 5 images 4 mGy (K, A, R)           This report was finalized on 4/4/2025 10:44 AM by Dr. Jae Cheema M.D on Workstation: [id number]       CT Abdomen Pelvis Without Contrast [[id number]] Collected: 04/02/25 0858     Updated: 04/02/25 0910    Narrative:      CT ABDOMEN PELVIS WO CONTRAST-     DATE OF EXAM: 4/2/2025 8:22 AM     INDICATION: left abdominal pain, n/v/d x 3 days.     COMPARISON: CT guided renal biopsy 11/14/2022. Radiographs of the sacrum  and coccyx 6/24/2023.     TECHNIQUE: Multiple contiguous axial images were acquired through the  abdomen and pelvis without the intravenous administration of contrast.  Reformatted coronal and sagittal sequences were also reviewed. Radiation  dose reduction techniques were utilized, including automated exposure  control and exposure modulation based on body size.     FINDINGS:  The included lung bases are clear. Partially imaged calcified coronary  artery disease and trace pericardial fluid. Relative hyperdensity of the  interventricular septum suggest anemia.     Status post cholecystectomy. The liver, spleen, pancreas, and adrenal  glands are unremarkable. Moderate left hydronephrosis and hydroureter  with left perinephric and periureteral fat stranding and air distended  calyces in the upper  pole of the left kidney, suggesting emphysematous  pyelitis. Bilateral renal calcifications are likely vascular and  nonobstructing. No obstructing renal or ureteral stone is identified.  The urinary bladder is nondistended. Diffuse urinary bladder wall  thickening could be accentuated by under distention. Status post  hysterectomy. The adnexa are not definitively identified.     Small periumbilical hernia containing a short knuckle of nondistended  transverse colon. Mild colorectal stool. No bowel obstruction or  significant bowel wall thickening. The appendix is normal.     Nonspecific small volume free fluid in the pelvis. No free  intraperitoneal air. No pathologically enlarged lymph nodes in the  abdomen or pelvis. Mild to moderate calcified atherosclerotic disease in  the abdominal aorta and its distal branches without aneurysm.     Mild rotary lumbar levoscoliosis. Multilevel lumbar spondylosis, most  severe at L5-S1. Mild to moderate bilateral hip and SI joint DJD. No  acute osseous abnormality or concerning osseous lesion.       Impression:         1. Moderate left hydronephrosis and hydroureter with left perinephric  and periureteral fat stranding and air distended calyces in the upper  pole of the left kidney, suggesting emphysematous pyelitis. Recommend  correlating with urinalysis.  2. Urinary bladder wall thickening could be accentuated by  underdistention but could also reflect a nonspecific cystitis.  3. Small periumbilical hernia containing a short knuckle of nondistended  transverse colon.     This report was finalized on 4/2/2025 9:07 AM by Corbin Mayberry MD on  Workstation: [id number]               Results for orders placed during the hospital encounter of 04/02/25    Adult Transthoracic Echo Complete W/ Cont if Necessary Per Protocol    Interpretation Summary  •  Left ventricular systolic function is low normal. Calculated left ventricular EF = 48.8%  •  Left ventricular diastolic function is  "consistent with (grade I) impaired relaxation.  •  The left atrial cavity is mildly dilated.  •  There is mild calcification of the aortic valve. No stenosis present  •  Calculated right ventricular systolic pressure from tricuspid regurgitation is 47 mmHg.  •  Mild to moderate pulmonary hypertension is present.  •  Mild mitral valve regurgitation is present.    Pertinent Labs     Results from last 7 days   Lab Units 04/16/25  0449 04/15/25  0319 04/14/25  0347 04/13/25  0344   WBC 10*3/mm3 11.30* 13.32* 13.43* 15.73*   HEMOGLOBIN g/dL 8.6* 8.2* 8.9* 8.3*   PLATELETS 10*3/mm3 442 415 365 389     Results from last 7 days   Lab Units 04/16/25  0449 04/15/25  0319 04/14/25  0347 04/13/25  0344   SODIUM mmol/L 130* 128* 125* 130*   POTASSIUM mmol/L 4.3 4.9 4.8 4.2   CHLORIDE mmol/L 98 94* 93* 94*   CO2 mmol/L 21.9* 21.9* 17.0* 23.7   BUN mg/dL 33* 52* 40* 28*   CREATININE mg/dL 5.74* 7.23* 6.59* 5.17*   GLUCOSE mg/dL 99 87 90 73   EGFR mL/min/1.73 8.3* 6.3* 7.1* 9.5*     Results from last 7 days   Lab Units 04/16/25  0449 04/15/25  0319 04/14/25  0347 04/13/25  0344   ALBUMIN g/dL 2.8* 2.8* 2.6* 2.7*     Results from last 7 days   Lab Units 04/16/25  0449 04/15/25  0319 04/14/25  0347 04/13/25  0344   CALCIUM mg/dL 8.4* 8.5* 8.3* 8.2*   ALBUMIN g/dL 2.8* 2.8* 2.6* 2.7*   PHOSPHORUS mg/dL 4.4 4.6* 3.8 3.5               Invalid input(s): \"LDLCALC\"  Results from last 7 days   Lab Units 04/11/25  1905 04/11/25  1851   BLOODCX  No growth at 4 days No growth at 4 days         Test Results Pending at Discharge     Pending Results       None              Discharge Details        Discharge Medications        New Medications        Instructions Start Date   aspirin 81 MG EC tablet   81 mg, Oral, Daily      insulin glargine 100 UNIT/ML injection  Commonly known as: LANTUS, SEMGLEE   10 Units, Subcutaneous, Nightly      insulin lispro 100 UNIT/ML injection  Commonly known as: HUMALOG/ADMELOG   2-7 Units, Subcutaneous, 4 Times Daily " Before Meals & Nightly      Lidocaine 4 %   1 patch, Transdermal, Every 24 Hours Scheduled, Remove & Discard patch within 12 hours or as directed by MD      sevelamer 800 MG tablet  Commonly known as: RENVELA   3,200 mg, Oral, 3 Times Daily With Meals             Changes to Medications        Instructions Start Date   albuterol sulfate  (90 Base) MCG/ACT inhaler  Commonly known as: PROVENTIL HFA;VENTOLIN HFA;PROAIR HFA  What changed: See the new instructions.   INHALE 2 PUFFS BY MOUTH EVERY 6 HOURS AS NEEDED FOR WHEEZING FOR SHORTNESS OF BREATH      gabapentin 300 MG capsule  Commonly known as: NEURONTIN  What changed:   medication strength  how much to take  when to take this  reasons to take this   300 mg, Oral, Nightly PRN      midodrine 5 MG tablet  Commonly known as: PROAMATINE  What changed:   medication strength  how much to take  when to take this  reasons to take this  additional instructions   5 mg, Oral, Daily PRN             Continue These Medications        Instructions Start Date   cyanocobalamin 1000 MCG tablet  Commonly known as: VITAMIN B-12   1,000 mcg, Oral, Daily      cyclobenzaprine 10 MG tablet  Commonly known as: FLEXERIL   10 mg, Oral, Nightly PRN      fluticasone 50 MCG/ACT nasal spray  Commonly known as: FLONASE   2 sprays, Each Nare, Daily      simvastatin 20 MG tablet  Commonly known as: ZOCOR   20 mg, Oral, Daily      vitamin D 1.25 MG (49265 UT) capsule capsule  Commonly known as: ERGOCALCIFEROL   50,000 Units, 2 Times Weekly             Stop These Medications      benzonatate 100 MG capsule  Commonly known as: TESSALON     carvedilol 25 MG tablet  Commonly known as: COREG     lanthanum 1000 MG chewable tablet  Commonly known as: FOSRENOL     Magnesium Glycinate 120 MG capsule     torsemide 100 MG tablet  Commonly known as: DEMADEX              Allergies   Allergen Reactions   • Tomato Swelling     THROAT   • Mixed Grasses Itching     WATERY EYES   • Penicillin G Unknown - High  Severity   • Cat Dander Itching     WATERY EYES   • Latex Itching       Discharge Disposition:  Rehab Facility or Unit (DC - External)      Discharge Diet:  Diet Order   Procedures   • Diet: Regular/House; Fluid Consistency: Thin (IDDSI 0)       Discharge Activity:       CODE STATUS:    Code Status and Medical Interventions: CPR (Attempt to Resuscitate); Full Support   Ordered at: 04/02/25 1306     Code Status (Patient has no pulse and is not breathing):    CPR (Attempt to Resuscitate)     Medical Interventions (Patient has pulse or is breathing):    Full Support       Future Appointments   Date Time Provider Department Center   5/6/2025  1:20 PM Damian Nguyen MD [id number]      Follow-up Information       Kim Benjamin APRN Follow up in 1 month(s).    Specialties: Nurse Practitioner, Family Medicine  Why: gonig to acute rehab first  Contact information:  [address]  [facility] [address]  [phone]                             Time Spent on Discharge:  Greater than 30 minutes      Mikael Leigh MD  [facility]  04/16/25  12:05 EDT

## 2025-04-16 NOTE — PROGRESS NOTES
RENAL/KCC:     LOS: 14 days    Patient Care Team:  Kim Benjamin APRN as PCP - General (Nurse Practitioner)  Miguel Stahl MD as Consulting Physician (Hematology and Oncology)  Radah Martinez APRN as Referring Physician (Nurse Practitioner)    Chief Complaint:  ESRD, Abdominal pain    Subjective     Interval History:   4/7: Chart reviewed  S/P cysto and L ureteral stent placement on 4/2/25  Extubated on Saturday  Feeling well, denies new complaints    4/8: Patient seen and examined on hemodialysis, she was seen about 30 minutes into her treatment, tolerating well so far, BP in the 140s, set for 2 L UF  She denies any shortness of breath chest pain or edema    4/9: She completed dialysis yesterday without event, 2 L of fluid was removed blood pressure was stable throughout her treatment  She declined to take midodrine with dialysis yesterday but looks like her blood pressure stable throughout the whole treatment  Another rapid response was called yesterday evening for respiratory arrest without PEA she was transferred to the ICU and is currently stable, somewhat hypertensive and O2 sats 100% on 2 L without any respiratory distress    April 10: Patient seen and examined in the ICU, on dialysis, tolerating well  She has been hypertensive throughout her treatment and has not needed Midodrine  Remains on oxygen, 1 to 2 L denies any worsening shortness of breath, no edema  Scheduled for 2 L UF but she refused that, goal is 1 L which looks like has been met    April 11: She tolerated dialysis very well yesterday  Denies new complaints, BP stable overnight  Sats 100% on 1 L  No dyspnea no edema no chest pain    April 13:   feels better, still with chest tenderness that is worse with movement due to cpr   Bp near goal     April 14   Tolerated UF yesterday, right breat pain today     4/15: She looks and feels well denies complaints getting ready to head down to dialysis    4/16 no acute events. Feeling well.  "      Objective     Vital Sign Min/Max for last 24 hours  Temp  Min: 97.4 °F (36.3 °C)  Max: 98.2 °F (36.8 °C)   BP  Min: 123/77  Max: 150/74   Pulse  Min: 73  Max: 79   Resp  Min: 18  Max: 18   SpO2  Min: 83 %  Max: 100 %   Flow (L/min) (Oxygen Therapy)  Min: 1  Max: 2   No data recorded     Flowsheet Rows      Flowsheet Row First Filed Value   Admission Height 175.3 cm (69\") Documented at 04/02/2025 1049   Admission Weight 110 kg (243 lb) Documented at 04/02/2025 1049            No intake/output data recorded.  I/O last 3 completed shifts:  In: 240 [P.O.:240]  Out: 50 [Urine:50]    Physical Exam:  GEN: Awake, alert, responsive, no acute distress, obese  ENT: PERRL, EOMI, MMM.  NECK: Supple, no JVD. RIJ tunneled HD catheter remains in place  CHEST: CTAB, no W/R/C.  CV: RRR, no M/G/R  ABD: Soft, NT, +BS  SKIN: Warm and Dry  NEURO: AAOX3   RUE AVF +thrill and bruit  +edema     WBC WBC   Date Value Ref Range Status   04/16/2025 11.30 (H) 3.40 - 10.80 10*3/mm3 Final   04/15/2025 13.32 (H) 3.40 - 10.80 10*3/mm3 Final   04/14/2025 13.43 (H) 3.40 - 10.80 10*3/mm3 Final      HGB Hemoglobin   Date Value Ref Range Status   04/16/2025 8.6 (L) 12.0 - 15.9 g/dL Final   04/15/2025 8.2 (L) 12.0 - 15.9 g/dL Final   04/14/2025 8.9 (L) 12.0 - 15.9 g/dL Final      HCT Hematocrit   Date Value Ref Range Status   04/16/2025 27.1 (L) 34.0 - 46.6 % Final   04/15/2025 25.4 (L) 34.0 - 46.6 % Final   04/14/2025 28.8 (L) 34.0 - 46.6 % Final      Platlets No results found for: \"LABPLAT\"   MCV MCV   Date Value Ref Range Status   04/16/2025 99.3 (H) 79.0 - 97.0 fL Final   04/15/2025 96.6 79.0 - 97.0 fL Final   04/14/2025 99.3 (H) 79.0 - 97.0 fL Final          Sodium Sodium   Date Value Ref Range Status   04/16/2025 130 (L) 136 - 145 mmol/L Final   04/15/2025 128 (L) 136 - 145 mmol/L Final   04/14/2025 125 (L) 136 - 145 mmol/L Final      Potassium Potassium   Date Value Ref Range Status   04/16/2025 4.3 3.5 - 5.2 mmol/L Final   04/15/2025 4.9 3.5 " "- 5.2 mmol/L Final   04/14/2025 4.8 3.5 - 5.2 mmol/L Final     Comment:     Slight hemolysis detected by analyzer. Result may be falsely elevated.      Chloride Chloride   Date Value Ref Range Status   04/16/2025 98 98 - 107 mmol/L Final   04/15/2025 94 (L) 98 - 107 mmol/L Final   04/14/2025 93 (L) 98 - 107 mmol/L Final      CO2 CO2   Date Value Ref Range Status   04/16/2025 21.9 (L) 22.0 - 29.0 mmol/L Final   04/15/2025 21.9 (L) 22.0 - 29.0 mmol/L Final   04/14/2025 17.0 (L) 22.0 - 29.0 mmol/L Final      BUN BUN   Date Value Ref Range Status   04/16/2025 33 (H) 6 - 20 mg/dL Final   04/15/2025 52 (H) 6 - 20 mg/dL Final   04/14/2025 40 (H) 6 - 20 mg/dL Final      Creatinine Creatinine   Date Value Ref Range Status   04/16/2025 5.74 (H) 0.57 - 1.00 mg/dL Final   04/15/2025 7.23 (H) 0.57 - 1.00 mg/dL Final   04/14/2025 6.59 (H) 0.57 - 1.00 mg/dL Final      Calcium Calcium   Date Value Ref Range Status   04/16/2025 8.4 (L) 8.6 - 10.5 mg/dL Final   04/15/2025 8.5 (L) 8.6 - 10.5 mg/dL Final   04/14/2025 8.3 (L) 8.6 - 10.5 mg/dL Final      PO4 No results found for: \"CAPO4\"   Albumin Albumin   Date Value Ref Range Status   04/16/2025 2.8 (L) 3.5 - 5.2 g/dL Final   04/15/2025 2.8 (L) 3.5 - 5.2 g/dL Final   04/14/2025 2.6 (L) 3.5 - 5.2 g/dL Final        Magnesium No results found for: \"MG\"       Uric Acid No results found for: \"URICACID\"        Results Review:     I reviewed the patient's new clinical results.    albumin human, 12.5 g, Intravenous, Once  aspirin, 81 mg, Oral, Daily  atorvastatin, 40 mg, Oral, Nightly  epoetin kindra/kindra-epbx, 10,000 Units, Intravenous, Once per day on Tuesday Thursday Saturday  ertapenem, 500 mg, Intravenous, Q24H  fluticasone, 2 spray, Each Nare, Daily  heparin (porcine), 5,000 Units, Subcutaneous, Q8H  insulin glargine, 13 Units, Subcutaneous, Nightly  insulin lispro, 2-7 Units, Subcutaneous, 4x Daily AC & at Bedtime  Lidocaine, 1 patch, Transdermal, Q24H  polyethylene glycol, 17 g, Oral, " Daily  sevelamer, 3,200 mg, Oral, TID With Meals  sodium chloride, 10 mL, Intravenous, Q12H  sodium chloride, 10 mL, Intravenous, Q12H  sodium chloride, 10 mL, Intravenous, Q12H  sodium chloride, 10 mL, Intravenous, Q12H  sodium chloride, 10 mL, Intravenous, Q12H  cyanocobalamin, 1,000 mcg, Oral, Daily  vitamin D, 50,000 Units, Oral, Once per day on Monday Thursday             Medication Review: Reviewed    Assessment & Plan     1) ESRD - on TTS HD, completed treatment Saturday   2) Abdominal pain, improved  3) L hydronephrosis with emphysematous pyelonephritis, status post cystoscopy/stent on 4/2  4) DM  5) Hypotension -stabilizing off midodrine  6) Metabolic acidosis  7) Anemia of CKD  8) ESBL UTI/Pyelo  9) Bacteremia  10) s/p cardiac arrest: bradycardia with subsequent PEA.  Another episode of rapid response with respiratory arrest, currently stable, possibly related to narcotics      Plan:  Will plan for HD today.      Her dialysis schedule at [facility] rehab will be Monday Wednesday Friday and she will be fine waiting until Friday for her next dialysis treatment as long as she stays on a renal diet and limits her fluids  Blood pressure stabilizing  Plan to resume b-blocker if she tolerates UF on HD today  Electrolytes and volume at goal today, oxygenation adequate    Tunnel catheter in place for access, okay to use AV fistula as well: Hx of infiltration   Epogen with HD for anemia  On Po4 binder with meals   Renally dose antibiotics for GFR less than 10  Stable for transfer to rehab at [facility] from nephrology standpoint at any time    Noted plans for stent removal at bedside by urology today.    Sima Gandara MD  [facility]  04/16/25  09:56 EDT

## 2025-05-06 ENCOUNTER — TRANSCRIBE ORDERS (OUTPATIENT)
Dept: ADMINISTRATIVE | Facility: HOSPITAL | Age: 55
End: 2025-05-06
Payer: MEDICARE

## 2025-05-06 DIAGNOSIS — Z12.31 VISIT FOR SCREENING MAMMOGRAM: Primary | ICD-10-CM

## 2025-05-21 ENCOUNTER — HOSPITAL ENCOUNTER (OUTPATIENT)
Dept: MAMMOGRAPHY | Facility: HOSPITAL | Age: 55
Discharge: HOME | End: 2025-05-21
Admitting: NURSE PRACTITIONER
Payer: MEDICARE

## 2025-05-21 DIAGNOSIS — Z12.31 VISIT FOR SCREENING MAMMOGRAM: ICD-10-CM

## 2025-05-21 PROCEDURE — 77067 SCR MAMMO BI INCL CAD: CPT

## 2025-05-21 PROCEDURE — 77063 BREAST TOMOSYNTHESIS BI: CPT

## 2025-05-22 ENCOUNTER — RESULTS FOLLOW-UP (OUTPATIENT)
Dept: ADMINISTRATIVE | Facility: HOSPITAL | Age: 55
End: 2025-05-22
Payer: MEDICARE

## 2025-05-22 NOTE — LETTER
Kelly Pack  [id number]  [place] [address]    Altagracia 3, 2025     Dear Ms. Mcgrathuart:    Below are the results from your recent visit:    Resulted Orders   Mammo Screening Digital Tomosynthesis Bilateral With CAD    Narrative    MAMMO SCREENING DIGITAL TOMOSYNTHESIS BILATERAL W CAD-     CLINICAL INDICATION: 55 years old female presents for annual screening  mammogram.        TECHNIQUE: 2-D and tomosynthesis MLO and CC views of the breast(s) were  obtained     FINDINGS:     BREAST  DENSITY: There are scattered areas of fibroglandular density.     There are no suspicious masses, calcifications, or areas of  architectural distortion. There are scattered nodular densities in each  breast as well as some benign calcifications that are unchanged from  previous studies. There are no findings to suggest malignancy.     IMPRESSION/RECOMMENDATION(S):     Benign mammogram showing no change from 12/5/2023 or 12/5/2021.     Recommend annual screening mammogram in one year.     BI-RADS Category 2. Benign.           This report was finalized on 5/21/2025 2:47 PM by Dr. Anthony Palomares M.D  on Workstation: [id number]          Benign mammogram showing no change from 12/5/2023 or 12/5/2021.     Recommend annual screening mammogram in one year.          If you have any questions or concerns, please don't hesitate to call.         Sincerely,        YOSSI Fernandez

## 2025-05-22 NOTE — PROGRESS NOTES
Benign mammogram showing no change from 12/5/2023 or 12/5/2021.     Recommend annual screening mammogram in one year.

## 2025-06-09 ENCOUNTER — APPOINTMENT (OUTPATIENT)
Dept: GENERAL RADIOLOGY | Facility: HOSPITAL | Age: 55
End: 2025-06-09
Payer: MEDICARE

## 2025-06-09 ENCOUNTER — HOSPITAL ENCOUNTER (OUTPATIENT)
Facility: HOSPITAL | Age: 55
Discharge: HOME | End: 2025-06-09
Attending: EMERGENCY MEDICINE | Admitting: EMERGENCY MEDICINE
Payer: MEDICARE

## 2025-06-09 VITALS
RESPIRATION RATE: 16 BRPM | DIASTOLIC BLOOD PRESSURE: 77 MMHG | HEIGHT: 69 IN | SYSTOLIC BLOOD PRESSURE: 183 MMHG | TEMPERATURE: 98 F | OXYGEN SATURATION: 100 % | WEIGHT: 240 LBS | BODY MASS INDEX: 35.55 KG/M2 | HEART RATE: 76 BPM

## 2025-06-09 DIAGNOSIS — S92.034A CLOSED NONDISPLACED AVULSION FRACTURE OF TUBEROSITY OF RIGHT CALCANEUS, INITIAL ENCOUNTER: ICD-10-CM

## 2025-06-09 DIAGNOSIS — I10 ELEVATED BLOOD PRESSURE READING WITH DIAGNOSIS OF HYPERTENSION: ICD-10-CM

## 2025-06-09 DIAGNOSIS — M25.571 ACUTE RIGHT ANKLE PAIN: Primary | ICD-10-CM

## 2025-06-09 DIAGNOSIS — N18.6 ESRD ON DIALYSIS: ICD-10-CM

## 2025-06-09 DIAGNOSIS — Z99.2 ESRD ON DIALYSIS: ICD-10-CM

## 2025-06-09 DIAGNOSIS — S82.301A CLOSED FRACTURE OF DISTAL END OF RIGHT TIBIA, UNSPECIFIED FRACTURE MORPHOLOGY, INITIAL ENCOUNTER: ICD-10-CM

## 2025-06-09 PROCEDURE — G0463 HOSPITAL OUTPT CLINIC VISIT: HCPCS | Performed by: NURSE PRACTITIONER

## 2025-06-09 PROCEDURE — 73630 X-RAY EXAM OF FOOT: CPT

## 2025-06-09 PROCEDURE — 73610 X-RAY EXAM OF ANKLE: CPT

## 2025-06-09 RX ORDER — CLONIDINE HYDROCHLORIDE 0.1 MG/1
0.1 TABLET ORAL ONCE
Status: COMPLETED | OUTPATIENT
Start: 2025-06-09 | End: 2025-06-09

## 2025-06-09 RX ORDER — ACETAMINOPHEN 500 MG
500 TABLET ORAL ONCE
Status: COMPLETED | OUTPATIENT
Start: 2025-06-09 | End: 2025-06-09

## 2025-06-09 RX ADMIN — ACETAMINOPHEN 500 MG: 500 TABLET, FILM COATED ORAL at 16:37

## 2025-06-09 RX ADMIN — CLONIDINE HYDROCHLORIDE 0.1 MG: 0.1 TABLET ORAL at 16:37

## 2025-06-09 NOTE — DISCHARGE INSTRUCTIONS
Continue your compression stockings and normal tennis shoe    Call Dr. Antony office tomorrow for follow-up    Return Precautions    Although you are being discharged from the ED today, I encourage you to return for worsening symptoms.  Things can, and do, change such that treatment at home with medication may not be adequate.      Specifically, return for any of the following:    Chest pain, shortness of breath, pain or nausea and vomiting not controlled by medications provided.    Please make a follow up with your Primary Care Provider for a blood pressure recheck.

## 2025-06-09 NOTE — FSED PROVIDER NOTE
EMERGENCY DEPARTMENT ENCOUNTER    Room Number:  09/09  Date seen:  6/9/2025  Time seen: 16:19 EDT  PCP: Kim Benjamin APRN  Historian: Patient    Discussed/obtained information from independent historians: n/a    HPI:  Chief complaint:right ankle pain  A complete HPI/ROS/PMH/PSH/SH/FH are unobtainable due to: n/a  Context:Kelly Pack is a 55 y.o. female with ESRD on hemodialysis, anemia, hypertension and diabetes who presents to the ED with c/o right ankle pain after she rolled it.  Pain has been present after this injury for 2 months and that due to insurance reasons she has been unable to get it looked at.  She wears compression stockings at baseline.  She does not have a current orthopedic provider.She has been using tylenol at home for pain.  She denies recent falls.  Patient takes Coreg for her blood pressure and as needed clonidine.  She is very hypertensive today.  She states she had an appointment this morning did not take her Coreg.    External (non-ED) record review: Reviewed recent discharge summary from April 15 of this year from [facility].  Patient was admitted for emphysematous pyelitis and left-sided hydronephrosis.  She went for cystoscopy with a stent insertion in her urine and blood cultures were positive for ESBL E. coli.  While in the hospital and dialysis she had a PEA arrest and was resuscitated promptly had subsequent cardiac cath.    Chronic or social conditions impacting care:ESRD on dialysis, hypertension    ALLERGIES  Tomato, Mixed grasses, Penicillin g, Cat dander, and Latex    PAST MEDICAL HISTORY  Active Ambulatory Problems     Diagnosis Date Noted    Hyperlipidemia 09/17/2019    Allergic rhinitis 09/17/2019    Nephrotic range proteinuria 09/17/2019    Asthma 04/08/2021    Essential hypertension 04/08/2021    Uncontrolled type 2 diabetes mellitus with hyperglycemia 04/08/2021    Acute renal failure superimposed on stage 3a chronic kidney disease  11/08/2022    Anasarca 11/09/2022    Suspected sleep apnea     Anemia     Bilateral lower extremity edema     Elevated troponin     Acute renal failure superimposed on chronic kidney disease 06/22/2023    Type 2 diabetes mellitus with chronic kidney disease 06/23/2023    Symptomatic anemia 08/30/2024    ESRD (end stage renal disease) 08/30/2024    Hydronephrosis of left kidney 04/02/2025    NSTEMI (non-ST elevated myocardial infarction) 04/06/2025    Class 2 severe obesity with serious comorbidity in adult 04/07/2025    ESBL (extended spectrum beta-lactamase) producing bacteria infection 04/15/2025     Resolved Ambulatory Problems     Diagnosis Date Noted    Hypertensive emergency     Sepsis 04/02/2025    Emphysematous pyelonephritis of left kidney 04/02/2025     Past Medical History:   Diagnosis Date    Albuminuria     Allergic     Bell's palsy     Cataract     Cholelithiasis     CKD (chronic kidney disease)     Diabetes mellitus     Drug therapy     Endometrial cancer     HL (hearing loss)     Hypertension     Low back pain     Migraine     Obesity     Peripheral neuropathy     Renal insufficiency     Right otitis media     Type II diabetes mellitus     Visual impairment     Vitamin D deficiency        PAST SURGICAL HISTORY  Past Surgical History:   Procedure Laterality Date    ARTERIOVENOUS FISTULA  05/2023    CARDIAC CATHETERIZATION N/A 4/6/2025    Procedure: Left Heart Cath;  Surgeon: Damian Nguyne MD;  Location: [facility] INVASIVE LOCATION;  Service: Cardiology;  Laterality: N/A;    CARDIAC CATHETERIZATION N/A 4/6/2025    Procedure: Coronary angiography;  Surgeon: Damian Nguyen MD;  Location: [facility] INVASIVE LOCATION;  Service: Cardiology;  Laterality: N/A;    CATARACT EXTRACTION      CHOLECYSTECTOMY      CYSTOSCOPY, RETROGRADE PYELOGRAM AND STENT INSERTION Left 4/2/2025    Procedure: CYSTOSCOPY RETROGRADE PYELOGRAM AND STENT INSERTION;  Surgeon: Tl Gray MD;  Location: [facility]  OR;  Service: Urology;  Laterality: Left;    EYE SURGERY      NOV 2020    HYSTERECTOMY      due to cancer    INSERTION HEMODIALYSIS CATHETER Right 06/23/2023    Procedure: HEMODIALYSIS CATHETER INSERTION;  Surgeon: Mikael Mackay MD;  Location: [facility] MAIN OR;  Service: Vascular;  Laterality: Right;    OOPHORECTOMY      VITRECTOMY PARS PLANA Left 01/28/2020    Procedure: 25G VITRECTOMY-ENDO LASER;  Surgeon: Lazarus, Howard S., MD;  Location: [facility] MAIN OR;  Service: Ophthalmology       FAMILY HISTORY  Family History   Problem Relation Age of Onset    Breast cancer Mother     Cancer Mother         Breast cancer, back in the 90s    Alzheimer's disease Father     Dementia Father     Diabetes Father     Hyperlipidemia Father     Hypertension Father     Hearing loss Father     Mental illness Father         Alzheimer’s    Asthma Maternal Grandfather     Malig Hyperthermia Neg Hx        SOCIAL HISTORY  Social History     Socioeconomic History    Marital status: Single   Tobacco Use    Smoking status: Never    Smokeless tobacco: Never   Vaping Use    Vaping status: Never Used   Substance and Sexual Activity    Alcohol use: Not Currently     Comment: 3-4 drinks PER YEAR!    Drug use: No    Sexual activity: Not Currently     Partners: Male     Birth control/protection: Condom       REVIEW OF SYSTEMS  Review of Systems    All systems reviewed and negative except for those discussed in HPI.     PHYSICAL EXAM    I have reviewed the triage vital signs and nursing notes.  Vitals:    06/09/25 1705   BP: (!) 183/77   Pulse:    Resp:    Temp:    SpO2:      Physical Exam    GENERAL: not distressed  HENT: nares patent  EYES: no scleral icterus  NECK: no ROM limitations  CV: regular rhythm, regular rate, right upper extremity with very dialysis graft.  Positive thrill  RESPIRATORY: normal effort  ABDOMEN: soft  : deferred  MUSCULOSKELETAL: Moderate chronic appearing bilateral ankle edema and mild right lateral ankle pain.  The foot  is normal.  I am able to palpate the DP PT  NEURO: alert, moves all extremities, follows commands  SKIN: warm, dry    LAB RESULTS  Recent Results (from the past 24 hours)   ANTIBODY SCREEN    Collection Time: 06/09/25 11:38 AM    Specimen: Blood   Result Value Ref Range    Antibody Screen Negative ABSC    ABO/RH    Collection Time: 06/09/25 11:38 AM    Specimen: Blood   Result Value Ref Range    ABORh Yes Prev Hx     Method: Tube     Anti-A 4+     Anti-B 0     Anti-D 4+     A1 Cell 0     B Cell 3+     ABORh A POS    HEPATITIS C ANTIBODY    Collection Time: 06/09/25 11:45 AM    Specimen: Blood   Result Value Ref Range    External Hepatitis C Ab NONREACTIVE Nonreactive   HEPATITIS B SURFACE ANTIGEN    Collection Time: 06/09/25 11:45 AM    Specimen: Blood   Result Value Ref Range    Hep B S Ag Interp NONREACTIVE Nonreactive   TRIGLYCERIDES    Collection Time: 06/09/25 11:45 AM    Specimen: Blood   Result Value Ref Range    Triglycerides 235 (H) <=149 mg/dL   PHOSPHORUS    Collection Time: 06/09/25 11:45 AM    Specimen: Blood   Result Value Ref Range    Phosphorus 9.4 (H) 2.5 - 5.0 mg/dL   ALCOHOL LEVEL    Collection Time: 06/09/25 11:45 AM    Specimen: Blood   Result Value Ref Range    Ethanol <10 <=10 mg/dL    Ethanol % <0.010 %   CHOLESTEROL, TOTAL    Collection Time: 06/09/25 11:45 AM    Specimen: Blood   Result Value Ref Range    Total Cholesterol 202 (H) <=199 mg/dL   AUTODIFF    Collection Time: 06/09/25 11:45 AM    Specimen: Blood   Result Value Ref Range    Neutrophil % 70 34.0 - 75.0 %    Lymphocyte % 16.6 (L) 17.0 - 53.0 %    Monocyte % 8.7 2.0 - 12.0 %    Eosinophil % 4 0.0 - 7.0 %    Basophil % 0.7 0.0 - 3.0 %    Neutrophils Absolute 6.1 1.5 - 7.1 x10(3)/ul    Lymphocytes Absolute 1.5 1.0 - 3.5 x10(3)/ul    Monocytes Absolute 0.8 0.0 - 1.0 x10(3)/ul    Eosinophils Absolute 0.3 0.0 - 0.7 x10(3)/ul    Basophils Absolute 0.1 0.0 - 0.3 x10(3)/ul   ABO/RH    Collection Time: 06/09/25  1:39 PM    Specimen: Blood    Result Value Ref Range    ABORh Yes Prev Hx     Method: Tube     Anti-A 4+     Anti-B 0     Anti-D 3+     A1 Cell 0     B Cell 3+     ABORh A POS        Ordered the above labs and independently interpreted results.  My findings will be discussed in the ED course or medical decision making section below    RADIOLOGY RESULTS  XR Ankle 3+ View Right  XR Ankle 3+ View Right, XR Foot 3+ View Right  Result Date: 6/9/2025  XR FOOT 3+ VW RIGHT-, XR ANKLE 3+ VW RIGHT-  INDICATIONS: Trauma.  TECHNIQUE: 3 views of the right foot, 3 views of the right ankle  COMPARISON: 9/16/2021  FINDINGS:  Since the prior exam, sclerotic changes have developed in the tarsals, that could reflect sequela of neuropathy. An avulsion fracture fragment is apparent at the lateral margin of the distal end of the calcaneus on the AP view of the foot. Vertically oriented linear lucency involving the distal tibial metaphysis on the lateral views, new from the prior exam and is suspicious for age-indeterminate nondisplaced fracture, correlate clinically; cross-sectional imaging may be helpful for further evaluation if indicated. No other fractures are identified. There appears to be some posterior subluxation of the talus in relation to the tibia. Moderate to prominent calcaneal spurring is apparent. Soft tissue swelling is conspicuous around the ankle. Arterial calcifications are present.       As described.    This report was finalized on 6/9/2025 5:09 PM by Dr. Ronaldo Rodriguez M.D on Workstation: [id number]         Ordered the above noted radiological studies.  Independently interpreted by me.  My findings will be discussed in the medical decision section below.     PROGRESS, DATA ANALYSIS, CONSULTS AND MEDICAL DECISION MAKING    Please note that this section constitutes my independent interpretation of clinical data including lab results, radiology, EKG's.  This constitutes my independent professional opinion regarding differential diagnosis and  management of this patient.  It may include any factors such as history from outside sources, review of external records, social determinants of health, management of medications, response to those treatments, and discussions with other providers.    ED Course as of 06/09/25 1727 Mon Jun 09, 2025 1726 I offered pt boot and she declined.  She is  more comfortable in her own shoe.  The injury was 2 months ago.  Will have her follow up with Orthopedic Surgery.  [EW]      ED Course User Index  [EW] Darya Syed APRN     Orders placed during this visit:  Orders Placed This Encounter   Procedures    XR Ankle 3+ View Right    XR Foot 3+ View Right            Medical Decision Making  Problems Addressed:  Acute right ankle pain: complicated acute illness or injury  Closed fracture of distal end of right tibia, unspecified fracture morphology, initial encounter: complicated acute illness or injury  Closed nondisplaced avulsion fracture of tuberosity of right calcaneus, initial encounter: complicated acute illness or injury  Elevated blood pressure reading with diagnosis of hypertension: complicated acute illness or injury  ESRD on dialysis: complicated acute illness or injury    Amount and/or Complexity of Data Reviewed  Radiology: ordered.    Risk  OTC drugs.  Prescription drug management.    Patient presents today for evaluation of right ankle and foot pain that has been going on for 2 months after a twisting type mechanism of injury.  She is ambulatory.  She has been wearing normal shoes with her compression stockings.  She does have some chronic ankle edema at baseline that is bilateral.  Her pulses and compartments are intact.  I considered distal fibular fracture, avulsion fracture of the distal fibula or lateral malleolus, fifth metatarsal fracture versus sprain or ligamentous type injury.  Imaging does show an avulsion fracture of the calcaneus and a age-indeterminate nondisplaced fracture of the distal  tibial metaphysis.  Patient needs to follow-up with orthopedic surgery.  I have given her Dr. Junior Antony for follow-up.  She ambulates.  She declined a boot.        DIAGNOSIS  Final diagnoses:   Acute right ankle pain   Closed nondisplaced avulsion fracture of tuberosity of right calcaneus, initial encounter   Closed fracture of distal end of right tibia, unspecified fracture morphology, initial encounter   Elevated blood pressure reading with diagnosis of hypertension   ESRD on dialysis          Medication List        Changed      albuterol sulfate  (90 Base) MCG/ACT inhaler  Commonly known as: PROVENTIL HFA;VENTOLIN HFA;PROAIR HFA  INHALE 2 PUFFS BY MOUTH EVERY 6 HOURS AS NEEDED FOR WHEEZING FOR SHORTNESS OF BREATH  What changed: See the new instructions.              FOLLOW-UP  Junior Antony Jr., MD  [address] [id number] [phone]    Schedule an appointment as soon as possible for a visit in 1 day          Latest Documented Vital Signs:  As of 17:27 EDT  BP- (!) 183/77 HR- 76 Temp- 98 °F (36.7 °C) (Oral) O2 sat- 100%    Appropriate PPE utilized throughout this patient encounter to include mask, if indicated, per current protocol. Hand hygiene was performed before donning PPE and after removal when leaving the room.    Please note that portions of this were completed with a voice recognition program.     Note Disclaimer: At [facility], we believe that sharing information builds trust and better relationships. You are receiving this note because you are receiving care at [facility] or recently visited. It is possible you will see health information before a provider has talked with you about it. This kind of information can be easy to misunderstand. To help you fully understand what it means for your health, we urge you to discuss this note with your provider.

## 2025-06-24 ENCOUNTER — OFFICE VISIT (OUTPATIENT)
Dept: FAMILY MEDICINE CLINIC | Facility: CLINIC | Age: 55
End: 2025-06-24
Payer: MEDICARE

## 2025-06-24 VITALS
SYSTOLIC BLOOD PRESSURE: 180 MMHG | HEIGHT: 69 IN | DIASTOLIC BLOOD PRESSURE: 78 MMHG | OXYGEN SATURATION: 98 % | BODY MASS INDEX: 36.66 KG/M2 | TEMPERATURE: 96.6 F | WEIGHT: 247.5 LBS | HEART RATE: 79 BPM

## 2025-06-24 DIAGNOSIS — F41.9 ANXIETY AND DEPRESSION: ICD-10-CM

## 2025-06-24 DIAGNOSIS — F32.A ANXIETY AND DEPRESSION: ICD-10-CM

## 2025-06-24 DIAGNOSIS — G47.33 OSA (OBSTRUCTIVE SLEEP APNEA): ICD-10-CM

## 2025-06-24 DIAGNOSIS — Z99.2 KIDNEY DISEASE, CHRONIC, END STAGE ON DIALYSIS: ICD-10-CM

## 2025-06-24 DIAGNOSIS — N18.6 KIDNEY DISEASE, CHRONIC, END STAGE ON DIALYSIS: ICD-10-CM

## 2025-06-24 DIAGNOSIS — E11.65 TYPE 2 DIABETES MELLITUS WITH HYPERGLYCEMIA, WITHOUT LONG-TERM CURRENT USE OF INSULIN: Primary | ICD-10-CM

## 2025-06-24 PROCEDURE — 3078F DIAST BP <80 MM HG: CPT | Performed by: NURSE PRACTITIONER

## 2025-06-24 PROCEDURE — 99214 OFFICE O/P EST MOD 30 MIN: CPT | Performed by: NURSE PRACTITIONER

## 2025-06-24 PROCEDURE — 3077F SYST BP >= 140 MM HG: CPT | Performed by: NURSE PRACTITIONER

## 2025-06-24 PROCEDURE — 1126F AMNT PAIN NOTED NONE PRSNT: CPT | Performed by: NURSE PRACTITIONER

## 2025-06-24 PROCEDURE — G2211 COMPLEX E/M VISIT ADD ON: HCPCS | Performed by: NURSE PRACTITIONER

## 2025-06-24 PROCEDURE — 3052F HG A1C>EQUAL 8.0%<EQUAL 9.0%: CPT | Performed by: NURSE PRACTITIONER

## 2025-06-24 NOTE — PROGRESS NOTES
Chief Complaint  Weight Loss and Diabetes    Subjective        Weight Loss  Symptoms: no abdominal pain, no chest pain, no chills, no cough, no fatigue, no fever, no headaches, no nausea and no vomiting    Diabetes  Associated symptoms:     weight loss      no chest pain, no fatigue, no polydipsia, no polyphagia and no polyuria    Hypoglycemia symptoms:     no dizziness, no headaches and is not nervous/anxious       Kelly presents to [facility]   as a 54-year-old female for follow-up on chronic conditions, to refill medications, review/order labs.  Overall doing okay     Type II diabetic   Since the last visit, she has overall felt well.  she has been compliant with current meds.  she denies medication side effects.  Glucose control with medication is needs improvement   The last HgbA1C result was   Lab Results   Component Value Date    HGBA1C 8.20 (H) 04/02/2025   .  The last dilated eye exam was 2025  Taking her medication as directed  Currently on dialysis  Would like to try Mounjaro to see if that also helps with sleep apnea states that she has discussed this with her nephrology team and they do recommend    Anxiety has been increased recently  Has upcoming surgery for right ankle  Recently lost her job  Currently on dialysis  She is taking all of her medication as directed  Denies any need for medication change today  Denies any SI or HI      .  She does have follow-up appointment scheduled with her specialist.     Last labs reviewed    No other acute complaints today    Review of Systems   Constitutional:  Positive for unexpected weight loss. Negative for chills, fatigue and fever.   Eyes:  Negative for visual disturbance.   Respiratory:  Negative for cough, shortness of breath and wheezing.    Cardiovascular:  Negative for chest pain and leg swelling.   Gastrointestinal:  Negative for abdominal pain, diarrhea, nausea and vomiting.   Endocrine: Negative for polydipsia,  "polyphagia and polyuria.   Neurological:  Negative for dizziness.   Psychiatric/Behavioral:  Negative for self-injury, sleep disturbance and suicidal ideas. The patient is not nervous/anxious.         Objective   Vital Signs:   Vitals:    06/24/25 0843   BP: 180/78   Pulse: 79   Temp: 96.6 °F (35.9 °C)   SpO2: 98%   Weight: 112 kg (247 lb 8 oz)   Height: 175.3 cm (69.02\")   PainSc: 0-No pain            4/7/2025     2:40 PM   PHQ-2/PHQ-9 Depression Screening   Little interest or pleasure in doing things Not at all   Feeling down, depressed, or hopeless Not at all       Class 2 Severe Obesity (BMI >=35 and <=39.9). Obesity-related health conditions include the following: obstructive sleep apnea and diabetes mellitus. Obesity is unchanged. BMI is is above average; BMI management plan is completed. We discussed portion control and increasing exercise.        Physical Exam  Vitals reviewed.   Constitutional:       General: She is not in acute distress.  Eyes:      Conjunctiva/sclera: Conjunctivae normal.   Neck:      Thyroid: No thyromegaly.      Vascular: No carotid bruit.   Cardiovascular:      Rate and Rhythm: Normal rate and regular rhythm.      Heart sounds: Normal heart sounds.   Pulmonary:      Effort: Pulmonary effort is normal. No respiratory distress.      Breath sounds: Normal breath sounds. No stridor. No wheezing, rhonchi or rales.   Chest:      Chest wall: No tenderness.   Neurological:      Mental Status: She is alert and oriented to person, place, and time.   Psychiatric:         Attention and Perception: Attention normal.         Mood and Affect: Mood normal.          Result Review :     The following data was reviewed by: YOSSI Fernandez on 06/24/2025:           Assessment and Plan       Type 2 diabetes mellitus with hyperglycemia, without long-term current use of insulin  Diabetes is worsening.   Medication changes per orders.  Diabetes will be reassessed in 6 months    Orders:    Tirzepatide " 2.5 MG/0.5ML solution auto-injector; Inject 2.5 mg under the skin into the appropriate area as directed 1 (One) Time Per Week.    Kidney disease, chronic, end stage on dialysis  Renal condition is stable.  Continue current treatment regimen.  Renal condition will be reassessed in 6 months.    Orders:    Tirzepatide 2.5 MG/0.5ML solution auto-injector; Inject 2.5 mg under the skin into the appropriate area as directed 1 (One) Time Per Week.    ALEISHA (obstructive sleep apnea)  Trial Mounjaro if approved by insurance  Orders:    Tirzepatide 2.5 MG/0.5ML solution auto-injector; Inject 2.5 mg under the skin into the appropriate area as directed 1 (One) Time Per Week.    Anxiety and depression  Patient's depression is a recurrent episode that is moderate without psychosis. Depression is active and stable.    Plan:   Continue current medication therapy     Followup in 6 months.                Follow Up   Return in about 6 months (around 12/24/2025), or if symptoms worsen or fail to improve.  Patient was given instructions and counseling regarding her condition or for health maintenance advice. Please see specific information pulled into the AVS if appropriate.

## 2025-06-26 ENCOUNTER — TELEPHONE (OUTPATIENT)
Dept: FAMILY MEDICINE CLINIC | Facility: CLINIC | Age: 55
End: 2025-06-26
Payer: MEDICARE

## 2025-06-26 NOTE — TELEPHONE ENCOUNTER
HUB TO RELAY  Left patient a voicemail to inform her that per covermymeds Authorization already on file for this request. Authorization starting on 06/24/2025 and ending on 12/31/2025. The PA has been completed and she would need to contact her insurance regarding the cost of medication.

## 2025-06-26 NOTE — TELEPHONE ENCOUNTER
Name: Kelly Pack    Relationship: Self    Best Callback Number:   Telephone Information:   Mobile [phone]       HUB PROVIDED THE RELAY MESSAGE FROM THE OFFICE   PATIENT VOICED UNDERSTANDING AND HAS NO FURTHER QUESTIONS AT THIS TIME    ADDITIONAL INFORMATION:

## 2025-06-26 NOTE — TELEPHONE ENCOUNTER
Caller: Kelly Pack    Relationship to patient: Self      Best call back number: [phone]     Provider: NICOLE WORTHINGTON     Medication PA needed: Tirzepatide 2.5 MG/0.5ML solution auto-injector     Reason for call/Prior Auth: PATIENT STATES THAT SHE IS ON MEDICARE AND THIS MEDICATION SHOULD BE COVERED IN FULL UNDER MEDICARE.     WHEN PATIENT WENT TO  IT WAS $629 AND PATIENT BELIEVES THAT SHE NEEDS A PRIOR AUTH TO HAVE THIS COVERED.     PLEASE CALL PATIENT TO DISCUSS AND ADVISE.     PATIENT STATES THAT SHE HAS SLEEP APNEA AND DIABETES.

## 2025-07-10 ENCOUNTER — TELEPHONE (OUTPATIENT)
Dept: FAMILY MEDICINE CLINIC | Facility: CLINIC | Age: 55
End: 2025-07-10

## 2025-07-10 DIAGNOSIS — E11.65 TYPE 2 DIABETES MELLITUS WITH HYPERGLYCEMIA, WITHOUT LONG-TERM CURRENT USE OF INSULIN: Primary | ICD-10-CM

## 2025-07-10 RX ORDER — TIRZEPATIDE 2.5 MG/.5ML
2.5 INJECTION, SOLUTION SUBCUTANEOUS WEEKLY
Qty: 2 ML | Refills: 1 | Status: SHIPPED | OUTPATIENT
Start: 2025-07-10

## 2025-07-16 ENCOUNTER — TELEPHONE (OUTPATIENT)
Dept: FAMILY MEDICINE CLINIC | Facility: CLINIC | Age: 55
End: 2025-07-16
Payer: MEDICARE

## 2025-07-16 NOTE — TELEPHONE ENCOUNTER
Caller: Kelly Pack    Relationship: Self    Best call back number: [phone]     Which medication are you concerned about:     Tirzepatide (Mounjaro) 2.5 MG/0.5ML solution auto-injector       Who prescribed you this medication: NICOLE        What are your concerns: MEDICATION WAS SENT TO [facility] BUT THEY DO NOT DO THE GENERIC AND IT IS OVER $600.  ASKING IF THIS CAN BE SENT TO [facility].      [id number] - [facility] [address]  AT [facility] [phone] [facility] [phone] [facility]       PATIENT SAID SHE APPRECIATES ALL THE HELP ON THIS.

## 2025-07-17 DIAGNOSIS — E11.65 TYPE 2 DIABETES MELLITUS WITH HYPERGLYCEMIA, WITHOUT LONG-TERM CURRENT USE OF INSULIN: ICD-10-CM

## 2025-07-17 RX ORDER — TIRZEPATIDE 2.5 MG/.5ML
2.5 INJECTION, SOLUTION SUBCUTANEOUS WEEKLY
Qty: 2 ML | Refills: 1 | Status: SHIPPED | OUTPATIENT
Start: 2025-07-17

## (undated) DEVICE — INST SHIRAGA BACKFLUSH W/EXT TP ACT ASP 25G

## (undated) DEVICE — GLV SURG SENSICARE SLT PF LF 7.5 STRL

## (undated) DEVICE — PK VITRECT EVA VGPC 25G

## (undated) DEVICE — NDL HYPO PRECISIONGLIDE REG 25G 1 1/2

## (undated) DEVICE — GLIDESHEATH BASIC HYDROPHILIC COATED INTRODUCER SHEATH: Brand: GLIDESHEATH

## (undated) DEVICE — NDL HYPO PRECISIONGLIDE/REG 18G 1IN PNK

## (undated) DEVICE — LOU CYSTO: Brand: MEDLINE INDUSTRIES, INC.

## (undated) DEVICE — KT MANIFLD CARDIAC

## (undated) DEVICE — TR BAND RADIAL ARTERY COMPRESSION DEVICE: Brand: TR BAND

## (undated) DEVICE — CATH DIAG IMPULSE FL3.5 5F 100CM

## (undated) DEVICE — PK PROC TURNOVER

## (undated) DEVICE — PK CATH CARD 40

## (undated) DEVICE — SOL IRRIG H2O 1000ML STRL

## (undated) DEVICE — DGW .035 FC J3MM 260CM TEF: Brand: EMERALD

## (undated) DEVICE — SHLD EYE ALUM W/GARTER

## (undated) DEVICE — CATH DIAG IMPULSE FR4 5F 100CM

## (undated) DEVICE — SYR LL TP 10ML STRL

## (undated) DEVICE — TIDISHIELD UROLOGY DRAIN BAGS FROSTY VINYL STERILE FITS SIEMENS UROSKOP ACCESS 20 PER CASE: Brand: TIDISHIELD

## (undated) DEVICE — LOU PACE DEFIB: Brand: MEDLINE INDUSTRIES, INC.

## (undated) DEVICE — CATH DIAG IMPULSE FL4 5F 100CM

## (undated) DEVICE — CATH VENT MIV RADL PIG ST TIP 4F 110CM

## (undated) DEVICE — PK VITRECT 50

## (undated) DEVICE — FEMORAL ENTRY ANGIOGRAPHY SHIELD-YELLOW: Brand: RADPAD

## (undated) DEVICE — NITINOL WIRE WITH HYDROPHILIC TIP: Brand: SENSOR

## (undated) DEVICE — GLV SURG BIOGEL LTX PF 7